# Patient Record
Sex: MALE | Race: BLACK OR AFRICAN AMERICAN | NOT HISPANIC OR LATINO | Employment: OTHER | URBAN - METROPOLITAN AREA
[De-identification: names, ages, dates, MRNs, and addresses within clinical notes are randomized per-mention and may not be internally consistent; named-entity substitution may affect disease eponyms.]

---

## 2023-03-07 LAB
ANION GAP IN SER/PLAS: 10 MMOL/L (ref 10–20)
CALCIUM (MG/DL) IN SER/PLAS: 9.1 MG/DL (ref 8.6–10.3)
CARBON DIOXIDE, TOTAL (MMOL/L) IN SER/PLAS: 30 MMOL/L (ref 21–32)
CHLORIDE (MMOL/L) IN SER/PLAS: 105 MMOL/L (ref 98–107)
CREATININE (MG/DL) IN SER/PLAS: 0.9 MG/DL (ref 0.5–1.3)
ESTIMATED AVERAGE GLUCOSE FOR HBA1C: 148 MG/DL
GFR MALE: 90 ML/MIN/1.73M2
GLUCOSE (MG/DL) IN SER/PLAS: 124 MG/DL (ref 74–99)
HEMOGLOBIN A1C/HEMOGLOBIN TOTAL IN BLOOD: 6.8 %
POTASSIUM (MMOL/L) IN SER/PLAS: 3.7 MMOL/L (ref 3.5–5.3)
SODIUM (MMOL/L) IN SER/PLAS: 141 MMOL/L (ref 136–145)
UREA NITROGEN (MG/DL) IN SER/PLAS: 13 MG/DL (ref 6–23)

## 2023-04-03 PROBLEM — E55.9 HYPOVITAMINOSIS D: Status: ACTIVE | Noted: 2023-04-03

## 2023-04-03 PROBLEM — R53.83 FATIGUE: Status: ACTIVE | Noted: 2023-04-03

## 2023-04-03 PROBLEM — N52.9 MALE ERECTILE DISORDER: Status: ACTIVE | Noted: 2023-04-03

## 2023-04-03 PROBLEM — M46.1 SACROILIITIS (CMS-HCC): Status: ACTIVE | Noted: 2023-04-03

## 2023-04-03 PROBLEM — E11.9 DIABETES MELLITUS (MULTI): Status: ACTIVE | Noted: 2023-04-03

## 2023-04-03 PROBLEM — I10 BENIGN ESSENTIAL HYPERTENSION: Status: ACTIVE | Noted: 2023-04-03

## 2023-04-03 PROBLEM — M25.562 LEFT KNEE PAIN: Status: ACTIVE | Noted: 2023-04-03

## 2023-04-03 PROBLEM — M21.70 LEG LENGTH DISCREPANCY: Status: ACTIVE | Noted: 2023-04-03

## 2023-04-03 PROBLEM — H91.91 HEARING LOSS ON RIGHT: Status: ACTIVE | Noted: 2023-04-03

## 2023-04-03 PROBLEM — R79.89 LOW VITAMIN B12 LEVEL: Status: ACTIVE | Noted: 2023-04-03

## 2023-04-03 PROBLEM — H61.21 IMPACTED CERUMEN OF RIGHT EAR: Status: ACTIVE | Noted: 2023-04-03

## 2023-04-03 PROBLEM — M17.12 LEFT KNEE DJD: Status: ACTIVE | Noted: 2023-04-03

## 2023-04-03 PROBLEM — R61 NIGHT SWEATS: Status: ACTIVE | Noted: 2023-04-03

## 2023-04-03 PROBLEM — M25.662 STIFFNESS OF LEFT KNEE: Status: ACTIVE | Noted: 2023-04-03

## 2023-04-03 PROBLEM — K92.1 HEMATOCHEZIA: Status: ACTIVE | Noted: 2023-04-03

## 2023-04-03 PROBLEM — K21.9 GERD (GASTROESOPHAGEAL REFLUX DISEASE): Status: ACTIVE | Noted: 2023-04-03

## 2023-04-03 PROBLEM — R60.9 EDEMA: Status: ACTIVE | Noted: 2023-04-03

## 2023-04-03 PROBLEM — M25.569 JOINT PAIN, KNEE: Status: ACTIVE | Noted: 2023-04-03

## 2023-04-03 PROBLEM — M54.30 SCIATICA: Status: ACTIVE | Noted: 2023-04-03

## 2023-04-03 PROBLEM — E78.5 HYPERLIPEMIA: Status: ACTIVE | Noted: 2023-04-03

## 2023-04-03 PROBLEM — E87.6 HYPOKALEMIA: Status: ACTIVE | Noted: 2023-04-03

## 2023-04-03 PROBLEM — M17.9 ARTHRITIS OF KNEE, DEGENERATIVE: Status: ACTIVE | Noted: 2023-04-03

## 2023-04-03 PROBLEM — M54.2 CERVICALGIA: Status: ACTIVE | Noted: 2023-04-03

## 2023-04-03 PROBLEM — M54.42 LOW BACK PAIN WITH LEFT-SIDED SCIATICA: Status: ACTIVE | Noted: 2023-04-03

## 2023-04-03 RX ORDER — SILDENAFIL 100 MG/1
TABLET, FILM COATED ORAL
COMMUNITY
Start: 2019-11-25

## 2023-04-03 RX ORDER — MELOXICAM 15 MG/1
1 TABLET ORAL DAILY
COMMUNITY
Start: 2022-08-10 | End: 2023-05-19

## 2023-04-03 RX ORDER — BLOOD SUGAR DIAGNOSTIC
STRIP MISCELLANEOUS 2 TIMES DAILY
COMMUNITY
Start: 2018-12-19

## 2023-04-03 RX ORDER — METFORMIN HYDROCHLORIDE 500 MG/1
2 TABLET, EXTENDED RELEASE ORAL 2 TIMES DAILY
COMMUNITY
Start: 2019-08-07 | End: 2023-05-16

## 2023-04-03 RX ORDER — ROSUVASTATIN CALCIUM 40 MG/1
1 TABLET, COATED ORAL DAILY
COMMUNITY
Start: 2015-04-28 | End: 2023-05-22

## 2023-04-03 RX ORDER — LISINOPRIL 40 MG/1
1 TABLET ORAL DAILY
COMMUNITY
Start: 2014-05-16 | End: 2023-06-26

## 2023-04-03 RX ORDER — CHLORTHALIDONE 25 MG/1
1 TABLET ORAL DAILY
COMMUNITY
Start: 2019-10-24 | End: 2023-04-20

## 2023-04-03 RX ORDER — OMEPRAZOLE 20 MG/1
1 CAPSULE, DELAYED RELEASE ORAL DAILY
COMMUNITY
Start: 2015-04-28 | End: 2023-06-26

## 2023-04-03 RX ORDER — LANOLIN ALCOHOL/MO/W.PET/CERES
1 CREAM (GRAM) TOPICAL DAILY
COMMUNITY
Start: 2018-12-14 | End: 2023-10-12

## 2023-04-03 RX ORDER — LANCETS
EACH MISCELLANEOUS 2 TIMES DAILY
COMMUNITY

## 2023-04-04 ENCOUNTER — APPOINTMENT (OUTPATIENT)
Dept: PRIMARY CARE | Facility: CLINIC | Age: 73
End: 2023-04-04
Payer: MEDICARE

## 2023-04-04 DIAGNOSIS — M54.2 CERVICALGIA: Primary | ICD-10-CM

## 2023-04-20 DIAGNOSIS — I10 ESSENTIAL (PRIMARY) HYPERTENSION: ICD-10-CM

## 2023-04-20 RX ORDER — CHLORTHALIDONE 25 MG/1
25 TABLET ORAL DAILY
Qty: 90 TABLET | Refills: 1 | Status: SHIPPED | OUTPATIENT
Start: 2023-04-20 | End: 2023-10-24

## 2023-05-15 DIAGNOSIS — E11.9 TYPE 2 DIABETES MELLITUS WITHOUT COMPLICATIONS (MULTI): ICD-10-CM

## 2023-05-16 RX ORDER — METFORMIN HYDROCHLORIDE 500 MG/1
TABLET, EXTENDED RELEASE ORAL
Qty: 360 TABLET | Refills: 3 | Status: SHIPPED | OUTPATIENT
Start: 2023-05-16 | End: 2024-04-01 | Stop reason: SDUPTHER

## 2023-05-19 DIAGNOSIS — M54.2 CERVICALGIA: ICD-10-CM

## 2023-05-19 RX ORDER — MELOXICAM 15 MG/1
TABLET ORAL
Qty: 90 TABLET | Refills: 0 | Status: SHIPPED | OUTPATIENT
Start: 2023-05-19 | End: 2023-08-15

## 2023-05-22 DIAGNOSIS — E78.5 HYPERLIPIDEMIA, UNSPECIFIED: ICD-10-CM

## 2023-05-22 RX ORDER — ROSUVASTATIN CALCIUM 40 MG/1
TABLET, COATED ORAL
Qty: 90 TABLET | Refills: 3 | Status: SHIPPED | OUTPATIENT
Start: 2023-05-22

## 2023-06-24 DIAGNOSIS — I10 ESSENTIAL (PRIMARY) HYPERTENSION: ICD-10-CM

## 2023-06-24 DIAGNOSIS — K21.9 GASTRO-ESOPHAGEAL REFLUX DISEASE WITHOUT ESOPHAGITIS: ICD-10-CM

## 2023-06-26 RX ORDER — OMEPRAZOLE 20 MG/1
CAPSULE, DELAYED RELEASE ORAL
Qty: 90 CAPSULE | Refills: 3 | Status: SHIPPED | OUTPATIENT
Start: 2023-06-26

## 2023-06-26 RX ORDER — LISINOPRIL 40 MG/1
TABLET ORAL
Qty: 90 TABLET | Refills: 3 | Status: SHIPPED | OUTPATIENT
Start: 2023-06-26

## 2023-08-14 DIAGNOSIS — M54.2 CERVICALGIA: ICD-10-CM

## 2023-08-15 RX ORDER — MELOXICAM 15 MG/1
TABLET ORAL
Qty: 90 TABLET | Refills: 0 | Status: SHIPPED | OUTPATIENT
Start: 2023-08-15 | End: 2023-11-02

## 2023-08-23 ENCOUNTER — OFFICE VISIT (OUTPATIENT)
Dept: PRIMARY CARE | Facility: CLINIC | Age: 73
End: 2023-08-23
Payer: MEDICARE

## 2023-08-23 VITALS
WEIGHT: 209 LBS | BODY MASS INDEX: 27.7 KG/M2 | RESPIRATION RATE: 16 BRPM | SYSTOLIC BLOOD PRESSURE: 118 MMHG | HEIGHT: 73 IN | OXYGEN SATURATION: 97 % | DIASTOLIC BLOOD PRESSURE: 78 MMHG | HEART RATE: 73 BPM

## 2023-08-23 DIAGNOSIS — E55.9 HYPOVITAMINOSIS D: ICD-10-CM

## 2023-08-23 DIAGNOSIS — Z00.00 MEDICARE ANNUAL WELLNESS VISIT, SUBSEQUENT: ICD-10-CM

## 2023-08-23 DIAGNOSIS — R79.89 LOW VITAMIN B12 LEVEL: ICD-10-CM

## 2023-08-23 DIAGNOSIS — I10 BENIGN ESSENTIAL HYPERTENSION: Primary | ICD-10-CM

## 2023-08-23 DIAGNOSIS — S46.911A STRAIN OF RIGHT SHOULDER, INITIAL ENCOUNTER: ICD-10-CM

## 2023-08-23 DIAGNOSIS — Z12.5 SCREENING FOR PROSTATE CANCER: ICD-10-CM

## 2023-08-23 DIAGNOSIS — E11.65 TYPE 2 DIABETES MELLITUS WITH HYPERGLYCEMIA, WITHOUT LONG-TERM CURRENT USE OF INSULIN (MULTI): ICD-10-CM

## 2023-08-23 DIAGNOSIS — E78.5 HYPERLIPIDEMIA, UNSPECIFIED HYPERLIPIDEMIA TYPE: ICD-10-CM

## 2023-08-23 PROCEDURE — 1036F TOBACCO NON-USER: CPT | Performed by: NURSE PRACTITIONER

## 2023-08-23 PROCEDURE — 4010F ACE/ARB THERAPY RXD/TAKEN: CPT | Performed by: NURSE PRACTITIONER

## 2023-08-23 PROCEDURE — 3078F DIAST BP <80 MM HG: CPT | Performed by: NURSE PRACTITIONER

## 2023-08-23 PROCEDURE — 1159F MED LIST DOCD IN RCRD: CPT | Performed by: NURSE PRACTITIONER

## 2023-08-23 PROCEDURE — 99214 OFFICE O/P EST MOD 30 MIN: CPT | Performed by: NURSE PRACTITIONER

## 2023-08-23 PROCEDURE — 1160F RVW MEDS BY RX/DR IN RCRD: CPT | Performed by: NURSE PRACTITIONER

## 2023-08-23 PROCEDURE — 3074F SYST BP LT 130 MM HG: CPT | Performed by: NURSE PRACTITIONER

## 2023-08-23 PROCEDURE — 3044F HG A1C LEVEL LT 7.0%: CPT | Performed by: NURSE PRACTITIONER

## 2023-08-23 RX ORDER — DICLOFENAC SODIUM 10 MG/G
4 GEL TOPICAL 4 TIMES DAILY
Qty: 100 G | Refills: 1 | Status: SHIPPED | OUTPATIENT
Start: 2023-08-23 | End: 2024-02-28 | Stop reason: ALTCHOICE

## 2023-08-23 ASSESSMENT — ANXIETY QUESTIONNAIRES
7. FEELING AFRAID AS IF SOMETHING AWFUL MIGHT HAPPEN: NOT AT ALL
4. TROUBLE RELAXING: NOT AT ALL
IF YOU CHECKED OFF ANY PROBLEMS ON THIS QUESTIONNAIRE, HOW DIFFICULT HAVE THESE PROBLEMS MADE IT FOR YOU TO DO YOUR WORK, TAKE CARE OF THINGS AT HOME, OR GET ALONG WITH OTHER PEOPLE: NOT DIFFICULT AT ALL
3. WORRYING TOO MUCH ABOUT DIFFERENT THINGS: NOT AT ALL
2. NOT BEING ABLE TO STOP OR CONTROL WORRYING: NOT AT ALL
6. BECOMING EASILY ANNOYED OR IRRITABLE: NOT AT ALL
1. FEELING NERVOUS, ANXIOUS, OR ON EDGE: NOT AT ALL
GAD7 TOTAL SCORE: 0
5. BEING SO RESTLESS THAT IT IS HARD TO SIT STILL: NOT AT ALL

## 2023-08-23 ASSESSMENT — PATIENT HEALTH QUESTIONNAIRE - PHQ9
SUM OF ALL RESPONSES TO PHQ9 QUESTIONS 1 AND 2: 0
1. LITTLE INTEREST OR PLEASURE IN DOING THINGS: NOT AT ALL
2. FEELING DOWN, DEPRESSED OR HOPELESS: NOT AT ALL

## 2023-08-23 ASSESSMENT — ENCOUNTER SYMPTOMS
DEPRESSION: 0
LOSS OF SENSATION IN FEET: 0
OCCASIONAL FEELINGS OF UNSTEADINESS: 0

## 2023-08-23 NOTE — PROGRESS NOTES
"Subjective   Patient ID: Jayant Holland is a 73 y.o. male who presents for Follow-up.    Patient is following up for management of multiple chronic disease.  He is a diabetic and complaining of lateral right distal shoulder discomfort with lifting of his arm above his head.  Reports that it feels like a strained muscle.  He denies preceding fall, strain or trauma.  He denies any other associated symptoms.  His most recent hemoglobin A1c was very good at 6.8%.         Review of Systems   All other systems reviewed and are negative.      Objective   /78   Pulse 73   Resp 16   Ht 1.854 m (6' 1\")   Wt 94.8 kg (209 lb)   SpO2 97%   BMI 27.57 kg/m²     Physical Exam  Constitutional:       Appearance: Normal appearance.   HENT:      Head: Normocephalic and atraumatic.      Right Ear: External ear normal.      Left Ear: External ear normal.      Nose: Nose normal.      Mouth/Throat:      Mouth: Mucous membranes are moist.   Cardiovascular:      Rate and Rhythm: Normal rate and regular rhythm.      Pulses: Normal pulses.      Heart sounds: Normal heart sounds.   Pulmonary:      Effort: Pulmonary effort is normal.      Breath sounds: Normal breath sounds.   Abdominal:      General: Abdomen is flat. Bowel sounds are normal.      Palpations: Abdomen is soft.   Musculoskeletal:         General: Normal range of motion.      Cervical back: Normal range of motion and neck supple.   Skin:     General: Skin is warm and dry.      Capillary Refill: Capillary refill takes less than 2 seconds.   Neurological:      General: No focal deficit present.      Mental Status: He is alert and oriented to person, place, and time.   Psychiatric:         Mood and Affect: Mood normal.         Behavior: Behavior normal.         Thought Content: Thought content normal.         Judgment: Judgment normal.         Assessment/Plan   Problem List Items Addressed This Visit    None         "

## 2023-09-14 ENCOUNTER — TELEPHONE (OUTPATIENT)
Dept: PRIMARY CARE | Facility: CLINIC | Age: 73
End: 2023-09-14
Payer: MEDICARE

## 2023-09-14 DIAGNOSIS — G89.29 CHRONIC BILATERAL LOW BACK PAIN WITH LEFT-SIDED SCIATICA: Primary | ICD-10-CM

## 2023-09-14 DIAGNOSIS — M54.42 CHRONIC BILATERAL LOW BACK PAIN WITH LEFT-SIDED SCIATICA: Primary | ICD-10-CM

## 2023-09-14 NOTE — TELEPHONE ENCOUNTER
He called to see if you would write a script for him to get a handicap placker. If so Please call him at [phone]

## 2023-09-22 DIAGNOSIS — R26.2 DIFFICULTY WALKING: Primary | ICD-10-CM

## 2023-10-12 DIAGNOSIS — E53.8 DEFICIENCY OF OTHER SPECIFIED B GROUP VITAMINS: ICD-10-CM

## 2023-10-12 RX ORDER — LANOLIN ALCOHOL/MO/W.PET/CERES
1000 CREAM (GRAM) TOPICAL DAILY
Qty: 90 TABLET | Refills: 3 | Status: SHIPPED | OUTPATIENT
Start: 2023-10-12

## 2023-10-21 DIAGNOSIS — I10 ESSENTIAL (PRIMARY) HYPERTENSION: ICD-10-CM

## 2023-10-24 RX ORDER — CHLORTHALIDONE 25 MG/1
25 TABLET ORAL DAILY
Qty: 90 TABLET | Refills: 1 | Status: SHIPPED | OUTPATIENT
Start: 2023-10-24 | End: 2024-02-28

## 2023-11-01 DIAGNOSIS — M54.2 CERVICALGIA: ICD-10-CM

## 2023-11-02 RX ORDER — MELOXICAM 15 MG/1
TABLET ORAL
Qty: 90 TABLET | Refills: 1 | Status: SHIPPED | OUTPATIENT
Start: 2023-11-02 | End: 2024-02-28

## 2023-11-22 DIAGNOSIS — M25.562 LEFT KNEE PAIN, UNSPECIFIED CHRONICITY: ICD-10-CM

## 2023-11-24 ENCOUNTER — ANCILLARY PROCEDURE (OUTPATIENT)
Dept: RADIOLOGY | Facility: CLINIC | Age: 73
End: 2023-11-24
Payer: MEDICARE

## 2023-11-24 DIAGNOSIS — M25.562 LEFT KNEE PAIN, UNSPECIFIED CHRONICITY: ICD-10-CM

## 2023-11-24 PROCEDURE — 73564 X-RAY EXAM KNEE 4 OR MORE: CPT | Mod: LT

## 2023-11-24 PROCEDURE — 73564 X-RAY EXAM KNEE 4 OR MORE: CPT | Mod: LEFT SIDE | Performed by: RADIOLOGY

## 2023-11-29 ENCOUNTER — OFFICE VISIT (OUTPATIENT)
Dept: ORTHOPEDIC SURGERY | Facility: CLINIC | Age: 73
End: 2023-11-29
Payer: MEDICARE

## 2023-11-29 VITALS — WEIGHT: 209 LBS | BODY MASS INDEX: 27.7 KG/M2 | HEIGHT: 73 IN

## 2023-11-29 DIAGNOSIS — M17.12 PRIMARY OSTEOARTHRITIS OF LEFT KNEE: Primary | ICD-10-CM

## 2023-11-29 PROCEDURE — 20610 DRAIN/INJ JOINT/BURSA W/O US: CPT | Performed by: STUDENT IN AN ORGANIZED HEALTH CARE EDUCATION/TRAINING PROGRAM

## 2023-11-29 PROCEDURE — 1160F RVW MEDS BY RX/DR IN RCRD: CPT | Performed by: STUDENT IN AN ORGANIZED HEALTH CARE EDUCATION/TRAINING PROGRAM

## 2023-11-29 PROCEDURE — 3044F HG A1C LEVEL LT 7.0%: CPT | Performed by: STUDENT IN AN ORGANIZED HEALTH CARE EDUCATION/TRAINING PROGRAM

## 2023-11-29 PROCEDURE — 4010F ACE/ARB THERAPY RXD/TAKEN: CPT | Performed by: STUDENT IN AN ORGANIZED HEALTH CARE EDUCATION/TRAINING PROGRAM

## 2023-11-29 PROCEDURE — 1125F AMNT PAIN NOTED PAIN PRSNT: CPT | Performed by: STUDENT IN AN ORGANIZED HEALTH CARE EDUCATION/TRAINING PROGRAM

## 2023-11-29 PROCEDURE — 99213 OFFICE O/P EST LOW 20 MIN: CPT | Performed by: STUDENT IN AN ORGANIZED HEALTH CARE EDUCATION/TRAINING PROGRAM

## 2023-11-29 PROCEDURE — 1036F TOBACCO NON-USER: CPT | Performed by: STUDENT IN AN ORGANIZED HEALTH CARE EDUCATION/TRAINING PROGRAM

## 2023-11-29 PROCEDURE — 1159F MED LIST DOCD IN RCRD: CPT | Performed by: STUDENT IN AN ORGANIZED HEALTH CARE EDUCATION/TRAINING PROGRAM

## 2023-11-29 RX ORDER — LIDOCAINE HYDROCHLORIDE 10 MG/ML
2 INJECTION INFILTRATION; PERINEURAL
Status: COMPLETED | OUTPATIENT
Start: 2023-11-29 | End: 2023-11-29

## 2023-11-29 RX ORDER — TRIAMCINOLONE ACETONIDE 40 MG/ML
40 INJECTION, SUSPENSION INTRA-ARTICULAR; INTRAMUSCULAR
Status: COMPLETED | OUTPATIENT
Start: 2023-11-29 | End: 2023-11-29

## 2023-11-29 RX ADMIN — LIDOCAINE HYDROCHLORIDE 2 ML: 10 INJECTION INFILTRATION; PERINEURAL at 17:11

## 2023-11-29 RX ADMIN — TRIAMCINOLONE ACETONIDE 40 MG: 40 INJECTION, SUSPENSION INTRA-ARTICULAR; INTRAMUSCULAR at 17:11

## 2023-11-29 ASSESSMENT — PAIN SCALES - GENERAL: PAINLEVEL_OUTOF10: 5 - MODERATE PAIN

## 2023-11-29 ASSESSMENT — PAIN - FUNCTIONAL ASSESSMENT: PAIN_FUNCTIONAL_ASSESSMENT: 0-10

## 2023-11-29 NOTE — PROGRESS NOTES
PRIMARY CARE PHYSICIAN: Jorge Luis Oconnor, APRN-CNP  ORTHOPAEDIC FOLLOW-UP: Knee Evaluation    ASSESSMENT & PLAN    Impression: 73 y.o. male with left knee osteoarthritis.    Plan:   I reviewed with the patient the nature of their diagnosis.  I reviewed their imaging studies with them.    Based on the history, physical exam and imaging studies above, the patient's presentation is consistent with consistent with the above diagnosis.  I had a long discussion with the patient regarding their presentation and the treatment options.  We discussed initial nonoperative versus operative management options as well as potential further diagnostic imaging.  We discussed continued nonoperative management.  He had good effect from the prior corticosteroid injection.  He requested a repeat injection today which he received as described below.  He tolerated this well.  He will continue to practice good weight management and focus on low impact activities.  He will work on maintaining his quadricep strength and hamstring flexibility.    Follow-Up: Patient will follow-up as needed    At the end of the visit, all questions were answered in full. The patient is in agreement with the plan and recommendations. They will call the office with any questions/concerns.    Note dictated with CloudTran software. Completed without full typed error editing and sent to avoid delay.     SUBJECTIVE  CHIEF COMPLAINT: Left knee osteoarthritis.    HPI: Jayant Holland is a 73 y.o. patient. Jayant Holland complains of Left knee pain and dysfunction related to his osteoarthritis. He would like an injection; last injection 8/10/22.  He denies any new traumatic injury.  He notes that the injection lasted for quite a while and his pain has recently returned.  He is hoping for a repeat corticosteroid injection today.    REVIEW OF SYSTEMS  Constitutional: See HPI for pain assessment, No significant weight loss, recent trauma  Cardiovascular: No  chest pain, shortness of breath  Respiratory: No difficulty breathing, cough  Gastrointestinal: No nausea, vomiting, diarrhea, constipation  Musculoskeletal: Noted in HPI, positive for pain, restricted motion, stiffness  Integumentary: No rashes, easy bruising, redness   Neurological: no numbness or tingling in extremities, no gait disturbances   Psychiatric: No mood changes, memory changes, social issues  Heme/Lymph: no excessive swelling, easy bruising, excessive bleeding  ENT: No hearing changes  Eyes: No vision changes    No past medical history on file.     No Known Allergies     Past Surgical History:   Procedure Laterality Date    COLONOSCOPY  04/28/2015    Complete Colonoscopy    KNEE SURGERY  04/28/2015    Knee Surgery        Family History   Problem Relation Name Age of Onset    Leukemia Mother      Memory loss Mother      Heart attack Father          Social History     Socioeconomic History    Marital status:      Spouse name: Not on file    Number of children: Not on file    Years of education: Not on file    Highest education level: Not on file   Occupational History    Not on file   Tobacco Use    Smoking status: Never    Smokeless tobacco: Never   Substance and Sexual Activity    Alcohol use: Never    Drug use: Never    Sexual activity: Not on file   Other Topics Concern    Not on file   Social History Narrative    Not on file     Social Determinants of Health     Financial Resource Strain: Not on file   Food Insecurity: Not on file   Transportation Needs: Not on file   Physical Activity: Not on file   Stress: Not on file   Social Connections: Not on file   Intimate Partner Violence: Not on file   Housing Stability: Not on file        CURRENT MEDICATIONS:   Current Outpatient Medications   Medication Sig Dispense Refill    blood sugar diagnostic (Accu-Chek Jessica Plus test strp) strip 2 times a day.      chlorthalidone (Hygroton) 25 mg tablet TAKE 1 TABLET BY MOUTH EVERY DAY 90 tablet 1     "cyanocobalamin (Vitamin B-12) 1,000 mcg tablet TAKE 1 TABLET BY MOUTH EVERY DAY AS DIRECTED 90 tablet 3    diclofenac sodium (Voltaren) 1 % gel gel Apply 1 Application topically 4 times a day. 100 g 1    lancets (Accu-Chek Softclix Lancets) misc 2 times a day.      lisinopril 40 mg tablet TAKE 1 TABLET BY MOUTH EVERY DAY 90 tablet 3    meloxicam (Mobic) 15 mg tablet TAKE 1 TABLET BY MOUTH EVERY DAY 90 tablet 1    metFORMIN XR (Glucophage-XR) 500 mg 24 hr tablet TAKE 2 TABLETS BY MOUTH TWICE A  tablet 3    omeprazole (PriLOSEC) 20 mg DR capsule TAKE 1 CAPSULE BY MOUTH EVERY DAY 90 capsule 3    rosuvastatin (Crestor) 40 mg tablet TAKE 1 TABLET BY MOUTH EVERY DAY 90 tablet 3    sildenafil (Viagra) 100 mg tablet Take by mouth. TAKE 1 TABLET AS NEEDED APPROXIMATELY 1 HOUR BEFORE SEXUAL ACTIVITY       No current facility-administered medications for this visit.        OBJECTIVE    PHYSICAL EXAM      4/15/2022     7:15 AM 8/4/2022    11:39 AM 8/10/2022     8:43 AM 12/9/2022    12:17 PM 2/21/2023     4:23 PM 3/14/2023     9:23 AM 8/23/2023     1:32 PM   Vitals   Systolic 120 120  120 118  118   Diastolic 80 74  72 76  78   Heart Rate 73 75  84 74  73   Resp 13 13  14   16   Height (in) 1.854 m (6' 1\") 1.854 m (6' 1\") 1.854 m (6' 1\") 1.854 m (6' 1\") 1.854 m (6' 1\") 1.852 m (6' 0.91\") 1.854 m (6' 1\")   Weight (lb) 210 200.05 200 208.4 196 198.41 209   BMI 27.71 kg/m2 26.39 kg/m2 26.39 kg/m2 27.5 kg/m2 25.86 kg/m2 26.24 kg/m2 27.57 kg/m2   BSA (m2) 2.22 m2 2.16 m2 2.16 m2 2.21 m2 2.14 m2 2.15 m2 2.21 m2   Visit Report       Report      There is no height or weight on file to calculate BMI.    GENERAL: A/Ox3, NAD. Appears healthy, well nourished  PSYCHIATRIC: Mood stable, appropriate memory recall  EYES: EOM intact, no scleral icterus  CARDIOVASCULAR: Palpable peripheral pulses  LUNGS: Breathing non-labored on room air  SKIN: no erythema, rashes, or ecchymoses     MUSCULOSKELETAL:  Laterality: left Knee Exam  - Skin " intact  - No erythema or warmth  - No ecchymosis or soft tissue swelling  - Alignment: varus  - Palpation: Positive tenderness medial and lateral joint lines, positive tenderness medial and lateral patellar facets  - ROM: 0 - 0 - 120  - Effusion: Trace  - Strength: knee extension and flexion 5/5, EHL/PF/DF motor intact  - Stability:        Anterior drawer stable       Posterior drawer stable       Varus/valgus stable       negative Lachman  -Equivocal Elma's  - Gait: Antalgic favoring left  - Hip Exam: flexion to 100+ degrees, full extension, internal/external rotation adequate, and no pain with log roll    NEUROVASCULAR:  - Neurovascular Status: sensation intact to light touch distally, lower extremity motor intact  - Capillary refill brisk at extremities, Bilateral dorsalis pedis pulse 2+    Imaging: No new imaging at this visit    L Inj/Asp: L knee on 11/29/2023 5:11 PM  Indications: pain  Details: 25 G needle, anterolateral approach  Medications: 40 mg triamcinolone acetonide 40 mg/mL; 2 mL lidocaine 10 mg/mL (1 %)  Outcome: tolerated well, no immediate complications  Procedure, treatment alternatives, risks and benefits explained, specific risks discussed. Consent was given by the patient. Immediately prior to procedure a time out was called to verify the correct patient, procedure, equipment, support staff and site/side marked as required. Patient was prepped and draped in the usual sterile fashion.               Art Mortensen MD  Attending Surgeon    Sports Medicine Orthopaedic Surgery  [facility] [employer]

## 2024-02-21 ENCOUNTER — LAB (OUTPATIENT)
Dept: LAB | Facility: LAB | Age: 74
End: 2024-02-21
Payer: MEDICARE

## 2024-02-21 DIAGNOSIS — I10 BENIGN ESSENTIAL HYPERTENSION: ICD-10-CM

## 2024-02-21 DIAGNOSIS — E78.5 HYPERLIPIDEMIA, UNSPECIFIED HYPERLIPIDEMIA TYPE: ICD-10-CM

## 2024-02-21 DIAGNOSIS — E55.9 HYPOVITAMINOSIS D: ICD-10-CM

## 2024-02-21 DIAGNOSIS — R79.89 LOW VITAMIN B12 LEVEL: ICD-10-CM

## 2024-02-21 DIAGNOSIS — E11.65 TYPE 2 DIABETES MELLITUS WITH HYPERGLYCEMIA, WITHOUT LONG-TERM CURRENT USE OF INSULIN (MULTI): ICD-10-CM

## 2024-02-21 DIAGNOSIS — Z12.5 SCREENING FOR PROSTATE CANCER: ICD-10-CM

## 2024-02-21 LAB
25(OH)D3 SERPL-MCNC: 16 NG/ML (ref 30–100)
ALBUMIN SERPL BCP-MCNC: 4.2 G/DL (ref 3.4–5)
ALP SERPL-CCNC: 66 U/L (ref 33–136)
ALT SERPL W P-5'-P-CCNC: 21 U/L (ref 10–52)
AMORPH CRY #/AREA UR COMP ASSIST: ABNORMAL /HPF
ANION GAP SERPL CALC-SCNC: 9 MMOL/L (ref 10–20)
APPEARANCE UR: ABNORMAL
AST SERPL W P-5'-P-CCNC: 18 U/L (ref 9–39)
BILIRUB SERPL-MCNC: 0.7 MG/DL (ref 0–1.2)
BILIRUB UR STRIP.AUTO-MCNC: NEGATIVE MG/DL
BUN SERPL-MCNC: 12 MG/DL (ref 6–23)
CALCIUM SERPL-MCNC: 9.6 MG/DL (ref 8.6–10.3)
CHLORIDE SERPL-SCNC: 105 MMOL/L (ref 98–107)
CHOLEST SERPL-MCNC: 183 MG/DL (ref 0–199)
CHOLESTEROL/HDL RATIO: 3
CO2 SERPL-SCNC: 32 MMOL/L (ref 21–32)
COLOR UR: YELLOW
CREAT SERPL-MCNC: 0.87 MG/DL (ref 0.5–1.3)
CREAT UR-MCNC: 393.9 MG/DL (ref 20–370)
EGFRCR SERPLBLD CKD-EPI 2021: >90 ML/MIN/1.73M*2
ERYTHROCYTE [DISTWIDTH] IN BLOOD BY AUTOMATED COUNT: 14.4 % (ref 11.5–14.5)
EST. AVERAGE GLUCOSE BLD GHB EST-MCNC: 154 MG/DL
GLUCOSE SERPL-MCNC: 147 MG/DL (ref 74–99)
GLUCOSE UR STRIP.AUTO-MCNC: NEGATIVE MG/DL
HBA1C MFR BLD: 7 %
HCT VFR BLD AUTO: 41.3 % (ref 41–52)
HDLC SERPL-MCNC: 61.2 MG/DL
HGB BLD-MCNC: 13.1 G/DL (ref 13.5–17.5)
KETONES UR STRIP.AUTO-MCNC: NEGATIVE MG/DL
LDLC SERPL CALC-MCNC: 103 MG/DL
LEUKOCYTE ESTERASE UR QL STRIP.AUTO: NEGATIVE
MCH RBC QN AUTO: 28.9 PG (ref 26–34)
MCHC RBC AUTO-ENTMCNC: 31.7 G/DL (ref 32–36)
MCV RBC AUTO: 91 FL (ref 80–100)
MICROALBUMIN UR-MCNC: 99.1 MG/L
MICROALBUMIN/CREAT UR: 25.2 UG/MG CREAT
MUCOUS THREADS #/AREA URNS AUTO: ABNORMAL /LPF
NITRITE UR QL STRIP.AUTO: NEGATIVE
NON HDL CHOLESTEROL: 122 MG/DL (ref 0–149)
NRBC BLD-RTO: 0 /100 WBCS (ref 0–0)
PH UR STRIP.AUTO: 5 [PH]
PLATELET # BLD AUTO: 165 X10*3/UL (ref 150–450)
POTASSIUM SERPL-SCNC: 3.9 MMOL/L (ref 3.5–5.3)
PROT SERPL-MCNC: 6.6 G/DL (ref 6.4–8.2)
PROT UR STRIP.AUTO-MCNC: ABNORMAL MG/DL
PSA SERPL-MCNC: 3.32 NG/ML
RBC # BLD AUTO: 4.53 X10*6/UL (ref 4.5–5.9)
RBC # UR STRIP.AUTO: NEGATIVE /UL
RBC #/AREA URNS AUTO: ABNORMAL /HPF
SODIUM SERPL-SCNC: 142 MMOL/L (ref 136–145)
SP GR UR STRIP.AUTO: 1.02
TRIGL SERPL-MCNC: 93 MG/DL (ref 0–149)
UROBILINOGEN UR STRIP.AUTO-MCNC: 2 MG/DL
VIT B12 SERPL-MCNC: 543 PG/ML (ref 211–911)
VLDL: 19 MG/DL (ref 0–40)
WBC # BLD AUTO: 5.7 X10*3/UL (ref 4.4–11.3)
WBC #/AREA URNS AUTO: ABNORMAL /HPF

## 2024-02-21 PROCEDURE — 80061 LIPID PANEL: CPT

## 2024-02-21 PROCEDURE — 36415 COLL VENOUS BLD VENIPUNCTURE: CPT

## 2024-02-21 PROCEDURE — G0103 PSA SCREENING: HCPCS

## 2024-02-21 PROCEDURE — 80053 COMPREHEN METABOLIC PANEL: CPT

## 2024-02-21 PROCEDURE — 81001 URINALYSIS AUTO W/SCOPE: CPT

## 2024-02-21 PROCEDURE — 82570 ASSAY OF URINE CREATININE: CPT

## 2024-02-21 PROCEDURE — 82306 VITAMIN D 25 HYDROXY: CPT

## 2024-02-21 PROCEDURE — 85027 COMPLETE CBC AUTOMATED: CPT

## 2024-02-21 PROCEDURE — 82607 VITAMIN B-12: CPT

## 2024-02-21 PROCEDURE — 83036 HEMOGLOBIN GLYCOSYLATED A1C: CPT

## 2024-02-21 PROCEDURE — 82043 UR ALBUMIN QUANTITATIVE: CPT

## 2024-02-27 ENCOUNTER — TELEPHONE (OUTPATIENT)
Dept: PRIMARY CARE | Facility: CLINIC | Age: 74
End: 2024-02-27
Payer: MEDICARE

## 2024-02-27 DIAGNOSIS — M54.2 CERVICALGIA: ICD-10-CM

## 2024-02-27 DIAGNOSIS — E11.65 TYPE 2 DIABETES MELLITUS WITH HYPERGLYCEMIA, WITHOUT LONG-TERM CURRENT USE OF INSULIN (MULTI): Primary | ICD-10-CM

## 2024-02-27 DIAGNOSIS — I10 ESSENTIAL (PRIMARY) HYPERTENSION: ICD-10-CM

## 2024-02-28 ENCOUNTER — OFFICE VISIT (OUTPATIENT)
Dept: PRIMARY CARE | Facility: CLINIC | Age: 74
End: 2024-02-28
Payer: MEDICARE

## 2024-02-28 VITALS
HEART RATE: 69 BPM | SYSTOLIC BLOOD PRESSURE: 118 MMHG | WEIGHT: 209 LBS | BODY MASS INDEX: 27.7 KG/M2 | OXYGEN SATURATION: 97 % | DIASTOLIC BLOOD PRESSURE: 78 MMHG | HEIGHT: 73 IN | RESPIRATION RATE: 16 BRPM

## 2024-02-28 DIAGNOSIS — K21.9 GASTROESOPHAGEAL REFLUX DISEASE WITHOUT ESOPHAGITIS: ICD-10-CM

## 2024-02-28 DIAGNOSIS — E11.42 TYPE 2 DIABETES MELLITUS WITH DIABETIC POLYNEUROPATHY, WITHOUT LONG-TERM CURRENT USE OF INSULIN (MULTI): ICD-10-CM

## 2024-02-28 DIAGNOSIS — R79.89 LOW VITAMIN B12 LEVEL: ICD-10-CM

## 2024-02-28 DIAGNOSIS — E55.9 VITAMIN D DEFICIENCY: ICD-10-CM

## 2024-02-28 DIAGNOSIS — R06.09 EXERTIONAL DYSPNEA: ICD-10-CM

## 2024-02-28 DIAGNOSIS — M46.1 INFLAMMATION OF SACROILIAC JOINT (CMS-HCC): ICD-10-CM

## 2024-02-28 DIAGNOSIS — E78.5 HYPERLIPIDEMIA, UNSPECIFIED HYPERLIPIDEMIA TYPE: ICD-10-CM

## 2024-02-28 DIAGNOSIS — I10 BENIGN ESSENTIAL HYPERTENSION: ICD-10-CM

## 2024-02-28 DIAGNOSIS — Z00.00 MEDICARE ANNUAL WELLNESS VISIT, SUBSEQUENT: Primary | ICD-10-CM

## 2024-02-28 DIAGNOSIS — N52.9 ERECTILE DYSFUNCTION, UNSPECIFIED ERECTILE DYSFUNCTION TYPE: ICD-10-CM

## 2024-02-28 PROBLEM — K52.9 ACUTE GASTROENTERITIS: Status: RESOLVED | Noted: 2023-02-09 | Resolved: 2024-02-28

## 2024-02-28 PROBLEM — Z20.822 CONTACT WITH AND (SUSPECTED) EXPOSURE TO COVID-19: Status: ACTIVE | Noted: 2023-02-09

## 2024-02-28 PROBLEM — Z77.098 EXPOSURE TO AGENT ORANGE: Status: ACTIVE | Noted: 2024-02-28

## 2024-02-28 PROBLEM — M25.669 KNEE STIFFNESS: Status: ACTIVE | Noted: 2024-02-28

## 2024-02-28 PROBLEM — R73.01 IMPAIRED FASTING GLUCOSE: Status: RESOLVED | Noted: 2024-02-28 | Resolved: 2024-02-28

## 2024-02-28 PROBLEM — M21.70 INEQUALITY OF LENGTH OF LOWER EXTREMITY: Status: ACTIVE | Noted: 2023-04-03

## 2024-02-28 PROBLEM — R73.01 IMPAIRED FASTING GLUCOSE: Status: ACTIVE | Noted: 2024-02-28

## 2024-02-28 PROBLEM — S46.919A STRAIN OF SHOULDER: Status: ACTIVE | Noted: 2023-08-23

## 2024-02-28 PROBLEM — Z77.29 CONTACT WITH AND (SUSPECTED) EXPOSURE TO OTHER HAZARDOUS SUBSTANCES: Status: ACTIVE | Noted: 2024-02-28

## 2024-02-28 PROBLEM — M17.9 OSTEOARTHRITIS OF KNEE: Status: ACTIVE | Noted: 2023-04-03

## 2024-02-28 PROBLEM — M17.12 OSTEOARTHRITIS OF LEFT KNEE: Status: ACTIVE | Noted: 2024-02-28

## 2024-02-28 PROBLEM — H91.91 HEARING LOSS OF RIGHT EAR: Status: ACTIVE | Noted: 2023-04-03

## 2024-02-28 PROBLEM — H61.20 IMPACTED CERUMEN: Status: ACTIVE | Noted: 2024-02-28

## 2024-02-28 PROBLEM — K52.9 ACUTE GASTROENTERITIS: Status: ACTIVE | Noted: 2023-02-09

## 2024-02-28 PROBLEM — R07.89 OTHER CHEST PAIN: Status: ACTIVE | Noted: 2024-02-28

## 2024-02-28 PROBLEM — M25.569 ARTHRALGIA OF KNEE: Status: ACTIVE | Noted: 2023-04-03

## 2024-02-28 PROCEDURE — 1170F FXNL STATUS ASSESSED: CPT | Performed by: NURSE PRACTITIONER

## 2024-02-28 PROCEDURE — 99214 OFFICE O/P EST MOD 30 MIN: CPT | Performed by: NURSE PRACTITIONER

## 2024-02-28 PROCEDURE — 1157F ADVNC CARE PLAN IN RCRD: CPT | Performed by: NURSE PRACTITIONER

## 2024-02-28 PROCEDURE — 3078F DIAST BP <80 MM HG: CPT | Performed by: NURSE PRACTITIONER

## 2024-02-28 PROCEDURE — 1159F MED LIST DOCD IN RCRD: CPT | Performed by: NURSE PRACTITIONER

## 2024-02-28 PROCEDURE — 3051F HG A1C>EQUAL 7.0%<8.0%: CPT | Performed by: NURSE PRACTITIONER

## 2024-02-28 PROCEDURE — 1160F RVW MEDS BY RX/DR IN RCRD: CPT | Performed by: NURSE PRACTITIONER

## 2024-02-28 PROCEDURE — G0444 DEPRESSION SCREEN ANNUAL: HCPCS | Performed by: NURSE PRACTITIONER

## 2024-02-28 PROCEDURE — G0439 PPPS, SUBSEQ VISIT: HCPCS | Performed by: NURSE PRACTITIONER

## 2024-02-28 PROCEDURE — 3049F LDL-C 100-129 MG/DL: CPT | Performed by: NURSE PRACTITIONER

## 2024-02-28 PROCEDURE — 3074F SYST BP LT 130 MM HG: CPT | Performed by: NURSE PRACTITIONER

## 2024-02-28 PROCEDURE — 1125F AMNT PAIN NOTED PAIN PRSNT: CPT | Performed by: NURSE PRACTITIONER

## 2024-02-28 PROCEDURE — 4010F ACE/ARB THERAPY RXD/TAKEN: CPT | Performed by: NURSE PRACTITIONER

## 2024-02-28 PROCEDURE — 1036F TOBACCO NON-USER: CPT | Performed by: NURSE PRACTITIONER

## 2024-02-28 PROCEDURE — 3060F POS MICROALBUMINURIA REV: CPT | Performed by: NURSE PRACTITIONER

## 2024-02-28 RX ORDER — LANCETS
1 EACH MISCELLANEOUS DAILY
Qty: 100 EACH | Refills: 3 | Status: SHIPPED | OUTPATIENT
Start: 2024-02-28

## 2024-02-28 RX ORDER — DEXTROSE 4 G
1 TABLET,CHEWABLE ORAL DAILY
Qty: 1 EACH | Refills: 0 | Status: SHIPPED | OUTPATIENT
Start: 2024-02-28 | End: 2024-03-06 | Stop reason: SDUPTHER

## 2024-02-28 RX ORDER — BLOOD-GLUCOSE METER
EACH MISCELLANEOUS
COMMUNITY
End: 2024-02-28 | Stop reason: SDUPTHER

## 2024-02-28 RX ORDER — DEXTROSE 4 G
TABLET,CHEWABLE ORAL
COMMUNITY
End: 2024-02-28 | Stop reason: SDUPTHER

## 2024-02-28 RX ORDER — IBUPROFEN 200 MG
1 CAPSULE ORAL DAILY
Qty: 100 STRIP | Refills: 3 | Status: SHIPPED | OUTPATIENT
Start: 2024-02-28 | End: 2024-03-11 | Stop reason: SDUPTHER

## 2024-02-28 RX ORDER — ALBUTEROL SULFATE 90 UG/1
2 AEROSOL, METERED RESPIRATORY (INHALATION) EVERY 4 HOURS PRN
Qty: 8 G | Refills: 0 | Status: SHIPPED | OUTPATIENT
Start: 2024-02-28 | End: 2025-02-27

## 2024-02-28 RX ORDER — ERGOCALCIFEROL 1.25 MG/1
50000 CAPSULE ORAL
Qty: 12 CAPSULE | Refills: 2 | Status: SHIPPED | OUTPATIENT
Start: 2024-02-28 | End: 2024-11-24

## 2024-02-28 RX ORDER — MELOXICAM 15 MG/1
15 TABLET ORAL DAILY
Qty: 90 TABLET | Refills: 3 | Status: SHIPPED | OUTPATIENT
Start: 2024-02-28

## 2024-02-28 RX ORDER — CHLORTHALIDONE 25 MG/1
25 TABLET ORAL DAILY
Qty: 90 TABLET | Refills: 3 | Status: SHIPPED | OUTPATIENT
Start: 2024-02-28 | End: 2024-04-01 | Stop reason: SDUPTHER

## 2024-02-28 RX ORDER — BLOOD-GLUCOSE METER
1 EACH MISCELLANEOUS DAILY
Qty: 100 STRIP | Refills: 3 | Status: SHIPPED | OUTPATIENT
Start: 2024-02-28

## 2024-02-28 ASSESSMENT — ANXIETY QUESTIONNAIRES
5. BEING SO RESTLESS THAT IT IS HARD TO SIT STILL: NOT AT ALL
7. FEELING AFRAID AS IF SOMETHING AWFUL MIGHT HAPPEN: NOT AT ALL
IF YOU CHECKED OFF ANY PROBLEMS ON THIS QUESTIONNAIRE, HOW DIFFICULT HAVE THESE PROBLEMS MADE IT FOR YOU TO DO YOUR WORK, TAKE CARE OF THINGS AT HOME, OR GET ALONG WITH OTHER PEOPLE: NOT DIFFICULT AT ALL
1. FEELING NERVOUS, ANXIOUS, OR ON EDGE: NOT AT ALL
GAD7 TOTAL SCORE: 0
6. BECOMING EASILY ANNOYED OR IRRITABLE: NOT AT ALL
3. WORRYING TOO MUCH ABOUT DIFFERENT THINGS: NOT AT ALL
4. TROUBLE RELAXING: NOT AT ALL
2. NOT BEING ABLE TO STOP OR CONTROL WORRYING: NOT AT ALL

## 2024-02-28 ASSESSMENT — ACTIVITIES OF DAILY LIVING (ADL)
BATHING: INDEPENDENT
GROCERY_SHOPPING: INDEPENDENT
TAKING_MEDICATION: INDEPENDENT
DOING_HOUSEWORK: INDEPENDENT
MANAGING_FINANCES: INDEPENDENT
DRESSING: INDEPENDENT

## 2024-02-28 ASSESSMENT — ENCOUNTER SYMPTOMS
OCCASIONAL FEELINGS OF UNSTEADINESS: 0
DEPRESSION: 0
LOSS OF SENSATION IN FEET: 0

## 2024-02-28 NOTE — PROGRESS NOTES
"Subjective   Reason for Visit: Jayant Holland is an 73 y.o. male here for a Medicare Wellness visit.     Past Medical, Surgical, and Family History reviewed and updated in chart.    Reviewed all medications by prescribing practitioner or clinical pharmacist (such as prescriptions, OTCs, herbal therapies and supplements) and documented in the medical record.    Patient is also following up for lab results review and management of multiple chronic diseases.  His lab results unremarkable, besides low vitamin D level at 16.  His hemoglobin A1c equals 7% which indicates controlled diabetes.  He is requesting for albuterol inhaler as needed for longstanding exertional shortness of breath.  He completed a screening colonoscopy on 3/14/2023 with recommendation to repeat for surveillance in 5 years.  [facility]. advises he is currently being worked up for [facility] by the [facility]. He denies acute medical complaint.        Patient Care Team:  JENNIFER Garland as PCP - General (Family Medicine)  JENNIFER Garland as PCP - [employer] PCP     Review of Systems   All other systems reviewed and are negative.      Objective   Vitals:  /78   Pulse 69   Resp 16   Ht 1.854 m (6' 1\")   Wt 94.8 kg (209 lb)   SpO2 97%   BMI 27.57 kg/m²       Physical Exam  Vitals reviewed.   Constitutional:       Appearance: Normal appearance.   HENT:      Head: Normocephalic and atraumatic.      Right Ear: Tympanic membrane, ear canal and external ear normal.      Left Ear: Tympanic membrane, ear canal and external ear normal.      Nose: Nose normal.      Mouth/Throat:      Mouth: Mucous membranes are moist.   Eyes:      Extraocular Movements: Extraocular movements intact.      Conjunctiva/sclera: Conjunctivae normal.      Pupils: Pupils are equal, round, and reactive to light.   Cardiovascular:      Rate and Rhythm: Normal rate and regular rhythm.      " Pulses: Normal pulses.      Heart sounds: Normal heart sounds.   Pulmonary:      Effort: Pulmonary effort is normal.      Breath sounds: Normal breath sounds.   Abdominal:      General: Abdomen is flat. Bowel sounds are normal.      Palpations: Abdomen is soft.   Musculoskeletal:      Cervical back: Neck supple.   Skin:     General: Skin is warm and dry.   Neurological:      General: No focal deficit present.      Mental Status: He is alert and oriented to person, place, and time.   Psychiatric:         Mood and Affect: Mood normal.         Behavior: Behavior normal.         Thought Content: Thought content normal.         Judgment: Judgment normal.         Assessment/Plan   Problem List Items Addressed This Visit       Medicare annual wellness visit, subsequent     Other Visit Diagnoses       Routine general medical examination at health care facility    -  Primary             Patient ID: Jayant Holland is a 73 y.o. male.    Procedures

## 2024-02-28 NOTE — PATIENT INSTRUCTIONS
Continue taking all current medications as prescribed, complete labs a week prior to follow up in 6 months or as needed.

## 2024-03-06 ENCOUNTER — TELEPHONE (OUTPATIENT)
Dept: PRIMARY CARE | Facility: CLINIC | Age: 74
End: 2024-03-06
Payer: COMMERCIAL

## 2024-03-06 DIAGNOSIS — E11.65 TYPE 2 DIABETES MELLITUS WITH HYPERGLYCEMIA, WITHOUT LONG-TERM CURRENT USE OF INSULIN (MULTI): ICD-10-CM

## 2024-03-06 RX ORDER — INSULIN PUMP SYRINGE, 3 ML
1 EACH MISCELLANEOUS AS NEEDED
COMMUNITY

## 2024-03-06 RX ORDER — DEXTROSE 4 G
1 TABLET,CHEWABLE ORAL DAILY
Qty: 1 EACH | Refills: 0 | Status: SHIPPED | OUTPATIENT
Start: 2024-03-06

## 2024-03-07 ENCOUNTER — TELEPHONE (OUTPATIENT)
Dept: PRIMARY CARE | Facility: CLINIC | Age: 74
End: 2024-03-07
Payer: COMMERCIAL

## 2024-03-07 DIAGNOSIS — E11.65 TYPE 2 DIABETES MELLITUS WITH HYPERGLYCEMIA, WITHOUT LONG-TERM CURRENT USE OF INSULIN (MULTI): Primary | ICD-10-CM

## 2024-03-07 RX ORDER — BLOOD-GLUCOSE CONTROL, NORMAL
EACH MISCELLANEOUS
COMMUNITY
End: 2024-03-07 | Stop reason: SDUPTHER

## 2024-03-07 NOTE — TELEPHONE ENCOUNTER
Refill on One touch delica plus lancets    To [facility] [place]    (Other lancets sent in, she cannot use in his machine)

## 2024-03-08 RX ORDER — BLOOD-GLUCOSE CONTROL, NORMAL
1 EACH MISCELLANEOUS 3 TIMES DAILY
Qty: 200 EACH | Refills: 3 | Status: SHIPPED | OUTPATIENT
Start: 2024-03-08

## 2024-03-11 ENCOUNTER — TELEPHONE (OUTPATIENT)
Dept: PRIMARY CARE | Facility: CLINIC | Age: 74
End: 2024-03-11
Payer: COMMERCIAL

## 2024-03-11 DIAGNOSIS — E11.42 TYPE 2 DIABETES MELLITUS WITH DIABETIC POLYNEUROPATHY, WITHOUT LONG-TERM CURRENT USE OF INSULIN (MULTI): ICD-10-CM

## 2024-03-11 RX ORDER — IBUPROFEN 200 MG
1 CAPSULE ORAL DAILY
Qty: 100 STRIP | Refills: 3 | Status: SHIPPED | OUTPATIENT
Start: 2024-03-11

## 2024-03-11 NOTE — TELEPHONE ENCOUNTER
Med Refill  blood sugar diagnostic (Blood Glucose Test) strip [[id number]]   TOUCH VERIO REFLECT - compatible     [id number] [place] [address]  [address] [address]  Phone: [phone]  Fax: [phone]

## 2024-04-01 DIAGNOSIS — I10 ESSENTIAL (PRIMARY) HYPERTENSION: ICD-10-CM

## 2024-04-01 DIAGNOSIS — E11.9 TYPE 2 DIABETES MELLITUS WITHOUT COMPLICATIONS (MULTI): ICD-10-CM

## 2024-04-01 RX ORDER — METFORMIN HYDROCHLORIDE 500 MG/1
1000 TABLET, EXTENDED RELEASE ORAL 2 TIMES DAILY
Qty: 360 TABLET | Refills: 3 | Status: SHIPPED | OUTPATIENT
Start: 2024-04-01

## 2024-04-01 RX ORDER — CHLORTHALIDONE 25 MG/1
25 TABLET ORAL DAILY
Qty: 90 TABLET | Refills: 3 | Status: SHIPPED | OUTPATIENT
Start: 2024-04-01

## 2024-06-22 DIAGNOSIS — K21.9 GASTRO-ESOPHAGEAL REFLUX DISEASE WITHOUT ESOPHAGITIS: ICD-10-CM

## 2024-06-22 DIAGNOSIS — E78.5 HYPERLIPIDEMIA, UNSPECIFIED: ICD-10-CM

## 2024-06-22 DIAGNOSIS — I10 ESSENTIAL (PRIMARY) HYPERTENSION: ICD-10-CM

## 2024-06-23 DIAGNOSIS — R06.09 EXERTIONAL DYSPNEA: ICD-10-CM

## 2024-06-24 RX ORDER — LISINOPRIL 40 MG/1
TABLET ORAL
Qty: 90 TABLET | Refills: 3 | Status: SHIPPED | OUTPATIENT
Start: 2024-06-24

## 2024-06-24 RX ORDER — ALBUTEROL SULFATE 90 UG/1
2 AEROSOL, METERED RESPIRATORY (INHALATION) EVERY 4 HOURS PRN
Qty: 18 G | Refills: 3 | Status: SHIPPED | OUTPATIENT
Start: 2024-06-24 | End: 2025-06-24

## 2024-06-24 RX ORDER — OMEPRAZOLE 20 MG/1
CAPSULE, DELAYED RELEASE ORAL
Qty: 90 CAPSULE | Refills: 3 | Status: SHIPPED | OUTPATIENT
Start: 2024-06-24

## 2024-06-24 RX ORDER — ROSUVASTATIN CALCIUM 40 MG/1
TABLET, COATED ORAL
Qty: 90 TABLET | Refills: 3 | Status: SHIPPED | OUTPATIENT
Start: 2024-06-24

## 2024-06-25 DIAGNOSIS — M54.2 CERVICALGIA: ICD-10-CM

## 2024-06-25 RX ORDER — MELOXICAM 15 MG/1
15 TABLET ORAL DAILY
Qty: 90 TABLET | Refills: 3 | Status: SHIPPED | OUTPATIENT
Start: 2024-06-25

## 2024-06-25 NOTE — TELEPHONE ENCOUNTER
----- Message from Jayant Holland sent at 6/25/2024  6:50 AM EDT -----  Regarding: Medication Refill  Contact: [phone]  I need a refill for my meloxicam 15 mg tablet  Commonly known as: Mobic sent to [facility].  Thank you

## 2024-08-26 ENCOUNTER — OFFICE VISIT (OUTPATIENT)
Dept: ORTHOPEDIC SURGERY | Facility: CLINIC | Age: 74
End: 2024-08-26
Payer: COMMERCIAL

## 2024-08-26 ENCOUNTER — HOSPITAL ENCOUNTER (OUTPATIENT)
Dept: RADIOLOGY | Facility: CLINIC | Age: 74
Discharge: HOME | End: 2024-08-26
Payer: COMMERCIAL

## 2024-08-26 VITALS — HEIGHT: 73 IN | WEIGHT: 209 LBS | BODY MASS INDEX: 27.7 KG/M2

## 2024-08-26 DIAGNOSIS — M17.11 OSTEOARTHRITIS OF RIGHT KNEE, UNSPECIFIED OSTEOARTHRITIS TYPE: Primary | ICD-10-CM

## 2024-08-26 DIAGNOSIS — M25.561 RIGHT KNEE PAIN, UNSPECIFIED CHRONICITY: ICD-10-CM

## 2024-08-26 PROCEDURE — 73564 X-RAY EXAM KNEE 4 OR MORE: CPT | Mod: RT

## 2024-08-26 PROCEDURE — 20610 DRAIN/INJ JOINT/BURSA W/O US: CPT

## 2024-08-26 PROCEDURE — 3060F POS MICROALBUMINURIA REV: CPT

## 2024-08-26 PROCEDURE — 3049F LDL-C 100-129 MG/DL: CPT

## 2024-08-26 PROCEDURE — 1157F ADVNC CARE PLAN IN RCRD: CPT

## 2024-08-26 PROCEDURE — 1159F MED LIST DOCD IN RCRD: CPT

## 2024-08-26 PROCEDURE — 3008F BODY MASS INDEX DOCD: CPT

## 2024-08-26 PROCEDURE — 1036F TOBACCO NON-USER: CPT

## 2024-08-26 PROCEDURE — 99214 OFFICE O/P EST MOD 30 MIN: CPT

## 2024-08-26 PROCEDURE — 1160F RVW MEDS BY RX/DR IN RCRD: CPT

## 2024-08-26 PROCEDURE — 4010F ACE/ARB THERAPY RXD/TAKEN: CPT

## 2024-08-26 PROCEDURE — 3051F HG A1C>EQUAL 7.0%<8.0%: CPT

## 2024-08-26 ASSESSMENT — PAIN SCALES - GENERAL: PAINLEVEL_OUTOF10: 5 - MODERATE PAIN

## 2024-08-26 ASSESSMENT — PAIN - FUNCTIONAL ASSESSMENT: PAIN_FUNCTIONAL_ASSESSMENT: 0-10

## 2024-08-26 NOTE — PROGRESS NOTES
PRIMARY CARE PHYSICIAN: Jorge Luis Oconnor, APRN-CNP  ORTHOPAEDIC FOLLOW-UP: Knee Evaluation    ASSESSMENT & PLAN    Impression: 74 y.o. male with right knee osteoarthritis.    Plan:   I reviewed with the patient the nature of their diagnosis.  I reviewed their imaging studies with them.    Based on the history, physical exam and imaging studies above, the patient's presentation is consistent with consistent with the above diagnosis.  I had a long discussion with the patient regarding their presentation and the treatment options.  We discussed initial nonoperative versus operative management options as well as potential further diagnostic imaging.  Patient would like to proceed with continued non operative management for both knees. He had good effect from prior corticosteroid injections into the left knee. As his right knee is currently the issue, he was provided with a corticosteroid injection into the right knee as described below and tolerated well. Patient will continue to practice good weight management and focus on low impact activities. He will work on maintaining his quadricep strength and hamstring flexibility and work on his home exercises. He will ice, rest and elevate the knee as he needs.    Follow-Up: Patient will follow-up as needed    At the end of the visit, all questions were answered in full. The patient is in agreement with the plan and recommendations. They will call the office with any questions/concerns.    Note dictated with Footnote software. Completed without full typed error editing and sent to avoid delay.     SUBJECTIVE  CHIEF COMPLAINT: Right knee osteoarthritis.    HPI: Jayant Holland is a 74 y.o. patient. Jayant Holland complains of Right knee pain and dysfunction related to his osteoarthritis. He would like an injection. Jayant is a known patient to Dr. Mortensen and has had multiple injections in his left knee in the past that have helped. Last injection into the left knee  done 11/29/2023. No prior injections into the right knee. He would now like to try an injection in his right knee today.  He denies any new traumatic injury.  He has had on and off right knee pain for a while now but states his right knee has progressively worsened for about a month and last Friday he was not able to put weight on it all without severe pain.  Pain is localized diffusely to the knee. He his taking oral anti-inflammatories and resting as he needs.    REVIEW OF SYSTEMS  Constitutional: See HPI for pain assessment, No significant weight loss, recent trauma  Cardiovascular: No chest pain, shortness of breath  Respiratory: No difficulty breathing, cough  Gastrointestinal: No nausea, vomiting, diarrhea, constipation  Musculoskeletal: Noted in HPI, positive for pain, restricted motion, stiffness  Integumentary: No rashes, easy bruising, redness   Neurological: no numbness or tingling in extremities, no gait disturbances   Psychiatric: No mood changes, memory changes, social issues  Heme/Lymph: no excessive swelling, easy bruising, excessive bleeding  ENT: No hearing changes  Eyes: No vision changes    No past medical history on file.     No Known Allergies     Past Surgical History:   Procedure Laterality Date    COLONOSCOPY  04/28/2015    Complete Colonoscopy    KNEE SURGERY  04/28/2015    Knee Surgery        Family History   Problem Relation Name Age of Onset    Leukemia Mother      Memory loss Mother      Heart attack Father          Social History     Socioeconomic History    Marital status:      Spouse name: Not on file    Number of children: Not on file    Years of education: Not on file    Highest education level: Not on file   Occupational History    Not on file   Tobacco Use    Smoking status: Never    Smokeless tobacco: Never   Substance and Sexual Activity    Alcohol use: Never    Drug use: Never    Sexual activity: Not on file   Other Topics Concern    Not on file   Social History  Narrative    Not on file     Social Determinants of Health     Financial Resource Strain: Not on file   Food Insecurity: Not on file   Transportation Needs: Not on file   Physical Activity: Not on file   Stress: Not on file   Social Connections: Not on file   Intimate Partner Violence: Not on file   Housing Stability: Not on file        CURRENT MEDICATIONS:   Current Outpatient Medications   Medication Sig Dispense Refill    albuterol 90 mcg/actuation inhaler INHALE 2 PUFFS EVERY 4 HOURS IF NEEDED FOR WHEEZING OR SHORTNESS OF BREATH. 18 g 3    Blood glucose monitoring meter kit kit 1 each if needed.      blood sugar diagnostic (Accu-Chek Jessica Plus test strp) strip 2 times a day.      blood sugar diagnostic (Blood Glucose Test) strip 1 strip once daily. 100 strip 3    blood sugar diagnostic (OneTouch Verio test strips) strip 1 strip once daily. 100 strip 3    blood-glucose meter misc 1 Device once daily. 1 each 0    chlorthalidone (Hygroton) 25 mg tablet Take 1 tablet (25 mg) by mouth once daily. 90 tablet 3    cyanocobalamin (Vitamin B-12) 1,000 mcg tablet TAKE 1 TABLET BY MOUTH EVERY DAY AS DIRECTED 90 tablet 3    ergocalciferol (Vitamin D-2) 1.25 MG (75410 UT) capsule Take 1 capsule (50,000 Units) by mouth 1 (one) time per week. 12 capsule 2    lancets (Accu-Chek Softclix Lancets) misc 2 times a day.      lancets (OneTouch UltraSoft Lancets) misc 1 Device once daily. 100 each 3    lancets 30 gauge misc 1 Device 3 times a day. 200 each 3    lancets misc Inject 1 Device under the skin once daily. 100 each 3    lisinopril 40 mg tablet TAKE 1 TABLET BY MOUTH EVERY DAY 90 tablet 3    meloxicam (Mobic) 15 mg tablet Take 1 tablet (15 mg) by mouth once daily. 90 tablet 3    metFORMIN  mg 24 hr tablet Take 2 tablets (1,000 mg) by mouth 2 times a day. Do not crush, chew, or split. 360 tablet 3    omeprazole (PriLOSEC) 20 mg DR capsule TAKE 1 CAPSULE BY MOUTH EVERY DAY 90 capsule 3    rosuvastatin (Crestor) 40 mg  "tablet TAKE 1 TABLET BY MOUTH EVERY DAY 90 tablet 3    sildenafil (Viagra) 100 mg tablet Take by mouth. TAKE 1 TABLET AS NEEDED APPROXIMATELY 1 HOUR BEFORE SEXUAL ACTIVITY       No current facility-administered medications for this visit.        OBJECTIVE    PHYSICAL EXAM      8/10/2022     8:43 AM 12/9/2022    12:17 PM 2/21/2023     4:23 PM 3/14/2023     9:23 AM 8/23/2023     1:32 PM 11/29/2023    11:58 AM 2/28/2024     7:54 AM   Vitals   Systolic  120 118  118  118   Diastolic  72 76  78  78   Heart Rate  84 74  73  69   Resp  14   16  16   Height (in) 1.854 m (6' 1\") 1.854 m (6' 1\") 1.854 m (6' 1\") 1.852 m (6' 0.91\") 1.854 m (6' 1\") 1.854 m (6' 1\") 1.854 m (6' 1\")   Weight (lb) 200 208.4 196 198.41 209 209 209   BMI 26.39 kg/m2 27.5 kg/m2 25.86 kg/m2 26.24 kg/m2 27.57 kg/m2 27.57 kg/m2 27.57 kg/m2   BSA (m2) 2.16 m2 2.21 m2 2.14 m2 2.15 m2 2.21 m2 2.21 m2 2.21 m2   Visit Report     Report Report Report      There is no height or weight on file to calculate BMI.    GENERAL: A/Ox3, NAD. Appears healthy, well nourished  PSYCHIATRIC: Mood stable, appropriate memory recall  EYES: EOM intact, no scleral icterus  CARDIOVASCULAR: Palpable peripheral pulses  LUNGS: Breathing non-labored on room air  SKIN: no erythema, rashes, or ecchymoses     MUSCULOSKELETAL:  Laterality: Right Knee Exam  - Skin intact  - No erythema or warmth  - No ecchymosis or soft tissue swelling  - Alignment: varus  - Palpation: Positive tenderness medial and lateral joint lines, positive tenderness medial and lateral patellar facets  - ROM: 0 - 0 - 120  - Effusion: Trace  - Strength: knee extension and flexion 5/5, EHL/PF/DF motor intact  - Stability:        Anterior drawer stable       Posterior drawer stable       Varus/valgus stable       negative Lachman  -Equivocal Elma's  - Gait: Antalgic favoring left  - Hip Exam: flexion to 100+ degrees, full extension, internal/external rotation adequate, and no pain with log roll    NEUROVASCULAR:  - " Neurovascular Status: sensation intact to light touch distally, lower extremity motor intact  - Capillary refill brisk at extremities, Bilateral dorsalis pedis pulse 2+    Imaging: Multiple views of the affected right knee(s) demonstrate: no acute osseous abnormality, no fracture, moderate tri-compartmental degenerative changes most prominent at medial joint line.   X-rays were personally reviewed and interpreted by me.  Radiology reports were reviewed by me as well, if readily available at the time.    L Inj/Asp: R knee on 8/26/2024 10:51 AM  Indications: pain  Details: 25 G needle, anterolateral approach  Medications: 40 mg triamcinolone acetonide 40 mg/mL; 2 mL lidocaine 10 mg/mL (1 %)  Outcome: tolerated well, no immediate complications  Procedure, treatment alternatives, risks and benefits explained, specific risks discussed. Consent was given by the patient. Immediately prior to procedure a time out was called to verify the correct patient, procedure, equipment, support staff and site/side marked as required. Patient was prepped and draped in the usual sterile fashion.

## 2024-08-26 NOTE — LETTER
August 26, 2024     Patient: Jayant Holland   YOB: 1950   Date of Visit: 8/26/2024       To Whom it May Concern:    Jayant Holland was seen in my clinic on 8/26/2024. Please excuse him from work on   8/23/2024 and today. Any questions or concerns please call us at [phone]        Sincerely,          Gabrielle LoPresti, PA-C        CC: No Recipients

## 2024-08-28 ENCOUNTER — APPOINTMENT (OUTPATIENT)
Dept: PRIMARY CARE | Facility: CLINIC | Age: 74
End: 2024-08-28
Payer: COMMERCIAL

## 2024-08-28 RX ORDER — TRIAMCINOLONE ACETONIDE 40 MG/ML
40 INJECTION, SUSPENSION INTRA-ARTICULAR; INTRAMUSCULAR
Status: COMPLETED | OUTPATIENT
Start: 2024-08-26 | End: 2024-08-26

## 2024-08-28 RX ORDER — LIDOCAINE HYDROCHLORIDE 10 MG/ML
2 INJECTION INFILTRATION; PERINEURAL
Status: COMPLETED | OUTPATIENT
Start: 2024-08-26 | End: 2024-08-26

## 2024-09-05 ENCOUNTER — APPOINTMENT (OUTPATIENT)
Dept: PRIMARY CARE | Facility: CLINIC | Age: 74
End: 2024-09-05
Payer: COMMERCIAL

## 2024-09-17 ENCOUNTER — APPOINTMENT (OUTPATIENT)
Dept: PRIMARY CARE | Facility: CLINIC | Age: 74
End: 2024-09-17
Payer: COMMERCIAL

## 2024-10-02 ENCOUNTER — LAB (OUTPATIENT)
Dept: LAB | Facility: LAB | Age: 74
End: 2024-10-02
Payer: COMMERCIAL

## 2024-10-02 DIAGNOSIS — E55.9 VITAMIN D DEFICIENCY: ICD-10-CM

## 2024-10-02 DIAGNOSIS — E11.42 TYPE 2 DIABETES MELLITUS WITH DIABETIC POLYNEUROPATHY, WITHOUT LONG-TERM CURRENT USE OF INSULIN: ICD-10-CM

## 2024-10-02 LAB
25(OH)D3 SERPL-MCNC: 31 NG/ML (ref 30–100)
ANION GAP SERPL CALC-SCNC: 12 MMOL/L (ref 10–20)
BUN SERPL-MCNC: 12 MG/DL (ref 6–23)
CALCIUM SERPL-MCNC: 9.2 MG/DL (ref 8.6–10.3)
CHLORIDE SERPL-SCNC: 105 MMOL/L (ref 98–107)
CO2 SERPL-SCNC: 29 MMOL/L (ref 21–32)
CREAT SERPL-MCNC: 0.91 MG/DL (ref 0.5–1.3)
EGFRCR SERPLBLD CKD-EPI 2021: 88 ML/MIN/1.73M*2
EST. AVERAGE GLUCOSE BLD GHB EST-MCNC: 137 MG/DL
GLUCOSE SERPL-MCNC: 130 MG/DL (ref 74–99)
HBA1C MFR BLD: 6.4 %
POTASSIUM SERPL-SCNC: 3.9 MMOL/L (ref 3.5–5.3)
SODIUM SERPL-SCNC: 142 MMOL/L (ref 136–145)

## 2024-10-02 PROCEDURE — 80048 BASIC METABOLIC PNL TOTAL CA: CPT

## 2024-10-02 PROCEDURE — 36415 COLL VENOUS BLD VENIPUNCTURE: CPT

## 2024-10-02 PROCEDURE — 83036 HEMOGLOBIN GLYCOSYLATED A1C: CPT

## 2024-10-02 PROCEDURE — 82306 VITAMIN D 25 HYDROXY: CPT

## 2024-10-08 ENCOUNTER — APPOINTMENT (OUTPATIENT)
Dept: PRIMARY CARE | Facility: CLINIC | Age: 74
End: 2024-10-08
Payer: COMMERCIAL

## 2024-10-08 VITALS
DIASTOLIC BLOOD PRESSURE: 80 MMHG | WEIGHT: 208 LBS | HEIGHT: 73 IN | BODY MASS INDEX: 27.57 KG/M2 | SYSTOLIC BLOOD PRESSURE: 126 MMHG | HEART RATE: 77 BPM | OXYGEN SATURATION: 97 %

## 2024-10-08 DIAGNOSIS — E11.42 TYPE 2 DIABETES MELLITUS WITH DIABETIC POLYNEUROPATHY, WITHOUT LONG-TERM CURRENT USE OF INSULIN: Primary | ICD-10-CM

## 2024-10-08 DIAGNOSIS — K21.9 GASTROESOPHAGEAL REFLUX DISEASE WITHOUT ESOPHAGITIS: ICD-10-CM

## 2024-10-08 DIAGNOSIS — Z00.00 MEDICARE ANNUAL WELLNESS VISIT, SUBSEQUENT: ICD-10-CM

## 2024-10-08 DIAGNOSIS — E78.5 HYPERLIPIDEMIA, UNSPECIFIED HYPERLIPIDEMIA TYPE: ICD-10-CM

## 2024-10-08 DIAGNOSIS — E55.9 VITAMIN D DEFICIENCY: ICD-10-CM

## 2024-10-08 DIAGNOSIS — R79.89 LOW VITAMIN B12 LEVEL: ICD-10-CM

## 2024-10-08 DIAGNOSIS — I10 BENIGN ESSENTIAL HYPERTENSION: ICD-10-CM

## 2024-10-08 PROCEDURE — 3074F SYST BP LT 130 MM HG: CPT | Performed by: NURSE PRACTITIONER

## 2024-10-08 PROCEDURE — 4010F ACE/ARB THERAPY RXD/TAKEN: CPT | Performed by: NURSE PRACTITIONER

## 2024-10-08 PROCEDURE — 3049F LDL-C 100-129 MG/DL: CPT | Performed by: NURSE PRACTITIONER

## 2024-10-08 PROCEDURE — 3079F DIAST BP 80-89 MM HG: CPT | Performed by: NURSE PRACTITIONER

## 2024-10-08 PROCEDURE — 1160F RVW MEDS BY RX/DR IN RCRD: CPT | Performed by: NURSE PRACTITIONER

## 2024-10-08 PROCEDURE — 3044F HG A1C LEVEL LT 7.0%: CPT | Performed by: NURSE PRACTITIONER

## 2024-10-08 PROCEDURE — 1036F TOBACCO NON-USER: CPT | Performed by: NURSE PRACTITIONER

## 2024-10-08 PROCEDURE — 3060F POS MICROALBUMINURIA REV: CPT | Performed by: NURSE PRACTITIONER

## 2024-10-08 PROCEDURE — 1157F ADVNC CARE PLAN IN RCRD: CPT | Performed by: NURSE PRACTITIONER

## 2024-10-08 PROCEDURE — 99214 OFFICE O/P EST MOD 30 MIN: CPT | Performed by: NURSE PRACTITIONER

## 2024-10-08 PROCEDURE — 1159F MED LIST DOCD IN RCRD: CPT | Performed by: NURSE PRACTITIONER

## 2024-10-08 PROCEDURE — 3008F BODY MASS INDEX DOCD: CPT | Performed by: NURSE PRACTITIONER

## 2024-10-08 ASSESSMENT — ENCOUNTER SYMPTOMS
OCCASIONAL FEELINGS OF UNSTEADINESS: 0
SORE THROAT: 1
LOSS OF SENSATION IN FEET: 0
DEPRESSION: 0

## 2024-10-08 ASSESSMENT — PATIENT HEALTH QUESTIONNAIRE - PHQ9
2. FEELING DOWN, DEPRESSED OR HOPELESS: NOT AT ALL
1. LITTLE INTEREST OR PLEASURE IN DOING THINGS: NOT AT ALL
SUM OF ALL RESPONSES TO PHQ9 QUESTIONS 1 AND 2: 0

## 2024-10-08 ASSESSMENT — COLUMBIA-SUICIDE SEVERITY RATING SCALE - C-SSRS
6. HAVE YOU EVER DONE ANYTHING, STARTED TO DO ANYTHING, OR PREPARED TO DO ANYTHING TO END YOUR LIFE?: NO
1. IN THE PAST MONTH, HAVE YOU WISHED YOU WERE DEAD OR WISHED YOU COULD GO TO SLEEP AND NOT WAKE UP?: NO
2. HAVE YOU ACTUALLY HAD ANY THOUGHTS OF KILLING YOURSELF?: NO

## 2024-10-08 NOTE — PROGRESS NOTES
"Subjective   Patient ID: Jayant Holland is a 74 y.o. male who presents for Follow-up.    Patient is following up for lab results review and management of multiple chronic diseases.  His lab results shows elevated hemoglobin A1c of 6.4%.  Advised he is compliant with his medication as prescribed with no side effect noted.  Reports that he just buried his mother last week, she was 93 years old with dementia.  Advises he developed a sore throat last weekend with no associated symptom. He started taking Mucinex this morning.  I recommend that he gets tested for COVID.  He denies any other acute medical complaint.         Review of Systems   HENT:  Positive for sore throat.    All other systems reviewed and are negative.      Objective   /80   Pulse 77   Ht 1.854 m (6' 1\")   Wt 94.3 kg (208 lb)   SpO2 97%   BMI 27.44 kg/m²     Physical Exam  Vitals reviewed.   Constitutional:       Appearance: Normal appearance.   HENT:      Head: Normocephalic and atraumatic.      Right Ear: External ear normal.      Left Ear: External ear normal.      Nose: Nose normal.      Mouth/Throat:      Mouth: Mucous membranes are dry.   Cardiovascular:      Rate and Rhythm: Normal rate and regular rhythm.      Pulses: Normal pulses.      Heart sounds: Normal heart sounds.   Pulmonary:      Effort: Pulmonary effort is normal.      Breath sounds: Normal breath sounds.   Abdominal:      General: Abdomen is flat. Bowel sounds are normal.      Palpations: Abdomen is soft.   Musculoskeletal:      Cervical back: Neck supple.   Skin:     General: Skin is warm and dry.      Capillary Refill: Capillary refill takes more than 3 seconds.   Neurological:      General: No focal deficit present.      Mental Status: He is alert and oriented to person, place, and time.   Psychiatric:         Mood and Affect: Mood normal.         Behavior: Behavior normal.         Thought Content: Thought content normal.         Judgment: Judgment normal. "         Assessment/Plan   Problem List Items Addressed This Visit       Diabetes mellitus (Multi)

## 2024-10-08 NOTE — PATIENT INSTRUCTIONS
Your hemoglobin A1c is very good at 6.4%.  Continue taking all current medications as prescribed and complete labs a few days prior to 5-month follow-up for annual Medicare wellness exam.

## 2024-10-21 ENCOUNTER — APPOINTMENT (OUTPATIENT)
Dept: ORTHOPEDIC SURGERY | Facility: CLINIC | Age: 74
End: 2024-10-21
Payer: COMMERCIAL

## 2024-10-21 VITALS — WEIGHT: 208 LBS | HEIGHT: 73 IN | BODY MASS INDEX: 27.57 KG/M2

## 2024-10-21 DIAGNOSIS — G89.29 CHRONIC PAIN OF LEFT KNEE: ICD-10-CM

## 2024-10-21 DIAGNOSIS — M25.562 CHRONIC PAIN OF LEFT KNEE: ICD-10-CM

## 2024-10-21 DIAGNOSIS — M17.12 PRIMARY OSTEOARTHRITIS OF LEFT KNEE: Primary | ICD-10-CM

## 2024-10-21 PROCEDURE — 3008F BODY MASS INDEX DOCD: CPT

## 2024-10-21 PROCEDURE — 1036F TOBACCO NON-USER: CPT

## 2024-10-21 PROCEDURE — 1157F ADVNC CARE PLAN IN RCRD: CPT

## 2024-10-21 PROCEDURE — 4010F ACE/ARB THERAPY RXD/TAKEN: CPT

## 2024-10-21 PROCEDURE — 3049F LDL-C 100-129 MG/DL: CPT

## 2024-10-21 PROCEDURE — 1159F MED LIST DOCD IN RCRD: CPT

## 2024-10-21 PROCEDURE — 1160F RVW MEDS BY RX/DR IN RCRD: CPT

## 2024-10-21 PROCEDURE — 3060F POS MICROALBUMINURIA REV: CPT

## 2024-10-21 PROCEDURE — 20610 DRAIN/INJ JOINT/BURSA W/O US: CPT

## 2024-10-21 PROCEDURE — 3044F HG A1C LEVEL LT 7.0%: CPT

## 2024-10-21 PROCEDURE — 99213 OFFICE O/P EST LOW 20 MIN: CPT

## 2024-10-21 RX ORDER — LIDOCAINE HYDROCHLORIDE 10 MG/ML
2 INJECTION, SOLUTION INFILTRATION; PERINEURAL
Status: COMPLETED | OUTPATIENT
Start: 2024-10-21 | End: 2024-10-21

## 2024-10-21 RX ORDER — TRIAMCINOLONE ACETONIDE 40 MG/ML
40 INJECTION, SUSPENSION INTRA-ARTICULAR; INTRAMUSCULAR
Status: COMPLETED | OUTPATIENT
Start: 2024-10-21 | End: 2024-10-21

## 2024-10-21 NOTE — PROGRESS NOTES
PRIMARY CARE PHYSICIAN: Jorge Luis Oconnor, APRN-CNP  ORTHOPAEDIC FOLLOW-UP: Knee Evaluation    ASSESSMENT & PLAN    Impression: 74 y.o. male with left knee osteoarthritis.    Plan:   I reviewed with the patient the nature of their diagnosis.  I reviewed their imaging studies with them.    Based on the history, physical exam and imaging studies above, the patient's presentation is consistent with consistent with the above diagnosis.  I had a long discussion with the patient regarding their presentation and the treatment options.  We discussed initial nonoperative versus operative management options as well as potential further diagnostic imaging.  Patient would like to continue to proceed with non operative management. He had good effect from his last corticosteroid injection done on 11/29/2023.  He requested a repeat injection today which he received as described below and tolerated well. He will continue to practice good weight management and focus on low impact activities. He will utilize a knee compression sleeve. He will work on maintaining his quadricep strength and hamstring flexibility and follow up with us as needed for persistent pain.    Follow-Up: Patient will follow-up as needed    At the end of the visit, all questions were answered in full. The patient is in agreement with the plan and recommendations. They will call the office with any questions/concerns.    Note dictated with [employer] software. Completed without full typed error editing and sent to avoid delay.     SUBJECTIVE  CHIEF COMPLAINT: Left knee osteoarthritis.    HPI: Jayant Holland is a 74 y.o. patient. Jayant Holland complains of chronic Left knee pain and dysfunction related to his osteoarthritis. He would like an injection; last injection into the Left knee was done 11/29/23. He denies any new traumatic injury.  He notes that the injection lasted for quite a while and his pain has recently returned.  He is hoping for a repeat  corticosteroid injection today.    REVIEW OF SYSTEMS  Constitutional: See HPI for pain assessment, No significant weight loss, recent trauma  Cardiovascular: No chest pain, shortness of breath  Respiratory: No difficulty breathing, cough  Gastrointestinal: No nausea, vomiting, diarrhea, constipation  Musculoskeletal: Noted in HPI, positive for pain, restricted motion, stiffness  Integumentary: No rashes, easy bruising, redness   Neurological: no numbness or tingling in extremities, no gait disturbances   Psychiatric: No mood changes, memory changes, social issues  Heme/Lymph: no excessive swelling, easy bruising, excessive bleeding  ENT: No hearing changes  Eyes: No vision changes    No past medical history on file.     No Known Allergies     Past Surgical History:   Procedure Laterality Date    COLONOSCOPY  04/28/2015    Complete Colonoscopy    KNEE SURGERY  04/28/2015    Knee Surgery        Family History   Problem Relation Name Age of Onset    Leukemia Mother      Memory loss Mother      Heart attack Father          Social History     Socioeconomic History    Marital status:      Spouse name: Not on file    Number of children: Not on file    Years of education: Not on file    Highest education level: Not on file   Occupational History    Not on file   Tobacco Use    Smoking status: Never    Smokeless tobacco: Never   Substance and Sexual Activity    Alcohol use: Never    Drug use: Never    Sexual activity: Not on file   Other Topics Concern    Not on file   Social History Narrative    Not on file     Social Drivers of Health     Financial Resource Strain: Not on file   Food Insecurity: Not on file   Transportation Needs: Not on file   Physical Activity: Not on file   Stress: Not on file   Social Connections: Not on file   Intimate Partner Violence: Not on file   Housing Stability: Not on file        CURRENT MEDICATIONS:   Current Outpatient Medications   Medication Sig Dispense Refill    albuterol 90  mcg/actuation inhaler INHALE 2 PUFFS EVERY 4 HOURS IF NEEDED FOR WHEEZING OR SHORTNESS OF BREATH. 18 g 3    Blood glucose monitoring meter kit kit 1 each if needed.      blood sugar diagnostic (Accu-Chek Jessica Plus test strp) strip 2 times a day.      blood sugar diagnostic (Blood Glucose Test) strip 1 strip once daily. 100 strip 3    blood sugar diagnostic (OneTouch Verio test strips) strip 1 strip once daily. 100 strip 3    blood-glucose meter misc 1 Device once daily. 1 each 0    chlorthalidone (Hygroton) 25 mg tablet Take 1 tablet (25 mg) by mouth once daily. 90 tablet 3    cyanocobalamin (Vitamin B-12) 1,000 mcg tablet TAKE 1 TABLET BY MOUTH EVERY DAY AS DIRECTED 90 tablet 3    ergocalciferol (Vitamin D-2) 1.25 MG (97362 UT) capsule Take 1 capsule (50,000 Units) by mouth 1 (one) time per week. 12 capsule 2    lancets (Accu-Chek Softclix Lancets) misc 2 times a day.      lancets (OneTouch UltraSoft Lancets) misc 1 Device once daily. 100 each 3    lancets 30 gauge misc 1 Device 3 times a day. 200 each 3    lancets misc Inject 1 Device under the skin once daily. 100 each 3    lisinopril 40 mg tablet TAKE 1 TABLET BY MOUTH EVERY DAY 90 tablet 3    meloxicam (Mobic) 15 mg tablet Take 1 tablet (15 mg) by mouth once daily. 90 tablet 3    metFORMIN  mg 24 hr tablet Take 2 tablets (1,000 mg) by mouth 2 times a day. Do not crush, chew, or split. 360 tablet 3    omeprazole (PriLOSEC) 20 mg DR capsule TAKE 1 CAPSULE BY MOUTH EVERY DAY 90 capsule 3    rosuvastatin (Crestor) 40 mg tablet TAKE 1 TABLET BY MOUTH EVERY DAY 90 tablet 3    sildenafil (Viagra) 100 mg tablet Take by mouth. TAKE 1 TABLET AS NEEDED APPROXIMATELY 1 HOUR BEFORE SEXUAL ACTIVITY       No current facility-administered medications for this visit.        OBJECTIVE    PHYSICAL EXAM      2/21/2023     4:23 PM 3/14/2023     9:23 AM 8/23/2023     1:32 PM 11/29/2023    11:58 AM 2/28/2024     7:54 AM 8/26/2024    10:41 AM 10/8/2024     2:13 PM   Vitals  "  Systolic 118  118  118  126   Diastolic 76  78  78  80   Heart Rate 74  73  69  77   Resp   16  16     Height (in) 1.854 m (6' 1\") 1.852 m (6' 0.91\") 1.854 m (6' 1\") 1.854 m (6' 1\") 1.854 m (6' 1\") 1.854 m (6' 1\") 1.854 m (6' 1\")   Weight (lb) 196 198.41 209 209 209 209 208   BMI 25.86 kg/m2 26.24 kg/m2 27.57 kg/m2 27.57 kg/m2 27.57 kg/m2 27.57 kg/m2 27.44 kg/m2   BSA (m2) 2.14 m2 2.15 m2 2.21 m2 2.21 m2 2.21 m2 2.21 m2 2.2 m2   Visit Report   Report Report Report Report Report      There is no height or weight on file to calculate BMI.    GENERAL: A/Ox3, NAD. Appears healthy, well nourished  PSYCHIATRIC: Mood stable, appropriate memory recall  EYES: EOM intact, no scleral icterus  CARDIOVASCULAR: Palpable peripheral pulses  LUNGS: Breathing non-labored on room air  SKIN: no erythema, rashes, or ecchymoses     MUSCULOSKELETAL:  Laterality: left Knee Exam  - Skin intact  - No erythema or warmth  - No ecchymosis or soft tissue swelling  - Alignment: varus  - Palpation: Positive tenderness medial and lateral joint lines, positive tenderness medial and lateral patellar facets  - ROM: 0 - 0 - 120  - Effusion: Trace  - Strength: knee extension and flexion 5/5, EHL/PF/DF motor intact  - Stability:        Anterior drawer stable       Posterior drawer stable       Varus/valgus stable       negative Lachman  -Equivocal Elma's  - Gait: Antalgic favoring left  - Hip Exam: flexion to 100+ degrees, full extension, internal/external rotation adequate, and no pain with log roll    NEUROVASCULAR:  - Neurovascular Status: sensation intact to light touch distally, lower extremity motor intact  - Capillary refill brisk at extremities, Bilateral dorsalis pedis pulse 2+    Imaging: No new imaging at this visit    L Inj/Asp: L knee on 10/21/2024 1:41 PM  Indications: pain  Details: 25 G needle, anterolateral approach  Medications: 40 mg triamcinolone acetonide 40 mg/mL; 2 mL lidocaine 10 mg/mL (1 %)  Outcome: tolerated well, no " immediate complications  Procedure, treatment alternatives, risks and benefits explained, specific risks discussed. Consent was given by the patient. Immediately prior to procedure a time out was called to verify the correct patient, procedure, equipment, support staff and site/side marked as required. Patient was prepped and draped in the usual sterile fashion.

## 2024-10-23 ENCOUNTER — APPOINTMENT (OUTPATIENT)
Dept: RADIOLOGY | Facility: HOSPITAL | Age: 74
End: 2024-10-23
Payer: COMMERCIAL

## 2024-10-23 ENCOUNTER — APPOINTMENT (OUTPATIENT)
Dept: CARDIOLOGY | Facility: HOSPITAL | Age: 74
End: 2024-10-23
Payer: COMMERCIAL

## 2024-10-23 ENCOUNTER — HOSPITAL ENCOUNTER (EMERGENCY)
Facility: HOSPITAL | Age: 74
Discharge: HOME | End: 2024-10-23
Payer: COMMERCIAL

## 2024-10-23 VITALS
HEART RATE: 66 BPM | BODY MASS INDEX: 26.85 KG/M2 | RESPIRATION RATE: 23 BRPM | WEIGHT: 202.6 LBS | HEIGHT: 73 IN | SYSTOLIC BLOOD PRESSURE: 131 MMHG | TEMPERATURE: 96.6 F | DIASTOLIC BLOOD PRESSURE: 84 MMHG | OXYGEN SATURATION: 96 %

## 2024-10-23 DIAGNOSIS — J18.9 PNEUMONIA OF RIGHT LOWER LOBE DUE TO INFECTIOUS ORGANISM: ICD-10-CM
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
## 2024-10-23 LAB
ALBUMIN SERPL BCP-MCNC: 4.4 G/DL (ref 3.4–5)
ALP SERPL-CCNC: 68 U/L (ref 33–136)
ALT SERPL W P-5'-P-CCNC: 21 U/L (ref 10–52)
ANION GAP SERPL CALC-SCNC: 14 MMOL/L (ref 10–20)
AST SERPL W P-5'-P-CCNC: 25 U/L (ref 9–39)
BASOPHILS # BLD AUTO: 0.02 X10*3/UL (ref 0–0.1)
BASOPHILS NFR BLD AUTO: 0.1 %
BILIRUB SERPL-MCNC: 0.3 MG/DL (ref 0–1.2)
BUN SERPL-MCNC: 26 MG/DL (ref 6–23)
CALCIUM SERPL-MCNC: 9.1 MG/DL (ref 8.6–10.3)
CARDIAC TROPONIN I PNL SERPL HS: 6 NG/L (ref 0–20)
CARDIAC TROPONIN I PNL SERPL HS: 7 NG/L (ref 0–20)
CHLORIDE SERPL-SCNC: 101 MMOL/L (ref 98–107)
CO2 SERPL-SCNC: 25 MMOL/L (ref 21–32)
CREAT SERPL-MCNC: 1.02 MG/DL (ref 0.5–1.3)
EGFRCR SERPLBLD CKD-EPI 2021: 77 ML/MIN/1.73M*2
EOSINOPHIL # BLD AUTO: 0.01 X10*3/UL (ref 0–0.4)
EOSINOPHIL NFR BLD AUTO: 0.1 %
ERYTHROCYTE [DISTWIDTH] IN BLOOD BY AUTOMATED COUNT: 13.3 % (ref 11.5–14.5)
ETHANOL SERPL-MCNC: 264 MG/DL
GLUCOSE SERPL-MCNC: 181 MG/DL (ref 74–99)
HCT VFR BLD AUTO: 39.3 % (ref 41–52)
HGB BLD-MCNC: 12.6 G/DL (ref 13.5–17.5)
IMM GRANULOCYTES # BLD AUTO: 0.1 X10*3/UL (ref 0–0.5)
IMM GRANULOCYTES NFR BLD AUTO: 0.6 % (ref 0–0.9)
LYMPHOCYTES # BLD AUTO: 1.57 X10*3/UL (ref 0.8–3)
LYMPHOCYTES NFR BLD AUTO: 8.6 %
MCH RBC QN AUTO: 28.8 PG (ref 26–34)
MCHC RBC AUTO-ENTMCNC: 32.1 G/DL (ref 32–36)
MCV RBC AUTO: 90 FL (ref 80–100)
MONOCYTES # BLD AUTO: 0.95 X10*3/UL (ref 0.05–0.8)
MONOCYTES NFR BLD AUTO: 5.2 %
NEUTROPHILS # BLD AUTO: 15.53 X10*3/UL (ref 1.6–5.5)
NEUTROPHILS NFR BLD AUTO: 85.4 %
NRBC BLD-RTO: 0 /100 WBCS (ref 0–0)
PLATELET # BLD AUTO: 231 X10*3/UL (ref 150–450)
POTASSIUM SERPL-SCNC: 3.2 MMOL/L (ref 3.5–5.3)
PROT SERPL-MCNC: 7.7 G/DL (ref 6.4–8.2)
RBC # BLD AUTO: 4.38 X10*6/UL (ref 4.5–5.9)
SODIUM SERPL-SCNC: 137 MMOL/L (ref 136–145)
WBC # BLD AUTO: 18.2 X10*3/UL (ref 4.4–11.3)

## 2024-10-23 PROCEDURE — 2500000002 HC RX 250 W HCPCS SELF ADMINISTERED DRUGS (ALT 637 FOR MEDICARE OP, ALT 636 FOR OP/ED)

## 2024-10-23 PROCEDURE — 70450 CT HEAD/BRAIN W/O DYE: CPT

## 2024-10-23 PROCEDURE — 93005 ELECTROCARDIOGRAM TRACING: CPT

## 2024-10-23 PROCEDURE — 12011 RPR F/E/E/N/L/M 2.5 CM/<: CPT

## 2024-10-23 PROCEDURE — 2500000004 HC RX 250 GENERAL PHARMACY W/ HCPCS (ALT 636 FOR OP/ED)

## 2024-10-23 PROCEDURE — 99285 EMERGENCY DEPT VISIT HI MDM: CPT | Mod: 25

## 2024-10-23 PROCEDURE — 84484 ASSAY OF TROPONIN QUANT: CPT

## 2024-10-23 PROCEDURE — 82077 ASSAY SPEC XCP UR&BREATH IA: CPT

## 2024-10-23 PROCEDURE — 71046 X-RAY EXAM CHEST 2 VIEWS: CPT

## 2024-10-23 PROCEDURE — 36415 COLL VENOUS BLD VENIPUNCTURE: CPT

## 2024-10-23 PROCEDURE — 71046 X-RAY EXAM CHEST 2 VIEWS: CPT | Performed by: RADIOLOGY

## 2024-10-23 PROCEDURE — 72125 CT NECK SPINE W/O DYE: CPT

## 2024-10-23 PROCEDURE — 2500000001 HC RX 250 WO HCPCS SELF ADMINISTERED DRUGS (ALT 637 FOR MEDICARE OP)

## 2024-10-23 PROCEDURE — 85025 COMPLETE CBC W/AUTO DIFF WBC: CPT

## 2024-10-23 PROCEDURE — 80053 COMPREHEN METABOLIC PANEL: CPT

## 2024-10-23 RX ORDER — AMOXICILLIN AND CLAVULANATE POTASSIUM 875; 125 MG/1; MG/1
1 TABLET, FILM COATED ORAL EVERY 12 HOURS
Qty: 10 TABLET | Refills: 0 | Status: SHIPPED | OUTPATIENT
Start: 2024-10-23 | End: 2024-10-28

## 2024-10-23 RX ORDER — AMOXICILLIN AND CLAVULANATE POTASSIUM 875; 125 MG/1; MG/1
1 TABLET, FILM COATED ORAL ONCE
Status: COMPLETED | OUTPATIENT
Start: 2024-10-23 | End: 2024-10-23

## 2024-10-23 RX ORDER — AZITHROMYCIN 250 MG/1
250 TABLET, FILM COATED ORAL DAILY
Qty: 5 TABLET | Refills: 0 | Status: SHIPPED | OUTPATIENT
Start: 2024-10-23 | End: 2024-10-28

## 2024-10-23 RX ORDER — AZITHROMYCIN 250 MG/1
500 TABLET, FILM COATED ORAL ONCE
Status: COMPLETED | OUTPATIENT
Start: 2024-10-23 | End: 2024-10-23

## 2024-10-23 RX ORDER — BACITRACIN ZINC 500 UNIT/G
OINTMENT IN PACKET (EA) TOPICAL 3 TIMES DAILY
Status: DISCONTINUED | OUTPATIENT
Start: 2024-10-23 | End: 2024-10-24 | Stop reason: HOSPADM

## 2024-10-23 RX ORDER — LIDOCAINE HYDROCHLORIDE AND EPINEPHRINE 10; 10 MG/ML; UG/ML
20 INJECTION, SOLUTION INFILTRATION; PERINEURAL ONCE
Status: COMPLETED | OUTPATIENT
Start: 2024-10-23 | End: 2024-10-23

## 2024-10-23 ASSESSMENT — LIFESTYLE VARIABLES
TOTAL SCORE: 0
EVER HAD A DRINK FIRST THING IN THE MORNING TO STEADY YOUR NERVES TO GET RID OF A HANGOVER: NO
HAVE PEOPLE ANNOYED YOU BY CRITICIZING YOUR DRINKING: NO
HAVE YOU EVER FELT YOU SHOULD CUT DOWN ON YOUR DRINKING: NO
EVER FELT BAD OR GUILTY ABOUT YOUR DRINKING: NO

## 2024-10-23 ASSESSMENT — PAIN - FUNCTIONAL ASSESSMENT: PAIN_FUNCTIONAL_ASSESSMENT: 0-10

## 2024-10-23 ASSESSMENT — PAIN SCALES - GENERAL
PAINLEVEL_OUTOF10: 0 - NO PAIN

## 2024-10-23 NOTE — ED PROVIDER NOTES
"HPI   Chief Complaint   Patient presents with    Fall    Head Laceration       HPI  Patient is a 74-year-old male who presents to emergency department for fall.  Patient states that he was walking in his driveway the next he knows he found was up on the ground.  Thinks he may have \"blacked out\".  He did hit his head on the driveway.  He does not take any blood thinners.  He admits to drinking 3 shots of absolute today.  Denies fevers, chills, nausea, vomiting, chest pain, shortness of breath, headache.      Patient History   History reviewed. No pertinent past medical history.  Past Surgical History:   Procedure Laterality Date    COLONOSCOPY  04/28/2015    Complete Colonoscopy    KNEE SURGERY  04/28/2015    Knee Surgery     Family History   Problem Relation Name Age of Onset    Leukemia Mother      Memory loss Mother      Heart attack Father       Social History     Tobacco Use    Smoking status: Never    Smokeless tobacco: Never   Substance Use Topics    Alcohol use: Yes     Alcohol/week: 3.0 standard drinks of alcohol     Types: 3 Standard drinks or equivalent per week    Drug use: Never       Physical Exam   ED Triage Vitals [10/23/24 1844]   Temp Heart Rate Respirations BP   -- 68 (!) 21 144/88      Pulse Ox Temp src Heart Rate Source Patient Position   97 % -- Monitor Lying      BP Location FiO2 (%)     Left arm --       Physical Exam  General: Resting comfortably.   Head: Normal cephalic.  2 cm gaping laceration left eyebrow.  Eyes: PERRL. Pupils 3 mm bilaterally.  ENT: No blood or trauma in the oropharynx. No nasal septal hematoma. Bilateral external ears atraumatic.   Neck: No midline cervical spine tenderness.  Heart: Regular rate and rhythm.  Lungs: Clear to auscultation bilaterally.    Abdomen: Soft, non-tender, non-distended, no guarding or peritoneal signs. Bilateral flanks atraumatic.  Musculoskeletal: No chest wall tenderness. Pelvis stable to AP and lateral compression. No midline TLS spinal " tenderness.  Neurologic: GCS 15      ED Course & MDM   ED Course as of 10/25/24 1303   Wed Oct 23, 2024   1928 WBC(!): 18.2 [WANDY]   1928 POTASSIUM(!): 3.2 [WANDY]   1928 Blood Urea Nitrogen(!): 26 [WANDY]   1928 Alcohol(!): 264 [WANDY]   1932 ECG 12 lead  EKG as interpreted by me, normal axis with PAC but no ST elevations or abnormal T wave inversions [WANDY]   1957 XR chest 2 views  IMPRESSION:  1. Patchy right lower lung airspace opacities. Consider pneumonia  and/or aspiration.   [WANDY]   1957 CT head wo IV contrast  IMPRESSION:  No evidence of acute cortical infarct or intracranial hemorrhage.   [WANDY]   1958 CT cervical spine wo IV contrast  IMPRESSION:  1. No evidence for an acute fracture or subluxation of the cervical  spine.   [WANDY]   2002 Patient states that he has been coughing for the past 2 weeks.  Last week did have fevers and chills.  States he was concerned that he may have pneumonia.  Will treat findings on x-ray with Augmentin and azithromycin. [WANDY]      ED Course User Index  [WANDY] Donna Rivas MD         Diagnoses as of 10/25/24 1303   Fall, initial encounter   Pneumonia of right lower lobe due to infectious organism                 No data recorded     Giltner Coma Scale Score: 15 (10/23/24 1849 : Flaquita Brown, RN)       NIH Stroke Scale: 0 (10/23/24 1849 : Flaquita Brown, RN)                   Medical Decision Making  Patient is a 74 y.o. male who presents to the emergency department via EMS with chief complaint of fall. History of present illness as above. Chronic conditions impacting care include hypertension, diabetes, hyperlipidemia. Patient remained afebrile and hemodynamically stable while in the emergency department. They saturated well on room air. The differential diagnosis associated with this patient's presentation includes intracranial injury, syncope, cervical spine injury, electrolyte abnormalities, alcohol intoxication, arrhythmia, traumatic chest injuries. Our workup consisted of  ordering/reviewing CBC, CMP, ethanol level, troponin, EKG, chest x-ray, CT head, CT C-spine.     Patient states it has been many years since he has had a tetanus booster, however he declines update at this time.    External records reviewed:  Care everywhere reviewed for current medications and medical history.     Diagnostics interpreted by me include:  EKG as noted below      ED Course as of 10/25/24 1303   Wed Oct 23, 2024   1928 WBC(!): 18.2 [WANDY]   1928 POTASSIUM(!): 3.2 [WANDY]   1928 Blood Urea Nitrogen(!): 26 [WANDY]   1928 Alcohol(!): 264 [WANDY]   1932 ECG 12 lead  EKG as interpreted by me, normal axis with PAC but no ST elevations or abnormal T wave inversions [WANDY]   1957 XR chest 2 views  IMPRESSION:  1. Patchy right lower lung airspace opacities. Consider pneumonia  and/or aspiration.   [WANDY]   1957 CT head wo IV contrast  IMPRESSION:  No evidence of acute cortical infarct or intracranial hemorrhage.   [WANDY]   1958 CT cervical spine wo IV contrast  IMPRESSION:  1. No evidence for an acute fracture or subluxation of the cervical  spine.   [WANDY]   2002 Patient states that he has been coughing for the past 2 weeks.  Last week did have fevers and chills.  States he was concerned that he may have pneumonia.  Will treat findings on x-ray with Augmentin and azithromycin. [WANDY]      ED Course User Index  [WANDY] Donna Rivas MD         Diagnoses as of 10/25/24 1303   Fall, initial encounter   Pneumonia of right lower lobe due to infectious organism     Patient's fall likely contributed by his alcohol intoxication and pneumonia.  Patient's wound was cleaned and sutured without any difficulty.  Patient ambulates without any difficulty.  He is otherwise well-appearing and appropriate discharge home with antibiotics for his pneumonia. Discussed results of labs and imaging with the patient (and/or their surrogate) who is agreeable with discharge home. Patient will follow up with their primary care provider regarding their  signs and symptoms. Appropriate return precautions discussed. Patient discharged in stable condition.      Social determinants of health affecting care:  None    ED Medications managed:  Medications   bacitracin ointment (has no administration in time range)   lidocaine-epinephrine (Xylocaine W/EPI) 1 %-1:100,000 injection 20 mL (20 mL infiltration Given 10/23/24 1918)         Procedure  Laceration Repair    Performed by: Donna Rivas MD  Authorized by: Donna Rivas MD    Consent:     Consent obtained:  Verbal    Consent given by:  Patient    Risks, benefits, and alternatives were discussed: yes      Risks discussed:  Poor wound healing, poor cosmetic result and pain  Universal protocol:     Procedure explained and questions answered to patient or proxy's satisfaction: yes      Imaging studies available: yes      Site/side marked: yes      Immediately prior to procedure, a time out was called: yes      Patient identity confirmed:  Verbally with patient  Anesthesia:     Anesthesia method:  Local infiltration    Local anesthetic:  Lidocaine 1% WITH epi  Laceration details:     Location:  Face    Face location:  L eyebrow    Length (cm):  2    Depth (mm):  3  Pre-procedure details:     Preparation:  Imaging obtained to evaluate for foreign bodies  Exploration:     Limited defect created (wound extended): no      Hemostasis achieved with:  Epinephrine    Imaging obtained comment:  CT    Imaging outcome: foreign body not noted      Wound extent: no fascia violation noted, no foreign bodies/material noted, no muscle damage noted and no underlying fracture noted      Contaminated: no    Treatment:     Area cleansed with:  Saline    Amount of cleaning:  Standard    Irrigation solution:  Sterile saline    Irrigation volume:  300    Visualized foreign bodies/material removed: no    Skin repair:     Repair method:  Sutures    Suture size:  5-0    Suture material:  Fast-absorbing gut    Suture technique:  Simple  interrupted    Number of sutures:  4  Approximation:     Approximation:  Close  Repair type:     Repair type:  Simple  Post-procedure details:     Dressing:  Antibiotic ointment    Procedure completion:  Tolerated well, no immediate complications       Donna Rivas MD  10/25/24 7980

## 2024-10-23 NOTE — ED TRIAGE NOTES
Pt arrived to ED by squad after falling in driveway and hitting the left side of his head. Pt states that he had 3 shots of absolute today. Pt states that he does not remember exactly what happened, just that he fell. Does not know exactly if he lost consciousness. Denies any pain.  
adult consistency food

## 2024-10-24 LAB
ATRIAL RATE: 63 BPM
P AXIS: 42 DEGREES
PR INTERVAL: 198 MS
Q ONSET: 249 MS
QRS COUNT: 10 BEATS
QRS DURATION: 101 MS
QT INTERVAL: 446 MS
QTC CALCULATION(BAZETT): 457 MS
QTC FREDERICIA: 453 MS
R AXIS: -14 DEGREES
T AXIS: 50 DEGREES
T OFFSET: 472 MS
VENTRICULAR RATE: 63 BPM

## 2024-11-06 ENCOUNTER — HOSPITAL ENCOUNTER (OUTPATIENT)
Dept: RADIOLOGY | Facility: HOSPITAL | Age: 74
Discharge: HOME | End: 2024-11-06
Payer: COMMERCIAL

## 2024-11-06 ENCOUNTER — APPOINTMENT (OUTPATIENT)
Dept: PRIMARY CARE | Facility: CLINIC | Age: 74
End: 2024-11-06
Payer: COMMERCIAL

## 2024-11-06 ENCOUNTER — TELEPHONE (OUTPATIENT)
Dept: PRIMARY CARE | Facility: CLINIC | Age: 74
End: 2024-11-06

## 2024-11-06 VITALS
DIASTOLIC BLOOD PRESSURE: 70 MMHG | OXYGEN SATURATION: 96 % | WEIGHT: 200.8 LBS | BODY MASS INDEX: 26.61 KG/M2 | HEIGHT: 73 IN | HEART RATE: 85 BPM | SYSTOLIC BLOOD PRESSURE: 140 MMHG

## 2024-11-06 DIAGNOSIS — M19.012 ARTHRITIS OF BOTH SHOULDER REGIONS: Primary | ICD-10-CM

## 2024-11-06 DIAGNOSIS — M25.512 BILATERAL SHOULDER PAIN, UNSPECIFIED CHRONICITY: ICD-10-CM

## 2024-11-06 DIAGNOSIS — M25.511 BILATERAL SHOULDER PAIN, UNSPECIFIED CHRONICITY: ICD-10-CM

## 2024-11-06 DIAGNOSIS — M25.819 IMPINGEMENT OF SHOULDER: ICD-10-CM

## 2024-11-06 DIAGNOSIS — M19.011 ARTHRITIS OF BOTH SHOULDER REGIONS: Primary | ICD-10-CM

## 2024-11-06 DIAGNOSIS — Z00.00 MEDICARE ANNUAL WELLNESS VISIT, SUBSEQUENT: ICD-10-CM

## 2024-11-06 DIAGNOSIS — M54.2 NECK PAIN, BILATERAL: Primary | ICD-10-CM

## 2024-11-06 DIAGNOSIS — M54.2 NECK PAIN, BILATERAL: ICD-10-CM

## 2024-11-06 PROBLEM — M48.02 NEURAL FORAMINAL STENOSIS OF CERVICAL SPINE: Status: ACTIVE | Noted: 2024-11-06

## 2024-11-06 PROCEDURE — 1160F RVW MEDS BY RX/DR IN RCRD: CPT | Performed by: NURSE PRACTITIONER

## 2024-11-06 PROCEDURE — 3044F HG A1C LEVEL LT 7.0%: CPT | Performed by: NURSE PRACTITIONER

## 2024-11-06 PROCEDURE — 3078F DIAST BP <80 MM HG: CPT | Performed by: NURSE PRACTITIONER

## 2024-11-06 PROCEDURE — 1157F ADVNC CARE PLAN IN RCRD: CPT | Performed by: NURSE PRACTITIONER

## 2024-11-06 PROCEDURE — 4010F ACE/ARB THERAPY RXD/TAKEN: CPT | Performed by: NURSE PRACTITIONER

## 2024-11-06 PROCEDURE — 72050 X-RAY EXAM NECK SPINE 4/5VWS: CPT

## 2024-11-06 PROCEDURE — 73030 X-RAY EXAM OF SHOULDER: CPT | Mod: LEFT SIDE | Performed by: RADIOLOGY

## 2024-11-06 PROCEDURE — 1036F TOBACCO NON-USER: CPT | Performed by: NURSE PRACTITIONER

## 2024-11-06 PROCEDURE — 3077F SYST BP >= 140 MM HG: CPT | Performed by: NURSE PRACTITIONER

## 2024-11-06 PROCEDURE — 73030 X-RAY EXAM OF SHOULDER: CPT | Mod: RT

## 2024-11-06 PROCEDURE — 72050 X-RAY EXAM NECK SPINE 4/5VWS: CPT | Performed by: RADIOLOGY

## 2024-11-06 PROCEDURE — 3008F BODY MASS INDEX DOCD: CPT | Performed by: NURSE PRACTITIONER

## 2024-11-06 PROCEDURE — 99213 OFFICE O/P EST LOW 20 MIN: CPT | Performed by: NURSE PRACTITIONER

## 2024-11-06 PROCEDURE — 3049F LDL-C 100-129 MG/DL: CPT | Performed by: NURSE PRACTITIONER

## 2024-11-06 PROCEDURE — 73030 X-RAY EXAM OF SHOULDER: CPT | Mod: LT

## 2024-11-06 PROCEDURE — 3060F POS MICROALBUMINURIA REV: CPT | Performed by: NURSE PRACTITIONER

## 2024-11-06 PROCEDURE — 1159F MED LIST DOCD IN RCRD: CPT | Performed by: NURSE PRACTITIONER

## 2024-11-06 ASSESSMENT — ANXIETY QUESTIONNAIRES
1. FEELING NERVOUS, ANXIOUS, OR ON EDGE: NOT AT ALL
GAD7 TOTAL SCORE: 0
3. WORRYING TOO MUCH ABOUT DIFFERENT THINGS: NOT AT ALL
2. NOT BEING ABLE TO STOP OR CONTROL WORRYING: NOT AT ALL
7. FEELING AFRAID AS IF SOMETHING AWFUL MIGHT HAPPEN: NOT AT ALL
5. BEING SO RESTLESS THAT IT IS HARD TO SIT STILL: NOT AT ALL
4. TROUBLE RELAXING: NOT AT ALL
6. BECOMING EASILY ANNOYED OR IRRITABLE: NOT AT ALL

## 2024-11-06 ASSESSMENT — ENCOUNTER SYMPTOMS
OCCASIONAL FEELINGS OF UNSTEADINESS: 0
DEPRESSION: 0
LOSS OF SENSATION IN FEET: 0
NECK PAIN: 1
ARTHRALGIAS: 1

## 2024-11-06 ASSESSMENT — PATIENT HEALTH QUESTIONNAIRE - PHQ9
SUM OF ALL RESPONSES TO PHQ9 QUESTIONS 1 AND 2: 0
2. FEELING DOWN, DEPRESSED OR HOPELESS: NOT AT ALL
1. LITTLE INTEREST OR PLEASURE IN DOING THINGS: NOT AT ALL
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
## 2024-11-06 NOTE — TELEPHONE ENCOUNTER
----- Message from Jorge Luis Oconnor sent at 11/6/2024  2:09 PM EST -----  Please tell patient that his neck x-ray shows arthritis and narrowing of the spine.  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  I have referred him to consult with orthopedic shoulder specialist.

## 2024-12-05 ENCOUNTER — HOSPITAL ENCOUNTER (EMERGENCY)
Facility: HOSPITAL | Age: 74
Discharge: HOME | End: 2024-12-05
Payer: COMMERCIAL

## 2024-12-05 ENCOUNTER — TELEPHONE (OUTPATIENT)
Dept: PRIMARY CARE | Facility: CLINIC | Age: 74
End: 2024-12-05
Payer: COMMERCIAL

## 2024-12-05 ENCOUNTER — APPOINTMENT (OUTPATIENT)
Dept: CARDIOLOGY | Facility: HOSPITAL | Age: 74
End: 2024-12-05
Payer: COMMERCIAL

## 2024-12-05 ENCOUNTER — HOSPITAL ENCOUNTER (OUTPATIENT)
Dept: RADIOLOGY | Facility: HOSPITAL | Age: 74
Discharge: HOME | End: 2024-12-05
Payer: COMMERCIAL

## 2024-12-05 ENCOUNTER — APPOINTMENT (OUTPATIENT)
Dept: RADIOLOGY | Facility: HOSPITAL | Age: 74
End: 2024-12-05
Payer: COMMERCIAL

## 2024-12-05 VITALS
HEART RATE: 85 BPM | BODY MASS INDEX: 26.51 KG/M2 | DIASTOLIC BLOOD PRESSURE: 90 MMHG | RESPIRATION RATE: 18 BRPM | WEIGHT: 200 LBS | OXYGEN SATURATION: 98 % | SYSTOLIC BLOOD PRESSURE: 144 MMHG | TEMPERATURE: 98.1 F | HEIGHT: 73 IN

## 2024-12-05 DIAGNOSIS — R06.02 SHORTNESS OF BREATH: Primary | ICD-10-CM

## 2024-12-05 DIAGNOSIS — R91.8 LUNG MASS: ICD-10-CM

## 2024-12-05 DIAGNOSIS — R05.2 SUBACUTE COUGH: Primary | ICD-10-CM

## 2024-12-05 DIAGNOSIS — R05.2 SUBACUTE COUGH: ICD-10-CM

## 2024-12-05 DIAGNOSIS — J18.9 PNEUMONIA OF RIGHT LOWER LOBE DUE TO INFECTIOUS ORGANISM: Primary | ICD-10-CM

## 2024-12-05 LAB
ALBUMIN SERPL BCP-MCNC: 4.3 G/DL (ref 3.4–5)
ALP SERPL-CCNC: 76 U/L (ref 33–136)
ALT SERPL W P-5'-P-CCNC: 20 U/L (ref 10–52)
ANION GAP SERPL CALC-SCNC: 9 MMOL/L (ref 10–20)
AST SERPL W P-5'-P-CCNC: 19 U/L (ref 9–39)
BASOPHILS # BLD AUTO: 0.05 X10*3/UL (ref 0–0.1)
BASOPHILS NFR BLD AUTO: 0.7 %
BILIRUB SERPL-MCNC: 0.6 MG/DL (ref 0–1.2)
BNP SERPL-MCNC: 16 PG/ML (ref 0–99)
BUN SERPL-MCNC: 14 MG/DL (ref 6–23)
CALCIUM SERPL-MCNC: 9.5 MG/DL (ref 8.6–10.3)
CARDIAC TROPONIN I PNL SERPL HS: 7 NG/L (ref 0–20)
CHLORIDE SERPL-SCNC: 105 MMOL/L (ref 98–107)
CO2 SERPL-SCNC: 29 MMOL/L (ref 21–32)
CREAT SERPL-MCNC: 0.91 MG/DL (ref 0.5–1.3)
EGFRCR SERPLBLD CKD-EPI 2021: 88 ML/MIN/1.73M*2
EOSINOPHIL # BLD AUTO: 0.39 X10*3/UL (ref 0–0.4)
EOSINOPHIL NFR BLD AUTO: 5.7 %
ERYTHROCYTE [DISTWIDTH] IN BLOOD BY AUTOMATED COUNT: 13.7 % (ref 11.5–14.5)
GLUCOSE SERPL-MCNC: 112 MG/DL (ref 74–99)
HCT VFR BLD AUTO: 42.5 % (ref 41–52)
HGB BLD-MCNC: 13.3 G/DL (ref 13.5–17.5)
IMM GRANULOCYTES # BLD AUTO: 0.01 X10*3/UL (ref 0–0.5)
IMM GRANULOCYTES NFR BLD AUTO: 0.1 % (ref 0–0.9)
LACTATE SERPL-SCNC: 1.6 MMOL/L (ref 0.4–2)
LYMPHOCYTES # BLD AUTO: 1.44 X10*3/UL (ref 0.8–3)
LYMPHOCYTES NFR BLD AUTO: 21.1 %
MCH RBC QN AUTO: 28.1 PG (ref 26–34)
MCHC RBC AUTO-ENTMCNC: 31.3 G/DL (ref 32–36)
MCV RBC AUTO: 90 FL (ref 80–100)
MONOCYTES # BLD AUTO: 0.54 X10*3/UL (ref 0.05–0.8)
MONOCYTES NFR BLD AUTO: 7.9 %
NEUTROPHILS # BLD AUTO: 4.39 X10*3/UL (ref 1.6–5.5)
NEUTROPHILS NFR BLD AUTO: 64.5 %
NRBC BLD-RTO: 0 /100 WBCS (ref 0–0)
PLATELET # BLD AUTO: 230 X10*3/UL (ref 150–450)
POTASSIUM SERPL-SCNC: 3.4 MMOL/L (ref 3.5–5.3)
PROT SERPL-MCNC: 7.4 G/DL (ref 6.4–8.2)
RBC # BLD AUTO: 4.74 X10*6/UL (ref 4.5–5.9)
SODIUM SERPL-SCNC: 140 MMOL/L (ref 136–145)
WBC # BLD AUTO: 6.8 X10*3/UL (ref 4.4–11.3)

## 2024-12-05 PROCEDURE — 71046 X-RAY EXAM CHEST 2 VIEWS: CPT

## 2024-12-05 PROCEDURE — 36415 COLL VENOUS BLD VENIPUNCTURE: CPT | Performed by: PHYSICIAN ASSISTANT

## 2024-12-05 PROCEDURE — 71260 CT THORAX DX C+: CPT

## 2024-12-05 PROCEDURE — 83880 ASSAY OF NATRIURETIC PEPTIDE: CPT | Performed by: PHYSICIAN ASSISTANT

## 2024-12-05 PROCEDURE — 85025 COMPLETE CBC W/AUTO DIFF WBC: CPT | Performed by: PHYSICIAN ASSISTANT

## 2024-12-05 PROCEDURE — 71260 CT THORAX DX C+: CPT | Mod: FOREIGN READ | Performed by: RADIOLOGY

## 2024-12-05 PROCEDURE — 84484 ASSAY OF TROPONIN QUANT: CPT | Performed by: PHYSICIAN ASSISTANT

## 2024-12-05 PROCEDURE — 87040 BLOOD CULTURE FOR BACTERIA: CPT | Mod: PORLAB | Performed by: PHYSICIAN ASSISTANT

## 2024-12-05 PROCEDURE — 99285 EMERGENCY DEPT VISIT HI MDM: CPT | Mod: 25

## 2024-12-05 PROCEDURE — 93005 ELECTROCARDIOGRAM TRACING: CPT

## 2024-12-05 PROCEDURE — 2550000001 HC RX 255 CONTRASTS: Performed by: PHYSICIAN ASSISTANT

## 2024-12-05 PROCEDURE — 80053 COMPREHEN METABOLIC PANEL: CPT | Performed by: PHYSICIAN ASSISTANT

## 2024-12-05 PROCEDURE — 83605 ASSAY OF LACTIC ACID: CPT | Performed by: PHYSICIAN ASSISTANT

## 2024-12-05 PROCEDURE — 99284 EMERGENCY DEPT VISIT MOD MDM: CPT | Mod: 25

## 2024-12-05 RX ORDER — AZITHROMYCIN 250 MG/1
TABLET, FILM COATED ORAL
Qty: 6 TABLET | Refills: 0 | Status: SHIPPED | OUTPATIENT
Start: 2024-12-05 | End: 2024-12-10

## 2024-12-05 RX ORDER — DOXYCYCLINE 100 MG/1
100 CAPSULE ORAL 2 TIMES DAILY
Qty: 20 CAPSULE | Refills: 0 | Status: SHIPPED | OUTPATIENT
Start: 2024-12-05 | End: 2024-12-15

## 2024-12-05 RX ADMIN — IOHEXOL 50 ML: 350 INJECTION, SOLUTION INTRAVENOUS at 18:24

## 2024-12-05 ASSESSMENT — PAIN SCALES - GENERAL
PAINLEVEL_OUTOF10: 0 - NO PAIN
PAINLEVEL_OUTOF10: 0 - NO PAIN

## 2024-12-05 ASSESSMENT — COLUMBIA-SUICIDE SEVERITY RATING SCALE - C-SSRS
2. HAVE YOU ACTUALLY HAD ANY THOUGHTS OF KILLING YOURSELF?: NO
1. IN THE PAST MONTH, HAVE YOU WISHED YOU WERE DEAD OR WISHED YOU COULD GO TO SLEEP AND NOT WAKE UP?: NO
6. HAVE YOU EVER DONE ANYTHING, STARTED TO DO ANYTHING, OR PREPARED TO DO ANYTHING TO END YOUR LIFE?: NO

## 2024-12-05 ASSESSMENT — PAIN - FUNCTIONAL ASSESSMENT: PAIN_FUNCTIONAL_ASSESSMENT: 0-10

## 2024-12-05 NOTE — TELEPHONE ENCOUNTER
He called and is coming on 12/10 but he wants to come in early for Persistant Coughing, since having pnuemonia.  Which this is why he is coming on 12/10

## 2024-12-05 NOTE — ED PROVIDER NOTES
"EMERGENCY MEDICINE EVALUATION NOTE    History of Present Illness     Chief Complaint:   Chief Complaint   Patient presents with    Shortness of Breath     Pt sent by dr jerome for fluid on lungs.        HPI: Jayant Holland is a 74 y.o. male presents with a chief complaint of shortness of breath.  Patient was sent by Dr. Jerome due to abnormal chest x-ray.  Patient had right-sided pneumonia about a month ago but never had resolution of his symptoms.  He states that he had continued shortness of breath and occasional pain along the right rib cage.  Patient reports that he had an x-ray performed as an outpatient today and they sent him here because he had \"water\" on his lungs.  Patient denies any fevers or chills but states a persistent cough that is nonproductive.  Patient reports that he has a history of diabetes as well as hypertension.    Previous History   No past medical history on file.  Past Surgical History:   Procedure Laterality Date    COLONOSCOPY  04/28/2015    Complete Colonoscopy    KNEE SURGERY  04/28/2015    Knee Surgery     Social History     Tobacco Use    Smoking status: Never    Smokeless tobacco: Never   Substance Use Topics    Alcohol use: Yes     Alcohol/week: 3.0 standard drinks of alcohol     Types: 3 Standard drinks or equivalent per week    Drug use: Never     Family History   Problem Relation Name Age of Onset    Leukemia Mother      Memory loss Mother      Heart attack Father       No Known Allergies  Current Outpatient Medications   Medication Instructions    albuterol 90 mcg/actuation inhaler 2 puffs, inhalation, Every 4 hours PRN    azithromycin (Zithromax) 250 mg tablet Take 2 tablets (500 mg) by mouth once daily for 1 day, THEN 1 tablet (250 mg) once daily for 4 days. Take 2 tabs (500 mg) by mouth today, than 1 daily for 4 days..    Blood glucose monitoring meter kit kit 1 each, As needed    blood sugar diagnostic (Accu-Chek Jessica Plus test strp) strip 2 times daily    blood sugar diagnostic " (Blood Glucose Test) strip 1 strip, miscellaneous, Daily    blood sugar diagnostic (OneTouch Verio test strips) strip 1 strip, miscellaneous, Daily    blood-glucose meter misc 1 Device, miscellaneous, Daily    chlorthalidone (HYGROTON) 25 mg, oral, Daily    cyanocobalamin (VITAMIN B-12) 1,000 mcg, oral, Daily, as directed    doxycycline (VIBRAMYCIN) 100 mg, oral, 2 times daily, Take with at least 8 ounces (large glass) of water, do not lie down for 30 minutes after    lancets 30 gauge misc 1 Device, miscellaneous, 3 times daily    lisinopril 40 mg tablet TAKE 1 TABLET BY MOUTH EVERY DAY    meloxicam (MOBIC) 15 mg, oral, Daily    metFORMIN XR (GLUCOPHAGE-XR) 1,000 mg, oral, 2 times daily, Do not crush, chew, or split.    omeprazole (PriLOSEC) 20 mg DR capsule TAKE 1 CAPSULE BY MOUTH EVERY DAY    rosuvastatin (Crestor) 40 mg tablet TAKE 1 TABLET BY MOUTH EVERY DAY    sildenafil (Viagra) 100 mg tablet Take by mouth. TAKE 1 TABLET AS NEEDED APPROXIMATELY 1 HOUR BEFORE SEXUAL ACTIVITY       Physical Exam     Appearance: Alert, oriented , cooperative,  in no acute distress.      Skin: Intact,  dry skin, no lesions, rash, petechiae or purpura.      Eyes: PERRLA, EOMs intact,  Conjunctiva pink      ENT: Hearing grossly intact. Pharynx clear     Neck: Supple. Trachea at midline.      Pulmonary: Creased throughout right lung field.  No rales, rhonchi or wheezing. No accessory muscle use or stridor.     Cardiac: Normal rate and rhythm without murmur     Abdomen: Soft, nontender, active bowel sounds.     Musculoskeletal: Full range of motion.      Neurological:Cranial nerves II through XII are grossly intact, normal sensation, no weakness, no focal findings identified.     Results     Labs Reviewed   CBC WITH AUTO DIFFERENTIAL - Abnormal       Result Value    WBC 6.8      nRBC 0.0      RBC 4.74      Hemoglobin 13.3 (*)     Hematocrit 42.5      MCV 90      MCH 28.1      MCHC 31.3 (*)     RDW 13.7      Platelets 230       Neutrophils % 64.5      Immature Granulocytes %, Automated 0.1      Lymphocytes % 21.1      Monocytes % 7.9      Eosinophils % 5.7      Basophils % 0.7      Neutrophils Absolute 4.39      Immature Granulocytes Absolute, Automated 0.01      Lymphocytes Absolute 1.44      Monocytes Absolute 0.54      Eosinophils Absolute 0.39      Basophils Absolute 0.05     COMPREHENSIVE METABOLIC PANEL - Abnormal    Glucose 112 (*)     Sodium 140      Potassium 3.4 (*)     Chloride 105      Bicarbonate 29      Anion Gap 9 (*)     Urea Nitrogen 14      Creatinine 0.91      eGFR 88      Calcium 9.5      Albumin 4.3      Alkaline Phosphatase 76      Total Protein 7.4      AST 19      Bilirubin, Total 0.6      ALT 20     LACTATE - Normal    Lactate 1.6      Narrative:     Venipuncture immediately after or during the administration of Metamizole may lead to falsely low results. Testing should be performed immediately prior to Metamizole dosing.   TROPONIN I, HIGH SENSITIVITY - Normal    Troponin I, High Sensitivity 7      Narrative:     Less than 99th percentile of normal range cutoff-  Female and children under 18 years old <14 ng/L; Male <21 ng/L: Negative  Repeat testing should be performed if clinically indicated.     Female and children under 18 years old 14-50 ng/L; Male 21-50 ng/L:  Consistent with possible cardiac damage and possible increased clinical   risk. Serial measurements may help to assess extent of myocardial damage.     >50 ng/L: Consistent with cardiac damage, increased clinical risk and  myocardial infarction. Serial measurements may help assess extent of   myocardial damage.      NOTE: Children less than 1 year old may have higher baseline troponin   levels and results should be interpreted in conjunction with the overall   clinical context.     NOTE: Troponin I testing is performed using a different   testing methodology at [facility] than at other   [facility]. Direct result comparisons should  "only   be made within the same method.   B-TYPE NATRIURETIC PEPTIDE - Normal    BNP 16      Narrative:        <100 pg/mL - Heart failure unlikely  100-299 pg/mL - Intermediate probability of acute heart                  failure exacerbation. Correlate with clinical                  context and patient history.    >=300 pg/mL - Heart Failure likely. Correlate with clinical                  context and patient history.    BNP testing is performed using different testing methodology at [facility] than at other [facility] hospitals. Direct result comparisons should only be made within the same method.      BLOOD CULTURE   BLOOD CULTURE     CT chest w IV contrast   Final Result   1. There is a large right pleural effusion with associated   atelectasis.   2. There is a spiculated mass in the right upper lobe which is   suspicious for malignancy.   Signed by Aung Almeida MD            ED Course & Medical Decision Making     Medications   iohexol (OMNIPaque) 350 mg iodine/mL solution 50 mL (50 mL intravenous Given 12/5/24 1824)     Heart Rate:  [87-92]   Temperature:  [36.7 °C (98.1 °F)]   Respirations:  [16-18]   BP: (145-153)/(87-88)   Height:  [185.4 cm (6' 1\")]   Weight:  [90.7 kg (200 lb)]   Pulse Ox:  [95 %-97 %]    ED Course as of 12/05/24 2107   Thu Dec 05, 2024   2105 Patient was seen and evaluated by attending physician.  Patient was updated on results by attending physician.  He was informed that there was a mass present with pleural effusion.  Patient will be discharged home at this time to follow-up with Veronica as an outpatient.  Patient and family agree with the plan of care.  Should be noted patient ambulatory in the emergency department and maintaining appropriate saturation.  Patient was encouraged to return to the emergency department immediately with any worsening symptoms. [CJ]      ED Course User Index  [CJ] Elbert Nieves PA-C         Diagnoses as of 12/05/24 2107   Shortness of breath "   Lung mass       Procedures   ECG 12 lead    Performed by: Elbert Nieves PA-C  Authorized by: Elbert Nieves PA-C    ECG interpreted by ED Physician in the absence of a cardiologist: yes    Rate:     ECG rate:  94    ECG rate assessment: normal    Rhythm:     Rhythm: sinus rhythm    ST segments:     ST segments:  Normal  T waves:     T waves: normal    Comments:      No STEMI      Diagnosis     1. Shortness of breath    2. Lung mass        Disposition   Discharge    ED Prescriptions    None         Disclaimer: This note was dictated by speech recognition. Minor errors in transcription may be present. Please call if questions.       Elbert Nieves PA-C  12/05/24 8178

## 2024-12-06 ENCOUNTER — TELEPHONE (OUTPATIENT)
Dept: HEMATOLOGY/ONCOLOGY | Facility: HOSPITAL | Age: 74
End: 2024-12-06
Payer: COMMERCIAL

## 2024-12-06 DIAGNOSIS — R91.8 MASS OF UPPER LOBE OF RIGHT LUNG: Primary | ICD-10-CM

## 2024-12-06 PROBLEM — E11.9 TYPE 2 DIABETES MELLITUS WITHOUT COMPLICATIONS (MULTI): Status: ACTIVE | Noted: 2024-12-06

## 2024-12-06 PROBLEM — F43.9 REACTION TO SEVERE STRESS, UNSPECIFIED: Status: ACTIVE | Noted: 2024-12-06

## 2024-12-06 PROBLEM — H90.3 SENSORINEURAL HEARING LOSS, BILATERAL: Status: ACTIVE | Noted: 2024-12-06

## 2024-12-06 PROBLEM — F43.12 CHRONIC POST-TRAUMATIC STRESS DISORDER: Status: ACTIVE | Noted: 2024-12-06

## 2024-12-06 PROBLEM — I20.0 UNSTABLE ANGINA (MULTI): Status: ACTIVE | Noted: 2024-12-06

## 2024-12-06 PROBLEM — R20.2 PARESTHESIA OF SKIN: Status: ACTIVE | Noted: 2024-12-06

## 2024-12-06 PROBLEM — Z77.29 EXPOSURE TO POTENTIALLY HAZARDOUS SUBSTANCE: Status: RESOLVED | Noted: 2024-12-06 | Resolved: 2024-12-06

## 2024-12-06 LAB
ATRIAL RATE: 87 BPM
P AXIS: 25 DEGREES
PR INTERVAL: 190 MS
Q ONSET: 253 MS
QRS COUNT: 15 BEATS
QRS DURATION: 87 MS
QT INTERVAL: 386 MS
QTC CALCULATION(BAZETT): 483 MS
QTC FREDERICIA: 448 MS
R AXIS: 235 DEGREES
T AXIS: 21 DEGREES
T OFFSET: 446 MS
VENTRICULAR RATE: 94 BPM

## 2024-12-06 RX ORDER — PRAZOSIN HYDROCHLORIDE 1 MG/1
1 CAPSULE ORAL NIGHTLY
COMMUNITY
Start: 2024-06-06

## 2024-12-06 RX ORDER — MIRTAZAPINE 30 MG/1
1 TABLET, FILM COATED ORAL NIGHTLY
COMMUNITY
Start: 2024-06-06

## 2024-12-06 NOTE — TELEPHONE ENCOUNTER
Referral placed to [facility] by KIKI Horton, [facility] ED, for RUL spiculated mass concerning for malignancy, noted on CT chest imaging yesterday, 12/5/24. I called the patient to discuss his imaging results and the referral, as well as to offer virtual visit to initiate work-up. No answer, I left  requesting return call to office. Diagnostic Clinic contact information provided.   FORTINO Robles.MARY  [facility]

## 2024-12-06 NOTE — DISCHARGE INSTRUCTIONS
Please follow-up with your primary care provider 1 to 2 days, or return to the emergency department immediately with any worsening symptoms.    If any medications were prescribed please take them as instructed.    Please follow-up with [facility] clinic with the referral provided.

## 2024-12-08 ENCOUNTER — TELEPHONE (OUTPATIENT)
Dept: HEMATOLOGY/ONCOLOGY | Facility: HOSPITAL | Age: 74
End: 2024-12-08
Payer: COMMERCIAL

## 2024-12-08 LAB
BACTERIA BLD CULT: NORMAL
BACTERIA BLD CULT: NORMAL

## 2024-12-08 ASSESSMENT — ENCOUNTER SYMPTOMS
SORE THROAT: 0
RHINORRHEA: 0
CLAUDICATION: 0
VOMITING: 0
HEADACHES: 0
SYNCOPE: 0
SWOLLEN GLANDS: 0
LEG PAIN: 0
ORTHOPNEA: 1
NECK PAIN: 0
FEVER: 0
ABDOMINAL PAIN: 0
HEMOPTYSIS: 0
PND: 1
WHEEZING: 1
SHORTNESS OF BREATH: 1
SPUTUM PRODUCTION: 1

## 2024-12-08 NOTE — TELEPHONE ENCOUNTER
12/8/2024 @ 1405:  Called and spoke with patient, per Kirstin Arteaga CNP's request, re: referral placed to our [facility], by Elbert Nieves PA-C, for further evaluation of the spiculated RUL lung mass and right pleural effusion noted on CT Chest 12/5/2024.  Patient states awareness of this referral.  Introduced myself, explained our [facility], our team and roles.       Patient reports continued cough and WILSON, which is significantly increased with stair climbing.  States dyspnea is the same or slightly increased since ED visit.  Denies fever, chills, sweats, chest pain, hemoptysis, N/V/C/D.  Reports decreased appetite, but still eating in smaller amounts.  States he has been eating cereal or eggs for breakfast and ate a hamburger 1 hour ago.  Reports approximate oral fluid intake of approximately 30-40 oz/day.  Advised patient of importance of increasing oral fluids; recommended sipping throughout the day, in addition to his current fluid intake.  Patient states he is able to remain on one level of his home, for most of the day, to minimize use of stairs.  Reviewed symptoms warranting prompt ED evaluation and/or call to EMS and risks of delayed evaluation.     Reviewed appointment options for a virtual visit appointment with Kirstin Arteaga CNP tomorrow; patient stated preference for 11 AM.  Virtual visit process reviewed with patient; he states previous experience with virtual visits via  [employer] and his smartphone.       Patient asked he could receive oncology care at [facility], if needed, due to proximity to his home and that [facility] would be the next easiest location for him.  He also stated preference for [facility] or [facility], for diagnostic tests, when possible and our  locations in [place], if needed.      Patient voiced understanding of all aforementioned information and instruction and denied further questions at this time.  He voiced understanding to contact us with  further questions, concerns or if he has difficulty connecting to his virtual visit tomorrow; our contact information was provided.    Update sent to Kirstin Arteaga CNP.    Rona Gonzalez RN  [facility]

## 2024-12-09 ENCOUNTER — APPOINTMENT (OUTPATIENT)
Dept: ORTHOPEDIC SURGERY | Facility: CLINIC | Age: 74
End: 2024-12-09
Payer: COMMERCIAL

## 2024-12-09 ENCOUNTER — TELEMEDICINE (OUTPATIENT)
Dept: HEMATOLOGY/ONCOLOGY | Facility: HOSPITAL | Age: 74
End: 2024-12-09
Payer: COMMERCIAL

## 2024-12-09 VITALS — HEIGHT: 73 IN | WEIGHT: 200 LBS | BODY MASS INDEX: 26.51 KG/M2

## 2024-12-09 DIAGNOSIS — M25.819 IMPINGEMENT OF SHOULDER: ICD-10-CM

## 2024-12-09 DIAGNOSIS — R91.8 MASS OF UPPER LOBE OF RIGHT LUNG: Primary | ICD-10-CM

## 2024-12-09 DIAGNOSIS — M19.012 ARTHRITIS OF BOTH SHOULDER REGIONS: ICD-10-CM

## 2024-12-09 DIAGNOSIS — M19.011 ARTHRITIS OF BOTH SHOULDER REGIONS: ICD-10-CM

## 2024-12-09 DIAGNOSIS — M47.812 CERVICAL ARTHRITIS: Primary | ICD-10-CM

## 2024-12-09 DIAGNOSIS — R50.9 FEVER, UNSPECIFIED: ICD-10-CM

## 2024-12-09 DIAGNOSIS — J90 PLEURAL EFFUSION, RIGHT: ICD-10-CM

## 2024-12-09 DIAGNOSIS — J90 PLEURAL EFFUSION ON RIGHT: Primary | ICD-10-CM

## 2024-12-09 PROCEDURE — 99214 OFFICE O/P EST MOD 30 MIN: CPT | Performed by: STUDENT IN AN ORGANIZED HEALTH CARE EDUCATION/TRAINING PROGRAM

## 2024-12-09 PROCEDURE — 3060F POS MICROALBUMINURIA REV: CPT | Performed by: NURSE PRACTITIONER

## 2024-12-09 PROCEDURE — 1157F ADVNC CARE PLAN IN RCRD: CPT | Performed by: STUDENT IN AN ORGANIZED HEALTH CARE EDUCATION/TRAINING PROGRAM

## 2024-12-09 PROCEDURE — 1157F ADVNC CARE PLAN IN RCRD: CPT | Performed by: NURSE PRACTITIONER

## 2024-12-09 PROCEDURE — 1036F TOBACCO NON-USER: CPT | Performed by: STUDENT IN AN ORGANIZED HEALTH CARE EDUCATION/TRAINING PROGRAM

## 2024-12-09 PROCEDURE — 3049F LDL-C 100-129 MG/DL: CPT | Performed by: STUDENT IN AN ORGANIZED HEALTH CARE EDUCATION/TRAINING PROGRAM

## 2024-12-09 PROCEDURE — 3008F BODY MASS INDEX DOCD: CPT | Performed by: STUDENT IN AN ORGANIZED HEALTH CARE EDUCATION/TRAINING PROGRAM

## 2024-12-09 PROCEDURE — 1160F RVW MEDS BY RX/DR IN RCRD: CPT | Performed by: STUDENT IN AN ORGANIZED HEALTH CARE EDUCATION/TRAINING PROGRAM

## 2024-12-09 PROCEDURE — 99205 OFFICE O/P NEW HI 60 MIN: CPT | Performed by: NURSE PRACTITIONER

## 2024-12-09 PROCEDURE — 1159F MED LIST DOCD IN RCRD: CPT | Performed by: NURSE PRACTITIONER

## 2024-12-09 PROCEDURE — 4010F ACE/ARB THERAPY RXD/TAKEN: CPT | Performed by: STUDENT IN AN ORGANIZED HEALTH CARE EDUCATION/TRAINING PROGRAM

## 2024-12-09 PROCEDURE — 1159F MED LIST DOCD IN RCRD: CPT | Performed by: STUDENT IN AN ORGANIZED HEALTH CARE EDUCATION/TRAINING PROGRAM

## 2024-12-09 PROCEDURE — 3060F POS MICROALBUMINURIA REV: CPT | Performed by: STUDENT IN AN ORGANIZED HEALTH CARE EDUCATION/TRAINING PROGRAM

## 2024-12-09 PROCEDURE — 3044F HG A1C LEVEL LT 7.0%: CPT | Performed by: NURSE PRACTITIONER

## 2024-12-09 PROCEDURE — 3044F HG A1C LEVEL LT 7.0%: CPT | Performed by: STUDENT IN AN ORGANIZED HEALTH CARE EDUCATION/TRAINING PROGRAM

## 2024-12-09 PROCEDURE — 1036F TOBACCO NON-USER: CPT | Performed by: NURSE PRACTITIONER

## 2024-12-09 PROCEDURE — 3049F LDL-C 100-129 MG/DL: CPT | Performed by: NURSE PRACTITIONER

## 2024-12-09 PROCEDURE — 4010F ACE/ARB THERAPY RXD/TAKEN: CPT | Performed by: NURSE PRACTITIONER

## 2024-12-09 PROCEDURE — 1160F RVW MEDS BY RX/DR IN RCRD: CPT | Performed by: NURSE PRACTITIONER

## 2024-12-09 ASSESSMENT — PAIN - FUNCTIONAL ASSESSMENT: PAIN_FUNCTIONAL_ASSESSMENT: 0-10

## 2024-12-09 ASSESSMENT — ENCOUNTER SYMPTOMS
VOMITING: 0
SHORTNESS OF BREATH: 1
EXTREMITY WEAKNESS: 0
COUGH: 1
UNEXPECTED WEIGHT CHANGE: 1
SPEECH DIFFICULTY: 0
FATIGUE: 1
NAUSEA: 0
CHILLS: 0
APPETITE CHANGE: 1
TROUBLE SWALLOWING: 0
HEMOPTYSIS: 0
WHEEZING: 1

## 2024-12-09 ASSESSMENT — PAIN SCALES - GENERAL: PAINLEVEL_OUTOF10: 4

## 2024-12-09 NOTE — PROGRESS NOTES
[facility]  Diagnostic Clinic  New Patient Virtual Visit    Date of Service: 24      Patient Name: Jayant Holland   : 1950  MRN: [id number]   PCP: FORTINO Garland-CNP   Referred by: KIKI Horton    Problem: RUL lung mass    HPI: Jayant Holland is a 74 y.o. year old male w/ PMH HTN, DM2, asthma, OA, GERD, and DDD who presents to [facility] with RUL lung mass concerning for malignancy, referral placed by KIKI Horton, [facility] ED.     Mr. Holland initially presented to [facility] ED on 10/23/24 after a fall on his driveway, resulting in a head laceration. CXR showed patchy RLL airspace opacities, treated w/ Zpak for possible PNA. He called his PCP on 24 to report persistent cough since treatment, repeat CXR was ordered - which showed interval development of a moderate right pleural effusion. He was referred to the ED, where he presented on . CT chest w IV contrast showed 2.2 x 2.1 cm spiculated mass in the RUL and large right pleural effusion w/ associated atelectasis. He was discharged home to follow-up in the outpatient setting w/ the diagnostic clinic.    He presents virtually via video to clinic today, w/ his wife. He reports persistent cough for at least the past 6 weeks, productive w/ phlegm, denies blood in sputum. He describes constant SOB, at rest and worse with any exertion, such as climbing a flight of stairs or carrying laundry. He also notes wheezing and is using his albuterol inh with mild relief. He has a history of asthma. He notes ~30 lb weight loss over the past yr, unintentional, and occasional episodes of night sweats. He has never smoked, vaped or used tobacco products.    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     History of tobacco use:   Never.  Denies ETOH or illicit drug use.    Personal history of malignancy:   None.    Family history of  malignancy:   Mother - breast cancer, leukemia.    PATHOLOGY:  N/A     IMAGING:    === 12/05/24 ===    CT CHEST W IV CONTRAST    - Impression -  1. There is a large right pleural effusion with associated  atelectasis.  2. There is a spiculated mass in the right upper lobe which is  suspicious for malignancy.  Signed by Anug Almeida MD    === 12/05/24 ===    XR CHEST 2 VIEWS    - Impression -  The right chest has developed a new moderate-sized pleural effusion  with worsening atelectasis versus pneumonia in the right lung base.      MACRO:  Critical Finding:  See findings. Notification was initiated on  12/5/2024 at 2:04 pm by  Placido Helm.  (**-OCF-**)    Signed by: Placido Helm 12/5/2024 2:04 PM  Dictation workstation:   [id number]    LABS:  12/5/24 Lab work reviewed.    PAST MEDICAL HISTORY:  Past Medical History:   Diagnosis Date    Arthritis 20+ years    Asthma 20+ years    Cervical disc disorder     Diabetes mellitus (Multi) 20+ years    Exposure to potentially hazardous substance 12/06/2024 Apr 24, 2024 Entered By: LALA RIBERA Comment: Entered automatically through [facility] Problem List documentation program      GERD (gastroesophageal reflux disease) 20+ years    Hypertension 20+ years       PAST SURGICAL HISTORY:  Past Surgical History:   Procedure Laterality Date    COLONOSCOPY  04/28/2015    Complete Colonoscopy    KNEE ARTHROPLASTY  20 + years    KNEE SURGERY  04/28/2015    Knee Surgery       SOCIAL HISTORY:  Social Connections: Not on file       FAMILY HISTORY:  Family History   Problem Relation Name Age of Onset    Leukemia Mother Annette Holland     Memory loss Mother Annette Holland     Cancer Mother Annette Holland     Heart attack Father         MEDICATIONS:  Current Outpatient Medications on File Prior to Visit   Medication Sig Dispense Refill    albuterol 90 mcg/actuation inhaler INHALE 2 PUFFS EVERY 4 HOURS IF NEEDED FOR WHEEZING OR SHORTNESS OF BREATH. 18 g 3    azithromycin (Zithromax) 250 mg  tablet Take 2 tablets (500 mg) by mouth once daily for 1 day, THEN 1 tablet (250 mg) once daily for 4 days. Take 2 tabs (500 mg) by mouth today, than 1 daily for 4 days.. 6 tablet 0    Blood glucose monitoring meter kit kit 1 each if needed.      blood sugar diagnostic (Accu-Chek Jessica Plus test strp) strip 2 times a day.      blood sugar diagnostic (Blood Glucose Test) strip 1 strip once daily. 100 strip 3    blood sugar diagnostic (OneTouch Verio test strips) strip 1 strip once daily. 100 strip 3    blood-glucose meter misc 1 Device once daily. 1 each 0    chlorthalidone (Hygroton) 25 mg tablet Take 1 tablet (25 mg) by mouth once daily. 90 tablet 3    cyanocobalamin (Vitamin B-12) 1,000 mcg tablet TAKE 1 TABLET BY MOUTH EVERY DAY AS DIRECTED 90 tablet 3    doxycycline (Vibramycin) 100 mg capsule Take 1 capsule (100 mg) by mouth 2 times a day for 10 days. Take with at least 8 ounces (large glass) of water, do not lie down for 30 minutes after 20 capsule 0    lancets 30 gauge misc 1 Device 3 times a day. 200 each 3    lisinopril 40 mg tablet TAKE 1 TABLET BY MOUTH EVERY DAY 90 tablet 3    meloxicam (Mobic) 15 mg tablet Take 1 tablet (15 mg) by mouth once daily. 90 tablet 3    metFORMIN  mg 24 hr tablet Take 2 tablets (1,000 mg) by mouth 2 times a day. Do not crush, chew, or split. 360 tablet 3    omeprazole (PriLOSEC) 20 mg DR capsule TAKE 1 CAPSULE BY MOUTH EVERY DAY 90 capsule 3    rosuvastatin (Crestor) 40 mg tablet TAKE 1 TABLET BY MOUTH EVERY DAY 90 tablet 3    sildenafil (Viagra) 100 mg tablet Take by mouth. TAKE 1 TABLET AS NEEDED APPROXIMATELY 1 HOUR BEFORE SEXUAL ACTIVITY       No current facility-administered medications on file prior to visit.         REVIEW OF SYSTEMS:  Review of Systems   Constitutional:  Positive for appetite change, fatigue and unexpected weight change. Negative for chills.        See HPI for more details, +30 lb unintentional wt loss over past yr, mild appetite decrease,  perhaps recent fever   HENT:   Negative for trouble swallowing.    Respiratory:  Positive for cough, shortness of breath and wheezing. Negative for hemoptysis.         +productive cough w/ phlegm, at least 6 wks, SOB at rest and worse w/ exertion, +wheezing Hx asthma, using inhaler, no hemoptysis   Cardiovascular:  Negative for chest pain.   Gastrointestinal:  Negative for nausea and vomiting.   Endocrine:        +occasional night sweats    Musculoskeletal:         +chronic neck pain   Neurological:  Negative for extremity weakness and speech difficulty.       OBJECTIVE:  There were no vitals taken for this visit.  General: Alert, in no acute distress, pleasant, conversant.  Pulm: +coughing, respirations appear unlabored, no conversational dyspnea.  Remainder of exam deferred due to virtual video visit.     ASSESSMENT:  Jayant Hollnad is a 74 y.o. year old male w/ PMH HTN, DM2, asthma, OA, GERD, and DDD who presents to [facility] with RUL lung mass, c/f malignancy, and large right pleural effusion, with SOB.    PLAN:  1. Mass of upper lobe of right lung (Primary)  2. Pleural effusion, right  Concern for possible malignancy, +symptomatic effusion.  -- Discussed w/ IP team - they will order urgent thora & reach out to patient to arrange, w/ cytology.  -- PET/CT ordered, to evaluate RUL mass & right lung (post-thora).  -- Will determine subsequent steps based on pleural cytology results and PET.  -- All patient and family questions answered. I provided the patient w/ the contact information for the diagnostic clinic; advised him to call in the interim with any questions/issues.     FORTINO Robles.CNP  [facility]         Virtual or Telephone Consent    An interactive audio and video telecommunication system which permits real time communications between the patient (at the originating site) and provider (at the distant site) was utilized to provide this telehealth service.   Verbal  consent was requested and obtained from Jayant Holland on this date, 12/09/24 for a telehealth visit.
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
## 2024-12-09 NOTE — PROGRESS NOTES
Pleural Procedure Scheduling Request    Pre-procedure visit: New patient visit with pleural procedure group provider  ASAP     Location: Either location  Performing physician: General pulmonologist  Referring physician:  Kirstin Arteaga CNP , JENNIFER Garland  Indication: Large Right Pleural Effusion  Sedation / Anesthesia: None  Procedure: Thoracentesis  Time: Tier 1  Cytology on-site: No  Imaging needed:  US machine  Labs:  None- had recent ok CBC and BMP  Meds:  None  Special Considerations:  Also has a RUL nodule, will need re-evaluation after Thoracentesis and results to see if needs bronch.  DEIDRA Arteaga ordered a  PET/CT.    Reviewed by:  Patti Brooks MD

## 2024-12-10 ENCOUNTER — APPOINTMENT (OUTPATIENT)
Dept: PRIMARY CARE | Facility: CLINIC | Age: 74
End: 2024-12-10
Payer: COMMERCIAL

## 2024-12-10 ENCOUNTER — HOSPITAL ENCOUNTER (OUTPATIENT)
Dept: GASTROENTEROLOGY | Facility: HOSPITAL | Age: 74
Discharge: HOME | End: 2024-12-10
Payer: COMMERCIAL

## 2024-12-10 ENCOUNTER — HOSPITAL ENCOUNTER (OUTPATIENT)
Dept: RADIOLOGY | Facility: HOSPITAL | Age: 74
Discharge: HOME | End: 2024-12-10
Payer: COMMERCIAL

## 2024-12-10 ENCOUNTER — TELEMEDICINE (OUTPATIENT)
Dept: PULMONOLOGY | Facility: CLINIC | Age: 74
End: 2024-12-10
Payer: COMMERCIAL

## 2024-12-10 VITALS
OXYGEN SATURATION: 95 % | HEART RATE: 90 BPM | BODY MASS INDEX: 26.51 KG/M2 | SYSTOLIC BLOOD PRESSURE: 148 MMHG | WEIGHT: 200 LBS | HEIGHT: 73 IN | TEMPERATURE: 98.4 F | DIASTOLIC BLOOD PRESSURE: 91 MMHG | RESPIRATION RATE: 16 BRPM

## 2024-12-10 VITALS — HEIGHT: 73 IN | BODY MASS INDEX: 26.51 KG/M2 | WEIGHT: 200 LBS

## 2024-12-10 DIAGNOSIS — Z01.811 PREOP PULMONARY/RESPIRATORY EXAM: Primary | ICD-10-CM

## 2024-12-10 DIAGNOSIS — J90 PLEURAL EFFUSION ON RIGHT: ICD-10-CM

## 2024-12-10 LAB
BACTERIA BLD CULT: NORMAL
BACTERIA BLD CULT: NORMAL
BASOPHILS NFR FLD MANUAL: 0 %
BLASTS NFR FLD MANUAL: 0 %
CHOLEST FLD-MCNC: 84 MG/DL
CLARITY FLD: ABNORMAL
COLOR FLD: ABNORMAL
EOSINOPHIL NFR FLD MANUAL: 3 %
GLUCOSE BLD MANUAL STRIP-MCNC: 145 MG/DL (ref 74–99)
GLUCOSE FLD-MCNC: 128 MG/DL
IMMATURE GRANULOCYTES IN FLUID: 0 %
LDH FLD L TO P-CCNC: 232 U/L
LYMPHOCYTES NFR FLD MANUAL: 75 %
MONOS+MACROS NFR FLD MANUAL: 15 %
NEUTROPHILS NFR FLD MANUAL: 3 %
OTHER CELLS NFR FLD MANUAL: 4 %
PLASMA CELLS NFR FLD MANUAL: 0 %
PROT FLD-MCNC: 5.2 G/DL
RBC # FLD MANUAL: ABNORMAL /UL
TOTAL CELLS COUNTED FLD: 100
WBC # FLD MANUAL: 2702 /UL

## 2024-12-10 PROCEDURE — 4010F ACE/ARB THERAPY RXD/TAKEN: CPT | Performed by: INTERNAL MEDICINE

## 2024-12-10 PROCEDURE — 87102 FUNGUS ISOLATION CULTURE: CPT | Performed by: INTERNAL MEDICINE

## 2024-12-10 PROCEDURE — 2720000007 HC OR 272 NO HCPCS

## 2024-12-10 PROCEDURE — 1159F MED LIST DOCD IN RCRD: CPT | Performed by: INTERNAL MEDICINE

## 2024-12-10 PROCEDURE — 71045 X-RAY EXAM CHEST 1 VIEW: CPT

## 2024-12-10 PROCEDURE — 88104 CYTOPATH FL NONGYN SMEARS: CPT | Performed by: INTERNAL MEDICINE

## 2024-12-10 PROCEDURE — 3008F BODY MASS INDEX DOCD: CPT | Performed by: INTERNAL MEDICINE

## 2024-12-10 PROCEDURE — 3060F POS MICROALBUMINURIA REV: CPT | Performed by: INTERNAL MEDICINE

## 2024-12-10 PROCEDURE — 71045 X-RAY EXAM CHEST 1 VIEW: CPT | Performed by: RADIOLOGY

## 2024-12-10 PROCEDURE — 82947 ASSAY GLUCOSE BLOOD QUANT: CPT

## 2024-12-10 PROCEDURE — 7100000010 HC PHASE TWO TIME - EACH INCREMENTAL 1 MINUTE

## 2024-12-10 PROCEDURE — 7100000009 HC PHASE TWO TIME - INITIAL BASE CHARGE

## 2024-12-10 PROCEDURE — 1036F TOBACCO NON-USER: CPT | Performed by: INTERNAL MEDICINE

## 2024-12-10 PROCEDURE — 83615 LACTATE (LD) (LDH) ENZYME: CPT | Performed by: INTERNAL MEDICINE

## 2024-12-10 PROCEDURE — 32555 ASPIRATE PLEURA W/ IMAGING: CPT | Performed by: INTERNAL MEDICINE

## 2024-12-10 PROCEDURE — 83986 ASSAY PH BODY FLUID NOS: CPT | Performed by: INTERNAL MEDICINE

## 2024-12-10 PROCEDURE — C1729 CATH, DRAINAGE: HCPCS

## 2024-12-10 PROCEDURE — 87075 CULTR BACTERIA EXCEPT BLOOD: CPT | Performed by: INTERNAL MEDICINE

## 2024-12-10 PROCEDURE — 82945 GLUCOSE OTHER FLUID: CPT | Performed by: INTERNAL MEDICINE

## 2024-12-10 PROCEDURE — 3044F HG A1C LEVEL LT 7.0%: CPT | Performed by: INTERNAL MEDICINE

## 2024-12-10 PROCEDURE — 87116 MYCOBACTERIA CULTURE: CPT | Performed by: INTERNAL MEDICINE

## 2024-12-10 PROCEDURE — 82465 ASSAY BLD/SERUM CHOLESTEROL: CPT | Performed by: INTERNAL MEDICINE

## 2024-12-10 PROCEDURE — 3049F LDL-C 100-129 MG/DL: CPT | Performed by: INTERNAL MEDICINE

## 2024-12-10 PROCEDURE — 89051 BODY FLUID CELL COUNT: CPT | Performed by: INTERNAL MEDICINE

## 2024-12-10 PROCEDURE — 1157F ADVNC CARE PLAN IN RCRD: CPT | Performed by: INTERNAL MEDICINE

## 2024-12-10 PROCEDURE — 99213 OFFICE O/P EST LOW 20 MIN: CPT | Performed by: INTERNAL MEDICINE

## 2024-12-10 PROCEDURE — 84157 ASSAY OF PROTEIN OTHER: CPT | Performed by: INTERNAL MEDICINE

## 2024-12-10 ASSESSMENT — ENCOUNTER SYMPTOMS
VOMITING: 0
RHINORRHEA: 0
LEG PAIN: 0
WHEEZING: 1
SPUTUM PRODUCTION: 1
HEMOPTYSIS: 0
SWOLLEN GLANDS: 0
NECK PAIN: 0
ORTHOPNEA: 1
FEVER: 0
CLAUDICATION: 0
SORE THROAT: 0
SYNCOPE: 0
PND: 1
SHORTNESS OF BREATH: 1
HEADACHES: 0
ABDOMINAL PAIN: 0

## 2024-12-10 ASSESSMENT — PAIN - FUNCTIONAL ASSESSMENT
PAIN_FUNCTIONAL_ASSESSMENT: 0-10

## 2024-12-10 ASSESSMENT — PAIN SCALES - GENERAL
PAINLEVEL_OUTOF10: 3
PAINLEVEL_OUTOF10: 3
PAINLEVEL_OUTOF10: 0 - NO PAIN
PAINLEVEL_OUTOF10: 0 - NO PAIN
PAINLEVEL_OUTOF10: 3
PAINLEVEL_OUTOF10: 0 - NO PAIN
PAINLEVEL_OUTOF10: 3

## 2024-12-10 NOTE — H&P (VIEW-ONLY)
Subjective   Patient ID: Jayant Holland is a 74 y.o. male who presents for Pre Thorocentesis.  HPI  Patient was contacted by his home telephone this morning.  Patient is scheduled for a thoracentesis on the right side at 2:00 this afternoon at the [facility].  Patient denies having any fever but he has had shortness of breath for the last 6 weeks.  He denies any chest pain.  He denies any prior history of pneumonia.  Patient does have a history of asthma and periodically uses an inhaler for his breathing.  He is currently taking doxycycline as treatment for the right sided pleural effusion.  Review of Systems  No change  Objective   Physical Exam  Not done today  Assessment/Plan        1.  Preop pulmonary/respiratory exam.  2.  Right sided pleural effusion.        This note was transcribed using the Dragon Dictation system.  There may be grammatical, punctuation, or verbiage errors that occur with voice recognition programs.  Viral He, DO 12/10/24 9:40 AM

## 2024-12-10 NOTE — DISCHARGE INSTRUCTIONS
Following your procedure, some people experience soreness at the insertion site.    Tylenol 650 mg (two regular strength tablets) may be taken every 4-6 hours for the soreness should it occur.     PLEASE CAREFULLY FOLLOW THE INSTRUCTIONS BELOW:  Remove the bandage tomorrow, and replace it with a band-aide or leave the site open to air if the wound is dry.  Notify your doctor:  · If there is continuous bleeding at the site  · If the insertion site becomes puffy or red  · If cloudy drainage is noted from the wound  · If you have a fever occur within 1-2 days following the procedure  · If you develop unusual chest pain, shortness of breath, or cough up bloody, frothy sputum    If you experience any problems or have any questions following discharge, please call:    Before 5 pm [phone]   After 5pm and on weekends call [phone] / [phone] and ask for the   Pulmonary Fellow on-call (Pager Number: [phone]).

## 2024-12-10 NOTE — PROGRESS NOTES
Subjective   Patient ID: Jayant Holland is a 74 y.o. male who presents for Pre Thorocentesis.  HPI  Patient was contacted by his home telephone this morning.  Patient is scheduled for a thoracentesis on the right side at 2:00 this afternoon at the [facility].  Patient denies having any fever but he has had shortness of breath for the last 6 weeks.  He denies any chest pain.  He denies any prior history of pneumonia.  Patient does have a history of asthma and periodically uses an inhaler for his breathing.  He is currently taking doxycycline as treatment for the right sided pleural effusion.  Review of Systems  No change  Objective   Physical Exam  Not done today  Assessment/Plan        1.  Preop pulmonary/respiratory exam.  2.  Right sided pleural effusion.        This note was transcribed using the Dragon Dictation system.  There may be grammatical, punctuation, or verbiage errors that occur with voice recognition programs.  Viral He, DO 12/10/24 9:40 AM

## 2024-12-10 NOTE — LETTER
DearJayant      12/9/2024                                              INFORMATION FOR YOUR PROCEDURE     INSTRUCTIONS MUST BE FOLLOWED OR YOU RUN THE RISK OF YOUR CASE BEING CANCELED     Information is attached to this e-mail for your upcoming procedure. (Look for the paperclip in your email to access this information)  Lab requisitions are also included as well as procedural instructions.    You are scheduled for your Thoracentesis on ,  12/10/24,   with Dr. Patti Brooks    Arrival is at  2:30 PM,  for Check In prior to your actual procedure time. This allows us to prepare you for your procedure.  Location:   [place] [address]   Bronchoscopy / Endoscopy Suite   [facility] Room 1300.     Located on the 1st Floor close to the Main entrance of the hospital.  Enter through the front doors, turn left and we are the 1st hallway on the left hand side.(Room 1300 [facility]).  If you park in the visitor's garage you will come in on the second floor and will need to make your way down to the 1st level.     Patient information is right there if assistance is needed.  You may have a light lunch prior    You may drive yourself if you feel comfortable    Automated calls about your upcoming scheduled appointments can be quite confusing for patients.    The times may vary depending on what you have scheduled for procedure day. Follow the time/s I have given you  so there is no confusion.    Blood work will need to be done prior to your procedure, preferably at a  Facility.  There are no restrictions for testing. Your blood work is current.  No need to repeat.      Dr. Viral He , will call you on  12/10/24, @ 9:00 AM    This is a phone visit to go over your medical history prior to your procedure, and is a necessary part of your workup.  The patient must be present at this phone visit.    Please reach out to me with any  questions you may have Alia @ [phone] or   Panfilo @ [phone]    If you have an emergency (weather or other) on the day of your scheduled procedure and need to cancel for any reason, Please call the Endoscopy Suite @ [phone],  Otherwise, call Alia @ [phone]    Have a nice day!    Alia Brambila    Bronchoscopy   Interventional Pulmonology    MD Hailey Trivedi MD Sameer Avasarala, MD Catalina Teba, MD Andrew Dunatchik, MD Sruti Brahmandam, MD        [facility]  Pulmonary, Critical Care and Sleep Medicine  [address] [place] [address]  P -[phone]   [phone]  [email or web address]

## 2024-12-10 NOTE — INTERVAL H&P NOTE
H&P reviewed. The patient was examined and there are no changes to the H&P.  No change from H and P prior, no complaints, lungs with diminished breath sounds on right, RRR no m/r/g, reviewed risks, benefits, alternatives- questions answered agrees to proceed and consent obtained.  Has PET/CT planned for Thursday- will evaluate thora results and PET/CT to determine next steps.

## 2024-12-10 NOTE — H&P (VIEW-ONLY)
Subjective   Patient ID: Jayant Holland is a 74 y.o. male who presents for Pre Thorocentesis.  HPI  Patient was contacted by his home telephone this morning.  Patient is scheduled for a thoracentesis on the right side at 2:00 this afternoon at the [facility].  Patient denies having any fever but he has had shortness of breath for the last 6 weeks.  He denies any chest pain.  He denies any prior history of pneumonia.  Patient does have a history of asthma and periodically uses an inhaler for his breathing.  He is currently taking doxycycline as treatment for the right sided pleural effusion.  Review of Systems  No change  Objective   Physical Exam  Not done today  Assessment/Plan        1.  Preop pulmonary/respiratory exam.  2.  Right sided pleural effusion.        This note was transcribed using the Dragon Dictation system.  There may be grammatical, punctuation, or verbiage errors that occur with voice recognition programs.  Viral He, DO 12/10/24 9:40 AM

## 2024-12-11 LAB
PATH REVIEW-CELL CT,FLUID: NORMAL
PH FLD: 8.46 [PH]

## 2024-12-11 PROCEDURE — 87116 MYCOBACTERIA CULTURE: CPT | Performed by: INTERNAL MEDICINE

## 2024-12-11 ASSESSMENT — PAIN SCALES - GENERAL: PAINLEVEL_OUTOF10: 0 - NO PAIN

## 2024-12-12 ENCOUNTER — APPOINTMENT (OUTPATIENT)
Dept: PRIMARY CARE | Facility: CLINIC | Age: 74
End: 2024-12-12
Payer: COMMERCIAL

## 2024-12-12 ENCOUNTER — APPOINTMENT (OUTPATIENT)
Dept: RADIOLOGY | Facility: HOSPITAL | Age: 74
End: 2024-12-12
Payer: COMMERCIAL

## 2024-12-12 ENCOUNTER — TELEPHONE (OUTPATIENT)
Dept: HEMATOLOGY/ONCOLOGY | Facility: HOSPITAL | Age: 74
End: 2024-12-12

## 2024-12-12 ASSESSMENT — ENCOUNTER SYMPTOMS
HEADACHES: 0
SPUTUM PRODUCTION: 1
WHEEZING: 1
SHORTNESS OF BREATH: 1
SORE THROAT: 0
RHINORRHEA: 0
VOMITING: 0
ORTHOPNEA: 1
NECK PAIN: 0
SWOLLEN GLANDS: 0
FEVER: 0
SYNCOPE: 0
PND: 1
HEMOPTYSIS: 0
ABDOMINAL PAIN: 0
LEG PAIN: 0
CLAUDICATION: 0

## 2024-12-12 NOTE — TELEPHONE ENCOUNTER
12/12/2024 @ 1258:  Patient called, stating that he is returning a call/VM message from Kirstin Arteaga CNP.  He states he'll be available all day today; requests call back to [phone].  Denies any needs or questions at this time.  Advised patient that I will ask Kirstin to return his call.    Update sent to Kirstin Arteaga CNP.    Rona Gonzalez RN  [facility]

## 2024-12-12 NOTE — TELEPHONE ENCOUNTER
12/12/2024 @ 1332:  Called patient; advised that Kirstin Arteaga CNP stated that she did not call the patient earlier today.  Also advised patient that I reviewed his EMR, but there were no notes stating that someone tried to reach him today.      Patient stated that his phone has been acting strangely, lately, so he  wondered if the VM message he received from Kirstin, today, was from a previous message.  Advised patient that Kirstin's last VM message to him was on 12/6/2024.    Inquired if he had spoken with someone, earlier, regarding his PET/CT appointment change; patient stated that he did speak with them this morning and that they were having issues with the PET scanner, that was going to require  assistance, so they rescheduled his appointment to Friday, 12/20/2024.  Asked patient if he would like me to check for sooner appointment availability at other  locations; patient stated the radiology staff offered to do the same, but since the earliest appointment was next week, also, patient preferred the 12/20/2024 appointment, since it is closest to his home.    Patient reports improvement in his breathing and discomfort following his thoracentesis on 12/10/2024.  He denies fever, chills, hemoptysis, increased discomfort at the thoracentesis site, bleeding/drainage at thoracentesis site, N/V/C/D, chest pain, abdominal pain, extremity pain or swelling.  Reports using Tylenol for discomfort and that he has increased his oral fluid intake while using Tylenol.      Advised patient that we will contact him to review his PET/CT results and to discuss next steps once his PET/CT report is available.  Patient stated awareness to contact us with questions, concerns or changes and awareness of our contact numbers. Update sent to JENNIFER Robles.    Rona Gonzalez RN  [facility] - RN Care Coordinator  Office:  [phone]

## 2024-12-12 NOTE — ADDENDUM NOTE
Encounter addended by: Daniel Moralez MT on: 12/11/2024 11:30 PM   Actions taken: Order list changed, Test resulted

## 2024-12-13 ENCOUNTER — TELEPHONE (OUTPATIENT)
Dept: HEMATOLOGY/ONCOLOGY | Facility: HOSPITAL | Age: 74
End: 2024-12-13

## 2024-12-13 ENCOUNTER — PATIENT OUTREACH (OUTPATIENT)
Dept: HEMATOLOGY/ONCOLOGY | Facility: HOSPITAL | Age: 74
End: 2024-12-13
Payer: COMMERCIAL

## 2024-12-13 DIAGNOSIS — C80.1 ADENOCARCINOMA (MULTI): Primary | ICD-10-CM

## 2024-12-13 DIAGNOSIS — J91.0 MALIGNANT PLEURAL EFFUSION (CMS-HCC): Primary | ICD-10-CM

## 2024-12-13 LAB
BACTERIA FLD CULT: NORMAL
FUNGUS SPEC CULT: NORMAL
FUNGUS SPEC FUNGUS STN: NORMAL
GRAM STN SPEC: NORMAL
GRAM STN SPEC: NORMAL
LAB AP ASR DISCLAIMER: NORMAL
LABORATORY COMMENT REPORT: NORMAL
LABORATORY COMMENT REPORT: NORMAL
PATH REPORT.COMMENTS IMP SPEC: NORMAL
PATH REPORT.FINAL DX SPEC: NORMAL
PATH REPORT.GROSS SPEC: NORMAL
PATH REPORT.TOTAL CANCER: NORMAL

## 2024-12-13 NOTE — RESULT ENCOUNTER NOTE
Called patient to discuss Thoracentesis results- no answer so left VM for him to call back and with call back numbers.  Given the positive cytology results and the fact that he had cough ending thoracentesis and still fluid residual, it is possible that there is lung entrapment and will recommend PleurX placement, and discuss other options for recurrent pleural effusion management.   If patient agrees to PleurX will schedule with my Pulmonary team.  Kirstin Arteaga NP with Heme/Onc will schedule him for Heme/Onc visit and follow up to discuss next steps.  His PET/CT is scheduled for 12/20 now, so pleurX placement prior may assist in getting a clearer view of the lungs.

## 2024-12-13 NOTE — PROGRESS NOTES
Pleural Procedure Scheduling Request    Pre-procedure visit: Not needed with PleurX physician    Location: Either location  Performing physician: PleurX physician  Referring physician: Kirstin Arteaga CNP, Patti Brooks MD, FORTINO Garland-CNP  Indication: Right Malignant Effusion  Sedation / Anesthesia: None  Procedure: PleurX placement - Right  Time: Tier 1  Cytology on-site: No  Imaging needed:  US machine for placement  Labs:  None  Meds:  None  Special Considerations:  Patient has PET/CT 12/20/24, his wife is having surgery 12/17/24- would be nice to have PleruX prior to PET/CT.  Still had residual effusion after thoracentesis 12/10, possible lung entrapment  Reviewed by:  Patti Brooks MD

## 2024-12-13 NOTE — TELEPHONE ENCOUNTER
Mr. Holland's pleural fluid cytology resulted this afternoon consistent with NSCLC - adenocarcinoma. He was notified of the results by Dr. Brooks & IP team is arranging PleurX catheter placement w/ concern for possible fluid entrapment. I discussed referral to thoracic medical oncology - he is agreeable to appt w/ Dr. Solitario on Tuesday, 12/17 at [facility]. He is scheduled for PET/CT on 12/20. Order placed for MRI brain to complete staging, will assist w/ appt. All questions answered & Mr. Holland is agreeable w/ the plan. Appt details reviewed.  Kirstin Arteaga, FORTINO.CNP  [facility]       Final Cytological Interpretation      A. PLEURAL FLUID RIGHT SIDE- RIGHT RLEURAL EFFUSION , CYTOLOGY AND CELL BLOCK:                                                Malignant cells present consistent with adenocarcinoma.  See comment.                      Electronically signed by Shari Carbone MD on 12/13/2024 at 1418     Comment    Immunohistochemical stains for TTF-1, Claudin 4 and MOC-31 (weak) are positive in the tumor cells and calretinin and D2-40 are negative consistent with adenocarcinoma, favor lung origin.

## 2024-12-13 NOTE — PROGRESS NOTES
RN called and spoke to Jayant in regards to his upcoming appointment on 12/17 at 11:40am with Dr. Solitario. Jayant said that he knew where to go for this appointment and knows to check in at desk A. He has no further questions or concerns at this time.

## 2024-12-16 ENCOUNTER — TELEPHONE (OUTPATIENT)
Dept: HEMATOLOGY/ONCOLOGY | Facility: HOSPITAL | Age: 74
End: 2024-12-16
Payer: COMMERCIAL

## 2024-12-16 DIAGNOSIS — C80.1 ADENOCARCINOMA (MULTI): ICD-10-CM

## 2024-12-16 LAB
FUNGUS SPEC CULT: NORMAL
FUNGUS SPEC FUNGUS STN: NORMAL
TEST COMMENT - SURGICAL SENDOUT REQUEST: NORMAL
TEST COMMENT - SURGICAL SENDOUT REQUEST: NORMAL

## 2024-12-16 ASSESSMENT — ENCOUNTER SYMPTOMS
SORE THROAT: 0
HEADACHES: 0
ABDOMINAL PAIN: 0
SWOLLEN GLANDS: 0
RHINORRHEA: 0
CLAUDICATION: 0
SPUTUM PRODUCTION: 1
LEG PAIN: 0
FEVER: 0
VOMITING: 0
ORTHOPNEA: 1
PND: 1
SYNCOPE: 0
WHEEZING: 1
HEMOPTYSIS: 0
NECK PAIN: 0
SHORTNESS OF BREATH: 1

## 2024-12-16 NOTE — TELEPHONE ENCOUNTER
"12/16/2024 @ 1218:  Spoke with patient regarding his MRI Brain appointment scheduling, per Kirstin Arteaga, CNP.  Patient audibly slightly dyspneic; he states he is currently \"doing ok.\"  Denies new or worsening symptoms over the weekend, including dyspnea, cough, hemoptysis, chest pain.  Reports some WILSON with stair climbing (15 steps to 2nd level) or when hurrying to the phone, which resolves in \"a few minutes.\"       Advised patient that per our Radiology Scheduling Team ([place]), patient's insurance plan ([employer] [facility] [employer]) requires radiology appointments to be scheduled 14 business days out, to allow time for precertification.  Patient's current appointment on 1/3/2025 ([facility]) is the soonest possible appointment that can be scheduled, at this time.  Advised patient that we will monitor for insurance authorization; once it has been authorized, we will call him with an update and we can try to move his appointment to an earlier date.    Improvement in conversational dyspnea noted after a few minutes.    Patient states preferred locations for MRI Brain include [facility] (1st choice) and [facility], [facility] or [facility] (2nd choice).       Patient states that his insurance plan will change to a [employer] plan as of Jan. 1, 2025 and that he uploaded his insurance information into [employer] over the weekend; advised patient that I can see his updated insurance card information (under the  tab).  Advised patient to bring his current and new insurance cards with him to his appointment tomorrow.    Reviewed patient's upcoming appointments with Dr. Solitario (12/17/2024) and for his PET/CT (12/20/2024).  Patient denies questions regarding driving directions.  Reviewed PET/CT prep information; patient voiced understanding.      Patient reports that his wife will undergo a colectomy tomorrow at [facility]; her surgery time is still pending.  Their son will stay with his wife, " but the patient would like his sister to be on speakerphone during his appointment with Dr. Solitario tomorrow.  Advised patient to let the nurses and Dr. Solitario know about this, at the beginning of his appointment and they will assist him, if needed.    Patient denied further questions at this time; he stated understanding of all aforementioned information, symptoms warranting ED evaluation and/or call to EMS, risks of delayed evaluation and awareness of our contact information for additional questions or concerns.  Continued improvement in patient's conversational dyspnea noted throughout conversation.    Update sent to FORTINO Robles-CNP.    Rona Gonzalez RN  [facility] - RN Care Coordinator  Office:  [phone]

## 2024-12-17 ENCOUNTER — HOSPITAL ENCOUNTER (OUTPATIENT)
Dept: GASTROENTEROLOGY | Facility: HOSPITAL | Age: 74
Discharge: HOME | End: 2024-12-17
Payer: COMMERCIAL

## 2024-12-17 ENCOUNTER — OFFICE VISIT (OUTPATIENT)
Dept: HEMATOLOGY/ONCOLOGY | Facility: HOSPITAL | Age: 74
End: 2024-12-17
Payer: COMMERCIAL

## 2024-12-17 ENCOUNTER — LAB (OUTPATIENT)
Dept: LAB | Facility: HOSPITAL | Age: 74
End: 2024-12-17
Payer: COMMERCIAL

## 2024-12-17 VITALS
RESPIRATION RATE: 16 BRPM | OXYGEN SATURATION: 97 % | SYSTOLIC BLOOD PRESSURE: 147 MMHG | BODY MASS INDEX: 27.29 KG/M2 | DIASTOLIC BLOOD PRESSURE: 80 MMHG | HEIGHT: 71 IN | WEIGHT: 194.9 LBS | HEART RATE: 102 BPM | TEMPERATURE: 97.5 F

## 2024-12-17 VITALS
TEMPERATURE: 96.8 F | SYSTOLIC BLOOD PRESSURE: 129 MMHG | WEIGHT: 194 LBS | DIASTOLIC BLOOD PRESSURE: 94 MMHG | HEART RATE: 93 BPM | HEIGHT: 73 IN | OXYGEN SATURATION: 97 % | BODY MASS INDEX: 25.71 KG/M2 | RESPIRATION RATE: 27 BRPM

## 2024-12-17 DIAGNOSIS — C80.1 ADENOCARCINOMA (MULTI): ICD-10-CM

## 2024-12-17 DIAGNOSIS — J90 PLEURAL EFFUSION ON RIGHT: ICD-10-CM

## 2024-12-17 LAB
ALBUMIN SERPL BCP-MCNC: 4.4 G/DL (ref 3.4–5)
ALP SERPL-CCNC: 70 U/L (ref 33–136)
ALT SERPL W P-5'-P-CCNC: 16 U/L (ref 10–52)
ANION GAP SERPL CALC-SCNC: 13 MMOL/L (ref 10–20)
AST SERPL W P-5'-P-CCNC: 19 U/L (ref 9–39)
BASOPHILS # BLD AUTO: 0.04 X10*3/UL (ref 0–0.1)
BASOPHILS NFR BLD AUTO: 0.6 %
BILIRUB SERPL-MCNC: 0.7 MG/DL (ref 0–1.2)
BUN SERPL-MCNC: 15 MG/DL (ref 6–23)
CALCIUM SERPL-MCNC: 9.9 MG/DL (ref 8.6–10.3)
CHLORIDE SERPL-SCNC: 103 MMOL/L (ref 98–107)
CO2 SERPL-SCNC: 30 MMOL/L (ref 21–32)
CREAT SERPL-MCNC: 0.85 MG/DL (ref 0.5–1.3)
EGFRCR SERPLBLD CKD-EPI 2021: >90 ML/MIN/1.73M*2
EOSINOPHIL # BLD AUTO: 0.29 X10*3/UL (ref 0–0.4)
EOSINOPHIL NFR BLD AUTO: 4.2 %
ERYTHROCYTE [DISTWIDTH] IN BLOOD BY AUTOMATED COUNT: 13.4 % (ref 11.5–14.5)
GLUCOSE BLD MANUAL STRIP-MCNC: 134 MG/DL (ref 74–99)
GLUCOSE SERPL-MCNC: 125 MG/DL (ref 74–99)
HCT VFR BLD AUTO: 41.3 % (ref 41–52)
HGB BLD-MCNC: 13.2 G/DL (ref 13.5–17.5)
IMM GRANULOCYTES # BLD AUTO: 0.02 X10*3/UL (ref 0–0.5)
IMM GRANULOCYTES NFR BLD AUTO: 0.3 % (ref 0–0.9)
LYMPHOCYTES # BLD AUTO: 1.68 X10*3/UL (ref 0.8–3)
LYMPHOCYTES NFR BLD AUTO: 24.5 %
MCH RBC QN AUTO: 28.3 PG (ref 26–34)
MCHC RBC AUTO-ENTMCNC: 32 G/DL (ref 32–36)
MCV RBC AUTO: 89 FL (ref 80–100)
MONOCYTES # BLD AUTO: 0.56 X10*3/UL (ref 0.05–0.8)
MONOCYTES NFR BLD AUTO: 8.2 %
NEUTROPHILS # BLD AUTO: 4.28 X10*3/UL (ref 1.6–5.5)
NEUTROPHILS NFR BLD AUTO: 62.2 %
NRBC BLD-RTO: 0 /100 WBCS (ref 0–0)
PLATELET # BLD AUTO: 247 X10*3/UL (ref 150–450)
POTASSIUM SERPL-SCNC: 3.5 MMOL/L (ref 3.5–5.3)
PROT SERPL-MCNC: 7.6 G/DL (ref 6.4–8.2)
RBC # BLD AUTO: 4.66 X10*6/UL (ref 4.5–5.9)
SODIUM SERPL-SCNC: 142 MMOL/L (ref 136–145)
WBC # BLD AUTO: 6.9 X10*3/UL (ref 4.4–11.3)

## 2024-12-17 PROCEDURE — G2211 COMPLEX E/M VISIT ADD ON: HCPCS | Performed by: STUDENT IN AN ORGANIZED HEALTH CARE EDUCATION/TRAINING PROGRAM

## 2024-12-17 PROCEDURE — 2720000007 HC OR 272 NO HCPCS

## 2024-12-17 PROCEDURE — 99215 OFFICE O/P EST HI 40 MIN: CPT | Mod: 25 | Performed by: STUDENT IN AN ORGANIZED HEALTH CARE EDUCATION/TRAINING PROGRAM

## 2024-12-17 PROCEDURE — 3049F LDL-C 100-129 MG/DL: CPT | Performed by: STUDENT IN AN ORGANIZED HEALTH CARE EDUCATION/TRAINING PROGRAM

## 2024-12-17 PROCEDURE — 7100000010 HC PHASE TWO TIME - EACH INCREMENTAL 1 MINUTE

## 2024-12-17 PROCEDURE — 32555 ASPIRATE PLEURA W/ IMAGING: CPT | Performed by: STUDENT IN AN ORGANIZED HEALTH CARE EDUCATION/TRAINING PROGRAM

## 2024-12-17 PROCEDURE — 7100000009 HC PHASE TWO TIME - INITIAL BASE CHARGE

## 2024-12-17 PROCEDURE — 3008F BODY MASS INDEX DOCD: CPT | Performed by: STUDENT IN AN ORGANIZED HEALTH CARE EDUCATION/TRAINING PROGRAM

## 2024-12-17 PROCEDURE — 1159F MED LIST DOCD IN RCRD: CPT | Performed by: STUDENT IN AN ORGANIZED HEALTH CARE EDUCATION/TRAINING PROGRAM

## 2024-12-17 PROCEDURE — 99215 OFFICE O/P EST HI 40 MIN: CPT | Performed by: STUDENT IN AN ORGANIZED HEALTH CARE EDUCATION/TRAINING PROGRAM

## 2024-12-17 PROCEDURE — 80053 COMPREHEN METABOLIC PANEL: CPT

## 2024-12-17 PROCEDURE — 36415 COLL VENOUS BLD VENIPUNCTURE: CPT

## 2024-12-17 PROCEDURE — 3079F DIAST BP 80-89 MM HG: CPT | Performed by: STUDENT IN AN ORGANIZED HEALTH CARE EDUCATION/TRAINING PROGRAM

## 2024-12-17 PROCEDURE — 3044F HG A1C LEVEL LT 7.0%: CPT | Performed by: STUDENT IN AN ORGANIZED HEALTH CARE EDUCATION/TRAINING PROGRAM

## 2024-12-17 PROCEDURE — 1157F ADVNC CARE PLAN IN RCRD: CPT | Performed by: STUDENT IN AN ORGANIZED HEALTH CARE EDUCATION/TRAINING PROGRAM

## 2024-12-17 PROCEDURE — 85025 COMPLETE CBC W/AUTO DIFF WBC: CPT

## 2024-12-17 PROCEDURE — 1125F AMNT PAIN NOTED PAIN PRSNT: CPT | Performed by: STUDENT IN AN ORGANIZED HEALTH CARE EDUCATION/TRAINING PROGRAM

## 2024-12-17 PROCEDURE — 3077F SYST BP >= 140 MM HG: CPT | Performed by: STUDENT IN AN ORGANIZED HEALTH CARE EDUCATION/TRAINING PROGRAM

## 2024-12-17 PROCEDURE — 4010F ACE/ARB THERAPY RXD/TAKEN: CPT | Performed by: STUDENT IN AN ORGANIZED HEALTH CARE EDUCATION/TRAINING PROGRAM

## 2024-12-17 PROCEDURE — 3060F POS MICROALBUMINURIA REV: CPT | Performed by: STUDENT IN AN ORGANIZED HEALTH CARE EDUCATION/TRAINING PROGRAM

## 2024-12-17 PROCEDURE — C1729 CATH, DRAINAGE: HCPCS

## 2024-12-17 PROCEDURE — 82947 ASSAY GLUCOSE BLOOD QUANT: CPT

## 2024-12-17 ASSESSMENT — PAIN - FUNCTIONAL ASSESSMENT
PAIN_FUNCTIONAL_ASSESSMENT: 0-10

## 2024-12-17 ASSESSMENT — COLUMBIA-SUICIDE SEVERITY RATING SCALE - C-SSRS
6. HAVE YOU EVER DONE ANYTHING, STARTED TO DO ANYTHING, OR PREPARED TO DO ANYTHING TO END YOUR LIFE?: NO
2. HAVE YOU ACTUALLY HAD ANY THOUGHTS OF KILLING YOURSELF?: NO
1. IN THE PAST MONTH, HAVE YOU WISHED YOU WERE DEAD OR WISHED YOU COULD GO TO SLEEP AND NOT WAKE UP?: NO

## 2024-12-17 ASSESSMENT — PAIN SCALES - GENERAL
PAINLEVEL_OUTOF10: 0 - NO PAIN
PAINLEVEL_OUTOF10: 3
PAINLEVEL_OUTOF10: 0 - NO PAIN

## 2024-12-17 NOTE — INTERVAL H&P NOTE
H&P reviewed. The patient was examined and there are no changes to the H&P.    Plan to proceed as discussed. Labs reviewed. No AC or DAPT.     Discussed with Dr. Clayton.     Poornima Rodriguez  Fellow  Pulmonary and Critical Care

## 2024-12-17 NOTE — SIGNIFICANT EVENT
Patient reporting chest pressure. Dr. Clayton said that is to be expected with the amount of pleural fluid drained Should resolve in 15 mins. Will continue to monitor.

## 2024-12-17 NOTE — PROGRESS NOTES
[facility] - Medical Oncology New Patient Visit    Patient ID: Jayant Holland is a 74 y.o. male.  Referring Physician: Kirstin Arteaga, APRN-CNP  [address]  [place] [id number]  Primary Care Provider: Jorge Luis Oconnor, FORTINO-CNP       DIAGNOSIS  NSCLC adenocarcinoma     STAGING  [id number]     CURRENT SITES OF DISEASE        MOLECULAR GENOMICS        SERUM TUMOR MARKER        PRIOR THERAPY        CURRENT THERAPY           CURRENT ONCOLOGICAL PROBLEMS           HISTORY OF PRESENT ILLNESS  Mr. Holland is a 75 yo with PMH significant for DMII, HTN, and HLD who had a CXR done on 12/5/24 for persistent coughing after pneumonia about a month prior, which was concerning for moderate pleural effusion of the R lung. He went to the ER, where a CT chest w/contrast was done with spiculated RUL mass measuring 2.2 x 2.1 cm and large R pleural effusion. He was referred to diagnostic clinic and seen on 12/9/24 by Kirstin where he noted persistent cough for 6 weeks, constant dyspnea, wheezing, and unintentional 30 lb weight loss over the last year. He was referred to pulmonology and had thoracentesis on 12/10/24 with 980 ml removed, cytology with adenocarcinoma, PD-L1 and NGS pending. He has been referred for pleur-x, PET/CT is scheduled for 12/20/24, and brain MRI is scheduled for 1/3/25.     He is here alone, his wife is having surgery right now. Sister on speakerphone. He is still short of breath, a little better after thora but still present. Worse with moving around, but present all the time. Still has cough, wheezing settled down. Non productive cough. Still losing weight. No appetite - this has been since he had the pneumonia. His energy levels have been low, taking naps, daily, couple hours. The arthritis in his neck can wake him up from pain. Every once in awhile he gets a little pain on his lower R chest.         PAST MEDICAL HISTORY  DMII   HTN  HLD   Osteoarthritis (neck)  asthma     SOCIAL  HISTORY  Lives at home with his wife. Retired from [employer], worked as a technician for 48 years, notes hazardous chemical exposures.  Tob: never  EtOH: occasionally  Illicits: none     FAMILY HISTORY  Mother - breast cancer      Meds (Current):    Current Outpatient Medications:     albuterol 90 mcg/actuation inhaler, INHALE 2 PUFFS EVERY 4 HOURS IF NEEDED FOR WHEEZING OR SHORTNESS OF BREATH., Disp: 18 g, Rfl: 3    Blood glucose monitoring meter kit kit, 1 each if needed., Disp: , Rfl:     blood sugar diagnostic (Accu-Chek Jessica Plus test strp) strip, 2 times a day., Disp: , Rfl:     blood sugar diagnostic (Blood Glucose Test) strip, 1 strip once daily., Disp: 100 strip, Rfl: 3    blood sugar diagnostic (OneTouch Verio test strips) strip, 1 strip once daily., Disp: 100 strip, Rfl: 3    blood-glucose meter misc, 1 Device once daily., Disp: 1 each, Rfl: 0    chlorthalidone (Hygroton) 25 mg tablet, Take 1 tablet (25 mg) by mouth once daily., Disp: 90 tablet, Rfl: 3    cyanocobalamin (Vitamin B-12) 1,000 mcg tablet, TAKE 1 TABLET BY MOUTH EVERY DAY AS DIRECTED, Disp: 90 tablet, Rfl: 3    lancets 30 gauge misc, 1 Device 3 times a day., Disp: 200 each, Rfl: 3    lisinopril 40 mg tablet, TAKE 1 TABLET BY MOUTH EVERY DAY, Disp: 90 tablet, Rfl: 3    meloxicam (Mobic) 15 mg tablet, Take 1 tablet (15 mg) by mouth once daily., Disp: 90 tablet, Rfl: 3    metFORMIN  mg 24 hr tablet, Take 2 tablets (1,000 mg) by mouth 2 times a day. Do not crush, chew, or split., Disp: 360 tablet, Rfl: 3    omeprazole (PriLOSEC) 20 mg DR capsule, TAKE 1 CAPSULE BY MOUTH EVERY DAY, Disp: 90 capsule, Rfl: 3    rosuvastatin (Crestor) 40 mg tablet, TAKE 1 TABLET BY MOUTH EVERY DAY, Disp: 90 tablet, Rfl: 3    sildenafil (Viagra) 100 mg tablet, Take by mouth. TAKE 1 TABLET AS NEEDED APPROXIMATELY 1 HOUR BEFORE SEXUAL ACTIVITY, Disp: , Rfl:     No Known Allergies    Review of Systems   All other systems reviewed and are negative.       Objective  "  BSA: 2.11 meters squared  Wt Readings from Last 5 Encounters:   12/18/24 87.1 kg (192 lb)   12/17/24 88 kg (194 lb)   12/17/24 88.4 kg (194 lb 14.4 oz)   12/10/24 90.7 kg (200 lb)   12/10/24 90.7 kg (200 lb)     /80 (BP Location: Left arm, Patient Position: Sitting)   Pulse 102   Temp 36.4 °C (97.5 °F) (Temporal)   Resp 16   Ht (S) 1.813 m (5' 11.38\")   Wt 88.4 kg (194 lb 14.4 oz)   SpO2 97%   BMI 26.90 kg/m²     ECOG Score: 1- Restricted in physically strenuous activity.  Carries out light duty.      Physical Exam  Vitals reviewed.   Constitutional:       General: He is not in acute distress.     Appearance: Normal appearance.   HENT:      Head: Normocephalic and atraumatic.      Mouth/Throat:      Mouth: Mucous membranes are moist.   Eyes:      Extraocular Movements: Extraocular movements intact.      Conjunctiva/sclera: Conjunctivae normal.      Pupils: Pupils are equal, round, and reactive to light.   Cardiovascular:      Rate and Rhythm: Regular rhythm.      Heart sounds: No murmur heard.  Pulmonary:      Effort: Pulmonary effort is normal. No respiratory distress.      Breath sounds: Normal breath sounds.   Abdominal:      General: Bowel sounds are normal. There is no distension.      Palpations: Abdomen is soft.   Skin:     General: Skin is warm and dry.   Neurological:      General: No focal deficit present.      Mental Status: He is alert and oriented to person, place, and time.   Psychiatric:         Mood and Affect: Mood normal.         Behavior: Behavior normal.         Thought Content: Thought content normal.          Results:  Labs:  Lab Results   Component Value Date    WBC 6.8 12/05/2024    HGB 13.3 (L) 12/05/2024    HCT 42.5 12/05/2024    MCV 90 12/05/2024     12/05/2024      Lab Results   Component Value Date    NEUTROABS 4.39 12/05/2024      Lab Results   Component Value Date    GLUCOSE 112 (H) 12/05/2024    CALCIUM 9.5 12/05/2024     12/05/2024    K 3.4 (L) 12/05/2024 "    CO2 29 12/05/2024     12/05/2024    BUN 14 12/05/2024    CREATININE 0.91 12/05/2024     Lab Results   Component Value Date    ALT 20 12/05/2024    AST 19 12/05/2024    ALKPHOS 76 12/05/2024    BILITOT 0.6 12/05/2024        Imaging:  I have personally reviewed the below imaging and concur with the reported findings unless otherwise stated:    === Results for orders placed during the hospital encounter of 12/05/24 ===    CT chest w IV contrast [[id number]] 12/05/2024    Status: Normal  1. There is a large right pleural effusion with associated  atelectasis.  2. There is a spiculated mass in the right upper lobe which is  suspicious for malignancy.  Signed by Aung Almeida MD      === Results for orders placed during the hospital encounter of 10/23/24 ===    CT cervical spine wo IV contrast [[id number]] 10/23/2024    Status: Normal  1. No evidence for an acute fracture or subluxation of the cervical  spine.    MACRO:  None    Signed by: Jose A Vyas 10/23/2024 7:56 PM  Dictation workstation:   [id number]    ___________________________________________________________________________    CT head wo IV contrast [[id number]] 10/23/2024    Status: Normal  No evidence of acute cortical infarct or intracranial hemorrhage.    MACRO:  None    Signed by: Jose A Vyas 10/23/2024 7:55 PM  Dictation workstation:   [id number]  No results found for this or any previous visit.  No results found for this or any previous visit.  No results found for this or any previous visit.  No results found for this or any previous visit.  === Results for orders placed in visit on 04/14/21 ===    XR KNEE COMPLETE 4 OR MORE VIEWS [[id number]] 04/14/2021    Status: Normal  Advanced osteoarthritis left knee similar to prior study.      === Results for orders placed in visit on 04/04/19 ===    XR KNEE COMPLETE 4 OR MORE VIEWS [[id number]] 04/04/2019    Status: Normal  Advanced osteoarthritis left knee worst medially, similar to prior.      === Results for  orders placed in visit on 11/28/16 ===    XR KNEE COMPLETE 4 OR MORE VIEWS [[id number]] 11/28/2016    Status: Normal  Tricompartmental degenerative change of the knee without osseous  injury evident.    Pathology:    Lab Results   Component Value Date    PATHREP  03/14/2023     Name SORAYA OROURKE                                                                                                   Accession #: [id number]            Pathologist:                   SHORTY HERNANDEZ MD  Date of Procedure:    3/14/2023  Date Received:          3/14/2023  Date Reported           3/20/2023  Submitting Physician:   ANDERSON TIPTON MD  Location:                    [facility]   Copy To/Referring/Attending:  LUPE RODRIGUEZ, CNP Other External #                                                                    FINAL DIAGNOSIS  A.  RECTAL POLYP:    -- COLONIC MUCOSA WITH SUPERFICIAL HYPERPLASTIC CHANGE.                                                                                                                                                                                                                                                                                                                                                                                                                                                                                         Electronically Signed Out By SHORTY HERNANDEZ MD/ATB  By the signature on this report, the individual or group listed as making the  Final Interpretation/Diagnosis certifies that they have reviewed this case.  Diagnostic interpretation performed at [facility] [address] [place] [address]           Clinical History:  Physician Contact Number: [phone]  Ischemic Time (A): 10:34  Fixative (A): Formalin  Fixative Time (A): 10:34  Clinical Diagnosis History; screening    Specimens Submitted As:  A: RECTAL POLYP     Gross Description:  Received in formalin,  "labeled with the patient's name and hospital number and  \"rectal polyp\", is one light tan polypoid, soft tissue fragment measuring 0.4 x  0.3 x 0.2 cm.  The specimen is submitted in toto in one cassette.  LMP    lmp/3/15/2023               [facility]  Department of Pathology   [address] [place] [id number]        FINALINTERP  12/10/2024       A. PLEURAL FLUID RIGHT SIDE- RIGHT RLEURAL EFFUSION , CYTOLOGY AND CELL BLOCK:         Malignant cells present consistent with adenocarcinoma.  See comment.          COMDX  12/10/2024     Immunohistochemical stains for TTF-1, Claudin 4 and MOC-31 (weak) are positive in the tumor cells and calretinin and D2-40 are negative consistent with adenocarcinoma, favor lung origin.         Assessment/Plan      Jayant Holland is a 74 y.o. male here for recommendations and to establish care for NSCLC adenocarcinoma.    # NSCLC adenocarcinoma  - [id number] (pleural effusion  - pending PET/CT and brain MRI  - pending PD-L1 and NGS  - with history of never smoking, discussed that we need both staging scans and NGS to determine best treatment plan moving forward  - discussed that if he becomes symptomatic, can consider chemotherapy alone until NGS returns  - sending liquid NGS ([facility])   - RTC after all results back    # Malignant pleural effusion  - s/p thoracentesism and contacted by IP while in visit for repeat thora.   - continue to monitor        Alisha Solitario MD                       "

## 2024-12-18 ENCOUNTER — APPOINTMENT (OUTPATIENT)
Dept: PRIMARY CARE | Facility: CLINIC | Age: 74
End: 2024-12-18
Payer: COMMERCIAL

## 2024-12-18 VITALS
BODY MASS INDEX: 25.45 KG/M2 | DIASTOLIC BLOOD PRESSURE: 66 MMHG | SYSTOLIC BLOOD PRESSURE: 106 MMHG | WEIGHT: 192 LBS | HEIGHT: 73 IN

## 2024-12-18 DIAGNOSIS — J91.0 MALIGNANT PLEURAL EFFUSION (CMS-HCC): Primary | ICD-10-CM

## 2024-12-18 DIAGNOSIS — Z00.00 MEDICARE ANNUAL WELLNESS VISIT, SUBSEQUENT: ICD-10-CM

## 2024-12-18 PROCEDURE — 3060F POS MICROALBUMINURIA REV: CPT | Performed by: NURSE PRACTITIONER

## 2024-12-18 PROCEDURE — 1036F TOBACCO NON-USER: CPT | Performed by: NURSE PRACTITIONER

## 2024-12-18 PROCEDURE — 1159F MED LIST DOCD IN RCRD: CPT | Performed by: NURSE PRACTITIONER

## 2024-12-18 PROCEDURE — 4010F ACE/ARB THERAPY RXD/TAKEN: CPT | Performed by: NURSE PRACTITIONER

## 2024-12-18 PROCEDURE — 3008F BODY MASS INDEX DOCD: CPT | Performed by: NURSE PRACTITIONER

## 2024-12-18 PROCEDURE — 3044F HG A1C LEVEL LT 7.0%: CPT | Performed by: NURSE PRACTITIONER

## 2024-12-18 PROCEDURE — 99213 OFFICE O/P EST LOW 20 MIN: CPT | Performed by: NURSE PRACTITIONER

## 2024-12-18 PROCEDURE — 1157F ADVNC CARE PLAN IN RCRD: CPT | Performed by: NURSE PRACTITIONER

## 2024-12-18 PROCEDURE — 3074F SYST BP LT 130 MM HG: CPT | Performed by: NURSE PRACTITIONER

## 2024-12-18 PROCEDURE — 3078F DIAST BP <80 MM HG: CPT | Performed by: NURSE PRACTITIONER

## 2024-12-18 PROCEDURE — 1160F RVW MEDS BY RX/DR IN RCRD: CPT | Performed by: NURSE PRACTITIONER

## 2024-12-18 PROCEDURE — 3049F LDL-C 100-129 MG/DL: CPT | Performed by: NURSE PRACTITIONER

## 2024-12-18 ASSESSMENT — ENCOUNTER SYMPTOMS
VOMITING: 0
PND: 1
SHORTNESS OF BREATH: 1
COUGH: 1
FEVER: 0
SYNCOPE: 0
ORTHOPNEA: 1
LEG PAIN: 0
RHINORRHEA: 0
SORE THROAT: 0
CLAUDICATION: 0
HEADACHES: 0
OCCASIONAL FEELINGS OF UNSTEADINESS: 0
LOSS OF SENSATION IN FEET: 0
ABDOMINAL PAIN: 0
SWOLLEN GLANDS: 0
DEPRESSION: 0
SPUTUM PRODUCTION: 1
HEMOPTYSIS: 0
NECK PAIN: 0

## 2024-12-18 ASSESSMENT — ANXIETY QUESTIONNAIRES
7. FEELING AFRAID AS IF SOMETHING AWFUL MIGHT HAPPEN: NOT AT ALL
2. NOT BEING ABLE TO STOP OR CONTROL WORRYING: NOT AT ALL
3. WORRYING TOO MUCH ABOUT DIFFERENT THINGS: NOT AT ALL
GAD7 TOTAL SCORE: 0
6. BECOMING EASILY ANNOYED OR IRRITABLE: NOT AT ALL
5. BEING SO RESTLESS THAT IT IS HARD TO SIT STILL: NOT AT ALL
4. TROUBLE RELAXING: NOT AT ALL
1. FEELING NERVOUS, ANXIOUS, OR ON EDGE: NOT AT ALL

## 2024-12-18 NOTE — PROGRESS NOTES
"Subjective   Patient ID: Jayant Holland is a 74 y.o. male who presents for Cough.    Patient is coming in for ED discharge follow-up exam.  On 12/5/2024, he was diagnosed with right malignant pleural effusion. He underwent thoracentesis on 12/10/25 and still had residual effusion, possible lung entrapment. He completed a right PleurX placement procedure yesterday at  main campus and reports feeling much better since after the procedure. He is scheduled for a PET/CT on 12/20/24. Reports his wife had surgery yesterday (Colectomy).  Patient is aware of her upcoming appointments after the PET/CT scan.  He denies acute medical complaint today.    Cough  Associated symptoms include shortness of breath. Pertinent negatives include no chest pain, ear pain, fever, headaches, hemoptysis, rash, rhinorrhea or sore throat.   Shortness of Breath  This is a new problem. The current episode started 1 to 4 weeks ago. The problem occurs daily. The problem has been waxing and waning. The average episode lasts 20 seconds. Associated symptoms include coryza, orthopnea, PND and sputum production. Pertinent negatives include no abdominal pain, chest pain, claudication, ear pain, fever, headaches, hemoptysis, leg pain, leg swelling, neck pain, rash, rhinorrhea, sore throat, swollen glands, syncope or vomiting. The symptoms are aggravated by any activity and lying flat.        Review of Systems   Constitutional:  Negative for fever.   HENT:  Negative for ear pain, rhinorrhea and sore throat.    Respiratory:  Positive for cough, sputum production and shortness of breath. Negative for hemoptysis.    Cardiovascular:  Positive for orthopnea and PND. Negative for chest pain, claudication, leg swelling and syncope.   Gastrointestinal:  Negative for abdominal pain and vomiting.   Musculoskeletal:  Negative for neck pain.   Skin:  Negative for rash.   Neurological:  Negative for headaches.       Objective   /66   Ht 1.854 m (6' 1\")   Wt 87.1 " kg (192 lb)   BMI 25.33 kg/m²     Physical Exam  Constitutional:       Appearance: Normal appearance.   HENT:      Head: Normocephalic and atraumatic.      Right Ear: External ear normal.      Left Ear: External ear normal.      Nose: Nose normal.      Mouth/Throat:      Mouth: Mucous membranes are moist.   Cardiovascular:      Rate and Rhythm: Normal rate and regular rhythm.      Pulses: Normal pulses.      Heart sounds: Normal heart sounds.   Pulmonary:      Effort: Pulmonary effort is normal.      Breath sounds: Normal breath sounds.   Abdominal:      General: Abdomen is flat. Bowel sounds are normal.      Palpations: Abdomen is soft.   Musculoskeletal:      Cervical back: Neck supple.   Skin:     General: Skin is warm and dry.   Neurological:      General: No focal deficit present.      Mental Status: He is alert and oriented to person, place, and time.   Psychiatric:         Mood and Affect: Mood normal.         Behavior: Behavior normal.         Thought Content: Thought content normal.         Judgment: Judgment normal.         Assessment/Plan   Problem List Items Addressed This Visit       Medicare annual wellness visit, subsequent - Primary    Relevant Orders    Follow Up In Advanced Primary Care - PCP - Medicare Annual

## 2024-12-18 NOTE — PATIENT INSTRUCTIONS
Continue taking all current medications as prescribed and keep all consult as advised.  Keep your predetermined office visit with me on 3/5/2025 for annual Medicare wellness exam.

## 2024-12-20 ENCOUNTER — HOSPITAL ENCOUNTER (OUTPATIENT)
Dept: RADIOLOGY | Facility: HOSPITAL | Age: 74
Discharge: HOME | End: 2024-12-20
Payer: COMMERCIAL

## 2024-12-20 DIAGNOSIS — R91.8 MASS OF UPPER LOBE OF RIGHT LUNG: ICD-10-CM

## 2024-12-20 DIAGNOSIS — J90 PLEURAL EFFUSION, RIGHT: ICD-10-CM

## 2024-12-20 DIAGNOSIS — R50.9 FEVER, UNSPECIFIED: ICD-10-CM

## 2024-12-20 PROCEDURE — A9552 F18 FDG: HCPCS | Performed by: NURSE PRACTITIONER

## 2024-12-20 PROCEDURE — 78815 PET IMAGE W/CT SKULL-THIGH: CPT | Mod: PI

## 2024-12-20 PROCEDURE — 3430000001 HC RX 343 DIAGNOSTIC RADIOPHARMACEUTICALS: Performed by: NURSE PRACTITIONER

## 2024-12-20 RX ORDER — FLUDEOXYGLUCOSE F 18 200 MCI/ML
14.1 INJECTION, SOLUTION INTRAVENOUS
Status: COMPLETED | OUTPATIENT
Start: 2024-12-20 | End: 2024-12-20

## 2024-12-23 LAB
FUNGUS SPEC CULT: NORMAL
FUNGUS SPEC FUNGUS STN: NORMAL

## 2024-12-24 LAB
LAB AP ASR DISCLAIMER: NORMAL
LABORATORY COMMENT REPORT: NORMAL
LABORATORY COMMENT REPORT: NORMAL
PATH REPORT.ADDENDUM SPEC: NORMAL
PATH REPORT.COMMENTS IMP SPEC: NORMAL
PATH REPORT.FINAL DX SPEC: NORMAL
PATH REPORT.GROSS SPEC: NORMAL
PATH REPORT.TOTAL CANCER: NORMAL

## 2024-12-27 LAB
DNA RANGE(S) EXAMINED NAR: NORMAL
ELECTRONICALLY SIGNED BY: NORMAL
FOCUSED SOLID TUMOR DNA/RNA RESULTS: NORMAL
GENE DIS ANL INTERP-IMP: POSITIVE
GENE DIS ASSESSED: NORMAL
REASON FOR STUDY: NORMAL
TEMPUS BLOOD TUMOR MUTATIONAL BURDEN: 5.5 M/MB
TEMPUS LCA: NORMAL
TEMPUS MSI NOTE: NORMAL
TEMPUS PORTAL: NORMAL
TEMPUS THERAPY1: NORMAL
TEMPUS THERAPYCOUNT: 1
TEMPUS TRIALCOUNT: 3
TEMPUS TRIALMATCHES1: NORMAL
TEMPUS TRIALMATCHES2: NORMAL
TEMPUS TRIALMATCHES3: NORMAL

## 2024-12-30 LAB
FUNGUS SPEC CULT: NORMAL
FUNGUS SPEC FUNGUS STN: NORMAL

## 2025-01-03 ENCOUNTER — APPOINTMENT (OUTPATIENT)
Dept: RADIOLOGY | Facility: HOSPITAL | Age: 75
End: 2025-01-03
Payer: MEDICARE

## 2025-01-06 ENCOUNTER — TUMOR BOARD CONFERENCE (OUTPATIENT)
Dept: HEMATOLOGY/ONCOLOGY | Facility: CLINIC | Age: 75
End: 2025-01-06
Payer: MEDICARE

## 2025-01-06 NOTE — TUMOR BOARD NOTE
Patient Name: SORAYA OROURKE  MRN: [id number]  Physician: Alisha Solitario  Date of Collection: 12-  Report Date: 12.13.2024  Primary Location of Tumor: Right Upper Lobe  Histology: Adenocarcinoma  Stage: IV  Location of Metastasis: Pleura  PDL 1: 5%  Actionable Alteration: EGFR p.[id number]  (exon 20)    Disease Relevant Alterations: EGFR p.[id number] ([id number])      Recommendations:  amivantamab    Clinical Trials (First line):   XL9415([id number]):Randomized Phase III Study of Combination PYL2690 (Osimertinib) and Bevacizumab Versus FLU0684 (Osimertinib) Alone as First-Line Treatment for Patients With Metastatic EGFR-Mutant Non-Small Cell Lung Cancer (NSCLC)    XMZ9624(Add Trial Number):A Phase 2, Open Label, Randomized Trial Evaluating the impact of enhanced versus standard dermatologic management on selected dermatologic adverse events among patients with locally advanced or metastatic EGFR-mutated NSCLC treated first-line with Amivantamab+ Lazertinib.

## 2025-01-07 ENCOUNTER — OFFICE VISIT (OUTPATIENT)
Dept: HEMATOLOGY/ONCOLOGY | Facility: HOSPITAL | Age: 75
End: 2025-01-07
Payer: MEDICARE

## 2025-01-07 VITALS
SYSTOLIC BLOOD PRESSURE: 118 MMHG | BODY MASS INDEX: 25.46 KG/M2 | TEMPERATURE: 97.5 F | HEART RATE: 101 BPM | DIASTOLIC BLOOD PRESSURE: 77 MMHG | OXYGEN SATURATION: 97 % | RESPIRATION RATE: 16 BRPM | WEIGHT: 193 LBS

## 2025-01-07 DIAGNOSIS — J90 PLEURAL EFFUSION: ICD-10-CM

## 2025-01-07 DIAGNOSIS — C80.1 ADENOCARCINOMA (MULTI): Primary | ICD-10-CM

## 2025-01-07 PROCEDURE — 4010F ACE/ARB THERAPY RXD/TAKEN: CPT | Performed by: STUDENT IN AN ORGANIZED HEALTH CARE EDUCATION/TRAINING PROGRAM

## 2025-01-07 PROCEDURE — 1125F AMNT PAIN NOTED PAIN PRSNT: CPT | Performed by: STUDENT IN AN ORGANIZED HEALTH CARE EDUCATION/TRAINING PROGRAM

## 2025-01-07 PROCEDURE — 3074F SYST BP LT 130 MM HG: CPT | Performed by: STUDENT IN AN ORGANIZED HEALTH CARE EDUCATION/TRAINING PROGRAM

## 2025-01-07 PROCEDURE — 1036F TOBACCO NON-USER: CPT | Performed by: STUDENT IN AN ORGANIZED HEALTH CARE EDUCATION/TRAINING PROGRAM

## 2025-01-07 PROCEDURE — 1157F ADVNC CARE PLAN IN RCRD: CPT | Performed by: STUDENT IN AN ORGANIZED HEALTH CARE EDUCATION/TRAINING PROGRAM

## 2025-01-07 PROCEDURE — 1159F MED LIST DOCD IN RCRD: CPT | Performed by: STUDENT IN AN ORGANIZED HEALTH CARE EDUCATION/TRAINING PROGRAM

## 2025-01-07 PROCEDURE — 99215 OFFICE O/P EST HI 40 MIN: CPT | Performed by: STUDENT IN AN ORGANIZED HEALTH CARE EDUCATION/TRAINING PROGRAM

## 2025-01-07 PROCEDURE — G2211 COMPLEX E/M VISIT ADD ON: HCPCS | Performed by: STUDENT IN AN ORGANIZED HEALTH CARE EDUCATION/TRAINING PROGRAM

## 2025-01-07 PROCEDURE — 3078F DIAST BP <80 MM HG: CPT | Performed by: STUDENT IN AN ORGANIZED HEALTH CARE EDUCATION/TRAINING PROGRAM

## 2025-01-07 RX ORDER — HEPARIN 100 UNIT/ML
500 SYRINGE INTRAVENOUS AS NEEDED
OUTPATIENT
Start: 2025-01-07

## 2025-01-07 RX ORDER — DEXAMETHASONE 4 MG/1
8 TABLET ORAL 2 TIMES DAILY
Qty: 10 TABLET | Refills: 0 | Status: SHIPPED | OUTPATIENT
Start: 2025-01-07 | End: 2025-01-10

## 2025-01-07 RX ORDER — DEXAMETHASONE 4 MG/1
TABLET ORAL
Qty: 4 TABLET | Refills: 11 | Status: SHIPPED | OUTPATIENT
Start: 2025-01-07

## 2025-01-07 RX ORDER — SENNOSIDES 8.6 MG/1
2 TABLET ORAL NIGHTLY PRN
Qty: 30 TABLET | Refills: 11 | Status: SHIPPED | OUTPATIENT
Start: 2025-01-07

## 2025-01-07 RX ORDER — DOXYCYCLINE 100 MG/1
100 CAPSULE ORAL 2 TIMES DAILY
Qty: 56 CAPSULE | Refills: 3 | Status: SHIPPED | OUTPATIENT
Start: 2025-01-07

## 2025-01-07 RX ORDER — FOLIC ACID 1 MG/1
1000 TABLET ORAL DAILY
Qty: 30 TABLET | Refills: 11 | Status: SHIPPED | OUTPATIENT
Start: 2025-01-07

## 2025-01-07 RX ORDER — HYDROCORTISONE 1 %
CREAM (GRAM) TOPICAL
Qty: 453.6 G | Refills: 3 | Status: SHIPPED | OUTPATIENT
Start: 2025-01-07

## 2025-01-07 RX ORDER — PROCHLORPERAZINE MALEATE 10 MG
10 TABLET ORAL EVERY 6 HOURS PRN
Qty: 30 TABLET | Refills: 5 | Status: SHIPPED | OUTPATIENT
Start: 2025-01-07

## 2025-01-07 RX ORDER — ONDANSETRON HYDROCHLORIDE 8 MG/1
8 TABLET, FILM COATED ORAL EVERY 8 HOURS PRN
Qty: 30 TABLET | Refills: 5 | Status: SHIPPED | OUTPATIENT
Start: 2025-01-07

## 2025-01-07 RX ORDER — CLINDAMYCIN PHOSPHATE 10 UG/ML
LOTION TOPICAL
Qty: 60 ML | Refills: 3 | Status: SHIPPED | OUTPATIENT
Start: 2025-01-07

## 2025-01-07 RX ORDER — OLANZAPINE 5 MG/1
5 TABLET ORAL NIGHTLY
Qty: 16 TABLET | Refills: 0 | Status: SHIPPED | OUTPATIENT
Start: 2025-01-07

## 2025-01-07 RX ORDER — HEPARIN SODIUM,PORCINE/PF 10 UNIT/ML
50 SYRINGE (ML) INTRAVENOUS AS NEEDED
OUTPATIENT
Start: 2025-01-07

## 2025-01-07 ASSESSMENT — PAIN SCALES - GENERAL: PAINLEVEL_OUTOF10: 2

## 2025-01-07 NOTE — PROGRESS NOTES
[facility] - Medical Oncology Follow-Up Visit    Patient ID: Jayant Holland is a 74 y.o. male with NSCLC adenocarcinoma     Current therapy: [No matching plan found]     Chief Concern: next steps    Oncologic History:     DIAGNOSIS  NSCLC adenocarcinoma     STAGING  [id number]     CURRENT SITES OF DISEASE  RUL, R pleural effusion/pleural carcinomatosis, R hilar, mediastinal, retrocaval nodes      MOLECULAR GENOMICS  PD-L1 5%  EGFR p.[id number] ([id number])      PRIOR THERAPY        CURRENT THERAPY           CURRENT ONCOLOGICAL PROBLEMS           HISTORY OF PRESENT ILLNESS  Mr. Holland is a 73 yo with PMH significant for DMII, HTN, and HLD who had a CXR done on 12/5/24 for persistent coughing after pneumonia about a month prior, which was concerning for moderate pleural effusion of the R lung. He went to the ER, where a CT chest w/contrast was done with spiculated RUL mass measuring 2.2 x 2.1 cm and large R pleural effusion. He was referred to diagnostic clinic and seen on 12/9/24 by Kirstin where he noted persistent cough for 6 weeks, constant dyspnea, wheezing, and unintentional 30 lb weight loss over the last year. He was referred to pulmonology and had thoracentesis on 12/10/24 with 980 ml removed, cytology with adenocarcinoma, PD-L1 5%, and NGS with EGFR exon 20 mutation. He had repeat thoracentesis on 12/17/24 with 2.4L removed. PET/CT 12/20/24 with FDV avidity of the RL nodule SUV max 9.2, multiple RLL avid nodules SUV max 4.1, FDG-avid pleural thickening on the right, multiple R hilar, mediastinal, subcarinal nodes, and moderate R pleural effusion. Mildly avid GGO within JANELLE SUV max 2.0. Brain MRI is rescheduled for 1/15/25. Consented for carboplatin/pemetrexed/amivantamab to start 1/22/25.      PAST MEDICAL HISTORY  DMII   HTN  HLD   Osteoarthritis (neck)  asthma     SOCIAL HISTORY  Lives at home with his wife. Retired from [employer], worked as a technician for  48 years, notes hazardous chemical exposures.  Tob: never  EtOH: occasionally  Illicits: none     FAMILY HISTORY  Mother - breast cancer    HPI   He is here today with his wife, and his son is on the phone. They got stuck in the elevator today. He continues to be tired, exhausted, a little worse than a couple weeks ago. By the time he got over here he was tired and short of breath. He had thoracentesis on 12/17/24 after visit, 2.4L off. He felt much better afterwards, started wheezing again though. He snowblowed his driveway yesterday - took him about half hour, about a year ago would have only taken 10-15 minutes. His weight loss has plateaued, nothing tastes right but still has an appetite. Still has a horibble taste in his mouth.     Meds (Current):    Current Outpatient Medications:     albuterol 90 mcg/actuation inhaler, INHALE 2 PUFFS EVERY 4 HOURS IF NEEDED FOR WHEEZING OR SHORTNESS OF BREATH., Disp: 18 g, Rfl: 3    Blood glucose monitoring meter kit kit, 1 each if needed., Disp: , Rfl:     blood sugar diagnostic (Accu-Chek Jessica Plus test strp) strip, 2 times a day., Disp: , Rfl:     blood sugar diagnostic (Blood Glucose Test) strip, 1 strip once daily., Disp: 100 strip, Rfl: 3    blood sugar diagnostic (OneTouch Verio test strips) strip, 1 strip once daily., Disp: 100 strip, Rfl: 3    blood-glucose meter misc, 1 Device once daily., Disp: 1 each, Rfl: 0    cyanocobalamin (Vitamin B-12) 1,000 mcg tablet, TAKE 1 TABLET BY MOUTH EVERY DAY AS DIRECTED, Disp: 90 tablet, Rfl: 3    lancets 30 gauge misc, 1 Device 3 times a day., Disp: 200 each, Rfl: 3    lisinopril 40 mg tablet, TAKE 1 TABLET BY MOUTH EVERY DAY, Disp: 90 tablet, Rfl: 3    metFORMIN  mg 24 hr tablet, Take 2 tablets (1,000 mg) by mouth 2 times a day. Do not crush, chew, or split., Disp: 360 tablet, Rfl: 3    omeprazole (PriLOSEC) 20 mg DR capsule, TAKE 1 CAPSULE BY MOUTH EVERY DAY, Disp: 90 capsule, Rfl: 3    rosuvastatin (Crestor) 40 mg  tablet, TAKE 1 TABLET BY MOUTH EVERY DAY, Disp: 90 tablet, Rfl: 3    sildenafil (Viagra) 100 mg tablet, Take by mouth. TAKE 1 TABLET AS NEEDED APPROXIMATELY 1 HOUR BEFORE SEXUAL ACTIVITY, Disp: , Rfl:     chlorthalidone (Hygroton) 25 mg tablet, Take 1 tablet (25 mg) by mouth once daily., Disp: 90 tablet, Rfl: 3    meloxicam (Mobic) 15 mg tablet, Take 1 tablet (15 mg) by mouth once daily., Disp: 90 tablet, Rfl: 3    Review of Systems   All other systems reviewed and are negative.       Objective   BSA: 2.12 meters squared  Wt Readings from Last 5 Encounters:   01/07/25 87.5 kg (193 lb)   12/18/24 87.1 kg (192 lb)   12/17/24 88 kg (194 lb)   12/17/24 88.4 kg (194 lb 14.4 oz)   12/10/24 90.7 kg (200 lb)     /77 (BP Location: Right arm, Patient Position: Sitting, BP Cuff Size: Adult)   Pulse 101   Temp 36.4 °C (97.5 °F) (Temporal)   Resp 16   Wt 87.5 kg (193 lb)   SpO2 97%   BMI 25.46 kg/m²     ECOG Score: 1- Restricted in physically strenuous activity.  Carries out light duty.      Physical Exam  Vitals reviewed.   Constitutional:       General: He is not in acute distress.  HENT:      Head: Normocephalic and atraumatic.      Mouth/Throat:      Mouth: Mucous membranes are moist.   Eyes:      Conjunctiva/sclera: Conjunctivae normal.      Pupils: Pupils are equal, round, and reactive to light.   Cardiovascular:      Rate and Rhythm: Normal rate and regular rhythm.      Heart sounds: No murmur heard.  Pulmonary:      Effort: No respiratory distress.      Comments: Reduced breath sounds throughout on R  Abdominal:      General: Bowel sounds are normal. There is no distension.      Palpations: Abdomen is soft.   Skin:     General: Skin is warm and dry.   Neurological:      General: No focal deficit present.      Mental Status: He is alert and oriented to person, place, and time. Mental status is at baseline.   Psychiatric:         Mood and Affect: Mood normal.         Behavior: Behavior normal.         Thought  Content: Thought content normal.          Results:  Labs:  Lab Results   Component Value Date    WBC 6.9 12/17/2024    HGB 13.2 (L) 12/17/2024    HCT 41.3 12/17/2024    MCV 89 12/17/2024     12/17/2024      Lab Results   Component Value Date    NEUTROABS 4.28 12/17/2024      Lab Results   Component Value Date    GLUCOSE 125 (H) 12/17/2024    CALCIUM 9.9 12/17/2024     12/17/2024    K 3.5 12/17/2024    CO2 30 12/17/2024     12/17/2024    BUN 15 12/17/2024    CREATININE 0.85 12/17/2024    MG 1.63 01/24/2023     Lab Results   Component Value Date    ALT 16 12/17/2024    AST 19 12/17/2024    ALKPHOS 70 12/17/2024    BILITOT 0.7 12/17/2024    BILIDIR 0.1 11/18/2019      Lab Results   Component Value Date    TSH 0.82 10/02/2021       Imaging:  I have personally reviewed the below imaging and concur with the reported findings unless otherwise stated:    NM PET CT bone skull base to mid thigh    Result Date: 12/21/2024  Impression: 1. Intense FDG avid spiculated pulmonary nodule in the right upper lobe, likely representing a primary lung cancer. Additional FDG avid pulmonary nodules within the right lower lobe concerning for local metastases. 2. FDG avid nodular pleural thickening in the right hemithorax as well as mildly FDG avid moderate-sized right pleural effusion, concerning for pleural carcinomatosis. 3. Multiple FDG avid right hilar, mediastinal and retrocaval nodes, compatible with zena metastases. 4. A mildly FDG avid ground-glass opacity within the apex of left lung, likely nonspecific, attention on follow-up study.   I personally reviewed the image(s) / study and agree with the findings and interpretation as stated. This study was interpreted at [facility].     Signed by: Harriet Stokes 12/21/2024 3:50 PM Dictation workstation:   [id number]      No results found for this or any previous visit.      Assessment/Plan      Jayant Holland is a 74 y.o. male here for  follow up of NSCLC adenocarcinoma    # NSCLC adenocarcinoma  - [id number] (pleural effusion)  - pending brain MRI  - PD-L1 5%, EGFR p.[id number] ([id number])   - discussed that given EGFR exon 20 mutation, recommend combination chemotherapy+amivantamab, consented  - planned to start 1/22/25  - plan for surveillance CT scans after C4     # Malignant pleural effusion  - s/p thoracentesis and contacted by IP while in visit for repeat thora.   - reached out to pulm for consideration of repeat thora  - continue to monitor    # Advanced care planning  - discussed incurable but treatable nature of metastatic disease, and goal of therapy is to prolong life while maintaining or improving quality of life.       Alisha Solitario MD  [facility]

## 2025-01-07 NOTE — PROGRESS NOTES
[place] - Medical Oncology New Patient Visit    Patient ID: Jayant Holland is a 74 y.o. male.  Referring Physician: Alisha Solitario MD  [address] [id number]  Primary Care Provider: Jorge Luis Oconnor, APRN-CNP       DIAGNOSIS  NSCLC adenocarcinoma     STAGING  [id number]     CURRENT SITES OF DISEASE  RUL, R pleura, hilar/mediastinal/retrocaval nodes     MOLECULAR GENOMICS  EGFR p.[id number] ([id number])      SERUM TUMOR MARKER  PD-L1 5%      PRIOR THERAPY  N/A      CURRENT THERAPY        CURRENT ONCOLOGICAL PROBLEMS        HISTORY OF PRESENT ILLNESS  Mr. Holland is a 75 yo with PMH significant for DMII, HTN, and HLD who had a CXR done on 12/5/24 for persistent coughing after pneumonia about a month prior, which was concerning for moderate pleural effusion of the R lung. He went to the ER, where a CT chest w/contrast was done with spiculated RUL mass measuring 2.2 x 2.1 cm and large R pleural effusion. He was referred to diagnostic clinic and seen on 12/9/24 by Kirstin where he noted persistent cough for 6 weeks, constant dyspnea, wheezing, and unintentional 30 lb weight loss over the last year. He was referred to pulmonology and had thoracentesis on 12/10/24 with 980 ml removed, cytology with adenocarcinoma, PD-L1 and NGS pending. S/p pleurx placement 12/17/24. 12/20/24  PET/CT with RUL avid nodule, R pleural mets, multiple FDG avid nodes (R hilar, mediastinal, retrocaval). Pending brain MRI. Discussed at 1/6 tumor board, recommending amivantamab.    PAST MEDICAL HISTORY  DMII   HTN  HLD   Osteoarthritis (neck)  asthma     SOCIAL HISTORY  Lives at home with his wife. Retired from [employer], worked as a technician for 48 years, notes hazardous chemical exposures.  Tob: never  EtOH: occasionally  Illicits: none     FAMILY HISTORY  Mother - breast cancer    Chief Concern: follow-up and readiness to treat     HPI   He is here today      He is here alone,  his wife is having surgery right now. Sister on speakerphone. He is still short of breath, a little better after thora but still present. Worse with moving around, but present all the time. Still has cough, wheezing settled down. Non productive cough. Still losing weight. No appetite - this has been since he had the pneumonia. His energy levels have been low, taking naps, daily, couple hours. The arthritis in his neck can wake him up from pain. Every once in awhile he gets a little pain on his lower R chest.     Meds (Current):    Current Outpatient Medications:     albuterol 90 mcg/actuation inhaler, INHALE 2 PUFFS EVERY 4 HOURS IF NEEDED FOR WHEEZING OR SHORTNESS OF BREATH., Disp: 18 g, Rfl: 3    Blood glucose monitoring meter kit kit, 1 each if needed., Disp: , Rfl:     blood sugar diagnostic (Accu-Chek Jessica Plus test strp) strip, 2 times a day., Disp: , Rfl:     blood sugar diagnostic (Blood Glucose Test) strip, 1 strip once daily., Disp: 100 strip, Rfl: 3    blood sugar diagnostic (OneTouch Verio test strips) strip, 1 strip once daily., Disp: 100 strip, Rfl: 3    blood-glucose meter misc, 1 Device once daily., Disp: 1 each, Rfl: 0    chlorthalidone (Hygroton) 25 mg tablet, Take 1 tablet (25 mg) by mouth once daily., Disp: 90 tablet, Rfl: 3    cyanocobalamin (Vitamin B-12) 1,000 mcg tablet, TAKE 1 TABLET BY MOUTH EVERY DAY AS DIRECTED, Disp: 90 tablet, Rfl: 3    lancets 30 gauge misc, 1 Device 3 times a day., Disp: 200 each, Rfl: 3    lisinopril 40 mg tablet, TAKE 1 TABLET BY MOUTH EVERY DAY, Disp: 90 tablet, Rfl: 3    meloxicam (Mobic) 15 mg tablet, Take 1 tablet (15 mg) by mouth once daily., Disp: 90 tablet, Rfl: 3    metFORMIN  mg 24 hr tablet, Take 2 tablets (1,000 mg) by mouth 2 times a day. Do not crush, chew, or split., Disp: 360 tablet, Rfl: 3    omeprazole (PriLOSEC) 20 mg DR capsule, TAKE 1 CAPSULE BY MOUTH EVERY DAY, Disp: 90 capsule, Rfl: 3    rosuvastatin (Crestor) 40 mg tablet, TAKE 1 TABLET  BY MOUTH EVERY DAY, Disp: 90 tablet, Rfl: 3    sildenafil (Viagra) 100 mg tablet, Take by mouth. TAKE 1 TABLET AS NEEDED APPROXIMATELY 1 HOUR BEFORE SEXUAL ACTIVITY, Disp: , Rfl:     No Known Allergies    Review of Systems   All other systems reviewed and are negative.       Objective   BSA: There is no height or weight on file to calculate BSA.  Wt Readings from Last 5 Encounters:   12/18/24 87.1 kg (192 lb)   12/17/24 88 kg (194 lb)   12/17/24 88.4 kg (194 lb 14.4 oz)   12/10/24 90.7 kg (200 lb)   12/10/24 90.7 kg (200 lb)     There were no vitals taken for this visit.    ECOG Score: 1- Restricted in physically strenuous activity.  Carries out light duty.      Physical Exam  Vitals reviewed.   Constitutional:       General: He is not in acute distress.     Appearance: Normal appearance.   HENT:      Head: Normocephalic and atraumatic.      Mouth/Throat:      Mouth: Mucous membranes are moist.   Eyes:      Extraocular Movements: Extraocular movements intact.      Conjunctiva/sclera: Conjunctivae normal.      Pupils: Pupils are equal, round, and reactive to light.   Cardiovascular:      Rate and Rhythm: Regular rhythm.      Heart sounds: No murmur heard.  Pulmonary:      Effort: Pulmonary effort is normal. No respiratory distress.      Breath sounds: Normal breath sounds.   Abdominal:      General: Bowel sounds are normal. There is no distension.      Palpations: Abdomen is soft.   Skin:     General: Skin is warm and dry.   Neurological:      General: No focal deficit present.      Mental Status: He is alert and oriented to person, place, and time.   Psychiatric:         Mood and Affect: Mood normal.         Behavior: Behavior normal.         Thought Content: Thought content normal.          Results:  Labs:  Lab Results   Component Value Date    WBC 6.9 12/17/2024    HGB 13.2 (L) 12/17/2024    HCT 41.3 12/17/2024    MCV 89 12/17/2024     12/17/2024      Lab Results   Component Value Date    NEUTROABS 4.28  12/17/2024      Lab Results   Component Value Date    GLUCOSE 125 (H) 12/17/2024    CALCIUM 9.9 12/17/2024     12/17/2024    K 3.5 12/17/2024    CO2 30 12/17/2024     12/17/2024    BUN 15 12/17/2024    CREATININE 0.85 12/17/2024     Lab Results   Component Value Date    ALT 16 12/17/2024    AST 19 12/17/2024    ALKPHOS 70 12/17/2024    BILITOT 0.7 12/17/2024        Imaging:  I have personally reviewed the below imaging and concur with the reported findings unless otherwise stated:    === Results for orders placed during the hospital encounter of 12/05/24 ===    CT chest w IV contrast [CJF411] 12/05/2024    Status: Normal  1. There is a large right pleural effusion with associated  atelectasis.  2. There is a spiculated mass in the right upper lobe which is  suspicious for malignancy.  Signed by Aung Almeida MD      === Results for orders placed during the hospital encounter of 10/23/24 ===    CT cervical spine wo IV contrast [[id number]] 10/23/2024    Status: Normal  1. No evidence for an acute fracture or subluxation of the cervical  spine.    MACRO:  None    Signed by: Jose A Vyas 10/23/2024 7:56 PM  Dictation workstation:   [id number]    ___________________________________________________________________________    CT head wo IV contrast [[id number]] 10/23/2024    Status: Normal  No evidence of acute cortical infarct or intracranial hemorrhage.    MACRO:  None    Signed by: Jose A Vyas 10/23/2024 7:55 PM  Dictation workstation:   [id number]  No results found for this or any previous visit.  No results found for this or any previous visit.  No results found for this or any previous visit.  === Results for orders placed during the hospital encounter of 12/20/24 ===    NM PET CT bone skull base to mid thigh [[id number]] 12/20/2024    Status: Normal  1. Intense FDG avid spiculated pulmonary nodule in the right upper  lobe, likely representing a primary lung cancer. Additional FDG avid  pulmonary nodules  within the right lower lobe concerning for local  metastases.  2. FDG avid nodular pleural thickening in the right hemithorax as  well as mildly FDG avid moderate-sized right pleural effusion,  concerning for pleural carcinomatosis.  3. Multiple FDG avid right hilar, mediastinal and retrocaval nodes,  compatible with zena metastases.  4. A mildly FDG avid ground-glass opacity within the apex of left  lung, likely nonspecific, attention on follow-up study.    I personally reviewed the image(s) / study and agree with the  findings and interpretation as stated. This study was interpreted at  [facility].      Signed by: Harriet Stokes 12/21/2024 3:50 PM  Dictation workstation:   [id number]  === Results for orders placed in visit on 04/14/21 ===    XR KNEE COMPLETE 4 OR MORE VIEWS [[id number]] 04/14/2021    Status: Normal  Advanced osteoarthritis left knee similar to prior study.      === Results for orders placed in visit on 04/04/19 ===    XR KNEE COMPLETE 4 OR MORE VIEWS [[id number]] 04/04/2019    Status: Normal  Advanced osteoarthritis left knee worst medially, similar to prior.      === Results for orders placed in visit on 11/28/16 ===    XR KNEE COMPLETE 4 OR MORE VIEWS [[id number]] 11/28/2016    Status: Normal  Tricompartmental degenerative change of the knee without osseous  injury evident.    Pathology:    Lab Results   Component Value Date    PATHREP  03/14/2023     Name SORAYA OROURKE                                                                                                   Accession #: [id number]            Pathologist:                   SHORTY HERNANDEZ MD  Date of Procedure:    3/14/2023  Date Received:          3/14/2023  Date Reported           3/20/2023  Submitting Physician:   ANDERSON TIPTON MD  Location:                    [facility]   Copy To/Referring/Attending:  LUPE RODRIGUEZ, CNP Other External #                                                                 "    FINAL DIAGNOSIS  A.  RECTAL POLYP:    -- COLONIC MUCOSA WITH SUPERFICIAL HYPERPLASTIC CHANGE.                                                                                                                                                                                                                                                                                                                                                                                                                                                                                         Electronically Signed Out By SHORTY HERNANDEZ MD/ATARIAN  By the signature on this report, the individual or group listed as making the  Final Interpretation/Diagnosis certifies that they have reviewed this case.  Diagnostic interpretation performed at [facility] [address] [facility] [place] [address]           Clinical History:  Physician Contact Number: [phone]  Ischemic Time (A): 10:34  Fixative (A): Formalin  Fixative Time (A): 10:34  Clinical Diagnosis History; screening    Specimens Submitted As:  A: RECTAL POLYP     Gross Description:  Received in formalin, labeled with the patient's name and hospital number and  \"rectal polyp\", is one light tan polypoid, soft tissue fragment measuring 0.4 x  0.3 x 0.2 cm.  The specimen is submitted in toto in one cassette.  LMP    lmp/3/15/2023               [facility]  Department of Pathology   [id number] [place] [address]        ADD1  12/10/2024     PD-L1 22C3  by Immunohistochemistry with Interpretation, pembrolizumab  (KEYTRUDA)    Block used:  A2    Interpretation: Low expression    Tumor Proportion Score (TPS): 5%      Intended use:  PD-L1 22C3 by IHC with Interpretation is a qualitative immunohistochemical assay using Monoclonal Mouse Anti-PD-L1, Clone 22C3 intended for use in the detection of PD-L1 protein in formalin-fixed, paraffin-embedded (FFPE) " non-small cell lung cancer (NSCLC) tissue using the Optiview detection on a Arley BenchMark Ultra. The specimen submitted for testing should contain at least 100 viable tumor cells to be considered adequate for evaluation. This assay is indicated as an aid in identifying NSCLC patients for treatment with pembrolizumab (KEYTRUDA).    Methodology:  PD-L1 protein expression is determined by using Tumor Proportion Score (TPS), which is the percentage of viable tumor cells showing partial or complete membrane staining at any intensity. In the clinical setting of first-line therapy (treatment-naïve patients), the specimen is considered PD-L1 positive if the TPS is equal to or greater than 50 percent. In the setting of second-line therapy, the specimen is considered positive if the TPS is equal to or greater than 1 percent.    Reference Range:  High Expression >=50% TPS  Low Expression 1-49% TPS  No Expression <1% TPS    This assay is validated and FDA-approved for lung cancer specimens only. For all other specimen types, results should be interpreted with caution and within the appropriate clinical context. The use of this assay on decalcified tissues has not been validated and is not recommended.   One or more of the reagents used to perform assays on this specimen MAY have contained components considered to be Laboratory Developed Tests (LDT).  LDT's have not been cleared or approved by the U.S. Food and Drug Administration.  These assays/tests were developed and their performance characteristics determined by the Department of Pathology Immunohistochemistry Lab at [facility]. The FDA does not require this test to go through premarket FDA review. This test is used for clinical purposes. It should not be regarded as investigational or for research. This laboratory is certified under the Clinical Laboratory Improvement Amendments (CLIA) as qualified to perform high complexity clinical  laboratory testing.  The assays/tests were performed with appropriate positive and negative controls which stained appropriately.       FINALINTERP  12/10/2024       A. PLEURAL FLUID RIGHT SIDE- RIGHT RLEURAL EFFUSION , CYTOLOGY AND CELL BLOCK:         Malignant cells present consistent with adenocarcinoma.  See comment.          COMDX  12/10/2024     Immunohistochemical stains for TTF-1, Claudin 4 and MOC-31 (weak) are positive in the tumor cells and calretinin and D2-40 are negative consistent with adenocarcinoma, favor lung origin.         Assessment/Plan      Jayant Holland is a 74 y.o. male here for recommendations and to establish care for NSCLC adenocarcinoma.    # NSCLC adenocarcinoma  - [id number] (pleural effusion s/p pleurx placement)  - EGFR mutant found on liquid NGS ([facility])  - 1/6/25 tumor board recommendations: amivantamab  - Discussed amivantamab today  - Still needs brain MRI    # Malignant pleural effusion  - thoracentesis with malignant cells  - s/p pleurx placement 12/17/24  - continue to monitor        Alisha Solitario MD

## 2025-01-08 ENCOUNTER — ONCOLOGY MEDICATION OUTREACH (OUTPATIENT)
Dept: HEMATOLOGY/ONCOLOGY | Facility: HOSPITAL | Age: 75
End: 2025-01-08
Payer: MEDICARE

## 2025-01-08 ENCOUNTER — APPOINTMENT (OUTPATIENT)
Dept: PAIN MEDICINE | Facility: HOSPITAL | Age: 75
End: 2025-01-08
Payer: MEDICARE

## 2025-01-08 LAB — SCAN RESULT: NORMAL

## 2025-01-08 NOTE — PROGRESS NOTES
ONCOLOGY CLINICAL PHARMACY NOTE     Subjective  Jayant Holland is a 74 y.o. male with NSCLC adenocarcinoma, contacted for education.        Treatment history  Treatment Details   Treatment goal [No plan goal]   Plan Name Amivantamab + PEMEtrexed / CARBOplatin, 21 Day Cycles   Status Active   Start Date 1/22/2025 (Planned)   End Date 1/13/2026 (Planned)   Provider Alisha Solitario MD   Chemotherapy cyanocobalamin (Vitamin B-12) injection 1,000 mcg, 1,000 mcg, intramuscular, Once, 0 of 6 cycles    fosaprepitant (Emend) 150 mg in sodium chloride 0.9% 150 mL IV, 150 mg, intravenous, Once, 0 of 4 cycles    CARBOplatin (Paraplatin) in sodium chloride 0.9% 100 mL IV, , intravenous, Once, 0 of 4 cycles    amivantamab-vmjw (Rybrevant) 350 mg in sodium chloride 0.9% 250 mL IV, 350 mg, intravenous, Once, 0 of 18 cycles    methylPREDNISolone sod succinate (SOLU-Medrol) 40 mg/mL injection 40 mg, 40 mg, intravenous, As needed, 0 of 18 cycles    palonosetron (Aloxi) injection 250 mcg, 250 mcg, intravenous, Once, 0 of 4 cycles    PEMEtrexed disodium (Alimta) 1,100 mg in sodium chloride 0.9% 144 mL IV, 500 mg/m2 = 1,100 mg, intravenous, Once, 0 of 18 cycles       Treatment Details   Treatment goal [No plan goal]   Plan Name Venous Access Orders   Status Active   Start Date 1/7/2025   End Date Until discontinued   Provider Alisha Solitario MD   Chemotherapy [No matching medication found in this treatment plan]        Objective  There were no vitals taken for this visit.  Lab Results   Component Value Date    WBC 6.9 12/17/2024    HGB 13.2 (L) 12/17/2024    HCT 41.3 12/17/2024    MCV 89 12/17/2024     12/17/2024      Lab Results   Component Value Date    GLUCOSE 125 (H) 12/17/2024    CALCIUM 9.9 12/17/2024     12/17/2024    K 3.5 12/17/2024    CO2 30 12/17/2024     12/17/2024    BUN 15 12/17/2024    CREATININE 0.85 12/17/2024     Lab Results   Component Value Date    ALT 16 12/17/2024    AST 19 12/17/2024    ALKPHOS 70  12/17/2024    BILITOT 0.7 12/17/2024       Allergies and Medications   No Known Allergies    Current Outpatient Medications:     albuterol 90 mcg/actuation inhaler, INHALE 2 PUFFS EVERY 4 HOURS IF NEEDED FOR WHEEZING OR SHORTNESS OF BREATH., Disp: 18 g, Rfl: 3    Blood glucose monitoring meter kit kit, 1 each if needed., Disp: , Rfl:     blood sugar diagnostic (Accu-Chek Jessica Plus test strp) strip, 2 times a day., Disp: , Rfl:     blood sugar diagnostic (Blood Glucose Test) strip, 1 strip once daily., Disp: 100 strip, Rfl: 3    blood sugar diagnostic (OneTouch Verio test strips) strip, 1 strip once daily., Disp: 100 strip, Rfl: 3    blood-glucose meter misc, 1 Device once daily., Disp: 1 each, Rfl: 0    chlorthalidone (Hygroton) 25 mg tablet, Take 1 tablet (25 mg) by mouth once daily., Disp: 90 tablet, Rfl: 3    clindamycin (Cleocin T) 1 % lotion, Apply topically to affected area twice daily., Disp: 60 mL, Rfl: 3    cyanocobalamin (Vitamin B-12) 1,000 mcg tablet, TAKE 1 TABLET BY MOUTH EVERY DAY AS DIRECTED, Disp: 90 tablet, Rfl: 3    dexAMETHasone (Decadron) 4 mg tablet, Take 2 tablets (8 mg) by mouth 2 times a day for 5 doses. Start 2 days prior to the first dose of Amivantamab on Cycle 1 only., Disp: 10 tablet, Rfl: 0    dexAMETHasone (Decadron) 4 mg tablet, Beginning with Cycle 2, take 1 tablet (4 mg) by mouth twice daily the day before PEMEtrexed treatment and twice daily the day after PEMEtrexed treatment., Disp: 4 tablet, Rfl: 11    doxycycline (Vibramycin) 100 mg capsule, Take 1 capsule (100 mg) by mouth 2 times a day. For rash prevention. Take with at least 8 ounces (large glass) of water, do not lie down for 30 minutes after, Disp: 56 capsule, Rfl: 3    folic acid (Folvite) 1 mg tablet, Take 1 tablet (1,000 mcg) by mouth once daily., Disp: 30 tablet, Rfl: 11    hydrocortisone 1 % cream, Apply topically to face, hands, feet, neck, back, and chest once daily at bedtime for rash prevention., Disp: 453.6 g,  Rfl: 3    lancets 30 gauge misc, 1 Device 3 times a day., Disp: 200 each, Rfl: 3    lisinopril 40 mg tablet, TAKE 1 TABLET BY MOUTH EVERY DAY, Disp: 90 tablet, Rfl: 3    meloxicam (Mobic) 15 mg tablet, Take 1 tablet (15 mg) by mouth once daily., Disp: 90 tablet, Rfl: 3    metFORMIN  mg 24 hr tablet, Take 2 tablets (1,000 mg) by mouth 2 times a day. Do not crush, chew, or split., Disp: 360 tablet, Rfl: 3    OLANZapine (ZyPREXA) 5 mg tablet, Take 1 tablet (5 mg) by mouth once daily at bedtime. For 4 days starting the evening of treatment, Disp: 16 tablet, Rfl: 0    omeprazole (PriLOSEC) 20 mg DR capsule, TAKE 1 CAPSULE BY MOUTH EVERY DAY, Disp: 90 capsule, Rfl: 3    ondansetron (Zofran) 8 mg tablet, Take 1 tablet (8 mg) by mouth every 8 hours if needed for nausea or vomiting., Disp: 30 tablet, Rfl: 5    prochlorperazine (Compazine) 10 mg tablet, Take 1 tablet (10 mg) by mouth every 6 hours if needed for nausea or vomiting., Disp: 30 tablet, Rfl: 5    rosuvastatin (Crestor) 40 mg tablet, TAKE 1 TABLET BY MOUTH EVERY DAY, Disp: 90 tablet, Rfl: 3    sennosides (Senokot) 8.6 mg tablet, Take 2 tablets (17.2 mg) by mouth as needed at bedtime for constipation., Disp: 30 tablet, Rfl: 11    sildenafil (Viagra) 100 mg tablet, Take by mouth. TAKE 1 TABLET AS NEEDED APPROXIMATELY 1 HOUR BEFORE SEXUAL ACTIVITY, Disp: , Rfl:     Assessment and Plan  Jayant Holland is a 74 y.o. 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  Clinical pharmacist was requested to contact patient via phone for education.     Chemotherapy  Chemotherapy Education: The chemotherapy regimen was discussed with the patient including treatment schedule, potential side effects, and management of side effects.      -   Reviewed the following treatment schedule  Cycle 1:  **Split dosing of amivantamab with first cycle**   Day 1: amivantamab + pemetrexed/carboplatin (480  min infusion appointment)  Day 2: amivantamab only (360 min infusion appointment)  Day 8: amivantamab only (360 min infusion appointment)  Day 15: amivantamab only (350 min infusion appointment)    Cycle 2 and beyond: amivantamab + pemetrexed/carboplatin  (240 min infusion appointment), once every 21 days.   Patient will receive premedications 30-60 minutes prior to infusions by RN (I.e. corticosteroid, benadryl, tylenol) as well as anti-nausea medications.     -    Potential side effects include but not limited to: myelosuppression (discussed fever >/= 100.4 F warrant call to office), changes in organ function and electrolyte abnormalities, nausea/vomiting, changes in bowel movements, fatigue, metallic/taste changes, rash/itchy skin (photosensitivity precautions discussed), neuropathy, edema, ocular/visual changes, cough/SOB, infusion reactions, bleeding/clotting risks.    -    Management techniques of these side effects were discussed.  Patient has been instructed on limiting sun exposure and the use of protective cloting and/or broad spectrum sunscreen.  Reviewed each of the 10 prescription medications sent to patients retail pharmacy including antinausea medications (ondansetron and prochlorperazine for as needed use; olanzapine for post-infusion prophylaxis), topical and oral antibiotics and topical steroids for prevention and treatment of rash related to EGFR inhibitor; stimulant laxative as needed for constipation; daily folic acid; and dexamethasone steroid with treatment cycles as indicated.  Advised patient he may initiate folic acid and doxycycline up to a week prior to cycle 1 start and continue as directed by provider throughout treatment.   -    Written materials were provided in-office including drug-specific side effect handouts.  Prescriptions for supportive care/prophylaxis (if applicable) medications were also discussed in terms of use and potential side effects.    -    Home Medications: Reviewed  patients documented home medications. Drug interactions identified between chemotherapy and patient's home medications: None.   -  Precautions regarding safe handling of bodily fluids addressed.     All questions were answered.  Patient verbalized understanding of the plan of care and management of side effects.  Patient confirms he has reviewed written material provided in office and is confident with plan of care. Spent approximately 30 minutes coordinating care and patient interaction.  Will follow-up as necessary.    Thank you for consulting [facility] Oncology Pharmacy Team.   Please don't hesitate to reach out for further questions regarding this patient.         Danika Almaraz, PharmD

## 2025-01-09 ENCOUNTER — APPOINTMENT (OUTPATIENT)
Dept: PHYSICAL THERAPY | Facility: CLINIC | Age: 75
End: 2025-01-09

## 2025-01-10 ENCOUNTER — HOSPITAL ENCOUNTER (OUTPATIENT)
Dept: GASTROENTEROLOGY | Facility: HOSPITAL | Age: 75
Discharge: HOME | End: 2025-01-10
Payer: MEDICARE

## 2025-01-10 ENCOUNTER — HOSPITAL ENCOUNTER (OUTPATIENT)
Dept: RADIOLOGY | Facility: HOSPITAL | Age: 75
Discharge: HOME | End: 2025-01-10
Payer: MEDICARE

## 2025-01-10 VITALS
OXYGEN SATURATION: 97 % | WEIGHT: 192 LBS | DIASTOLIC BLOOD PRESSURE: 78 MMHG | TEMPERATURE: 96.9 F | HEART RATE: 93 BPM | RESPIRATION RATE: 18 BRPM | SYSTOLIC BLOOD PRESSURE: 111 MMHG | HEIGHT: 73 IN | BODY MASS INDEX: 25.45 KG/M2

## 2025-01-10 DIAGNOSIS — J90 PLEURAL EFFUSION: ICD-10-CM

## 2025-01-10 LAB — GLUCOSE BLD MANUAL STRIP-MCNC: 117 MG/DL (ref 74–99)

## 2025-01-10 PROCEDURE — 32550 INSERT PLEURAL CATH: CPT | Performed by: STUDENT IN AN ORGANIZED HEALTH CARE EDUCATION/TRAINING PROGRAM

## 2025-01-10 PROCEDURE — C1729 CATH, DRAINAGE: HCPCS

## 2025-01-10 PROCEDURE — 82947 ASSAY GLUCOSE BLOOD QUANT: CPT

## 2025-01-10 PROCEDURE — 7100000009 HC PHASE TWO TIME - INITIAL BASE CHARGE

## 2025-01-10 PROCEDURE — 2720000007 HC OR 272 NO HCPCS

## 2025-01-10 PROCEDURE — 71045 X-RAY EXAM CHEST 1 VIEW: CPT

## 2025-01-10 PROCEDURE — 7100000010 HC PHASE TWO TIME - EACH INCREMENTAL 1 MINUTE

## 2025-01-10 ASSESSMENT — PAIN SCALES - GENERAL
PAINLEVEL_OUTOF10: 2
PAINLEVEL_OUTOF10: 0 - NO PAIN
PAINLEVEL_OUTOF10: 3
PAINLEVEL_OUTOF10: 0 - NO PAIN
PAINLEVEL_OUTOF10: 0 - NO PAIN
PAINLEVEL_OUTOF10: 2

## 2025-01-10 ASSESSMENT — PAIN - FUNCTIONAL ASSESSMENT
PAIN_FUNCTIONAL_ASSESSMENT: 0-10

## 2025-01-10 ASSESSMENT — PAIN DESCRIPTION - DESCRIPTORS: DESCRIPTORS: THROBBING

## 2025-01-10 NOTE — DISCHARGE INSTRUCTIONS
[facility] Interventional Pulmonology  PLEURX CATHETER CARE  Care After Indwelling Pleural Catheter Insertion     Refer to this sheet in the next few weeks. These instructions provide you with information on caring for yourself after your procedure. Your health care provider may also give you more specific instructions. Your treatment has been planned according to current medical practices, but problems sometimes occur. Call your health care provider if you have any problems or questions after your procedure.    WHAT TO EXPECT AFTER THE PROCEDURE:    After the procedure, you will have a thin tube that passes through the skin between your ribs and into your chest.  You may have pain or discomfort at the site of the chest tube insertion for 3-4 days post procedure.    You may experience side effects related to sedation, which include: Drowsiness, slurred speech, dizziness, blurred vision, poor balance, and feeling of a hangover for up to 24-48 hours after the procedure.    You may experience an increased cough for 24 hours after the procedure.    HOME CARE INSTRUCTIONS:  You will be going home with a catheter inserted into the chest so must follow these instructions:    Be careful to avoid any activity that may cause your catheter to become dislodged.    Keep the provided clamp nearby. If the tube gets damaged, clamp the tubing close to the site where it enters the body with the clamp. Call your health care provider for directions. You may need to go to the emergency department if the problem cannot be solved.      Take these steps if the catheter falls out:    Do not panic.    Open a package of gauze coated with petroleum jelly.    Open a package of dry, square gauze.    Cover the wound first with the petroleum jelly gauze and then cover that with the dry, square gauze.    Tape the dry, square gauze in place.    After you have covered the site, call your health care provider for instructions. Your health  care provider will decide if you need to go to the emergency department.      You may shower with the chest tube in place if your health care provider approves. Cover the area where the chest tube comes out with a small plastic bag or waterproof dressing and tape it well.      You may not submerge the insertion site - do NOT take a bath, swim, or use a Jacuzzi while the pleural tube is in place.      You may resume your previous diet and medications as instructed by your health care provider. Your family/caregiver has received instructions from the pulmonary staff on how to drain and care for the pleural tube at home or home health care is being arranged to assist you with this.  You have received a starter kit, which includes draining supplies, an instructional DVD, as well as other resources to help you are for your pleural catheter at home.    A draining schedule has been included.  Please follow the directions on the schedule, outlining how often to drain your catheter and when to contact your healthcare provider.  Perform gentle exercise such as walking as directed by your health care provider. Talk to your health care provider about any other activity or exercise you want to do.    Only take over-the-counter or prescription medicines as directed by your health care provider.    Follow up with your health care provider as directed.    For any NON-EMERGENT questions, please call Pulmonary at [phone] and ask to speak with a nurse.    SEEK MEDICAL CARE IF:    You have redness or swelling at the incision.      Your incision has opened (the edges are not staying together).    You have drainage from the incision area.        Your pain is not controlled with the medicines prescribed.      SEEK IMMEDIATE MEDICAL CARE IF:    You have an unexplained fever that lasts more than 24 hours.      You have any new trouble with breathing.      You develop any new chest pain.      You feel lightheaded, or you faint.    You  develop red streaking of the skin that extends above or below the incision.    There is pus coming from the site where the catheter is inserted.    Your chest tube falls out.      The drainage fluid appears significantly bloody or pus like or has foul odor.     DRAINING INSTRUCTIONS FOR PLEURAL CATHETER:    To begin, drain every other day    When, on two successive drainage attempts, you are draining:  Less than 300 mL = drain every 3rd day  Less than 200 mL = drain every 5th day  Less than 100 mL = drain once per week  Less than 50 mL for 2 weeks continuously - call Pulmonary at [phone] and ask to speak with a nurse.     This information is not intended to replace advice given to you by your health care provider. Make sure you discuss any questions you have with your health care provider.  The patient/parent/caregiver has received the instructions and indicates understanding.

## 2025-01-13 ENCOUNTER — HOME HEALTH ADMISSION (OUTPATIENT)
Dept: HOME HEALTH SERVICES | Facility: HOME HEALTH | Age: 75
End: 2025-01-13
Payer: MEDICARE

## 2025-01-13 ENCOUNTER — TELEPHONE (OUTPATIENT)
Dept: HOME HEALTH SERVICES | Facility: HOME HEALTH | Age: 75
End: 2025-01-13
Payer: MEDICARE

## 2025-01-13 NOTE — TELEPHONE ENCOUNTER
We can see this patient on Thursday 1/16/25. If home care is needed sooner than that, please refer to an alternate agency.     Thank you,   [facility] Intake

## 2025-01-13 NOTE — TELEPHONE ENCOUNTER
Good afternoon, thank you for the referral to  Home Care, skilled nursing available start of care is 1/16/2025.    Thank you, [facility] Intake

## 2025-01-14 ENCOUNTER — APPOINTMENT (OUTPATIENT)
Dept: GASTROENTEROLOGY | Facility: HOSPITAL | Age: 75
End: 2025-01-14
Payer: MEDICARE

## 2025-01-15 ENCOUNTER — TELEPHONE (OUTPATIENT)
Dept: HEMATOLOGY/ONCOLOGY | Facility: HOSPITAL | Age: 75
End: 2025-01-15
Payer: MEDICARE

## 2025-01-15 ENCOUNTER — HOSPITAL ENCOUNTER (OUTPATIENT)
Dept: RADIOLOGY | Facility: HOSPITAL | Age: 75
Discharge: HOME | End: 2025-01-15
Payer: MEDICARE

## 2025-01-15 DIAGNOSIS — C80.1 ADENOCARCINOMA (MULTI): ICD-10-CM

## 2025-01-15 PROCEDURE — 70553 MRI BRAIN STEM W/O & W/DYE: CPT | Performed by: RADIOLOGY

## 2025-01-15 PROCEDURE — 2550000001 HC RX 255 CONTRASTS: Performed by: NURSE PRACTITIONER

## 2025-01-15 PROCEDURE — 70553 MRI BRAIN STEM W/O & W/DYE: CPT

## 2025-01-15 PROCEDURE — A9575 INJ GADOTERATE MEGLUMI 0.1ML: HCPCS | Performed by: NURSE PRACTITIONER

## 2025-01-15 RX ORDER — GADOTERATE MEGLUMINE 376.9 MG/ML
0.2 INJECTION INTRAVENOUS
Status: COMPLETED | OUTPATIENT
Start: 2025-01-15 | End: 2025-01-15

## 2025-01-15 RX ADMIN — GADOTERATE MEGLUMINE 17 ML: 376.9 INJECTION INTRAVENOUS at 07:13

## 2025-01-15 NOTE — TELEPHONE ENCOUNTER
1/15/2025 @ 1608:  Received call from patient, requesting to speak with Kirstin Arteaga CNP, stating she was supposed to call him with updates regarding arranging home care to come out, tomorrow, to drain his PleurX catheter, but he has not heard back from her yet.  He requests call back to [phone].  Advised patient that I will talk with Kirstin and one of us will call him back before the end of the day.  Patient voiced appreciation and understanding.    Upon review of patient's EMR,  Home Care is scheduled to be initiated on Thursday, 1/16/2025 by Sushila Soliman RN.      1/15/2025 @ 1612:  Called  Home Care; spoke with Scott.  Advised that the patient called, stating that he's still waiting for a call back, from Sushila, regarding his  Home Care appt tomorrow.   Scott confirmed that Sushila Soliman RN is scheduled to see the patient at 9 AM tomorrow.  I explained that the patient has been working with Kirstin Arteaga CNP in our [facility], recently, so he must have thought that's who he had spoken with earlier today.  Scott voiced understanding and stated that he'll ask Sushila Soliman RN to contact the patient, this afternoon, to confirm his home care appt with her tomorrow.    1/15/2025 @ 1653:  Left VM message for patient, advising that Sushila Soliman RN, from  Home Care, is scheduled to see him in his home tomorrow at 9 AM, which will include assisting with his PleurX catheter drainage.  Advised that this will be a new nurse for him and it will not be Kirstin Arteaga CNP, whom he's been working with, recently, in our [facility].   Advised patient to call  Home Care at [phone] for questions or updates regarding his home care visits and advised that he may call us at [phone] for further clarification, if needed.    Update sent to JENNIFER Robles.    Rona Gonzalez RN  [facility]  [phone]

## 2025-01-16 ENCOUNTER — HOME CARE VISIT (OUTPATIENT)
Dept: HOME HEALTH SERVICES | Facility: HOME HEALTH | Age: 75
End: 2025-01-16
Payer: MEDICARE

## 2025-01-16 PROCEDURE — 169592 NO-PAY CLAIM PROCEDURE

## 2025-01-16 PROCEDURE — G0299 HHS/HOSPICE OF RN EA 15 MIN: HCPCS | Mod: HHH

## 2025-01-17 VITALS
BODY MASS INDEX: 25.45 KG/M2 | HEART RATE: 98 BPM | OXYGEN SATURATION: 95 % | RESPIRATION RATE: 14 BRPM | WEIGHT: 192 LBS | HEIGHT: 73 IN | DIASTOLIC BLOOD PRESSURE: 68 MMHG | SYSTOLIC BLOOD PRESSURE: 110 MMHG | TEMPERATURE: 96.8 F

## 2025-01-17 ASSESSMENT — ENCOUNTER SYMPTOMS
PAIN LOCATION - PAIN QUALITY: CRAMPING
APPETITE LEVEL: FAIR
FATIGUES EASILY: 1
PAIN LOCATION: CHEST
LOWEST PAIN SEVERITY IN PAST 24 HOURS: 1/10
PERSON REPORTING PAIN: PATIENT
HIGHEST PAIN SEVERITY IN PAST 24 HOURS: 3/10
PAIN LOCATION - PAIN FREQUENCY: INTERMITTENT
PAIN: 1
MUSCLE WEAKNESS: 1
SUBJECTIVE PAIN PROGRESSION: UNCHANGED
PAIN LOCATION - RELIEVING FACTORS: MEDICATION, REST
DYSPNEA ON EXERTION: 1
PAIN SEVERITY GOAL: 0/10
PAIN LOCATION - PAIN SEVERITY: 3/10
CHANGE IN APPETITE: UNCHANGED
SHORTNESS OF BREATH: 1
FATIGUE: 1
PAIN LOCATION - EXACERBATING FACTORS: LUNG CA
PAIN LOCATION - PAIN DURATION: VARIES
DYSPNEA ACTIVITY LEVEL: AFTER AMBULATING 10 - 20 FT

## 2025-01-17 ASSESSMENT — ACTIVITIES OF DAILY LIVING (ADL)
ENTERING_EXITING_HOME: MODERATE ASSIST
AMBULATION ASSISTANCE: STAND BY ASSIST
AMBULATION ASSISTANCE: 1
OASIS_M1830: 03

## 2025-01-18 ENCOUNTER — HOME CARE VISIT (OUTPATIENT)
Dept: HOME HEALTH SERVICES | Facility: HOME HEALTH | Age: 75
End: 2025-01-18
Payer: MEDICARE

## 2025-01-18 VITALS
OXYGEN SATURATION: 98 % | SYSTOLIC BLOOD PRESSURE: 110 MMHG | TEMPERATURE: 97.4 F | RESPIRATION RATE: 12 BRPM | DIASTOLIC BLOOD PRESSURE: 62 MMHG | HEART RATE: 95 BPM

## 2025-01-18 PROCEDURE — G0299 HHS/HOSPICE OF RN EA 15 MIN: HCPCS | Mod: HHH

## 2025-01-18 ASSESSMENT — ENCOUNTER SYMPTOMS
PAIN: 1
PAIN LOCATION - RELIEVING FACTORS: MEDICATION, REST
PAIN LOCATION - PAIN DURATION: VARIES
PERSON REPORTING PAIN: PATIENT
PAIN LOCATION: CHEST
PAIN LOCATION - PAIN FREQUENCY: INTERMITTENT
PAIN LOCATION - PAIN QUALITY: PRESSURE
SUBJECTIVE PAIN PROGRESSION: UNCHANGED
PAIN LOCATION - PAIN SEVERITY: 3/10
LOWEST PAIN SEVERITY IN PAST 24 HOURS: 1/10
HIGHEST PAIN SEVERITY IN PAST 24 HOURS: 4/10
PAIN LOCATION - EXACERBATING FACTORS: LUNG CA
PAIN SEVERITY GOAL: 0/10

## 2025-01-20 ENCOUNTER — HOME CARE VISIT (OUTPATIENT)
Dept: HOME HEALTH SERVICES | Facility: HOME HEALTH | Age: 75
End: 2025-01-20
Payer: MEDICARE

## 2025-01-20 ENCOUNTER — TELEPHONE (OUTPATIENT)
Dept: HEMATOLOGY/ONCOLOGY | Facility: HOSPITAL | Age: 75
End: 2025-01-20
Payer: MEDICARE

## 2025-01-20 VITALS
HEART RATE: 103 BPM | DIASTOLIC BLOOD PRESSURE: 62 MMHG | RESPIRATION RATE: 16 BRPM | OXYGEN SATURATION: 98 % | SYSTOLIC BLOOD PRESSURE: 110 MMHG | TEMPERATURE: 97.9 F

## 2025-01-20 PROCEDURE — G0300 HHS/HOSPICE OF LPN EA 15 MIN: HCPCS | Mod: HHH

## 2025-01-20 SDOH — ECONOMIC STABILITY: GENERAL

## 2025-01-20 ASSESSMENT — ENCOUNTER SYMPTOMS
FATIGUES EASILY: 1
CHANGE IN APPETITE: UNCHANGED
APPETITE LEVEL: FAIR
LAST BOWEL MOVEMENT: 67225
DENIES PAIN: 1
FATIGUE: 1
COUGH: 1
SHORTNESS OF BREATH: 1
SHORTNESS OF BREATH: 1
COUGH CHARACTERISTICS: NON-PRODUCTIVE
DIZZINESS: 1
APPETITE LEVEL: GOOD
DYSPNEA ACTIVITY LEVEL: AFTER AMBULATING 10 - 20 FT
MUSCLE WEAKNESS: 1
DYSPNEA ACTIVITY LEVEL: AFTER AMBULATING 10 - 20 FT
CHANGE IN APPETITE: UNCHANGED

## 2025-01-20 ASSESSMENT — ACTIVITIES OF DAILY LIVING (ADL)
AMBULATION ASSISTANCE: 1
AMBULATION ASSISTANCE: STAND BY ASSIST
MONEY MANAGEMENT (EXPENSES/BILLS): NEEDS ASSISTANCE

## 2025-01-20 NOTE — TELEPHONE ENCOUNTER
Patient scheduled for first infusion Wednesday am but also due for pleurx to be drained that date as well. Inquiring if pleurx can be drained in infusion or if still needs to have home care nurse come out in the afternoon after infusion.

## 2025-01-20 NOTE — TELEPHONE ENCOUNTER
Patient notified he will still need to have home care nurse come out to drain. Patient verbalized understanding and agreeable.

## 2025-01-21 ENCOUNTER — HOSPITAL ENCOUNTER (INPATIENT)
Facility: HOSPITAL | Age: 75
LOS: 2 days | Discharge: HOME HEALTH | DRG: 181 | End: 2025-01-23
Attending: STUDENT IN AN ORGANIZED HEALTH CARE EDUCATION/TRAINING PROGRAM | Admitting: STUDENT IN AN ORGANIZED HEALTH CARE EDUCATION/TRAINING PROGRAM
Payer: MEDICARE

## 2025-01-21 ENCOUNTER — APPOINTMENT (OUTPATIENT)
Dept: CARDIOLOGY | Facility: HOSPITAL | Age: 75
DRG: 181 | End: 2025-01-21
Payer: MEDICARE

## 2025-01-21 ENCOUNTER — APPOINTMENT (OUTPATIENT)
Dept: RADIOLOGY | Facility: HOSPITAL | Age: 75
DRG: 181 | End: 2025-01-21
Payer: MEDICARE

## 2025-01-21 DIAGNOSIS — J90 PLEURAL EFFUSION ON RIGHT: ICD-10-CM

## 2025-01-21 DIAGNOSIS — R07.9 CHEST PAIN, UNSPECIFIED TYPE: ICD-10-CM

## 2025-01-21 DIAGNOSIS — C80.1 ADENOCARCINOMA (MULTI): ICD-10-CM

## 2025-01-21 DIAGNOSIS — D72.829 LEUKOCYTOSIS, UNSPECIFIED TYPE: ICD-10-CM

## 2025-01-21 DIAGNOSIS — J15.3: ICD-10-CM

## 2025-01-21 DIAGNOSIS — J90 PLEURAL EFFUSION: Primary | ICD-10-CM

## 2025-01-21 LAB
ALBUMIN SERPL BCP-MCNC: 4 G/DL (ref 3.4–5)
ALP SERPL-CCNC: 65 U/L (ref 33–136)
ALT SERPL W P-5'-P-CCNC: 10 U/L (ref 10–52)
ANION GAP SERPL CALC-SCNC: 12 MMOL/L (ref 10–20)
APPEARANCE UR: CLEAR
APTT PPP: 34 SECONDS (ref 27–38)
AST SERPL W P-5'-P-CCNC: 13 U/L (ref 9–39)
BASOPHILS # BLD AUTO: 0.02 X10*3/UL (ref 0–0.1)
BASOPHILS NFR BLD AUTO: 0.1 %
BILIRUB SERPL-MCNC: 0.6 MG/DL (ref 0–1.2)
BILIRUB UR STRIP.AUTO-MCNC: NEGATIVE MG/DL
BNP SERPL-MCNC: 61 PG/ML (ref 0–99)
BUN SERPL-MCNC: 20 MG/DL (ref 6–23)
CALCIUM SERPL-MCNC: 9.4 MG/DL (ref 8.6–10.3)
CAOX CRY #/AREA UR COMP ASSIST: ABNORMAL /HPF
CARDIAC TROPONIN I PNL SERPL HS: 4 NG/L (ref 0–20)
CARDIAC TROPONIN I PNL SERPL HS: 5 NG/L (ref 0–20)
CHLORIDE SERPL-SCNC: 99 MMOL/L (ref 98–107)
CO2 SERPL-SCNC: 28 MMOL/L (ref 21–32)
COLOR UR: ABNORMAL
CREAT SERPL-MCNC: 0.95 MG/DL (ref 0.5–1.3)
EGFRCR SERPLBLD CKD-EPI 2021: 84 ML/MIN/1.73M*2
EOSINOPHIL # BLD AUTO: 0.01 X10*3/UL (ref 0–0.4)
EOSINOPHIL NFR BLD AUTO: 0.1 %
ERYTHROCYTE [DISTWIDTH] IN BLOOD BY AUTOMATED COUNT: 13.3 % (ref 11.5–14.5)
GLUCOSE BLD MANUAL STRIP-MCNC: 200 MG/DL (ref 74–99)
GLUCOSE SERPL-MCNC: 192 MG/DL (ref 74–99)
GLUCOSE UR STRIP.AUTO-MCNC: NORMAL MG/DL
HCT VFR BLD AUTO: 39 % (ref 41–52)
HGB BLD-MCNC: 12.5 G/DL (ref 13.5–17.5)
HYALINE CASTS #/AREA URNS AUTO: ABNORMAL /LPF
IMM GRANULOCYTES # BLD AUTO: 0.1 X10*3/UL (ref 0–0.5)
IMM GRANULOCYTES NFR BLD AUTO: 0.5 % (ref 0–0.9)
INR PPP: 1.1 (ref 0.9–1.1)
KETONES UR STRIP.AUTO-MCNC: NEGATIVE MG/DL
LACTATE SERPL-SCNC: 1.9 MMOL/L (ref 0.4–2)
LEUKOCYTE ESTERASE UR QL STRIP.AUTO: NEGATIVE
LYMPHOCYTES # BLD AUTO: 0.73 X10*3/UL (ref 0.8–3)
LYMPHOCYTES NFR BLD AUTO: 3.8 %
MCH RBC QN AUTO: 27.4 PG (ref 26–34)
MCHC RBC AUTO-ENTMCNC: 32.1 G/DL (ref 32–36)
MCV RBC AUTO: 86 FL (ref 80–100)
MONOCYTES # BLD AUTO: 0.34 X10*3/UL (ref 0.05–0.8)
MONOCYTES NFR BLD AUTO: 1.8 %
MUCOUS THREADS #/AREA URNS AUTO: ABNORMAL /LPF
NEUTROPHILS # BLD AUTO: 17.83 X10*3/UL (ref 1.6–5.5)
NEUTROPHILS NFR BLD AUTO: 93.7 %
NITRITE UR QL STRIP.AUTO: NEGATIVE
NRBC BLD-RTO: 0 /100 WBCS (ref 0–0)
PH UR STRIP.AUTO: 5.5 [PH]
PLATELET # BLD AUTO: 271 X10*3/UL (ref 150–450)
POTASSIUM SERPL-SCNC: 3.7 MMOL/L (ref 3.5–5.3)
PROT SERPL-MCNC: 7.2 G/DL (ref 6.4–8.2)
PROT UR STRIP.AUTO-MCNC: ABNORMAL MG/DL
PROTHROMBIN TIME: 12.3 SECONDS (ref 9.8–12.8)
RBC # BLD AUTO: 4.56 X10*6/UL (ref 4.5–5.9)
RBC # UR STRIP.AUTO: NEGATIVE /UL
RBC #/AREA URNS AUTO: ABNORMAL /HPF
SODIUM SERPL-SCNC: 135 MMOL/L (ref 136–145)
SP GR UR STRIP.AUTO: 1.05
UROBILINOGEN UR STRIP.AUTO-MCNC: NORMAL MG/DL
WBC # BLD AUTO: 19 X10*3/UL (ref 4.4–11.3)
WBC #/AREA URNS AUTO: ABNORMAL /HPF

## 2025-01-21 PROCEDURE — 71275 CT ANGIOGRAPHY CHEST: CPT

## 2025-01-21 PROCEDURE — 80053 COMPREHEN METABOLIC PANEL: CPT | Performed by: PHYSICIAN ASSISTANT

## 2025-01-21 PROCEDURE — 84484 ASSAY OF TROPONIN QUANT: CPT | Performed by: PHYSICIAN ASSISTANT

## 2025-01-21 PROCEDURE — 83880 ASSAY OF NATRIURETIC PEPTIDE: CPT | Performed by: STUDENT IN AN ORGANIZED HEALTH CARE EDUCATION/TRAINING PROGRAM

## 2025-01-21 PROCEDURE — 87040 BLOOD CULTURE FOR BACTERIA: CPT | Mod: PORLAB | Performed by: PHYSICIAN ASSISTANT

## 2025-01-21 PROCEDURE — 96365 THER/PROPH/DIAG IV INF INIT: CPT

## 2025-01-21 PROCEDURE — 83605 ASSAY OF LACTIC ACID: CPT | Performed by: PHYSICIAN ASSISTANT

## 2025-01-21 PROCEDURE — 85730 THROMBOPLASTIN TIME PARTIAL: CPT | Performed by: PHYSICIAN ASSISTANT

## 2025-01-21 PROCEDURE — 99223 1ST HOSP IP/OBS HIGH 75: CPT | Performed by: STUDENT IN AN ORGANIZED HEALTH CARE EDUCATION/TRAINING PROGRAM

## 2025-01-21 PROCEDURE — 36415 COLL VENOUS BLD VENIPUNCTURE: CPT | Performed by: PHYSICIAN ASSISTANT

## 2025-01-21 PROCEDURE — 99285 EMERGENCY DEPT VISIT HI MDM: CPT | Mod: 25 | Performed by: STUDENT IN AN ORGANIZED HEALTH CARE EDUCATION/TRAINING PROGRAM

## 2025-01-21 PROCEDURE — 96366 THER/PROPH/DIAG IV INF ADDON: CPT

## 2025-01-21 PROCEDURE — 2500000004 HC RX 250 GENERAL PHARMACY W/ HCPCS (ALT 636 FOR OP/ED): Performed by: PHYSICIAN ASSISTANT

## 2025-01-21 PROCEDURE — 93005 ELECTROCARDIOGRAM TRACING: CPT

## 2025-01-21 PROCEDURE — 85025 COMPLETE CBC W/AUTO DIFF WBC: CPT | Performed by: PHYSICIAN ASSISTANT

## 2025-01-21 PROCEDURE — 2500000004 HC RX 250 GENERAL PHARMACY W/ HCPCS (ALT 636 FOR OP/ED): Performed by: STUDENT IN AN ORGANIZED HEALTH CARE EDUCATION/TRAINING PROGRAM

## 2025-01-21 PROCEDURE — 85610 PROTHROMBIN TIME: CPT | Performed by: PHYSICIAN ASSISTANT

## 2025-01-21 PROCEDURE — 1210000001 HC SEMI-PRIVATE ROOM DAILY

## 2025-01-21 PROCEDURE — 82947 ASSAY GLUCOSE BLOOD QUANT: CPT

## 2025-01-21 PROCEDURE — 2500000005 HC RX 250 GENERAL PHARMACY W/O HCPCS: Performed by: PHYSICIAN ASSISTANT

## 2025-01-21 PROCEDURE — 2550000001 HC RX 255 CONTRASTS: Performed by: PHYSICIAN ASSISTANT

## 2025-01-21 PROCEDURE — 81001 URINALYSIS AUTO W/SCOPE: CPT | Performed by: PHYSICIAN ASSISTANT

## 2025-01-21 PROCEDURE — 71275 CT ANGIOGRAPHY CHEST: CPT | Mod: FOREIGN READ | Performed by: RADIOLOGY

## 2025-01-21 PROCEDURE — 96367 TX/PROPH/DG ADDL SEQ IV INF: CPT

## 2025-01-21 RX ORDER — POLYETHYLENE GLYCOL 3350 17 G/17G
17 POWDER, FOR SOLUTION ORAL DAILY
Status: DISCONTINUED | OUTPATIENT
Start: 2025-01-22 | End: 2025-01-23 | Stop reason: HOSPADM

## 2025-01-21 RX ORDER — DEXTROSE 50 % IN WATER (D50W) INTRAVENOUS SYRINGE
25
Status: DISCONTINUED | OUTPATIENT
Start: 2025-01-21 | End: 2025-01-23 | Stop reason: HOSPADM

## 2025-01-21 RX ORDER — BISACODYL 10 MG/1
10 SUPPOSITORY RECTAL DAILY PRN
Status: DISCONTINUED | OUTPATIENT
Start: 2025-01-21 | End: 2025-01-23 | Stop reason: HOSPADM

## 2025-01-21 RX ORDER — DEXTROSE 50 % IN WATER (D50W) INTRAVENOUS SYRINGE
12.5
Status: DISCONTINUED | OUTPATIENT
Start: 2025-01-21 | End: 2025-01-23 | Stop reason: HOSPADM

## 2025-01-21 RX ORDER — SODIUM CHLORIDE, SODIUM LACTATE, POTASSIUM CHLORIDE, CALCIUM CHLORIDE 600; 310; 30; 20 MG/100ML; MG/100ML; MG/100ML; MG/100ML
75 INJECTION, SOLUTION INTRAVENOUS CONTINUOUS
Status: ACTIVE | OUTPATIENT
Start: 2025-01-21 | End: 2025-01-22

## 2025-01-21 RX ORDER — PANTOPRAZOLE SODIUM 40 MG/1
40 TABLET, DELAYED RELEASE ORAL
Status: DISCONTINUED | OUTPATIENT
Start: 2025-01-22 | End: 2025-01-23 | Stop reason: HOSPADM

## 2025-01-21 RX ORDER — GUAIFENESIN 600 MG/1
600 TABLET, EXTENDED RELEASE ORAL EVERY 12 HOURS PRN
Status: DISCONTINUED | OUTPATIENT
Start: 2025-01-21 | End: 2025-01-23 | Stop reason: HOSPADM

## 2025-01-21 RX ORDER — PANTOPRAZOLE SODIUM 40 MG/10ML
40 INJECTION, POWDER, LYOPHILIZED, FOR SOLUTION INTRAVENOUS
Status: DISCONTINUED | OUTPATIENT
Start: 2025-01-22 | End: 2025-01-23 | Stop reason: HOSPADM

## 2025-01-21 RX ORDER — INSULIN LISPRO 100 [IU]/ML
0-10 INJECTION, SOLUTION INTRAVENOUS; SUBCUTANEOUS
Status: DISCONTINUED | OUTPATIENT
Start: 2025-01-21 | End: 2025-01-23 | Stop reason: HOSPADM

## 2025-01-21 RX ORDER — ONDANSETRON HYDROCHLORIDE 2 MG/ML
4 INJECTION, SOLUTION INTRAVENOUS EVERY 8 HOURS PRN
Status: DISCONTINUED | OUTPATIENT
Start: 2025-01-21 | End: 2025-01-23 | Stop reason: HOSPADM

## 2025-01-21 RX ORDER — BISACODYL 5 MG
10 TABLET, DELAYED RELEASE (ENTERIC COATED) ORAL DAILY PRN
Status: DISCONTINUED | OUTPATIENT
Start: 2025-01-21 | End: 2025-01-23 | Stop reason: HOSPADM

## 2025-01-21 RX ORDER — ROSUVASTATIN CALCIUM 20 MG/1
40 TABLET, COATED ORAL DAILY
Status: DISCONTINUED | OUTPATIENT
Start: 2025-01-22 | End: 2025-01-23 | Stop reason: HOSPADM

## 2025-01-21 RX ORDER — ONDANSETRON 4 MG/1
4 TABLET, FILM COATED ORAL EVERY 8 HOURS PRN
Status: DISCONTINUED | OUTPATIENT
Start: 2025-01-21 | End: 2025-01-23 | Stop reason: HOSPADM

## 2025-01-21 RX ORDER — CEFTRIAXONE 2 G/50ML
2 INJECTION, SOLUTION INTRAVENOUS EVERY 24 HOURS
Status: DISCONTINUED | OUTPATIENT
Start: 2025-01-22 | End: 2025-01-22

## 2025-01-21 RX ORDER — ACETAMINOPHEN 325 MG/1
650 TABLET ORAL EVERY 4 HOURS PRN
Status: DISCONTINUED | OUTPATIENT
Start: 2025-01-21 | End: 2025-01-23 | Stop reason: HOSPADM

## 2025-01-21 RX ORDER — HEPARIN SODIUM 5000 [USP'U]/ML
5000 INJECTION, SOLUTION INTRAVENOUS; SUBCUTANEOUS EVERY 8 HOURS
Status: DISCONTINUED | OUTPATIENT
Start: 2025-01-21 | End: 2025-01-23 | Stop reason: HOSPADM

## 2025-01-21 RX ORDER — TALC
3 POWDER (GRAM) TOPICAL NIGHTLY PRN
Status: DISCONTINUED | OUTPATIENT
Start: 2025-01-21 | End: 2025-01-23 | Stop reason: HOSPADM

## 2025-01-21 RX ORDER — ALBUTEROL SULFATE 90 UG/1
2 INHALANT RESPIRATORY (INHALATION) EVERY 4 HOURS PRN
Status: DISCONTINUED | OUTPATIENT
Start: 2025-01-21 | End: 2025-01-23 | Stop reason: HOSPADM

## 2025-01-21 RX ADMIN — IOHEXOL 75 ML: 350 INJECTION, SOLUTION INTRAVENOUS at 17:22

## 2025-01-21 RX ADMIN — PIPERACILLIN SODIUM AND TAZOBACTAM SODIUM 4.5 G: 4; .5 INJECTION, SOLUTION INTRAVENOUS at 16:52

## 2025-01-21 RX ADMIN — VANCOMYCIN HYDROCHLORIDE 2 G: 10 INJECTION, POWDER, LYOPHILIZED, FOR SOLUTION INTRAVENOUS at 17:48

## 2025-01-21 RX ADMIN — SODIUM CHLORIDE, POTASSIUM CHLORIDE, SODIUM LACTATE AND CALCIUM CHLORIDE 75 ML/HR: 600; 310; 30; 20 INJECTION, SOLUTION INTRAVENOUS at 23:04

## 2025-01-21 SDOH — ECONOMIC STABILITY: INCOME INSECURITY: IN THE PAST 12 MONTHS HAS THE ELECTRIC, GAS, OIL, OR WATER COMPANY THREATENED TO SHUT OFF SERVICES IN YOUR HOME?: NO

## 2025-01-21 SDOH — SOCIAL STABILITY: SOCIAL INSECURITY: WITHIN THE LAST YEAR, HAVE YOU BEEN HUMILIATED OR EMOTIONALLY ABUSED IN OTHER WAYS BY YOUR PARTNER OR EX-PARTNER?: NO

## 2025-01-21 SDOH — SOCIAL STABILITY: SOCIAL INSECURITY: WITHIN THE LAST YEAR, HAVE YOU BEEN AFRAID OF YOUR PARTNER OR EX-PARTNER?: NO

## 2025-01-21 SDOH — SOCIAL STABILITY: SOCIAL INSECURITY: ARE THERE ANY APPARENT SIGNS OF INJURIES/BEHAVIORS THAT COULD BE RELATED TO ABUSE/NEGLECT?: NO

## 2025-01-21 SDOH — SOCIAL STABILITY: SOCIAL INSECURITY: DO YOU FEEL ANYONE HAS EXPLOITED OR TAKEN ADVANTAGE OF YOU FINANCIALLY OR OF YOUR PERSONAL PROPERTY?: NO

## 2025-01-21 SDOH — ECONOMIC STABILITY: FOOD INSECURITY: WITHIN THE PAST 12 MONTHS, THE FOOD YOU BOUGHT JUST DIDN'T LAST AND YOU DIDN'T HAVE MONEY TO GET MORE.: NEVER TRUE

## 2025-01-21 SDOH — SOCIAL STABILITY: SOCIAL INSECURITY: HAVE YOU HAD ANY THOUGHTS OF HARMING ANYONE ELSE?: NO

## 2025-01-21 SDOH — ECONOMIC STABILITY: FOOD INSECURITY: WITHIN THE PAST 12 MONTHS, YOU WORRIED THAT YOUR FOOD WOULD RUN OUT BEFORE YOU GOT THE MONEY TO BUY MORE.: NEVER TRUE

## 2025-01-21 SDOH — SOCIAL STABILITY: SOCIAL INSECURITY
WITHIN THE LAST YEAR, HAVE YOU BEEN KICKED, HIT, SLAPPED, OR OTHERWISE PHYSICALLY HURT BY YOUR PARTNER OR EX-PARTNER?: NO

## 2025-01-21 SDOH — SOCIAL STABILITY: SOCIAL INSECURITY: WERE YOU ABLE TO COMPLETE ALL THE BEHAVIORAL HEALTH SCREENINGS?: YES

## 2025-01-21 SDOH — SOCIAL STABILITY: SOCIAL INSECURITY
WITHIN THE LAST YEAR, HAVE YOU BEEN RAPED OR FORCED TO HAVE ANY KIND OF SEXUAL ACTIVITY BY YOUR PARTNER OR EX-PARTNER?: NO

## 2025-01-21 SDOH — SOCIAL STABILITY: SOCIAL INSECURITY: HAVE YOU HAD THOUGHTS OF HARMING ANYONE ELSE?: NO

## 2025-01-21 SDOH — SOCIAL STABILITY: SOCIAL INSECURITY: HAS ANYONE EVER THREATENED TO HURT YOUR FAMILY OR YOUR PETS?: NO

## 2025-01-21 SDOH — SOCIAL STABILITY: SOCIAL INSECURITY: ARE YOU OR HAVE YOU BEEN THREATENED OR ABUSED PHYSICALLY, EMOTIONALLY, OR SEXUALLY BY ANYONE?: NO

## 2025-01-21 SDOH — SOCIAL STABILITY: SOCIAL INSECURITY: DOES ANYONE TRY TO KEEP YOU FROM HAVING/CONTACTING OTHER FRIENDS OR DOING THINGS OUTSIDE YOUR HOME?: NO

## 2025-01-21 SDOH — SOCIAL STABILITY: SOCIAL INSECURITY: ABUSE: ADULT

## 2025-01-21 SDOH — SOCIAL STABILITY: SOCIAL INSECURITY: DO YOU FEEL UNSAFE GOING BACK TO THE PLACE WHERE YOU ARE LIVING?: NO

## 2025-01-21 ASSESSMENT — LIFESTYLE VARIABLES
HAVE PEOPLE ANNOYED YOU BY CRITICIZING YOUR DRINKING: NO
HAVE YOU EVER FELT YOU SHOULD CUT DOWN ON YOUR DRINKING: NO
HOW MANY STANDARD DRINKS CONTAINING ALCOHOL DO YOU HAVE ON A TYPICAL DAY: PATIENT DOES NOT DRINK
HOW OFTEN DO YOU HAVE A DRINK CONTAINING ALCOHOL: NEVER
EVER FELT BAD OR GUILTY ABOUT YOUR DRINKING: NO
AUDIT-C TOTAL SCORE: 0
HOW OFTEN DO YOU HAVE 6 OR MORE DRINKS ON ONE OCCASION: NEVER
EVER HAD A DRINK FIRST THING IN THE MORNING TO STEADY YOUR NERVES TO GET RID OF A HANGOVER: NO
AUDIT-C TOTAL SCORE: 0
SKIP TO QUESTIONS 9-10: 1
TOTAL SCORE: 0

## 2025-01-21 ASSESSMENT — ACTIVITIES OF DAILY LIVING (ADL)
HEARING - LEFT EAR: FUNCTIONAL
LACK_OF_TRANSPORTATION: NO
HEARING - RIGHT EAR: FUNCTIONAL
TOILETING: INDEPENDENT
FEEDING YOURSELF: INDEPENDENT
GROOMING: INDEPENDENT
WALKS IN HOME: INDEPENDENT
LACK_OF_TRANSPORTATION: NO
DRESSING YOURSELF: INDEPENDENT
BATHING: INDEPENDENT
PATIENT'S MEMORY ADEQUATE TO SAFELY COMPLETE DAILY ACTIVITIES?: YES
ADEQUATE_TO_COMPLETE_ADL: YES
ASSISTIVE_DEVICE: EYEGLASSES
JUDGMENT_ADEQUATE_SAFELY_COMPLETE_DAILY_ACTIVITIES: YES

## 2025-01-21 ASSESSMENT — COGNITIVE AND FUNCTIONAL STATUS - GENERAL
PATIENT BASELINE BEDBOUND: NO
MOBILITY SCORE: 24
DAILY ACTIVITIY SCORE: 24

## 2025-01-21 ASSESSMENT — PATIENT HEALTH QUESTIONNAIRE - PHQ9
1. LITTLE INTEREST OR PLEASURE IN DOING THINGS: NOT AT ALL
2. FEELING DOWN, DEPRESSED OR HOPELESS: NOT AT ALL
SUM OF ALL RESPONSES TO PHQ9 QUESTIONS 1 & 2: 0

## 2025-01-21 ASSESSMENT — ENCOUNTER SYMPTOMS
DYSURIA: 0
FATIGUE: 1
CHILLS: 0
UNEXPECTED WEIGHT CHANGE: 1
WHEEZING: 1
VOMITING: 0
ABDOMINAL PAIN: 0
COUGH: 1
WEAKNESS: 1
SLEEP DISTURBANCE: 0
APPETITE CHANGE: 1
NAUSEA: 0
TREMORS: 0
PHOTOPHOBIA: 0
DIZZINESS: 0
FEVER: 0
SHORTNESS OF BREATH: 1
POLYDIPSIA: 0
COLOR CHANGE: 0
PALPITATIONS: 0
HEMATURIA: 0
BLOOD IN STOOL: 0
CONFUSION: 0
LIGHT-HEADEDNESS: 0

## 2025-01-21 ASSESSMENT — PAIN SCALES - GENERAL
PAINLEVEL_OUTOF10: 0 - NO PAIN
PAINLEVEL_OUTOF10: 0 - NO PAIN
PAINLEVEL_OUTOF10: 3
PAINLEVEL_OUTOF10: 0 - NO PAIN

## 2025-01-21 ASSESSMENT — PAIN - FUNCTIONAL ASSESSMENT
PAIN_FUNCTIONAL_ASSESSMENT: 0-10
PAIN_FUNCTIONAL_ASSESSMENT: 0-10

## 2025-01-21 NOTE — ED PROVIDER NOTES
EMERGENCY MEDICINE EVALUATION NOTE    History of Present Illness     Chief Complaint:   Chief Complaint   Patient presents with    Chest Pain     PT has a catheter in his right chest to drain fluid from the lung area. PT has stage 4 lung cancer which has also spread to the brain. PT starts treatment tomorrow morning.  PT was doubled over in pain when he called EMS for right sided chest pain.       HPI: Jayant Holland is a 74 y.o. male presents with a chief complaint of chest pain and some shortness of breath.  Patient has a catheter in the right chest for effusion drainage.  Patient reports that he gets about a liter taken off every other day.  He states that the last time fluid taken off was yesterday.  He reports that he does have stage IV lung cancer which is spread to his brain on last CT imaging.  Patient reports that he supposed to start his treatment for his cancer tomorrow.  Wife states that he was doubled over in pain earlier today so she called for EMS.  He states that he was having pain on that right side of his chest.  He states that he occasionally has intermittent pain near that catheter site.  He states he is feeling as where the pain was today but it kind of radiate towards the center of his chest.  Patient denies any cardiac history currently.    Previous History     Past Medical History:   Diagnosis Date    Arthritis 20+ years    Asthma 20+ years    Cervical disc disorder     Diabetes mellitus (Multi) 20+ years    Exposure to potentially hazardous substance 12/06/2024 Apr 24, 2024 Entered By: LALA RIBERA Comment: Entered automatically through [employer] Problem List documentation program      GERD (gastroesophageal reflux disease) 20+ years    Hypertension 20+ years     Past Surgical History:   Procedure Laterality Date    COLONOSCOPY  04/28/2015    Complete Colonoscopy    KNEE ARTHROPLASTY  20 + years    KNEE SURGERY  04/28/2015    Knee Surgery     Social History     Tobacco Use    Smoking  status: Never    Smokeless tobacco: Never   Substance Use Topics    Alcohol use: Yes     Alcohol/week: 5.0 standard drinks of alcohol     Types: 2 Glasses of wine, 3 Standard drinks or equivalent per week    Drug use: Never     Family History   Problem Relation Name Age of Onset    Leukemia Mother Annette Holland     Memory loss Mother Annette Holland     Cancer Mother Annette Holland     Heart attack Father       No Known Allergies  Current Outpatient Medications   Medication Instructions    albuterol 90 mcg/actuation inhaler 2 puffs, inhalation, Every 4 hours PRN    Blood glucose monitoring meter kit kit 1 each, As needed    blood sugar diagnostic (Accu-Chek Jessica Plus test strp) strip 2 times daily    blood sugar diagnostic (Blood Glucose Test) strip 1 strip, miscellaneous, Daily    blood sugar diagnostic (OneTouch Verio test strips) strip 1 strip, miscellaneous, Daily    blood-glucose meter misc 1 Device, miscellaneous, Daily    chlorthalidone (HYGROTON) 25 mg, oral, Daily    clindamycin (Cleocin T) 1 % lotion Apply topically to affected area twice daily.    cyanocobalamin (VITAMIN B-12) 1,000 mcg, oral, Daily, as directed    dexAMETHasone (Decadron) 4 mg tablet Beginning with Cycle 2, take 1 tablet (4 mg) by mouth twice daily the day before PEMEtrexed treatment and twice daily the day after PEMEtrexed treatment.    dexAMETHasone (DECADRON) 8 mg, oral, 2 times daily, Start 2 days prior to the first dose of Amivantamab on Cycle 1 only.    doxycycline (VIBRAMYCIN) 100 mg, oral, 2 times daily, For rash prevention. Take with at least 8 ounces (large glass) of water, do not lie down for 30 minutes after    folic acid (FOLVITE) 1,000 mcg, oral, Daily    hydrocortisone 1 % cream Apply topically to face, hands, feet, neck, back, and chest once daily at bedtime for rash prevention.    lancets 30 gauge misc 1 Device, miscellaneous, 3 times daily    lisinopril 40 mg tablet TAKE 1 TABLET BY MOUTH EVERY DAY    meloxicam (MOBIC) 15 mg,  oral, Daily    metFORMIN XR (GLUCOPHAGE-XR) 1,000 mg, oral, 2 times daily, Do not crush, chew, or split.    OLANZapine (ZYPREXA) 5 mg, oral, Nightly, For 4 days starting the evening of treatment    omeprazole (PriLOSEC) 20 mg DR capsule TAKE 1 CAPSULE BY MOUTH EVERY DAY    ondansetron (ZOFRAN) 8 mg, oral, Every 8 hours PRN    prochlorperazine (COMPAZINE) 10 mg, oral, Every 6 hours PRN    rosuvastatin (Crestor) 40 mg tablet TAKE 1 TABLET BY MOUTH EVERY DAY    sennosides (SENOKOT) 17.2 mg, oral, Nightly PRN    sildenafil (Viagra) 100 mg tablet Take by mouth. TAKE 1 TABLET AS NEEDED APPROXIMATELY 1 HOUR BEFORE SEXUAL ACTIVITY       Physical Exam     Appearance: Alert, oriented , cooperative     Skin: Intact,  dry skin, no lesions, rash, petechiae or purpura.      Eyes: PERRLA, EOMs intact,  Conjunctiva pink      ENT: Hearing grossly intact. Pharynx clear     Neck: Supple. Trachea at midline.      Pulmonary: Patient has clear lung sounds in the left lung field as well as the right upper lung fields.  Right lower lung field is decreased throughout.     Cardiac: Normal rate and rhythm without murmur.  Drainage catheter noted in right lower chest right upper abdomen area on right side.  Dressing intact.     Abdomen: Soft, nontender, active bowel sounds.     Musculoskeletal: Full range of motion.      Neurological:Cranial nerves II through XII are grossly intact, normal sensation, no weakness, no focal findings identified.     Results     Labs Reviewed   CBC WITH AUTO DIFFERENTIAL - Abnormal       Result Value    WBC 19.0 (*)     nRBC 0.0      RBC 4.56      Hemoglobin 12.5 (*)     Hematocrit 39.0 (*)     MCV 86      MCH 27.4      MCHC 32.1      RDW 13.3      Platelets 271      Neutrophils % 93.7      Immature Granulocytes %, Automated 0.5      Lymphocytes % 3.8      Monocytes % 1.8      Eosinophils % 0.1      Basophils % 0.1      Neutrophils Absolute 17.83 (*)     Immature Granulocytes Absolute, Automated 0.10       Lymphocytes Absolute 0.73 (*)     Monocytes Absolute 0.34      Eosinophils Absolute 0.01      Basophils Absolute 0.02     COMPREHENSIVE METABOLIC PANEL - Abnormal    Glucose 192 (*)     Sodium 135 (*)     Potassium 3.7      Chloride 99      Bicarbonate 28      Anion Gap 12      Urea Nitrogen 20      Creatinine 0.95      eGFR 84      Calcium 9.4      Albumin 4.0      Alkaline Phosphatase 65      Total Protein 7.2      AST 13      Bilirubin, Total 0.6      ALT 10     URINALYSIS WITH REFLEX CULTURE AND MICROSCOPIC - Abnormal    Color, Urine Light-Yellow      Appearance, Urine Clear      Specific Gravity, Urine 1.050 (*)     pH, Urine 5.5      Protein, Urine 20 (TRACE)      Glucose, Urine Normal      Blood, Urine NEGATIVE      Ketones, Urine NEGATIVE      Bilirubin, Urine NEGATIVE      Urobilinogen, Urine Normal      Nitrite, Urine NEGATIVE      Leukocyte Esterase, Urine NEGATIVE     URINALYSIS MICROSCOPIC WITH REFLEX CULTURE - Abnormal    WBC, Urine NONE      RBC, Urine NONE      Mucus, Urine 2+      Hyaline Casts, Urine 1+ (*)     Calcium Oxalate Crystals, Urine 1+     PROTIME-INR - Normal    Protime 12.3      INR 1.1     APTT - Normal    aPTT 34      Narrative:     The APTT is no longer used for monitoring Unfractionated Heparin Therapy. For monitoring Heparin Therapy, use the Heparin Assay.   SERIAL TROPONIN-INITIAL - Normal    Troponin I, High Sensitivity 4      Narrative:     Less than 99th percentile of normal range cutoff-  Female and children under 18 years old <14 ng/L; Male <21 ng/L: Negative  Repeat testing should be performed if clinically indicated.     Female and children under 18 years old 14-50 ng/L; Male 21-50 ng/L:  Consistent with possible cardiac damage and possible increased clinical   risk. Serial measurements may help to assess extent of myocardial damage.     >50 ng/L: Consistent with cardiac damage, increased clinical risk and  myocardial infarction. Serial measurements may help assess extent  of   myocardial damage.      NOTE: Children less than 1 year old may have higher baseline troponin   levels and results should be interpreted in conjunction with the overall   clinical context.     NOTE: Troponin I testing is performed using a different   testing methodology at [facility] than at other   [facility]. Direct result comparisons should only   be made within the same method.   SERIAL TROPONIN, 1 HOUR - Normal    Troponin I, High Sensitivity 5      Narrative:     Less than 99th percentile of normal range cutoff-  Female and children under 18 years old <14 ng/L; Male <21 ng/L: Negative  Repeat testing should be performed if clinically indicated.     Female and children under 18 years old 14-50 ng/L; Male 21-50 ng/L:  Consistent with possible cardiac damage and possible increased clinical   risk. Serial measurements may help to assess extent of myocardial damage.     >50 ng/L: Consistent with cardiac damage, increased clinical risk and  myocardial infarction. Serial measurements may help assess extent of   myocardial damage.      NOTE: Children less than 1 year old may have higher baseline troponin   levels and results should be interpreted in conjunction with the overall   clinical context.     NOTE: Troponin I testing is performed using a different   testing methodology at [facility] than at other   [facility]. Direct result comparisons should only   be made within the same method.   B-TYPE NATRIURETIC PEPTIDE - Normal    BNP 61      Narrative:        <100 pg/mL - Heart failure unlikely  100-299 pg/mL - Intermediate probability of acute heart                  failure exacerbation. Correlate with clinical                  context and patient history.    >=300 pg/mL - Heart Failure likely. Correlate with clinical                  context and patient history.    BNP testing is performed using different testing methodology at [facility] than at other  [facility]. Direct result comparisons should only be made within the same method.      LACTATE - Normal    Lactate 1.9      Narrative:     Venipuncture immediately after or during the administration of Metamizole may lead to falsely low results. Testing should be performed immediately prior to Metamizole dosing.   BLOOD CULTURE   BLOOD CULTURE   TROPONIN SERIES- (INITIAL, 1 HR)    Narrative:     The following orders were created for panel order Troponin I Series, High Sensitivity (0, 1 HR).  Procedure                               Abnormality         Status                     ---------                               -----------         ------                     Troponin I, High Sensiti...[[id number]]  Normal              Final result               Troponin, High Sensitivi...[[id number]]  Normal              Final result                 Please view results for these tests on the individual orders.   URINALYSIS WITH REFLEX CULTURE AND MICROSCOPIC    Narrative:     The following orders were created for panel order Urinalysis with Reflex Culture and Microscopic.  Procedure                               Abnormality         Status                     ---------                               -----------         ------                     Urinalysis with Reflex C...[[id number]]  Abnormal            Final result               Extra Urine Gray Tube[[id number]]                            In process                   Please view results for these tests on the individual orders.   EXTRA URINE GRAY TUBE     CT angio chest for pulmonary embolism   Final Result   1.  No evidence of a pulmonary embolus.   2.  Large right pleural effusion.  A tunneled pleural drainage   catheter is present.   3.  Incidental mass in the right upper lobe of the lung, indicative of   carcinoma.   4.  Multiple satellite nodules seen throughout the right lung,   indicative of metastatic disease.   5.  Right hilar and mediastinal adenopathy.   6.   "Coronary artery calcifications.   Signed by Bola Rodriguez MD            ED Course & Medical Decision Making     Medications   piperacillin-tazobactam (Zosyn) 4.5 g in dextrose (iso)  mL (0 g intravenous Stopped 1/21/25 1744)   vancomycin (Vancocin) 2 g in dextrose 5% 500 mL IV (2 g intravenous New Bag 1/21/25 1748)   iohexol (OMNIPaque) 350 mg iodine/mL solution 75 mL (75 mL intravenous Given 1/21/25 1722)     Heart Rate:  [75-82]   Temperature:  [36.4 °C (97.5 °F)]   Respirations:  [16-18]   BP: (103-106)/(66-93)   Height:  [185.4 cm (6' 1\")]   Weight:  [86.2 kg (190 lb)]   Pulse Ox:  [96 %-98 %]    ED Course as of 01/21/25 2046   Tue Jan 21, 2025   1602 WBC(!): 19.0  Patient will have sepsis orders placed at this time. [CJ]   1927 Spoke to the on-call oncologist Dr. Marcus.  We discussed the patient's plan of care.  She does request that we bring the patient in here for observation.  She states that she would like to have him observed to ensure that his blood cultures are negative and she recommends potentially culturing pleural fluid as well. [CJ]   1936 Updated patient on the plan of care.  He is in agreement with admission to the hospital for observation. [CJ]   2045 Patient signed out to attending at shift change pending discussion with hospitalist for admission. [CJ]      ED Course User Index  [CJ] Elbert Nieves PA-C         Diagnoses as of 01/21/25 2046   Pleural effusion   Leukocytosis, unspecified type       Procedures   Procedures    Diagnosis     1. Pleural effusion    2. Leukocytosis, unspecified type        Disposition   Pending discussed with hospitalist for admission.    ED Prescriptions    None         Disclaimer: This note was dictated by speech recognition. Minor errors in transcription may be present. Please call if questions.       Elbert Nieves PA-C  01/21/25 2046    "

## 2025-01-22 ENCOUNTER — APPOINTMENT (OUTPATIENT)
Dept: HOME HEALTH SERVICES | Facility: HOME HEALTH | Age: 75
End: 2025-01-22
Payer: MEDICARE

## 2025-01-22 ENCOUNTER — APPOINTMENT (OUTPATIENT)
Dept: CARDIOLOGY | Facility: HOSPITAL | Age: 75
DRG: 181 | End: 2025-01-22
Payer: MEDICARE

## 2025-01-22 ENCOUNTER — APPOINTMENT (OUTPATIENT)
Dept: RADIOLOGY | Facility: HOSPITAL | Age: 75
DRG: 181 | End: 2025-01-22
Payer: MEDICARE

## 2025-01-22 ENCOUNTER — HOME CARE VISIT (OUTPATIENT)
Dept: HOME HEALTH SERVICES | Facility: HOME HEALTH | Age: 75
End: 2025-01-22
Payer: MEDICARE

## 2025-01-22 ENCOUNTER — APPOINTMENT (OUTPATIENT)
Dept: HEMATOLOGY/ONCOLOGY | Facility: HOSPITAL | Age: 75
End: 2025-01-22
Payer: MEDICARE

## 2025-01-22 LAB
ALBUMIN SERPL BCP-MCNC: 3.3 G/DL (ref 3.4–5)
ALP SERPL-CCNC: 56 U/L (ref 33–136)
ALT SERPL W P-5'-P-CCNC: 12 U/L (ref 10–52)
ANION GAP SERPL CALC-SCNC: 11 MMOL/L (ref 10–20)
AST SERPL W P-5'-P-CCNC: 15 U/L (ref 9–39)
ATRIAL RATE: 96 BPM
BASOPHILS # BLD AUTO: 0.03 X10*3/UL (ref 0–0.1)
BASOPHILS NFR BLD AUTO: 0.2 %
BASOPHILS NFR FLD MANUAL: 1 %
BILIRUB SERPL-MCNC: 0.4 MG/DL (ref 0–1.2)
BLASTS NFR FLD MANUAL: 0 %
BUN SERPL-MCNC: 19 MG/DL (ref 6–23)
CALCIUM SERPL-MCNC: 8.7 MG/DL (ref 8.6–10.3)
CHLORIDE SERPL-SCNC: 101 MMOL/L (ref 98–107)
CLARITY FLD: ABNORMAL
CO2 SERPL-SCNC: 27 MMOL/L (ref 21–32)
COLOR FLD: ABNORMAL
CREAT SERPL-MCNC: 0.95 MG/DL (ref 0.5–1.3)
EGFRCR SERPLBLD CKD-EPI 2021: 84 ML/MIN/1.73M*2
EJECTION FRACTION APICAL 4 CHAMBER: 58.7
EJECTION FRACTION: 53 %
EOSINOPHIL # BLD AUTO: 0.45 X10*3/UL (ref 0–0.4)
EOSINOPHIL NFR BLD AUTO: 2.7 %
EOSINOPHIL NFR FLD MANUAL: 4 %
ERYTHROCYTE [DISTWIDTH] IN BLOOD BY AUTOMATED COUNT: 13.5 % (ref 11.5–14.5)
GLOBAL LONGITUDINAL STRAIN: 13 %
GLUCOSE BLD MANUAL STRIP-MCNC: 100 MG/DL (ref 74–99)
GLUCOSE BLD MANUAL STRIP-MCNC: 134 MG/DL (ref 74–99)
GLUCOSE BLD MANUAL STRIP-MCNC: 156 MG/DL (ref 74–99)
GLUCOSE BLD MANUAL STRIP-MCNC: 99 MG/DL (ref 74–99)
GLUCOSE FLD-MCNC: 106 MG/DL
GLUCOSE SERPL-MCNC: 158 MG/DL (ref 74–99)
HCT VFR BLD AUTO: 35.4 % (ref 41–52)
HGB BLD-MCNC: 11.1 G/DL (ref 13.5–17.5)
HOLD SPECIMEN: NORMAL
IMM GRANULOCYTES # BLD AUTO: 0.12 X10*3/UL (ref 0–0.5)
IMM GRANULOCYTES NFR BLD AUTO: 0.7 % (ref 0–0.9)
IMMATURE GRANULOCYTES IN FLUID: 0 %
LDH FLD L TO P-CCNC: 414 U/L
LDH SERPL L TO P-CCNC: 175 U/L (ref 84–246)
LEFT ATRIUM VOLUME AREA LENGTH INDEX BSA: 18.7 ML/M2
LEFT VENTRICLE INTERNAL DIMENSION DIASTOLE: 4.3 CM (ref 3.5–6)
LEFT VENTRICULAR OUTFLOW TRACT DIAMETER: 2.2 CM
LV EJECTION FRACTION BIPLANE: 53 %
LYMPHOCYTES # BLD AUTO: 1.03 X10*3/UL (ref 0.8–3)
LYMPHOCYTES NFR BLD AUTO: 6.2 %
LYMPHOCYTES NFR FLD MANUAL: 83 %
MAGNESIUM SERPL-MCNC: 1.37 MG/DL (ref 1.6–2.4)
MCH RBC QN AUTO: 26.7 PG (ref 26–34)
MCHC RBC AUTO-ENTMCNC: 31.4 G/DL (ref 32–36)
MCV RBC AUTO: 85 FL (ref 80–100)
MITRAL VALVE E/A RATIO: 1.07
MONOCYTES # BLD AUTO: 0.95 X10*3/UL (ref 0.05–0.8)
MONOCYTES NFR BLD AUTO: 5.7 %
MONOS+MACROS NFR FLD MANUAL: 8 %
NEUTROPHILS # BLD AUTO: 14.01 X10*3/UL (ref 1.6–5.5)
NEUTROPHILS NFR BLD AUTO: 84.5 %
NEUTROPHILS NFR FLD MANUAL: 4 %
NRBC BLD-RTO: 0 /100 WBCS (ref 0–0)
OTHER CELLS NFR FLD MANUAL: 0 %
P AXIS: 49 DEGREES
PH FLD: 7.7 [PH]
PLASMA CELLS NFR FLD MANUAL: 0 %
PLATELET # BLD AUTO: 235 X10*3/UL (ref 150–450)
POTASSIUM SERPL-SCNC: 3.7 MMOL/L (ref 3.5–5.3)
PR INTERVAL: 189 MS
PROT FLD-MCNC: 4.5 G/DL
PROT SERPL-MCNC: 5.7 G/DL (ref 6.4–8.2)
PROT SERPL-MCNC: 6 G/DL (ref 6.4–8.2)
Q ONSET: 249 MS
QRS COUNT: 15 BEATS
QRS DURATION: 93 MS
QT INTERVAL: 386 MS
QTC CALCULATION(BAZETT): 459 MS
QTC FREDERICIA: 433 MS
R AXIS: 171 DEGREES
RBC # BLD AUTO: 4.15 X10*6/UL (ref 4.5–5.9)
RBC # FLD AUTO: ABNORMAL /UL
RIGHT VENTRICLE FREE WALL PEAK S': 13.4 CM/S
RIGHT VENTRICLE PEAK SYSTOLIC PRESSURE: 27.4 MMHG
SODIUM SERPL-SCNC: 135 MMOL/L (ref 136–145)
T AXIS: -5 DEGREES
T OFFSET: 442 MS
TOTAL CELLS COUNTED FLD: 100
TRICUSPID ANNULAR PLANE SYSTOLIC EXCURSION: 1.9 CM
VENTRICULAR RATE: 85 BPM
WBC # BLD AUTO: 16.6 X10*3/UL (ref 4.4–11.3)
WBC # FLD AUTO: 3382 /UL

## 2025-01-22 PROCEDURE — 85025 COMPLETE CBC W/AUTO DIFF WBC: CPT | Performed by: STUDENT IN AN ORGANIZED HEALTH CARE EDUCATION/TRAINING PROGRAM

## 2025-01-22 PROCEDURE — 2500000002 HC RX 250 W HCPCS SELF ADMINISTERED DRUGS (ALT 637 FOR MEDICARE OP, ALT 636 FOR OP/ED): Performed by: STUDENT IN AN ORGANIZED HEALTH CARE EDUCATION/TRAINING PROGRAM

## 2025-01-22 PROCEDURE — 84157 ASSAY OF PROTEIN OTHER: CPT | Mod: PORLAB | Performed by: INTERNAL MEDICINE

## 2025-01-22 PROCEDURE — 36415 COLL VENOUS BLD VENIPUNCTURE: CPT | Performed by: STUDENT IN AN ORGANIZED HEALTH CARE EDUCATION/TRAINING PROGRAM

## 2025-01-22 PROCEDURE — 82947 ASSAY GLUCOSE BLOOD QUANT: CPT

## 2025-01-22 PROCEDURE — 93005 ELECTROCARDIOGRAM TRACING: CPT

## 2025-01-22 PROCEDURE — 82945 GLUCOSE OTHER FLUID: CPT | Mod: PORLAB | Performed by: INTERNAL MEDICINE

## 2025-01-22 PROCEDURE — 89051 BODY FLUID CELL COUNT: CPT | Performed by: INTERNAL MEDICINE

## 2025-01-22 PROCEDURE — 2500000001 HC RX 250 WO HCPCS SELF ADMINISTERED DRUGS (ALT 637 FOR MEDICARE OP): Performed by: STUDENT IN AN ORGANIZED HEALTH CARE EDUCATION/TRAINING PROGRAM

## 2025-01-22 PROCEDURE — 88112 CYTOPATH CELL ENHANCE TECH: CPT | Mod: TC | Performed by: INTERNAL MEDICINE

## 2025-01-22 PROCEDURE — 83615 LACTATE (LD) (LDH) ENZYME: CPT | Mod: PORLAB | Performed by: INTERNAL MEDICINE

## 2025-01-22 PROCEDURE — 1210000001 HC SEMI-PRIVATE ROOM DAILY

## 2025-01-22 PROCEDURE — 99233 SBSQ HOSP IP/OBS HIGH 50: CPT | Performed by: INTERNAL MEDICINE

## 2025-01-22 PROCEDURE — C8929 TTE W OR WO FOL WCON,DOPPLER: HCPCS

## 2025-01-22 PROCEDURE — 83615 LACTATE (LD) (LDH) ENZYME: CPT | Performed by: INTERNAL MEDICINE

## 2025-01-22 PROCEDURE — 83986 ASSAY PH BODY FLUID NOS: CPT | Performed by: STUDENT IN AN ORGANIZED HEALTH CARE EDUCATION/TRAINING PROGRAM

## 2025-01-22 PROCEDURE — 87070 CULTURE OTHR SPECIMN AEROBIC: CPT | Mod: PORLAB | Performed by: INTERNAL MEDICINE

## 2025-01-22 PROCEDURE — 84075 ASSAY ALKALINE PHOSPHATASE: CPT | Performed by: STUDENT IN AN ORGANIZED HEALTH CARE EDUCATION/TRAINING PROGRAM

## 2025-01-22 PROCEDURE — 93010 ELECTROCARDIOGRAM REPORT: CPT | Performed by: INTERNAL MEDICINE

## 2025-01-22 PROCEDURE — 83735 ASSAY OF MAGNESIUM: CPT | Performed by: STUDENT IN AN ORGANIZED HEALTH CARE EDUCATION/TRAINING PROGRAM

## 2025-01-22 PROCEDURE — 84155 ASSAY OF PROTEIN SERUM: CPT | Performed by: INTERNAL MEDICINE

## 2025-01-22 PROCEDURE — 99221 1ST HOSP IP/OBS SF/LOW 40: CPT | Performed by: INTERNAL MEDICINE

## 2025-01-22 PROCEDURE — 93306 TTE W/DOPPLER COMPLETE: CPT | Performed by: INTERNAL MEDICINE

## 2025-01-22 PROCEDURE — 2500000004 HC RX 250 GENERAL PHARMACY W/ HCPCS (ALT 636 FOR OP/ED): Performed by: STUDENT IN AN ORGANIZED HEALTH CARE EDUCATION/TRAINING PROGRAM

## 2025-01-22 RX ADMIN — HUMAN ALBUMIN MICROSPHERES AND PERFLUTREN 0.5 ML: 10; .22 INJECTION, SOLUTION INTRAVENOUS at 14:16

## 2025-01-22 RX ADMIN — DOXYCYCLINE 100 MG: 100 INJECTION, POWDER, LYOPHILIZED, FOR SOLUTION INTRAVENOUS at 09:10

## 2025-01-22 RX ADMIN — CEFTRIAXONE SODIUM 2 G: 2 INJECTION, SOLUTION INTRAVENOUS at 10:50

## 2025-01-22 RX ADMIN — PANTOPRAZOLE SODIUM 40 MG: 40 TABLET, DELAYED RELEASE ORAL at 05:23

## 2025-01-22 RX ADMIN — ROSUVASTATIN 40 MG: 20 TABLET, FILM COATED ORAL at 19:46

## 2025-01-22 ASSESSMENT — COGNITIVE AND FUNCTIONAL STATUS - GENERAL
CLIMB 3 TO 5 STEPS WITH RAILING: A LITTLE
DAILY ACTIVITIY SCORE: 24
MOBILITY SCORE: 22
WALKING IN HOSPITAL ROOM: A LITTLE

## 2025-01-22 ASSESSMENT — PAIN SCALES - GENERAL: PAINLEVEL_OUTOF10: 0 - NO PAIN

## 2025-01-22 ASSESSMENT — PAIN - FUNCTIONAL ASSESSMENT: PAIN_FUNCTIONAL_ASSESSMENT: 0-10

## 2025-01-22 NOTE — H&P
[facility] - GENERAL MEDICINE HISTORY AND PHYSICAL    History Obtained From (Primary Source): Patient  Collateral History (Secondary Sources): D/w spouse, ED    History Of Present Illness (HPI):  Jayant Holland is a 74 y.o. male with PMHx s/f HTN, DLD, T2DM, asthma, NSCLC - [id number] (current sites: RUL, R pleural effusion/pleural carcinomatosis, R hilar, mediastinal, retrocaval nodes, brain), recurrent malignant pleural effusion s/p PleurX (getting drained at home), presenting with right-sided chest pain and associated SOB. Pt reports that he currently has a PleurX in place for getting fluid drained at home 2/2 recurrent malignant effusion on the R; most recently had 1L fluid off day before admission (Monday, 01/20/25) at home through [facility]. He admits to occasionally getting pain at the catheter site since it was placed, but this morning (01/21/25), he had severe pain at the site that actually radiated toward the center of his chest. This was associated with increased SOB from his baseline. His wife called EMS 2/2 being hunched over in severe pain when this happened. He has since had improvement in these symptoms. He denies any known cardiac history. Denies palpitations, diaphoresis, dizziness / lightheadedness, syncope or near syncope, HA, vision changes, f/c/n/v/d/abd pain. He was scheduled to start his combination chemotherapy tomorrow (01/22/2025). Of note, oncology was contacted by the ED and they recommended admission locally, trending blood cultures, and consideration of repeat thora on the R and sending for cultures.     ED Course (Summary - please note all labs, imaging studies, and interventions noted below have been personally reviewed and/or interpreted on day of admission):   Vitals on presentation: T97.5, /69, HR 75, RR 18, SpO2 98% RA  Labs: CBC with WBC 19.0, Hgb 12.5, platelets 271.  CMP with glucose 192, sodium 135, potassium 3.7, BUN 20, serum creatinine 0.95.  Lactate 1.9.  BNP  61.  High-sensitivity troponin 4-5.  PT/INR: 12.3/1.1.  UA negative for infection.  Blood cultures x 2 collected, in process.  EKG: none completed in ED  Imaging: CTA PE protocol with no evidence for PE, large right pleural effusion; tunneled pleural drainage catheter present, right upper lobe mass with multiple satellite nodules seen throughout the lung, right hilar and mediastinal adenopathy, coronary artery calcifications  Interventions: Vanco/Zosyn, consultation with oncology, admitted to medicine    12-point ROS reviewed and found to be negative aside from aforementioned positives in HPI and/or noted in dedicated ROS section below.     ED Course (From ED Provider):  ED Course as of 01/21/25 2124 Tue Jan 21, 2025   1602 WBC(!): 19.0  Patient will have sepsis orders placed at this time. [CJ]   1927 Spoke to the on-call oncologist Dr. Marcus.  We discussed the patient's plan of care.  She does request that we bring the patient in here for observation.  She states that she would like to have him observed to ensure that his blood cultures are negative and she recommends potentially culturing pleural fluid as well. [CJ]   1936 Updated patient on the plan of care.  He is in agreement with admission to the hospital for observation. [CJ]   2045 Patient signed out to attending at shift change pending discussion with hospitalist for admission. [CJ]      ED Course User Index  [CJ] Elbert Nieves PA-C         Diagnoses as of 01/21/25 2124   Pleural effusion   Leukocytosis, unspecified type     Relevant Results  Results for orders placed or performed during the hospital encounter of 01/21/25 (from the past 24 hours)   CBC and Auto Differential   Result Value Ref Range    WBC 19.0 (H) 4.4 - 11.3 x10*3/uL    nRBC 0.0 0.0 - 0.0 /100 WBCs    RBC 4.56 4.50 - 5.90 x10*6/uL    Hemoglobin 12.5 (L) 13.5 - 17.5 g/dL    Hematocrit 39.0 (L) 41.0 - 52.0 %    MCV 86 80 - 100 fL    MCH 27.4 26.0 - 34.0 pg    MCHC 32.1 32.0 - 36.0 g/dL     RDW 13.3 11.5 - 14.5 %    Platelets 271 150 - 450 x10*3/uL    Neutrophils % 93.7 40.0 - 80.0 %    Immature Granulocytes %, Automated 0.5 0.0 - 0.9 %    Lymphocytes % 3.8 13.0 - 44.0 %    Monocytes % 1.8 2.0 - 10.0 %    Eosinophils % 0.1 0.0 - 6.0 %    Basophils % 0.1 0.0 - 2.0 %    Neutrophils Absolute 17.83 (H) 1.60 - 5.50 x10*3/uL    Immature Granulocytes Absolute, Automated 0.10 0.00 - 0.50 x10*3/uL    Lymphocytes Absolute 0.73 (L) 0.80 - 3.00 x10*3/uL    Monocytes Absolute 0.34 0.05 - 0.80 x10*3/uL    Eosinophils Absolute 0.01 0.00 - 0.40 x10*3/uL    Basophils Absolute 0.02 0.00 - 0.10 x10*3/uL   Comprehensive metabolic panel   Result Value Ref Range    Glucose 192 (H) 74 - 99 mg/dL    Sodium 135 (L) 136 - 145 mmol/L    Potassium 3.7 3.5 - 5.3 mmol/L    Chloride 99 98 - 107 mmol/L    Bicarbonate 28 21 - 32 mmol/L    Anion Gap 12 10 - 20 mmol/L    Urea Nitrogen 20 6 - 23 mg/dL    Creatinine 0.95 0.50 - 1.30 mg/dL    eGFR 84 >60 mL/min/1.73m*2    Calcium 9.4 8.6 - 10.3 mg/dL    Albumin 4.0 3.4 - 5.0 g/dL    Alkaline Phosphatase 65 33 - 136 U/L    Total Protein 7.2 6.4 - 8.2 g/dL    AST 13 9 - 39 U/L    Bilirubin, Total 0.6 0.0 - 1.2 mg/dL    ALT 10 10 - 52 U/L   Protime-INR   Result Value Ref Range    Protime 12.3 9.8 - 12.8 seconds    INR 1.1 0.9 - 1.1   aPTT   Result Value Ref Range    aPTT 34 27 - 38 seconds   Troponin I, High Sensitivity, Initial   Result Value Ref Range    Troponin I, High Sensitivity 4 0 - 20 ng/L   B-type natriuretic peptide   Result Value Ref Range    BNP 61 0 - 99 pg/mL   Troponin, High Sensitivity, 1 Hour   Result Value Ref Range    Troponin I, High Sensitivity 5 0 - 20 ng/L   Lactate   Result Value Ref Range    Lactate 1.9 0.4 - 2.0 mmol/L   Urinalysis with Reflex Culture and Microscopic   Result Value Ref Range    Color, Urine Light-Yellow Light-Yellow, Yellow, Dark-Yellow    Appearance, Urine Clear Clear    Specific Gravity, Urine 1.050 (N) 1.005 - 1.035    pH, Urine 5.5 5.0, 5.5,  6.0, 6.5, 7.0, 7.5, 8.0    Protein, Urine 20 (TRACE) NEGATIVE, 10 (TRACE), 20 (TRACE) mg/dL    Glucose, Urine Normal Normal mg/dL    Blood, Urine NEGATIVE NEGATIVE    Ketones, Urine NEGATIVE NEGATIVE mg/dL    Bilirubin, Urine NEGATIVE NEGATIVE    Urobilinogen, Urine Normal Normal mg/dL    Nitrite, Urine NEGATIVE NEGATIVE    Leukocyte Esterase, Urine NEGATIVE NEGATIVE   Urinalysis Microscopic   Result Value Ref Range    WBC, Urine NONE 1-5, NONE /HPF    RBC, Urine NONE NONE, 1-2, 3-5 /HPF    Mucus, Urine 2+ Reference range not established. /LPF    Hyaline Casts, Urine 1+ (A) NONE /LPF    Calcium Oxalate Crystals, Urine 1+ NONE, 1+ /HPF      CT angio chest for pulmonary embolism    Result Date: 1/21/2025  STUDY: CT Angiogram of the Chest; 1/21/2025 at 5:44 PM. INDICATION: History of right-sided lung cancer, shortness of breath. COMPARISON: XR chest 1/10/2025, 12/10/2024; PET/CT 12/20/2024; CT chest 12/5/2024. ACCESSION NUMBER(S): [id number] ORDERING CLINICIAN: SHARAN ESPINAL TECHNIQUE:  CTA of the chest was performed with intravenous contrast. Images are reviewed and processed at a workstation according to the CT angiogram protocol with 3-D and/or MIP post processing imaging generated.  Omnipaque 350 75 mL was administered intravenously. Automated mA/kV exposure control was utilized and patient examination was performed in strict accordance with principles of ALARA. FINDINGS: No filling defects are seen in the main pulmonary artery, left or right pulmonary arteries, or first order branches to indicate an embolus.  No early filling veins are noted to indicate an arteriovenous malformation.  There is a large right pleural effusion. A tunneled pleural catheter is present.  There continues to be a mass in the right upper lobe of the lung measured at 2.0 x 2.2 cm(Series 5, Image 74), indicative of carcinoma.  There are multiple nodules seen throughout the right upper lobe, right middle lobe, and right lower lobe,  suspicious for metastatic satellite nodules.  No pneumothorax is noted.  No filling defects are seen in the trachea or mainstem bronchi.  There is an enlarged right hilar lymph node measured at 2.3 x 1.7 cm.  There is a subcarinal lymph node estimated at 2.2 x 1.8 cm.  There is no pericardial effusion.  There is no evidence of aneurysmal dilatation of the ascending aorta, aortic arch, or descending thoracic aorta.  Coronary artery calcifications are present.  Degenerative changes are seen throughout the thoracic spine.    1.  No evidence of a pulmonary embolus. 2.  Large right pleural effusion.  A tunneled pleural drainage catheter is present. 3.  Incidental mass in the right upper lobe of the lung, indicative of carcinoma. 4.  Multiple satellite nodules seen throughout the right lung, indicative of metastatic disease. 5.  Right hilar and mediastinal adenopathy. 6.  Coronary artery calcifications. Signed by Bola Rodriguez MD    Scheduled medications:  [START ON 1/22/2025] cefTRIAXone, 2 g, intravenous, q24h  [START ON 1/22/2025] doxycycline, 100 mg, intravenous, q12h  heparin (porcine), 5,000 Units, subcutaneous, q8h  insulin lispro, 0-10 Units, subcutaneous, Before meals & nightly  [START ON 1/22/2025] pantoprazole, 40 mg, oral, Daily before breakfast   Or  [START ON 1/22/2025] pantoprazole, 40 mg, intravenous, Daily before breakfast  [START ON 1/22/2025] polyethylene glycol, 17 g, oral, Daily  [START ON 1/22/2025] rosuvastatin, 40 mg, oral, Daily      Continuous medications:     PRN medications:  PRN medications: acetaminophen, albuterol, bisacodyl, bisacodyl, dextrose, dextrose, glucagon, glucagon, guaiFENesin, melatonin, ondansetron **OR** ondansetron     Past Medical History  He has a past medical history of Arthritis (20+ years), Asthma (20+ years), Cervical disc disorder, Diabetes mellitus (Multi) (20+ years), Exposure to potentially hazardous substance (12/06/2024), GERD (gastroesophageal reflux disease) (20+  years), and Hypertension (20+ years).    Surgical History  He has a past surgical history that includes Knee surgery (04/28/2015); Colonoscopy (04/28/2015); and Knee Arthroplasty (20 + years).     Social History  He reports that he has never smoked. He has never used smokeless tobacco. He reports current alcohol use of about 5.0 standard drinks of alcohol per week. He reports that he does not use drugs.    Family History  Family History   Problem Relation Name Age of Onset    Leukemia Mother Annette Holland     Memory loss Mother Annette Holland     Cancer Mother Annette Holland     Heart attack Father       Allergies  Patient has no known allergies.    Code Status  Full Code     Review of Systems   Constitutional:  Positive for appetite change, fatigue and unexpected weight change (Weight loss has plateaued). Negative for chills and fever.   Eyes:  Negative for photophobia and visual disturbance.   Respiratory:  Positive for cough, shortness of breath and wheezing.    Cardiovascular:  Positive for chest pain. Negative for palpitations and leg swelling.   Gastrointestinal:  Negative for abdominal pain, blood in stool, nausea and vomiting.   Endocrine: Negative for polydipsia and polyuria.   Genitourinary:  Negative for decreased urine volume, dysuria, hematuria and urgency.   Skin:  Negative for color change and rash.   Allergic/Immunologic: Positive for immunocompromised state.   Neurological:  Positive for weakness. Negative for dizziness, tremors, syncope and light-headedness.   Psychiatric/Behavioral:  Negative for confusion and sleep disturbance.    All other systems reviewed and are negative.    Last Recorded Vitals  BP (!) 103/93   Pulse 81   Temp 36.4 °C (97.5 °F)   Resp 16   Wt 86.2 kg (190 lb)   SpO2 96%      Physical Exam:  Vital signs and nursing notes reviewed.   Constitutional: Pleasant and cooperative. Laying in bed in no acute distress. Conversant.   Skin: Warm and dry; no obvious lesions, rashes, pallor, or  jaundice.   Eyes: EOMI. Anicteric sclera.   ENT: Mucous membranes moist; no obvious injury or deformity appreciated.   Head and Neck: Normocephalic, atraumatic. ROM preserved. Trachea midline.   Respiratory: Nonlabored on RA. Diminished breath sounds throughout the R. Chest rise is equal. +PleurX right lower chest  Cardiovascular: RRR. No gross murmur, gallop, or rub. Extremities are warm and well-perfused with good capillary refill (< 3 seconds). No chest wall tenderness.   GI: Abdomen soft, nontender, nondistended. No obvious organomegaly appreciated.   : No CVA tenderness.   MSK: No gross abnormalities appreciated. No limitations to AROM/PROM appreciated.   Extremities: No cyanosis, edema, or clubbing evident. Neurovascularly intact.   Neuro: A&Ox3. CN 2-12 grossly intact. Able to respond to questions appropriately and clearly. No acute focal neurologic deficits appreciated.  Psych: Appropriate mood and behavior.    Assessment/Plan     74 y.o. male with PMHx s/f HTN, DLD, T2DM, asthma, NSCLC - [id number] (current sites: RUL, R pleural effusion/pleural carcinomatosis, R hilar, mediastinal, retrocaval nodes, brain), recurrent malignant pleural effusion s/p PleurX (getting drained at home), presenting with right-sided chest pain and associated SOB.     Recurrent R pleural effusion   Leukocytosis   -Patient does not technically meet sepsis criteria. He is immunocompromised and has elevated WBCs however   -Will follow blood cultures.   -IR consult appreciated for thora and send fluid for diagnostics   -Continue ceftriaxone/doxycycline for now   -ID, heme-onc consults appreciated     Chest pain  -Likely related to the above, however does have coronary artery calcifications on CT   -No evidence of PE on imaging   -HSTI 4-5 in the ED  -EKG ordered, pending   -Tele monitoring. Check echo out of abundance of caution    Metastatic NSCLC   -current sites: RUL, R pleural effusion/pleural carcinomatosis, R hilar, mediastinal,  retrocaval nodes, brain  -Continue current management as above   -Continue supportive care   -Pall care consult for symptom support appreciated     HTN, DLD   -Continue home therapies     DM-II   -Hold home oral meds for now   -Continue with SSI ACHS   -Accucheks, hypoglycemic protocol   -Monitor and adjust as needed      Asthma without acute exacerbation   -Continue home therapies     Diet: Carb controlled, NPO @ MN  DVT Prophylaxis: SCDs, SQH  Code Status: Full Code   Case Discussed With: ED provider  Additional Sources Reviewed: ED note day of admission; available outpatient heme-onc notes, available primary care notes     Anticipated Length of Stay (LOS): Patient will require 2+ midnight stay for management of the above     Rosie River PA-C    Dragon dictation software was used to dictate this note and thus there may be minor errors in translation/transcription including garbled speech or misspellings. Please contact for clarification if needed.

## 2025-01-22 NOTE — PROGRESS NOTES
Jayant Holland is a 74 y.o. male on day 1 of admission presenting with Pleural effusion.      Subjective   Jayant Holland is a 74 y.o. male with PMHx s/f HTN, DLD, T2DM, asthma, NSCLC - [id number] (current sites: RUL, R pleural effusion/pleural carcinomatosis, R hilar, mediastinal, retrocaval nodes, brain), recurrent malignant pleural effusion s/p PleurX (getting drained at home), presenting with right-sided chest pain and associated SOB. Pt reports that he currently has a PleurX in place for getting fluid drained at home 2/2 recurrent malignant effusion on the R; most recently had 1L fluid off day before admission (Monday, 01/20/25) at home through [facility]. He admits to occasionally getting pain at the catheter site since it was placed, but this morning (01/21/25), he had severe pain at the site that actually radiated toward the center of his chest. This was associated with increased SOB from his baseline. His wife called EMS 2/2 being hunched over in severe pain when this happened. He has since had improvement in these symptoms. He denies any known cardiac history. Denies palpitations, diaphoresis, dizziness / lightheadedness, syncope or near syncope, HA, vision changes, f/c/n/v/d/abd pain. He was scheduled to start his combination chemotherapy tomorrow (01/22/2025). Of note, oncology was contacted by the ED and they recommended admission locally, trending blood cultures, and consideration of repeat thora on the R and sending for cultures.      ED Course (Summary - please note all labs, imaging studies, and interventions noted below have been personally reviewed and/or interpreted on day of admission):   Vitals on presentation: T97.5, /69, HR 75, RR 18, SpO2 98% RA  Labs: CBC with WBC 19.0, Hgb 12.5, platelets 271.  CMP with glucose 192, sodium 135, potassium 3.7, BUN 20, serum creatinine 0.95.  Lactate 1.9.  BNP 61.  High-sensitivity troponin 4-5.  PT/INR: 12.3/1.1.  UA negative for infection.  Blood cultures x 2  collected, in process.  EKG: none completed in ED  Imaging: CTA PE protocol with no evidence for PE, large right pleural effusion; tunneled pleural drainage catheter present, right upper lobe mass with multiple satellite nodules seen throughout the lung, right hilar and mediastinal adenopathy, coronary artery calcifications  Interventions: Vanco/Zosyn, consultation with oncology, admitted to medicine    1/22: Right thoracentesis today. Pleural fluid studies ordered.     Objective     Last Recorded Vitals  /69 (BP Location: Right arm, Patient Position: Lying)   Pulse 67   Temp 36.2 °C (97.1 °F)   Resp 16   Wt 86.2 kg (190 lb)   SpO2 96%   Intake/Output last 3 Shifts:    Intake/Output Summary (Last 24 hours) at 1/22/2025 1318  Last data filed at 1/22/2025 0353  Gross per 24 hour   Intake 361.25 ml   Output --   Net 361.25 ml       Admission Weight  Weight: 86.2 kg (190 lb) (01/21/25 1323)    Daily Weight  01/21/25 : 86.2 kg (190 lb)      Physical Exam  Constitutional: Pleasant and cooperative. Laying in bed in no acute distress. Conversant.   Head and Neck: Normocephalic, atraumatic. ROM preserved. Trachea midline.   Respiratory: Nonlabored on RA. Diminished breath sounds throughout the R. Chest rise is equal. +PleurX right lower chest  Cardiovascular: RRR. No gross murmur, gallop, or rub. Extremities are warm and well-perfused with good capillary refill (< 3 seconds). No chest wall tenderness.   GI: Abdomen soft, nontender, nondistended. No obvious organomegaly appreciated.   : No CVA tenderness.   MSK: No gross abnormalities appreciated. No limitations to AROM/PROM appreciated.   Extremities: No cyanosis, edema, or clubbing evident. Neurovascularly intact.   Neuro: A&Ox3. CN 2-12 grossly intact. Able to respond to questions appropriately and clearly. No acute focal neurologic deficits appreciated.  Psych: Appropriate mood and behavior.  Relevant Results  Results for orders placed or performed during the  hospital encounter of 01/21/25 (from the past 24 hours)   Electrocardiogram, 12-lead PRN ACS symptoms   Result Value Ref Range    Ventricular Rate 85 BPM    Atrial Rate 96 BPM    LA Interval 189 ms    QRS Duration 93 ms    QT Interval 386 ms    QTC Calculation(Bazett) 459 ms    P Axis 49 degrees    R Axis 171 degrees    T Axis -5 degrees    QRS Count 15 beats    Q Onset 249 ms    T Offset 442 ms    QTC Fredericia 433 ms   CBC and Auto Differential   Result Value Ref Range    WBC 19.0 (H) 4.4 - 11.3 x10*3/uL    nRBC 0.0 0.0 - 0.0 /100 WBCs    RBC 4.56 4.50 - 5.90 x10*6/uL    Hemoglobin 12.5 (L) 13.5 - 17.5 g/dL    Hematocrit 39.0 (L) 41.0 - 52.0 %    MCV 86 80 - 100 fL    MCH 27.4 26.0 - 34.0 pg    MCHC 32.1 32.0 - 36.0 g/dL    RDW 13.3 11.5 - 14.5 %    Platelets 271 150 - 450 x10*3/uL    Neutrophils % 93.7 40.0 - 80.0 %    Immature Granulocytes %, Automated 0.5 0.0 - 0.9 %    Lymphocytes % 3.8 13.0 - 44.0 %    Monocytes % 1.8 2.0 - 10.0 %    Eosinophils % 0.1 0.0 - 6.0 %    Basophils % 0.1 0.0 - 2.0 %    Neutrophils Absolute 17.83 (H) 1.60 - 5.50 x10*3/uL    Immature Granulocytes Absolute, Automated 0.10 0.00 - 0.50 x10*3/uL    Lymphocytes Absolute 0.73 (L) 0.80 - 3.00 x10*3/uL    Monocytes Absolute 0.34 0.05 - 0.80 x10*3/uL    Eosinophils Absolute 0.01 0.00 - 0.40 x10*3/uL    Basophils Absolute 0.02 0.00 - 0.10 x10*3/uL   Comprehensive metabolic panel   Result Value Ref Range    Glucose 192 (H) 74 - 99 mg/dL    Sodium 135 (L) 136 - 145 mmol/L    Potassium 3.7 3.5 - 5.3 mmol/L    Chloride 99 98 - 107 mmol/L    Bicarbonate 28 21 - 32 mmol/L    Anion Gap 12 10 - 20 mmol/L    Urea Nitrogen 20 6 - 23 mg/dL    Creatinine 0.95 0.50 - 1.30 mg/dL    eGFR 84 >60 mL/min/1.73m*2    Calcium 9.4 8.6 - 10.3 mg/dL    Albumin 4.0 3.4 - 5.0 g/dL    Alkaline Phosphatase 65 33 - 136 U/L    Total Protein 7.2 6.4 - 8.2 g/dL    AST 13 9 - 39 U/L    Bilirubin, Total 0.6 0.0 - 1.2 mg/dL    ALT 10 10 - 52 U/L   Protime-INR   Result Value  Ref Range    Protime 12.3 9.8 - 12.8 seconds    INR 1.1 0.9 - 1.1   aPTT   Result Value Ref Range    aPTT 34 27 - 38 seconds   Troponin I, High Sensitivity, Initial   Result Value Ref Range    Troponin I, High Sensitivity 4 0 - 20 ng/L   B-type natriuretic peptide   Result Value Ref Range    BNP 61 0 - 99 pg/mL   Troponin, High Sensitivity, 1 Hour   Result Value Ref Range    Troponin I, High Sensitivity 5 0 - 20 ng/L   Lactate   Result Value Ref Range    Lactate 1.9 0.4 - 2.0 mmol/L   Blood Culture    Specimen: Peripheral Venipuncture; Blood culture   Result Value Ref Range    Blood Culture Loaded on Instrument - Culture in progress    Blood Culture    Specimen: Peripheral Venipuncture; Blood culture   Result Value Ref Range    Blood Culture Loaded on Instrument - Culture in progress    Urinalysis with Reflex Culture and Microscopic   Result Value Ref Range    Color, Urine Light-Yellow Light-Yellow, Yellow, Dark-Yellow    Appearance, Urine Clear Clear    Specific Gravity, Urine 1.050 (N) 1.005 - 1.035    pH, Urine 5.5 5.0, 5.5, 6.0, 6.5, 7.0, 7.5, 8.0    Protein, Urine 20 (TRACE) NEGATIVE, 10 (TRACE), 20 (TRACE) mg/dL    Glucose, Urine Normal Normal mg/dL    Blood, Urine NEGATIVE NEGATIVE    Ketones, Urine NEGATIVE NEGATIVE mg/dL    Bilirubin, Urine NEGATIVE NEGATIVE    Urobilinogen, Urine Normal Normal mg/dL    Nitrite, Urine NEGATIVE NEGATIVE    Leukocyte Esterase, Urine NEGATIVE NEGATIVE   Extra Urine Gray Tube   Result Value Ref Range    Extra Tube Hold for add-ons.    Urinalysis Microscopic   Result Value Ref Range    WBC, Urine NONE 1-5, NONE /HPF    RBC, Urine NONE NONE, 1-2, 3-5 /HPF    Mucus, Urine 2+ Reference range not established. /LPF    Hyaline Casts, Urine 1+ (A) NONE /LPF    Calcium Oxalate Crystals, Urine 1+ NONE, 1+ /HPF   POCT GLUCOSE   Result Value Ref Range    POCT Glucose 200 (H) 74 - 99 mg/dL   ECG 12 Lead   Result Value Ref Range    Ventricular Rate 86 BPM    Atrial Rate 39 BPM    PA Interval  187 ms    QRS Duration 94 ms    QT Interval 402 ms    QTC Calculation(Bazett) 481 ms    P Axis 29 degrees    R Axis 123 degrees    T Axis 20 degrees    QRS Count 12 beats    Q Onset 251 ms    T Offset 452 ms    QTC Fredericia 453 ms   CBC and Auto Differential   Result Value Ref Range    WBC 16.6 (H) 4.4 - 11.3 x10*3/uL    nRBC 0.0 0.0 - 0.0 /100 WBCs    RBC 4.15 (L) 4.50 - 5.90 x10*6/uL    Hemoglobin 11.1 (L) 13.5 - 17.5 g/dL    Hematocrit 35.4 (L) 41.0 - 52.0 %    MCV 85 80 - 100 fL    MCH 26.7 26.0 - 34.0 pg    MCHC 31.4 (L) 32.0 - 36.0 g/dL    RDW 13.5 11.5 - 14.5 %    Platelets 235 150 - 450 x10*3/uL    Neutrophils % 84.5 40.0 - 80.0 %    Immature Granulocytes %, Automated 0.7 0.0 - 0.9 %    Lymphocytes % 6.2 13.0 - 44.0 %    Monocytes % 5.7 2.0 - 10.0 %    Eosinophils % 2.7 0.0 - 6.0 %    Basophils % 0.2 0.0 - 2.0 %    Neutrophils Absolute 14.01 (H) 1.60 - 5.50 x10*3/uL    Immature Granulocytes Absolute, Automated 0.12 0.00 - 0.50 x10*3/uL    Lymphocytes Absolute 1.03 0.80 - 3.00 x10*3/uL    Monocytes Absolute 0.95 (H) 0.05 - 0.80 x10*3/uL    Eosinophils Absolute 0.45 (H) 0.00 - 0.40 x10*3/uL    Basophils Absolute 0.03 0.00 - 0.10 x10*3/uL   Comprehensive Metabolic Panel   Result Value Ref Range    Glucose 158 (H) 74 - 99 mg/dL    Sodium 135 (L) 136 - 145 mmol/L    Potassium 3.7 3.5 - 5.3 mmol/L    Chloride 101 98 - 107 mmol/L    Bicarbonate 27 21 - 32 mmol/L    Anion Gap 11 10 - 20 mmol/L    Urea Nitrogen 19 6 - 23 mg/dL    Creatinine 0.95 0.50 - 1.30 mg/dL    eGFR 84 >60 mL/min/1.73m*2    Calcium 8.7 8.6 - 10.3 mg/dL    Albumin 3.3 (L) 3.4 - 5.0 g/dL    Alkaline Phosphatase 56 33 - 136 U/L    Total Protein 5.7 (L) 6.4 - 8.2 g/dL    AST 15 9 - 39 U/L    Bilirubin, Total 0.4 0.0 - 1.2 mg/dL    ALT 12 10 - 52 U/L   Magnesium   Result Value Ref Range    Magnesium 1.37 (L) 1.60 - 2.40 mg/dL   Protein, Total   Result Value Ref Range    Total Protein 6.0 (L) 6.4 - 8.2 g/dL   Lactate Dehydrogenase   Result Value Ref  Range     84 - 246 U/L   POCT GLUCOSE   Result Value Ref Range    POCT Glucose 134 (H) 74 - 99 mg/dL   POCT GLUCOSE   Result Value Ref Range    POCT Glucose 99 74 - 99 mg/dL        CT angio chest for pulmonary embolism   Final Result   1.  No evidence of a pulmonary embolus.   2.  Large right pleural effusion.  A tunneled pleural drainage   catheter is present.   3.  Incidental mass in the right upper lobe of the lung, indicative of   carcinoma.   4.  Multiple satellite nodules seen throughout the right lung,   indicative of metastatic disease.   5.  Right hilar and mediastinal adenopathy.   6.  Coronary artery calcifications.   Signed by Bola Rodriguez MD      Transthoracic Echo (TTE) Complete    (Results Pending)   US thoracentesis    (Results Pending)       Scheduled medications  cefTRIAXone, 2 g, intravenous, q24h  heparin (porcine), 5,000 Units, subcutaneous, q8h  insulin lispro, 0-10 Units, subcutaneous, Before meals & nightly  pantoprazole, 40 mg, oral, Daily before breakfast   Or  pantoprazole, 40 mg, intravenous, Daily before breakfast  perflutren protein A microsphere, 0.5 mL, intravenous, Once in imaging  polyethylene glycol, 17 g, oral, Daily  rosuvastatin, 40 mg, oral, Daily      Continuous medications     PRN medications  PRN medications: acetaminophen, albuterol, bisacodyl, bisacodyl, dextrose, dextrose, glucagon, glucagon, guaiFENesin, melatonin, ondansetron **OR** ondansetron         Assessment/Plan                  Assessment & Plan  Pleural effusion    74 y.o. male with PMHx s/f HTN, DLD, T2DM, asthma, NSCLC - [id number] (current sites: RUL, R pleural effusion/pleural carcinomatosis, R hilar, mediastinal, retrocaval nodes, brain), recurrent malignant pleural effusion s/p PleurX (getting drained at home), presenting with right-sided chest pain and associated SOB.      Recurrent R pleural effusion   Leukocytosis   Stage IV non-small cell lung cancer   -Patient does not technically meet sepsis  criteria. He is immunocompromised and has elevated WBCs however   -Will follow blood cultures.   -IR consult appreciated for thora and send fluid for diagnostics   -Continue ceftriaxone/doxycycline for now   -ID, heme-onc consults appreciated   1/22: Right thoracentesis today. Pleural fluid studies ordered. Oncology and ID recs appreciated. Less likely infectious process per ID. DC doxycycline. Continue ceftriaxone     Chest pain  -Likely related to the above, however does have coronary artery calcifications on CT   -No evidence of PE on imaging   -HSTI 4-5 in the ED  -EKG ordered, pending   -Tele monitoring. Check echo out of abundance of caution     Metastatic NSCLC   -current sites: RUL, R pleural effusion/pleural carcinomatosis, R hilar, mediastinal, retrocaval nodes, brain  -Continue current management as above   -Continue supportive care   -Pall care consult for symptom support appreciated      HTN, DLD   -Continue home therapies      DM-II   -Hold home oral meds for now   -Continue with SSI ACHS   -Accucheks, hypoglycemic protocol   -Monitor and adjust as needed       Asthma without acute exacerbation   -Continue home therapies      Diet: Carb controlled, NPO @ MN  DVT Prophylaxis: SCDs, SQH  Code Status: Full Code               Raheem Olivier MD

## 2025-01-22 NOTE — PROGRESS NOTES
Physical Therapy SCREEN                 Therapy Communication Note    Patient Name: Jayant Holland  MRN: [id number]  Department: [facility] 2 E  Room: [address] [place], [place] [address]  Today's Date: 1/22/2025     Discipline: Physical Therapy SCREEN/DC      Comment: pt denies need for Skilled PT services, states he has no difficulty with mobility. PT discussed with Mobility Aide who will promote amb/mobility while pt admitted.

## 2025-01-22 NOTE — CONSULTS
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 male with PMHx s/f HTN, DLD, T2DM, asthma, NSCLC - [id number] (current sites: RUL, R pleural effusion/pleural carcinomatosis, R hilar, mediastinal, retrocaval nodes, brain), recurrent malignant pleural effusion s/p PleurX (getting drained at home), presenting with right-sided chest pain and associated SOB. Pt reports that he currently has a PleurX in place for getting fluid drained at home 2/2 recurrent malignant effusion on the R; most recently had 1L fluid off day before admission (Monday, 01/20/25) at home through [facility]. He admits to occasionally getting pain at the catheter site since it was placed, but this morning (01/21/25), he had severe pain at the site that actually radiated toward the center of his chest. This was associated with increased SOB from his baseline. His wife called EMS 2/2 being hunched over in severe pain when this happened. He has since had improvement in these symptoms. He denies any known cardiac history. Denies palpitations, diaphoresis, dizziness / lightheadedness, syncope or near syncope, HA, vision changes, f/c/n/v/d/abd pain. He was scheduled to start his combination chemotherapy tomorrow (01/22/2025). Of note, oncology was contacted by the ED and they recommended admission locally, trending blood cultures, and consideration of repeat thora on the R and sending for cultures.      ED Course (Summary - please note all labs, imaging studies, and interventions noted below have been personally reviewed and/or interpreted on day of admission):   Vitals on presentation: T97.5, /69, HR 75, RR 18, SpO2 98% RA  Labs: CBC with WBC 19.0, Hgb 12.5, platelets 271.  CMP with glucose 192, sodium 135, potassium 3.7, BUN 20, serum creatinine 0.95.  Lactate 1.9.  BNP 61.  High-sensitivity troponin 4-5.  PT/INR: 12.3/1.1.  UA negative for infection.  Blood  cultures x 2 collected, in process.  EKG: none completed in ED  Imaging: CTA PE protocol with no evidence for PE, large right pleural effusion; tunneled pleural drainage catheter present, right upper lobe mass with multiple satellite nodules seen throughout the lung, right hilar and mediastinal adenopathy, coronary artery calcifications  Interventions: Vanco/Zosyn, consultation with oncology, admitted to medicine  Patient admitted with recurrent right pleural effusion status Pleurx catheter placement.    Medical oncology consultation requested, medical record reviewed, patient was scheduled to receive chemotherapy including carboplatin pemetrexed and amivantamab.     Past Medical History  He has a past medical history of Arthritis (20+ years), Asthma (20+ years), Cervical disc disorder, Diabetes mellitus (Multi) (20+ years), Exposure to potentially hazardous substance (12/06/2024), GERD (gastroesophageal reflux disease) (20+ years), and Hypertension (20+ years).    Surgical History  He has a past surgical history that includes Knee surgery (04/28/2015); Colonoscopy (04/28/2015); and Knee Arthroplasty (20 + years).     Social History  He reports that he has never smoked. He has never used smokeless tobacco. He reports current alcohol use of about 5.0 standard drinks of alcohol per week. He reports that he does not use drugs.    Family History  Family History   Problem Relation Name Age of Onset    Leukemia Mother Annette Holland     Memory loss Mother Annette Holland     Cancer Mother Annette Holland     Heart attack Father          Allergies  Patient has no known allergies.    Review of Systems  Review of system positive for weakness, chronic shortness of breath sometimes substernal discomfort  ECOG 2  Physical Exam  Vitals are stable  Neck supple  Lungs examination did show decreased breath on the right side status post right Pleurx catheter placement    Heart with normal regular rhythm    Abdomen is soft, nontender no  "hepatosplenomegaly    Neuro examination nonfocal     Last Recorded Vitals  Blood pressure 109/69, pulse 67, temperature 36.2 °C (97.1 °F), resp. rate 16, height 1.854 m (6' 1\"), weight 86.2 kg (190 lb), SpO2 96%.    Relevant Results  CT chest angio,1.  No evidence of a pulmonary embolus.  2.  Large right pleural effusion.  A tunneled pleural drainage  catheter is present.  3.  Incidental mass in the right upper lobe of the lung, indicative of  carcinoma.  4.  Multiple satellite nodules seen throughout the right lung,  indicative of metastatic disease.  5.  Right hilar and mediastinal adenopathy.  6.  Coronary artery calcifications.     Assessment/Plan     1 . 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  Recurrent right pleural effusion    Right thoracentesis today pending patient is stable patient can be discharged we will start chemotherapy including carboplatin pemetrexed and amivantamab as outpatient.    Continue supportive care, discussed with patient    I spent 45 minutes in the professional and overall care of this patient.      Aaron Malloy MD    "

## 2025-01-22 NOTE — CARE PLAN
The patient's goals for the shift include  rest    The clinical goals for the shift include patient will be free of falls and injury this shift    Problem: Pain - Adult  Goal: Verbalizes/displays adequate comfort level or baseline comfort level  Outcome: Progressing     Problem: Safety - Adult  Goal: Free from fall injury  Outcome: Progressing     Problem: Discharge Planning  Goal: Discharge to home or other facility with appropriate resources  Outcome: Progressing     Problem: Chronic Conditions and Co-morbidities  Goal: Patient's chronic conditions and co-morbidity symptoms are monitored and maintained or improved  Outcome: Progressing     Problem: Diabetes  Goal: Achieve decreasing blood glucose levels by end of shift  Outcome: Progressing  Goal: Increase stability of blood glucose readings by end of shift  Outcome: Progressing  Goal: Decrease in ketones present in urine by end of shift  Outcome: Progressing  Goal: Maintain electrolyte levels within acceptable range throughout shift  Outcome: Progressing  Goal: Maintain glucose levels >70mg/dl to <250mg/dl throughout shift  Outcome: Progressing  Goal: No changes in neurological exam by end of shift  Outcome: Progressing  Goal: Learn about and adhere to nutrition recommendations by end of shift  Outcome: Progressing  Goal: Vital signs within normal range for age by end of shift  Outcome: Progressing  Goal: Increase self care and/or family involovement by end of shift  Outcome: Progressing  Goal: Receive DSME education by end of shift  Outcome: Progressing

## 2025-01-22 NOTE — PROGRESS NOTES
Social work consult placed for discharge planning. SW reviewed pt's chart and communicated with TCC. No SW needs foreseen at this time. SW signing off; available upon request.    Rogelio Gamino, MSW, LSW ([id number])   Care Transitions

## 2025-01-22 NOTE — CONSULTS
Infectious Disease Inpatient Consult    Inpatient consult to Infectious Diseases  Consult performed by: Kevin Fatima MD  Consult ordered by: Rosie River PA-C        Primary MD: FORTINO Garland-CNP    Reason For Consult  R pleural effusion      Assessment/Plan:    #Recurrent right pleural effusion, likely malignant  NSCLC.  Plan was made to start chemotherapy today but patient ended up in the hospital.  States since the catheter insertion, he has pain but yesterday the pain was severe.  No signs or symptoms of infection    #Leukocytosis, likely reactive  Less likely infectious process as patient is clinically stable.  Pending thoracentesis at this time.  Will DC doxycycline and continue ceftriaxone pending thoracentesis results    DM, HTN  GERD  NSCLC    Recommendations:    -Continue ceftriaxone 2 g every 24 hours  -DC doxycycline  -Monitor blood cultures  -Pending thoracentesis, body fluid cell count, culture, pH  -Thank you for consult.  Will continue to follow    Kevin Fatima MD  Date of service: 1/22/2025  Time of service: 8:47 AM      History Of Present Illness  Jayant Holland is a 74 y.o. male with PMHx of asthma, DM, GERD, HTN, NSCLC presented to the hospital after pain around the Pleurx catheter.  Patient states that he has been getting about 1 L of fluid removed every other day by home health.  Yesterday, he started having severe sharp pain 10/10 around the Pleurx catheter for which she came to the hospital for further evaluation.  Denies any cough, fever, chills.    On admission, vital stable.  Labs showed WBC count 19, Hb 12.5, potassium 3.7, creatinine 0.9, AST/ALT normal.  Blood cultures were drawn and patient started on ceftriaxone and doxycycline.  CT chest shows right pleural effusion, right upper lobe mass, multiple satellite nodules throughout the right lung indicated of metastatic disease.  ID consulted for further antibiotic recommendation     Past Medical History  He has a  past medical history of Arthritis (20+ years), Asthma (20+ years), Cervical disc disorder, Diabetes mellitus (Multi) (20+ years), Exposure to potentially hazardous substance (12/06/2024), GERD (gastroesophageal reflux disease) (20+ years), and Hypertension (20+ years).    Surgical History  He has a past surgical history that includes Knee surgery (04/28/2015); Colonoscopy (04/28/2015); and Knee Arthroplasty (20 + years).     Social History     Occupational History    Not on file   Tobacco Use    Smoking status: Never    Smokeless tobacco: Never   Substance and Sexual Activity    Alcohol use: Yes     Alcohol/week: 5.0 standard drinks of alcohol     Types: 2 Glasses of wine, 3 Standard drinks or equivalent per week    Drug use: Never    Sexual activity: Yes     Partners: Female     Travel History   Travel since 12/22/24    No documented travel since 12/22/24        Service:  [employer]eper Years Served Period   [employer]       Comments:        Family History  Family History   Problem Relation Name Age of Onset    Leukemia Mother Annette Holland     Memory loss Mother Annette Holland     Cancer Mother Annette Holland     Heart attack Father       Allergies  Patient has no known allergies.     Immunization History   Administered Date(s) Administered    COVID-19, mRNA, LNP-S, PF, 30 mcg/0.3 mL dose 07/30/2021, 08/20/2021    Influenza, Unspecified 11/13/2019    Influenza, seasonal, injectable 11/13/2019    Tdap vaccine, age 7 year and older (BOOSTRIX, ADACEL) 04/28/2010     Medications  Home medications:  Medications Prior to Admission   Medication Sig Dispense Refill Last Dose/Taking    dexAMETHasone (Decadron) 4 mg tablet Beginning with Cycle 2, take 1 tablet (4 mg) by mouth twice daily the day before PEMEtrexed treatment and twice daily the day after PEMEtrexed treatment. 4 tablet 11 1/21/2025    albuterol 90 mcg/actuation inhaler INHALE 2 PUFFS EVERY 4 HOURS IF NEEDED FOR WHEEZING OR SHORTNESS OF BREATH. 18 g 3     Blood glucose  monitoring meter kit kit 1 each if needed.       blood sugar diagnostic (Accu-Chek Jessica Plus test strp) strip 2 times a day.       blood sugar diagnostic (Blood Glucose Test) strip 1 strip once daily. 100 strip 3     blood sugar diagnostic (OneTouch Verio test strips) strip 1 strip once daily. 100 strip 3     blood-glucose meter misc 1 Device once daily. 1 each 0     chlorthalidone (Hygroton) 25 mg tablet Take 1 tablet (25 mg) by mouth once daily. (Patient not taking: Reported on 1/10/2025) 90 tablet 3     clindamycin (Cleocin T) 1 % lotion Apply topically to affected area twice daily. (Patient not taking: Reported on 1/10/2025) 60 mL 3     cyanocobalamin (Vitamin B-12) 1,000 mcg tablet TAKE 1 TABLET BY MOUTH EVERY DAY AS DIRECTED (Patient not taking: Reported on 1/10/2025) 90 tablet 3     dexAMETHasone (Decadron) 4 mg tablet Take 2 tablets (8 mg) by mouth 2 times a day for 5 doses. Start 2 days prior to the first dose of Amivantamab on Cycle 1 only. (Patient not taking: Reported on 1/10/2025) 10 tablet 0     doxycycline (Vibramycin) 100 mg capsule Take 1 capsule (100 mg) by mouth 2 times a day. For rash prevention. Take with at least 8 ounces (large glass) of water, do not lie down for 30 minutes after (Patient not taking: Reported on 1/10/2025) 56 capsule 3     folic acid (Folvite) 1 mg tablet Take 1 tablet (1,000 mcg) by mouth once daily. (Patient not taking: Reported on 1/10/2025) 30 tablet 11     hydrocortisone 1 % cream Apply topically to face, hands, feet, neck, back, and chest once daily at bedtime for rash prevention. (Patient not taking: Reported on 1/10/2025) 453.6 g 3     lancets 30 gauge misc 1 Device 3 times a day. 200 each 3     lisinopril 40 mg tablet TAKE 1 TABLET BY MOUTH EVERY DAY 90 tablet 3     meloxicam (Mobic) 15 mg tablet Take 1 tablet (15 mg) by mouth once daily. 90 tablet 3     metFORMIN  mg 24 hr tablet Take 2 tablets (1,000 mg) by mouth 2 times a day. Do not crush, chew, or split.  360 tablet 3     OLANZapine (ZyPREXA) 5 mg tablet Take 1 tablet (5 mg) by mouth once daily at bedtime. For 4 days starting the evening of treatment (Patient not taking: Reported on 1/10/2025) 16 tablet 0     omeprazole (PriLOSEC) 20 mg DR capsule TAKE 1 CAPSULE BY MOUTH EVERY DAY 90 capsule 3     ondansetron (Zofran) 8 mg tablet Take 1 tablet (8 mg) by mouth every 8 hours if needed for nausea or vomiting. (Patient not taking: Reported on 1/10/2025) 30 tablet 5     prochlorperazine (Compazine) 10 mg tablet Take 1 tablet (10 mg) by mouth every 6 hours if needed for nausea or vomiting. (Patient not taking: Reported on 1/10/2025) 30 tablet 5     rosuvastatin (Crestor) 40 mg tablet TAKE 1 TABLET BY MOUTH EVERY DAY 90 tablet 3     sennosides (Senokot) 8.6 mg tablet Take 2 tablets (17.2 mg) by mouth as needed at bedtime for constipation. (Patient not taking: Reported on 1/10/2025) 30 tablet 11     sildenafil (Viagra) 100 mg tablet Take by mouth. TAKE 1 TABLET AS NEEDED APPROXIMATELY 1 HOUR BEFORE SEXUAL ACTIVITY        Current medications:  Scheduled medications  cefTRIAXone, 2 g, intravenous, q24h  doxycycline, 100 mg, intravenous, q12h  heparin (porcine), 5,000 Units, subcutaneous, q8h  insulin lispro, 0-10 Units, subcutaneous, Before meals & nightly  pantoprazole, 40 mg, oral, Daily before breakfast   Or  pantoprazole, 40 mg, intravenous, Daily before breakfast  perflutren protein A microsphere, 0.5 mL, intravenous, Once in imaging  polyethylene glycol, 17 g, oral, Daily  rosuvastatin, 40 mg, oral, Daily      Continuous medications  lactated Ringer's, 75 mL/hr, Last Rate: 75 mL/hr (01/22/25 0353)      PRN medications  PRN medications: acetaminophen, albuterol, bisacodyl, bisacodyl, dextrose, dextrose, glucagon, glucagon, guaiFENesin, melatonin, ondansetron **OR** ondansetron    Review of Systems     Constitutional:  Denies appetite change, chills, fatigue, fever.  HENT:  Denies ear discharge, ear pain, sore throat   "  Eyes:  Denies photophobia, eye drainage  Respiratory: Reports improved intermittent cough  Cardiovascular:  Denies chest pain, palpitations  Gastrointestinal:  Denies abdominal pain, diarrhea, nausea and vomiting.   Genitourinary:  Denies dysuria, flank pain, frequency  Musculoskeletal:  Denies joint pain  Skin:  Denies ulcer and rash   Neurological:  Denies light-headedness, numbness and headaches.    Objective  Range of Vitals (last 24 hours)  Heart Rate:  [68-90]   Temp:  [36.3 °C (97.4 °F)-36.4 °C (97.6 °F)]   Resp:  [15-18]   BP: ()/(63-93)   Height:  [185.4 cm (6' 1\")]   Weight:  [86.2 kg (190 lb)]   SpO2:  [96 %-100 %]   Daily Weight  25 : 86.2 kg (190 lb)    Body mass index is 25.07 kg/m².     Physical Exam  /65 (BP Location: Right arm, Patient Position: Lying)   Pulse 68   Temp 36.4 °C (97.6 °F) (Temporal)   Resp 16   Ht 1.854 m (6' 1\")   Wt 86.2 kg (190 lb)   SpO2 97%   BMI 25.07 kg/m²   Temp (24hrs), Av.4 °C (97.5 °F), Min:36.3 °C (97.4 °F), Max:36.4 °C (97.6 °F)    General: alert, oriented, NAD  HEENT: No conjunctival pallor, no scleral icterus  Neck: No LAD, No JVD  Lungs: Absent breath sounds right lower side, no wheezing  Heart: regular rate and rhythm, no murmur  Abdomen: soft, non tender, non distended, BS+, right upper quadrant Pleurx catheter  Neuro: AAO x 3, PERRL, CN grossly intact  Extremities: no edema, no cyanosis  Skin: No rashes or ulcers  MSK: No joint inflammation     Relevant Results    Labs  Results from last 72 hours   Lab Units 25  0606 25  1515   WBC AUTO x10*3/uL 16.6* 19.0*   HEMOGLOBIN g/dL 11.1* 12.5*   HEMATOCRIT % 35.4* 39.0*   PLATELETS AUTO x10*3/uL 235 271   NEUTROS PCT AUTO % 84.5 93.7   LYMPHS PCT AUTO % 6.2 3.8   MONOS PCT AUTO % 5.7 1.8   EOS PCT AUTO % 2.7 0.1     Results from last 72 hours   Lab Units 25  0606 25  1515   SODIUM mmol/L 135* 135*   POTASSIUM mmol/L 3.7 3.7   CHLORIDE mmol/L 101 99   CO2 mmol/L 27 28 " "  BUN mg/dL 19 20   CREATININE mg/dL 0.95 0.95   GLUCOSE mg/dL 158* 192*   CALCIUM mg/dL 8.7 9.4   ANION GAP mmol/L 11 12   EGFR mL/min/1.73m*2 84 84     Results from last 72 hours   Lab Units 01/22/25  0606 01/21/25  1515   ALK PHOS U/L 56 65   BILIRUBIN TOTAL mg/dL 0.4 0.6   PROTEIN TOTAL g/dL 5.7* 7.2   ALT U/L 12 10   AST U/L 15 13   ALBUMIN g/dL 3.3* 4.0     Estimated Creatinine Clearance: 77.1 mL/min (by C-G formula based on SCr of 0.95 mg/dL).  No results found for: \"CRP\", \"SEDRATE\"  No results found for: \"HIV1X2\", \"HIVCONF\", \"AZGBBU3WI\"  Hepatitis C Ab   Date Value Ref Range Status   12/13/2018 NON-REACTIVE NONREACTIVE Final     Comment:      Patients receiving more than 5 mg/day of biotin may have interference   in test results.  A sample should be taken no sooner than eight hours   after  previous dose. Contact [phone] for additional information.          Microbiology  No results found for the last 14 days.      Imaging  CT angio chest for pulmonary embolism    Result Date: 1/21/2025  1.  No evidence of a pulmonary embolus. 2.  Large right pleural effusion.  A tunneled pleural drainage catheter is present. 3.  Incidental mass in the right upper lobe of the lung, indicative of carcinoma. 4.  Multiple satellite nodules seen throughout the right lung, indicative of metastatic disease. 5.  Right hilar and mediastinal adenopathy. 6.  Coronary artery calcifications. Signed by Bola Rodriguez MD    MR brain w and wo IV contrast    Result Date: 1/15/2025  8 x 5 mm metastatic lesion in the dorsal left superior frontal gyrus and 5 x 4 mm lesion in the left precuneus. No associated hemorrhage or mass effect.   No acute infarct. Moderate microangiopathic disease and mild diffuse parenchymal volume loss.   Signed by: Yulia Kolb 1/15/2025 12:51 PM Dictation workstation:   [id number]    XR chest 1 view    Result Date: 1/11/2025  1. Interval placement of right pleural catheter/chest tube. No pneumothorax. 2. " Similar large right pleural effusion with increased right basilar subsegmental atelectasis.   I personally reviewed the images/study and I agree with the findings as stated by Miryam Su MD (PGY-2). This study was interpreted at [facility], [place], [place].   MACRO: None   Signed by: Viral Salcido 1/11/2025 2:28 PM Dictation workstation:   [id number]

## 2025-01-22 NOTE — PROGRESS NOTES
Emergency Medicine Transition of Care Note.    I received Jayant Holland in signout from Dr. Pearce.  Please see the previous ED provider note for all HPI, PE and MDM up to the time of signout at 1700. This is in addition to the primary record.    In brief Jayant Holland is an 74 y.o. male presenting for   Chief Complaint   Patient presents with    Chest Pain     PT has a catheter in his right chest to drain fluid from the lung area. PT has stage 4 lung cancer which has also spread to the brain. PT starts treatment tomorrow morning.  PT was doubled over in pain when he called EMS for right sided chest pain.     At the time of signout we were awaiting: Labs, CT, disposition    ED Course as of 01/21/25 2119 Tue Jan 21, 2025   1602 WBC(!): 19.0  Patient will have sepsis orders placed at this time. [CJ]   1927 Spoke to the on-call oncologist Dr. Marcus.  We discussed the patient's plan of care.  She does request that we bring the patient in here for observation.  She states that she would like to have him observed to ensure that his blood cultures are negative and she recommends potentially culturing pleural fluid as well. [CJ]   1936 Updated patient on the plan of care.  He is in agreement with admission to the hospital for observation. [CJ]   2045 Patient signed out to attending at shift change pending discussion with hospitalist for admission. [CJ]      ED Course User Index  [CJ] Elbert Nieves PA-C         Diagnoses as of 01/21/25 2119   Pleural effusion   Leukocytosis, unspecified type       Medical Decision Making  Patient was signed out to me from the outgoing team pending labs, CAT scan, and disposition.  Labs demonstrate a leukocytosis and the patient was covered with antibiotics.  Does not meet sepsis criteria as the patient is afebrile, not tachycardic, not tachypneic, and the patient's lactate is within normal limits.  CT scan demonstrates a large right-sided pleural effusion with an incidental mass in the right  upper lobe of the lung indicative of carcinoma multiple satellite nodules noted.  Advanced practice provider spoke with oncology who are recommending bringing the patient in here at [facility] for observation, trending of blood cultures, and draining of the pleural fluid.  Patient was admitted to medicine here at [facility] for continued management and treatment.    Final diagnoses:   [J90] Pleural effusion   [D72.829] Leukocytosis, unspecified type           Procedure  Procedures    Hitesh Patiño MD

## 2025-01-22 NOTE — PROGRESS NOTES
01/22/25 1520   Discharge Planning   Living Arrangements Spouse/significant other   Support Systems Spouse/significant other   Type of Residence Private residence   Type of Home Care Services Home nursing visits   Expected Discharge Disposition Home Health   Patient Choice   Patient / Family choosing to utilize agency / facility established prior to hospitalization Yes   Stroke Family Assessment   Stroke Family Assessment Needed No   Intensity of Service   Intensity of Service 0-30 min     Pt lives at home with wife. Pt confirms PCP as Charbel Oconnor. Pt denies any ambulation assistive device usage at home. Pt confirms that he still drives himself to and from appointments. Pt states that he was utilizing [facility] prior to admission and would like to return to their services at discharge. PT/OT pending.  TCC following.

## 2025-01-23 ENCOUNTER — APPOINTMENT (OUTPATIENT)
Dept: HEMATOLOGY/ONCOLOGY | Facility: HOSPITAL | Age: 75
End: 2025-01-23
Payer: MEDICARE

## 2025-01-23 VITALS
TEMPERATURE: 97.5 F | HEIGHT: 73 IN | OXYGEN SATURATION: 94 % | DIASTOLIC BLOOD PRESSURE: 70 MMHG | WEIGHT: 190 LBS | HEART RATE: 75 BPM | SYSTOLIC BLOOD PRESSURE: 112 MMHG | RESPIRATION RATE: 16 BRPM | BODY MASS INDEX: 25.18 KG/M2

## 2025-01-23 LAB
ANION GAP SERPL CALC-SCNC: 8 MMOL/L (ref 10–20)
ATRIAL RATE: 39 BPM
BUN SERPL-MCNC: 20 MG/DL (ref 6–23)
CALCIUM SERPL-MCNC: 8.4 MG/DL (ref 8.6–10.3)
CHLORIDE SERPL-SCNC: 102 MMOL/L (ref 98–107)
CO2 SERPL-SCNC: 29 MMOL/L (ref 21–32)
CREAT SERPL-MCNC: 1.07 MG/DL (ref 0.5–1.3)
EGFRCR SERPLBLD CKD-EPI 2021: 73 ML/MIN/1.73M*2
ERYTHROCYTE [DISTWIDTH] IN BLOOD BY AUTOMATED COUNT: 13.6 % (ref 11.5–14.5)
GLUCOSE BLD MANUAL STRIP-MCNC: 119 MG/DL (ref 74–99)
GLUCOSE BLD MANUAL STRIP-MCNC: 169 MG/DL (ref 74–99)
GLUCOSE SERPL-MCNC: 126 MG/DL (ref 74–99)
HCT VFR BLD AUTO: 36.5 % (ref 41–52)
HGB BLD-MCNC: 11.3 G/DL (ref 13.5–17.5)
MCH RBC QN AUTO: 27 PG (ref 26–34)
MCHC RBC AUTO-ENTMCNC: 31 G/DL (ref 32–36)
MCV RBC AUTO: 87 FL (ref 80–100)
NRBC BLD-RTO: 0 /100 WBCS (ref 0–0)
P AXIS: 29 DEGREES
PATH REVIEW-CELL CT,FLUID: NORMAL
PLATELET # BLD AUTO: 229 X10*3/UL (ref 150–450)
POTASSIUM SERPL-SCNC: 3.8 MMOL/L (ref 3.5–5.3)
PR INTERVAL: 187 MS
Q ONSET: 251 MS
QRS COUNT: 12 BEATS
QRS DURATION: 94 MS
QT INTERVAL: 402 MS
QTC CALCULATION(BAZETT): 481 MS
QTC FREDERICIA: 453 MS
R AXIS: 123 DEGREES
RBC # BLD AUTO: 4.18 X10*6/UL (ref 4.5–5.9)
SODIUM SERPL-SCNC: 135 MMOL/L (ref 136–145)
T AXIS: 20 DEGREES
T OFFSET: 452 MS
VENTRICULAR RATE: 86 BPM
WBC # BLD AUTO: 8.5 X10*3/UL (ref 4.4–11.3)

## 2025-01-23 PROCEDURE — 99231 SBSQ HOSP IP/OBS SF/LOW 25: CPT | Performed by: INTERNAL MEDICINE

## 2025-01-23 PROCEDURE — 82947 ASSAY GLUCOSE BLOOD QUANT: CPT

## 2025-01-23 PROCEDURE — 2500000002 HC RX 250 W HCPCS SELF ADMINISTERED DRUGS (ALT 637 FOR MEDICARE OP, ALT 636 FOR OP/ED): Performed by: STUDENT IN AN ORGANIZED HEALTH CARE EDUCATION/TRAINING PROGRAM

## 2025-01-23 PROCEDURE — 85027 COMPLETE CBC AUTOMATED: CPT | Performed by: INTERNAL MEDICINE

## 2025-01-23 PROCEDURE — 36415 COLL VENOUS BLD VENIPUNCTURE: CPT | Performed by: INTERNAL MEDICINE

## 2025-01-23 PROCEDURE — 80048 BASIC METABOLIC PNL TOTAL CA: CPT | Performed by: INTERNAL MEDICINE

## 2025-01-23 PROCEDURE — 2500000001 HC RX 250 WO HCPCS SELF ADMINISTERED DRUGS (ALT 637 FOR MEDICARE OP): Performed by: STUDENT IN AN ORGANIZED HEALTH CARE EDUCATION/TRAINING PROGRAM

## 2025-01-23 PROCEDURE — 99239 HOSP IP/OBS DSCHRG MGMT >30: CPT | Performed by: INTERNAL MEDICINE

## 2025-01-23 PROCEDURE — 2500000004 HC RX 250 GENERAL PHARMACY W/ HCPCS (ALT 636 FOR OP/ED): Performed by: STUDENT IN AN ORGANIZED HEALTH CARE EDUCATION/TRAINING PROGRAM

## 2025-01-23 RX ORDER — DOXYCYCLINE 100 MG/1
100 CAPSULE ORAL 2 TIMES DAILY
Qty: 10 CAPSULE | Refills: 0 | Status: CANCELLED | OUTPATIENT
Start: 2025-01-23 | End: 2025-01-28

## 2025-01-23 RX ORDER — DOXYCYCLINE 100 MG/1
100 CAPSULE ORAL 2 TIMES DAILY
Qty: 10 CAPSULE | Refills: 0 | Status: SHIPPED | OUTPATIENT
Start: 2025-01-23 | End: 2025-01-28

## 2025-01-23 RX ADMIN — CEFTRIAXONE 2 G: 2 INJECTION, POWDER, FOR SOLUTION INTRAMUSCULAR; INTRAVENOUS at 09:46

## 2025-01-23 RX ADMIN — ROSUVASTATIN 40 MG: 20 TABLET, FILM COATED ORAL at 09:46

## 2025-01-23 RX ADMIN — PANTOPRAZOLE SODIUM 40 MG: 40 TABLET, DELAYED RELEASE ORAL at 05:07

## 2025-01-23 ASSESSMENT — COGNITIVE AND FUNCTIONAL STATUS - GENERAL
DAILY ACTIVITIY SCORE: 24
MOBILITY SCORE: 24

## 2025-01-23 ASSESSMENT — PAIN - FUNCTIONAL ASSESSMENT: PAIN_FUNCTIONAL_ASSESSMENT: 0-10

## 2025-01-23 ASSESSMENT — PAIN SCALES - GENERAL: PAINLEVEL_OUTOF10: 0 - NO PAIN

## 2025-01-23 NOTE — PROGRESS NOTES
Jayant Holland is a 74 y.o. male on day 2 of admission presenting with Pleural effusion.      Assessment/Plan:     #Recurrent right pleural effusion, likely malignant s/p thora  NSCLC.  Plan was made to start chemotherapy today but patient ended up in the hospital.  States since the catheter insertion, he has pain but yesterday the pain was severe.  No signs or symptoms of infection.  Thoracentesis shows pH 7.7, cloudy fluid with 3000 WBCs.  pH 7.7, pending glucose fluid analysis concerning for exudative effusion, likely due to malignancy.     #Leukocytosis, likely reactive  Less likely infectious process as patient is clinically stable.  Pending thoracentesis at this time.  Will DC doxycycline and continue ceftriaxone pending thoracentesis results.  WBC count back to normal     DM, HTN  GERD  NSCLC     Recommendations:     -DC ceftriaxone  -Monitor blood cultures  -Monitor off antibiotics  -Patient can be discharge from ID standpoint    Kevin Fatima MD  Date of service: 1/23/2025  Time of service: 1:47 PM      Subjective   Interval History: No acute events overnight.  Patient denies any new complaint.        Review of Systems  Denies: fever, chills, nausea, vomiting, diarrhea, dysuria    Objective   Range of Vitals (last 24 hours)  Heart Rate:  [66-91]   Temp:  [36.4 °C (97.5 °F)-37.4 °C (99.4 °F)]   Resp:  [16-17]   BP: (100-116)/(63-71)   SpO2:  [94 %-96 %]  Daily Weight  01/21/25 : 86.2 kg (190 lb)   Body mass index is 25.07 kg/m².      General: alert, oriented, NAD  Lungs: Decreased breath sounds right lower side, no wheezing  Heart: regular rate and rhythm, no murmur  Abdomen: soft, non tender, non distended, BS+, right upper quadrant Pleurx catheter  Neuro: AAO x 3, PERRL, CN grossly intact  Extremities: no edema, no cyanosis  Skin: No rashes or ulcers  MSK: No joint inflammation    Antibiotics  cefTRIAXone - 2 gram/50 mL  clindamycin - 1 %  doxycycline - 100 mg      Relevant Results  Labs  Results from last 72  "hours   Lab Units 01/23/25  0525 01/22/25  0606 01/21/25  1515   WBC AUTO x10*3/uL 8.5 16.6* 19.0*   HEMOGLOBIN g/dL 11.3* 11.1* 12.5*   HEMATOCRIT % 36.5* 35.4* 39.0*   PLATELETS AUTO x10*3/uL 229 235 271   NEUTROS PCT AUTO %  --  84.5 93.7   LYMPHS PCT AUTO %  --  6.2 3.8   MONOS PCT AUTO %  --  5.7 1.8   EOS PCT AUTO %  --  2.7 0.1     Results from last 72 hours   Lab Units 01/23/25  0525 01/22/25  0606 01/21/25  1515   SODIUM mmol/L 135* 135* 135*   POTASSIUM mmol/L 3.8 3.7 3.7   CHLORIDE mmol/L 102 101 99   CO2 mmol/L 29 27 28   BUN mg/dL 20 19 20   CREATININE mg/dL 1.07 0.95 0.95   GLUCOSE mg/dL 126* 158* 192*   CALCIUM mg/dL 8.4* 8.7 9.4   ANION GAP mmol/L 8* 11 12   EGFR mL/min/1.73m*2 73 84 84     Results from last 72 hours   Lab Units 01/22/25  0606 01/21/25  1515   ALK PHOS U/L 56 65   BILIRUBIN TOTAL mg/dL 0.4 0.6   PROTEIN TOTAL g/dL 6.0*  5.7* 7.2   ALT U/L 12 10   AST U/L 15 13   ALBUMIN g/dL 3.3* 4.0     Estimated Creatinine Clearance: 68.5 mL/min (by C-G formula based on SCr of 1.07 mg/dL).  No results found for: \"CRP\"    Microbiology  No results found for the last 14 days.      Imaging    CT angio chest for pulmonary embolism    Result Date: 1/21/2025  1.  No evidence of a pulmonary embolus. 2.  Large right pleural effusion.  A tunneled pleural drainage catheter is present. 3.  Incidental mass in the right upper lobe of the lung, indicative of carcinoma. 4.  Multiple satellite nodules seen throughout the right lung, indicative of metastatic disease. 5.  Right hilar and mediastinal adenopathy. 6.  Coronary artery calcifications. Signed by Bola Rodriguez MD    MR brain w and wo IV contrast    Result Date: 1/15/2025  8 x 5 mm metastatic lesion in the dorsal left superior frontal gyrus and 5 x 4 mm lesion in the left precuneus. No associated hemorrhage or mass effect.   No acute infarct. Moderate microangiopathic disease and mild diffuse parenchymal volume loss.   Signed by: Yulia Kolb 1/15/2025 " 12:51 PM Dictation workstation:   [id number]    XR chest 1 view    Result Date: 1/11/2025  1. Interval placement of right pleural catheter/chest tube. No pneumothorax. 2. Similar large right pleural effusion with increased right basilar subsegmental atelectasis.   I personally reviewed the images/study and I agree with the findings as stated by Miryam Su MD (PGY-2). This study was interpreted at [facility], [place], [place].   MACRO: None   Signed by: Viral Salcido 1/11/2025 2:28 PM Dictation workstation:   [id number]

## 2025-01-23 NOTE — DISCHARGE SUMMARY
Discharge Diagnosis  Recurrent pleural effusion  Stage IV non-small cell lung cancer  Leukocytosis  Chest pain  Hypertension  Hyperlipidemia  Diabetes type 2  Asthma without exacerbation      Issues Requiring Follow-Up      Discharge Meds     Medication List      CHANGE how you take these medications     dexAMETHasone 4 mg tablet; Commonly known as: Decadron; Beginning with   Cycle 2, take 1 tablet (4 mg) by mouth twice daily the day before   PEMEtrexed treatment and twice daily the day after PEMEtrexed treatment.;   What changed: Another medication with the same name was removed. Continue   taking this medication, and follow the directions you see here.   doxycycline 100 mg capsule; Commonly known as: Vibramycin; Take 1   capsule (100 mg) by mouth 2 times a day for 5 days. Take with at least 8   ounces (large glass) of water, do not lie down for 30 minutes after; What   changed: additional instructions     CONTINUE taking these medications     * Accu-Chek Jessica Plus test strp strip; Generic drug: blood sugar   diagnostic   * OneTouch Verio test strips strip; Generic drug: blood sugar   diagnostic; 1 strip once daily.   * Blood Glucose Test strip; Generic drug: blood sugar diagnostic; 1   strip once daily.   albuterol 90 mcg/actuation inhaler; INHALE 2 PUFFS EVERY 4 HOURS IF   NEEDED FOR WHEEZING OR SHORTNESS OF BREATH.   * Blood glucose monitoring meter   * blood-glucose meter misc; 1 Device once daily.   lancets 30 gauge misc; 1 Device 3 times a day.   lisinopril 40 mg tablet; TAKE 1 TABLET BY MOUTH EVERY DAY   meloxicam 15 mg tablet; Commonly known as: Mobic; Take 1 tablet (15 mg)   by mouth once daily.   metFORMIN  mg 24 hr tablet; Commonly known as: Glucophage-XR; Take   2 tablets (1,000 mg) by mouth 2 times a day. Do not crush, chew, or split.   omeprazole 20 mg DR capsule; Commonly known as: PriLOSEC; TAKE 1 CAPSULE   BY MOUTH EVERY DAY   rosuvastatin 40 mg tablet; Commonly known as: Crestor; TAKE 1  TABLET BY   MOUTH EVERY DAY   sildenafil 100 mg tablet; Commonly known as: Viagra  * This list has 5 medication(s) that are the same as other medications   prescribed for you. Read the directions carefully, and ask your doctor or   other care provider to review them with you.     STOP taking these medications     chlorthalidone 25 mg tablet; Commonly known as: Hygroton   clindamycin 1 % lotion; Commonly known as: Cleocin T   cyanocobalamin 1,000 mcg tablet; Commonly known as: Vitamin B-12   folic acid 1 mg tablet; Commonly known as: Folvite   hydrocortisone 1 % cream   OLANZapine 5 mg tablet; Commonly known as: ZyPREXA   ondansetron 8 mg tablet; Commonly known as: Zofran   prochlorperazine 10 mg tablet; Commonly known as: Compazine   sennosides 8.6 mg tablet; Commonly known as: Senokot       Test Results Pending At Discharge  Pending Labs       Order Current Status    Cytology (Non-Gynecologic) In process    Blood Culture Preliminary result    Blood Culture Preliminary result    Sterile Fluid Culture/Smear Preliminary result            Hospital Course  Jayant Holland is a 74 y.o. male with PMHx s/f HTN, DLD, T2DM, asthma, NSCLC - [id number] (current sites: RUL, R pleural effusion/pleural carcinomatosis, R hilar, mediastinal, retrocaval nodes, brain), recurrent malignant pleural effusion s/p PleurX (getting drained at home), presenting with right-sided chest pain and associated SOB. Pt reports that he currently has a PleurX in place for getting fluid drained at home 2/2 recurrent malignant effusion on the R; most recently had 1L fluid off day before admission (Monday, 01/20/25) at home through [facility]. He admits to occasionally getting pain at the catheter site since it was placed, but this morning (01/21/25), he had severe pain at the site that actually radiated toward the center of his chest. This was associated with increased SOB from his baseline. His wife called EMS 2/2 being hunched over in severe pain when this  happened. He has since had improvement in these symptoms. He denies any known cardiac history. Denies palpitations, diaphoresis, dizziness / lightheadedness, syncope or near syncope, HA, vision changes, f/c/n/v/d/abd pain. He was scheduled to start his combination chemotherapy tomorrow (01/22/2025). Of note, oncology was contacted by the ED and they recommended admission locally, trending blood cultures, and consideration of repeat thora on the R and sending for cultures.      ED Course (Summary - please note all labs, imaging studies, and interventions noted below have been personally reviewed and/or interpreted on day of admission):   Vitals on presentation: T97.5, /69, HR 75, RR 18, SpO2 98% RA  Labs: CBC with WBC 19.0, Hgb 12.5, platelets 271.  CMP with glucose 192, sodium 135, potassium 3.7, BUN 20, serum creatinine 0.95.  Lactate 1.9.  BNP 61.  High-sensitivity troponin 4-5.  PT/INR: 12.3/1.1.  UA negative for infection.  Blood cultures x 2 collected, in process.  EKG: none completed in ED  Imaging: CTA PE protocol with no evidence for PE, large right pleural effusion; tunneled pleural drainage catheter present, right upper lobe mass with multiple satellite nodules seen throughout the lung, right hilar and mediastinal adenopathy, coronary artery calcifications  Interventions: Vanco/Zosyn, consultation with oncology, admitted to medicine     1/22: Right thoracentesis today. Pleural fluid studies ordered.   1/23: Patient is status post Pleurx catheter drainage from yesterday of 1.2 L of fluid.  Blood cultures are negative x 1.  Fluid analysis cultures are also negative.  ID recommended p.o. doxycycline on discharge.  He was discharged in stable condition to follow-up as outpatient with primary care physician within 1 week of discharge and with oncologist as scheduled.  He was discharged home with home health care to continue every other day Pleurx catheter drainage.      The discharge process took about 35  minutes.    Pertinent Physical Exam At Time of Discharge  Physical Exam  Constitutional: Pleasant and cooperative. Laying in bed in no acute distress. Conversant.   Head and Neck: Normocephalic, atraumatic. ROM preserved. Trachea midline.   Respiratory: Nonlabored on RA. Diminished breath sounds throughout the R. Chest rise is equal. +PleurX right lower chest  Cardiovascular: RRR. No gross murmur, gallop, or rub. Extremities are warm and well-perfused with good capillary refill (< 3 seconds). No chest wall tenderness.   GI: Abdomen soft, nontender, nondistended. No obvious organomegaly appreciated.   : No CVA tenderness.   MSK: No gross abnormalities appreciated. No limitations to AROM/PROM appreciated.   Extremities: No cyanosis, edema, or clubbing evident. Neurovascularly intact.   Neuro: A&Ox3. CN 2-12 grossly intact. Able to respond to questions appropriately and clearly. No acute focal neurologic deficits appreciated.  Psych: Appropriate mood and behavior.  Outpatient Follow-Up  Future Appointments   Date Time Provider Department Center   1/28/2025  8:00 AM INF 14 [facility]   1/28/2025  8:00 AM FORTINO Kaur-CNP [id number] [facility]   2/4/2025  8:[facility]   2/4/2025  8:00 AM FORTINO Kaur-CNP [id number] [facility]   2/11/2025  9:30 AM FORTINO Kaur-CNP [id number] [facility]   2/11/2025 10:00 AM INF 10 [facility]   3/5/2025  4:00 PM FORTINO Garland-CNP [id number] [facility]   3/31/2025  9:40 AM FORTINO Garland-CNP [id number] [facility]         Lexie Zambrano MD

## 2025-01-23 NOTE — NURSING NOTE
Patient discharged to home. Discharge instructions were reviewed with the patient and his IV access was removed. Patient left with all of his belongings.

## 2025-01-23 NOTE — CARE PLAN
Problem: Pain - Adult  Goal: Verbalizes/displays adequate comfort level or baseline comfort level  Outcome: Progressing     Problem: Safety - Adult  Goal: Free from fall injury  Outcome: Progressing     Problem: Discharge Planning  Goal: Discharge to home or other facility with appropriate resources  Outcome: Progressing     Problem: Chronic Conditions and Co-morbidities  Goal: Patient's chronic conditions and co-morbidity symptoms are monitored and maintained or improved  Outcome: Progressing     Problem: Diabetes  Goal: Achieve decreasing blood glucose levels by end of shift  Outcome: Progressing  Goal: Increase stability of blood glucose readings by end of shift  Outcome: Progressing  Goal: Decrease in ketones present in urine by end of shift  Outcome: Progressing  Goal: Maintain electrolyte levels within acceptable range throughout shift  Outcome: Progressing  Goal: Maintain glucose levels >70mg/dl to <250mg/dl throughout shift  Outcome: Progressing  Goal: No changes in neurological exam by end of shift  Outcome: Progressing  Goal: Learn about and adhere to nutrition recommendations by end of shift  Outcome: Progressing  Goal: Vital signs within normal range for age by end of shift  Outcome: Progressing  Goal: Increase self care and/or family involovement by end of shift  Outcome: Progressing  Goal: Receive DSME education by end of shift  Outcome: Progressing       The patient's goals for the shift include      The clinical goals for the shift include patient will remain hemodynamiclly stable throughout the shift.

## 2025-01-23 NOTE — PROGRESS NOTES
"Jayant Holland is a 74 y.o. male on day 2 of admission presenting with Pleural effusion.  Metastatic non-small cell lung cancer  Subjective   Shortness of breath better after thoracentesis       Objective     Physical Exam  Vitals are stable  Neck supple  Lungs examination did show decreased breath on the right side status post right Pleurx catheter placement     Heart with normal regular rhythm     Abdomen is soft, nontender no hepatosplenomegaly     Neuro examination nonfocal  Last Recorded Vitals  Blood pressure 116/71, pulse 69, temperature 37.2 °C (98.9 °F), resp. rate 16, height 1.854 m (6' 1\"), weight 86.2 kg (190 lb), SpO2 95%.  Intake/Output last 3 Shifts:  I/O last 3 completed shifts:  In: 240 (2.8 mL/kg) [P.O.:240]  Out: 1200 (13.9 mL/kg) [Drains:1200]  Weight: 86.2 kg     Relevant Results                  Assessment/Plan     1 . 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  Recurrent right pleural effusion     Right thoracentesis today pending patient is stable patient can be discharged we will start chemotherapy including carboplatin pemetrexed and amivantamab as outpatient.     Continue supportive care, discussed with patient  I spent 20 minutes in the professional and overall care of this patient.      Aaron Malloy MD      "

## 2025-01-23 NOTE — PROGRESS NOTES
Occupational Therapy                 Therapy Communication Note    Patient Name: Jayant Holland  MRN: [id number]  Department: [facility] 2 E  Room: [address] [place] [address]  Today's Date: 1/23/2025     Discipline: Occupational Therapy    Patient was screened for need of OT services. Chart reviewed. Spoke with pt in his room and observed activity. Pt performed transfers independently. Pt performed functional mobility around room no LOB, distant supervision. Pt denies self care concern and has been SBA for ADLs. No skilled OT needs, pt appears close to baseline. DC

## 2025-01-24 ENCOUNTER — PATIENT OUTREACH (OUTPATIENT)
Dept: PRIMARY CARE | Facility: CLINIC | Age: 75
End: 2025-01-24
Payer: MEDICARE

## 2025-01-24 ENCOUNTER — DOCUMENTATION (OUTPATIENT)
Dept: HOME HEALTH SERVICES | Facility: HOME HEALTH | Age: 75
End: 2025-01-24
Payer: MEDICARE

## 2025-01-24 ENCOUNTER — TELEPHONE (OUTPATIENT)
Dept: HEMATOLOGY/ONCOLOGY | Facility: HOSPITAL | Age: 75
End: 2025-01-24
Payer: MEDICARE

## 2025-01-24 NOTE — PROGRESS NOTES
Discharge Facility: [place]  Discharge Diagnosis:   Recurrent pleural effusion  Stage IV non-small cell lung cancer  Leukocytosis  Chest pain  Hypertension  Hyperlipidemia  Diabetes type 2  Asthma without exacerbation  Admission Date: 21 Jan 25  Discharge Date: 23 Jan 25    PCP Appointment Date: Tasked to office  Specialist Appointment Date: 28 Jan 25 ([facility])  Hospital Encounter and Summary Linked: Yes    See discharge assessment below for further details     Engagement  Call Start Time: 1012 (Spoke with patient) (1/24/2025 10:22 AM)    Medications  Medications reviewed with patient/caregiver?: Yes (1/24/2025 10:22 AM)  Is the patient having any side effects they believe may be caused by any medication additions or changes?: No (1/24/2025 10:22 AM)  Does the patient have all medications ordered at discharge?: Yes (1/24/2025 10:22 AM)  Prescription Comments: pt able to obtain and afford all meds. (1/24/2025 10:22 AM)  Is the patient taking all medications as directed (includes completed medication regime)?: Yes (1/24/2025 10:22 AM)  Medication Comments: No questions on meds. (1/24/2025 10:22 AM)    Appointments  Does the patient have a primary care provider?: Yes (Tasked to office) (1/24/2025 10:22 AM)  Has the patient kept scheduled appointments due by today?: Yes (1/24/2025 10:22 AM)    Self Management  What is the home health agency?: [facility], set to see patient 25 Jan 25 (1/24/2025 10:22 AM)  Has home health visited the patient within 72 hours of discharge?: Call prior to 72 hours (1/24/2025 10:22 AM)  What Durable Medical Equipment (DME) was ordered?: None (1/24/2025 10:22 AM)    Patient Teaching  Does the patient have access to their discharge instructions?: Yes (1/24/2025 10:22 AM)  Care Management Interventions: Reviewed instructions with patient (1/24/2025 10:22 AM)  What is the patient's perception of their health status since discharge?: Improving (1/24/2025 10:22 AM)  Is the patient/caregiver  able to teach back the hierarchy of who to call/visit for symptoms/problems? PCP, Specialist, Home Health nurse, Urgent Care, ED, 911: Yes (1/24/2025 10:22 AM)  Patient/Caregiver Education Comments: None (1/24/2025 10:22 AM)    Wrap Up  Wrap Up Additional Comments: Pt stating he is doing well since his discharge. pt has no questions on medications or discharge instructions at this time. message sent to office for scheduling. pt ok with contact in 2 wks. Home health set to see patient tomorrow (25 Jan 25) (1/24/2025 10:22 AM)  Call End Time: 1022 (1/24/2025 10:22 AM)

## 2025-01-24 NOTE — HH CARE COORDINATION
Home Care received a Referral to Resume Care for Nursing. We have processed the referral for a Resumption of Care on 1/25.     If you have any questions or concerns, please feel free to contact us at [phone]. Follow the prompts, enter your five digit zip code, and you will be directed to your care team on [facility].

## 2025-01-24 NOTE — TELEPHONE ENCOUNTER
RN called Jayant in regards to his upcoming appointments. RN let him know that he will see JUNIOR Rehman NP on 1/28 at 8am and the following day 1/29 he will start treatment. RN informed Jayant that a schedule will be handed to him when he comes in for his clinic appointment. Jyaant was appreciative of the call and had no further questions at this time.

## 2025-01-25 ENCOUNTER — HOME CARE VISIT (OUTPATIENT)
Dept: HOME HEALTH SERVICES | Facility: HOME HEALTH | Age: 75
End: 2025-01-25
Payer: MEDICARE

## 2025-01-25 VITALS
OXYGEN SATURATION: 96 % | DIASTOLIC BLOOD PRESSURE: 58 MMHG | TEMPERATURE: 97.3 F | SYSTOLIC BLOOD PRESSURE: 114 MMHG | HEART RATE: 84 BPM

## 2025-01-25 LAB
BACTERIA FLD CULT: NORMAL
GRAM STN SPEC: NORMAL
GRAM STN SPEC: NORMAL

## 2025-01-25 PROCEDURE — G0299 HHS/HOSPICE OF RN EA 15 MIN: HCPCS | Mod: HHH

## 2025-01-25 ASSESSMENT — ENCOUNTER SYMPTOMS
MUSCLE WEAKNESS: 1
DYSPNEA ACTIVITY LEVEL: AFTER AMBULATING LESS THAN 10 FT
APPETITE LEVEL: POOR
SHORTNESS OF BREATH: 1
CHANGE IN APPETITE: UNCHANGED

## 2025-01-25 ASSESSMENT — ACTIVITIES OF DAILY LIVING (ADL)
ENTERING_EXITING_HOME: SUPERVISION
OASIS_M1830: 01

## 2025-01-26 LAB
BACTERIA BLD CULT: NORMAL
BACTERIA BLD CULT: NORMAL

## 2025-01-27 ENCOUNTER — APPOINTMENT (OUTPATIENT)
Dept: HOME HEALTH SERVICES | Facility: HOME HEALTH | Age: 75
End: 2025-01-27
Payer: MEDICARE

## 2025-01-27 VITALS
DIASTOLIC BLOOD PRESSURE: 62 MMHG | SYSTOLIC BLOOD PRESSURE: 122 MMHG | TEMPERATURE: 98.4 F | OXYGEN SATURATION: 96 % | HEART RATE: 86 BPM | RESPIRATION RATE: 16 BRPM

## 2025-01-27 PROCEDURE — G0300 HHS/HOSPICE OF LPN EA 15 MIN: HCPCS | Mod: HHH

## 2025-01-27 ASSESSMENT — ENCOUNTER SYMPTOMS
LAST BOWEL MOVEMENT: 67231
CHANGE IN APPETITE: UNCHANGED
DENIES PAIN: 1
COUGH: 1
SHORTNESS OF BREATH: 1
APPETITE LEVEL: GOOD

## 2025-01-28 ENCOUNTER — APPOINTMENT (OUTPATIENT)
Dept: HEMATOLOGY/ONCOLOGY | Facility: HOSPITAL | Age: 75
End: 2025-01-28
Payer: MEDICARE

## 2025-01-28 ENCOUNTER — LAB (OUTPATIENT)
Dept: LAB | Facility: HOSPITAL | Age: 75
End: 2025-01-28
Payer: MEDICARE

## 2025-01-28 ENCOUNTER — OFFICE VISIT (OUTPATIENT)
Dept: HEMATOLOGY/ONCOLOGY | Facility: HOSPITAL | Age: 75
End: 2025-01-28
Payer: MEDICARE

## 2025-01-28 VITALS
OXYGEN SATURATION: 97 % | TEMPERATURE: 98.1 F | WEIGHT: 185.3 LBS | HEART RATE: 85 BPM | BODY MASS INDEX: 24.45 KG/M2 | DIASTOLIC BLOOD PRESSURE: 75 MMHG | RESPIRATION RATE: 18 BRPM | SYSTOLIC BLOOD PRESSURE: 127 MMHG

## 2025-01-28 DIAGNOSIS — C80.1 ADENOCARCINOMA (MULTI): Primary | ICD-10-CM

## 2025-01-28 DIAGNOSIS — Z51.11 ENCOUNTER FOR ANTINEOPLASTIC CHEMOTHERAPY: ICD-10-CM

## 2025-01-28 DIAGNOSIS — R79.89 LOW VITAMIN B12 LEVEL: ICD-10-CM

## 2025-01-28 DIAGNOSIS — I10 BENIGN ESSENTIAL HYPERTENSION: ICD-10-CM

## 2025-01-28 DIAGNOSIS — C80.1 ADENOCARCINOMA (MULTI): ICD-10-CM

## 2025-01-28 DIAGNOSIS — E78.5 HYPERLIPIDEMIA, UNSPECIFIED HYPERLIPIDEMIA TYPE: ICD-10-CM

## 2025-01-28 DIAGNOSIS — E11.42 TYPE 2 DIABETES MELLITUS WITH DIABETIC POLYNEUROPATHY, WITHOUT LONG-TERM CURRENT USE OF INSULIN: ICD-10-CM

## 2025-01-28 DIAGNOSIS — E55.9 VITAMIN D DEFICIENCY: ICD-10-CM

## 2025-01-28 LAB
25(OH)D3 SERPL-MCNC: 32 NG/ML (ref 30–100)
ALBUMIN SERPL BCP-MCNC: 3.9 G/DL (ref 3.4–5)
ALP SERPL-CCNC: 68 U/L (ref 33–136)
ALT SERPL W P-5'-P-CCNC: 26 U/L (ref 10–52)
ANION GAP SERPL CALC-SCNC: 11 MMOL/L (ref 10–20)
APPEARANCE UR: CLEAR
AST SERPL W P-5'-P-CCNC: 19 U/L (ref 9–39)
BASOPHILS # BLD AUTO: 0.05 X10*3/UL (ref 0–0.1)
BASOPHILS NFR BLD AUTO: 0.7 %
BILIRUB SERPL-MCNC: 0.6 MG/DL (ref 0–1.2)
BILIRUB UR STRIP.AUTO-MCNC: NEGATIVE MG/DL
BUN SERPL-MCNC: 14 MG/DL (ref 6–23)
CALCIUM SERPL-MCNC: 9.7 MG/DL (ref 8.6–10.6)
CHLORIDE SERPL-SCNC: 100 MMOL/L (ref 98–107)
CHOLEST SERPL-MCNC: 171 MG/DL (ref 0–199)
CHOLESTEROL/HDL RATIO: 3
CO2 SERPL-SCNC: 32 MMOL/L (ref 21–32)
COLOR UR: NORMAL
CREAT SERPL-MCNC: 0.8 MG/DL (ref 0.5–1.3)
CREAT UR-MCNC: 162 MG/DL (ref 20–370)
EGFRCR SERPLBLD CKD-EPI 2021: >90 ML/MIN/1.73M*2
EOSINOPHIL # BLD AUTO: 0.61 X10*3/UL (ref 0–0.4)
EOSINOPHIL NFR BLD AUTO: 8 %
ERYTHROCYTE [DISTWIDTH] IN BLOOD BY AUTOMATED COUNT: 13.7 % (ref 11.5–14.5)
EST. AVERAGE GLUCOSE BLD GHB EST-MCNC: 157 MG/DL
GLUCOSE SERPL-MCNC: 135 MG/DL (ref 74–99)
GLUCOSE UR STRIP.AUTO-MCNC: NORMAL MG/DL
HBA1C MFR BLD: 7.1 %
HBV CORE AB SER QL: NONREACTIVE
HBV SURFACE AB SER-ACNC: <3.1 MIU/ML
HBV SURFACE AG SERPL QL IA: NONREACTIVE
HCT VFR BLD AUTO: 40.3 % (ref 41–52)
HDLC SERPL-MCNC: 56.4 MG/DL
HGB BLD-MCNC: 12.9 G/DL (ref 13.5–17.5)
IMM GRANULOCYTES # BLD AUTO: 0.09 X10*3/UL (ref 0–0.5)
IMM GRANULOCYTES NFR BLD AUTO: 1.2 % (ref 0–0.9)
KETONES UR STRIP.AUTO-MCNC: NEGATIVE MG/DL
LDLC SERPL CALC-MCNC: 83 MG/DL
LEUKOCYTE ESTERASE UR QL STRIP.AUTO: NEGATIVE
LYMPHOCYTES # BLD AUTO: 1.61 X10*3/UL (ref 0.8–3)
LYMPHOCYTES NFR BLD AUTO: 21 %
MCH RBC QN AUTO: 27.5 PG (ref 26–34)
MCHC RBC AUTO-ENTMCNC: 32 G/DL (ref 32–36)
MCV RBC AUTO: 86 FL (ref 80–100)
MICROALBUMIN UR-MCNC: 13.8 MG/L
MICROALBUMIN/CREAT UR: 8.5 UG/MG CREAT
MONOCYTES # BLD AUTO: 0.85 X10*3/UL (ref 0.05–0.8)
MONOCYTES NFR BLD AUTO: 11.1 %
MUCOUS THREADS #/AREA URNS AUTO: NORMAL /LPF
NEUTROPHILS # BLD AUTO: 4.46 X10*3/UL (ref 1.6–5.5)
NEUTROPHILS NFR BLD AUTO: 58 %
NITRITE UR QL STRIP.AUTO: NEGATIVE
NON HDL CHOLESTEROL: 115 MG/DL (ref 0–149)
NRBC BLD-RTO: 0 /100 WBCS (ref 0–0)
PH UR STRIP.AUTO: 6.5 [PH]
PLATELET # BLD AUTO: 245 X10*3/UL (ref 150–450)
POTASSIUM SERPL-SCNC: 4.4 MMOL/L (ref 3.5–5.3)
PROT SERPL-MCNC: 6.8 G/DL (ref 6.4–8.2)
PROT UR STRIP.AUTO-MCNC: NORMAL MG/DL
RBC # BLD AUTO: 4.69 X10*6/UL (ref 4.5–5.9)
RBC # UR STRIP.AUTO: NEGATIVE /UL
RBC #/AREA URNS AUTO: NORMAL /HPF
SODIUM SERPL-SCNC: 139 MMOL/L (ref 136–145)
SP GR UR STRIP.AUTO: 1.02
TRIGL SERPL-MCNC: 156 MG/DL (ref 0–149)
TSH SERPL-ACNC: 2.12 MIU/L (ref 0.44–3.98)
UROBILINOGEN UR STRIP.AUTO-MCNC: NORMAL MG/DL
VIT B12 SERPL-MCNC: 372 PG/ML (ref 211–911)
VLDL: 31 MG/DL (ref 0–40)
WBC # BLD AUTO: 7.7 X10*3/UL (ref 4.4–11.3)
WBC #/AREA URNS AUTO: NORMAL /HPF

## 2025-01-28 PROCEDURE — 3061F NEG MICROALBUMINURIA REV: CPT | Performed by: NURSE PRACTITIONER

## 2025-01-28 PROCEDURE — 1126F AMNT PAIN NOTED NONE PRSNT: CPT | Performed by: NURSE PRACTITIONER

## 2025-01-28 PROCEDURE — 86704 HEP B CORE ANTIBODY TOTAL: CPT

## 2025-01-28 PROCEDURE — 82306 VITAMIN D 25 HYDROXY: CPT

## 2025-01-28 PROCEDURE — 99215 OFFICE O/P EST HI 40 MIN: CPT | Performed by: NURSE PRACTITIONER

## 2025-01-28 PROCEDURE — 82607 VITAMIN B-12: CPT

## 2025-01-28 PROCEDURE — 80061 LIPID PANEL: CPT

## 2025-01-28 PROCEDURE — 4010F ACE/ARB THERAPY RXD/TAKEN: CPT | Performed by: NURSE PRACTITIONER

## 2025-01-28 PROCEDURE — 83036 HEMOGLOBIN GLYCOSYLATED A1C: CPT

## 2025-01-28 PROCEDURE — 1160F RVW MEDS BY RX/DR IN RCRD: CPT | Performed by: NURSE PRACTITIONER

## 2025-01-28 PROCEDURE — 85025 COMPLETE CBC W/AUTO DIFF WBC: CPT

## 2025-01-28 PROCEDURE — 3074F SYST BP LT 130 MM HG: CPT | Performed by: NURSE PRACTITIONER

## 2025-01-28 PROCEDURE — 3051F HG A1C>EQUAL 7.0%<8.0%: CPT | Performed by: NURSE PRACTITIONER

## 2025-01-28 PROCEDURE — 84443 ASSAY THYROID STIM HORMONE: CPT

## 2025-01-28 PROCEDURE — 3078F DIAST BP <80 MM HG: CPT | Performed by: NURSE PRACTITIONER

## 2025-01-28 PROCEDURE — 1111F DSCHRG MED/CURRENT MED MERGE: CPT | Performed by: NURSE PRACTITIONER

## 2025-01-28 PROCEDURE — 1159F MED LIST DOCD IN RCRD: CPT | Performed by: NURSE PRACTITIONER

## 2025-01-28 PROCEDURE — 81001 URINALYSIS AUTO W/SCOPE: CPT

## 2025-01-28 PROCEDURE — 3048F LDL-C <100 MG/DL: CPT | Performed by: NURSE PRACTITIONER

## 2025-01-28 PROCEDURE — 36415 COLL VENOUS BLD VENIPUNCTURE: CPT

## 2025-01-28 PROCEDURE — 86706 HEP B SURFACE ANTIBODY: CPT

## 2025-01-28 PROCEDURE — 87340 HEPATITIS B SURFACE AG IA: CPT

## 2025-01-28 PROCEDURE — 82043 UR ALBUMIN QUANTITATIVE: CPT

## 2025-01-28 PROCEDURE — 1157F ADVNC CARE PLAN IN RCRD: CPT | Performed by: NURSE PRACTITIONER

## 2025-01-28 PROCEDURE — 80053 COMPREHEN METABOLIC PANEL: CPT

## 2025-01-28 PROCEDURE — G2211 COMPLEX E/M VISIT ADD ON: HCPCS | Performed by: NURSE PRACTITIONER

## 2025-01-28 RX ORDER — DEXAMETHASONE 4 MG/1
8 TABLET ORAL 2 TIMES DAILY
Qty: 10 TABLET | Refills: 0 | Status: SHIPPED | OUTPATIENT
Start: 2025-01-28 | End: 2025-01-31

## 2025-01-28 RX ORDER — PROCHLORPERAZINE EDISYLATE 5 MG/ML
10 INJECTION INTRAMUSCULAR; INTRAVENOUS EVERY 6 HOURS PRN
Status: CANCELLED | OUTPATIENT
Start: 2025-01-29

## 2025-01-28 RX ORDER — ACETAMINOPHEN 325 MG/1
650 TABLET ORAL ONCE
Status: CANCELLED | OUTPATIENT
Start: 2025-01-29

## 2025-01-28 RX ORDER — DEXAMETHASONE 4 MG/1
TABLET ORAL
Qty: 4 TABLET | Refills: 11 | Status: SHIPPED | OUTPATIENT
Start: 2025-01-29

## 2025-01-28 RX ORDER — DOXYCYCLINE 100 MG/1
100 CAPSULE ORAL 2 TIMES DAILY
Qty: 56 CAPSULE | Refills: 3 | Status: SHIPPED | OUTPATIENT
Start: 2025-01-29

## 2025-01-28 RX ORDER — DIPHENHYDRAMINE HYDROCHLORIDE 50 MG/ML
50 INJECTION INTRAMUSCULAR; INTRAVENOUS AS NEEDED
Status: CANCELLED | OUTPATIENT
Start: 2025-01-29

## 2025-01-28 RX ORDER — PALONOSETRON 0.05 MG/ML
0.25 INJECTION, SOLUTION INTRAVENOUS ONCE
Status: CANCELLED | OUTPATIENT
Start: 2025-01-29

## 2025-01-28 RX ORDER — FOLIC ACID 1 MG/1
1000 TABLET ORAL DAILY
Qty: 30 TABLET | Refills: 11 | Status: SHIPPED | OUTPATIENT
Start: 2025-01-29

## 2025-01-28 RX ORDER — DIPHENHYDRAMINE HCL 50 MG
50 CAPSULE ORAL ONCE
Status: CANCELLED | OUTPATIENT
Start: 2025-01-29

## 2025-01-28 RX ORDER — PROCHLORPERAZINE MALEATE 10 MG
10 TABLET ORAL EVERY 6 HOURS PRN
Status: CANCELLED | OUTPATIENT
Start: 2025-01-29

## 2025-01-28 RX ORDER — ALBUTEROL SULFATE 0.83 MG/ML
3 SOLUTION RESPIRATORY (INHALATION) AS NEEDED
Status: CANCELLED | OUTPATIENT
Start: 2025-01-29

## 2025-01-28 RX ORDER — CYANOCOBALAMIN 1000 UG/ML
1000 INJECTION, SOLUTION INTRAMUSCULAR; SUBCUTANEOUS ONCE
Status: CANCELLED | OUTPATIENT
Start: 2025-01-29

## 2025-01-28 RX ORDER — FAMOTIDINE 10 MG/ML
20 INJECTION INTRAVENOUS ONCE AS NEEDED
Status: CANCELLED | OUTPATIENT
Start: 2025-01-29

## 2025-01-28 RX ORDER — ONDANSETRON HYDROCHLORIDE 8 MG/1
8 TABLET, FILM COATED ORAL EVERY 8 HOURS PRN
Qty: 30 TABLET | Refills: 5 | Status: SHIPPED | OUTPATIENT
Start: 2025-01-29

## 2025-01-28 RX ORDER — EPINEPHRINE 0.3 MG/.3ML
0.3 INJECTION SUBCUTANEOUS EVERY 5 MIN PRN
Status: CANCELLED | OUTPATIENT
Start: 2025-01-29

## 2025-01-28 RX ORDER — SENNOSIDES 8.6 MG/1
2 TABLET ORAL NIGHTLY PRN
Qty: 30 TABLET | Refills: 11 | Status: SHIPPED | OUTPATIENT
Start: 2025-01-29

## 2025-01-28 RX ORDER — OLANZAPINE 5 MG/1
5 TABLET ORAL NIGHTLY
Qty: 16 TABLET | Refills: 0 | Status: SHIPPED | OUTPATIENT
Start: 2025-01-29

## 2025-01-28 RX ORDER — HYDROCORTISONE 1 %
CREAM (GRAM) TOPICAL
Qty: 453.6 G | Refills: 3 | Status: SHIPPED | OUTPATIENT
Start: 2025-01-29

## 2025-01-28 RX ORDER — CLINDAMYCIN PHOSPHATE 10 UG/ML
LOTION TOPICAL
Qty: 60 ML | Refills: 3 | Status: SHIPPED | OUTPATIENT
Start: 2025-01-29

## 2025-01-28 RX ORDER — PROCHLORPERAZINE MALEATE 10 MG
10 TABLET ORAL EVERY 6 HOURS PRN
Qty: 30 TABLET | Refills: 5 | Status: SHIPPED | OUTPATIENT
Start: 2025-01-29

## 2025-01-28 RX ORDER — DEXAMETHASONE 4 MG/1
8 TABLET ORAL 2 TIMES DAILY
Qty: 10 TABLET | Refills: 0 | Status: SHIPPED | OUTPATIENT
Start: 2025-01-29 | End: 2025-02-01

## 2025-01-28 ASSESSMENT — ENCOUNTER SYMPTOMS
CONSTITUTIONAL NEGATIVE: 1
GASTROINTESTINAL NEGATIVE: 1
CARDIOVASCULAR NEGATIVE: 1
RESPIRATORY NEGATIVE: 1

## 2025-01-28 ASSESSMENT — PAIN SCALES - GENERAL: PAINLEVEL_OUTOF10: 0-NO PAIN

## 2025-01-28 NOTE — PROGRESS NOTES
[facility] - Medical Oncology Follow-Up Visit    Patient ID: Jayant Holland is a 74 y.o. male with NSCLC adenocarcinoma     Current therapy: cyanocobalamin (Vitamin B-12) injection 1,000 mcg, 1,000 mcg, intramuscular, Once, 0 of 6 cycles        fosaprepitant (Emend) 150 mg in sodium chloride 0.9% 150 mL IV, 150 mg, intravenous, Once, 0 of 4 cycles        CARBOplatin (Paraplatin) 600 mg in sodium chloride 0.9% 160 mL IV, 600 mg, intravenous, Once, 0 of 4 cycles        amivantamab-vmjw (Rybrevant) 350 mg in sodium chloride 0.9% 250 mL IV, 350 mg, intravenous, Once, 0 of 18 cycles        methylPREDNISolone sod succinate (SOLU-Medrol) 40 mg/mL injection 40 mg, 40 mg, intravenous, As needed, 0 of 18 cycles        palonosetron (Aloxi) injection 250 mcg, 250 mcg, intravenous, Once, 0 of 4 cycles        PEMEtrexed disodium (Alimta) 1,100 mg in sodium chloride 0.9% 144 mL IV, 500 mg/m2 = 1,100 mg, intravenous, Once, 0 of 18 cycles       Chief Concern: readiness to treat     Oncologic History:     DIAGNOSIS  NSCLC adenocarcinoma     STAGING  [id number]     CURRENT SITES OF DISEASE  RUL, R pleural effusion/pleural carcinomatosis, R hilar, mediastinal, retrocaval nodes      MOLECULAR GENOMICS  PD-L1 5%  EGFR [id number] ([id number])      PRIOR THERAPY        CURRENT THERAPY  Carboplatin/Pemetrexed/Amivantamab C1D1 1/29/25 -         CURRENT ONCOLOGICAL PROBLEMS           HISTORY OF PRESENT ILLNESS  Mr. Holland is a 73 yo with PMH significant for DMII, HTN, and HLD who had a CXR done on 12/5/24 for persistent coughing after pneumonia about a month prior, which was concerning for moderate pleural effusion of the R lung. He went to the ER, where a CT chest w/contrast was done with spiculated RUL mass measuring 2.2 x 2.1 cm and large R pleural effusion. He was referred to diagnostic clinic and seen on 12/9/24 by Kirstin where he noted persistent cough for 6 weeks, constant  dyspnea, wheezing, and unintentional 30 lb weight loss over the last year. He was referred to pulmonology and had thoracentesis on 12/10/24 with 980 ml removed, cytology with adenocarcinoma, PD-L1 5%, and NGS with EGFR exon 20 mutation. He had repeat thoracentesis on 12/17/24 with 2.4L removed. PET/CT 12/20/24 with FDV avidity of the RL nodule SUV max 9.2, multiple RLL avid nodules SUV max 4.1, FDG-avid pleural thickening on the right, multiple R hilar, mediastinal, subcarinal nodes, and moderate R pleural effusion. Mildly avid GGO within JANELLE SUV max 2.0. Brain MRI is rescheduled for 1/15/25.   Consented for carboplatin/pemetrexed/amivantamab to start 1/29/25.  Treatment delayed d/t hospitalization.      1/21/25 - 1/23/25 - hospitalization 2/2 recurrent pleural effusion.        PAST MEDICAL HISTORY  DMII   HTN  HLD   Osteoarthritis (neck)  asthma     SOCIAL HISTORY  Lives at home with his wife. Retired from [employer], worked as a technician for 48 years, notes hazardous chemical exposures.  Tob: never  EtOH: occasionally  Illicits: none     FAMILY HISTORY  Mother - breast cancer    HPI   He is here today with his wife.  Feels well today.  Breathing is stable.  Reports pleurx in place with HH referral.  Pleurx drained yesterday - output 50ml.  Reports output cranberry in color.  Plan is drainage every other day.  Doxy Rx provided upon hospital discharge not started.  Pharmacy did not fill Rx d/t tx Rx.  Was told it was too early to fill Rx.  C1D1 dexamethasone Rx completed last week - with original start date.  Will send new C1D1 Rx.  Pt to take two doses today and 3rd dose prior to treatment.  Discussed potential initial treatment infusion reaction.  Denies n/v/c/d.  No fevers, chills, or CP.  Treatment medication discussed. Labs WNL.  Ready for treatment tomorrow.        Meds (Current):    Current Outpatient Medications:     albuterol 90 mcg/actuation inhaler, INHALE 2 PUFFS EVERY 4 HOURS IF NEEDED FOR WHEEZING OR  SHORTNESS OF BREATH., Disp: 18 g, Rfl: 3    Blood glucose monitoring meter kit kit, 1 each if needed., Disp: , Rfl:     blood sugar diagnostic (Accu-Chek Jessica Plus test strp) strip, 2 times a day., Disp: , Rfl:     blood sugar diagnostic (Blood Glucose Test) strip, 1 strip once daily., Disp: 100 strip, Rfl: 3    blood sugar diagnostic (OneTouch Verio test strips) strip, 1 strip once daily., Disp: 100 strip, Rfl: 3    blood-glucose meter misc, 1 Device once daily., Disp: 1 each, Rfl: 0    dexAMETHasone (Decadron) 4 mg tablet, Beginning with Cycle 2, take 1 tablet (4 mg) by mouth twice daily the day before PEMEtrexed treatment and twice daily the day after PEMEtrexed treatment., Disp: 4 tablet, Rfl: 11    lancets 30 gauge misc, 1 Device 3 times a day., Disp: 200 each, Rfl: 3    lisinopril 40 mg tablet, TAKE 1 TABLET BY MOUTH EVERY DAY, Disp: 90 tablet, Rfl: 3    meloxicam (Mobic) 15 mg tablet, Take 1 tablet (15 mg) by mouth once daily., Disp: 90 tablet, Rfl: 3    metFORMIN  mg 24 hr tablet, Take 2 tablets (1,000 mg) by mouth 2 times a day. Do not crush, chew, or split., Disp: 360 tablet, Rfl: 3    omeprazole (PriLOSEC) 20 mg DR capsule, TAKE 1 CAPSULE BY MOUTH EVERY DAY, Disp: 90 capsule, Rfl: 3    rosuvastatin (Crestor) 40 mg tablet, TAKE 1 TABLET BY MOUTH EVERY DAY, Disp: 90 tablet, Rfl: 3    sildenafil (Viagra) 100 mg tablet, Take by mouth. TAKE 1 TABLET AS NEEDED APPROXIMATELY 1 HOUR BEFORE SEXUAL ACTIVITY, Disp: , Rfl:     [START ON 1/29/2025] clindamycin (Cleocin T) 1 % lotion, Apply topically to affected area twice daily. Do not fill before January 29, 2025., Disp: 60 mL, Rfl: 3    dexAMETHasone (Decadron) 4 mg tablet, Take 2 tablets (8 mg) by mouth 2 times a day for 5 doses. Start 2 days prior to the first dose of Amivantamab on Cycle 1 only., Disp: 10 tablet, Rfl: 0    [START ON 1/29/2025] dexAMETHasone (Decadron) 4 mg tablet, Take 2 tablets (8 mg) by mouth 2 times a day for 5 doses. Start 2 days prior  to the first dose of Amivantamab on Cycle 1 only. Do not fill before January 29, 2025., Disp: 10 tablet, Rfl: 0    [START ON 1/29/2025] dexAMETHasone (Decadron) 4 mg tablet, Beginning with Cycle 2, take 1 tablet (4 mg) by mouth twice daily the day before PEMEtrexed treatment and twice daily the day after PEMEtrexed treatment. Do not fill before January 29, 2025., Disp: 4 tablet, Rfl: 11    doxycycline (Vibramycin) 100 mg capsule, Take 1 capsule (100 mg) by mouth 2 times a day for 5 days. Take with at least 8 ounces (large glass) of water, do not lie down for 30 minutes after (Patient not taking: Reported on 1/28/2025), Disp: 10 capsule, Rfl: 0    [START ON 1/29/2025] doxycycline (Vibramycin) 100 mg capsule, Take 1 capsule (100 mg) by mouth 2 times a day. For rash prevention. Take with at least 8 ounces (large glass) of water, do not lie down for 30 minutes after Do not fill before January 29, 2025., Disp: 56 capsule, Rfl: 3    [START ON 1/29/2025] folic acid (Folvite) 1 mg tablet, Take 1 tablet (1,000 mcg) by mouth once daily. Do not fill before January 29, 2025., Disp: 30 tablet, Rfl: 11    [START ON 1/29/2025] hydrocortisone 1 % cream, Apply topically to face, hands, feet, neck, back, and chest once daily at bedtime for rash prevention. Do not fill before January 29, 2025., Disp: 453.6 g, Rfl: 3    [START ON 1/29/2025] OLANZapine (ZyPREXA) 5 mg tablet, Take 1 tablet (5 mg) by mouth once daily at bedtime. For 4 days starting the evening of treatment Do not fill before January 29, 2025., Disp: 16 tablet, Rfl: 0    [START ON 1/29/2025] ondansetron (Zofran) 8 mg tablet, Take 1 tablet (8 mg) by mouth every 8 hours if needed for nausea or vomiting. Do not fill before January 29, 2025., Disp: 30 tablet, Rfl: 5    [START ON 1/29/2025] prochlorperazine (Compazine) 10 mg tablet, Take 1 tablet (10 mg) by mouth every 6 hours if needed for nausea or vomiting. Do not fill before January 29, 2025., Disp: 30 tablet, Rfl: 5     [START ON 1/29/2025] sennosides (Senokot) 8.6 mg tablet, Take 2 tablets (17.2 mg) by mouth as needed at bedtime for constipation. Do not fill before January 29, 2025., Disp: 30 tablet, Rfl: 11    Review of Systems   Constitutional: Negative.    HENT:  Negative.     Respiratory: Negative.     Cardiovascular: Negative.    Gastrointestinal: Negative.    All other systems reviewed and are negative.       Objective   BSA: 2.08 meters squared  Wt Readings from Last 5 Encounters:   01/28/25 84.1 kg (185 lb 4.8 oz)   01/21/25 86.2 kg (190 lb)   01/16/25 87.1 kg (192 lb)   01/10/25 87.1 kg (192 lb)   01/07/25 87.5 kg (193 lb)     /75 (BP Location: Left arm, Patient Position: Sitting, BP Cuff Size: Adult)   Pulse 85   Temp 36.7 °C (98.1 °F) (Skin)   Resp 18   Wt 84.1 kg (185 lb 4.8 oz)   SpO2 97%   BMI 24.45 kg/m²     ECOG Score: 1- Restricted in physically strenuous activity.  Carries out light duty.      Physical Exam  Vitals reviewed.   Constitutional:       General: He is not in acute distress.     Appearance: Normal appearance.   HENT:      Head: Normocephalic and atraumatic.      Mouth/Throat:      Mouth: Mucous membranes are moist.   Eyes:      Conjunctiva/sclera: Conjunctivae normal.      Pupils: Pupils are equal, round, and reactive to light.   Cardiovascular:      Rate and Rhythm: Normal rate and regular rhythm.      Heart sounds: Normal heart sounds. No murmur heard.  Pulmonary:      Effort: Pulmonary effort is normal. No respiratory distress.      Comments: Reduced breath sounds throughout on R  Abdominal:      General: Bowel sounds are normal. There is no distension.      Palpations: Abdomen is soft.   Musculoskeletal:         General: Normal range of motion.      Cervical back: Normal range of motion.   Skin:     General: Skin is warm and dry.   Neurological:      General: No focal deficit present.      Mental Status: He is alert and oriented to person, place, and time. Mental status is at baseline.    Psychiatric:         Mood and Affect: Mood normal.         Behavior: Behavior normal.         Thought Content: Thought content normal.          Results:  Labs:  Lab Results   Component Value Date    WBC 7.7 01/28/2025    HGB 12.9 (L) 01/28/2025    HCT 40.3 (L) 01/28/2025    MCV 86 01/28/2025     01/28/2025      Lab Results   Component Value Date    NEUTROABS 4.46 01/28/2025      Lab Results   Component Value Date    GLUCOSE 135 (H) 01/28/2025    CALCIUM 9.7 01/28/2025     01/28/2025    K 4.4 01/28/2025    CO2 32 01/28/2025     01/28/2025    BUN 14 01/28/2025    CREATININE 0.80 01/28/2025    MG 1.37 (L) 01/22/2025     Lab Results   Component Value Date    ALT 26 01/28/2025    AST 19 01/28/2025    ALKPHOS 68 01/28/2025    BILITOT 0.6 01/28/2025    BILIDIR 0.1 11/18/2019      Lab Results   Component Value Date    TSH 2.12 01/28/2025       Imaging:  No new imaging.       Assessment/Plan      Jayant Holland is a 74 y.o. male here for follow up of NSCLC adenocarcinoma    # NSCLC adenocarcinoma  - [id number] (pleural effusion)  - pending brain MRI  - PD-L1 5%, EGFR [id number] ([id number])   - discussed that given EGFR exon 20 mutation, recommend combination chemotherapy+amivantamab, consented  - planned to start C1D1 1/28/25  - plan for surveillance CT scans after C4  - C1D1 dexamethasone Rx started today - for 3 doses.  Discussed potential infusion reaction.  Pt verbalized understanding.       # Malignant pleural effusion  - s/p thoracentesis and contacted by IP while in visit for repeat thora.   - Pleurx catheter placed 12/17/24 with HH referral in place.  Plan is drainage every other day.    - continue to monitor    # Advanced care planning  - discussed incurable but treatable nature of metastatic disease, and goal of therapy is to prolong life while maintaining or improving quality of life.

## 2025-01-28 NOTE — PATIENT INSTRUCTIONS
Just for C1D1 - today start Dexamethasone (Decadron) 8mg (2 tabs) two doses today.  Tomorrow - one hour prior to treatment, take 8mg (2 tabs) one hour prior to tx.

## 2025-01-29 ENCOUNTER — INFUSION (OUTPATIENT)
Dept: HEMATOLOGY/ONCOLOGY | Facility: HOSPITAL | Age: 75
End: 2025-01-29
Payer: MEDICARE

## 2025-01-29 VITALS
WEIGHT: 187.17 LBS | SYSTOLIC BLOOD PRESSURE: 120 MMHG | HEIGHT: 71 IN | HEART RATE: 85 BPM | DIASTOLIC BLOOD PRESSURE: 55 MMHG | OXYGEN SATURATION: 98 % | TEMPERATURE: 97.7 F | RESPIRATION RATE: 18 BRPM | BODY MASS INDEX: 26.2 KG/M2

## 2025-01-29 DIAGNOSIS — C80.1 ADENOCARCINOMA (MULTI): ICD-10-CM

## 2025-01-29 LAB
LAB AP ASR DISCLAIMER: NORMAL
LABORATORY COMMENT REPORT: NORMAL
LABORATORY COMMENT REPORT: NORMAL
PATH REPORT.COMMENTS IMP SPEC: NORMAL
PATH REPORT.FINAL DX SPEC: NORMAL
PATH REPORT.GROSS SPEC: NORMAL
PATH REPORT.RELEVANT HX SPEC: NORMAL
PATH REPORT.TOTAL CANCER: NORMAL

## 2025-01-29 PROCEDURE — 2500000004 HC RX 250 GENERAL PHARMACY W/ HCPCS (ALT 636 FOR OP/ED): Performed by: STUDENT IN AN ORGANIZED HEALTH CARE EDUCATION/TRAINING PROGRAM

## 2025-01-29 PROCEDURE — 96375 TX/PRO/DX INJ NEW DRUG ADDON: CPT | Mod: INF

## 2025-01-29 PROCEDURE — 96411 CHEMO IV PUSH ADDL DRUG: CPT

## 2025-01-29 PROCEDURE — 96413 CHEMO IV INFUSION 1 HR: CPT

## 2025-01-29 PROCEDURE — 96366 THER/PROPH/DIAG IV INF ADDON: CPT | Mod: INF

## 2025-01-29 PROCEDURE — 96367 TX/PROPH/DG ADDL SEQ IV INF: CPT

## 2025-01-29 PROCEDURE — 2500000001 HC RX 250 WO HCPCS SELF ADMINISTERED DRUGS (ALT 637 FOR MEDICARE OP): Performed by: STUDENT IN AN ORGANIZED HEALTH CARE EDUCATION/TRAINING PROGRAM

## 2025-01-29 PROCEDURE — 96372 THER/PROPH/DIAG INJ SC/IM: CPT

## 2025-01-29 RX ORDER — FAMOTIDINE 10 MG/ML
20 INJECTION INTRAVENOUS ONCE AS NEEDED
Status: DISCONTINUED | OUTPATIENT
Start: 2025-01-29 | End: 2025-01-29 | Stop reason: HOSPADM

## 2025-01-29 RX ORDER — HEPARIN SODIUM,PORCINE/PF 10 UNIT/ML
50 SYRINGE (ML) INTRAVENOUS AS NEEDED
OUTPATIENT
Start: 2025-01-29

## 2025-01-29 RX ORDER — EPINEPHRINE 0.3 MG/.3ML
0.3 INJECTION SUBCUTANEOUS EVERY 5 MIN PRN
Status: CANCELLED | OUTPATIENT
Start: 2025-01-30

## 2025-01-29 RX ORDER — HEPARIN 100 UNIT/ML
500 SYRINGE INTRAVENOUS AS NEEDED
OUTPATIENT
Start: 2025-01-29

## 2025-01-29 RX ORDER — DIPHENHYDRAMINE HCL 50 MG
50 CAPSULE ORAL ONCE
Status: COMPLETED | OUTPATIENT
Start: 2025-01-29 | End: 2025-01-29

## 2025-01-29 RX ORDER — PROCHLORPERAZINE MALEATE 10 MG
10 TABLET ORAL EVERY 6 HOURS PRN
Status: CANCELLED | OUTPATIENT
Start: 2025-01-30

## 2025-01-29 RX ORDER — ALBUTEROL SULFATE 0.83 MG/ML
3 SOLUTION RESPIRATORY (INHALATION) AS NEEDED
Status: DISCONTINUED | OUTPATIENT
Start: 2025-01-29 | End: 2025-01-29 | Stop reason: HOSPADM

## 2025-01-29 RX ORDER — PROCHLORPERAZINE EDISYLATE 5 MG/ML
10 INJECTION INTRAMUSCULAR; INTRAVENOUS EVERY 6 HOURS PRN
Status: DISCONTINUED | OUTPATIENT
Start: 2025-01-29 | End: 2025-01-29 | Stop reason: HOSPADM

## 2025-01-29 RX ORDER — DIPHENHYDRAMINE HCL 50 MG
50 CAPSULE ORAL ONCE
Status: CANCELLED | OUTPATIENT
Start: 2025-01-30

## 2025-01-29 RX ORDER — DIPHENHYDRAMINE HYDROCHLORIDE 50 MG/ML
50 INJECTION INTRAMUSCULAR; INTRAVENOUS AS NEEDED
Status: CANCELLED | OUTPATIENT
Start: 2025-01-30

## 2025-01-29 RX ORDER — CYANOCOBALAMIN 1000 UG/ML
1000 INJECTION, SOLUTION INTRAMUSCULAR; SUBCUTANEOUS ONCE
Status: COMPLETED | OUTPATIENT
Start: 2025-01-29 | End: 2025-01-29

## 2025-01-29 RX ORDER — PALONOSETRON 0.05 MG/ML
0.25 INJECTION, SOLUTION INTRAVENOUS ONCE
Status: COMPLETED | OUTPATIENT
Start: 2025-01-29 | End: 2025-01-29

## 2025-01-29 RX ORDER — ACETAMINOPHEN 325 MG/1
650 TABLET ORAL ONCE
Status: CANCELLED | OUTPATIENT
Start: 2025-01-30

## 2025-01-29 RX ORDER — DIPHENHYDRAMINE HYDROCHLORIDE 50 MG/ML
50 INJECTION INTRAMUSCULAR; INTRAVENOUS AS NEEDED
Status: DISCONTINUED | OUTPATIENT
Start: 2025-01-29 | End: 2025-01-29 | Stop reason: HOSPADM

## 2025-01-29 RX ORDER — PROCHLORPERAZINE MALEATE 10 MG
10 TABLET ORAL EVERY 6 HOURS PRN
Status: DISCONTINUED | OUTPATIENT
Start: 2025-01-29 | End: 2025-01-29 | Stop reason: HOSPADM

## 2025-01-29 RX ORDER — PROCHLORPERAZINE EDISYLATE 5 MG/ML
10 INJECTION INTRAMUSCULAR; INTRAVENOUS EVERY 6 HOURS PRN
Status: CANCELLED | OUTPATIENT
Start: 2025-01-30

## 2025-01-29 RX ORDER — EPINEPHRINE 0.3 MG/.3ML
0.3 INJECTION SUBCUTANEOUS EVERY 5 MIN PRN
Status: DISCONTINUED | OUTPATIENT
Start: 2025-01-29 | End: 2025-01-29 | Stop reason: HOSPADM

## 2025-01-29 RX ORDER — FAMOTIDINE 10 MG/ML
20 INJECTION INTRAVENOUS ONCE AS NEEDED
Status: CANCELLED | OUTPATIENT
Start: 2025-01-30

## 2025-01-29 RX ORDER — ACETAMINOPHEN 325 MG/1
650 TABLET ORAL ONCE
Status: COMPLETED | OUTPATIENT
Start: 2025-01-29 | End: 2025-01-29

## 2025-01-29 RX ORDER — ALBUTEROL SULFATE 0.83 MG/ML
3 SOLUTION RESPIRATORY (INHALATION) AS NEEDED
Status: CANCELLED | OUTPATIENT
Start: 2025-01-30

## 2025-01-29 RX ADMIN — FOSAPREPITANT 150 MG: 150 INJECTION, POWDER, LYOPHILIZED, FOR SOLUTION INTRAVENOUS at 09:32

## 2025-01-29 RX ADMIN — ACETAMINOPHEN 650 MG: 325 TABLET ORAL at 10:23

## 2025-01-29 RX ADMIN — DEXAMETHASONE SODIUM PHOSPHATE 12 MG: 10 INJECTION, SOLUTION INTRAMUSCULAR; INTRAVENOUS at 09:08

## 2025-01-29 RX ADMIN — CYANOCOBALAMIN 1000 MCG: 1000 INJECTION, SOLUTION INTRAMUSCULAR at 09:09

## 2025-01-29 RX ADMIN — CARBOPLATIN 600 MG: 600 INJECTION, SOLUTION INTRAVENOUS at 10:23

## 2025-01-29 RX ADMIN — DIPHENHYDRAMINE HYDROCHLORIDE 50 MG: 50 CAPSULE ORAL at 10:23

## 2025-01-29 RX ADMIN — AMIVANTAMAB 350 MG: 350 INJECTION INTRAVENOUS at 11:05

## 2025-01-29 RX ADMIN — PEMETREXED DISODIUM 1100 MG: 500 INJECTION, POWDER, LYOPHILIZED, FOR SOLUTION INTRAVENOUS at 10:06

## 2025-01-29 RX ADMIN — PALONOSETRON HYDROCHLORIDE 250 MCG: 0.25 INJECTION INTRAVENOUS at 09:09

## 2025-01-29 NOTE — PROGRESS NOTES
Pt arrived ambulatory to infusion for treatment of C1D1 .  Denies any new or worsening symptoms. Tolerated infusions without issue. Discharged in stable condition.

## 2025-01-29 NOTE — PATIENT INSTRUCTIONS
You received aloxi as a premedication to your chemotherapy today. Do not take zofran for 3 days following treatment. You may take compazine if you are nauseous during this time.

## 2025-01-30 ENCOUNTER — SOCIAL WORK (OUTPATIENT)
Dept: CASE MANAGEMENT | Facility: HOSPITAL | Age: 75
End: 2025-01-30
Payer: MEDICARE

## 2025-01-30 ENCOUNTER — TELEPHONE (OUTPATIENT)
Dept: PALLIATIVE MEDICINE | Facility: HOSPITAL | Age: 75
End: 2025-01-30
Payer: MEDICARE

## 2025-01-30 ENCOUNTER — INFUSION (OUTPATIENT)
Dept: HEMATOLOGY/ONCOLOGY | Facility: HOSPITAL | Age: 75
End: 2025-01-30
Payer: MEDICARE

## 2025-01-30 VITALS
HEART RATE: 87 BPM | HEIGHT: 71 IN | DIASTOLIC BLOOD PRESSURE: 50 MMHG | SYSTOLIC BLOOD PRESSURE: 94 MMHG | BODY MASS INDEX: 26.6 KG/M2 | OXYGEN SATURATION: 98 % | RESPIRATION RATE: 22 BRPM | TEMPERATURE: 97.7 F | WEIGHT: 190.04 LBS

## 2025-01-30 DIAGNOSIS — C80.1 ADENOCARCINOMA (MULTI): ICD-10-CM

## 2025-01-30 DIAGNOSIS — C80.1 ADENOCARCINOMA (MULTI): Primary | ICD-10-CM

## 2025-01-30 PROCEDURE — 96375 TX/PRO/DX INJ NEW DRUG ADDON: CPT | Mod: INF

## 2025-01-30 PROCEDURE — 2500000004 HC RX 250 GENERAL PHARMACY W/ HCPCS (ALT 636 FOR OP/ED): Performed by: NURSE PRACTITIONER

## 2025-01-30 PROCEDURE — 96365 THER/PROPH/DIAG IV INF INIT: CPT | Mod: INF

## 2025-01-30 PROCEDURE — 2500000004 HC RX 250 GENERAL PHARMACY W/ HCPCS (ALT 636 FOR OP/ED): Mod: JZ,TB | Performed by: NURSE PRACTITIONER

## 2025-01-30 PROCEDURE — 2500000001 HC RX 250 WO HCPCS SELF ADMINISTERED DRUGS (ALT 637 FOR MEDICARE OP): Performed by: NURSE PRACTITIONER

## 2025-01-30 PROCEDURE — 96413 CHEMO IV INFUSION 1 HR: CPT

## 2025-01-30 PROCEDURE — 96366 THER/PROPH/DIAG IV INF ADDON: CPT | Mod: INF

## 2025-01-30 PROCEDURE — 96415 CHEMO IV INFUSION ADDL HR: CPT

## 2025-01-30 RX ORDER — DIPHENHYDRAMINE HCL 50 MG
50 CAPSULE ORAL ONCE
Status: COMPLETED | OUTPATIENT
Start: 2025-01-30 | End: 2025-01-30

## 2025-01-30 RX ORDER — ACETAMINOPHEN 325 MG/1
650 TABLET ORAL ONCE
Status: COMPLETED | OUTPATIENT
Start: 2025-01-30 | End: 2025-01-30

## 2025-01-30 RX ORDER — PROCHLORPERAZINE EDISYLATE 5 MG/ML
10 INJECTION INTRAMUSCULAR; INTRAVENOUS EVERY 6 HOURS PRN
Status: DISCONTINUED | OUTPATIENT
Start: 2025-01-30 | End: 2025-01-30 | Stop reason: HOSPADM

## 2025-01-30 RX ORDER — FAMOTIDINE 10 MG/ML
20 INJECTION INTRAVENOUS ONCE AS NEEDED
Status: DISCONTINUED | OUTPATIENT
Start: 2025-01-30 | End: 2025-01-30 | Stop reason: HOSPADM

## 2025-01-30 RX ORDER — DIPHENHYDRAMINE HYDROCHLORIDE 50 MG/ML
50 INJECTION INTRAMUSCULAR; INTRAVENOUS AS NEEDED
Status: DISCONTINUED | OUTPATIENT
Start: 2025-01-30 | End: 2025-01-30 | Stop reason: HOSPADM

## 2025-01-30 RX ORDER — ALBUTEROL SULFATE 0.83 MG/ML
3 SOLUTION RESPIRATORY (INHALATION) AS NEEDED
Status: DISCONTINUED | OUTPATIENT
Start: 2025-01-30 | End: 2025-01-30 | Stop reason: HOSPADM

## 2025-01-30 RX ORDER — PROCHLORPERAZINE MALEATE 10 MG
10 TABLET ORAL EVERY 6 HOURS PRN
Status: DISCONTINUED | OUTPATIENT
Start: 2025-01-30 | End: 2025-01-30 | Stop reason: HOSPADM

## 2025-01-30 RX ORDER — EPINEPHRINE 0.3 MG/.3ML
0.3 INJECTION SUBCUTANEOUS EVERY 5 MIN PRN
Status: DISCONTINUED | OUTPATIENT
Start: 2025-01-30 | End: 2025-01-30 | Stop reason: HOSPADM

## 2025-01-30 RX ADMIN — AMIVANTAMAB 1400 MG: 350 INJECTION INTRAVENOUS at 08:56

## 2025-01-30 RX ADMIN — DIPHENHYDRAMINE HYDROCHLORIDE 50 MG: 50 CAPSULE ORAL at 08:09

## 2025-01-30 RX ADMIN — ACETAMINOPHEN 650 MG: 325 TABLET ORAL at 08:09

## 2025-01-30 RX ADMIN — DEXAMETHASONE SODIUM PHOSPHATE 10 MG: 4 INJECTION, SOLUTION INTRA-ARTICULAR; INTRALESIONAL; INTRAMUSCULAR; INTRAVENOUS; SOFT TISSUE at 08:09

## 2025-01-30 NOTE — PROGRESS NOTES
This SW was asked to call pt who had inquired about [facility]. SW called pt and [facility] is [facility] with [facility]. I suggested he contact social work at the site where he sees provider(s). He acknowledged. No further needs identified.  SW available as needed.

## 2025-01-30 NOTE — TELEPHONE ENCOUNTER
Supportive Oncology referral received  from Jamaica Rehman NP. Mr. Holland has metastatic lung cancer and is currently receiving  chemotherapy.  He is referred to Supportive Oncology for  constipation, fatigue and Goals of Care.  Spoke with Mr. Holland, reviewed the referral and focus of our team. He states that he feels that this symptoms are managed at this time and therefore declined to schedule a this time. He will reach out to his provider if needs arise.

## 2025-01-31 ENCOUNTER — HOME CARE VISIT (OUTPATIENT)
Dept: HOME HEALTH SERVICES | Facility: HOME HEALTH | Age: 75
End: 2025-01-31
Payer: MEDICARE

## 2025-01-31 DIAGNOSIS — E11.9 TYPE 2 DIABETES MELLITUS WITHOUT COMPLICATION, UNSPECIFIED WHETHER LONG TERM INSULIN USE (MULTI): Primary | ICD-10-CM

## 2025-01-31 PROCEDURE — G0299 HHS/HOSPICE OF RN EA 15 MIN: HCPCS | Mod: HHH

## 2025-02-01 VITALS
HEART RATE: 92 BPM | DIASTOLIC BLOOD PRESSURE: 50 MMHG | RESPIRATION RATE: 18 BRPM | TEMPERATURE: 97.5 F | SYSTOLIC BLOOD PRESSURE: 112 MMHG | OXYGEN SATURATION: 96 %

## 2025-02-01 ASSESSMENT — ENCOUNTER SYMPTOMS
CHANGE IN APPETITE: UNCHANGED
DENIES PAIN: 1
PERSON REPORTING PAIN: PATIENT
LAST BOWEL MOVEMENT: 67236
LIMITED RANGE OF MOTION: 1
MUSCLE WEAKNESS: 1
APPETITE LEVEL: GOOD

## 2025-02-03 ENCOUNTER — HOME CARE VISIT (OUTPATIENT)
Dept: HOME HEALTH SERVICES | Facility: HOME HEALTH | Age: 75
End: 2025-02-03
Payer: MEDICARE

## 2025-02-03 VITALS
TEMPERATURE: 97.1 F | DIASTOLIC BLOOD PRESSURE: 62 MMHG | OXYGEN SATURATION: 96 % | SYSTOLIC BLOOD PRESSURE: 105 MMHG | RESPIRATION RATE: 26 BRPM | HEART RATE: 113 BPM

## 2025-02-03 PROCEDURE — G0299 HHS/HOSPICE OF RN EA 15 MIN: HCPCS | Mod: HHH

## 2025-02-03 ASSESSMENT — ENCOUNTER SYMPTOMS
DENIES PAIN: 1
LAST BOWEL MOVEMENT: 67239
PERSON REPORTING PAIN: PATIENT
DYSPNEA ON EXERTION: 1
MUSCLE WEAKNESS: 1
APPETITE LEVEL: GOOD
CHANGE IN APPETITE: UNCHANGED
DYSPNEA ACTIVITY LEVEL: AFTER AMBULATING LESS THAN 10 FT
SHORTNESS OF BREATH: 1

## 2025-02-03 NOTE — CASE COMMUNICATION
650ml red fluid drained via plurex today. Patient's respirations before draining were 26 and , but irregular. Patient says he typically feels like it's easier to take full breaths after fluid removal. No distress noted. Patient advised to call 911 if SOB, increased anxiety, can't catch his breath.   Yolanda Cummings RN

## 2025-02-04 ENCOUNTER — APPOINTMENT (OUTPATIENT)
Dept: PRIMARY CARE | Facility: CLINIC | Age: 75
End: 2025-02-04
Payer: MEDICARE

## 2025-02-04 ENCOUNTER — INFUSION (OUTPATIENT)
Dept: HEMATOLOGY/ONCOLOGY | Facility: HOSPITAL | Age: 75
End: 2025-02-04
Payer: MEDICARE

## 2025-02-04 ENCOUNTER — OFFICE VISIT (OUTPATIENT)
Dept: HEMATOLOGY/ONCOLOGY | Facility: HOSPITAL | Age: 75
End: 2025-02-04
Payer: MEDICARE

## 2025-02-04 ENCOUNTER — LAB (OUTPATIENT)
Dept: LAB | Facility: HOSPITAL | Age: 75
End: 2025-02-04
Payer: MEDICARE

## 2025-02-04 VITALS
OXYGEN SATURATION: 98 % | BODY MASS INDEX: 25.04 KG/M2 | RESPIRATION RATE: 18 BRPM | SYSTOLIC BLOOD PRESSURE: 110 MMHG | HEART RATE: 107 BPM | WEIGHT: 181.44 LBS | DIASTOLIC BLOOD PRESSURE: 73 MMHG | TEMPERATURE: 97.5 F

## 2025-02-04 DIAGNOSIS — C80.1 ADENOCARCINOMA (MULTI): ICD-10-CM

## 2025-02-04 DIAGNOSIS — E83.42 HYPOMAGNESEMIA: ICD-10-CM

## 2025-02-04 DIAGNOSIS — R63.4 WEIGHT LOSS, ABNORMAL: Primary | ICD-10-CM

## 2025-02-04 LAB
ALBUMIN SERPL BCP-MCNC: 3.4 G/DL (ref 3.4–5)
ALP SERPL-CCNC: 89 U/L (ref 33–136)
ALT SERPL W P-5'-P-CCNC: 27 U/L (ref 10–52)
ANION GAP SERPL CALC-SCNC: 17 MMOL/L (ref 10–20)
AST SERPL W P-5'-P-CCNC: 16 U/L (ref 9–39)
BASOPHILS # BLD AUTO: 0 X10*3/UL (ref 0–0.1)
BASOPHILS NFR BLD AUTO: 0 %
BILIRUB SERPL-MCNC: 1.2 MG/DL (ref 0–1.2)
BUN SERPL-MCNC: 23 MG/DL (ref 6–23)
CALCIUM SERPL-MCNC: 9 MG/DL (ref 8.6–10.3)
CHLORIDE SERPL-SCNC: 94 MMOL/L (ref 98–107)
CO2 SERPL-SCNC: 27 MMOL/L (ref 21–32)
CREAT SERPL-MCNC: 0.78 MG/DL (ref 0.5–1.3)
EGFRCR SERPLBLD CKD-EPI 2021: >90 ML/MIN/1.73M*2
EOSINOPHIL # BLD AUTO: 0.02 X10*3/UL (ref 0–0.4)
EOSINOPHIL NFR BLD AUTO: 0.5 %
ERYTHROCYTE [DISTWIDTH] IN BLOOD BY AUTOMATED COUNT: 13.3 % (ref 11.5–14.5)
GLUCOSE SERPL-MCNC: 365 MG/DL (ref 74–99)
HCT VFR BLD AUTO: 40.4 % (ref 41–52)
HGB BLD-MCNC: 13.1 G/DL (ref 13.5–17.5)
IMM GRANULOCYTES # BLD AUTO: 0.02 X10*3/UL (ref 0–0.5)
IMM GRANULOCYTES NFR BLD AUTO: 0.5 % (ref 0–0.9)
LYMPHOCYTES # BLD AUTO: 0.5 X10*3/UL (ref 0.8–3)
LYMPHOCYTES NFR BLD AUTO: 11.4 %
MCH RBC QN AUTO: 27.8 PG (ref 26–34)
MCHC RBC AUTO-ENTMCNC: 32.4 G/DL (ref 32–36)
MCV RBC AUTO: 86 FL (ref 80–100)
MONOCYTES # BLD AUTO: 0.03 X10*3/UL (ref 0.05–0.8)
MONOCYTES NFR BLD AUTO: 0.7 %
NEUTROPHILS # BLD AUTO: 3.83 X10*3/UL (ref 1.6–5.5)
NEUTROPHILS NFR BLD AUTO: 86.9 %
NRBC BLD-RTO: 0 /100 WBCS (ref 0–0)
PLATELET # BLD AUTO: 116 X10*3/UL (ref 150–450)
POTASSIUM SERPL-SCNC: 4 MMOL/L (ref 3.5–5.3)
PROT SERPL-MCNC: 6.3 G/DL (ref 6.4–8.2)
RBC # BLD AUTO: 4.72 X10*6/UL (ref 4.5–5.9)
SODIUM SERPL-SCNC: 134 MMOL/L (ref 136–145)
WBC # BLD AUTO: 4.4 X10*3/UL (ref 4.4–11.3)

## 2025-02-04 PROCEDURE — 99215 OFFICE O/P EST HI 40 MIN: CPT | Performed by: NURSE PRACTITIONER

## 2025-02-04 PROCEDURE — 1111F DSCHRG MED/CURRENT MED MERGE: CPT | Performed by: NURSE PRACTITIONER

## 2025-02-04 PROCEDURE — 36415 COLL VENOUS BLD VENIPUNCTURE: CPT

## 2025-02-04 PROCEDURE — G2211 COMPLEX E/M VISIT ADD ON: HCPCS | Performed by: NURSE PRACTITIONER

## 2025-02-04 PROCEDURE — 2500000004 HC RX 250 GENERAL PHARMACY W/ HCPCS (ALT 636 FOR OP/ED): Mod: JZ,TB | Performed by: NURSE PRACTITIONER

## 2025-02-04 PROCEDURE — 3074F SYST BP LT 130 MM HG: CPT | Performed by: NURSE PRACTITIONER

## 2025-02-04 PROCEDURE — 96413 CHEMO IV INFUSION 1 HR: CPT

## 2025-02-04 PROCEDURE — 1159F MED LIST DOCD IN RCRD: CPT | Performed by: NURSE PRACTITIONER

## 2025-02-04 PROCEDURE — 96375 TX/PRO/DX INJ NEW DRUG ADDON: CPT | Mod: INF

## 2025-02-04 PROCEDURE — 3078F DIAST BP <80 MM HG: CPT | Performed by: NURSE PRACTITIONER

## 2025-02-04 PROCEDURE — 1126F AMNT PAIN NOTED NONE PRSNT: CPT | Performed by: NURSE PRACTITIONER

## 2025-02-04 PROCEDURE — 80053 COMPREHEN METABOLIC PANEL: CPT

## 2025-02-04 PROCEDURE — 1157F ADVNC CARE PLAN IN RCRD: CPT | Performed by: NURSE PRACTITIONER

## 2025-02-04 PROCEDURE — 96415 CHEMO IV INFUSION ADDL HR: CPT

## 2025-02-04 PROCEDURE — 2500000004 HC RX 250 GENERAL PHARMACY W/ HCPCS (ALT 636 FOR OP/ED): Performed by: NURSE PRACTITIONER

## 2025-02-04 PROCEDURE — 3048F LDL-C <100 MG/DL: CPT | Performed by: NURSE PRACTITIONER

## 2025-02-04 PROCEDURE — 1036F TOBACCO NON-USER: CPT | Performed by: NURSE PRACTITIONER

## 2025-02-04 PROCEDURE — 3061F NEG MICROALBUMINURIA REV: CPT | Performed by: NURSE PRACTITIONER

## 2025-02-04 PROCEDURE — 2500000001 HC RX 250 WO HCPCS SELF ADMINISTERED DRUGS (ALT 637 FOR MEDICARE OP): Performed by: NURSE PRACTITIONER

## 2025-02-04 PROCEDURE — 3051F HG A1C>EQUAL 7.0%<8.0%: CPT | Performed by: NURSE PRACTITIONER

## 2025-02-04 PROCEDURE — 4010F ACE/ARB THERAPY RXD/TAKEN: CPT | Performed by: NURSE PRACTITIONER

## 2025-02-04 PROCEDURE — 85025 COMPLETE CBC W/AUTO DIFF WBC: CPT

## 2025-02-04 RX ORDER — ONDANSETRON HYDROCHLORIDE 2 MG/ML
8 INJECTION, SOLUTION INTRAVENOUS ONCE
Status: CANCELLED | OUTPATIENT
Start: 2025-02-04

## 2025-02-04 RX ORDER — PROCHLORPERAZINE EDISYLATE 5 MG/ML
10 INJECTION INTRAMUSCULAR; INTRAVENOUS EVERY 6 HOURS PRN
Status: CANCELLED | OUTPATIENT
Start: 2025-02-04

## 2025-02-04 RX ORDER — ACETAMINOPHEN 325 MG/1
650 TABLET ORAL ONCE
Status: CANCELLED | OUTPATIENT
Start: 2025-02-04

## 2025-02-04 RX ORDER — DIPHENHYDRAMINE HYDROCHLORIDE 50 MG/ML
50 INJECTION INTRAMUSCULAR; INTRAVENOUS AS NEEDED
Status: DISCONTINUED | OUTPATIENT
Start: 2025-02-04 | End: 2025-02-04 | Stop reason: HOSPADM

## 2025-02-04 RX ORDER — FAMOTIDINE 10 MG/ML
20 INJECTION INTRAVENOUS ONCE AS NEEDED
Status: DISCONTINUED | OUTPATIENT
Start: 2025-02-04 | End: 2025-02-04 | Stop reason: HOSPADM

## 2025-02-04 RX ORDER — HEPARIN SODIUM,PORCINE/PF 10 UNIT/ML
50 SYRINGE (ML) INTRAVENOUS AS NEEDED
OUTPATIENT
Start: 2025-02-04

## 2025-02-04 RX ORDER — PROCHLORPERAZINE MALEATE 10 MG
10 TABLET ORAL EVERY 6 HOURS PRN
Status: CANCELLED | OUTPATIENT
Start: 2025-02-04

## 2025-02-04 RX ORDER — DIPHENHYDRAMINE HCL 50 MG
50 CAPSULE ORAL ONCE
Status: CANCELLED | OUTPATIENT
Start: 2025-02-04

## 2025-02-04 RX ORDER — ALBUTEROL SULFATE 0.83 MG/ML
3 SOLUTION RESPIRATORY (INHALATION) AS NEEDED
Status: DISCONTINUED | OUTPATIENT
Start: 2025-02-04 | End: 2025-02-04 | Stop reason: HOSPADM

## 2025-02-04 RX ORDER — ONDANSETRON HYDROCHLORIDE 2 MG/ML
8 INJECTION, SOLUTION INTRAVENOUS ONCE
Status: COMPLETED | OUTPATIENT
Start: 2025-02-04 | End: 2025-02-04

## 2025-02-04 RX ORDER — FAMOTIDINE 10 MG/ML
20 INJECTION INTRAVENOUS ONCE AS NEEDED
Status: CANCELLED | OUTPATIENT
Start: 2025-02-04

## 2025-02-04 RX ORDER — DIPHENHYDRAMINE HYDROCHLORIDE 50 MG/ML
50 INJECTION INTRAMUSCULAR; INTRAVENOUS AS NEEDED
Status: CANCELLED | OUTPATIENT
Start: 2025-02-04

## 2025-02-04 RX ORDER — HEPARIN 100 UNIT/ML
500 SYRINGE INTRAVENOUS AS NEEDED
OUTPATIENT
Start: 2025-02-04

## 2025-02-04 RX ORDER — ALBUTEROL SULFATE 0.83 MG/ML
3 SOLUTION RESPIRATORY (INHALATION) AS NEEDED
Status: CANCELLED | OUTPATIENT
Start: 2025-02-04

## 2025-02-04 RX ORDER — PROCHLORPERAZINE MALEATE 10 MG
10 TABLET ORAL EVERY 6 HOURS PRN
Status: DISCONTINUED | OUTPATIENT
Start: 2025-02-04 | End: 2025-02-04 | Stop reason: HOSPADM

## 2025-02-04 RX ORDER — EPINEPHRINE 0.3 MG/.3ML
0.3 INJECTION SUBCUTANEOUS EVERY 5 MIN PRN
Status: DISCONTINUED | OUTPATIENT
Start: 2025-02-04 | End: 2025-02-04 | Stop reason: HOSPADM

## 2025-02-04 RX ORDER — ACETAMINOPHEN 325 MG/1
650 TABLET ORAL ONCE
Status: COMPLETED | OUTPATIENT
Start: 2025-02-04 | End: 2025-02-04

## 2025-02-04 RX ORDER — PROCHLORPERAZINE EDISYLATE 5 MG/ML
10 INJECTION INTRAMUSCULAR; INTRAVENOUS EVERY 6 HOURS PRN
Status: DISCONTINUED | OUTPATIENT
Start: 2025-02-04 | End: 2025-02-04 | Stop reason: HOSPADM

## 2025-02-04 RX ORDER — EPINEPHRINE 0.3 MG/.3ML
0.3 INJECTION SUBCUTANEOUS EVERY 5 MIN PRN
Status: CANCELLED | OUTPATIENT
Start: 2025-02-04

## 2025-02-04 RX ORDER — DIPHENHYDRAMINE HCL 50 MG
50 CAPSULE ORAL ONCE
Status: COMPLETED | OUTPATIENT
Start: 2025-02-04 | End: 2025-02-04

## 2025-02-04 RX ADMIN — ACETAMINOPHEN 650 MG: 325 TABLET ORAL at 09:20

## 2025-02-04 RX ADMIN — DIPHENHYDRAMINE HYDROCHLORIDE 50 MG: 50 CAPSULE ORAL at 09:20

## 2025-02-04 RX ADMIN — ONDANSETRON 8 MG: 2 INJECTION INTRAMUSCULAR; INTRAVENOUS at 09:26

## 2025-02-04 RX ADMIN — AMIVANTAMAB 1750 MG: 350 INJECTION INTRAVENOUS at 10:08

## 2025-02-04 ASSESSMENT — ENCOUNTER SYMPTOMS
APPETITE CHANGE: 1
RESPIRATORY NEGATIVE: 1
UNEXPECTED WEIGHT CHANGE: 1
FATIGUE: 1
GASTROINTESTINAL NEGATIVE: 1
NUMBNESS: 1
CARDIOVASCULAR NEGATIVE: 1
MUSCULOSKELETAL NEGATIVE: 1

## 2025-02-04 ASSESSMENT — PAIN SCALES - GENERAL: PAINLEVEL_OUTOF10: 0-NO PAIN

## 2025-02-04 NOTE — PROGRESS NOTES
Patient arrives for scheduled Amivantamab.  Seen by Jamaica Rehman CNP prior to infusion appointment, see note for further assessment questions.   Tolerated infusion well and discharged in stable condition.

## 2025-02-04 NOTE — PROGRESS NOTES
[facility] - Medical Oncology Follow-Up Visit    Patient ID: Jayant Holland is a 74 y.o. male with NSCLC adenocarcinoma     Current therapy: cyanocobalamin (Vitamin B-12) injection 1,000 mcg, 1,000 mcg, intramuscular, Once, 1 of 6 cycles    Administration: 1,000 mcg (1/29/2025)        fosaprepitant (Emend) 150 mg in sodium chloride 0.9% 250 mL IV, 150 mg, intravenous, Once, 1 of 4 cycles    Administration: 150 mg (1/29/2025)        CARBOplatin (Paraplatin) 600 mg in sodium chloride 0.9% 170 mL IV, 600 mg, intravenous, Once, 1 of 4 cycles    Administration: 600 mg (1/29/2025)        amivantamab-vmjw (Rybrevant) 350 mg in sodium chloride 0.9% 250 mL IV, 350 mg, intravenous, Once, 1 of 18 cycles    Administration: 350 mg (1/29/2025), 1,400 mg (1/30/2025), 1,750 mg (2/4/2025)        methylPREDNISolone sod succinate (SOLU-Medrol) 40 mg/mL injection 40 mg, 40 mg, intravenous, As needed, 1 of 18 cycles        palonosetron (Aloxi) injection 250 mcg, 250 mcg, intravenous, Once, 1 of 4 cycles    Administration: 250 mcg (1/29/2025)        PEMEtrexed disodium (Alimta) 1,100 mg in sodium chloride 0.9% 154 mL IV, 500 mg/m2 = 1,100 mg, intravenous, Once, 1 of 18 cycles    Administration: 1,100 mg (1/29/2025)       Chief Concern: follow-up and readiness to treat     Oncologic History:     DIAGNOSIS  NSCLC adenocarcinoma     STAGING  [id number]     CURRENT SITES OF DISEASE  RUL, R pleural effusion/pleural carcinomatosis, R hilar, mediastinal, retrocaval nodes      MOLECULAR GENOMICS  PD-L1 5%  EGFR [id number] ([id number])      PRIOR THERAPY        CURRENT THERAPY  Carboplatin/Pemetrexed/Amivantamab C1D1 1/29/25 -         CURRENT ONCOLOGICAL PROBLEMS  Unintentional wt loss         HISTORY OF PRESENT ILLNESS  Mr. Holland is a 75 yo with PMH significant for DMII, HTN, and HLD who had a CXR done on 12/5/24 for persistent coughing after pneumonia about a month prior, which was  concerning for moderate pleural effusion of the R lung. He went to the ER, where a CT chest w/contrast was done with spiculated RUL mass measuring 2.2 x 2.1 cm and large R pleural effusion. He was referred to diagnostic clinic and seen on 12/9/24 by Kirstin where he noted persistent cough for 6 weeks, constant dyspnea, wheezing, and unintentional 30 lb weight loss over the last year. He was referred to pulmonology and had thoracentesis on 12/10/24 with 980 ml removed, cytology with adenocarcinoma, PD-L1 5%, and NGS with EGFR exon 20 mutation. He had repeat thoracentesis on 12/17/24 with 2.4L removed. PET/CT 12/20/24 with FDV avidity of the RL nodule SUV max 9.2, multiple RLL avid nodules SUV max 4.1, FDG-avid pleural thickening on the right, multiple R hilar, mediastinal, subcarinal nodes, and moderate R pleural effusion. Mildly avid GGO within JANELLE SUV max 2.0. Brain MRI is rescheduled for 1/15/25.   Consented for carboplatin/pemetrexed/amivantamab to start 1/29/25.  Treatment delayed d/t hospitalization.      1/21/25 - 1/23/25 - hospitalization 2/2 recurrent pleural effusion.        PAST MEDICAL HISTORY  DMII   HTN  HLD   Osteoarthritis (neck)  asthma     SOCIAL HISTORY  Lives at home with his wife. Retired from [employer], worked as a technician for 48 years, notes hazardous chemical exposures.  Tob: never  EtOH: occasionally  Illicits: none     FAMILY HISTORY  Mother - breast cancer    HPI   He is here today with his wife.  Feeling sluggish today.  Breathing stable.  Denies SOB.  #9 Weight loss notes since 1/30/25.  States he has an appetite.  Eating but not as much as he was prior to tx.   Tastes - can see a difference.  Not totally off.   Denies n/v/c/d.  Staying hydrated.  Voiding fine.  No fevers, chills, or CP.  Numbness in his right arm, to pinky finger.  Not new.  Occurs every now and then.  Lasts approx 30 seconds.  Occurring 1-2x/wk.  Comes out of the blue.  Does not keep him up.  No worse.  Recreased PLT  count.  Denies s/sx bleeding.   No other concerns today.  Labs WNL.  Ready for treatment today.        Meds (Current):    Current Outpatient Medications:     albuterol 90 mcg/actuation inhaler, INHALE 2 PUFFS EVERY 4 HOURS IF NEEDED FOR WHEEZING OR SHORTNESS OF BREATH., Disp: 18 g, Rfl: 3    Blood glucose monitoring meter kit kit, 1 each if needed., Disp: , Rfl:     blood sugar diagnostic (Accu-Chek Jessica Plus test strp) strip, 2 times a day., Disp: , Rfl:     blood sugar diagnostic (Blood Glucose Test) strip, 1 strip once daily., Disp: 100 strip, Rfl: 3    blood sugar diagnostic (OneTouch Verio test strips) strip, 1 strip once daily., Disp: 100 strip, Rfl: 3    blood-glucose meter misc, 1 Device once daily., Disp: 1 each, Rfl: 0    clindamycin (Cleocin T) 1 % lotion, Apply topically to affected area twice daily. Do not fill before January 29, 2025., Disp: 60 mL, Rfl: 3    dexAMETHasone (Decadron) 4 mg tablet, Beginning with Cycle 2, take 1 tablet (4 mg) by mouth twice daily the day before PEMEtrexed treatment and twice daily the day after PEMEtrexed treatment., Disp: 4 tablet, Rfl: 11    dexAMETHasone (Decadron) 4 mg tablet, Beginning with Cycle 2, take 1 tablet (4 mg) by mouth twice daily the day before PEMEtrexed treatment and twice daily the day after PEMEtrexed treatment. Do not fill before January 29, 2025., Disp: 4 tablet, Rfl: 11    doxycycline (Vibramycin) 100 mg capsule, Take 1 capsule (100 mg) by mouth 2 times a day. For rash prevention. Take with at least 8 ounces (large glass) of water, do not lie down for 30 minutes after Do not fill before January 29, 2025., Disp: 56 capsule, Rfl: 3    folic acid (Folvite) 1 mg tablet, Take 1 tablet (1,000 mcg) by mouth once daily. Do not fill before January 29, 2025., Disp: 30 tablet, Rfl: 11    hydrocortisone 1 % cream, Apply topically to face, hands, feet, neck, back, and chest once daily at bedtime for rash prevention. Do not fill before January 29, 2025., Disp:  453.6 g, Rfl: 3    lancets 30 gauge misc, 1 Device 3 times a day., Disp: 200 each, Rfl: 3    lisinopril 40 mg tablet, TAKE 1 TABLET BY MOUTH EVERY DAY, Disp: 90 tablet, Rfl: 3    meloxicam (Mobic) 15 mg tablet, Take 1 tablet (15 mg) by mouth once daily., Disp: 90 tablet, Rfl: 3    metFORMIN  mg 24 hr tablet, Take 2 tablets (1,000 mg) by mouth 2 times a day. Do not crush, chew, or split., Disp: 360 tablet, Rfl: 3    OLANZapine (ZyPREXA) 5 mg tablet, Take 1 tablet (5 mg) by mouth once daily at bedtime. For 4 days starting the evening of treatment Do not fill before January 29, 2025., Disp: 16 tablet, Rfl: 0    omeprazole (PriLOSEC) 20 mg DR capsule, TAKE 1 CAPSULE BY MOUTH EVERY DAY, Disp: 90 capsule, Rfl: 3    ondansetron (Zofran) 8 mg tablet, Take 1 tablet (8 mg) by mouth every 8 hours if needed for nausea or vomiting. Do not fill before January 29, 2025., Disp: 30 tablet, Rfl: 5    prochlorperazine (Compazine) 10 mg tablet, Take 1 tablet (10 mg) by mouth every 6 hours if needed for nausea or vomiting. Do not fill before January 29, 2025., Disp: 30 tablet, Rfl: 5    rosuvastatin (Crestor) 40 mg tablet, TAKE 1 TABLET BY MOUTH EVERY DAY, Disp: 90 tablet, Rfl: 3    sennosides (Senokot) 8.6 mg tablet, Take 2 tablets (17.2 mg) by mouth as needed at bedtime for constipation. Do not fill before January 29, 2025., Disp: 30 tablet, Rfl: 11    sildenafil (Viagra) 100 mg tablet, Take by mouth. TAKE 1 TABLET AS NEEDED APPROXIMATELY 1 HOUR BEFORE SEXUAL ACTIVITY, Disp: , Rfl:     dexAMETHasone (Decadron) 4 mg tablet, Take 2 tablets (8 mg) by mouth 2 times a day for 5 doses. Start 2 days prior to the first dose of Amivantamab on Cycle 1 only., Disp: 10 tablet, Rfl: 0    dexAMETHasone (Decadron) 4 mg tablet, Take 2 tablets (8 mg) by mouth 2 times a day for 5 doses. Start 2 days prior to the first dose of Amivantamab on Cycle 1 only. Do not fill before January 29, 2025., Disp: 10 tablet, Rfl: 0  No current  facility-administered medications for this visit.    Facility-Administered Medications Ordered in Other Visits:     albuterol 2.5 mg /3 mL (0.083 %) nebulizer solution 3 mL, 3 mL, nebulization, PRN, [place] Fergusons, APRN-CNP    dextrose 5 % in water (D5W) bolus 500 mL, 500 mL, intravenous, PRN, Jamaica Mckinneygins, APRN-CNP    diphenhydrAMINE (BENADryl) injection 50 mg, 50 mg, intravenous, PRN, [place] Mckinneygins, APRN-CNP    EPINEPHrine (Epipen) injection syringe 0.3 mg, 0.3 mg, intramuscular, q5 min PRN, [place] Fergusons, APRN-CNP    famotidine PF (Pepcid) injection 20 mg, 20 mg, intravenous, Once PRN, [place] Mckinneygins, APRN-CNP    methylPREDNISolone sod succinate (SOLU-Medrol) 40 mg/mL injection 40 mg, 40 mg, intravenous, PRN, [place] Fergusons, APRN-CNP    prochlorperazine (Compazine) injection 10 mg, 10 mg, intravenous, q6h PRN, [place] Mckinneygins, APRN-CNP    prochlorperazine (Compazine) tablet 10 mg, 10 mg, oral, q6h PRN, [place] Fergusons, APRN-CNP    sodium chloride 0.9 % bolus 500 mL, 500 mL, intravenous, PRN, [place] Mckinneygins, APRN-CNP    Review of Systems   Constitutional:  Positive for appetite change, fatigue and unexpected weight change.   HENT:  Negative.     Respiratory: Negative.     Cardiovascular: Negative.    Gastrointestinal: Negative.    Musculoskeletal: Negative.    Skin: Negative.    Neurological:  Positive for numbness.   All other systems reviewed and are negative.       Objective   BSA: 2.04 meters squared  Wt Readings from Last 5 Encounters:   02/04/25 82.3 kg (181 lb 7 oz)   01/30/25 86.2 kg (190 lb 0.6 oz)   01/29/25 84.9 kg (187 lb 2.7 oz)   01/28/25 84.1 kg (185 lb 4.8 oz)   01/21/25 86.2 kg (190 lb)     /73 (BP Location: Left arm, Patient Position: Sitting, BP Cuff Size: Adult)   Pulse 107   Temp 36.4 °C (97.5 °F) (Temporal)   Resp 18   Wt 82.3 kg (181 lb 7 oz)   SpO2 98%   BMI 25.04 kg/m²     ECOG Score: 1- Restricted in physically strenuous activity.  Carries out  light duty.      Physical Exam  Vitals reviewed.   Constitutional:       General: He is not in acute distress.     Appearance: Normal appearance.   HENT:      Head: Normocephalic and atraumatic.      Mouth/Throat:      Mouth: Mucous membranes are moist.   Eyes:      Conjunctiva/sclera: Conjunctivae normal.      Pupils: Pupils are equal, round, and reactive to light.   Cardiovascular:      Rate and Rhythm: Normal rate and regular rhythm.      Heart sounds: Normal heart sounds. No murmur heard.  Pulmonary:      Effort: Pulmonary effort is normal. No respiratory distress.   Abdominal:      General: Bowel sounds are normal. There is no distension.      Palpations: Abdomen is soft.   Musculoskeletal:         General: Normal range of motion.      Cervical back: Normal range of motion.   Skin:     General: Skin is warm and dry.   Neurological:      General: No focal deficit present.      Mental Status: He is alert and oriented to person, place, and time. Mental status is at baseline.   Psychiatric:         Mood and Affect: Mood normal.         Behavior: Behavior normal.         Thought Content: Thought content normal.          Results:  Labs:  Lab Results   Component Value Date    WBC 4.4 02/04/2025    HGB 13.1 (L) 02/04/2025    HCT 40.4 (L) 02/04/2025    MCV 86 02/04/2025     (L) 02/04/2025      Lab Results   Component Value Date    NEUTROABS 3.83 02/04/2025      Lab Results   Component Value Date    GLUCOSE 365 (H) 02/04/2025    CALCIUM 9.0 02/04/2025     (L) 02/04/2025    K 4.0 02/04/2025    CO2 27 02/04/2025    CL 94 (L) 02/04/2025    BUN 23 02/04/2025    CREATININE 0.78 02/04/2025    MG 1.37 (L) 01/22/2025     Lab Results   Component Value Date    ALT 27 02/04/2025    AST 16 02/04/2025    ALKPHOS 89 02/04/2025    BILITOT 1.2 02/04/2025    BILIDIR 0.1 11/18/2019      Lab Results   Component Value Date    TSH 2.12 01/28/2025       Imaging:  No new imaging.       Assessment/Plan      Jayant Holland is a 74 y.o.  male here for follow up of NSCLC adenocarcinoma    # NSCLC adenocarcinoma  - [id number] (pleural effusion)  - pending brain MRI  - PD-L1 5%, EGFR [id number] ([id number])   - discussed that given EGFR exon 20 mutation, recommend combination chemotherapy+amivantamab, consented  - planned to start C1D1 1/28/25  - plan for surveillance CT scans after C4  - C1D1 dexamethasone Rx started today - for 3 doses.  Discussed potential infusion reaction.  Pt verbalized understanding.    - RTC 2/11/25 for C1D15.   - Per pt request, 2/19/25 C2 infusion and visit transferred to [facility].  Visit with FORTINO Grace.  Scan review visits with Dr. Solitario at [facility].       # Malignant pleural effusion  - s/p thoracentesis and contacted by IP while in visit for repeat thora.   - Right pleurx catheter placed 12/17/24 with HH referral in place.  Plan is drainage every other day.    - continue to monitor    # Hypomagnesia 1/37 1/22/25  - Repeat lab 2/11/25    # Unintentional wt loss  - Recommended pt add Ensure/Boost supplements and snacks throughout the day. Will try increased nutritional intake vs pharm intervention for now.    - Dietician referral placed.     # Advanced care planning  - discussed incurable but treatable nature of metastatic disease, and goal of therapy is to prolong life while maintaining or improving quality of life.  - 1/30/25 - Supportive onc referral placed.  Pt declined need for visit at this time.  Supportive onc will follow-up in 2-3 weeks.

## 2025-02-05 ENCOUNTER — HOME CARE VISIT (OUTPATIENT)
Dept: HOME HEALTH SERVICES | Facility: HOME HEALTH | Age: 75
End: 2025-02-05
Payer: MEDICARE

## 2025-02-05 VITALS
TEMPERATURE: 97.1 F | DIASTOLIC BLOOD PRESSURE: 62 MMHG | RESPIRATION RATE: 22 BRPM | SYSTOLIC BLOOD PRESSURE: 110 MMHG | OXYGEN SATURATION: 98 % | HEART RATE: 92 BPM

## 2025-02-05 PROCEDURE — G0299 HHS/HOSPICE OF RN EA 15 MIN: HCPCS | Mod: HHH

## 2025-02-05 ASSESSMENT — ENCOUNTER SYMPTOMS
BOWEL PATTERN NORMAL: 1
DENIES PAIN: 1
RECTAL BLEEDING: 1
MUSCLE WEAKNESS: 1
SHORTNESS OF BREATH: 1
DYSPNEA ACTIVITY LEVEL: AFTER AMBULATING LESS THAN 10 FT
PERSON REPORTING PAIN: PATIENT
STOOL FREQUENCY: DAILY
CHANGE IN APPETITE: UNCHANGED
APPETITE LEVEL: FAIR

## 2025-02-07 ENCOUNTER — APPOINTMENT (OUTPATIENT)
Dept: HOME HEALTH SERVICES | Facility: HOME HEALTH | Age: 75
End: 2025-02-07
Payer: MEDICARE

## 2025-02-07 VITALS
SYSTOLIC BLOOD PRESSURE: 96 MMHG | OXYGEN SATURATION: 95 % | HEART RATE: 94 BPM | RESPIRATION RATE: 18 BRPM | DIASTOLIC BLOOD PRESSURE: 68 MMHG | TEMPERATURE: 98.2 F

## 2025-02-07 LAB — SCAN RESULT: NORMAL

## 2025-02-07 PROCEDURE — G0299 HHS/HOSPICE OF RN EA 15 MIN: HCPCS | Mod: HHH

## 2025-02-07 ASSESSMENT — ENCOUNTER SYMPTOMS
APPETITE LEVEL: GOOD
DENIES PAIN: 1
LAST BOWEL MOVEMENT: 67242
FLUID RETENTION: 1
PERSON REPORTING PAIN: PATIENT
FATIGUES EASILY: 1
FATIGUE: 1
DRY SKIN: 1

## 2025-02-10 ENCOUNTER — PATIENT OUTREACH (OUTPATIENT)
Dept: PRIMARY CARE | Facility: CLINIC | Age: 75
End: 2025-02-10

## 2025-02-10 ENCOUNTER — APPOINTMENT (OUTPATIENT)
Dept: HOME HEALTH SERVICES | Facility: HOME HEALTH | Age: 75
End: 2025-02-10
Payer: MEDICARE

## 2025-02-10 VITALS
RESPIRATION RATE: 16 BRPM | OXYGEN SATURATION: 98 % | TEMPERATURE: 98 F | HEART RATE: 95 BPM | SYSTOLIC BLOOD PRESSURE: 100 MMHG | DIASTOLIC BLOOD PRESSURE: 62 MMHG

## 2025-02-10 PROCEDURE — G0300 HHS/HOSPICE OF LPN EA 15 MIN: HCPCS | Mod: HHH

## 2025-02-10 RX ORDER — PROCHLORPERAZINE EDISYLATE 5 MG/ML
10 INJECTION INTRAMUSCULAR; INTRAVENOUS EVERY 6 HOURS PRN
Status: CANCELLED | OUTPATIENT
Start: 2025-02-11

## 2025-02-10 RX ORDER — ONDANSETRON HYDROCHLORIDE 2 MG/ML
8 INJECTION, SOLUTION INTRAVENOUS ONCE
Status: CANCELLED | OUTPATIENT
Start: 2025-02-11

## 2025-02-10 RX ORDER — ALBUTEROL SULFATE 0.83 MG/ML
3 SOLUTION RESPIRATORY (INHALATION) AS NEEDED
Status: CANCELLED | OUTPATIENT
Start: 2025-02-11

## 2025-02-10 RX ORDER — EPINEPHRINE 0.3 MG/.3ML
0.3 INJECTION SUBCUTANEOUS EVERY 5 MIN PRN
Status: CANCELLED | OUTPATIENT
Start: 2025-02-11

## 2025-02-10 RX ORDER — PROCHLORPERAZINE MALEATE 10 MG
10 TABLET ORAL EVERY 6 HOURS PRN
Status: CANCELLED | OUTPATIENT
Start: 2025-02-11

## 2025-02-10 RX ORDER — DIPHENHYDRAMINE HCL 50 MG
50 CAPSULE ORAL ONCE
Status: CANCELLED | OUTPATIENT
Start: 2025-02-11

## 2025-02-10 RX ORDER — ACETAMINOPHEN 325 MG/1
650 TABLET ORAL ONCE
Status: CANCELLED | OUTPATIENT
Start: 2025-02-11

## 2025-02-10 RX ORDER — DIPHENHYDRAMINE HYDROCHLORIDE 50 MG/ML
50 INJECTION INTRAMUSCULAR; INTRAVENOUS AS NEEDED
Status: CANCELLED | OUTPATIENT
Start: 2025-02-11

## 2025-02-10 RX ORDER — FAMOTIDINE 10 MG/ML
20 INJECTION INTRAVENOUS ONCE AS NEEDED
Status: CANCELLED | OUTPATIENT
Start: 2025-02-11

## 2025-02-10 ASSESSMENT — ENCOUNTER SYMPTOMS
APPETITE LEVEL: GOOD
DENIES PAIN: 1
LAST BOWEL MOVEMENT: 67246
CHANGE IN APPETITE: UNCHANGED

## 2025-02-10 NOTE — PROGRESS NOTES
Pt was a no show to PCP follow up appt. Callback attempt successful . Pt states he is doing well and has no questions or concerns at this time

## 2025-02-11 ENCOUNTER — LAB (OUTPATIENT)
Dept: LAB | Facility: HOSPITAL | Age: 75
End: 2025-02-11
Payer: MEDICARE

## 2025-02-11 ENCOUNTER — INFUSION (OUTPATIENT)
Dept: HEMATOLOGY/ONCOLOGY | Facility: HOSPITAL | Age: 75
End: 2025-02-11
Payer: MEDICARE

## 2025-02-11 ENCOUNTER — OFFICE VISIT (OUTPATIENT)
Dept: HEMATOLOGY/ONCOLOGY | Facility: HOSPITAL | Age: 75
End: 2025-02-11
Payer: MEDICARE

## 2025-02-11 VITALS
RESPIRATION RATE: 18 BRPM | TEMPERATURE: 98.2 F | HEART RATE: 108 BPM | BODY MASS INDEX: 24.98 KG/M2 | WEIGHT: 181 LBS | OXYGEN SATURATION: 100 %

## 2025-02-11 DIAGNOSIS — C80.1 ADENOCARCINOMA (MULTI): Primary | ICD-10-CM

## 2025-02-11 DIAGNOSIS — C80.1 ADENOCARCINOMA (MULTI): ICD-10-CM

## 2025-02-11 DIAGNOSIS — Z51.12 ENCOUNTER FOR MONOCLONAL ANTIBODY TREATMENT FOR MALIGNANCY: ICD-10-CM

## 2025-02-11 DIAGNOSIS — E83.42 HYPOMAGNESEMIA: ICD-10-CM

## 2025-02-11 LAB
ALBUMIN SERPL BCP-MCNC: 3.4 G/DL (ref 3.4–5)
ALP SERPL-CCNC: 100 U/L (ref 33–136)
ALT SERPL W P-5'-P-CCNC: 33 U/L (ref 10–52)
ANION GAP SERPL CALC-SCNC: 16 MMOL/L (ref 10–20)
AST SERPL W P-5'-P-CCNC: 18 U/L (ref 9–39)
BASOPHILS # BLD AUTO: 0 X10*3/UL (ref 0–0.1)
BASOPHILS NFR BLD AUTO: 0 %
BILIRUB SERPL-MCNC: 0.4 MG/DL (ref 0–1.2)
BUN SERPL-MCNC: 22 MG/DL (ref 6–23)
BURR CELLS BLD QL SMEAR: NORMAL
CALCIUM SERPL-MCNC: 8 MG/DL (ref 8.6–10.3)
CHLORIDE SERPL-SCNC: 100 MMOL/L (ref 98–107)
CO2 SERPL-SCNC: 24 MMOL/L (ref 21–32)
CREAT SERPL-MCNC: 0.83 MG/DL (ref 0.5–1.3)
EGFRCR SERPLBLD CKD-EPI 2021: >90 ML/MIN/1.73M*2
EOSINOPHIL # BLD AUTO: 0.01 X10*3/UL (ref 0–0.4)
EOSINOPHIL NFR BLD AUTO: 0.6 %
ERYTHROCYTE [DISTWIDTH] IN BLOOD BY AUTOMATED COUNT: 13.2 % (ref 11.5–14.5)
GIANT PLATELETS BLD QL SMEAR: NORMAL
GLUCOSE SERPL-MCNC: 291 MG/DL (ref 74–99)
HCT VFR BLD AUTO: 35.3 % (ref 41–52)
HGB BLD-MCNC: 11.5 G/DL (ref 13.5–17.5)
IMM GRANULOCYTES # BLD AUTO: 0.01 X10*3/UL (ref 0–0.5)
IMM GRANULOCYTES NFR BLD AUTO: 0.6 % (ref 0–0.9)
LYMPHOCYTES # BLD AUTO: 0.47 X10*3/UL (ref 0.8–3)
LYMPHOCYTES NFR BLD AUTO: 29.4 %
MAGNESIUM SERPL-MCNC: 0.7 MG/DL (ref 1.6–2.4)
MCH RBC QN AUTO: 28 PG (ref 26–34)
MCHC RBC AUTO-ENTMCNC: 32.6 G/DL (ref 32–36)
MCV RBC AUTO: 86 FL (ref 80–100)
MONOCYTES # BLD AUTO: 0.1 X10*3/UL (ref 0.05–0.8)
MONOCYTES NFR BLD AUTO: 6.3 %
NEUTROPHILS # BLD AUTO: 1.01 X10*3/UL (ref 1.6–5.5)
NEUTROPHILS NFR BLD AUTO: 63.1 %
NRBC BLD-RTO: 0 /100 WBCS (ref 0–0)
OVALOCYTES BLD QL SMEAR: NORMAL
PLATELET # BLD AUTO: 71 X10*3/UL (ref 150–450)
PLATELET CLUMP BLD QL SMEAR: PRESENT
POLYCHROMASIA BLD QL SMEAR: NORMAL
POTASSIUM SERPL-SCNC: 3.7 MMOL/L (ref 3.5–5.3)
PROT SERPL-MCNC: 5.9 G/DL (ref 6.4–8.2)
RBC # BLD AUTO: 4.11 X10*6/UL (ref 4.5–5.9)
RBC MORPH BLD: NORMAL
SODIUM SERPL-SCNC: 136 MMOL/L (ref 136–145)
WBC # BLD AUTO: 1.6 X10*3/UL (ref 4.4–11.3)

## 2025-02-11 PROCEDURE — 96365 THER/PROPH/DIAG IV INF INIT: CPT | Mod: INF

## 2025-02-11 PROCEDURE — 96415 CHEMO IV INFUSION ADDL HR: CPT

## 2025-02-11 PROCEDURE — 99215 OFFICE O/P EST HI 40 MIN: CPT | Performed by: NURSE PRACTITIONER

## 2025-02-11 PROCEDURE — 3051F HG A1C>EQUAL 7.0%<8.0%: CPT | Performed by: NURSE PRACTITIONER

## 2025-02-11 PROCEDURE — 2500000004 HC RX 250 GENERAL PHARMACY W/ HCPCS (ALT 636 FOR OP/ED): Performed by: STUDENT IN AN ORGANIZED HEALTH CARE EDUCATION/TRAINING PROGRAM

## 2025-02-11 PROCEDURE — 3061F NEG MICROALBUMINURIA REV: CPT | Performed by: NURSE PRACTITIONER

## 2025-02-11 PROCEDURE — 4010F ACE/ARB THERAPY RXD/TAKEN: CPT | Performed by: NURSE PRACTITIONER

## 2025-02-11 PROCEDURE — 96417 CHEMO IV INFUS EACH ADDL SEQ: CPT

## 2025-02-11 PROCEDURE — 2500000004 HC RX 250 GENERAL PHARMACY W/ HCPCS (ALT 636 FOR OP/ED): Performed by: NURSE PRACTITIONER

## 2025-02-11 PROCEDURE — 96375 TX/PRO/DX INJ NEW DRUG ADDON: CPT | Mod: INF

## 2025-02-11 PROCEDURE — 85025 COMPLETE CBC W/AUTO DIFF WBC: CPT

## 2025-02-11 PROCEDURE — 1126F AMNT PAIN NOTED NONE PRSNT: CPT | Performed by: NURSE PRACTITIONER

## 2025-02-11 PROCEDURE — 1157F ADVNC CARE PLAN IN RCRD: CPT | Performed by: NURSE PRACTITIONER

## 2025-02-11 PROCEDURE — 3048F LDL-C <100 MG/DL: CPT | Performed by: NURSE PRACTITIONER

## 2025-02-11 PROCEDURE — 80053 COMPREHEN METABOLIC PANEL: CPT

## 2025-02-11 PROCEDURE — 36415 COLL VENOUS BLD VENIPUNCTURE: CPT

## 2025-02-11 PROCEDURE — 96413 CHEMO IV INFUSION 1 HR: CPT

## 2025-02-11 PROCEDURE — 1111F DSCHRG MED/CURRENT MED MERGE: CPT | Performed by: NURSE PRACTITIONER

## 2025-02-11 PROCEDURE — 96366 THER/PROPH/DIAG IV INF ADDON: CPT | Mod: INF

## 2025-02-11 PROCEDURE — 99215 OFFICE O/P EST HI 40 MIN: CPT | Mod: 25 | Performed by: NURSE PRACTITIONER

## 2025-02-11 PROCEDURE — 96367 TX/PROPH/DG ADDL SEQ IV INF: CPT

## 2025-02-11 PROCEDURE — 2500000001 HC RX 250 WO HCPCS SELF ADMINISTERED DRUGS (ALT 637 FOR MEDICARE OP): Performed by: STUDENT IN AN ORGANIZED HEALTH CARE EDUCATION/TRAINING PROGRAM

## 2025-02-11 PROCEDURE — 83735 ASSAY OF MAGNESIUM: CPT

## 2025-02-11 PROCEDURE — 1159F MED LIST DOCD IN RCRD: CPT | Performed by: NURSE PRACTITIONER

## 2025-02-11 RX ORDER — FAMOTIDINE 10 MG/ML
20 INJECTION INTRAVENOUS ONCE AS NEEDED
Status: DISCONTINUED | OUTPATIENT
Start: 2025-02-11 | End: 2025-02-11 | Stop reason: HOSPADM

## 2025-02-11 RX ORDER — MAGNESIUM SULFATE HEPTAHYDRATE 40 MG/ML
4 INJECTION, SOLUTION INTRAVENOUS ONCE
Status: COMPLETED | OUTPATIENT
Start: 2025-02-11 | End: 2025-02-11

## 2025-02-11 RX ORDER — LANOLIN ALCOHOL/MO/W.PET/CERES
400 CREAM (GRAM) TOPICAL 2 TIMES DAILY
Qty: 60 TABLET | Refills: 11 | Status: SHIPPED | OUTPATIENT
Start: 2025-02-11 | End: 2026-02-11

## 2025-02-11 RX ORDER — ALBUTEROL SULFATE 0.83 MG/ML
3 SOLUTION RESPIRATORY (INHALATION) AS NEEDED
Status: DISCONTINUED | OUTPATIENT
Start: 2025-02-11 | End: 2025-02-11 | Stop reason: HOSPADM

## 2025-02-11 RX ORDER — MAGNESIUM SULFATE HEPTAHYDRATE 40 MG/ML
4 INJECTION, SOLUTION INTRAVENOUS ONCE
Status: CANCELLED | OUTPATIENT
Start: 2025-02-11 | End: 2025-02-11

## 2025-02-11 RX ORDER — EPINEPHRINE 0.3 MG/.3ML
0.3 INJECTION SUBCUTANEOUS EVERY 5 MIN PRN
Status: DISCONTINUED | OUTPATIENT
Start: 2025-02-11 | End: 2025-02-11 | Stop reason: HOSPADM

## 2025-02-11 RX ORDER — DIPHENHYDRAMINE HCL 50 MG
50 CAPSULE ORAL ONCE
Status: COMPLETED | OUTPATIENT
Start: 2025-02-11 | End: 2025-02-11

## 2025-02-11 RX ORDER — DIPHENHYDRAMINE HYDROCHLORIDE 50 MG/ML
50 INJECTION INTRAMUSCULAR; INTRAVENOUS AS NEEDED
Status: DISCONTINUED | OUTPATIENT
Start: 2025-02-11 | End: 2025-02-11 | Stop reason: HOSPADM

## 2025-02-11 RX ORDER — ACETAMINOPHEN 325 MG/1
650 TABLET ORAL ONCE
Status: COMPLETED | OUTPATIENT
Start: 2025-02-11 | End: 2025-02-11

## 2025-02-11 RX ORDER — PROCHLORPERAZINE MALEATE 10 MG
10 TABLET ORAL EVERY 6 HOURS PRN
Status: DISCONTINUED | OUTPATIENT
Start: 2025-02-11 | End: 2025-02-11 | Stop reason: HOSPADM

## 2025-02-11 RX ORDER — PROCHLORPERAZINE EDISYLATE 5 MG/ML
10 INJECTION INTRAMUSCULAR; INTRAVENOUS EVERY 6 HOURS PRN
Status: DISCONTINUED | OUTPATIENT
Start: 2025-02-11 | End: 2025-02-11 | Stop reason: HOSPADM

## 2025-02-11 RX ORDER — ONDANSETRON HYDROCHLORIDE 2 MG/ML
8 INJECTION, SOLUTION INTRAVENOUS ONCE
Status: COMPLETED | OUTPATIENT
Start: 2025-02-11 | End: 2025-02-11

## 2025-02-11 RX ADMIN — AMIVANTAMAB 1750 MG: 350 INJECTION INTRAVENOUS at 12:47

## 2025-02-11 RX ADMIN — MAGNESIUM SULFATE IN WATER 4 G: 4 INJECTION, SOLUTION INTRAVENOUS at 10:41

## 2025-02-11 RX ADMIN — ONDANSETRON 8 MG: 2 INJECTION INTRAMUSCULAR; INTRAVENOUS at 12:38

## 2025-02-11 RX ADMIN — ACETAMINOPHEN 650 MG: 325 TABLET ORAL at 12:19

## 2025-02-11 RX ADMIN — DIPHENHYDRAMINE HYDROCHLORIDE 50 MG: 50 CAPSULE ORAL at 12:19

## 2025-02-11 ASSESSMENT — ENCOUNTER SYMPTOMS
RESPIRATORY NEGATIVE: 1
CARDIOVASCULAR NEGATIVE: 1
MUSCULOSKELETAL NEGATIVE: 1
FATIGUE: 1
GASTROINTESTINAL NEGATIVE: 1

## 2025-02-11 ASSESSMENT — PAIN SCALES - GENERAL: PAINLEVEL_OUTOF10: 0-NO PAIN

## 2025-02-11 NOTE — PROGRESS NOTES
[facility] - Medical Oncology Follow-Up Visit    Patient ID: Jayant Holland is a 74 y.o. male with NSCLC adenocarcinoma     Current therapy: cyanocobalamin (Vitamin B-12) injection 1,000 mcg, 1,000 mcg, intramuscular, Once, 1 of 6 cycles    Administration: 1,000 mcg (1/29/2025)        fosaprepitant (Emend) 150 mg in sodium chloride 0.9% 250 mL IV, 150 mg, intravenous, Once, 1 of 4 cycles    Administration: 150 mg (1/29/2025)        CARBOplatin (Paraplatin) 600 mg in sodium chloride 0.9% 170 mL IV, 600 mg, intravenous, Once, 1 of 4 cycles    Administration: 600 mg (1/29/2025)        amivantamab-vmjw (Rybrevant) 350 mg in sodium chloride 0.9% 250 mL IV, 350 mg, intravenous, Once, 1 of 18 cycles    Administration: 350 mg (1/29/2025), 1,400 mg (1/30/2025), 1,750 mg (2/4/2025), 1,750 mg (2/11/2025)        methylPREDNISolone sod succinate (SOLU-Medrol) 40 mg/mL injection 40 mg, 40 mg, intravenous, As needed, 1 of 18 cycles        palonosetron (Aloxi) injection 250 mcg, 250 mcg, intravenous, Once, 1 of 4 cycles    Administration: 250 mcg (1/29/2025)        PEMEtrexed disodium (Alimta) 1,100 mg in sodium chloride 0.9% 154 mL IV, 500 mg/m2 = 1,100 mg, intravenous, Once, 1 of 18 cycles    Dose modification: 400 mg/m2 (original dose 500 mg/m2, Cycle 2)    Administration: 1,100 mg (1/29/2025)       Chief Concern: follow-up and readiness to treat     Oncologic History:     DIAGNOSIS  NSCLC adenocarcinoma     STAGING  [id number]     CURRENT SITES OF DISEASE  RUL, R pleural effusion/pleural carcinomatosis, R hilar, mediastinal, retrocaval nodes      MOLECULAR GENOMICS  PD-L1 5%  EGFR [id number] ([id number])      PRIOR THERAPY        CURRENT THERAPY  Carboplatin/Pemetrexed/Amivantamab C1D1 1/29/25 -         CURRENT ONCOLOGICAL PROBLEMS  Unintentional wt loss - stabilized        HISTORY OF PRESENT ILLNESS  Mr. Holland is a 75 yo with PMH significant for DMII, HTN, and HLD who  had a CXR done on 12/5/24 for persistent coughing after pneumonia about a month prior, which was concerning for moderate pleural effusion of the R lung. He went to the ER, where a CT chest w/contrast was done with spiculated RUL mass measuring 2.2 x 2.1 cm and large R pleural effusion. He was referred to diagnostic clinic and seen on 12/9/24 by Kirstin where he noted persistent cough for 6 weeks, constant dyspnea, wheezing, and unintentional 30 lb weight loss over the last year. He was referred to pulmonology and had thoracentesis on 12/10/24 with 980 ml removed, cytology with adenocarcinoma, PD-L1 5%, and NGS with EGFR exon 20 mutation. He had repeat thoracentesis on 12/17/24 with 2.4L removed. PET/CT 12/20/24 with FDV avidity of the RL nodule SUV max 9.2, multiple RLL avid nodules SUV max 4.1, FDG-avid pleural thickening on the right, multiple R hilar, mediastinal, subcarinal nodes, and moderate R pleural effusion. Mildly avid GGO within JANELLE SUV max 2.0. Brain MRI is rescheduled for 1/15/25.   Consented for carboplatin/pemetrexed/amivantamab to start 1/29/25.  Treatment delayed d/t hospitalization.      1/21/25 - 1/23/25 - hospitalization 2/2 recurrent pleural effusion.        PAST MEDICAL HISTORY  DMII   HTN  HLD   Osteoarthritis (neck)  asthma     SOCIAL HISTORY  Lives at home with his wife. Retired from [employer], worked as a technician for 48 years, notes hazardous chemical exposures.  Tob: never  EtOH: occasionally  Illicits: none     FAMILY HISTORY  Mother - breast cancer    HPI   He is here today with his wife.  Breathing feeling better.  Denies cough or SOB.  Pleurx drained yesterday - 400cc drained off.  Output cranberry in color.  Visits changed to 2x/wk.  Appetite - good.  Wt loss stabilized.   Drinking 2 Boost/day.   Denies n/v/c/d.  Voiding fine.   Lightheadedness/dizziness if he gets up quickly.  Sore/scratchy throat - started two weeks.  No intervention taken.  Not that bothersome.  No fevers, chills, or  CP.  Ready for treatment.        Meds (Current):    Current Outpatient Medications:     albuterol 90 mcg/actuation inhaler, INHALE 2 PUFFS EVERY 4 HOURS IF NEEDED FOR WHEEZING OR SHORTNESS OF BREATH., Disp: 18 g, Rfl: 3    Blood glucose monitoring meter kit kit, 1 each if needed., Disp: , Rfl:     blood sugar diagnostic (Accu-Chek Jessica Plus test strp) strip, 2 times a day., Disp: , Rfl:     blood sugar diagnostic (Blood Glucose Test) strip, 1 strip once daily., Disp: 100 strip, Rfl: 3    blood sugar diagnostic (OneTouch Verio test strips) strip, 1 strip once daily., Disp: 100 strip, Rfl: 3    blood-glucose meter misc, 1 Device once daily., Disp: 1 each, Rfl: 0    clindamycin (Cleocin T) 1 % lotion, Apply topically to affected area twice daily. Do not fill before January 29, 2025., Disp: 60 mL, Rfl: 3    dexAMETHasone (Decadron) 4 mg tablet, Beginning with Cycle 2, take 1 tablet (4 mg) by mouth twice daily the day before PEMEtrexed treatment and twice daily the day after PEMEtrexed treatment., Disp: 4 tablet, Rfl: 11    dexAMETHasone (Decadron) 4 mg tablet, Beginning with Cycle 2, take 1 tablet (4 mg) by mouth twice daily the day before PEMEtrexed treatment and twice daily the day after PEMEtrexed treatment. Do not fill before January 29, 2025., Disp: 4 tablet, Rfl: 11    doxycycline (Vibramycin) 100 mg capsule, Take 1 capsule (100 mg) by mouth 2 times a day. For rash prevention. Take with at least 8 ounces (large glass) of water, do not lie down for 30 minutes after Do not fill before January 29, 2025., Disp: 56 capsule, Rfl: 3    folic acid (Folvite) 1 mg tablet, Take 1 tablet (1,000 mcg) by mouth once daily. Do not fill before January 29, 2025., Disp: 30 tablet, Rfl: 11    hydrocortisone 1 % cream, Apply topically to face, hands, feet, neck, back, and chest once daily at bedtime for rash prevention. Do not fill before January 29, 2025., Disp: 453.6 g, Rfl: 3    lancets 30 gauge misc, 1 Device 3 times a day.,  Disp: 200 each, Rfl: 3    lisinopril 40 mg tablet, TAKE 1 TABLET BY MOUTH EVERY DAY, Disp: 90 tablet, Rfl: 3    meloxicam (Mobic) 15 mg tablet, Take 1 tablet (15 mg) by mouth once daily., Disp: 90 tablet, Rfl: 3    metFORMIN  mg 24 hr tablet, Take 2 tablets (1,000 mg) by mouth 2 times a day. Do not crush, chew, or split., Disp: 360 tablet, Rfl: 3    OLANZapine (ZyPREXA) 5 mg tablet, Take 1 tablet (5 mg) by mouth once daily at bedtime. For 4 days starting the evening of treatment Do not fill before January 29, 2025., Disp: 16 tablet, Rfl: 0    omeprazole (PriLOSEC) 20 mg DR capsule, TAKE 1 CAPSULE BY MOUTH EVERY DAY, Disp: 90 capsule, Rfl: 3    ondansetron (Zofran) 8 mg tablet, Take 1 tablet (8 mg) by mouth every 8 hours if needed for nausea or vomiting. Do not fill before January 29, 2025., Disp: 30 tablet, Rfl: 5    prochlorperazine (Compazine) 10 mg tablet, Take 1 tablet (10 mg) by mouth every 6 hours if needed for nausea or vomiting. Do not fill before January 29, 2025., Disp: 30 tablet, Rfl: 5    rosuvastatin (Crestor) 40 mg tablet, TAKE 1 TABLET BY MOUTH EVERY DAY, Disp: 90 tablet, Rfl: 3    sennosides (Senokot) 8.6 mg tablet, Take 2 tablets (17.2 mg) by mouth as needed at bedtime for constipation. Do not fill before January 29, 2025., Disp: 30 tablet, Rfl: 11    sildenafil (Viagra) 100 mg tablet, Take by mouth. TAKE 1 TABLET AS NEEDED APPROXIMATELY 1 HOUR BEFORE SEXUAL ACTIVITY, Disp: , Rfl:     dexAMETHasone (Decadron) 4 mg tablet, Take 2 tablets (8 mg) by mouth 2 times a day for 5 doses. Start 2 days prior to the first dose of Amivantamab on Cycle 1 only., Disp: 10 tablet, Rfl: 0    dexAMETHasone (Decadron) 4 mg tablet, Take 2 tablets (8 mg) by mouth 2 times a day for 5 doses. Start 2 days prior to the first dose of Amivantamab on Cycle 1 only. Do not fill before January 29, 2025., Disp: 10 tablet, Rfl: 0    magnesium oxide (Mag-Ox) 400 mg (241.3 mg magnesium) tablet, Take 1 tablet (400 mg) by mouth 2  times a day., Disp: 60 tablet, Rfl: 11    Review of Systems   Constitutional:  Positive for fatigue. Negative for appetite change and unexpected weight change.   HENT:   Positive for sore throat.    Respiratory: Negative.     Cardiovascular: Negative.    Gastrointestinal: Negative.    Musculoskeletal: Negative.    Skin: Negative.    Neurological:  Negative for numbness.   All other systems reviewed and are negative.       Objective   BSA: 2.03 meters squared  Wt Readings from Last 5 Encounters:   02/11/25 82.1 kg (181 lb)   02/04/25 82.3 kg (181 lb 7 oz)   01/30/25 86.2 kg (190 lb 0.6 oz)   01/29/25 84.9 kg (187 lb 2.7 oz)   01/28/25 84.1 kg (185 lb 4.8 oz)     Pulse 108   Temp 36.8 °C (98.2 °F) (Skin)   Resp 18   Wt 82.1 kg (181 lb)   SpO2 100%   BMI 24.98 kg/m²     ECOG Score: 1- Restricted in physically strenuous activity.  Carries out light duty.      Physical Exam  Vitals reviewed.   Constitutional:       General: He is not in acute distress.     Appearance: Normal appearance.   HENT:      Head: Normocephalic and atraumatic.      Mouth/Throat:      Mouth: Mucous membranes are moist.   Eyes:      Conjunctiva/sclera: Conjunctivae normal.      Pupils: Pupils are equal, round, and reactive to light.   Cardiovascular:      Rate and Rhythm: Normal rate and regular rhythm.      Heart sounds: Normal heart sounds. No murmur heard.  Pulmonary:      Effort: Pulmonary effort is normal. No respiratory distress.   Abdominal:      General: Bowel sounds are normal. There is no distension.      Palpations: Abdomen is soft.   Musculoskeletal:         General: Normal range of motion.      Cervical back: Normal range of motion.   Skin:     General: Skin is warm and dry.      Comments: Pleurx dressing - D&I to RUQ   Neurological:      General: No focal deficit present.      Mental Status: He is alert and oriented to person, place, and time. Mental status is at baseline.   Psychiatric:         Mood and Affect: Mood normal.          Behavior: Behavior normal.         Thought Content: Thought content normal.          Results:  Labs:  Lab Results   Component Value Date    WBC 1.6 (L) 02/11/2025    HGB 11.5 (L) 02/11/2025    HCT 35.3 (L) 02/11/2025    MCV 86 02/11/2025    PLT 71 (L) 02/11/2025      Lab Results   Component Value Date    NEUTROABS 1.01 (L) 02/11/2025      Lab Results   Component Value Date    GLUCOSE 291 (H) 02/11/2025    CALCIUM 8.0 (L) 02/11/2025     02/11/2025    K 3.7 02/11/2025    CO2 24 02/11/2025     02/11/2025    BUN 22 02/11/2025    CREATININE 0.83 02/11/2025    MG 0.70 (LL) 02/11/2025     Lab Results   Component Value Date    ALT 33 02/11/2025    AST 18 02/11/2025    ALKPHOS 100 02/11/2025    BILITOT 0.4 02/11/2025    BILIDIR 0.1 11/18/2019      Lab Results   Component Value Date    TSH 2.12 01/28/2025       Imaging:  No new imaging.       Assessment/Plan      Jayant Holland is a 74 y.o. male here for follow up of NSCLC adenocarcinoma    # NSCLC adenocarcinoma  - [id number] (pleural effusion)  - pending brain MRI  - PD-L1 5%, EGFR [id number] ([id number])   - discussed that given EGFR exon 20 mutation, recommend combination chemotherapy+amivantamab, consented  - planned to start C1D1 1/28/25  - plan for surveillance CT scans after C4  - C1D1 dexamethasone Rx started today - for 3 doses.  Discussed potential infusion reaction.  Pt verbalized understanding.    - RTC 2/11/25 for C1D15.   - Per pt request, 2/19/25 C2 infusion and visit transferred to [facility].  Visit with FORTINO Grace.  Scan review visits with Dr. Solitario at [facility].    - C2D1 2/19/25 at [facility], labs 2/17/25 prior to visit.      # Malignant pleural effusion  - s/p thoracentesis and contacted by IP while in visit for repeat thora.   - Right pleurx catheter placed 12/17/24 with HH referral in place.  Drained 2x/wk.  - continue to monitor    # Hypomagnesia 0.70 2/11/25  - 4g IV Magnesium during 2/11/25 infusion visit  - Rx  sent for Mag Oxide, 1 tab BID, repeat lab 2/14/25  - Routine magnesium lab added to tx plan, weekly checks    # Unintentional wt loss  - Recommended pt add Ensure/Boost supplements and snacks throughout the day. Will try increased nutritional intake vs pharm intervention for now.    - Dietician referral placed.   - 2/11:  wt loss stabilized, pt started daily Boost supplements    # Advanced care planning  - discussed incurable but treatable nature of metastatic disease, and goal of therapy is to prolong life while maintaining or improving quality of life.  - 1/30/25 - Supportive onc referral placed.  Pt declined need for visit at this time.  Supportive onc will follow-up in 2-3 weeks.

## 2025-02-11 NOTE — PROGRESS NOTES
Patient arrived ambulatory to infusion for scheduled tx of amivantamab. Saw [place] RODRIGUEZ  in clinic prior. Denies any new or worsening sx. Patient received 4g of Mg for a level of 0.70 per [place] Rehmna request.  Tolerated infusion without issue. Patient made aware of upcoming appointment.Discharged in stable condition.

## 2025-02-13 PROBLEM — Z51.12 ENCOUNTER FOR MONOCLONAL ANTIBODY TREATMENT FOR MALIGNANCY: Status: ACTIVE | Noted: 2025-02-13

## 2025-02-13 ASSESSMENT — ENCOUNTER SYMPTOMS
NUMBNESS: 0
UNEXPECTED WEIGHT CHANGE: 0
APPETITE CHANGE: 0
SORE THROAT: 1

## 2025-02-14 ENCOUNTER — HOME CARE VISIT (OUTPATIENT)
Dept: HOME HEALTH SERVICES | Facility: HOME HEALTH | Age: 75
End: 2025-02-14
Payer: MEDICARE

## 2025-02-14 ENCOUNTER — LAB (OUTPATIENT)
Dept: LAB | Facility: HOSPITAL | Age: 75
End: 2025-02-14
Payer: MEDICARE

## 2025-02-14 DIAGNOSIS — C80.1 MALIGNANT (PRIMARY) NEOPLASM, UNSPECIFIED (MULTI): Primary | ICD-10-CM

## 2025-02-14 DIAGNOSIS — C80.1 ADENOCARCINOMA (MULTI): ICD-10-CM

## 2025-02-14 DIAGNOSIS — E83.42 HYPOMAGNESEMIA: ICD-10-CM

## 2025-02-14 LAB
ALBUMIN SERPL BCP-MCNC: 3.3 G/DL (ref 3.4–5)
ALP SERPL-CCNC: 83 U/L (ref 33–136)
ALT SERPL W P-5'-P-CCNC: 36 U/L (ref 10–52)
ANION GAP SERPL CALC-SCNC: 10 MMOL/L (ref 10–20)
AST SERPL W P-5'-P-CCNC: 23 U/L (ref 9–39)
ATRIAL RATE: 96 BPM
BASOPHILS # BLD MANUAL: 0 X10*3/UL (ref 0–0.1)
BASOPHILS NFR BLD MANUAL: 0 %
BILIRUB SERPL-MCNC: 0.3 MG/DL (ref 0–1.2)
BUN SERPL-MCNC: 15 MG/DL (ref 6–23)
BURR CELLS BLD QL SMEAR: ABNORMAL
CALCIUM SERPL-MCNC: 8 MG/DL (ref 8.6–10.3)
CHLORIDE SERPL-SCNC: 101 MMOL/L (ref 98–107)
CO2 SERPL-SCNC: 28 MMOL/L (ref 21–32)
CREAT SERPL-MCNC: 0.89 MG/DL (ref 0.5–1.3)
DACRYOCYTES BLD QL SMEAR: ABNORMAL
EGFRCR SERPLBLD CKD-EPI 2021: 90 ML/MIN/1.73M*2
EOSINOPHIL # BLD MANUAL: 0.05 X10*3/UL (ref 0–0.4)
EOSINOPHIL NFR BLD MANUAL: 2 %
ERYTHROCYTE [DISTWIDTH] IN BLOOD BY AUTOMATED COUNT: 14.2 % (ref 11.5–14.5)
GIANT PLATELETS BLD QL SMEAR: ABNORMAL
GLUCOSE SERPL-MCNC: 145 MG/DL (ref 74–99)
HCT VFR BLD AUTO: 37.5 % (ref 41–52)
HGB BLD-MCNC: 11.7 G/DL (ref 13.5–17.5)
HYPOCHROMIA BLD QL SMEAR: ABNORMAL
IMM GRANULOCYTES # BLD AUTO: 0.16 X10*3/UL (ref 0–0.5)
IMM GRANULOCYTES NFR BLD AUTO: 5.9 % (ref 0–0.9)
LYMPHOCYTES # BLD MANUAL: 1.38 X10*3/UL (ref 0.8–3)
LYMPHOCYTES NFR BLD MANUAL: 51 %
MCH RBC QN AUTO: 27.2 PG (ref 26–34)
MCHC RBC AUTO-ENTMCNC: 31.2 G/DL (ref 32–36)
MCV RBC AUTO: 87 FL (ref 80–100)
METAMYELOCYTES # BLD MANUAL: 0.03 X10*3/UL
METAMYELOCYTES NFR BLD MANUAL: 1 %
MONOCYTES # BLD MANUAL: 0.27 X10*3/UL (ref 0.05–0.8)
MONOCYTES NFR BLD MANUAL: 10 %
NEUTROPHILS # BLD MANUAL: 0.84 X10*3/UL (ref 1.6–5.5)
NEUTS BAND # BLD MANUAL: 0.08 X10*3/UL (ref 0–0.5)
NEUTS BAND NFR BLD MANUAL: 3 %
NEUTS SEG # BLD MANUAL: 0.76 X10*3/UL (ref 1.6–5)
NEUTS SEG NFR BLD MANUAL: 28 %
NRBC BLD-RTO: 0 /100 WBCS (ref 0–0)
OVALOCYTES BLD QL SMEAR: ABNORMAL
P AXIS: 49 DEGREES
PLATELET # BLD AUTO: 191 X10*3/UL (ref 150–450)
POLYCHROMASIA BLD QL SMEAR: ABNORMAL
POTASSIUM SERPL-SCNC: 4.3 MMOL/L (ref 3.5–5.3)
PR INTERVAL: 189 MS
PROT SERPL-MCNC: 5.5 G/DL (ref 6.4–8.2)
Q ONSET: 249 MS
QRS COUNT: 15 BEATS
QRS DURATION: 93 MS
QT INTERVAL: 386 MS
QTC CALCULATION(BAZETT): 459 MS
QTC FREDERICIA: 433 MS
R AXIS: 171 DEGREES
RBC # BLD AUTO: 4.3 X10*6/UL (ref 4.5–5.9)
RBC MORPH BLD: ABNORMAL
SODIUM SERPL-SCNC: 135 MMOL/L (ref 136–145)
T AXIS: -5 DEGREES
T OFFSET: 442 MS
TARGETS BLD QL SMEAR: ABNORMAL
TOTAL CELLS COUNTED BLD: 100
VARIANT LYMPHS # BLD MANUAL: 0.14 X10*3/UL (ref 0–0.3)
VARIANT LYMPHS NFR BLD: 5 %
VENTRICULAR RATE: 85 BPM
WBC # BLD AUTO: 2.7 X10*3/UL (ref 4.4–11.3)

## 2025-02-14 PROCEDURE — 83735 ASSAY OF MAGNESIUM: CPT

## 2025-02-14 PROCEDURE — 80053 COMPREHEN METABOLIC PANEL: CPT

## 2025-02-14 PROCEDURE — 36415 COLL VENOUS BLD VENIPUNCTURE: CPT

## 2025-02-14 PROCEDURE — 85027 COMPLETE CBC AUTOMATED: CPT

## 2025-02-14 PROCEDURE — 85007 BL SMEAR W/DIFF WBC COUNT: CPT

## 2025-02-14 PROCEDURE — G0299 HHS/HOSPICE OF RN EA 15 MIN: HCPCS | Mod: HHH

## 2025-02-15 ENCOUNTER — TELEPHONE (OUTPATIENT)
Dept: ADMISSION | Facility: HOSPITAL | Age: 75
End: 2025-02-15
Payer: MEDICARE

## 2025-02-15 DIAGNOSIS — E83.42 HYPOMAGNESEMIA: Primary | ICD-10-CM

## 2025-02-15 LAB — MAGNESIUM SERPL-MCNC: 0.86 MG/DL (ref 1.6–2.4)

## 2025-02-15 RX ORDER — MAGNESIUM SULFATE HEPTAHYDRATE 40 MG/ML
4 INJECTION, SOLUTION INTRAVENOUS ONCE
Status: CANCELLED | OUTPATIENT
Start: 2025-02-15 | End: 2025-02-15

## 2025-02-15 NOTE — TELEPHONE ENCOUNTER
Rebekah from  Outpatient lab called the office with a Critical Magnesium 0.86 at 11:55am 2/15. Message sent to fellow on call.

## 2025-02-15 NOTE — TELEPHONE ENCOUNTER
Patient set up with infusion for tomorrow at 9am main campus. Contacted patient currently displaying no symptoms of low magnesium - Dr. Solitario aware and will complete orders.

## 2025-02-16 ENCOUNTER — INFUSION (OUTPATIENT)
Dept: HEMATOLOGY/ONCOLOGY | Facility: HOSPITAL | Age: 75
End: 2025-02-16
Payer: MEDICARE

## 2025-02-16 VITALS
OXYGEN SATURATION: 99 % | SYSTOLIC BLOOD PRESSURE: 110 MMHG | RESPIRATION RATE: 12 BRPM | TEMPERATURE: 97.2 F | HEART RATE: 91 BPM | DIASTOLIC BLOOD PRESSURE: 50 MMHG

## 2025-02-16 VITALS
DIASTOLIC BLOOD PRESSURE: 58 MMHG | RESPIRATION RATE: 18 BRPM | TEMPERATURE: 97.5 F | SYSTOLIC BLOOD PRESSURE: 110 MMHG | HEART RATE: 102 BPM

## 2025-02-16 DIAGNOSIS — E83.42 HYPOMAGNESEMIA: ICD-10-CM

## 2025-02-16 PROCEDURE — 96365 THER/PROPH/DIAG IV INF INIT: CPT | Mod: INF

## 2025-02-16 PROCEDURE — 96366 THER/PROPH/DIAG IV INF ADDON: CPT | Mod: INF

## 2025-02-16 PROCEDURE — 2500000004 HC RX 250 GENERAL PHARMACY W/ HCPCS (ALT 636 FOR OP/ED): Performed by: STUDENT IN AN ORGANIZED HEALTH CARE EDUCATION/TRAINING PROGRAM

## 2025-02-16 RX ORDER — MAGNESIUM SULFATE HEPTAHYDRATE 40 MG/ML
4 INJECTION, SOLUTION INTRAVENOUS ONCE
Status: COMPLETED | OUTPATIENT
Start: 2025-02-16 | End: 2025-02-16

## 2025-02-16 RX ADMIN — MAGNESIUM SULFATE IN WATER 4 G: 4 INJECTION, SOLUTION INTRAVENOUS at 09:11

## 2025-02-16 SDOH — ECONOMIC STABILITY: GENERAL

## 2025-02-16 ASSESSMENT — ENCOUNTER SYMPTOMS
LOWEST PAIN SEVERITY IN PAST 24 HOURS: 0/10
DENIES PAIN: 1
FATIGUE: 1
HIGHEST PAIN SEVERITY IN PAST 24 HOURS: 0/10
CHANGE IN APPETITE: UNCHANGED
PERSON REPORTING PAIN: PATIENT
PAIN SEVERITY GOAL: 0/10
SUBJECTIVE PAIN PROGRESSION: UNCHANGED
DYSPNEA ACTIVITY LEVEL: AFTER AMBULATING MORE THAN 20 FT
MUSCLE WEAKNESS: 1
SHORTNESS OF BREATH: 1
FATIGUES EASILY: 1
APPETITE LEVEL: GOOD

## 2025-02-16 ASSESSMENT — ACTIVITIES OF DAILY LIVING (ADL)
MONEY MANAGEMENT (EXPENSES/BILLS): NEEDS ASSISTANCE
AMBULATION ASSISTANCE: 1
AMBULATION ASSISTANCE: STAND BY ASSIST

## 2025-02-17 ENCOUNTER — LAB (OUTPATIENT)
Dept: LAB | Facility: HOSPITAL | Age: 75
End: 2025-02-17
Payer: MEDICARE

## 2025-02-17 ENCOUNTER — HOME CARE VISIT (OUTPATIENT)
Dept: HOME HEALTH SERVICES | Facility: HOME HEALTH | Age: 75
End: 2025-02-17
Payer: MEDICARE

## 2025-02-17 VITALS
HEART RATE: 110 BPM | RESPIRATION RATE: 20 BRPM | DIASTOLIC BLOOD PRESSURE: 62 MMHG | OXYGEN SATURATION: 99 % | SYSTOLIC BLOOD PRESSURE: 100 MMHG | TEMPERATURE: 97.9 F

## 2025-02-17 DIAGNOSIS — E83.42 HYPOMAGNESEMIA: Primary | ICD-10-CM

## 2025-02-17 LAB — MAGNESIUM SERPL-MCNC: 1.34 MG/DL (ref 1.6–2.4)

## 2025-02-17 PROCEDURE — 83735 ASSAY OF MAGNESIUM: CPT

## 2025-02-17 PROCEDURE — G0300 HHS/HOSPICE OF LPN EA 15 MIN: HCPCS | Mod: HHH

## 2025-02-17 RX ORDER — MAGNESIUM SULFATE HEPTAHYDRATE 40 MG/ML
2 INJECTION, SOLUTION INTRAVENOUS ONCE
Status: CANCELLED | OUTPATIENT
Start: 2025-02-19 | End: 2025-02-19

## 2025-02-17 RX ORDER — MAGNESIUM SULFATE HEPTAHYDRATE 40 MG/ML
2 INJECTION, SOLUTION INTRAVENOUS ONCE
Status: CANCELLED | OUTPATIENT
Start: 2025-02-18

## 2025-02-17 ASSESSMENT — ENCOUNTER SYMPTOMS
APPETITE LEVEL: GOOD
LAST BOWEL MOVEMENT: 67252
DENIES PAIN: 1

## 2025-02-18 ENCOUNTER — INFUSION (OUTPATIENT)
Dept: HEMATOLOGY/ONCOLOGY | Facility: CLINIC | Age: 75
End: 2025-02-18
Payer: MEDICARE

## 2025-02-18 VITALS
OXYGEN SATURATION: 99 % | HEART RATE: 88 BPM | SYSTOLIC BLOOD PRESSURE: 113 MMHG | BODY MASS INDEX: 26.11 KG/M2 | HEIGHT: 71 IN | DIASTOLIC BLOOD PRESSURE: 65 MMHG | RESPIRATION RATE: 16 BRPM | WEIGHT: 186.5 LBS | TEMPERATURE: 97.7 F

## 2025-02-18 DIAGNOSIS — J91.0 MALIGNANT PLEURAL EFFUSION (CMS-HCC): ICD-10-CM

## 2025-02-18 DIAGNOSIS — J91.0 MALIGNANT PLEURAL EFFUSION (CMS-HCC): Primary | ICD-10-CM

## 2025-02-18 DIAGNOSIS — C80.1 ADENOCARCINOMA (MULTI): ICD-10-CM

## 2025-02-18 DIAGNOSIS — E83.42 HYPOMAGNESEMIA: ICD-10-CM

## 2025-02-18 DIAGNOSIS — E83.42 HYPOMAGNESEMIA: Primary | ICD-10-CM

## 2025-02-18 LAB
BASOPHILS # BLD MANUAL: 0 X10*3/UL (ref 0–0.1)
BASOPHILS NFR BLD MANUAL: 0 %
EOSINOPHIL # BLD MANUAL: 0.18 X10*3/UL (ref 0–0.4)
EOSINOPHIL NFR BLD MANUAL: 3 %
ERYTHROCYTE [DISTWIDTH] IN BLOOD BY AUTOMATED COUNT: 14.6 % (ref 11.5–14.5)
HCT VFR BLD AUTO: 35.1 % (ref 41–52)
HGB BLD-MCNC: 10.9 G/DL (ref 13.5–17.5)
HYPOCHROMIA BLD QL SMEAR: ABNORMAL
IMM GRANULOCYTES # BLD AUTO: 0.41 X10*3/UL (ref 0–0.5)
IMM GRANULOCYTES NFR BLD AUTO: 6.8 % (ref 0–0.9)
LYMPHOCYTES # BLD MANUAL: 0.6 X10*3/UL (ref 0.8–3)
LYMPHOCYTES NFR BLD MANUAL: 10 %
MAGNESIUM SERPL-MCNC: 1.07 MG/DL (ref 1.6–2.4)
MCH RBC QN AUTO: 27.3 PG (ref 26–34)
MCHC RBC AUTO-ENTMCNC: 31.1 G/DL (ref 32–36)
MCV RBC AUTO: 88 FL (ref 80–100)
METAMYELOCYTES # BLD MANUAL: 0.12 X10*3/UL
METAMYELOCYTES NFR BLD MANUAL: 2 %
MONOCYTES # BLD MANUAL: 0.48 X10*3/UL (ref 0.05–0.8)
MONOCYTES NFR BLD MANUAL: 8 %
MYELOCYTES # BLD MANUAL: 0.06 X10*3/UL
MYELOCYTES NFR BLD MANUAL: 1 %
NEUTROPHILS # BLD MANUAL: 4.38 X10*3/UL (ref 1.6–5.5)
NEUTS BAND # BLD MANUAL: 0.12 X10*3/UL (ref 0–0.5)
NEUTS BAND NFR BLD MANUAL: 2 %
NEUTS SEG # BLD MANUAL: 4.26 X10*3/UL (ref 1.6–5)
NEUTS SEG NFR BLD MANUAL: 71 %
NRBC BLD-RTO: ABNORMAL /100{WBCS}
OVALOCYTES BLD QL SMEAR: ABNORMAL
PLATELET # BLD AUTO: 239 X10*3/UL (ref 150–450)
POLYCHROMASIA BLD QL SMEAR: ABNORMAL
RBC # BLD AUTO: 3.99 X10*6/UL (ref 4.5–5.9)
RBC MORPH BLD: ABNORMAL
TOTAL CELLS COUNTED BLD: 100
VARIANT LYMPHS # BLD MANUAL: 0.18 X10*3/UL (ref 0–0.3)
VARIANT LYMPHS NFR BLD: 3 %
WBC # BLD AUTO: 6 X10*3/UL (ref 4.4–11.3)

## 2025-02-18 PROCEDURE — 85007 BL SMEAR W/DIFF WBC COUNT: CPT

## 2025-02-18 PROCEDURE — 83735 ASSAY OF MAGNESIUM: CPT

## 2025-02-18 PROCEDURE — 96365 THER/PROPH/DIAG IV INF INIT: CPT | Mod: INF

## 2025-02-18 PROCEDURE — 2500000004 HC RX 250 GENERAL PHARMACY W/ HCPCS (ALT 636 FOR OP/ED): Performed by: NURSE PRACTITIONER

## 2025-02-18 PROCEDURE — 85027 COMPLETE CBC AUTOMATED: CPT

## 2025-02-18 RX ORDER — MAGNESIUM SULFATE HEPTAHYDRATE 40 MG/ML
4 INJECTION, SOLUTION INTRAVENOUS ONCE
OUTPATIENT
Start: 2025-02-27

## 2025-02-18 RX ORDER — FAMOTIDINE 10 MG/ML
20 INJECTION, SOLUTION INTRAVENOUS ONCE AS NEEDED
Status: CANCELLED | OUTPATIENT
Start: 2025-02-19

## 2025-02-18 RX ORDER — MAGNESIUM SULFATE HEPTAHYDRATE 40 MG/ML
4 INJECTION, SOLUTION INTRAVENOUS ONCE
OUTPATIENT
Start: 2025-02-24

## 2025-02-18 RX ORDER — MAGNESIUM SULFATE HEPTAHYDRATE 40 MG/ML
4 INJECTION, SOLUTION INTRAVENOUS ONCE
Status: CANCELLED | OUTPATIENT
Start: 2025-02-21

## 2025-02-18 RX ORDER — MAGNESIUM SULFATE HEPTAHYDRATE 40 MG/ML
4 INJECTION, SOLUTION INTRAVENOUS ONCE
Status: CANCELLED | OUTPATIENT
Start: 2025-02-19

## 2025-02-18 RX ORDER — ALBUTEROL SULFATE 0.83 MG/ML
3 SOLUTION RESPIRATORY (INHALATION) AS NEEDED
Status: CANCELLED | OUTPATIENT
Start: 2025-02-19

## 2025-02-18 RX ORDER — MAGNESIUM SULFATE HEPTAHYDRATE 40 MG/ML
2 INJECTION, SOLUTION INTRAVENOUS ONCE
Status: COMPLETED | OUTPATIENT
Start: 2025-02-18 | End: 2025-02-18

## 2025-02-18 RX ORDER — EPINEPHRINE 0.3 MG/.3ML
0.3 INJECTION SUBCUTANEOUS EVERY 5 MIN PRN
Status: CANCELLED | OUTPATIENT
Start: 2025-02-19

## 2025-02-18 RX ORDER — PROCHLORPERAZINE EDISYLATE 5 MG/ML
10 INJECTION INTRAMUSCULAR; INTRAVENOUS EVERY 6 HOURS PRN
Status: CANCELLED | OUTPATIENT
Start: 2025-02-19

## 2025-02-18 RX ORDER — ACETAMINOPHEN 325 MG/1
650 TABLET ORAL ONCE
Status: CANCELLED | OUTPATIENT
Start: 2025-02-19

## 2025-02-18 RX ORDER — DIPHENHYDRAMINE HYDROCHLORIDE 50 MG/ML
50 INJECTION INTRAMUSCULAR; INTRAVENOUS AS NEEDED
Status: CANCELLED | OUTPATIENT
Start: 2025-02-19

## 2025-02-18 RX ORDER — PROCHLORPERAZINE MALEATE 10 MG
10 TABLET ORAL EVERY 6 HOURS PRN
Status: CANCELLED | OUTPATIENT
Start: 2025-02-19

## 2025-02-18 RX ORDER — MAGNESIUM SULFATE HEPTAHYDRATE 40 MG/ML
4 INJECTION, SOLUTION INTRAVENOUS ONCE
OUTPATIENT
Start: 2025-03-03

## 2025-02-18 RX ORDER — MAGNESIUM SULFATE HEPTAHYDRATE 40 MG/ML
4 INJECTION, SOLUTION INTRAVENOUS ONCE
OUTPATIENT
Start: 2025-03-06

## 2025-02-18 RX ORDER — DIPHENHYDRAMINE HCL 50 MG
50 CAPSULE ORAL ONCE
Status: CANCELLED | OUTPATIENT
Start: 2025-02-19

## 2025-02-18 RX ORDER — PALONOSETRON 0.05 MG/ML
0.25 INJECTION, SOLUTION INTRAVENOUS ONCE
Status: CANCELLED | OUTPATIENT
Start: 2025-02-19

## 2025-02-18 RX ADMIN — MAGNESIUM SULFATE IN WATER 2 G: 40 INJECTION, SOLUTION INTRAVENOUS at 13:09

## 2025-02-18 ASSESSMENT — PAIN SCALES - GENERAL: PAINLEVEL_OUTOF10: 0-NO PAIN

## 2025-02-18 NOTE — PROGRESS NOTES
Patient here for 2MG Magnesium only, tolerated well. Patient to [facility] on 02/18 for treatment. Patient denies any questions at this time. Discharged in stable condition.

## 2025-02-19 ENCOUNTER — INFUSION (OUTPATIENT)
Dept: HEMATOLOGY/ONCOLOGY | Facility: CLINIC | Age: 75
End: 2025-02-19
Payer: MEDICARE

## 2025-02-19 ENCOUNTER — OFFICE VISIT (OUTPATIENT)
Dept: HEMATOLOGY/ONCOLOGY | Facility: CLINIC | Age: 75
End: 2025-02-19
Payer: MEDICARE

## 2025-02-19 VITALS
WEIGHT: 185.85 LBS | DIASTOLIC BLOOD PRESSURE: 75 MMHG | SYSTOLIC BLOOD PRESSURE: 121 MMHG | OXYGEN SATURATION: 96 % | TEMPERATURE: 97.3 F | HEART RATE: 93 BPM | RESPIRATION RATE: 18 BRPM | BODY MASS INDEX: 25.65 KG/M2

## 2025-02-19 DIAGNOSIS — C80.1 ADENOCARCINOMA (MULTI): ICD-10-CM

## 2025-02-19 DIAGNOSIS — E83.42 HYPOMAGNESEMIA: Primary | ICD-10-CM

## 2025-02-19 PROCEDURE — 96367 TX/PROPH/DG ADDL SEQ IV INF: CPT

## 2025-02-19 PROCEDURE — 96366 THER/PROPH/DIAG IV INF ADDON: CPT | Mod: INF

## 2025-02-19 PROCEDURE — 96417 CHEMO IV INFUS EACH ADDL SEQ: CPT

## 2025-02-19 PROCEDURE — 3048F LDL-C <100 MG/DL: CPT | Performed by: NURSE PRACTITIONER

## 2025-02-19 PROCEDURE — 4010F ACE/ARB THERAPY RXD/TAKEN: CPT | Performed by: NURSE PRACTITIONER

## 2025-02-19 PROCEDURE — 96415 CHEMO IV INFUSION ADDL HR: CPT

## 2025-02-19 PROCEDURE — 1157F ADVNC CARE PLAN IN RCRD: CPT | Performed by: NURSE PRACTITIONER

## 2025-02-19 PROCEDURE — 1036F TOBACCO NON-USER: CPT | Performed by: NURSE PRACTITIONER

## 2025-02-19 PROCEDURE — 1111F DSCHRG MED/CURRENT MED MERGE: CPT | Performed by: NURSE PRACTITIONER

## 2025-02-19 PROCEDURE — 2500000004 HC RX 250 GENERAL PHARMACY W/ HCPCS (ALT 636 FOR OP/ED): Performed by: STUDENT IN AN ORGANIZED HEALTH CARE EDUCATION/TRAINING PROGRAM

## 2025-02-19 PROCEDURE — 2500000004 HC RX 250 GENERAL PHARMACY W/ HCPCS (ALT 636 FOR OP/ED): Performed by: NURSE PRACTITIONER

## 2025-02-19 PROCEDURE — 96375 TX/PRO/DX INJ NEW DRUG ADDON: CPT | Mod: INF

## 2025-02-19 PROCEDURE — 96411 CHEMO IV PUSH ADDL DRUG: CPT

## 2025-02-19 PROCEDURE — 3061F NEG MICROALBUMINURIA REV: CPT | Performed by: NURSE PRACTITIONER

## 2025-02-19 PROCEDURE — 96413 CHEMO IV INFUSION 1 HR: CPT

## 2025-02-19 PROCEDURE — 3051F HG A1C>EQUAL 7.0%<8.0%: CPT | Performed by: NURSE PRACTITIONER

## 2025-02-19 PROCEDURE — 2500000001 HC RX 250 WO HCPCS SELF ADMINISTERED DRUGS (ALT 637 FOR MEDICARE OP): Performed by: STUDENT IN AN ORGANIZED HEALTH CARE EDUCATION/TRAINING PROGRAM

## 2025-02-19 PROCEDURE — 99213 OFFICE O/P EST LOW 20 MIN: CPT | Performed by: NURSE PRACTITIONER

## 2025-02-19 RX ORDER — PALONOSETRON 0.05 MG/ML
0.25 INJECTION, SOLUTION INTRAVENOUS ONCE
Status: COMPLETED | OUTPATIENT
Start: 2025-02-19 | End: 2025-02-19

## 2025-02-19 RX ORDER — EPINEPHRINE 0.3 MG/.3ML
0.3 INJECTION SUBCUTANEOUS EVERY 5 MIN PRN
Status: DISCONTINUED | OUTPATIENT
Start: 2025-02-19 | End: 2025-02-19 | Stop reason: HOSPADM

## 2025-02-19 RX ORDER — PROCHLORPERAZINE MALEATE 10 MG
10 TABLET ORAL EVERY 6 HOURS PRN
Status: DISCONTINUED | OUTPATIENT
Start: 2025-02-19 | End: 2025-02-19 | Stop reason: HOSPADM

## 2025-02-19 RX ORDER — HEPARIN SODIUM,PORCINE/PF 10 UNIT/ML
50 SYRINGE (ML) INTRAVENOUS AS NEEDED
Status: CANCELLED | OUTPATIENT
Start: 2025-02-19

## 2025-02-19 RX ORDER — ALBUTEROL SULFATE 0.83 MG/ML
3 SOLUTION RESPIRATORY (INHALATION) AS NEEDED
Status: DISCONTINUED | OUTPATIENT
Start: 2025-02-19 | End: 2025-02-19 | Stop reason: HOSPADM

## 2025-02-19 RX ORDER — ACETAMINOPHEN 325 MG/1
650 TABLET ORAL ONCE
Status: COMPLETED | OUTPATIENT
Start: 2025-02-19 | End: 2025-02-19

## 2025-02-19 RX ORDER — FAMOTIDINE 10 MG/ML
20 INJECTION, SOLUTION INTRAVENOUS ONCE AS NEEDED
Status: DISCONTINUED | OUTPATIENT
Start: 2025-02-19 | End: 2025-02-19 | Stop reason: HOSPADM

## 2025-02-19 RX ORDER — HEPARIN 100 UNIT/ML
500 SYRINGE INTRAVENOUS AS NEEDED
Status: DISCONTINUED | OUTPATIENT
Start: 2025-02-19 | End: 2025-02-19 | Stop reason: HOSPADM

## 2025-02-19 RX ORDER — MAGNESIUM SULFATE HEPTAHYDRATE 40 MG/ML
4 INJECTION, SOLUTION INTRAVENOUS ONCE
Status: COMPLETED | OUTPATIENT
Start: 2025-02-19 | End: 2025-02-19

## 2025-02-19 RX ORDER — DIPHENHYDRAMINE HCL 50 MG
50 CAPSULE ORAL ONCE
Status: COMPLETED | OUTPATIENT
Start: 2025-02-19 | End: 2025-02-19

## 2025-02-19 RX ORDER — HEPARIN 100 UNIT/ML
500 SYRINGE INTRAVENOUS AS NEEDED
Status: CANCELLED | OUTPATIENT
Start: 2025-02-19

## 2025-02-19 RX ORDER — DIPHENHYDRAMINE HYDROCHLORIDE 50 MG/ML
50 INJECTION INTRAMUSCULAR; INTRAVENOUS AS NEEDED
Status: DISCONTINUED | OUTPATIENT
Start: 2025-02-19 | End: 2025-02-19 | Stop reason: HOSPADM

## 2025-02-19 RX ORDER — PROCHLORPERAZINE EDISYLATE 5 MG/ML
10 INJECTION INTRAMUSCULAR; INTRAVENOUS EVERY 6 HOURS PRN
Status: DISCONTINUED | OUTPATIENT
Start: 2025-02-19 | End: 2025-02-19 | Stop reason: HOSPADM

## 2025-02-19 RX ORDER — HEPARIN SODIUM,PORCINE/PF 10 UNIT/ML
50 SYRINGE (ML) INTRAVENOUS AS NEEDED
Status: DISCONTINUED | OUTPATIENT
Start: 2025-02-19 | End: 2025-02-19 | Stop reason: HOSPADM

## 2025-02-19 RX ADMIN — AMIVANTAMAB 1750 MG: 350 INJECTION INTRAVENOUS at 12:50

## 2025-02-19 RX ADMIN — DIPHENHYDRAMINE HYDROCHLORIDE 50 MG: 50 CAPSULE ORAL at 10:49

## 2025-02-19 RX ADMIN — PALONOSETRON HYDROCHLORIDE 250 MCG: 0.25 INJECTION INTRAVENOUS at 10:49

## 2025-02-19 RX ADMIN — DEXAMETHASONE SODIUM PHOSPHATE 12 MG: 10 INJECTION, SOLUTION INTRAMUSCULAR; INTRAVENOUS at 10:49

## 2025-02-19 RX ADMIN — MAGNESIUM SULFATE HEPTAHYDRATE 4 G: 4 INJECTION, SOLUTION INTRAVENOUS at 08:42

## 2025-02-19 RX ADMIN — FOSAPREPITANT 150 MG: 150 INJECTION, POWDER, LYOPHILIZED, FOR SOLUTION INTRAVENOUS at 11:14

## 2025-02-19 RX ADMIN — CARBOPLATIN 550 MG: 10 INJECTION, SOLUTION INTRAVENOUS at 12:09

## 2025-02-19 RX ADMIN — PEMETREXED DISODIUM 850 MG: 500 INJECTION, POWDER, LYOPHILIZED, FOR SOLUTION INTRAVENOUS at 11:53

## 2025-02-19 RX ADMIN — ACETAMINOPHEN 650 MG: 325 TABLET ORAL at 10:49

## 2025-02-19 ASSESSMENT — PAIN SCALES - GENERAL: PAINLEVEL_OUTOF10: 0-NO PAIN

## 2025-02-19 NOTE — PROGRESS NOTES
[facility] - Medical Oncology Follow-Up Visit  Patient ID: Jayant Holland is a 74 y.o. male with NSCLC adenocarcinoma     Current therapy: cyanocobalamin (Vitamin B-12) injection 1,000 mcg, 1,000 mcg, intramuscular, Once, 1 of 6 cycles  Administration: 1,000 mcg (1/29/2025)    fosaprepitant (Emend) 150 mg in sodium chloride 0.9% 250 mL IV, 150 mg, intravenous, Once, 1 of 4 cycles  Administration: 150 mg (1/29/2025)    CARBOplatin (Paraplatin) 600 mg in sodium chloride 0.9% 170 mL IV, 600 mg, intravenous, Once, 1 of 4 cycles  Administration: 600 mg (1/29/2025)    amivantamab-vmjw (Rybrevant) 350 mg in sodium chloride 0.9% 250 mL IV, 350 mg, intravenous, Once, 1 of 18 cycles  Administration: 350 mg (1/29/2025), 1,400 mg (1/30/2025), 1,750 mg (2/4/2025), 1,750 mg (2/11/2025)  methylPREDNISolone sod succinate (SOLU-Medrol) 40 mg/mL injection 40 mg, 40 mg, intravenous, As needed, 1 of 18 cycles    palonosetron (Aloxi) injection 250 mcg, 250 mcg, intravenous, Once, 1 of 4 cycles  Administration: 250 mcg (1/29/2025)    PEMEtrexed disodium (Alimta) 1,100 mg in sodium chloride 0.9% 154 mL IV, 500 mg/m2 = 1,100 mg, intravenous, Once, 1 of 18 cycles  Dose modification: 400 mg/m2 (original dose 500 mg/m2, Cycle 2)  Administration: 1,100 mg (1/29/2025)       Chief Concern: follow-up and readiness to treat     Oncologic History:     DIAGNOSIS  NSCLC adenocarcinoma     STAGING  [id number]     CURRENT SITES OF DISEASE  RUL, R pleural effusion/pleural carcinomatosis, R hilar, mediastinal, retrocaval nodes      MOLECULAR GENOMICS  PD-L1 5%  EGFR [id number] ([id number])      PRIOR THERAPY      CURRENT THERAPY  Carboplatin/Pemetrexed/Amivantamab C1D1 1/29/25 -   Pemetrexed dose reduced 400mg/m2 C2 -      CURRENT ONCOLOGICAL PROBLEMS  Unintentional wt loss - stabilized        HISTORY OF PRESENT ILLNESS  Mr. Holland is a 73 yo with PMH significant for DMII, HTN, and HLD who had a CXR  done on 12/5/24 for persistent coughing after pneumonia about a month prior, which was concerning for moderate pleural effusion of the R lung. He went to the ER, where a CT chest w/contrast was done with spiculated RUL mass measuring 2.2 x 2.1 cm and large R pleural effusion. He was referred to diagnostic clinic and seen on 12/9/24 by Kirstin where he noted persistent cough for 6 weeks, constant dyspnea, wheezing, and unintentional 30 lb weight loss over the last year. He was referred to pulmonology and had thoracentesis on 12/10/24 with 980 ml removed, cytology with adenocarcinoma, PD-L1 5%, and NGS with EGFR exon 20 mutation. He had repeat thoracentesis on 12/17/24 with 2.4L removed. PET/CT 12/20/24 with FDV avidity of the RL nodule SUV max 9.2, multiple RLL avid nodules SUV max 4.1, FDG-avid pleural thickening on the right, multiple R hilar, mediastinal, subcarinal nodes, and moderate R pleural effusion. Mildly avid GGO within JANELLE SUV max 2.0. Brain MRI is rescheduled for 1/15/25.   Consented for carboplatin/pemetrexed/amivantamab to start 1/29/25.  Treatment delayed d/t hospitalization.      1/21/25 - 1/23/25 - hospitalization 2/2 recurrent pleural effusion.        PAST MEDICAL HISTORY  DMII   HTN  HLD   Osteoarthritis (neck)  asthma     SOCIAL HISTORY  Lives at home with his wife. Retired from [employer], worked as a technician for 48 years, notes hazardous chemical exposures.  Tob: never  EtOH: occasionally  Illicits: none     FAMILY HISTORY  Mother - breast cancer    HPI   2/19/2025  Patient presents today for treatment readiness visit. He is doing well and has no complaints. He denies any fevers, chills or night sweats. No cough, chest pain or shortness of breath. No nausea or vomiting. No constipation or diarrhea. No urinary symptoms. Dry skin bridge of nose. No neuropathy.     Review of Systems:  A review of systems has been completed and are negative for complaints except what is stated in the HPI and/or past  medical history     Meds (Current):    Current Outpatient Medications:     albuterol 90 mcg/actuation inhaler, INHALE 2 PUFFS EVERY 4 HOURS IF NEEDED FOR WHEEZING OR SHORTNESS OF BREATH., Disp: 18 g, Rfl: 3    Blood glucose monitoring meter kit kit, 1 each if needed., Disp: , Rfl:     blood sugar diagnostic (Accu-Chek Jessica Plus test strp) strip, 2 times a day., Disp: , Rfl:     blood sugar diagnostic (Blood Glucose Test) strip, 1 strip once daily., Disp: 100 strip, Rfl: 3    blood sugar diagnostic (OneTouch Verio test strips) strip, 1 strip once daily., Disp: 100 strip, Rfl: 3    blood-glucose meter misc, 1 Device once daily., Disp: 1 each, Rfl: 0    clindamycin (Cleocin T) 1 % lotion, Apply topically to affected area twice daily. Do not fill before January 29, 2025., Disp: 60 mL, Rfl: 3    dexAMETHasone (Decadron) 4 mg tablet, Beginning with Cycle 2, take 1 tablet (4 mg) by mouth twice daily the day before PEMEtrexed treatment and twice daily the day after PEMEtrexed treatment., Disp: 4 tablet, Rfl: 11    dexAMETHasone (Decadron) 4 mg tablet, Take 2 tablets (8 mg) by mouth 2 times a day for 5 doses. Start 2 days prior to the first dose of Amivantamab on Cycle 1 only., Disp: 10 tablet, Rfl: 0    dexAMETHasone (Decadron) 4 mg tablet, Take 2 tablets (8 mg) by mouth 2 times a day for 5 doses. Start 2 days prior to the first dose of Amivantamab on Cycle 1 only. Do not fill before January 29, 2025., Disp: 10 tablet, Rfl: 0    dexAMETHasone (Decadron) 4 mg tablet, Beginning with Cycle 2, take 1 tablet (4 mg) by mouth twice daily the day before PEMEtrexed treatment and twice daily the day after PEMEtrexed treatment. Do not fill before January 29, 2025., Disp: 4 tablet, Rfl: 11    doxycycline (Vibramycin) 100 mg capsule, Take 1 capsule (100 mg) by mouth 2 times a day. For rash prevention. Take with at least 8 ounces (large glass) of water, do not lie down for 30 minutes after Do not fill before January 29, 2025., Disp: 56  capsule, Rfl: 3    folic acid (Folvite) 1 mg tablet, Take 1 tablet (1,000 mcg) by mouth once daily. Do not fill before January 29, 2025., Disp: 30 tablet, Rfl: 11    hydrocortisone 1 % cream, Apply topically to face, hands, feet, neck, back, and chest once daily at bedtime for rash prevention. Do not fill before January 29, 2025., Disp: 453.6 g, Rfl: 3    lancets 30 gauge misc, 1 Device 3 times a day., Disp: 200 each, Rfl: 3    lisinopril 40 mg tablet, TAKE 1 TABLET BY MOUTH EVERY DAY, Disp: 90 tablet, Rfl: 3    magnesium oxide (Mag-Ox) 400 mg (241.3 mg magnesium) tablet, Take 1 tablet (400 mg) by mouth 2 times a day., Disp: 60 tablet, Rfl: 11    meloxicam (Mobic) 15 mg tablet, Take 1 tablet (15 mg) by mouth once daily., Disp: 90 tablet, Rfl: 3    metFORMIN  mg 24 hr tablet, Take 2 tablets (1,000 mg) by mouth 2 times a day. Do not crush, chew, or split., Disp: 360 tablet, Rfl: 3    OLANZapine (ZyPREXA) 5 mg tablet, Take 1 tablet (5 mg) by mouth once daily at bedtime. For 4 days starting the evening of treatment Do not fill before January 29, 2025., Disp: 16 tablet, Rfl: 0    omeprazole (PriLOSEC) 20 mg DR capsule, TAKE 1 CAPSULE BY MOUTH EVERY DAY, Disp: 90 capsule, Rfl: 3    ondansetron (Zofran) 8 mg tablet, Take 1 tablet (8 mg) by mouth every 8 hours if needed for nausea or vomiting. Do not fill before January 29, 2025., Disp: 30 tablet, Rfl: 5    prochlorperazine (Compazine) 10 mg tablet, Take 1 tablet (10 mg) by mouth every 6 hours if needed for nausea or vomiting. Do not fill before January 29, 2025., Disp: 30 tablet, Rfl: 5    rosuvastatin (Crestor) 40 mg tablet, TAKE 1 TABLET BY MOUTH EVERY DAY, Disp: 90 tablet, Rfl: 3    sennosides (Senokot) 8.6 mg tablet, Take 2 tablets (17.2 mg) by mouth as needed at bedtime for constipation. Do not fill before January 29, 2025., Disp: 30 tablet, Rfl: 11    sildenafil (Viagra) 100 mg tablet, Take by mouth. TAKE 1 TABLET AS NEEDED APPROXIMATELY 1 HOUR BEFORE SEXUAL  ACTIVITY, Disp: , Rfl:   No current facility-administered medications for this visit.    Facility-Administered Medications Ordered in Other Visits:     alteplase (Cathflo Activase) injection 2 mg, 2 mg, intra-catheter, PRN, Alisha Solitario MD    heparin flush 10 unit/mL syringe 50 Units, 50 Units, intra-catheter, PATRICK, Alisha Solitario MD    heparin flush 100 unit/mL syringe 500 Units, 500 Units, intra-catheter, Alisha NGUYEN MD    magnesium sulfate 4 g in sterile water for injection 100 mL, 4 g, intravenous, Once, FORTINO Kaur-CNP, Last Rate: 50 mL/hr at 25 0842, 4 g at 25 0842      Objective   Vital Signs  /75, HR 93, Temp 36.3. Pox 96%    Wt Readings from Last 5 Encounters:   25 84.3 kg (185 lb 13.6 oz)   25 84.6 kg (186 lb 8 oz)   25 82.1 kg (181 lb)   25 82.3 kg (181 lb 7 oz)   25 86.2 kg (190 lb 0.6 oz)        Physical Exam:  ECO  Pain: 0  Constitutional: Well developed, awake/alert/oriented x3, no distress, alert and cooperative  Eyes: PER. sclera anicteric  ENMT: Oral mucosa moist, no lesions or thrush identified  Respiratory/Thorax: Breathing is non-labored. Lungs are clear to auscultation bilaterally. No adventitious breath sounds  Cardiovascular: S1-S2. Regular rate and rhythm. No murmurs, rubs, or gallops appreciated  Gastrointestinal: Abdomen soft nontender, nondistended, normal active bowel sounds.  Musculoskeletal: ROM intact, no joint swelling, normal strength  Extremities: normal extremities, no cyanosis, no edema, no clubbing  Lymphatics: no palpable lymphadenopathy   Skin: no rash  Neurologic: alert and oriented x3. Nonfocal exam. No myoclonus  Psychological: Pleasant, appropriate and easily engaged        Results:    Lab Results   Component Value Date    WBC 6.0 2025    HGB 10.9 (L) 2025    HCT 35.1 (L) 2025    MCV 88 2025     2025      Lab Results   Component Value Date    NEUTROABS 1.01 (L)  02/11/2025      Lab Results   Component Value Date    GLUCOSE 145 (H) 02/14/2025    CALCIUM 8.0 (L) 02/14/2025     (L) 02/14/2025    K 4.3 02/14/2025    CO2 28 02/14/2025     02/14/2025    BUN 15 02/14/2025    CREATININE 0.89 02/14/2025    MG 1.07 (L) 02/18/2025     Lab Results   Component Value Date    ALT 36 02/14/2025    AST 23 02/14/2025    ALKPHOS 83 02/14/2025    BILITOT 0.3 02/14/2025    BILIDIR 0.1 11/18/2019      Lab Results   Component Value Date    TSH 2.12 01/28/2025           Imaging:  No new imaging.       Assessment/Plan      Jayant Holland is a 74 y.o. male here for follow up of NSCLC adenocarcinoma    # NSCLC adenocarcinoma  - [id number] (pleural effusion)  - pending brain MRI  - PD-L1 5%, EGFR [id number] ([id number])   - discussed that given EGFR exon 20 mutation, recommend combination chemotherapy+amivantamab, consented  - planned to start C1D1 1/28/25  - plan for surveillance CT scans after C4  - C1D1 dexamethasone Rx started today - for 3 doses.  Discussed potential infusion reaction.  Pt verbalized understanding.    - RTC 2/11/25 for C1D15.   - Per pt request, 2/19/25 C2 infusion and visit transferred to [facility].  Visit with FORTINO Grace.  [place] [facility]    - C2D1 2/19/25 at [facility] (today), labs 2/17/25 prior to visit.      # Malignant pleural effusion  - s/p thoracentesis and contacted by IP while in visit for repeat thora.   - Right pleurx catheter placed 12/17/24 with HH referral in place.  Drained 2x/wk.  - continue to monitor    # Hypomagnesia 0.70 2/11/25  - 4g IV Magnesium during 2/19/25 infusion visit  - Rx sent for Mag Oxide, 1 tab BID, repeat lab 2/14/25  - Routine magnesium lab added to tx plan, weekly checks    # Unintentional wt loss  - Recommended pt add Ensure/Boost supplements and snacks throughout the day. Will try increased nutritional intake vs pharm intervention for now.    - Dietician referral placed.   -  2/11:  wt loss stabilized, pt started daily Boost supplements    # Advanced care planning  - discussed incurable but treatable nature of metastatic disease, and goal of therapy is to prolong life while maintaining or improving quality of life.  - 1/30/25 - Supportive onc referral placed.  Pt declined need for visit at this time.  Supportive onc will follow-up in 2-3 weeks.        Tito Matos, APRN-CNP  [facility]  Thoracic + H&N Medical Oncology     I spent a total of 30+ minutes on the date of the service which included preparing to see the patient, face-to-face patient care, completing clinical documentation, obtaining and / or reviewing separately obtained history, counseling and educating the patient/family/caregiver, ordering medications, tests, or procedures, communicating with other healthcare providers (not separately reported), independently interpreting results, not separately reported, and communicating results to the patient/family/caregiver, Name and date of birth verified.

## 2025-02-19 NOTE — SIGNIFICANT EVENT

## 2025-02-19 NOTE — PATIENT INSTRUCTIONS
Common side effects of amivantamab (Rybrevent) is infusion reactions, shortness of breath, cough, fever, rash, nail changes, eye problems such as dryness, redness, itching, blurred vision, call doctor with any of these side effects at [phone], press 2 then 5 to leave message with nurse.    To help prevent skin rashes, use hydrocortisone cream with alcohol free lotion at bedtime and avoid laying down for 30 minutes. Limit sun exposure (even in the winter) during treatment and for up to 2 months after, use sunscreen and protective clothing to avoid sun. Take doxycycline (antibiotic) 100 mg twice a day to help prevent skin rashes. If rash occurs, use clindamycin cream on any rashes that appear twice a day.    To help prevent nail changes or inflammation of finger nails and toe nails, do antimicrobial soaks twice a day, use vinegar, baking soda, and warm water to soaks finger nails and toenails for 15 mins.     For cycle 2 and future cycles, take dexamethasone 4 mg twice a day, one day before chemo and for one day after chemo.    Today you received a medication called aloxi. This is a long acting anti-nausea medication that will help prevent nausea for 3 days. If you have nausea within this 3 day time period, it is recommended that you utilize compazine (prochlorperazine) as directed by your doctor for nausea relief. After day 3, you can start to also utilize zofran (ondansetron) for additional nausea relief.  If you do not get relief from nausea, please reach out to your physician's office for additional support. You have been instructed by your oncology team to take zyprexa 5 mg at bedtime to help with your nausea.

## 2025-02-20 ENCOUNTER — PATIENT OUTREACH (OUTPATIENT)
Dept: PRIMARY CARE | Facility: CLINIC | Age: 75
End: 2025-02-20
Payer: MEDICARE

## 2025-02-20 ENCOUNTER — HOME CARE VISIT (OUTPATIENT)
Dept: HOME HEALTH SERVICES | Facility: HOME HEALTH | Age: 75
End: 2025-02-20
Payer: MEDICARE

## 2025-02-20 ENCOUNTER — LAB (OUTPATIENT)
Dept: LAB | Facility: HOSPITAL | Age: 75
End: 2025-02-20
Payer: MEDICARE

## 2025-02-20 VITALS
DIASTOLIC BLOOD PRESSURE: 62 MMHG | OXYGEN SATURATION: 98 % | SYSTOLIC BLOOD PRESSURE: 112 MMHG | TEMPERATURE: 96.8 F | RESPIRATION RATE: 12 BRPM | HEART RATE: 92 BPM

## 2025-02-20 DIAGNOSIS — E83.42 HYPOMAGNESEMIA: ICD-10-CM

## 2025-02-20 DIAGNOSIS — E83.42 HYPOMAGNESEMIA: Primary | ICD-10-CM

## 2025-02-20 LAB — MAGNESIUM SERPL-MCNC: 1.64 MG/DL (ref 1.6–2.4)

## 2025-02-20 PROCEDURE — 83735 ASSAY OF MAGNESIUM: CPT

## 2025-02-20 PROCEDURE — G0299 HHS/HOSPICE OF RN EA 15 MIN: HCPCS | Mod: HHH

## 2025-02-20 SDOH — ECONOMIC STABILITY: GENERAL

## 2025-02-20 ASSESSMENT — ENCOUNTER SYMPTOMS
DENIES PAIN: 1
LOWEST PAIN SEVERITY IN PAST 24 HOURS: 0/10
MUSCLE WEAKNESS: 1
PAIN SEVERITY GOAL: 0/10
SUBJECTIVE PAIN PROGRESSION: UNCHANGED
SHORTNESS OF BREATH: 1
APPETITE LEVEL: GOOD
FATIGUES EASILY: 1
FATIGUE: 1
DYSPNEA ACTIVITY LEVEL: AFTER AMBULATING MORE THAN 20 FT
HIGHEST PAIN SEVERITY IN PAST 24 HOURS: 0/10
PERSON REPORTING PAIN: PATIENT
PAIN: PATIENT DENIES PAIN

## 2025-02-21 ENCOUNTER — INFUSION (OUTPATIENT)
Dept: HEMATOLOGY/ONCOLOGY | Facility: CLINIC | Age: 75
End: 2025-02-21
Payer: MEDICARE

## 2025-02-21 VITALS
TEMPERATURE: 96.8 F | HEIGHT: 71 IN | DIASTOLIC BLOOD PRESSURE: 68 MMHG | HEART RATE: 82 BPM | RESPIRATION RATE: 16 BRPM | OXYGEN SATURATION: 97 % | WEIGHT: 188.3 LBS | BODY MASS INDEX: 26.36 KG/M2 | SYSTOLIC BLOOD PRESSURE: 113 MMHG

## 2025-02-21 DIAGNOSIS — E83.42 HYPOMAGNESEMIA: ICD-10-CM

## 2025-02-21 DIAGNOSIS — C80.1 ADENOCARCINOMA (MULTI): ICD-10-CM

## 2025-02-21 PROCEDURE — 96365 THER/PROPH/DIAG IV INF INIT: CPT | Mod: INF

## 2025-02-21 PROCEDURE — 96366 THER/PROPH/DIAG IV INF ADDON: CPT | Mod: INF

## 2025-02-21 PROCEDURE — 2500000004 HC RX 250 GENERAL PHARMACY W/ HCPCS (ALT 636 FOR OP/ED): Performed by: STUDENT IN AN ORGANIZED HEALTH CARE EDUCATION/TRAINING PROGRAM

## 2025-02-21 RX ORDER — MAGNESIUM SULFATE HEPTAHYDRATE 40 MG/ML
4 INJECTION, SOLUTION INTRAVENOUS ONCE
Status: COMPLETED | OUTPATIENT
Start: 2025-02-21 | End: 2025-02-21

## 2025-02-21 RX ORDER — HEPARIN SODIUM,PORCINE/PF 10 UNIT/ML
50 SYRINGE (ML) INTRAVENOUS AS NEEDED
OUTPATIENT
Start: 2025-02-21

## 2025-02-21 RX ORDER — HEPARIN 100 UNIT/ML
500 SYRINGE INTRAVENOUS AS NEEDED
OUTPATIENT
Start: 2025-02-21

## 2025-02-21 RX ADMIN — MAGNESIUM SULFATE 4 G: 4 INJECTION INTRAVENOUS at 10:43

## 2025-02-21 ASSESSMENT — PAIN SCALES - GENERAL: PAINLEVEL_OUTOF10: 0-NO PAIN

## 2025-02-21 NOTE — PROGRESS NOTES
Pt here for magnesium infusion. Clarified with team to make sure pt is to still receive mag infusion with mag level of 1.64 which is WNL. Will still proceed with mag today. Pt tolerated infusion well. He is aware of plan of care, will call with further questions or concerns. Will return Monday for next infusion

## 2025-02-22 DIAGNOSIS — E83.42 HYPOMAGNESEMIA: ICD-10-CM

## 2025-02-24 ENCOUNTER — LAB (OUTPATIENT)
Dept: LAB | Facility: HOSPITAL | Age: 75
End: 2025-02-24
Payer: MEDICARE

## 2025-02-24 ENCOUNTER — INFUSION (OUTPATIENT)
Dept: HEMATOLOGY/ONCOLOGY | Facility: CLINIC | Age: 75
End: 2025-02-24
Payer: MEDICARE

## 2025-02-24 VITALS
RESPIRATION RATE: 16 BRPM | HEIGHT: 71 IN | DIASTOLIC BLOOD PRESSURE: 75 MMHG | BODY MASS INDEX: 26.32 KG/M2 | HEART RATE: 97 BPM | SYSTOLIC BLOOD PRESSURE: 124 MMHG | OXYGEN SATURATION: 97 % | TEMPERATURE: 96.8 F | WEIGHT: 188 LBS

## 2025-02-24 DIAGNOSIS — E83.42 HYPOMAGNESEMIA: Primary | ICD-10-CM

## 2025-02-24 DIAGNOSIS — E83.42 HYPOMAGNESEMIA: ICD-10-CM

## 2025-02-24 LAB — MAGNESIUM SERPL-MCNC: 1.01 MG/DL (ref 1.6–2.4)

## 2025-02-24 PROCEDURE — 96365 THER/PROPH/DIAG IV INF INIT: CPT | Mod: INF

## 2025-02-24 PROCEDURE — 2500000004 HC RX 250 GENERAL PHARMACY W/ HCPCS (ALT 636 FOR OP/ED): Performed by: STUDENT IN AN ORGANIZED HEALTH CARE EDUCATION/TRAINING PROGRAM

## 2025-02-24 PROCEDURE — 83735 ASSAY OF MAGNESIUM: CPT

## 2025-02-24 RX ORDER — MAGNESIUM SULFATE HEPTAHYDRATE 40 MG/ML
2 INJECTION, SOLUTION INTRAVENOUS ONCE
Status: COMPLETED | OUTPATIENT
Start: 2025-02-24 | End: 2025-02-24

## 2025-02-24 RX ORDER — MAGNESIUM SULFATE HEPTAHYDRATE 40 MG/ML
4 INJECTION, SOLUTION INTRAVENOUS ONCE
Status: DISCONTINUED | OUTPATIENT
Start: 2025-03-06 | End: 2025-02-24 | Stop reason: HOSPADM

## 2025-02-24 RX ORDER — HEPARIN SODIUM,PORCINE/PF 10 UNIT/ML
50 SYRINGE (ML) INTRAVENOUS AS NEEDED
OUTPATIENT
Start: 2025-02-24

## 2025-02-24 RX ORDER — HEPARIN 100 UNIT/ML
500 SYRINGE INTRAVENOUS AS NEEDED
OUTPATIENT
Start: 2025-02-24

## 2025-02-24 RX ADMIN — MAGNESIUM SULFATE IN WATER 2 G: 40 INJECTION, SOLUTION INTRAVENOUS at 13:37

## 2025-02-24 ASSESSMENT — PAIN SCALES - GENERAL: PAINLEVEL_OUTOF10: 0-NO PAIN

## 2025-02-24 NOTE — PROGRESS NOTES
Patient presents for treatment, has no complaints alert and oriented x 4. PIV placed per protocol. Patient meets parameters for treatment. Patient had labs drawn at  facility earlier in the day. Per provider team, patient to get 2 grams of magnesium today, OK to treat with lab pending.  Patient tolerated treatment well, no reaction noted. After treatment, PIV flushed and removed per practice policy and procedure, site care given with gauze, tape and coban. Pt tolerated procedure well. Patient feels well and has no complaints, vital signs stable. Patient discharged in stable condition with no further needs.

## 2025-02-25 ENCOUNTER — HOME CARE VISIT (OUTPATIENT)
Dept: HOME HEALTH SERVICES | Facility: HOME HEALTH | Age: 75
End: 2025-02-25
Payer: MEDICARE

## 2025-02-25 VITALS
OXYGEN SATURATION: 96 % | SYSTOLIC BLOOD PRESSURE: 100 MMHG | RESPIRATION RATE: 16 BRPM | DIASTOLIC BLOOD PRESSURE: 70 MMHG | TEMPERATURE: 97.5 F | HEART RATE: 76 BPM

## 2025-02-25 PROCEDURE — G0300 HHS/HOSPICE OF LPN EA 15 MIN: HCPCS | Mod: HHH

## 2025-02-25 ASSESSMENT — ENCOUNTER SYMPTOMS
SHORTNESS OF BREATH: 1
DYSPNEA ON EXERTION: 1
APPETITE LEVEL: GOOD
MUSCLE WEAKNESS: 1
CHANGE IN APPETITE: UNCHANGED
FATIGUES EASILY: 1
DENIES PAIN: 1
DYSPNEA ACTIVITY LEVEL: AFTER AMBULATING MORE THAN 20 FT

## 2025-02-26 DIAGNOSIS — E83.42 HYPOMAGNESEMIA: ICD-10-CM

## 2025-02-27 ENCOUNTER — INFUSION (OUTPATIENT)
Dept: HEMATOLOGY/ONCOLOGY | Facility: CLINIC | Age: 75
End: 2025-02-27
Payer: MEDICARE

## 2025-02-27 VITALS
OXYGEN SATURATION: 98 % | SYSTOLIC BLOOD PRESSURE: 105 MMHG | BODY MASS INDEX: 25.54 KG/M2 | TEMPERATURE: 97.5 F | HEIGHT: 71 IN | WEIGHT: 182.4 LBS | RESPIRATION RATE: 16 BRPM | DIASTOLIC BLOOD PRESSURE: 65 MMHG | HEART RATE: 107 BPM

## 2025-02-27 DIAGNOSIS — E83.42 HYPOMAGNESEMIA: Primary | ICD-10-CM

## 2025-02-27 DIAGNOSIS — C80.1 ADENOCARCINOMA (MULTI): ICD-10-CM

## 2025-02-27 DIAGNOSIS — E83.42 HYPOMAGNESEMIA: ICD-10-CM

## 2025-02-27 LAB — MAGNESIUM SERPL-MCNC: 0.97 MG/DL (ref 1.6–2.4)

## 2025-02-27 PROCEDURE — 83735 ASSAY OF MAGNESIUM: CPT

## 2025-02-27 PROCEDURE — 96365 THER/PROPH/DIAG IV INF INIT: CPT | Mod: INF

## 2025-02-27 PROCEDURE — 2500000004 HC RX 250 GENERAL PHARMACY W/ HCPCS (ALT 636 FOR OP/ED): Performed by: STUDENT IN AN ORGANIZED HEALTH CARE EDUCATION/TRAINING PROGRAM

## 2025-02-27 PROCEDURE — 96366 THER/PROPH/DIAG IV INF ADDON: CPT | Mod: INF

## 2025-02-27 RX ORDER — MAGNESIUM SULFATE HEPTAHYDRATE 40 MG/ML
4 INJECTION, SOLUTION INTRAVENOUS ONCE
Status: COMPLETED | OUTPATIENT
Start: 2025-02-27 | End: 2025-02-27

## 2025-02-27 RX ORDER — MAGNESIUM SULFATE HEPTAHYDRATE 40 MG/ML
4 INJECTION, SOLUTION INTRAVENOUS ONCE
OUTPATIENT
Start: 2025-03-10

## 2025-02-27 RX ADMIN — MAGNESIUM SULFATE 4 G: 4 INJECTION INTRAVENOUS at 10:46

## 2025-02-27 ASSESSMENT — PAIN SCALES - GENERAL: PAINLEVEL_OUTOF10: 0-NO PAIN

## 2025-02-27 NOTE — PROGRESS NOTES
Pt here for magnesium. Team notified of critical mag of 0.97. 4g of mag given as ordered. Tolerated well. He is aware of plan of care, will call with further questions or concerns. Will return Monday for labs and magnesium infusion

## 2025-02-28 ENCOUNTER — HOME CARE VISIT (OUTPATIENT)
Dept: HOME HEALTH SERVICES | Facility: HOME HEALTH | Age: 75
End: 2025-02-28
Payer: MEDICARE

## 2025-02-28 VITALS
OXYGEN SATURATION: 97 % | RESPIRATION RATE: 16 BRPM | SYSTOLIC BLOOD PRESSURE: 88 MMHG | TEMPERATURE: 97.1 F | DIASTOLIC BLOOD PRESSURE: 58 MMHG | HEART RATE: 92 BPM

## 2025-02-28 DIAGNOSIS — I10 BENIGN ESSENTIAL HYPERTENSION: Primary | ICD-10-CM

## 2025-02-28 PROCEDURE — G0299 HHS/HOSPICE OF RN EA 15 MIN: HCPCS | Mod: HHH

## 2025-02-28 RX ORDER — LISINOPRIL 20 MG/1
20 TABLET ORAL DAILY
Qty: 90 TABLET | Refills: 0 | Status: SHIPPED | OUTPATIENT
Start: 2025-02-28 | End: 2025-05-29

## 2025-02-28 ASSESSMENT — ENCOUNTER SYMPTOMS
PERSON REPORTING PAIN: PATIENT
MUSCLE WEAKNESS: 1
CHANGE IN APPETITE: UNCHANGED
APPETITE LEVEL: FAIR
DENIES PAIN: 1
DIZZINESS: 1

## 2025-03-01 DIAGNOSIS — E83.42 HYPOMAGNESEMIA: ICD-10-CM

## 2025-03-03 ENCOUNTER — HOME CARE VISIT (OUTPATIENT)
Dept: HOME HEALTH SERVICES | Facility: HOME HEALTH | Age: 75
End: 2025-03-03
Payer: MEDICARE

## 2025-03-03 ENCOUNTER — INFUSION (OUTPATIENT)
Dept: HEMATOLOGY/ONCOLOGY | Facility: CLINIC | Age: 75
End: 2025-03-03
Payer: MEDICARE

## 2025-03-03 ENCOUNTER — APPOINTMENT (OUTPATIENT)
Dept: HOME HEALTH SERVICES | Facility: HOME HEALTH | Age: 75
End: 2025-03-03
Payer: MEDICARE

## 2025-03-03 VITALS
HEART RATE: 103 BPM | HEIGHT: 71 IN | TEMPERATURE: 97.5 F | BODY MASS INDEX: 25.81 KG/M2 | RESPIRATION RATE: 16 BRPM | DIASTOLIC BLOOD PRESSURE: 66 MMHG | SYSTOLIC BLOOD PRESSURE: 100 MMHG | WEIGHT: 184.4 LBS | OXYGEN SATURATION: 99 %

## 2025-03-03 DIAGNOSIS — E83.42 HYPOMAGNESEMIA: ICD-10-CM

## 2025-03-03 DIAGNOSIS — C80.1 ADENOCARCINOMA (MULTI): ICD-10-CM

## 2025-03-03 LAB
ALBUMIN SERPL BCP-MCNC: 3 G/DL (ref 3.4–5)
ALP SERPL-CCNC: 96 U/L (ref 33–136)
ALT SERPL W P-5'-P-CCNC: 48 U/L (ref 10–52)
ANION GAP SERPL CALC-SCNC: <7 MMOL/L (ref 10–20)
AST SERPL W P-5'-P-CCNC: 32 U/L (ref 9–39)
BASOPHILS # BLD AUTO: 0.01 X10*3/UL (ref 0–0.1)
BASOPHILS NFR BLD AUTO: 0.2 %
BILIRUB SERPL-MCNC: 0.4 MG/DL (ref 0–1.2)
BUN SERPL-MCNC: 18 MG/DL (ref 6–23)
CALCIUM SERPL-MCNC: 8.2 MG/DL (ref 8.6–10.3)
CHLORIDE SERPL-SCNC: 106 MMOL/L (ref 98–107)
CO2 SERPL-SCNC: 28 MMOL/L (ref 21–32)
CREAT SERPL-MCNC: 0.88 MG/DL (ref 0.5–1.3)
EGFRCR SERPLBLD CKD-EPI 2021: 90 ML/MIN/1.73M*2
EOSINOPHIL # BLD AUTO: 0.28 X10*3/UL (ref 0–0.4)
EOSINOPHIL NFR BLD AUTO: 5.5 %
ERYTHROCYTE [DISTWIDTH] IN BLOOD BY AUTOMATED COUNT: 14.8 % (ref 11.5–14.5)
GLUCOSE SERPL-MCNC: 161 MG/DL (ref 74–99)
HCT VFR BLD AUTO: 35.4 % (ref 41–52)
HGB BLD-MCNC: 11 G/DL (ref 13.5–17.5)
IMM GRANULOCYTES # BLD AUTO: 0.02 X10*3/UL (ref 0–0.5)
IMM GRANULOCYTES NFR BLD AUTO: 0.4 % (ref 0–0.9)
LYMPHOCYTES # BLD AUTO: 1.93 X10*3/UL (ref 0.8–3)
LYMPHOCYTES NFR BLD AUTO: 38.1 %
MAGNESIUM SERPL-MCNC: 1.04 MG/DL (ref 1.6–2.4)
MCH RBC QN AUTO: 27.2 PG (ref 26–34)
MCHC RBC AUTO-ENTMCNC: 31.1 G/DL (ref 32–36)
MCV RBC AUTO: 87 FL (ref 80–100)
MONOCYTES # BLD AUTO: 0.41 X10*3/UL (ref 0.05–0.8)
MONOCYTES NFR BLD AUTO: 8.1 %
NEUTROPHILS # BLD AUTO: 2.41 X10*3/UL (ref 1.6–5.5)
NEUTROPHILS NFR BLD AUTO: 47.7 %
NRBC BLD-RTO: ABNORMAL /100{WBCS}
PLATELET # BLD AUTO: 98 X10*3/UL (ref 150–450)
POTASSIUM SERPL-SCNC: 4.3 MMOL/L (ref 3.5–5.3)
PROT SERPL-MCNC: 5.4 G/DL (ref 6.4–8.2)
RBC # BLD AUTO: 4.05 X10*6/UL (ref 4.5–5.9)
SODIUM SERPL-SCNC: 136 MMOL/L (ref 136–145)
WBC # BLD AUTO: 5.1 X10*3/UL (ref 4.4–11.3)

## 2025-03-03 PROCEDURE — 83735 ASSAY OF MAGNESIUM: CPT | Performed by: NURSE PRACTITIONER

## 2025-03-03 PROCEDURE — 80053 COMPREHEN METABOLIC PANEL: CPT

## 2025-03-03 PROCEDURE — 2500000004 HC RX 250 GENERAL PHARMACY W/ HCPCS (ALT 636 FOR OP/ED): Performed by: STUDENT IN AN ORGANIZED HEALTH CARE EDUCATION/TRAINING PROGRAM

## 2025-03-03 PROCEDURE — 96365 THER/PROPH/DIAG IV INF INIT: CPT | Mod: INF

## 2025-03-03 PROCEDURE — 36415 COLL VENOUS BLD VENIPUNCTURE: CPT

## 2025-03-03 PROCEDURE — 96366 THER/PROPH/DIAG IV INF ADDON: CPT | Mod: INF

## 2025-03-03 PROCEDURE — 85025 COMPLETE CBC W/AUTO DIFF WBC: CPT

## 2025-03-03 RX ORDER — HEPARIN 100 UNIT/ML
500 SYRINGE INTRAVENOUS AS NEEDED
Status: CANCELLED | OUTPATIENT
Start: 2025-03-03

## 2025-03-03 RX ORDER — MAGNESIUM SULFATE HEPTAHYDRATE 40 MG/ML
4 INJECTION, SOLUTION INTRAVENOUS ONCE
Status: COMPLETED | OUTPATIENT
Start: 2025-03-03 | End: 2025-03-03

## 2025-03-03 RX ORDER — HEPARIN SODIUM,PORCINE/PF 10 UNIT/ML
50 SYRINGE (ML) INTRAVENOUS AS NEEDED
Status: CANCELLED | OUTPATIENT
Start: 2025-03-03

## 2025-03-03 RX ADMIN — MAGNESIUM SULFATE 4 G: 4 INJECTION INTRAVENOUS at 10:36

## 2025-03-03 ASSESSMENT — PAIN SCALES - GENERAL: PAINLEVEL_OUTOF10: 0-NO PAIN

## 2025-03-03 NOTE — PROGRESS NOTES
Patient presents for Magnesium treatment,  has no complaints alert and oriented x 4. PIV placed per protocol. Patient meets parameters for treatment based off of lab results from 2/27/25. Labs also drawn today prior to treatment. Patient tolerated treatment well, no reaction noted. After treatment, PIV flushed and removed per practice policy and procedure, site care given with gauze, tape and coban. Pt tolerated procedure well. Patient feels well and has no complaints, vital signs stable. Patient discharged in stable condition with no further needs.

## 2025-03-03 NOTE — HOME HEALTH
Last Friday this patient's BP was 88/58 taken manually. He reports being dizzy with ambulation. Primary care provider JENNIFER Garland sent in basket response to this nurse to advise the patient to reduce lisinopril down to 20mg daily. Patient called to relay message. Spouse took message and verbalized understanding of instructions.

## 2025-03-04 ENCOUNTER — HOME CARE VISIT (OUTPATIENT)
Dept: HOME HEALTH SERVICES | Facility: HOME HEALTH | Age: 75
End: 2025-03-04
Payer: MEDICARE

## 2025-03-04 ASSESSMENT — ACTIVITIES OF DAILY LIVING (ADL)
OASIS_M1830: 00
HOME_HEALTH_OASIS: 00

## 2025-03-05 ENCOUNTER — APPOINTMENT (OUTPATIENT)
Dept: PRIMARY CARE | Facility: CLINIC | Age: 75
End: 2025-03-05
Payer: COMMERCIAL

## 2025-03-05 VITALS
HEART RATE: 95 BPM | DIASTOLIC BLOOD PRESSURE: 67 MMHG | OXYGEN SATURATION: 96 % | WEIGHT: 187 LBS | BODY MASS INDEX: 25.33 KG/M2 | SYSTOLIC BLOOD PRESSURE: 110 MMHG | HEIGHT: 72 IN

## 2025-03-05 DIAGNOSIS — N52.9 ERECTILE DYSFUNCTION, UNSPECIFIED ERECTILE DYSFUNCTION TYPE: ICD-10-CM

## 2025-03-05 DIAGNOSIS — Z78.9 ADVANCE DIRECTIVE IN CHART: ICD-10-CM

## 2025-03-05 DIAGNOSIS — E11.69 TYPE 2 DIABETES MELLITUS WITH OTHER SPECIFIED COMPLICATION, WITHOUT LONG-TERM CURRENT USE OF INSULIN: ICD-10-CM

## 2025-03-05 DIAGNOSIS — E78.5 HYPERLIPIDEMIA, UNSPECIFIED HYPERLIPIDEMIA TYPE: ICD-10-CM

## 2025-03-05 DIAGNOSIS — K21.9 GASTROESOPHAGEAL REFLUX DISEASE WITHOUT ESOPHAGITIS: ICD-10-CM

## 2025-03-05 DIAGNOSIS — E83.42 HYPOMAGNESEMIA: ICD-10-CM

## 2025-03-05 DIAGNOSIS — M54.2 CERVICALGIA: ICD-10-CM

## 2025-03-05 DIAGNOSIS — Z00.00 MEDICARE ANNUAL WELLNESS VISIT, SUBSEQUENT: Primary | ICD-10-CM

## 2025-03-05 DIAGNOSIS — I10 BENIGN ESSENTIAL HYPERTENSION: ICD-10-CM

## 2025-03-05 DIAGNOSIS — C80.1 ADENOCARCINOMA (MULTI): ICD-10-CM

## 2025-03-05 PROBLEM — R63.4 WEIGHT LOSS, ABNORMAL: Status: RESOLVED | Noted: 2025-02-04 | Resolved: 2025-03-05

## 2025-03-05 PROBLEM — H91.91 HEARING LOSS ON RIGHT: Status: RESOLVED | Noted: 2023-04-03 | Resolved: 2025-03-05

## 2025-03-05 PROBLEM — H61.21 IMPACTED CERUMEN OF RIGHT EAR: Status: RESOLVED | Noted: 2023-04-03 | Resolved: 2025-03-05

## 2025-03-05 PROBLEM — H61.20 IMPACTED CERUMEN: Status: RESOLVED | Noted: 2024-02-28 | Resolved: 2025-03-05

## 2025-03-05 PROBLEM — E11.9 TYPE 2 DIABETES MELLITUS WITHOUT COMPLICATIONS (MULTI): Status: RESOLVED | Noted: 2024-12-06 | Resolved: 2025-03-05

## 2025-03-05 PROCEDURE — G0439 PPPS, SUBSEQ VISIT: HCPCS | Performed by: NURSE PRACTITIONER

## 2025-03-05 PROCEDURE — 1159F MED LIST DOCD IN RCRD: CPT | Performed by: NURSE PRACTITIONER

## 2025-03-05 PROCEDURE — 3078F DIAST BP <80 MM HG: CPT | Performed by: NURSE PRACTITIONER

## 2025-03-05 PROCEDURE — 1160F RVW MEDS BY RX/DR IN RCRD: CPT | Performed by: NURSE PRACTITIONER

## 2025-03-05 PROCEDURE — 4010F ACE/ARB THERAPY RXD/TAKEN: CPT | Performed by: NURSE PRACTITIONER

## 2025-03-05 PROCEDURE — 1170F FXNL STATUS ASSESSED: CPT | Performed by: NURSE PRACTITIONER

## 2025-03-05 PROCEDURE — 3061F NEG MICROALBUMINURIA REV: CPT | Performed by: NURSE PRACTITIONER

## 2025-03-05 PROCEDURE — 3074F SYST BP LT 130 MM HG: CPT | Performed by: NURSE PRACTITIONER

## 2025-03-05 PROCEDURE — 3048F LDL-C <100 MG/DL: CPT | Performed by: NURSE PRACTITIONER

## 2025-03-05 PROCEDURE — 1157F ADVNC CARE PLAN IN RCRD: CPT | Performed by: NURSE PRACTITIONER

## 2025-03-05 PROCEDURE — 3051F HG A1C>EQUAL 7.0%<8.0%: CPT | Performed by: NURSE PRACTITIONER

## 2025-03-05 PROCEDURE — 99214 OFFICE O/P EST MOD 30 MIN: CPT | Performed by: NURSE PRACTITIONER

## 2025-03-05 PROCEDURE — 3008F BODY MASS INDEX DOCD: CPT | Performed by: NURSE PRACTITIONER

## 2025-03-05 PROCEDURE — 1036F TOBACCO NON-USER: CPT | Performed by: NURSE PRACTITIONER

## 2025-03-05 RX ORDER — LISINOPRIL 20 MG/1
20 TABLET ORAL DAILY
Qty: 100 TABLET | Refills: 3 | Status: SHIPPED | OUTPATIENT
Start: 2025-03-05 | End: 2026-04-09

## 2025-03-05 ASSESSMENT — ACTIVITIES OF DAILY LIVING (ADL)
MANAGING_FINANCES: INDEPENDENT
DOING_HOUSEWORK: INDEPENDENT
FEEDING: INDEPENDENT
BATHING: INDEPENDENT
DRESSING: INDEPENDENT
PREPARING MEALS: INDEPENDENT
DRESSING: INDEPENDENT
TAKING_MEDICATION: INDEPENDENT
ADEQUATE_TO_COMPLETE_ADL: YES
GROCERY_SHOPPING: INDEPENDENT
JUDGMENT_ADEQUATE_SAFELY_COMPLETE_DAILY_ACTIVITIES: YES
DOING HOUSEWORK: INDEPENDENT
USING TRANSPORTATION: INDEPENDENT
PILL BOX USED: YES
MANAGING FINANCES: INDEPENDENT
NEEDS ASSISTANCE WITH FOOD: INDEPENDENT
STIL DRIVING: YES
GROCERY SHOPPING: INDEPENDENT
USING TELEPHONE: INDEPENDENT
BATHING: INDEPENDENT
EATING: INDEPENDENT
TAKING MEDICATION: INDEPENDENT
TOILETING: INDEPENDENT

## 2025-03-05 ASSESSMENT — PATIENT HEALTH QUESTIONNAIRE - PHQ9
1. LITTLE INTEREST OR PLEASURE IN DOING THINGS: NOT AT ALL
2. FEELING DOWN, DEPRESSED OR HOPELESS: NOT AT ALL
SUM OF ALL RESPONSES TO PHQ9 QUESTIONS 1 AND 2: 0

## 2025-03-05 ASSESSMENT — ENCOUNTER SYMPTOMS
OCCASIONAL FEELINGS OF UNSTEADINESS: 0
DEPRESSION: 0
LOSS OF SENSATION IN FEET: 0

## 2025-03-05 ASSESSMENT — ANXIETY QUESTIONNAIRES
2. NOT BEING ABLE TO STOP OR CONTROL WORRYING: NOT AT ALL
6. BECOMING EASILY ANNOYED OR IRRITABLE: NOT AT ALL
3. WORRYING TOO MUCH ABOUT DIFFERENT THINGS: NOT AT ALL
GAD7 TOTAL SCORE: 0
5. BEING SO RESTLESS THAT IT IS HARD TO SIT STILL: NOT AT ALL
1. FEELING NERVOUS, ANXIOUS, OR ON EDGE: NOT AT ALL
4. TROUBLE RELAXING: NOT AT ALL
7. FEELING AFRAID AS IF SOMETHING AWFUL MIGHT HAPPEN: NOT AT ALL

## 2025-03-05 NOTE — ASSESSMENT & PLAN NOTE
Orders:    lisinopril 20 mg tablet; Take 1 tablet (20 mg) by mouth once daily.    Comprehensive Metabolic Panel; Future    CBC; Future    Follow Up In [facility] - PCP - Established; Future

## 2025-03-05 NOTE — ASSESSMENT & PLAN NOTE
Orders:    lisinopril 20 mg tablet; Take 1 tablet (20 mg) by mouth once daily.    Hemoglobin A1C; Future    Comprehensive Metabolic Panel; Future    Follow Up In [facility] - PCP - Established; Future    Albumin-Creatinine Ratio, Urine Random; Future

## 2025-03-05 NOTE — PROGRESS NOTES
Subjective   Reason for Visit: Jayant Holland is an 74 y.o. male here for a Medicare Wellness visit.     Past Medical, Surgical, and Family History reviewed and updated in chart.    Reviewed all medications by prescribing practitioner or clinical pharmacist (such as prescriptions, OTCs, herbal therapies and supplements) and documented in the medical record.    Patient is following for annual Medicare wellness exam and management of hypertension, type 2 diabetes mellitus, GERD, hyperlipidemia, cephalgia and vitamin D deficiency.  He is compliant with his prescribed medications.  He completed a screening colonoscopy on 4/13/2023 and does not remember when to repeat for surveillance.  He will contact the office of the provider who  completed the colonoscopy for the date to repeat for surveillance.  He is under the care of oncology for management of adenocarcinoma and hypomagnesemia.  Reports low blood pressure associated with dizziness.        Patient Care Team:  FORTINO Garland-CNP as PCP - General (Family Medicine)  Alisha Solitario MD as On Call Attending Physician (Hematology and Oncology)  Birgit Estrada RN as Nurse Navigator (Hematology and Oncology)  Papito Weinberg RN as Care Manager (Case Management)     Review of Systems    Objective   Vitals:  Ht 1.829 m (6')   Wt 84.8 kg (187 lb)   BMI 25.36 kg/m²       Physical Exam    Assessment & Plan  Medicare annual wellness visit, subsequent    Orders:    Follow Up In [facility] - PCP - Medicare Annual    Advance directive in chart    Orders:    1 Year Follow Up In [facility] - PCP - Wellness Exam; Future    Benign essential hypertension    Orders:    lisinopril 20 mg tablet; Take 1 tablet (20 mg) by mouth once daily.    Comprehensive Metabolic Panel; Future    CBC; Future    Follow Up In [facility] - PCP - Established; Future    Hyperlipidemia, unspecified hyperlipidemia type    Orders:    Lipid panel; Future    Type 2 diabetes  mellitus with other specified complication, without long-term current use of insulin    Orders:    lisinopril 20 mg tablet; Take 1 tablet (20 mg) by mouth once daily.    Hemoglobin A1C; Future    Comprehensive Metabolic Panel; Future    Follow Up In [facility] - PCP - Established; Future    Albumin-Creatinine Ratio, Urine Random; Future    Gastroesophageal reflux disease without esophagitis         Erectile dysfunction, unspecified erectile dysfunction type         Hypomagnesemia         Adenocarcinoma (Multi)         Cervicalgia

## 2025-03-05 NOTE — PATIENT INSTRUCTIONS
I have reduced her lisinopril from 40 mg daily to 20 mg daily.  Continue taking all other medications as prescribed and complete labs a few days prior to 6-month follow-up.

## 2025-03-06 ENCOUNTER — INFUSION (OUTPATIENT)
Dept: HEMATOLOGY/ONCOLOGY | Facility: CLINIC | Age: 75
End: 2025-03-06
Payer: MEDICARE

## 2025-03-06 VITALS
HEART RATE: 99 BPM | HEIGHT: 72 IN | DIASTOLIC BLOOD PRESSURE: 73 MMHG | SYSTOLIC BLOOD PRESSURE: 105 MMHG | TEMPERATURE: 96.8 F | WEIGHT: 187 LBS | RESPIRATION RATE: 16 BRPM | BODY MASS INDEX: 25.33 KG/M2 | OXYGEN SATURATION: 99 %

## 2025-03-06 DIAGNOSIS — E83.42 HYPOMAGNESEMIA: ICD-10-CM

## 2025-03-06 DIAGNOSIS — E83.42 HYPOMAGNESEMIA: Primary | ICD-10-CM

## 2025-03-06 DIAGNOSIS — C80.1 ADENOCARCINOMA (MULTI): ICD-10-CM

## 2025-03-06 LAB — MAGNESIUM SERPL-MCNC: 1.03 MG/DL (ref 1.6–2.4)

## 2025-03-06 PROCEDURE — 96366 THER/PROPH/DIAG IV INF ADDON: CPT | Mod: INF

## 2025-03-06 PROCEDURE — 96365 THER/PROPH/DIAG IV INF INIT: CPT | Mod: INF

## 2025-03-06 PROCEDURE — 2500000004 HC RX 250 GENERAL PHARMACY W/ HCPCS (ALT 636 FOR OP/ED): Performed by: STUDENT IN AN ORGANIZED HEALTH CARE EDUCATION/TRAINING PROGRAM

## 2025-03-06 PROCEDURE — 83735 ASSAY OF MAGNESIUM: CPT

## 2025-03-06 RX ORDER — MAGNESIUM SULFATE HEPTAHYDRATE 40 MG/ML
4 INJECTION, SOLUTION INTRAVENOUS ONCE
Status: COMPLETED | OUTPATIENT
Start: 2025-03-06 | End: 2025-03-06

## 2025-03-06 RX ORDER — DIPHENHYDRAMINE HYDROCHLORIDE 50 MG/ML
50 INJECTION INTRAMUSCULAR; INTRAVENOUS AS NEEDED
OUTPATIENT
Start: 2025-03-14

## 2025-03-06 RX ORDER — MAGNESIUM SULFATE HEPTAHYDRATE 40 MG/ML
4 INJECTION, SOLUTION INTRAVENOUS ONCE
OUTPATIENT
Start: 2025-03-20

## 2025-03-06 RX ORDER — FAMOTIDINE 10 MG/ML
20 INJECTION, SOLUTION INTRAVENOUS ONCE AS NEEDED
OUTPATIENT
Start: 2025-03-14

## 2025-03-06 RX ORDER — MAGNESIUM SULFATE HEPTAHYDRATE 40 MG/ML
4 INJECTION, SOLUTION INTRAVENOUS ONCE
OUTPATIENT
Start: 2025-03-14

## 2025-03-06 RX ORDER — MAGNESIUM SULFATE HEPTAHYDRATE 40 MG/ML
4 INJECTION, SOLUTION INTRAVENOUS ONCE
OUTPATIENT
Start: 2025-03-17

## 2025-03-06 RX ORDER — HEPARIN 100 UNIT/ML
500 SYRINGE INTRAVENOUS AS NEEDED
OUTPATIENT
Start: 2025-03-06

## 2025-03-06 RX ORDER — ALBUTEROL SULFATE 0.83 MG/ML
3 SOLUTION RESPIRATORY (INHALATION) AS NEEDED
OUTPATIENT
Start: 2025-03-14

## 2025-03-06 RX ORDER — HEPARIN SODIUM,PORCINE/PF 10 UNIT/ML
50 SYRINGE (ML) INTRAVENOUS AS NEEDED
OUTPATIENT
Start: 2025-03-06

## 2025-03-06 RX ORDER — MAGNESIUM SULFATE HEPTAHYDRATE 40 MG/ML
4 INJECTION, SOLUTION INTRAVENOUS ONCE
Status: CANCELLED | OUTPATIENT
Start: 2025-03-09

## 2025-03-06 RX ORDER — MAGNESIUM SULFATE HEPTAHYDRATE 40 MG/ML
4 INJECTION, SOLUTION INTRAVENOUS ONCE
OUTPATIENT
Start: 2025-03-06 | End: 2025-03-06

## 2025-03-06 RX ORDER — EPINEPHRINE 0.3 MG/.3ML
0.3 INJECTION SUBCUTANEOUS EVERY 5 MIN PRN
OUTPATIENT
Start: 2025-03-14

## 2025-03-06 RX ADMIN — MAGNESIUM SULFATE 4 G: 4 INJECTION INTRAVENOUS at 11:06

## 2025-03-06 ASSESSMENT — PAIN SCALES - GENERAL: PAINLEVEL_OUTOF10: 0-NO PAIN

## 2025-03-06 NOTE — PROGRESS NOTES
Pt arrived awake, alert, oriented, no apparent distress with unlabored breaths. Pt reports feeling well today without s/sx of illness. Pt here for infusion of magnesium. Pt received 4 gram magnesium, as ordered, without event. Pt to be set up with an additional magnesium infusion appointment on 3/10/25 by his provider. Pt with FUV and infusion appointment set for 3/12/25 at [facility] [place]. Pt verbalized understanding that he is to call his provider office in regards to setting up his next mag infusion for 3/10/25. Pt without further questions or concerns, discharged in stable condition.

## 2025-03-09 ENCOUNTER — INFUSION (OUTPATIENT)
Dept: HEMATOLOGY/ONCOLOGY | Facility: HOSPITAL | Age: 75
End: 2025-03-09
Payer: MEDICARE

## 2025-03-09 VITALS
HEART RATE: 103 BPM | DIASTOLIC BLOOD PRESSURE: 63 MMHG | OXYGEN SATURATION: 97 % | TEMPERATURE: 97.9 F | BODY MASS INDEX: 25.56 KG/M2 | RESPIRATION RATE: 16 BRPM | SYSTOLIC BLOOD PRESSURE: 111 MMHG | WEIGHT: 188.49 LBS

## 2025-03-09 DIAGNOSIS — E83.42 HYPOMAGNESEMIA: ICD-10-CM

## 2025-03-09 LAB — MAGNESIUM SERPL-MCNC: 1.25 MG/DL (ref 1.6–2.4)

## 2025-03-09 PROCEDURE — 2500000004 HC RX 250 GENERAL PHARMACY W/ HCPCS (ALT 636 FOR OP/ED): Performed by: NURSE PRACTITIONER

## 2025-03-09 PROCEDURE — 83735 ASSAY OF MAGNESIUM: CPT

## 2025-03-09 PROCEDURE — 96366 THER/PROPH/DIAG IV INF ADDON: CPT | Mod: INF

## 2025-03-09 PROCEDURE — 96365 THER/PROPH/DIAG IV INF INIT: CPT | Mod: INF

## 2025-03-09 RX ORDER — MAGNESIUM SULFATE HEPTAHYDRATE 40 MG/ML
4 INJECTION, SOLUTION INTRAVENOUS ONCE
Status: COMPLETED | OUTPATIENT
Start: 2025-03-09 | End: 2025-03-09

## 2025-03-09 RX ADMIN — MAGNESIUM SULFATE IN WATER 4 G: 4 INJECTION, SOLUTION INTRAVENOUS at 09:10

## 2025-03-09 ASSESSMENT — PAIN SCALES - GENERAL: PAINLEVEL_OUTOF10: 0-NO PAIN

## 2025-03-09 NOTE — PROGRESS NOTES
Patient seen in infusion for IV Mag replacement, tolerated without complications. Mag level 1.25 today. Labs and schedule reviewed with patient. IV removed. Discharged in stable condition.

## 2025-03-10 ENCOUNTER — APPOINTMENT (OUTPATIENT)
Dept: HEMATOLOGY/ONCOLOGY | Facility: CLINIC | Age: 75
End: 2025-03-10
Payer: MEDICARE

## 2025-03-12 ENCOUNTER — NUTRITION (OUTPATIENT)
Dept: HEMATOLOGY/ONCOLOGY | Facility: HOSPITAL | Age: 75
End: 2025-03-12

## 2025-03-12 ENCOUNTER — LAB (OUTPATIENT)
Dept: LAB | Facility: HOSPITAL | Age: 75
End: 2025-03-12
Payer: MEDICARE

## 2025-03-12 ENCOUNTER — INFUSION (OUTPATIENT)
Dept: HEMATOLOGY/ONCOLOGY | Facility: HOSPITAL | Age: 75
End: 2025-03-12
Payer: MEDICARE

## 2025-03-12 ENCOUNTER — OFFICE VISIT (OUTPATIENT)
Dept: HEMATOLOGY/ONCOLOGY | Facility: HOSPITAL | Age: 75
End: 2025-03-12
Payer: MEDICARE

## 2025-03-12 VITALS
OXYGEN SATURATION: 97 % | TEMPERATURE: 97.2 F | DIASTOLIC BLOOD PRESSURE: 67 MMHG | WEIGHT: 187.83 LBS | SYSTOLIC BLOOD PRESSURE: 110 MMHG | RESPIRATION RATE: 20 BRPM | HEART RATE: 98 BPM | BODY MASS INDEX: 25.47 KG/M2

## 2025-03-12 VITALS — BODY MASS INDEX: 25.55 KG/M2 | HEIGHT: 72 IN

## 2025-03-12 DIAGNOSIS — E83.42 HYPOMAGNESEMIA: ICD-10-CM

## 2025-03-12 DIAGNOSIS — C80.1 ADENOCARCINOMA (MULTI): ICD-10-CM

## 2025-03-12 DIAGNOSIS — Z51.12 ENCOUNTER FOR MONOCLONAL ANTIBODY TREATMENT FOR MALIGNANCY: ICD-10-CM

## 2025-03-12 DIAGNOSIS — J91.0 MALIGNANT PLEURAL EFFUSION (CMS-HCC): ICD-10-CM

## 2025-03-12 DIAGNOSIS — J91.0 MALIGNANT PLEURAL EFFUSION (CMS-HCC): Primary | ICD-10-CM

## 2025-03-12 LAB
ALBUMIN SERPL BCP-MCNC: 3.3 G/DL (ref 3.4–5)
ALP SERPL-CCNC: 130 U/L (ref 33–136)
ALT SERPL W P-5'-P-CCNC: 30 U/L (ref 10–52)
ANION GAP SERPL CALC-SCNC: 13 MMOL/L (ref 10–20)
AST SERPL W P-5'-P-CCNC: 21 U/L (ref 9–39)
BASOPHILS # BLD AUTO: 0.01 X10*3/UL (ref 0–0.1)
BASOPHILS NFR BLD AUTO: 0.1 %
BILIRUB SERPL-MCNC: 0.3 MG/DL (ref 0–1.2)
BUN SERPL-MCNC: 20 MG/DL (ref 6–23)
CALCIUM SERPL-MCNC: 8.7 MG/DL (ref 8.6–10.3)
CHLORIDE SERPL-SCNC: 102 MMOL/L (ref 98–107)
CO2 SERPL-SCNC: 25 MMOL/L (ref 21–32)
CREAT SERPL-MCNC: 0.81 MG/DL (ref 0.5–1.3)
EGFRCR SERPLBLD CKD-EPI 2021: >90 ML/MIN/1.73M*2
EOSINOPHIL # BLD AUTO: 0.01 X10*3/UL (ref 0–0.4)
EOSINOPHIL NFR BLD AUTO: 0.1 %
ERYTHROCYTE [DISTWIDTH] IN BLOOD BY AUTOMATED COUNT: 16.8 % (ref 11.5–14.5)
EST. AVERAGE GLUCOSE BLD GHB EST-MCNC: 189 MG/DL
GLUCOSE SERPL-MCNC: 297 MG/DL (ref 74–99)
HBA1C MFR BLD: 8.2 %
HCT VFR BLD AUTO: 37 % (ref 41–52)
HGB BLD-MCNC: 11.8 G/DL (ref 13.5–17.5)
IMM GRANULOCYTES # BLD AUTO: 0.24 X10*3/UL (ref 0–0.5)
IMM GRANULOCYTES NFR BLD AUTO: 1.9 % (ref 0–0.9)
LYMPHOCYTES # BLD AUTO: 2.08 X10*3/UL (ref 0.8–3)
LYMPHOCYTES NFR BLD AUTO: 16.5 %
MAGNESIUM SERPL-MCNC: 1.32 MG/DL (ref 1.6–2.4)
MCH RBC QN AUTO: 28 PG (ref 26–34)
MCHC RBC AUTO-ENTMCNC: 31.9 G/DL (ref 32–36)
MCV RBC AUTO: 88 FL (ref 80–100)
MONOCYTES # BLD AUTO: 0.78 X10*3/UL (ref 0.05–0.8)
MONOCYTES NFR BLD AUTO: 6.2 %
NEUTROPHILS # BLD AUTO: 9.45 X10*3/UL (ref 1.6–5.5)
NEUTROPHILS NFR BLD AUTO: 75.2 %
NRBC BLD-RTO: 0 /100 WBCS (ref 0–0)
PLATELET # BLD AUTO: 266 X10*3/UL (ref 150–450)
POTASSIUM SERPL-SCNC: 4.6 MMOL/L (ref 3.5–5.3)
PROT SERPL-MCNC: 6.1 G/DL (ref 6.4–8.2)
RBC # BLD AUTO: 4.21 X10*6/UL (ref 4.5–5.9)
SODIUM SERPL-SCNC: 135 MMOL/L (ref 136–145)
WBC # BLD AUTO: 12.6 X10*3/UL (ref 4.4–11.3)

## 2025-03-12 PROCEDURE — 83036 HEMOGLOBIN GLYCOSYLATED A1C: CPT | Performed by: NURSE PRACTITIONER

## 2025-03-12 PROCEDURE — 1157F ADVNC CARE PLAN IN RCRD: CPT | Performed by: NURSE PRACTITIONER

## 2025-03-12 PROCEDURE — 3074F SYST BP LT 130 MM HG: CPT | Performed by: NURSE PRACTITIONER

## 2025-03-12 PROCEDURE — 96368 THER/DIAG CONCURRENT INF: CPT

## 2025-03-12 PROCEDURE — 2500000004 HC RX 250 GENERAL PHARMACY W/ HCPCS (ALT 636 FOR OP/ED): Performed by: NURSE PRACTITIONER

## 2025-03-12 PROCEDURE — 4010F ACE/ARB THERAPY RXD/TAKEN: CPT | Performed by: NURSE PRACTITIONER

## 2025-03-12 PROCEDURE — 96411 CHEMO IV PUSH ADDL DRUG: CPT

## 2025-03-12 PROCEDURE — 99215 OFFICE O/P EST HI 40 MIN: CPT | Performed by: NURSE PRACTITIONER

## 2025-03-12 PROCEDURE — 96415 CHEMO IV INFUSION ADDL HR: CPT

## 2025-03-12 PROCEDURE — 96375 TX/PRO/DX INJ NEW DRUG ADDON: CPT | Mod: INF

## 2025-03-12 PROCEDURE — 1159F MED LIST DOCD IN RCRD: CPT | Performed by: NURSE PRACTITIONER

## 2025-03-12 PROCEDURE — 80053 COMPREHEN METABOLIC PANEL: CPT

## 2025-03-12 PROCEDURE — 96417 CHEMO IV INFUS EACH ADDL SEQ: CPT

## 2025-03-12 PROCEDURE — 2500000001 HC RX 250 WO HCPCS SELF ADMINISTERED DRUGS (ALT 637 FOR MEDICARE OP): Performed by: NURSE PRACTITIONER

## 2025-03-12 PROCEDURE — 1160F RVW MEDS BY RX/DR IN RCRD: CPT | Performed by: NURSE PRACTITIONER

## 2025-03-12 PROCEDURE — 3048F LDL-C <100 MG/DL: CPT | Performed by: NURSE PRACTITIONER

## 2025-03-12 PROCEDURE — 85025 COMPLETE CBC W/AUTO DIFF WBC: CPT

## 2025-03-12 PROCEDURE — 96367 TX/PROPH/DG ADDL SEQ IV INF: CPT

## 2025-03-12 PROCEDURE — 3078F DIAST BP <80 MM HG: CPT | Performed by: NURSE PRACTITIONER

## 2025-03-12 PROCEDURE — 3061F NEG MICROALBUMINURIA REV: CPT | Performed by: NURSE PRACTITIONER

## 2025-03-12 PROCEDURE — G2211 COMPLEX E/M VISIT ADD ON: HCPCS | Performed by: NURSE PRACTITIONER

## 2025-03-12 PROCEDURE — 2500000004 HC RX 250 GENERAL PHARMACY W/ HCPCS (ALT 636 FOR OP/ED): Mod: JZ,TB | Performed by: NURSE PRACTITIONER

## 2025-03-12 PROCEDURE — 96413 CHEMO IV INFUSION 1 HR: CPT

## 2025-03-12 PROCEDURE — 36415 COLL VENOUS BLD VENIPUNCTURE: CPT

## 2025-03-12 PROCEDURE — 1126F AMNT PAIN NOTED NONE PRSNT: CPT | Performed by: NURSE PRACTITIONER

## 2025-03-12 PROCEDURE — 3052F HG A1C>EQUAL 8.0%<EQUAL 9.0%: CPT | Performed by: NURSE PRACTITIONER

## 2025-03-12 PROCEDURE — 83735 ASSAY OF MAGNESIUM: CPT

## 2025-03-12 RX ORDER — EPINEPHRINE 0.3 MG/.3ML
0.3 INJECTION SUBCUTANEOUS EVERY 5 MIN PRN
Status: CANCELLED | OUTPATIENT
Start: 2025-03-14

## 2025-03-12 RX ORDER — ALBUTEROL SULFATE 0.83 MG/ML
3 SOLUTION RESPIRATORY (INHALATION) AS NEEDED
Status: DISCONTINUED | OUTPATIENT
Start: 2025-03-12 | End: 2025-03-12 | Stop reason: HOSPADM

## 2025-03-12 RX ORDER — DIPHENHYDRAMINE HYDROCHLORIDE 50 MG/ML
50 INJECTION INTRAMUSCULAR; INTRAVENOUS AS NEEDED
Status: DISCONTINUED | OUTPATIENT
Start: 2025-03-12 | End: 2025-03-12 | Stop reason: HOSPADM

## 2025-03-12 RX ORDER — EPINEPHRINE 0.3 MG/.3ML
0.3 INJECTION SUBCUTANEOUS EVERY 5 MIN PRN
Status: DISCONTINUED | OUTPATIENT
Start: 2025-03-12 | End: 2025-03-12 | Stop reason: HOSPADM

## 2025-03-12 RX ORDER — PROCHLORPERAZINE EDISYLATE 5 MG/ML
10 INJECTION INTRAMUSCULAR; INTRAVENOUS EVERY 6 HOURS PRN
Status: DISCONTINUED | OUTPATIENT
Start: 2025-03-12 | End: 2025-03-12 | Stop reason: HOSPADM

## 2025-03-12 RX ORDER — FAMOTIDINE 10 MG/ML
20 INJECTION, SOLUTION INTRAVENOUS ONCE AS NEEDED
Status: CANCELLED | OUTPATIENT
Start: 2025-03-12

## 2025-03-12 RX ORDER — PALONOSETRON 0.05 MG/ML
0.25 INJECTION, SOLUTION INTRAVENOUS ONCE
Status: CANCELLED | OUTPATIENT
Start: 2025-03-12

## 2025-03-12 RX ORDER — ALBUTEROL SULFATE 0.83 MG/ML
3 SOLUTION RESPIRATORY (INHALATION) AS NEEDED
Status: CANCELLED | OUTPATIENT
Start: 2025-03-12

## 2025-03-12 RX ORDER — DIPHENHYDRAMINE HYDROCHLORIDE 50 MG/ML
50 INJECTION INTRAMUSCULAR; INTRAVENOUS AS NEEDED
Status: CANCELLED | OUTPATIENT
Start: 2025-03-12

## 2025-03-12 RX ORDER — ALBUTEROL SULFATE 0.83 MG/ML
3 SOLUTION RESPIRATORY (INHALATION) AS NEEDED
Status: CANCELLED | OUTPATIENT
Start: 2025-03-14

## 2025-03-12 RX ORDER — ACETAMINOPHEN 325 MG/1
650 TABLET ORAL ONCE
Status: CANCELLED | OUTPATIENT
Start: 2025-03-12

## 2025-03-12 RX ORDER — DIPHENHYDRAMINE HYDROCHLORIDE 50 MG/ML
50 INJECTION INTRAMUSCULAR; INTRAVENOUS AS NEEDED
Status: CANCELLED | OUTPATIENT
Start: 2025-03-14

## 2025-03-12 RX ORDER — ACETAMINOPHEN 325 MG/1
650 TABLET ORAL ONCE
Status: COMPLETED | OUTPATIENT
Start: 2025-03-12 | End: 2025-03-12

## 2025-03-12 RX ORDER — FAMOTIDINE 10 MG/ML
20 INJECTION, SOLUTION INTRAVENOUS ONCE AS NEEDED
Status: CANCELLED | OUTPATIENT
Start: 2025-03-14

## 2025-03-12 RX ORDER — PROCHLORPERAZINE MALEATE 10 MG
10 TABLET ORAL EVERY 6 HOURS PRN
Status: DISCONTINUED | OUTPATIENT
Start: 2025-03-12 | End: 2025-03-12 | Stop reason: HOSPADM

## 2025-03-12 RX ORDER — EPINEPHRINE 0.3 MG/.3ML
0.3 INJECTION SUBCUTANEOUS EVERY 5 MIN PRN
Status: CANCELLED | OUTPATIENT
Start: 2025-03-12

## 2025-03-12 RX ORDER — DIPHENHYDRAMINE HCL 50 MG
50 CAPSULE ORAL ONCE
Status: COMPLETED | OUTPATIENT
Start: 2025-03-12 | End: 2025-03-12

## 2025-03-12 RX ORDER — MAGNESIUM SULFATE HEPTAHYDRATE 40 MG/ML
2 INJECTION, SOLUTION INTRAVENOUS ONCE
Status: COMPLETED | OUTPATIENT
Start: 2025-03-12 | End: 2025-03-12

## 2025-03-12 RX ORDER — DIPHENHYDRAMINE HCL 50 MG
50 CAPSULE ORAL ONCE
Status: CANCELLED | OUTPATIENT
Start: 2025-03-12

## 2025-03-12 RX ORDER — PROCHLORPERAZINE EDISYLATE 5 MG/ML
10 INJECTION INTRAMUSCULAR; INTRAVENOUS EVERY 6 HOURS PRN
Status: CANCELLED | OUTPATIENT
Start: 2025-03-12

## 2025-03-12 RX ORDER — FAMOTIDINE 10 MG/ML
20 INJECTION, SOLUTION INTRAVENOUS ONCE AS NEEDED
Status: DISCONTINUED | OUTPATIENT
Start: 2025-03-12 | End: 2025-03-12 | Stop reason: HOSPADM

## 2025-03-12 RX ORDER — MAGNESIUM SULFATE HEPTAHYDRATE 40 MG/ML
2 INJECTION, SOLUTION INTRAVENOUS ONCE
Status: CANCELLED | OUTPATIENT
Start: 2025-03-12 | End: 2025-03-12

## 2025-03-12 RX ORDER — PROCHLORPERAZINE MALEATE 10 MG
10 TABLET ORAL EVERY 6 HOURS PRN
Status: CANCELLED | OUTPATIENT
Start: 2025-03-12

## 2025-03-12 RX ORDER — PALONOSETRON 0.05 MG/ML
0.25 INJECTION, SOLUTION INTRAVENOUS ONCE
Status: COMPLETED | OUTPATIENT
Start: 2025-03-12 | End: 2025-03-12

## 2025-03-12 RX ORDER — DEXAMETHASONE 6 MG/1
12 TABLET ORAL ONCE
Status: COMPLETED | OUTPATIENT
Start: 2025-03-12 | End: 2025-03-12

## 2025-03-12 RX ADMIN — PALONOSETRON HYDROCHLORIDE 0.25 MG: 0.25 INJECTION INTRAVENOUS at 10:46

## 2025-03-12 RX ADMIN — ACETAMINOPHEN 650 MG: 325 TABLET ORAL at 12:04

## 2025-03-12 RX ADMIN — MAGNESIUM SULFATE IN WATER 2 G: 40 INJECTION, SOLUTION INTRAVENOUS at 11:22

## 2025-03-12 RX ADMIN — PEMETREXED DISODIUM 850 MG: 500 INJECTION, POWDER, LYOPHILIZED, FOR SOLUTION INTRAVENOUS at 11:22

## 2025-03-12 RX ADMIN — DEXAMETHASONE 12 MG: 6 TABLET ORAL at 10:46

## 2025-03-12 RX ADMIN — CARBOPLATIN 590 MG: 10 INJECTION INTRAVENOUS at 11:38

## 2025-03-12 RX ADMIN — DIPHENHYDRAMINE HYDROCHLORIDE 50 MG: 50 CAPSULE ORAL at 12:04

## 2025-03-12 RX ADMIN — AMIVANTAMAB 2100 MG: 350 INJECTION INTRAVENOUS at 12:42

## 2025-03-12 RX ADMIN — FOSAPREPITANT 150 MG: 150 INJECTION, POWDER, LYOPHILIZED, FOR SOLUTION INTRAVENOUS at 10:48

## 2025-03-12 ASSESSMENT — PAIN SCALES - GENERAL: PAINLEVEL_OUTOF10: 0-NO PAIN

## 2025-03-12 ASSESSMENT — ENCOUNTER SYMPTOMS
RESPIRATORY NEGATIVE: 1
GASTROINTESTINAL NEGATIVE: 1
CARDIOVASCULAR NEGATIVE: 1
MUSCULOSKELETAL NEGATIVE: 1
FATIGUE: 1

## 2025-03-12 NOTE — PROGRESS NOTES
NUTRITION Assessment NOTE    Nutrition Assessment     Reason for Visit:  Jayant Holland is a 74 y.o. male who presents for NSCLC Adenocarcinoma, Staging [id number], RUL, R pleaural effusion/;leural carcinomatosis, R hilar, mediastinal retrocaval nodes.  PMH: T2DM, HTN, HLD    ACTIVE TREATMENT: Amivantamab + Pemetrexed/Carboplatin, 21 Day Cycles  Today's treatment: Day 1, Cycle 3    Met with patient during infusion due to hx of weight loss and varied intake.    Patient Active Problem List   Diagnosis    Arthritis of knee, degenerative    Benign essential hypertension    Diabetes mellitus (Multi)    Edema    Fatigue    GERD (gastroesophageal reflux disease)    Hematochezia    Hyperlipemia    Hypokalemia    Joint pain, knee    Left knee DJD    Left knee pain    Leg length discrepancy    Cervicalgia    Low back pain with left-sided sciatica    Low vitamin B12 level    Stiffness of left knee    Sciatica    Night sweats    Strain of right shoulder    Medicare annual wellness visit, subsequent    Contact with and (suspected) exposure to covid-19    Contact with and (suspected) exposure to other hazardous substances    Exposure to Agent Orange    Other chest pain    Hearing loss of right ear    Vitamin D deficiency    Knee stiffness    Arthralgia of knee    Osteoarthritis of knee    Osteoarthritis of left knee    Inequality of length of lower extremity    Erectile dysfunction    Inflammation of sacroiliac joint (CMS-HCC)    Strain of shoulder    Exertional dyspnea    Neck pain, bilateral    Bilateral shoulder pain    Neural foraminal stenosis of cervical spine    Arthritis of both shoulder regions    Impingement of shoulder    Chronic post-traumatic stress disorder    Paresthesia of skin    Reaction to severe stress, unspecified    Sensorineural hearing loss, bilateral    Unstable angina (Multi)    Pleural effusion on right    Malignant pleural effusion (CMS-HCC)    Adenocarcinoma (Multi)    Pleural effusion    Encounter for  "antineoplastic chemotherapy    Hypomagnesemia    Encounter for monoclonal antibody treatment for malignancy    Advance directive in chart       Nutrition Significant Labs:  Lab Results   Component Value Date/Time    GLUCOSE 297 (H) 03/12/2025 0901     (L) 03/12/2025 0901    K 4.6 03/12/2025 0901     03/12/2025 0901    CO2 25 03/12/2025 0901    ANIONGAP 13 03/12/2025 0901    BUN 20 03/12/2025 0901    CREATININE 0.81 03/12/2025 0901    EGFR >90 03/12/2025 0901    CALCIUM 8.7 03/12/2025 0901    ALBUMIN 3.3 (L) 03/12/2025 0901    ALKPHOS 130 03/12/2025 0901    PROT 6.1 (L) 03/12/2025 0901    AST 21 03/12/2025 0901    BILITOT 0.3 03/12/2025 0901    ALT 30 03/12/2025 0901    MG 1.32 (L) 03/12/2025 0901     Lab Results   Component Value Date/Time    VITD25 32 01/28/2025 0833         Anthropometrics:  Height: 182.6 cm (5' 11.89\")            IBW/kg (Dietitian Calculated): 81 kg Percent of IBW: 105 %          Weight History:   Daily Weight  03/12/25 : 85.2 kg (187 lb 13.3 oz)  03/09/25 : 85.5 kg (188 lb 7.9 oz)  03/06/25 : 84.8 kg (187 lb)  03/05/25 : 84.8 kg (187 lb)  03/03/25 : 83.6 kg (184 lb 6.4 oz)  02/27/25 : 82.7 kg (182 lb 6.4 oz)  02/24/25 : 85.3 kg (188 lb)  02/21/25 : 85.4 kg (188 lb 4.8 oz)  02/19/25 : 84.3 kg (185 lb 13.6 oz)  02/18/25 : 84.6 kg (186 lb 8 oz)  8/26/24: 94.8 kg    Weight Change %:  Weight History / % Weight Change: 9.7% loss in 6 months  Significant Weight Loss: No (Insidious weight loss.)    Nutrition History:  Food & Nutrition History: Appetite is good  Food Allergies:    Food Intolerances:    Vitamin/mineral intake:       Herbal supplements:    Medication and Complementary/Alternative Medicine Use:    Dentition:    Sleep Habits:      Diet Recall:  Meal 1: Boost regular,  or quevedo and scramb eggs sometime toast/apple juice  Snack 1:    Meal 2:    Snack 2:    Meal 3: fish, filet, green beans  sweet tea, spinach  Snack 3: Boost  reg  Food Variety:    Oral Nutrition Supplement Use: Oral " Nutrition Supplements: Boost Frequency: once/day Calories: 240 Protein: 10  Fluid Intake: 48 ounces water/16 oz juice  Energy Intake:  ~ 1000 kcal  Protein intake: 60 gm/day  Food Preparation:  Cooking: Patient  Grocery Shopping: Patient  Dining Out:      Medications:  Current Outpatient Medications   Medication Instructions    albuterol 90 mcg/actuation inhaler 2 puffs, inhalation, Every 4 hours PRN    Blood glucose monitoring meter kit kit 1 each, As needed    blood sugar diagnostic (Accu-Chek Jessica Plus test strp) strip 2 times daily    blood sugar diagnostic (Blood Glucose Test) strip 1 strip, miscellaneous, Daily    blood sugar diagnostic (OneTouch Verio test strips) strip 1 strip, miscellaneous, Daily    blood-glucose meter misc 1 Device, miscellaneous, Daily    clindamycin (Cleocin T) 1 % lotion Apply topically to affected area twice daily.    dexAMETHasone (Decadron) 4 mg tablet Beginning with Cycle 2, take 1 tablet (4 mg) by mouth twice daily the day before PEMEtrexed treatment and twice daily the day after PEMEtrexed treatment.    dexAMETHasone (Decadron) 4 mg tablet Beginning with Cycle 2, take 1 tablet (4 mg) by mouth twice daily the day before PEMEtrexed treatment and twice daily the day after PEMEtrexed treatment.    dexAMETHasone (DECADRON) 8 mg, oral, 2 times daily, Start 2 days prior to the first dose of Amivantamab on Cycle 1 only.    dexAMETHasone (DECADRON) 8 mg, oral, 2 times daily, Start 2 days prior to the first dose of Amivantamab on Cycle 1 only.    folic acid (FOLVITE) 1,000 mcg, oral, Daily    hydrocortisone 1 % cream Apply topically to face, hands, feet, neck, back, and chest once daily at bedtime for rash prevention.    lancets 30 gauge misc 1 Device, miscellaneous, 3 times daily    lisinopril 20 mg, oral, Daily    magnesium oxide (MAG-OX) 400 mg, oral, 2 times daily    meloxicam (MOBIC) 15 mg, oral, Daily    metFORMIN XR (GLUCOPHAGE-XR) 1,000 mg, oral, 2 times daily, Do not crush, chew,  or split.    OLANZapine (ZYPREXA) 5 mg, oral, Nightly, For 4 days starting the evening of treatment    omeprazole (PriLOSEC) 20 mg DR capsule TAKE 1 CAPSULE BY MOUTH EVERY DAY    ondansetron (ZOFRAN) 8 mg, oral, Every 8 hours PRN    prochlorperazine (COMPAZINE) 10 mg, oral, Every 6 hours PRN    rosuvastatin (Crestor) 40 mg tablet TAKE 1 TABLET BY MOUTH EVERY DAY    sennosides (SENOKOT) 17.2 mg, oral, Nightly PRN    sildenafil (Viagra) 100 mg tablet Take by mouth. TAKE 1 TABLET AS NEEDED APPROXIMATELY 1 HOUR BEFORE SEXUAL ACTIVITY       Nutrition Focused Physical Exam Findings:    Subcutaneous Fat Loss:   Orbital Fat Pads: Mild-Moderate (slight dark circles and slight hollowing)  Buccal Fat Pads: Mild-Moderate (flat cheeks, minimal bounce)  Triceps: Mild-Moderate (less than ample fat tissue)  Ribs: Well nourished (chest is full, ribs do not show, slight to no protrusion of the iliac crest)    Muscle Wasting:  Temporalis: Well nourished (well-defined muscle)  Pectoralis (Clavicular Region): Well nourished (clavicle not visible)  Deltoid/Trapezius: Mild-Moderate (slight protrusion of acromion process)  Interosseous: Mild-Moderate (slightly depressed area between thumb and forefinger)  Trapezius/Infraspinatus/Supraspinatus (Scapular Region): Mild-Moderate (slight protrusion of scapula)  Quadriceps: Well nourished (well developed, well rounded)  Gastrocnemius: Well nourished (well developed bulbous muscle)    Physical Findings:     Edema: +2 mild    Estimated Needs:       Total Energy Estimated Needs in 24 hours (kCal):  (4496-9407)  Energy Estimated Needs per kg Body Weight in 24 hours (kCal/kg):  (25-30)     Total Fluid Estimated Needs in 24 Hours (mL):  (0176-7500)         Nutrition Diagnosis   Malnutrition Diagnosis  Patient has Malnutrition Diagnosis: Yes  Diagnosis Status: New  Malnutrition Diagnosis: Moderate malnutrition related to chronic disease or condition  Related to: increased nutrient demands of cancer and  inadequate intake  As Evidenced by: intake <50% of nutrition needs, insidious weight loss of 9.7% in 6 months, and physical findings per NFPE.        Nutrition Interventions/Recommendations   Nutrition Prescription: Oral nutrition High Calorie, High protein diet, ~ 3 meals and 2 snacks or supplements/day    Nutrition Interventions:   Food and Nutrient Delivery: Meals & Snacks: Modification of schedule of oral intake, Protein-modified diet, Energy-modified diet  Goals: Pt will increase intake to >/= 75% of nutrition/hydration needs AEB diet recall.  Medical Food Supplement: Commercial beverage medical food supplement therapy  Goals: Pt will consume 1-2 Boost per day.  Other:: Fluids  Goals: Pt will consume >/= 75% of fluid needs /day.     Coordination of Care: Collaboration and referral of nutrition care: Collaboration and Referral of Nutrition Care  Goals: RD will continue to work with Oncology team to ensure best possible outcomes for patient.     Nutrition Education:   Nutrition Education Content: Content related nutrition education  Pt and family will have good understanding of diet concepts AEB diet recall.  Provided verbal and written instruction on High-Calorie, High Protein diet with suggestions for snack ideas.  Provided recommendation to increase  or chose nutrient density of foods, and/or increase Boost to 1 carton BID or consider Boost VHC at least 1 time day.  Provided dietitian contact information.    Readiness to Change : Good  Level of Understanding : Good  Anticipated Compliant : Good         Nutrition Monitoring and Evaluation   Food and Nutrient Intake  Monitoring and Evaluation Plan: Energy intake, Protein intake, Meal/snack pattern  Energy Intake: Estimated energy intake  Criteria: Diet recall, measured weight  Meal/Snack Pattern: Estimated meal and snack pattern  Criteria: Diet recall  Estimated protein intake: Estimated protein intake  Criteria: Diet recall    Anthropometric  measurements  Monitoring and Evaluation Plan: Weight, Weight change  Body Weight: Measured body weight  Body Weight Change: Body weight change percentage       Follow Up:     Time Spent  Prep time on day of patient encounter: 5 minutes  Time spent directly with patient, family or caregiver: 30 minutes  Additional Time Spent on Patient Care Activities: 0 minutes  Documentation Time: 20 minutes  Other Time Spent: 0 minutes  Total: 55 minutes

## 2025-03-12 NOTE — H&P (VIEW-ONLY)
[facility] - Medical Oncology Follow-Up Visit  Patient ID: Jayant Holland is a 74 y.o. male with NSCLC adenocarcinoma     Current therapy: cyanocobalamin (Vitamin B-12) injection 1,000 mcg, 1,000 mcg, intramuscular, Once, 1 of 6 cycles  Administration: 1,000 mcg (1/29/2025)    fosaprepitant (Emend) 150 mg in sodium chloride 0.9% 250 mL IV, 150 mg, intravenous, Once, 1 of 4 cycles  Administration: 150 mg (1/29/2025)    CARBOplatin (Paraplatin) 600 mg in sodium chloride 0.9% 170 mL IV, 600 mg, intravenous, Once, 1 of 4 cycles  Administration: 600 mg (1/29/2025)    amivantamab-vmjw (Rybrevant) 350 mg in sodium chloride 0.9% 250 mL IV, 350 mg, intravenous, Once, 1 of 18 cycles  Administration: 350 mg (1/29/2025), 1,400 mg (1/30/2025), 1,750 mg (2/4/2025), 1,750 mg (2/11/2025)  methylPREDNISolone sod succinate (SOLU-Medrol) 40 mg/mL injection 40 mg, 40 mg, intravenous, As needed, 1 of 18 cycles    palonosetron (Aloxi) injection 250 mcg, 250 mcg, intravenous, Once, 1 of 4 cycles  Administration: 250 mcg (1/29/2025)    PEMEtrexed disodium (Alimta) 1,100 mg in sodium chloride 0.9% 154 mL IV, 500 mg/m2 = 1,100 mg, intravenous, Once, 1 of 18 cycles  Dose modification: 400 mg/m2 (original dose 500 mg/m2, Cycle 2)  Administration: 1,100 mg (1/29/2025)       Chief Concern: follow-up and readiness to treat     Oncologic History:     DIAGNOSIS  NSCLC adenocarcinoma     STAGING  [id number]     CURRENT SITES OF DISEASE  RUL, R pleural effusion/pleural carcinomatosis, R hilar, mediastinal, retrocaval nodes      MOLECULAR GENOMICS  PD-L1 5%  EGFR [id number] ([id number])      PRIOR THERAPY      CURRENT THERAPY  Carboplatin/Pemetrexed/Amivantamab C1D1 1/29/25 -   Pemetrexed dose reduced 400mg/m2 C2 -      CURRENT ONCOLOGICAL PROBLEMS  Unintentional wt loss - stabilized  Hypomagnesia         HISTORY OF PRESENT ILLNESS  Mr. Holland is a 75 yo with PMH significant for DMII, HTN, and  EMANUEL who had a CXR done on 12/5/24 for persistent coughing after pneumonia about a month prior, which was concerning for moderate pleural effusion of the R lung. He went to the ER, where a CT chest w/contrast was done with spiculated RUL mass measuring 2.2 x 2.1 cm and large R pleural effusion. He was referred to diagnostic clinic and seen on 12/9/24 by Kirstin where he noted persistent cough for 6 weeks, constant dyspnea, wheezing, and unintentional 30 lb weight loss over the last year. He was referred to pulmonology and had thoracentesis on 12/10/24 with 980 ml removed, cytology with adenocarcinoma, PD-L1 5%, and NGS with EGFR exon 20 mutation. He had repeat thoracentesis on 12/17/24 with 2.4L removed. PET/CT 12/20/24 with FDV avidity of the RL nodule SUV max 9.2, multiple RLL avid nodules SUV max 4.1, FDG-avid pleural thickening on the right, multiple R hilar, mediastinal, subcarinal nodes, and moderate R pleural effusion. Mildly avid GGO within JANELLE SUV max 2.0. Brain MRI is rescheduled for 1/15/25.   Consented for carboplatin/pemetrexed/amivantamab to start 1/29/25.  Treatment delayed d/t hospitalization.      1/21/25 - 1/23/25 - hospitalization 2/2 recurrent pleural effusion.        PAST MEDICAL HISTORY  DMII   HTN  HLD   Osteoarthritis (neck)  asthma     SOCIAL HISTORY  Lives at home with his wife. Retired from [employer], worked as a technician for 48 years, notes hazardous chemical exposures.  Tob: never  EtOH: occasionally  Illicits: none     FAMILY HISTORY  Mother - breast cancer    HPI   Present for readiness to treat - C3.  Reports feeling fatigued for the last few days.  Low energy.   Breathing feels good.  Pleurx drained 2x/wk.  Output cranberry in color.  Averaging approx 400cc.  Appetite is good with stable wt.  Denies n/v/c/d.  Staying hydrated.  Voiding fine.  No pain.   Rash to face, improving with cream use.  No other skin concerns.  No fevers, chills, or CP.  Slight swelling to ankles, left slighty  worse then right (Hx of ankle injury).  No other concerns.  Labs WNL.  Ready for treatment.        Review of Systems   Constitutional:  Positive for fatigue.   HENT:  Negative.     Respiratory: Negative.     Cardiovascular: Negative.    Gastrointestinal: Negative.    Musculoskeletal: Negative.    Skin:  Positive for rash.         Meds (Current):    Current Outpatient Medications:     albuterol 90 mcg/actuation inhaler, INHALE 2 PUFFS EVERY 4 HOURS IF NEEDED FOR WHEEZING OR SHORTNESS OF BREATH., Disp: 18 g, Rfl: 3    Blood glucose monitoring meter kit kit, 1 each if needed., Disp: , Rfl:     blood sugar diagnostic (Accu-Chek Jessica Plus test strp) strip, 2 times a day., Disp: , Rfl:     blood sugar diagnostic (Blood Glucose Test) strip, 1 strip once daily., Disp: 100 strip, Rfl: 3    blood sugar diagnostic (OneTouch Verio test strips) strip, 1 strip once daily., Disp: 100 strip, Rfl: 3    blood-glucose meter misc, 1 Device once daily., Disp: 1 each, Rfl: 0    clindamycin (Cleocin T) 1 % lotion, Apply topically to affected area twice daily. Do not fill before January 29, 2025., Disp: 60 mL, Rfl: 3    dexAMETHasone (Decadron) 4 mg tablet, Beginning with Cycle 2, take 1 tablet (4 mg) by mouth twice daily the day before PEMEtrexed treatment and twice daily the day after PEMEtrexed treatment., Disp: 4 tablet, Rfl: 11    dexAMETHasone (Decadron) 4 mg tablet, Beginning with Cycle 2, take 1 tablet (4 mg) by mouth twice daily the day before PEMEtrexed treatment and twice daily the day after PEMEtrexed treatment. Do not fill before January 29, 2025., Disp: 4 tablet, Rfl: 11    folic acid (Folvite) 1 mg tablet, Take 1 tablet (1,000 mcg) by mouth once daily. Do not fill before January 29, 2025., Disp: 30 tablet, Rfl: 11    hydrocortisone 1 % cream, Apply topically to face, hands, feet, neck, back, and chest once daily at bedtime for rash prevention. Do not fill before January 29, 2025., Disp: 453.6 g, Rfl: 3    lancets 30 gauge  misc, 1 Device 3 times a day., Disp: 200 each, Rfl: 3    lisinopril 20 mg tablet, Take 1 tablet (20 mg) by mouth once daily., Disp: 100 tablet, Rfl: 3    magnesium oxide (Mag-Ox) 400 mg (241.3 mg magnesium) tablet, Take 1 tablet (400 mg) by mouth 2 times a day., Disp: 60 tablet, Rfl: 11    meloxicam (Mobic) 15 mg tablet, Take 1 tablet (15 mg) by mouth once daily., Disp: 90 tablet, Rfl: 3    metFORMIN  mg 24 hr tablet, Take 2 tablets (1,000 mg) by mouth 2 times a day. Do not crush, chew, or split., Disp: 360 tablet, Rfl: 3    OLANZapine (ZyPREXA) 5 mg tablet, Take 1 tablet (5 mg) by mouth once daily at bedtime. For 4 days starting the evening of treatment Do not fill before January 29, 2025., Disp: 16 tablet, Rfl: 0    omeprazole (PriLOSEC) 20 mg DR capsule, TAKE 1 CAPSULE BY MOUTH EVERY DAY, Disp: 90 capsule, Rfl: 3    ondansetron (Zofran) 8 mg tablet, Take 1 tablet (8 mg) by mouth every 8 hours if needed for nausea or vomiting. Do not fill before January 29, 2025., Disp: 30 tablet, Rfl: 5    prochlorperazine (Compazine) 10 mg tablet, Take 1 tablet (10 mg) by mouth every 6 hours if needed for nausea or vomiting. Do not fill before January 29, 2025., Disp: 30 tablet, Rfl: 5    rosuvastatin (Crestor) 40 mg tablet, TAKE 1 TABLET BY MOUTH EVERY DAY, Disp: 90 tablet, Rfl: 3    sennosides (Senokot) 8.6 mg tablet, Take 2 tablets (17.2 mg) by mouth as needed at bedtime for constipation. Do not fill before January 29, 2025., Disp: 30 tablet, Rfl: 11    sildenafil (Viagra) 100 mg tablet, Take by mouth. TAKE 1 TABLET AS NEEDED APPROXIMATELY 1 HOUR BEFORE SEXUAL ACTIVITY, Disp: , Rfl:     dexAMETHasone (Decadron) 4 mg tablet, Take 2 tablets (8 mg) by mouth 2 times a day for 5 doses. Start 2 days prior to the first dose of Amivantamab on Cycle 1 only., Disp: 10 tablet, Rfl: 0    dexAMETHasone (Decadron) 4 mg tablet, Take 2 tablets (8 mg) by mouth 2 times a day for 5 doses. Start 2 days prior to the first dose of  Amivantamab on Cycle 1 only. Do not fill before January 29, 2025., Disp: 10 tablet, Rfl: 0  No current facility-administered medications for this visit.      Objective     /67   Pulse 98   Temp 36.2 °C (97.2 °F) (Core)   Resp 20   Wt 85.2 kg (187 lb 13.3 oz)   SpO2 97%   BMI 25.47 kg/m²      Wt Readings from Last 5 Encounters:   03/12/25 85.2 kg (187 lb 13.3 oz)   03/09/25 85.5 kg (188 lb 7.9 oz)   03/06/25 84.8 kg (187 lb)   03/05/25 84.8 kg (187 lb)   03/03/25 83.6 kg (184 lb 6.4 oz)       Physical Exam  Vitals reviewed.   Constitutional:       Appearance: Normal appearance.   HENT:      Head: Normocephalic.      Nose: Nose normal.      Mouth/Throat:      Mouth: Mucous membranes are moist.      Pharynx: Oropharynx is clear.   Eyes:      Extraocular Movements: Extraocular movements intact.      Conjunctiva/sclera: Conjunctivae normal.      Pupils: Pupils are equal, round, and reactive to light.   Cardiovascular:      Rate and Rhythm: Normal rate and regular rhythm.      Pulses: Normal pulses.      Heart sounds: Normal heart sounds.   Pulmonary:      Breath sounds: Normal breath sounds.      Comments: +Pleurx drain  Abdominal:      General: Bowel sounds are normal.      Palpations: Abdomen is soft.   Musculoskeletal:         General: Normal range of motion.      Cervical back: Normal range of motion and neck supple.      Comments: Slight swelling to ankles.     Skin:     General: Skin is warm and dry.      Findings: Rash present.      Comments: Faint macular rash to bridge of nose and cheeks.     Neurological:      General: No focal deficit present.      Mental Status: He is alert and oriented to person, place, and time.   Psychiatric:         Mood and Affect: Mood normal.         Behavior: Behavior normal.         Thought Content: Thought content normal.         Judgment: Judgment normal.          Results:    Lab Results   Component Value Date    WBC 12.6 (H) 03/12/2025    HGB 11.8 (L) 03/12/2025    HCT  37.0 (L) 03/12/2025    MCV 88 03/12/2025     03/12/2025      Lab Results   Component Value Date    NEUTROABS 9.45 (H) 03/12/2025      Lab Results   Component Value Date    GLUCOSE 297 (H) 03/12/2025    CALCIUM 8.7 03/12/2025     (L) 03/12/2025    K 4.6 03/12/2025    CO2 25 03/12/2025     03/12/2025    BUN 20 03/12/2025    CREATININE 0.81 03/12/2025    MG 1.32 (L) 03/12/2025     Lab Results   Component Value Date    ALT 30 03/12/2025    AST 21 03/12/2025    ALKPHOS 130 03/12/2025    BILITOT 0.3 03/12/2025    BILIDIR 0.1 11/18/2019      Lab Results   Component Value Date    TSH 2.12 01/28/2025       Imaging:  No new imaging.       Assessment/Plan      Jayant Holland is a 74 y.o. male here for follow up of NSCLC adenocarcinoma    # NSCLC adenocarcinoma  - [id number] (pleural effusion)  - pending brain MRI  - PD-L1 5%, EGFR [id number] ([id number])   - discussed that given EGFR exon 20 mutation, recommend combination chemotherapy+amivantamab, consented  - planned to start C1D1 1/28/25  - plan for surveillance CT scans after C4 - CT scheduled 4/18/25.   - C1D1 dexamethasone Rx started today - for 3 doses.  Discussed potential infusion reaction.  Pt verbalized understanding.    - Per pt request, 2/19/25 C2 infusion and visit transferred to [facility].  Visit with FORTINO Grace.  [place] [facility] [place]    - C4D1 4/2/25 at [facility], labs prior to visit.   - C5 - visit changed to Dr. Solitario for scan review.    - Per pt request, order placed for mediport placement.      # Malignant pleural effusion  - s/p thoracentesis and contacted by IP while in visit for repeat thora.   - Right pleurx catheter placed 12/17/24 with HH referral in place.  Drained 2x/wk.  - continue to monitor    # Hypomagnesia, ongoing with current Amivantamab treatment  - Mg replacement plan created, next IV mag replacement dates include: 3/14, 3/17, 3/20, 3/24, 3/27, 3/31, 4/4, 4/7, 4/9, 4/11, 4/14  at [place].  Replacement dates ongoing. Treatment parameters:    Serum magnesium 1 - 1.6 mg/dL - administer 2 grams over 1 hour  Serum magnesium less than 1 mg/dL - administer 4 grams over 2 hours   - 3/12:  Mg 1.32 - 2g IV Mg with today's treatment.      # Macular rash to face, bridge of nose and checks  - Continue routine Doxycycline and treatment rash protocols.      # Unintentional wt loss  - Recommended pt add Ensure/Boost supplements and snacks throughout the day. Will try increased nutritional intake vs pharm intervention for now.    - Dietician referral placed.   - 2/11:  wt loss stabilized, pt started daily Boost supplements    # Leukocytosis 12.6 3/12/25  - no clinical s/sx's infection  - Continue to monitor     # Advanced care planning  - discussed incurable but treatable nature of metastatic disease, and goal of therapy is to prolong life while maintaining or improving quality of life.  - 1/30/25 - Supportive onc referral placed.  Pt declined need for visit at this time.  Supportive onc will follow-up in 2-3 weeks.

## 2025-03-12 NOTE — PROGRESS NOTES
[facility] - Medical Oncology Follow-Up Visit  Patient ID: Jayant Holland is a 74 y.o. male with NSCLC adenocarcinoma     Current therapy: cyanocobalamin (Vitamin B-12) injection 1,000 mcg, 1,000 mcg, intramuscular, Once, 1 of 6 cycles  Administration: 1,000 mcg (1/29/2025)    fosaprepitant (Emend) 150 mg in sodium chloride 0.9% 250 mL IV, 150 mg, intravenous, Once, 1 of 4 cycles  Administration: 150 mg (1/29/2025)    CARBOplatin (Paraplatin) 600 mg in sodium chloride 0.9% 170 mL IV, 600 mg, intravenous, Once, 1 of 4 cycles  Administration: 600 mg (1/29/2025)    amivantamab-vmjw (Rybrevant) 350 mg in sodium chloride 0.9% 250 mL IV, 350 mg, intravenous, Once, 1 of 18 cycles  Administration: 350 mg (1/29/2025), 1,400 mg (1/30/2025), 1,750 mg (2/4/2025), 1,750 mg (2/11/2025)  methylPREDNISolone sod succinate (SOLU-Medrol) 40 mg/mL injection 40 mg, 40 mg, intravenous, As needed, 1 of 18 cycles    palonosetron (Aloxi) injection 250 mcg, 250 mcg, intravenous, Once, 1 of 4 cycles  Administration: 250 mcg (1/29/2025)    PEMEtrexed disodium (Alimta) 1,100 mg in sodium chloride 0.9% 154 mL IV, 500 mg/m2 = 1,100 mg, intravenous, Once, 1 of 18 cycles  Dose modification: 400 mg/m2 (original dose 500 mg/m2, Cycle 2)  Administration: 1,100 mg (1/29/2025)       Chief Concern: follow-up and readiness to treat     Oncologic History:     DIAGNOSIS  NSCLC adenocarcinoma     STAGING  [id number]     CURRENT SITES OF DISEASE  RUL, R pleural effusion/pleural carcinomatosis, R hilar, mediastinal, retrocaval nodes      MOLECULAR GENOMICS  PD-L1 5%  EGFR [id number] ([id number])      PRIOR THERAPY      CURRENT THERAPY  Carboplatin/Pemetrexed/Amivantamab C1D1 1/29/25 -   Pemetrexed dose reduced 400mg/m2 C2 -      CURRENT ONCOLOGICAL PROBLEMS  Unintentional wt loss - stabilized  Hypomagnesia         HISTORY OF PRESENT ILLNESS  Mr. Holland is a 75 yo with PMH significant for DMII, HTN, and  EMANUEL who had a CXR done on 12/5/24 for persistent coughing after pneumonia about a month prior, which was concerning for moderate pleural effusion of the R lung. He went to the ER, where a CT chest w/contrast was done with spiculated RUL mass measuring 2.2 x 2.1 cm and large R pleural effusion. He was referred to diagnostic clinic and seen on 12/9/24 by Kirstin where he noted persistent cough for 6 weeks, constant dyspnea, wheezing, and unintentional 30 lb weight loss over the last year. He was referred to pulmonology and had thoracentesis on 12/10/24 with 980 ml removed, cytology with adenocarcinoma, PD-L1 5%, and NGS with EGFR exon 20 mutation. He had repeat thoracentesis on 12/17/24 with 2.4L removed. PET/CT 12/20/24 with FDV avidity of the RL nodule SUV max 9.2, multiple RLL avid nodules SUV max 4.1, FDG-avid pleural thickening on the right, multiple R hilar, mediastinal, subcarinal nodes, and moderate R pleural effusion. Mildly avid GGO within JANELLE SUV max 2.0. Brain MRI is rescheduled for 1/15/25.   Consented for carboplatin/pemetrexed/amivantamab to start 1/29/25.  Treatment delayed d/t hospitalization.      1/21/25 - 1/23/25 - hospitalization 2/2 recurrent pleural effusion.        PAST MEDICAL HISTORY  DMII   HTN  HLD   Osteoarthritis (neck)  asthma     SOCIAL HISTORY  Lives at home with his wife. Retired from [employer], worked as a technician for 48 years, notes hazardous chemical exposures.  Tob: never  EtOH: occasionally  Illicits: none     FAMILY HISTORY  Mother - breast cancer    HPI   Present for readiness to treat - C3.  Reports feeling fatigued for the last few days.  Low energy.   Breathing feels good.  Pleurx drained 2x/wk.  Output cranberry in color.  Averaging approx 400cc.  Appetite is good with stable wt.  Denies n/v/c/d.  Staying hydrated.  Voiding fine.  No pain.   Rash to face, improving with cream use.  No other skin concerns.  No fevers, chills, or CP.  Slight swelling to ankles, left slighty  worse then right (Hx of ankle injury).  No other concerns.  Labs WNL.  Ready for treatment.        Review of Systems   Constitutional:  Positive for fatigue.   HENT:  Negative.     Respiratory: Negative.     Cardiovascular: Negative.    Gastrointestinal: Negative.    Musculoskeletal: Negative.    Skin:  Positive for rash.         Meds (Current):    Current Outpatient Medications:     albuterol 90 mcg/actuation inhaler, INHALE 2 PUFFS EVERY 4 HOURS IF NEEDED FOR WHEEZING OR SHORTNESS OF BREATH., Disp: 18 g, Rfl: 3    Blood glucose monitoring meter kit kit, 1 each if needed., Disp: , Rfl:     blood sugar diagnostic (Accu-Chek Jessica Plus test strp) strip, 2 times a day., Disp: , Rfl:     blood sugar diagnostic (Blood Glucose Test) strip, 1 strip once daily., Disp: 100 strip, Rfl: 3    blood sugar diagnostic (OneTouch Verio test strips) strip, 1 strip once daily., Disp: 100 strip, Rfl: 3    blood-glucose meter misc, 1 Device once daily., Disp: 1 each, Rfl: 0    clindamycin (Cleocin T) 1 % lotion, Apply topically to affected area twice daily. Do not fill before January 29, 2025., Disp: 60 mL, Rfl: 3    dexAMETHasone (Decadron) 4 mg tablet, Beginning with Cycle 2, take 1 tablet (4 mg) by mouth twice daily the day before PEMEtrexed treatment and twice daily the day after PEMEtrexed treatment., Disp: 4 tablet, Rfl: 11    dexAMETHasone (Decadron) 4 mg tablet, Beginning with Cycle 2, take 1 tablet (4 mg) by mouth twice daily the day before PEMEtrexed treatment and twice daily the day after PEMEtrexed treatment. Do not fill before January 29, 2025., Disp: 4 tablet, Rfl: 11    folic acid (Folvite) 1 mg tablet, Take 1 tablet (1,000 mcg) by mouth once daily. Do not fill before January 29, 2025., Disp: 30 tablet, Rfl: 11    hydrocortisone 1 % cream, Apply topically to face, hands, feet, neck, back, and chest once daily at bedtime for rash prevention. Do not fill before January 29, 2025., Disp: 453.6 g, Rfl: 3    lancets 30 gauge  misc, 1 Device 3 times a day., Disp: 200 each, Rfl: 3    lisinopril 20 mg tablet, Take 1 tablet (20 mg) by mouth once daily., Disp: 100 tablet, Rfl: 3    magnesium oxide (Mag-Ox) 400 mg (241.3 mg magnesium) tablet, Take 1 tablet (400 mg) by mouth 2 times a day., Disp: 60 tablet, Rfl: 11    meloxicam (Mobic) 15 mg tablet, Take 1 tablet (15 mg) by mouth once daily., Disp: 90 tablet, Rfl: 3    metFORMIN  mg 24 hr tablet, Take 2 tablets (1,000 mg) by mouth 2 times a day. Do not crush, chew, or split., Disp: 360 tablet, Rfl: 3    OLANZapine (ZyPREXA) 5 mg tablet, Take 1 tablet (5 mg) by mouth once daily at bedtime. For 4 days starting the evening of treatment Do not fill before January 29, 2025., Disp: 16 tablet, Rfl: 0    omeprazole (PriLOSEC) 20 mg DR capsule, TAKE 1 CAPSULE BY MOUTH EVERY DAY, Disp: 90 capsule, Rfl: 3    ondansetron (Zofran) 8 mg tablet, Take 1 tablet (8 mg) by mouth every 8 hours if needed for nausea or vomiting. Do not fill before January 29, 2025., Disp: 30 tablet, Rfl: 5    prochlorperazine (Compazine) 10 mg tablet, Take 1 tablet (10 mg) by mouth every 6 hours if needed for nausea or vomiting. Do not fill before January 29, 2025., Disp: 30 tablet, Rfl: 5    rosuvastatin (Crestor) 40 mg tablet, TAKE 1 TABLET BY MOUTH EVERY DAY, Disp: 90 tablet, Rfl: 3    sennosides (Senokot) 8.6 mg tablet, Take 2 tablets (17.2 mg) by mouth as needed at bedtime for constipation. Do not fill before January 29, 2025., Disp: 30 tablet, Rfl: 11    sildenafil (Viagra) 100 mg tablet, Take by mouth. TAKE 1 TABLET AS NEEDED APPROXIMATELY 1 HOUR BEFORE SEXUAL ACTIVITY, Disp: , Rfl:     dexAMETHasone (Decadron) 4 mg tablet, Take 2 tablets (8 mg) by mouth 2 times a day for 5 doses. Start 2 days prior to the first dose of Amivantamab on Cycle 1 only., Disp: 10 tablet, Rfl: 0    dexAMETHasone (Decadron) 4 mg tablet, Take 2 tablets (8 mg) by mouth 2 times a day for 5 doses. Start 2 days prior to the first dose of  Amivantamab on Cycle 1 only. Do not fill before January 29, 2025., Disp: 10 tablet, Rfl: 0  No current facility-administered medications for this visit.      Objective     /67   Pulse 98   Temp 36.2 °C (97.2 °F) (Core)   Resp 20   Wt 85.2 kg (187 lb 13.3 oz)   SpO2 97%   BMI 25.47 kg/m²      Wt Readings from Last 5 Encounters:   03/12/25 85.2 kg (187 lb 13.3 oz)   03/09/25 85.5 kg (188 lb 7.9 oz)   03/06/25 84.8 kg (187 lb)   03/05/25 84.8 kg (187 lb)   03/03/25 83.6 kg (184 lb 6.4 oz)       Physical Exam  Vitals reviewed.   Constitutional:       Appearance: Normal appearance.   HENT:      Head: Normocephalic.      Nose: Nose normal.      Mouth/Throat:      Mouth: Mucous membranes are moist.      Pharynx: Oropharynx is clear.   Eyes:      Extraocular Movements: Extraocular movements intact.      Conjunctiva/sclera: Conjunctivae normal.      Pupils: Pupils are equal, round, and reactive to light.   Cardiovascular:      Rate and Rhythm: Normal rate and regular rhythm.      Pulses: Normal pulses.      Heart sounds: Normal heart sounds.   Pulmonary:      Breath sounds: Normal breath sounds.      Comments: +Pleurx drain  Abdominal:      General: Bowel sounds are normal.      Palpations: Abdomen is soft.   Musculoskeletal:         General: Normal range of motion.      Cervical back: Normal range of motion and neck supple.      Comments: Slight swelling to ankles.     Skin:     General: Skin is warm and dry.      Findings: Rash present.      Comments: Faint macular rash to bridge of nose and cheeks.     Neurological:      General: No focal deficit present.      Mental Status: He is alert and oriented to person, place, and time.   Psychiatric:         Mood and Affect: Mood normal.         Behavior: Behavior normal.         Thought Content: Thought content normal.         Judgment: Judgment normal.          Results:    Lab Results   Component Value Date    WBC 12.6 (H) 03/12/2025    HGB 11.8 (L) 03/12/2025    HCT  37.0 (L) 03/12/2025    MCV 88 03/12/2025     03/12/2025      Lab Results   Component Value Date    NEUTROABS 9.45 (H) 03/12/2025      Lab Results   Component Value Date    GLUCOSE 297 (H) 03/12/2025    CALCIUM 8.7 03/12/2025     (L) 03/12/2025    K 4.6 03/12/2025    CO2 25 03/12/2025     03/12/2025    BUN 20 03/12/2025    CREATININE 0.81 03/12/2025    MG 1.32 (L) 03/12/2025     Lab Results   Component Value Date    ALT 30 03/12/2025    AST 21 03/12/2025    ALKPHOS 130 03/12/2025    BILITOT 0.3 03/12/2025    BILIDIR 0.1 11/18/2019      Lab Results   Component Value Date    TSH 2.12 01/28/2025       Imaging:  No new imaging.       Assessment/Plan      Jayant Holland is a 74 y.o. male here for follow up of NSCLC adenocarcinoma    # NSCLC adenocarcinoma  - [id number] (pleural effusion)  - pending brain MRI  - PD-L1 5%, EGFR [id number] ([id number])   - discussed that given EGFR exon 20 mutation, recommend combination chemotherapy+amivantamab, consented  - planned to start C1D1 1/28/25  - plan for surveillance CT scans after C4 - CT scheduled 4/18/25.   - C1D1 dexamethasone Rx started today - for 3 doses.  Discussed potential infusion reaction.  Pt verbalized understanding.    - Per pt request, 2/19/25 C2 infusion and visit transferred to [facility].  Visit with FORTINO Grace.  Scan review visits with Dr. Solitario at [facility].    - C4D1 4/2/25 at [facility], labs prior to visit.   - C5 - visit changed to Dr. Solitario for scan review.    - Per pt request, order placed for mediport placement.      # Malignant pleural effusion  - s/p thoracentesis and contacted by IP while in visit for repeat thora.   - Right pleurx catheter placed 12/17/24 with HH referral in place.  Drained 2x/wk.  - continue to monitor    # Hypomagnesia, ongoing with current Amivantamab treatment  - Mg replacement plan created, next IV mag replacement dates include: 3/14, 3/17, 3/20, 3/24, 3/27, 3/31, 4/4, 4/7, 4/9, 4/11, 4/14  at [facility].  Replacement dates ongoing. Treatment parameters:    Serum magnesium 1 - 1.6 mg/dL - administer 2 grams over 1 hour  Serum magnesium less than 1 mg/dL - administer 4 grams over 2 hours   - 3/12:  Mg 1.32 - 2g IV Mg with today's treatment.      # Macular rash to face, bridge of nose and checks  - Continue routine Doxycycline and treatment rash protocols.      # Unintentional wt loss  - Recommended pt add Ensure/Boost supplements and snacks throughout the day. Will try increased nutritional intake vs pharm intervention for now.    - Dietician referral placed.   - 2/11:  wt loss stabilized, pt started daily Boost supplements    # Leukocytosis 12.6 3/12/25  - no clinical s/sx's infection  - Continue to monitor     # Advanced care planning  - discussed incurable but treatable nature of metastatic disease, and goal of therapy is to prolong life while maintaining or improving quality of life.  - 1/30/25 - Supportive onc referral placed.  Pt declined need for visit at this time.  Supportive onc will follow-up in 2-3 weeks.

## 2025-03-12 NOTE — PROGRESS NOTES
Assessment unchanged from prior office visit. Pt tolerated chemo infusions without incident.  Per patient request education provided re Mediport placement.  PI sheet given and explained.  Verbalized understanding. Discharged home in stable condition

## 2025-03-13 ENCOUNTER — APPOINTMENT (OUTPATIENT)
Dept: HEMATOLOGY/ONCOLOGY | Facility: CLINIC | Age: 75
End: 2025-03-13
Payer: MEDICARE

## 2025-03-13 DIAGNOSIS — C80.1 ADENOCARCINOMA (MULTI): ICD-10-CM

## 2025-03-13 RX ORDER — CHLORTHALIDONE 25 MG/1
25 TABLET ORAL DAILY
COMMUNITY

## 2025-03-14 ENCOUNTER — DOCUMENTATION (OUTPATIENT)
Dept: HEMATOLOGY/ONCOLOGY | Facility: HOSPITAL | Age: 75
End: 2025-03-14
Payer: MEDICARE

## 2025-03-14 ENCOUNTER — INFUSION (OUTPATIENT)
Dept: HEMATOLOGY/ONCOLOGY | Facility: CLINIC | Age: 75
End: 2025-03-14
Payer: MEDICARE

## 2025-03-14 VITALS
TEMPERATURE: 97.2 F | HEIGHT: 72 IN | WEIGHT: 193.4 LBS | BODY MASS INDEX: 26.19 KG/M2 | RESPIRATION RATE: 16 BRPM | OXYGEN SATURATION: 99 % | SYSTOLIC BLOOD PRESSURE: 104 MMHG | HEART RATE: 78 BPM | DIASTOLIC BLOOD PRESSURE: 65 MMHG

## 2025-03-14 DIAGNOSIS — J91.0 MALIGNANT PLEURAL EFFUSION (CMS-HCC): ICD-10-CM

## 2025-03-14 DIAGNOSIS — E83.42 HYPOMAGNESEMIA: ICD-10-CM

## 2025-03-14 DIAGNOSIS — J90 PLEURAL EFFUSION, NOT ELSEWHERE CLASSIFIED: ICD-10-CM

## 2025-03-14 DIAGNOSIS — C80.1 ADENOCARCINOMA (MULTI): ICD-10-CM

## 2025-03-14 DIAGNOSIS — C80.1 ADENOCARCINOMA (MULTI): Primary | ICD-10-CM

## 2025-03-14 LAB
INR PPP: 1.2 (ref 0.9–1.1)
MAGNESIUM SERPL-MCNC: 1.51 MG/DL (ref 1.6–2.4)
PROTHROMBIN TIME: 13 SECONDS (ref 9.8–12.4)

## 2025-03-14 PROCEDURE — 96365 THER/PROPH/DIAG IV INF INIT: CPT | Mod: INF

## 2025-03-14 PROCEDURE — 85610 PROTHROMBIN TIME: CPT

## 2025-03-14 PROCEDURE — 83735 ASSAY OF MAGNESIUM: CPT

## 2025-03-14 PROCEDURE — 2500000004 HC RX 250 GENERAL PHARMACY W/ HCPCS (ALT 636 FOR OP/ED): Performed by: STUDENT IN AN ORGANIZED HEALTH CARE EDUCATION/TRAINING PROGRAM

## 2025-03-14 RX ORDER — DIPHENHYDRAMINE HYDROCHLORIDE 50 MG/ML
50 INJECTION INTRAMUSCULAR; INTRAVENOUS AS NEEDED
Status: DISCONTINUED | OUTPATIENT
Start: 2025-03-14 | End: 2025-03-14 | Stop reason: HOSPADM

## 2025-03-14 RX ORDER — ALBUTEROL SULFATE 0.83 MG/ML
3 SOLUTION RESPIRATORY (INHALATION) AS NEEDED
Status: DISCONTINUED | OUTPATIENT
Start: 2025-03-14 | End: 2025-03-14 | Stop reason: HOSPADM

## 2025-03-14 RX ORDER — DIPHENHYDRAMINE HYDROCHLORIDE 50 MG/ML
50 INJECTION INTRAMUSCULAR; INTRAVENOUS AS NEEDED
OUTPATIENT
Start: 2025-03-17

## 2025-03-14 RX ORDER — EPINEPHRINE 0.3 MG/.3ML
0.3 INJECTION SUBCUTANEOUS EVERY 5 MIN PRN
Status: DISCONTINUED | OUTPATIENT
Start: 2025-03-14 | End: 2025-03-14 | Stop reason: HOSPADM

## 2025-03-14 RX ORDER — ALBUTEROL SULFATE 0.83 MG/ML
3 SOLUTION RESPIRATORY (INHALATION) AS NEEDED
OUTPATIENT
Start: 2025-03-17

## 2025-03-14 RX ORDER — FAMOTIDINE 10 MG/ML
20 INJECTION, SOLUTION INTRAVENOUS ONCE AS NEEDED
OUTPATIENT
Start: 2025-03-17

## 2025-03-14 RX ORDER — EPINEPHRINE 0.3 MG/.3ML
0.3 INJECTION SUBCUTANEOUS EVERY 5 MIN PRN
OUTPATIENT
Start: 2025-03-17

## 2025-03-14 RX ORDER — MAGNESIUM SULFATE HEPTAHYDRATE 40 MG/ML
2 INJECTION, SOLUTION INTRAVENOUS ONCE
Status: CANCELLED | OUTPATIENT
Start: 2025-03-17 | End: 2025-03-17

## 2025-03-14 RX ORDER — FAMOTIDINE 10 MG/ML
20 INJECTION, SOLUTION INTRAVENOUS ONCE AS NEEDED
Status: DISCONTINUED | OUTPATIENT
Start: 2025-03-14 | End: 2025-03-14 | Stop reason: HOSPADM

## 2025-03-14 RX ORDER — MAGNESIUM SULFATE HEPTAHYDRATE 40 MG/ML
2 INJECTION, SOLUTION INTRAVENOUS ONCE
Status: COMPLETED | OUTPATIENT
Start: 2025-03-14 | End: 2025-03-14

## 2025-03-14 RX ADMIN — MAGNESIUM SULFATE IN WATER 2 G: 40 INJECTION, SOLUTION INTRAVENOUS at 10:28

## 2025-03-14 ASSESSMENT — PAIN SCALES - GENERAL: PAINLEVEL_OUTOF10: 0-NO PAIN

## 2025-03-14 NOTE — PROGRESS NOTES
Pt here for mag infusion. Tolerated well. He is aware of plan of care, will call with further questions or concerns. Will return Monday for next infusion. Pt has appt for port placement next week, IV was placed via US today

## 2025-03-17 ENCOUNTER — INFUSION (OUTPATIENT)
Dept: HEMATOLOGY/ONCOLOGY | Facility: CLINIC | Age: 75
End: 2025-03-17
Payer: MEDICARE

## 2025-03-17 ENCOUNTER — LAB (OUTPATIENT)
Dept: LAB | Facility: HOSPITAL | Age: 75
End: 2025-03-17
Payer: MEDICARE

## 2025-03-17 ENCOUNTER — HOSPITAL ENCOUNTER (OUTPATIENT)
Dept: CARDIOLOGY | Facility: HOSPITAL | Age: 75
Discharge: HOME | End: 2025-03-17
Payer: MEDICARE

## 2025-03-17 VITALS
SYSTOLIC BLOOD PRESSURE: 133 MMHG | HEART RATE: 97 BPM | TEMPERATURE: 97.9 F | BODY MASS INDEX: 25.27 KG/M2 | RESPIRATION RATE: 16 BRPM | DIASTOLIC BLOOD PRESSURE: 75 MMHG | OXYGEN SATURATION: 97 % | WEIGHT: 186.6 LBS | HEIGHT: 72 IN

## 2025-03-17 VITALS
TEMPERATURE: 97 F | DIASTOLIC BLOOD PRESSURE: 64 MMHG | OXYGEN SATURATION: 97 % | HEIGHT: 73 IN | BODY MASS INDEX: 25.58 KG/M2 | SYSTOLIC BLOOD PRESSURE: 99 MMHG | RESPIRATION RATE: 16 BRPM | WEIGHT: 193 LBS | HEART RATE: 79 BPM

## 2025-03-17 DIAGNOSIS — E83.42 HYPOMAGNESEMIA: ICD-10-CM

## 2025-03-17 DIAGNOSIS — C80.1 ADENOCARCINOMA (MULTI): ICD-10-CM

## 2025-03-17 DIAGNOSIS — C80.1 ADENOCARCINOMA (MULTI): Primary | ICD-10-CM

## 2025-03-17 LAB — MAGNESIUM SERPL-MCNC: 1.12 MG/DL (ref 1.6–2.4)

## 2025-03-17 PROCEDURE — C1788 PORT, INDWELLING, IMP: HCPCS

## 2025-03-17 PROCEDURE — 2500000004 HC RX 250 GENERAL PHARMACY W/ HCPCS (ALT 636 FOR OP/ED): Performed by: NURSE PRACTITIONER

## 2025-03-17 PROCEDURE — 7100000009 HC PHASE TWO TIME - INITIAL BASE CHARGE

## 2025-03-17 PROCEDURE — 2720000007 HC OR 272 NO HCPCS

## 2025-03-17 PROCEDURE — 83735 ASSAY OF MAGNESIUM: CPT

## 2025-03-17 PROCEDURE — 36561 INSERT TUNNELED CV CATH: CPT | Mod: RT | Performed by: RADIOLOGY

## 2025-03-17 PROCEDURE — 2500000004 HC RX 250 GENERAL PHARMACY W/ HCPCS (ALT 636 FOR OP/ED): Performed by: STUDENT IN AN ORGANIZED HEALTH CARE EDUCATION/TRAINING PROGRAM

## 2025-03-17 PROCEDURE — 99152 MOD SED SAME PHYS/QHP 5/>YRS: CPT

## 2025-03-17 PROCEDURE — 99152 MOD SED SAME PHYS/QHP 5/>YRS: CPT | Performed by: RADIOLOGY

## 2025-03-17 PROCEDURE — 96365 THER/PROPH/DIAG IV INF INIT: CPT | Mod: INF

## 2025-03-17 PROCEDURE — C1894 INTRO/SHEATH, NON-LASER: HCPCS

## 2025-03-17 PROCEDURE — 7100000010 HC PHASE TWO TIME - EACH INCREMENTAL 1 MINUTE

## 2025-03-17 PROCEDURE — 2500000004 HC RX 250 GENERAL PHARMACY W/ HCPCS (ALT 636 FOR OP/ED): Performed by: RADIOLOGY

## 2025-03-17 PROCEDURE — 77001 FLUOROGUIDE FOR VEIN DEVICE: CPT | Performed by: RADIOLOGY

## 2025-03-17 PROCEDURE — 76937 US GUIDE VASCULAR ACCESS: CPT | Performed by: RADIOLOGY

## 2025-03-17 PROCEDURE — 2780000003 HC OR 278 NO HCPCS

## 2025-03-17 RX ORDER — EPINEPHRINE 0.3 MG/.3ML
0.3 INJECTION SUBCUTANEOUS EVERY 5 MIN PRN
Status: CANCELLED | OUTPATIENT
Start: 2025-03-20

## 2025-03-17 RX ORDER — LIDOCAINE HYDROCHLORIDE AND EPINEPHRINE 10; 10 UG/ML; MG/ML
INJECTION, SOLUTION INFILTRATION; PERINEURAL
Status: COMPLETED | OUTPATIENT
Start: 2025-03-17 | End: 2025-03-17

## 2025-03-17 RX ORDER — ALBUTEROL SULFATE 0.83 MG/ML
3 SOLUTION RESPIRATORY (INHALATION) AS NEEDED
Status: CANCELLED | OUTPATIENT
Start: 2025-03-20

## 2025-03-17 RX ORDER — MAGNESIUM SULFATE HEPTAHYDRATE 40 MG/ML
2 INJECTION, SOLUTION INTRAVENOUS ONCE
Status: CANCELLED | OUTPATIENT
Start: 2025-03-20

## 2025-03-17 RX ORDER — ALBUTEROL SULFATE 0.83 MG/ML
3 SOLUTION RESPIRATORY (INHALATION) AS NEEDED
Status: DISCONTINUED | OUTPATIENT
Start: 2025-03-17 | End: 2025-03-17 | Stop reason: HOSPADM

## 2025-03-17 RX ORDER — HEPARIN 100 UNIT/ML
500 SYRINGE INTRAVENOUS AS NEEDED
OUTPATIENT
Start: 2025-03-17

## 2025-03-17 RX ORDER — DIPHENHYDRAMINE HYDROCHLORIDE 50 MG/ML
50 INJECTION, SOLUTION INTRAMUSCULAR; INTRAVENOUS AS NEEDED
Status: CANCELLED | OUTPATIENT
Start: 2025-03-20

## 2025-03-17 RX ORDER — DIPHENHYDRAMINE HYDROCHLORIDE 50 MG/ML
50 INJECTION, SOLUTION INTRAMUSCULAR; INTRAVENOUS AS NEEDED
Status: DISCONTINUED | OUTPATIENT
Start: 2025-03-17 | End: 2025-03-17 | Stop reason: HOSPADM

## 2025-03-17 RX ORDER — HEPARIN SODIUM,PORCINE/PF 10 UNIT/ML
50 SYRINGE (ML) INTRAVENOUS AS NEEDED
OUTPATIENT
Start: 2025-03-17

## 2025-03-17 RX ORDER — MAGNESIUM SULFATE HEPTAHYDRATE 40 MG/ML
4 INJECTION, SOLUTION INTRAVENOUS ONCE
Status: DISCONTINUED | OUTPATIENT
Start: 2025-03-17 | End: 2025-03-17

## 2025-03-17 RX ORDER — EPINEPHRINE 0.3 MG/.3ML
0.3 INJECTION SUBCUTANEOUS EVERY 5 MIN PRN
Status: DISCONTINUED | OUTPATIENT
Start: 2025-03-17 | End: 2025-03-17 | Stop reason: HOSPADM

## 2025-03-17 RX ORDER — HEPARIN 100 UNIT/ML
500 SYRINGE INTRAVENOUS AS NEEDED
Status: DISCONTINUED | OUTPATIENT
Start: 2025-03-17 | End: 2025-03-17 | Stop reason: HOSPADM

## 2025-03-17 RX ORDER — FAMOTIDINE 10 MG/ML
20 INJECTION, SOLUTION INTRAVENOUS ONCE AS NEEDED
Status: CANCELLED | OUTPATIENT
Start: 2025-03-20

## 2025-03-17 RX ORDER — FENTANYL CITRATE 50 UG/ML
INJECTION, SOLUTION INTRAMUSCULAR; INTRAVENOUS
Status: COMPLETED | OUTPATIENT
Start: 2025-03-17 | End: 2025-03-17

## 2025-03-17 RX ORDER — MAGNESIUM SULFATE HEPTAHYDRATE 40 MG/ML
2 INJECTION, SOLUTION INTRAVENOUS ONCE
Status: COMPLETED | OUTPATIENT
Start: 2025-03-17 | End: 2025-03-17

## 2025-03-17 RX ORDER — FAMOTIDINE 10 MG/ML
20 INJECTION, SOLUTION INTRAVENOUS ONCE AS NEEDED
Status: DISCONTINUED | OUTPATIENT
Start: 2025-03-17 | End: 2025-03-17 | Stop reason: HOSPADM

## 2025-03-17 RX ORDER — MIDAZOLAM HYDROCHLORIDE 1 MG/ML
INJECTION, SOLUTION INTRAMUSCULAR; INTRAVENOUS
Status: COMPLETED | OUTPATIENT
Start: 2025-03-17 | End: 2025-03-17

## 2025-03-17 RX ADMIN — MAGNESIUM SULFATE IN WATER 2 G: 40 INJECTION, SOLUTION INTRAVENOUS at 15:09

## 2025-03-17 RX ADMIN — FENTANYL CITRATE 50 MCG: 50 INJECTION INTRAMUSCULAR; INTRAVENOUS at 13:27

## 2025-03-17 RX ADMIN — Medication 500 UNITS: at 16:09

## 2025-03-17 RX ADMIN — LIDOCAINE HYDROCHLORIDE,EPINEPHRINE BITARTRATE 5 ML: 10; .01 INJECTION, SOLUTION INFILTRATION; PERINEURAL at 13:28

## 2025-03-17 RX ADMIN — MIDAZOLAM 1 MG: 1 INJECTION INTRAMUSCULAR; INTRAVENOUS at 13:27

## 2025-03-17 ASSESSMENT — PAIN SCALES - GENERAL
PAINLEVEL_OUTOF10: 0-NO PAIN
PAINLEVEL_OUTOF10: 0 - NO PAIN

## 2025-03-17 ASSESSMENT — PAIN - FUNCTIONAL ASSESSMENT
PAIN_FUNCTIONAL_ASSESSMENT: 0-10

## 2025-03-17 NOTE — PROGRESS NOTES
Pt here magnesium infusion after port placement. Tolerated well. He is aware of plan of care, will call with further questions or concerns. Will return this week for mag infusion and labs

## 2025-03-17 NOTE — DISCHARGE INSTRUCTIONS
Instructions after Your Port Placement   Today, you had your port placed in Interventional Radiology/Specials department. Your port will be used for blood draws, imaging studies like CT-scans or MRIs and used for infusions and/ or chemotherapy etc.     Activity  For the first day, rest with light walking as tolerated. You might feel more tired than usual.   Do not lift anything heavier than 10 lbs. (around the weight of 1 gallon milk jug) for 24 hours. You can lift weights heavier than 10 lbs. on _3/18/2025______      .  Do not drive for the first 24 hours. You can drive on __3/18/2025______________.   The following day after the procedure, you can get back to your normal activities.    Port incision and neck area care   Keep your dressing over your chest area clean, dry and in place for 5-7 days. If you have a scheduled infusion before your dressing removal date, it is okay to let the staff remove your dressing and use your port.   You may remove chest dressing on __3/21/2025___________ and leave the area uncovered.   You may remove your clear neck dressing 2 days after your procedure.   You may remove neck dressing on __3/19/2025__________ and leave the area uncovered.   Do not remove the strips of tape on your chest or neck area. Let theses fall off on their own. Do not peel off any glue that might be over your incision.  The stitches used to close the skin around the port will dissolve on their own.   You may shower 3 days after your procedure. Continue to protect the dressing from direct water.   Do not submerge your port area in bath tub, swimming pool or hot tub until it is fully healed.   Once you remove a dressing, gently clean the area with soap and water and pat it dry with clean towel.   Check your port area every day for any signs of infection   Redness   Drainage  Increased warmth around the port site   Pus or foul odor   Fever or chills or increased pain at the port site  If you have any of  these signs or symptoms of infection: Please call and leave a message for our IR/Specials Nurse Practitioner Emily Minh at [phone]. She will return your call the same business day. If unable to reach Emily, please call [phone] and ask to speak with a specials nurse. We are here M-F 7-4 PM.    Pain  After your procedure, when the numbing medicine wears off you might experience mild to moderate pain in the neck and chest area.   To help with pain you may apply ice pack 3 to 4 times a day for 20 minutes at a time to the chest and neck area. Be careful to not get the dressing area wet.      Medications for pain  Please refer to the medications on your discharge paperwork   If you are having pain, please take what you normally would to help relieve your pain.   If you have a prescription medication for chronic pain, continue taking your current regiment and that will help reduce or alleviate pain at the site of the incision as well.   Pain at the incision typically lasts 3-7 days and continues to lessen each day      Bleeding  If you have oozing from the port site that extends to the edges of the dressing. Hold pressure over the neck and chest area. If oozing does not stop in 5 minutes, call 911 or come straight to the emergency room to be evaluated.

## 2025-03-17 NOTE — POST-PROCEDURE NOTE
Interventional Radiology Brief Postprocedure Note    Attending: Cristopher Vargas MD      Assistant: none    Diagnosis: lung ca    Description of procedure: port placement     Anesthesia:  Local, mod sed    Complications: None    Estimated Blood Loss: minimal    No specimens collected      See detailed result report with images in PACS.    The patient tolerated the procedure well without incident or complication.

## 2025-03-17 NOTE — PRE-SEDATION DOCUMENTATION
"Interventional Radiology Preprocedure Note    Jayant Holland   Indication for procedure: The encounter diagnosis was Adenocarcinoma (Multi). Patient here for Mediport placement. Patient has had 4 cycles of chemotherapy. He is scheduled to have another cycle today.     Relevant review of systems: NA      /81   Pulse 85   Temp 36.1 °C (97 °F) (Tympanic)   Resp 14   Ht 1.854 m (6' 1\")   Wt 87.5 kg (193 lb)   SpO2 99%   BMI 25.46 kg/m²    Relevant Labs:   Lab Results   Component Value Date    CREATININE 0.81 03/12/2025    EGFR >90 03/12/2025    INR 1.2 (H) 03/14/2025    PROTIME 13.0 (H) 03/14/2025       Planned Sedation/Anesthesia: Moderate    Airway assessment: normal    Directed physical examination:    Alert and oriented X 3, lungs CTAB, heart sounds normal S1 and S2    Mallampati: II (hard and soft palate, upper portion of tonsils and uvula visible)    ASA Score: ASA 3 - Patient with moderate systemic disease with functional limitations    Benefits, risks and alternatives of procedure and planned sedation have been discussed with the patient and/or their representative. All questions answered and they agree to proceed.     Leigh Wilkinson, APRN-CNP   "

## 2025-03-18 ENCOUNTER — TELEPHONE (OUTPATIENT)
Dept: PULMONOLOGY | Facility: HOSPITAL | Age: 75
End: 2025-03-18
Payer: MEDICARE

## 2025-03-18 NOTE — TELEPHONE ENCOUNTER
I called [employer] on behalf of the patient to order more PleurX draining bottles. They are shipping out in 2 days.

## 2025-03-19 ENCOUNTER — APPOINTMENT (OUTPATIENT)
Dept: CARDIOLOGY | Facility: HOSPITAL | Age: 75
End: 2025-03-19
Payer: MEDICARE

## 2025-03-20 ENCOUNTER — HOSPITAL ENCOUNTER (INPATIENT)
Facility: HOSPITAL | Age: 75
LOS: 2 days | Discharge: HOME | DRG: 175 | End: 2025-03-23
Attending: EMERGENCY MEDICINE | Admitting: INTERNAL MEDICINE
Payer: MEDICARE

## 2025-03-20 ENCOUNTER — APPOINTMENT (OUTPATIENT)
Dept: CARDIOLOGY | Facility: HOSPITAL | Age: 75
DRG: 175 | End: 2025-03-20
Payer: MEDICARE

## 2025-03-20 ENCOUNTER — INFUSION (OUTPATIENT)
Dept: HEMATOLOGY/ONCOLOGY | Facility: CLINIC | Age: 75
End: 2025-03-20
Payer: MEDICARE

## 2025-03-20 ENCOUNTER — APPOINTMENT (OUTPATIENT)
Dept: RADIOLOGY | Facility: HOSPITAL | Age: 75
DRG: 175 | End: 2025-03-20
Payer: MEDICARE

## 2025-03-20 VITALS
DIASTOLIC BLOOD PRESSURE: 77 MMHG | TEMPERATURE: 97.2 F | RESPIRATION RATE: 16 BRPM | BODY MASS INDEX: 24.77 KG/M2 | SYSTOLIC BLOOD PRESSURE: 117 MMHG | HEART RATE: 104 BPM | OXYGEN SATURATION: 99 % | HEIGHT: 72 IN | WEIGHT: 182.9 LBS

## 2025-03-20 DIAGNOSIS — R55 SYNCOPE, UNSPECIFIED SYNCOPE TYPE: ICD-10-CM

## 2025-03-20 DIAGNOSIS — C80.1 ADENOCARCINOMA (MULTI): ICD-10-CM

## 2025-03-20 DIAGNOSIS — I26.93 SINGLE SUBSEGMENTAL PULMONARY EMBOLISM WITHOUT ACUTE COR PULMONALE: Primary | ICD-10-CM

## 2025-03-20 DIAGNOSIS — I26.99 ACUTE PULMONARY EMBOLISM WITHOUT ACUTE COR PULMONALE, UNSPECIFIED PULMONARY EMBOLISM TYPE (MULTI): ICD-10-CM

## 2025-03-20 DIAGNOSIS — R60.1 GENERALIZED EDEMA: ICD-10-CM

## 2025-03-20 DIAGNOSIS — E83.42 HYPOMAGNESEMIA: ICD-10-CM

## 2025-03-20 DIAGNOSIS — I26.99 OTHER PULMONARY EMBOLISM WITHOUT ACUTE COR PULMONALE: ICD-10-CM

## 2025-03-20 LAB
ALBUMIN SERPL BCP-MCNC: 3.1 G/DL (ref 3.4–5)
ALP SERPL-CCNC: 84 U/L (ref 33–136)
ALT SERPL W P-5'-P-CCNC: 24 U/L (ref 10–52)
ANION GAP SERPL CALC-SCNC: 12 MMOL/L (ref 10–20)
AST SERPL W P-5'-P-CCNC: 22 U/L (ref 9–39)
BASOPHILS # BLD AUTO: 0.02 X10*3/UL (ref 0–0.1)
BASOPHILS NFR BLD AUTO: 0.4 %
BILIRUB SERPL-MCNC: 0.6 MG/DL (ref 0–1.2)
BNP SERPL-MCNC: 9 PG/ML (ref 0–99)
BUN SERPL-MCNC: 22 MG/DL (ref 6–23)
BURR CELLS BLD QL SMEAR: NORMAL
CALCIUM SERPL-MCNC: 7.9 MG/DL (ref 8.6–10.3)
CARDIAC TROPONIN I PNL SERPL HS: 10 NG/L (ref 0–20)
CARDIAC TROPONIN I PNL SERPL HS: 11 NG/L (ref 0–20)
CHLORIDE SERPL-SCNC: 98 MMOL/L (ref 98–107)
CO2 SERPL-SCNC: 26 MMOL/L (ref 21–32)
CREAT SERPL-MCNC: 0.83 MG/DL (ref 0.5–1.3)
EGFRCR SERPLBLD CKD-EPI 2021: >90 ML/MIN/1.73M*2
EOSINOPHIL # BLD AUTO: 0.04 X10*3/UL (ref 0–0.4)
EOSINOPHIL NFR BLD AUTO: 0.8 %
ERYTHROCYTE [DISTWIDTH] IN BLOOD BY AUTOMATED COUNT: 16.1 % (ref 11.5–14.5)
GLUCOSE SERPL-MCNC: 205 MG/DL (ref 74–99)
HCT VFR BLD AUTO: 31.3 % (ref 41–52)
HGB BLD-MCNC: 10.1 G/DL (ref 13.5–17.5)
IMM GRANULOCYTES # BLD AUTO: 0.04 X10*3/UL (ref 0–0.5)
IMM GRANULOCYTES NFR BLD AUTO: 0.8 % (ref 0–0.9)
INR PPP: 1.2 (ref 0.9–1.1)
LACTATE SERPL-SCNC: 2.2 MMOL/L (ref 0.4–2)
LYMPHOCYTES # BLD AUTO: 1.41 X10*3/UL (ref 0.8–3)
LYMPHOCYTES NFR BLD AUTO: 29.6 %
MAGNESIUM SERPL-MCNC: 0.96 MG/DL (ref 1.6–2.4)
MAGNESIUM SERPL-MCNC: 1.63 MG/DL (ref 1.6–2.4)
MCH RBC QN AUTO: 27.4 PG (ref 26–34)
MCHC RBC AUTO-ENTMCNC: 32.3 G/DL (ref 32–36)
MCV RBC AUTO: 85 FL (ref 80–100)
MONOCYTES # BLD AUTO: 0.37 X10*3/UL (ref 0.05–0.8)
MONOCYTES NFR BLD AUTO: 7.8 %
NEUTROPHILS # BLD AUTO: 2.89 X10*3/UL (ref 1.6–5.5)
NEUTROPHILS NFR BLD AUTO: 60.6 %
NRBC BLD-RTO: 0 /100 WBCS (ref 0–0)
OVALOCYTES BLD QL SMEAR: NORMAL
PLATELET # BLD AUTO: 66 X10*3/UL (ref 150–450)
POLYCHROMASIA BLD QL SMEAR: NORMAL
POTASSIUM SERPL-SCNC: 3.4 MMOL/L (ref 3.5–5.3)
PROT SERPL-MCNC: 5.7 G/DL (ref 6.4–8.2)
PROTHROMBIN TIME: 13.3 SECONDS (ref 9.8–12.4)
RBC # BLD AUTO: 3.69 X10*6/UL (ref 4.5–5.9)
RBC MORPH BLD: NORMAL
SODIUM SERPL-SCNC: 133 MMOL/L (ref 136–145)
WBC # BLD AUTO: 4.8 X10*3/UL (ref 4.4–11.3)

## 2025-03-20 PROCEDURE — 99285 EMERGENCY DEPT VISIT HI MDM: CPT | Mod: 25 | Performed by: EMERGENCY MEDICINE

## 2025-03-20 PROCEDURE — 85610 PROTHROMBIN TIME: CPT | Performed by: EMERGENCY MEDICINE

## 2025-03-20 PROCEDURE — 80053 COMPREHEN METABOLIC PANEL: CPT | Performed by: EMERGENCY MEDICINE

## 2025-03-20 PROCEDURE — 96365 THER/PROPH/DIAG IV INF INIT: CPT

## 2025-03-20 PROCEDURE — 96366 THER/PROPH/DIAG IV INF ADDON: CPT

## 2025-03-20 PROCEDURE — 83735 ASSAY OF MAGNESIUM: CPT

## 2025-03-20 PROCEDURE — 85730 THROMBOPLASTIN TIME PARTIAL: CPT | Performed by: STUDENT IN AN ORGANIZED HEALTH CARE EDUCATION/TRAINING PROGRAM

## 2025-03-20 PROCEDURE — 2550000001 HC RX 255 CONTRASTS: Performed by: EMERGENCY MEDICINE

## 2025-03-20 PROCEDURE — 84484 ASSAY OF TROPONIN QUANT: CPT | Performed by: EMERGENCY MEDICINE

## 2025-03-20 PROCEDURE — 83880 ASSAY OF NATRIURETIC PEPTIDE: CPT | Performed by: EMERGENCY MEDICINE

## 2025-03-20 PROCEDURE — 70450 CT HEAD/BRAIN W/O DYE: CPT | Performed by: RADIOLOGY

## 2025-03-20 PROCEDURE — 96366 THER/PROPH/DIAG IV INF ADDON: CPT | Mod: INF

## 2025-03-20 PROCEDURE — 71275 CT ANGIOGRAPHY CHEST: CPT

## 2025-03-20 PROCEDURE — 93005 ELECTROCARDIOGRAM TRACING: CPT

## 2025-03-20 PROCEDURE — 83605 ASSAY OF LACTIC ACID: CPT | Performed by: EMERGENCY MEDICINE

## 2025-03-20 PROCEDURE — 2500000004 HC RX 250 GENERAL PHARMACY W/ HCPCS (ALT 636 FOR OP/ED): Performed by: NURSE PRACTITIONER

## 2025-03-20 PROCEDURE — 83735 ASSAY OF MAGNESIUM: CPT | Performed by: EMERGENCY MEDICINE

## 2025-03-20 PROCEDURE — 71275 CT ANGIOGRAPHY CHEST: CPT | Performed by: RADIOLOGY

## 2025-03-20 PROCEDURE — 2500000004 HC RX 250 GENERAL PHARMACY W/ HCPCS (ALT 636 FOR OP/ED): Performed by: EMERGENCY MEDICINE

## 2025-03-20 PROCEDURE — 96367 TX/PROPH/DG ADDL SEQ IV INF: CPT

## 2025-03-20 PROCEDURE — 85025 COMPLETE CBC W/AUTO DIFF WBC: CPT | Performed by: EMERGENCY MEDICINE

## 2025-03-20 PROCEDURE — 96361 HYDRATE IV INFUSION ADD-ON: CPT

## 2025-03-20 PROCEDURE — 70450 CT HEAD/BRAIN W/O DYE: CPT

## 2025-03-20 RX ORDER — EPINEPHRINE 0.3 MG/.3ML
0.3 INJECTION SUBCUTANEOUS EVERY 5 MIN PRN
OUTPATIENT
Start: 2025-03-24

## 2025-03-20 RX ORDER — MAGNESIUM SULFATE HEPTAHYDRATE 40 MG/ML
2 INJECTION, SOLUTION INTRAVENOUS ONCE
Status: COMPLETED | OUTPATIENT
Start: 2025-03-20 | End: 2025-03-20

## 2025-03-20 RX ORDER — FAMOTIDINE 10 MG/ML
20 INJECTION, SOLUTION INTRAVENOUS ONCE AS NEEDED
OUTPATIENT
Start: 2025-03-24

## 2025-03-20 RX ORDER — ALBUTEROL SULFATE 0.83 MG/ML
3 SOLUTION RESPIRATORY (INHALATION) AS NEEDED
Status: DISCONTINUED | OUTPATIENT
Start: 2025-03-20 | End: 2025-03-20 | Stop reason: HOSPADM

## 2025-03-20 RX ORDER — DIPHENHYDRAMINE HYDROCHLORIDE 50 MG/ML
50 INJECTION, SOLUTION INTRAMUSCULAR; INTRAVENOUS AS NEEDED
OUTPATIENT
Start: 2025-03-24

## 2025-03-20 RX ORDER — MAGNESIUM SULFATE HEPTAHYDRATE 40 MG/ML
4 INJECTION, SOLUTION INTRAVENOUS ONCE
Status: COMPLETED | OUTPATIENT
Start: 2025-03-20 | End: 2025-03-20

## 2025-03-20 RX ORDER — EPINEPHRINE 0.3 MG/.3ML
0.3 INJECTION SUBCUTANEOUS EVERY 5 MIN PRN
Status: DISCONTINUED | OUTPATIENT
Start: 2025-03-20 | End: 2025-03-20 | Stop reason: HOSPADM

## 2025-03-20 RX ORDER — ALBUTEROL SULFATE 0.83 MG/ML
3 SOLUTION RESPIRATORY (INHALATION) AS NEEDED
OUTPATIENT
Start: 2025-03-24

## 2025-03-20 RX ORDER — DIPHENHYDRAMINE HYDROCHLORIDE 50 MG/ML
50 INJECTION, SOLUTION INTRAMUSCULAR; INTRAVENOUS AS NEEDED
Status: DISCONTINUED | OUTPATIENT
Start: 2025-03-20 | End: 2025-03-20 | Stop reason: HOSPADM

## 2025-03-20 RX ORDER — FAMOTIDINE 10 MG/ML
20 INJECTION, SOLUTION INTRAVENOUS ONCE AS NEEDED
Status: DISCONTINUED | OUTPATIENT
Start: 2025-03-20 | End: 2025-03-20 | Stop reason: HOSPADM

## 2025-03-20 RX ORDER — HEPARIN SODIUM 10000 [USP'U]/100ML
0-4500 INJECTION, SOLUTION INTRAVENOUS CONTINUOUS
Status: DISCONTINUED | OUTPATIENT
Start: 2025-03-20 | End: 2025-03-21

## 2025-03-20 RX ORDER — SODIUM CHLORIDE 9 MG/ML
125 INJECTION, SOLUTION INTRAVENOUS CONTINUOUS
Status: ACTIVE | OUTPATIENT
Start: 2025-03-20 | End: 2025-03-21

## 2025-03-20 RX ADMIN — MAGNESIUM SULFATE HEPTAHYDRATE 2 G: 40 INJECTION, SOLUTION INTRAVENOUS at 20:12

## 2025-03-20 RX ADMIN — IOHEXOL 75 ML: 350 INJECTION, SOLUTION INTRAVENOUS at 21:43

## 2025-03-20 RX ADMIN — MAGNESIUM SULFATE 4 G: 4 INJECTION INTRAVENOUS at 12:54

## 2025-03-20 RX ADMIN — SODIUM CHLORIDE 125 ML/HR: 0.9 INJECTION, SOLUTION INTRAVENOUS at 20:13

## 2025-03-20 RX ADMIN — SODIUM CHLORIDE 500 ML: 9 INJECTION, SOLUTION INTRAVENOUS at 20:53

## 2025-03-20 ASSESSMENT — PAIN - FUNCTIONAL ASSESSMENT: PAIN_FUNCTIONAL_ASSESSMENT: 0-10

## 2025-03-20 ASSESSMENT — PAIN SCALES - GENERAL
PAINLEVEL_OUTOF10: 0 - NO PAIN
PAINLEVEL_OUTOF10: 0-NO PAIN

## 2025-03-20 NOTE — PROGRESS NOTES
Patient here for port labs and Magnesium Replacement, tolerated well. Patient to return 03/24 for labs and IV Mag replacement if needed. Patient denies any questions at this time. Discharged in stable condition.

## 2025-03-21 ENCOUNTER — APPOINTMENT (OUTPATIENT)
Dept: RADIOLOGY | Facility: HOSPITAL | Age: 75
DRG: 175 | End: 2025-03-21
Payer: MEDICARE

## 2025-03-21 ENCOUNTER — APPOINTMENT (OUTPATIENT)
Dept: VASCULAR MEDICINE | Facility: HOSPITAL | Age: 75
DRG: 175 | End: 2025-03-21
Payer: MEDICARE

## 2025-03-21 PROBLEM — I26.99 PULMONARY EMBOLI: Status: ACTIVE | Noted: 2025-03-21

## 2025-03-21 LAB
ANION GAP SERPL CALC-SCNC: 9 MMOL/L (ref 10–20)
APTT PPP: 29 SECONDS (ref 26–36)
ATRIAL RATE: 91 BPM
BUN SERPL-MCNC: 17 MG/DL (ref 6–23)
CALCIUM SERPL-MCNC: 7.5 MG/DL (ref 8.6–10.3)
CHLORIDE SERPL-SCNC: 104 MMOL/L (ref 98–107)
CO2 SERPL-SCNC: 25 MMOL/L (ref 21–32)
CREAT SERPL-MCNC: 0.64 MG/DL (ref 0.5–1.3)
EGFRCR SERPLBLD CKD-EPI 2021: >90 ML/MIN/1.73M*2
ERYTHROCYTE [DISTWIDTH] IN BLOOD BY AUTOMATED COUNT: 16.2 % (ref 11.5–14.5)
GLUCOSE SERPL-MCNC: 114 MG/DL (ref 74–99)
HCT VFR BLD AUTO: 27.4 % (ref 41–52)
HGB BLD-MCNC: 8.9 G/DL (ref 13.5–17.5)
LACTATE SERPL-SCNC: 1.3 MMOL/L (ref 0.4–2)
MCH RBC QN AUTO: 27.8 PG (ref 26–34)
MCHC RBC AUTO-ENTMCNC: 32.5 G/DL (ref 32–36)
MCV RBC AUTO: 86 FL (ref 80–100)
NRBC BLD-RTO: 0 /100 WBCS (ref 0–0)
P AXIS: 28 DEGREES
PLATELET # BLD AUTO: 60 X10*3/UL (ref 150–450)
POTASSIUM SERPL-SCNC: 3.6 MMOL/L (ref 3.5–5.3)
PR INTERVAL: 193 MS
Q ONSET: 251 MS
QRS COUNT: 15 BEATS
QRS DURATION: 91 MS
QT INTERVAL: 371 MS
QTC CALCULATION(BAZETT): 449 MS
QTC FREDERICIA: 421 MS
R AXIS: 126 DEGREES
RBC # BLD AUTO: 3.2 X10*6/UL (ref 4.5–5.9)
SODIUM SERPL-SCNC: 134 MMOL/L (ref 136–145)
T AXIS: 43 DEGREES
T OFFSET: 436 MS
VENTRICULAR RATE: 88 BPM
WBC # BLD AUTO: 3.3 X10*3/UL (ref 4.4–11.3)

## 2025-03-21 PROCEDURE — 83605 ASSAY OF LACTIC ACID: CPT | Performed by: EMERGENCY MEDICINE

## 2025-03-21 PROCEDURE — 93970 EXTREMITY STUDY: CPT | Performed by: INTERNAL MEDICINE

## 2025-03-21 PROCEDURE — 2500000002 HC RX 250 W HCPCS SELF ADMINISTERED DRUGS (ALT 637 FOR MEDICARE OP, ALT 636 FOR OP/ED): Performed by: INTERNAL MEDICINE

## 2025-03-21 PROCEDURE — 96365 THER/PROPH/DIAG IV INF INIT: CPT

## 2025-03-21 PROCEDURE — 85027 COMPLETE CBC AUTOMATED: CPT | Performed by: INTERNAL MEDICINE

## 2025-03-21 PROCEDURE — 80048 BASIC METABOLIC PNL TOTAL CA: CPT | Performed by: INTERNAL MEDICINE

## 2025-03-21 PROCEDURE — 2500000001 HC RX 250 WO HCPCS SELF ADMINISTERED DRUGS (ALT 637 FOR MEDICARE OP): Performed by: INTERNAL MEDICINE

## 2025-03-21 PROCEDURE — 2500000004 HC RX 250 GENERAL PHARMACY W/ HCPCS (ALT 636 FOR OP/ED): Performed by: INTERNAL MEDICINE

## 2025-03-21 PROCEDURE — 96361 HYDRATE IV INFUSION ADD-ON: CPT

## 2025-03-21 PROCEDURE — 2060000001 HC INTERMEDIATE ICU ROOM DAILY

## 2025-03-21 PROCEDURE — 99223 1ST HOSP IP/OBS HIGH 75: CPT | Performed by: INTERNAL MEDICINE

## 2025-03-21 PROCEDURE — 93970 EXTREMITY STUDY: CPT

## 2025-03-21 PROCEDURE — 2500000004 HC RX 250 GENERAL PHARMACY W/ HCPCS (ALT 636 FOR OP/ED): Performed by: STUDENT IN AN ORGANIZED HEALTH CARE EDUCATION/TRAINING PROGRAM

## 2025-03-21 RX ORDER — ACETAMINOPHEN 650 MG/1
650 SUPPOSITORY RECTAL EVERY 4 HOURS PRN
Status: DISCONTINUED | OUTPATIENT
Start: 2025-03-21 | End: 2025-03-23 | Stop reason: HOSPADM

## 2025-03-21 RX ORDER — PANTOPRAZOLE SODIUM 40 MG/1
40 TABLET, DELAYED RELEASE ORAL DAILY
Status: DISCONTINUED | OUTPATIENT
Start: 2025-03-21 | End: 2025-03-23 | Stop reason: HOSPADM

## 2025-03-21 RX ORDER — ACETAMINOPHEN 160 MG/5ML
650 SOLUTION ORAL EVERY 4 HOURS PRN
Status: DISCONTINUED | OUTPATIENT
Start: 2025-03-21 | End: 2025-03-23 | Stop reason: HOSPADM

## 2025-03-21 RX ORDER — ACETAMINOPHEN 325 MG/1
650 TABLET ORAL EVERY 4 HOURS PRN
Status: DISCONTINUED | OUTPATIENT
Start: 2025-03-21 | End: 2025-03-23 | Stop reason: HOSPADM

## 2025-03-21 RX ORDER — SODIUM CHLORIDE 9 MG/ML
75 INJECTION, SOLUTION INTRAVENOUS CONTINUOUS
Status: ACTIVE | OUTPATIENT
Start: 2025-03-21 | End: 2025-03-22

## 2025-03-21 RX ORDER — ONDANSETRON 4 MG/1
8 TABLET, FILM COATED ORAL EVERY 8 HOURS PRN
Status: DISCONTINUED | OUTPATIENT
Start: 2025-03-21 | End: 2025-03-23 | Stop reason: HOSPADM

## 2025-03-21 RX ORDER — LANOLIN ALCOHOL/MO/W.PET/CERES
400 CREAM (GRAM) TOPICAL 2 TIMES DAILY
Status: DISCONTINUED | OUTPATIENT
Start: 2025-03-21 | End: 2025-03-23 | Stop reason: HOSPADM

## 2025-03-21 RX ORDER — METFORMIN HYDROCHLORIDE 500 MG/1
1000 TABLET, EXTENDED RELEASE ORAL 2 TIMES DAILY
Status: DISCONTINUED | OUTPATIENT
Start: 2025-03-21 | End: 2025-03-23 | Stop reason: HOSPADM

## 2025-03-21 RX ORDER — DEXTROSE 50 % IN WATER (D50W) INTRAVENOUS SYRINGE
25
Status: DISCONTINUED | OUTPATIENT
Start: 2025-03-21 | End: 2025-03-23 | Stop reason: HOSPADM

## 2025-03-21 RX ORDER — INSULIN LISPRO 100 [IU]/ML
0-5 INJECTION, SOLUTION INTRAVENOUS; SUBCUTANEOUS
Status: DISCONTINUED | OUTPATIENT
Start: 2025-03-22 | End: 2025-03-23 | Stop reason: HOSPADM

## 2025-03-21 RX ORDER — OLANZAPINE 5 MG/1
5 TABLET ORAL NIGHTLY
Status: DISCONTINUED | OUTPATIENT
Start: 2025-03-21 | End: 2025-03-23 | Stop reason: HOSPADM

## 2025-03-21 RX ORDER — SENNOSIDES 8.6 MG/1
2 TABLET ORAL NIGHTLY PRN
Status: DISCONTINUED | OUTPATIENT
Start: 2025-03-21 | End: 2025-03-23 | Stop reason: HOSPADM

## 2025-03-21 RX ORDER — ROSUVASTATIN CALCIUM 20 MG/1
40 TABLET, COATED ORAL NIGHTLY
Status: DISCONTINUED | OUTPATIENT
Start: 2025-03-21 | End: 2025-03-23 | Stop reason: HOSPADM

## 2025-03-21 RX ORDER — DEXTROSE 50 % IN WATER (D50W) INTRAVENOUS SYRINGE
12.5
Status: DISCONTINUED | OUTPATIENT
Start: 2025-03-21 | End: 2025-03-23 | Stop reason: HOSPADM

## 2025-03-21 RX ADMIN — METFORMIN HYDROCHLORIDE 1000 MG: 500 TABLET, EXTENDED RELEASE ORAL at 09:36

## 2025-03-21 RX ADMIN — OLANZAPINE 5 MG: 5 TABLET, FILM COATED ORAL at 20:00

## 2025-03-21 RX ADMIN — APIXABAN 10 MG: 5 TABLET, FILM COATED ORAL at 19:57

## 2025-03-21 RX ADMIN — Medication 400 MG: at 20:16

## 2025-03-21 RX ADMIN — HEPARIN SODIUM 1500 UNITS/HR: 10000 INJECTION, SOLUTION INTRAVENOUS at 00:13

## 2025-03-21 RX ADMIN — SODIUM CHLORIDE 75 ML/HR: 9 INJECTION, SOLUTION INTRAVENOUS at 04:27

## 2025-03-21 RX ADMIN — ROSUVASTATIN 40 MG: 20 TABLET, FILM COATED ORAL at 20:00

## 2025-03-21 RX ADMIN — Medication 400 MG: at 09:36

## 2025-03-21 RX ADMIN — APIXABAN 10 MG: 5 TABLET, FILM COATED ORAL at 06:09

## 2025-03-21 RX ADMIN — PANTOPRAZOLE SODIUM 40 MG: 40 TABLET, DELAYED RELEASE ORAL at 09:36

## 2025-03-21 SDOH — SOCIAL STABILITY: SOCIAL INSECURITY: DOES ANYONE TRY TO KEEP YOU FROM HAVING/CONTACTING OTHER FRIENDS OR DOING THINGS OUTSIDE YOUR HOME?: NO

## 2025-03-21 SDOH — ECONOMIC STABILITY: FOOD INSECURITY: HOW HARD IS IT FOR YOU TO PAY FOR THE VERY BASICS LIKE FOOD, HOUSING, MEDICAL CARE, AND HEATING?: NOT HARD AT ALL

## 2025-03-21 SDOH — SOCIAL STABILITY: SOCIAL INSECURITY: ARE THERE ANY APPARENT SIGNS OF INJURIES/BEHAVIORS THAT COULD BE RELATED TO ABUSE/NEGLECT?: NO

## 2025-03-21 SDOH — ECONOMIC STABILITY: HOUSING INSECURITY: IN THE PAST 12 MONTHS, HOW MANY TIMES HAVE YOU MOVED WHERE YOU WERE LIVING?: 0

## 2025-03-21 SDOH — SOCIAL STABILITY: SOCIAL INSECURITY: WITHIN THE LAST YEAR, HAVE YOU BEEN AFRAID OF YOUR PARTNER OR EX-PARTNER?: NO

## 2025-03-21 SDOH — SOCIAL STABILITY: SOCIAL INSECURITY: DO YOU FEEL UNSAFE GOING BACK TO THE PLACE WHERE YOU ARE LIVING?: NO

## 2025-03-21 SDOH — HEALTH STABILITY: PHYSICAL HEALTH
HOW OFTEN DO YOU NEED TO HAVE SOMEONE HELP YOU WHEN YOU READ INSTRUCTIONS, PAMPHLETS, OR OTHER WRITTEN MATERIAL FROM YOUR DOCTOR OR PHARMACY?: NEVER

## 2025-03-21 SDOH — SOCIAL STABILITY: SOCIAL INSECURITY: HAVE YOU HAD ANY THOUGHTS OF HARMING ANYONE ELSE?: NO

## 2025-03-21 SDOH — ECONOMIC STABILITY: FOOD INSECURITY: WITHIN THE PAST 12 MONTHS, THE FOOD YOU BOUGHT JUST DIDN'T LAST AND YOU DIDN'T HAVE MONEY TO GET MORE.: NEVER TRUE

## 2025-03-21 SDOH — SOCIAL STABILITY: SOCIAL INSECURITY: WITHIN THE LAST YEAR, HAVE YOU BEEN HUMILIATED OR EMOTIONALLY ABUSED IN OTHER WAYS BY YOUR PARTNER OR EX-PARTNER?: NO

## 2025-03-21 SDOH — SOCIAL STABILITY: SOCIAL INSECURITY: ABUSE: ADULT

## 2025-03-21 SDOH — ECONOMIC STABILITY: HOUSING INSECURITY: IN THE LAST 12 MONTHS, WAS THERE A TIME WHEN YOU WERE NOT ABLE TO PAY THE MORTGAGE OR RENT ON TIME?: NO

## 2025-03-21 SDOH — HEALTH STABILITY: MENTAL HEALTH
DO YOU FEEL STRESS - TENSE, RESTLESS, NERVOUS, OR ANXIOUS, OR UNABLE TO SLEEP AT NIGHT BECAUSE YOUR MIND IS TROUBLED ALL THE TIME - THESE DAYS?: NOT AT ALL

## 2025-03-21 SDOH — HEALTH STABILITY: PHYSICAL HEALTH: ON AVERAGE, HOW MANY MINUTES DO YOU ENGAGE IN EXERCISE AT THIS LEVEL?: 10 MIN

## 2025-03-21 SDOH — SOCIAL STABILITY: SOCIAL NETWORK: HOW OFTEN DO YOU ATTEND MEETINGS OF THE CLUBS OR ORGANIZATIONS YOU BELONG TO?: 1 TO 4 TIMES PER YEAR

## 2025-03-21 SDOH — ECONOMIC STABILITY: FOOD INSECURITY: WITHIN THE PAST 12 MONTHS, YOU WORRIED THAT YOUR FOOD WOULD RUN OUT BEFORE YOU GOT THE MONEY TO BUY MORE.: NEVER TRUE

## 2025-03-21 SDOH — SOCIAL STABILITY: SOCIAL INSECURITY: DO YOU FEEL ANYONE HAS EXPLOITED OR TAKEN ADVANTAGE OF YOU FINANCIALLY OR OF YOUR PERSONAL PROPERTY?: NO

## 2025-03-21 SDOH — SOCIAL STABILITY: SOCIAL NETWORK
DO YOU BELONG TO ANY CLUBS OR ORGANIZATIONS SUCH AS CHURCH GROUPS, UNIONS, FRATERNAL OR ATHLETIC GROUPS, OR SCHOOL GROUPS?: YES

## 2025-03-21 SDOH — SOCIAL STABILITY: SOCIAL INSECURITY: ARE YOU MARRIED, WIDOWED, DIVORCED, SEPARATED, NEVER MARRIED, OR LIVING WITH A PARTNER?: MARRIED

## 2025-03-21 SDOH — SOCIAL STABILITY: SOCIAL INSECURITY: ARE YOU OR HAVE YOU BEEN THREATENED OR ABUSED PHYSICALLY, EMOTIONALLY, OR SEXUALLY BY ANYONE?: NO

## 2025-03-21 SDOH — ECONOMIC STABILITY: TRANSPORTATION INSECURITY: IN THE PAST 12 MONTHS, HAS LACK OF TRANSPORTATION KEPT YOU FROM MEDICAL APPOINTMENTS OR FROM GETTING MEDICATIONS?: NO

## 2025-03-21 SDOH — ECONOMIC STABILITY: INCOME INSECURITY: IN THE PAST 12 MONTHS HAS THE ELECTRIC, GAS, OIL, OR WATER COMPANY THREATENED TO SHUT OFF SERVICES IN YOUR HOME?: NO

## 2025-03-21 SDOH — ECONOMIC STABILITY: HOUSING INSECURITY: AT ANY TIME IN THE PAST 12 MONTHS, WERE YOU HOMELESS OR LIVING IN A SHELTER (INCLUDING NOW)?: NO

## 2025-03-21 SDOH — SOCIAL STABILITY: SOCIAL NETWORK: HOW OFTEN DO YOU ATTEND CHURCH OR RELIGIOUS SERVICES?: NEVER

## 2025-03-21 SDOH — SOCIAL STABILITY: SOCIAL INSECURITY: HAS ANYONE EVER THREATENED TO HURT YOUR FAMILY OR YOUR PETS?: NO

## 2025-03-21 SDOH — SOCIAL STABILITY: SOCIAL INSECURITY: HAVE YOU HAD THOUGHTS OF HARMING ANYONE ELSE?: NO

## 2025-03-21 SDOH — HEALTH STABILITY: PHYSICAL HEALTH: ON AVERAGE, HOW MANY DAYS PER WEEK DO YOU ENGAGE IN MODERATE TO STRENUOUS EXERCISE (LIKE A BRISK WALK)?: 3 DAYS

## 2025-03-21 SDOH — SOCIAL STABILITY: SOCIAL NETWORK: HOW OFTEN DO YOU GET TOGETHER WITH FRIENDS OR RELATIVES?: TWICE A WEEK

## 2025-03-21 SDOH — SOCIAL STABILITY: SOCIAL NETWORK
IN A TYPICAL WEEK, HOW MANY TIMES DO YOU TALK ON THE PHONE WITH FAMILY, FRIENDS, OR NEIGHBORS?: MORE THAN THREE TIMES A WEEK

## 2025-03-21 ASSESSMENT — PAIN SCALES - GENERAL
PAINLEVEL_OUTOF10: 0 - NO PAIN
PAINLEVEL_OUTOF10: 0 - NO PAIN

## 2025-03-21 ASSESSMENT — COGNITIVE AND FUNCTIONAL STATUS - GENERAL
DAILY ACTIVITIY SCORE: 24
MOBILITY SCORE: 24
PATIENT BASELINE BEDBOUND: NO
MOBILITY SCORE: 24
DAILY ACTIVITIY SCORE: 24

## 2025-03-21 ASSESSMENT — ACTIVITIES OF DAILY LIVING (ADL)
GROOMING: INDEPENDENT
WALKS IN HOME: INDEPENDENT
DRESSING YOURSELF: INDEPENDENT
PATIENT'S MEMORY ADEQUATE TO SAFELY COMPLETE DAILY ACTIVITIES?: YES
HEARING - RIGHT EAR: FUNCTIONAL
TOILETING: INDEPENDENT
BATHING: INDEPENDENT
FEEDING YOURSELF: INDEPENDENT
LACK_OF_TRANSPORTATION: NO
JUDGMENT_ADEQUATE_SAFELY_COMPLETE_DAILY_ACTIVITIES: YES
ADEQUATE_TO_COMPLETE_ADL: YES
HEARING - LEFT EAR: FUNCTIONAL
LACK_OF_TRANSPORTATION: NO

## 2025-03-21 ASSESSMENT — PATIENT HEALTH QUESTIONNAIRE - PHQ9
2. FEELING DOWN, DEPRESSED OR HOPELESS: NOT AT ALL
SUM OF ALL RESPONSES TO PHQ9 QUESTIONS 1 & 2: 0
1. LITTLE INTEREST OR PLEASURE IN DOING THINGS: NOT AT ALL

## 2025-03-21 ASSESSMENT — ENCOUNTER SYMPTOMS
DIZZINESS: 0
COUGH: 0
CHEST TIGHTNESS: 0
BLOOD IN STOOL: 0
FATIGUE: 0
NECK PAIN: 0
SPEECH DIFFICULTY: 0
WHEEZING: 0
FEVER: 0
CONFUSION: 0
WEAKNESS: 0
POLYDIPSIA: 0
VOMITING: 0
NUMBNESS: 0
SEIZURES: 0
ABDOMINAL PAIN: 0
SORE THROAT: 0
NAUSEA: 0
EYES NEGATIVE: 1
SHORTNESS OF BREATH: 1
FLANK PAIN: 0
PALPITATIONS: 0
BACK PAIN: 0
CHILLS: 0
DYSURIA: 0

## 2025-03-21 ASSESSMENT — LIFESTYLE VARIABLES
AUDIT-C TOTAL SCORE: 1
SUBSTANCE_ABUSE_PAST_12_MONTHS: NO
HOW OFTEN DO YOU HAVE 6 OR MORE DRINKS ON ONE OCCASION: NEVER
AUDIT-C TOTAL SCORE: 1
HOW MANY STANDARD DRINKS CONTAINING ALCOHOL DO YOU HAVE ON A TYPICAL DAY: 1 OR 2
PRESCIPTION_ABUSE_PAST_12_MONTHS: NO
SKIP TO QUESTIONS 9-10: 1
HOW OFTEN DO YOU HAVE A DRINK CONTAINING ALCOHOL: MONTHLY OR LESS

## 2025-03-21 ASSESSMENT — PAIN - FUNCTIONAL ASSESSMENT: PAIN_FUNCTIONAL_ASSESSMENT: 0-10

## 2025-03-21 NOTE — PROGRESS NOTES
Occupational Therapy                 Therapy Communication Note    Patient Name: Jayant Holland  MRN: [id number]  Department: P[facility] ICU  Room: 16/16-A  Today's Date: 3/21/2025     Discipline: Occupational Therapy    The patient was screened for need of OT services. Chart reviewed and observed pt activity. Pt ambulating self out of bathroom when therapy arrived. Observed pt perform transfers and functional mobility independently. Pt denies self care concerns. No dizziness, no LOB. Appears near baseline with no skilled needs. DC

## 2025-03-21 NOTE — PROGRESS NOTES
Emergency Medicine Transition of Care Note.    I received Jayant Holland in signout from Dr. Sanchez.  Please see the previous ED provider note for all HPI, PE and MDM up to the time of signout. This is in addition to the primary record.    In brief Jayant Holland is an 74 y.o. male presenting for   Chief Complaint   Patient presents with    Syncope     Passed out at dinner table. Unwitnessed. Slumped over in chair. Regained consciousness and couldn't recall what had happened. No complaints at this time. Lung CA patient        ED Course as of 03/20/25 2335   Thu Mar 20, 2025   2021 EKG at 752 interpreted me at 756.  Normal sinus rhythm 88 bpm.  Normal axis.  AL, QRS, QT intervals are normal.  No acute ST-T change.  No STEMI. [EC]   2300 CT head showing mild inflammatory changes but no acute hemorrhage or infarct noted. [JG]   2305 CT PE showing small pulmonary embolism in RML. Patient SpO2 100% on RA. No evidence of right heart strain. PESI score 80, consistent with intermediate risk class 3. Per our protocol, patient can be admitted at this local facility.  Started on heparin gtt.  Patient admitted to [facility] for PE in the setting of known lung cancer with Pleur-evac.   [JG]      ED Course User Index  [EC] Rick Sanchez DO  [JG] Yolanda Pearce MD         Diagnoses as of 03/20/25 2335   Syncope, unspecified syncope type       Medical Decision Making  I received this patient in signout at this time. Patient presented with syncopal episode with history of lung cancer. Had labs outpatient earlier today showing hypomagnesemia. Pleurivac present to left chest. Suspicion for cardiac origin of syncope. Signed out to me pending CT head and chest imaging and dispo.  Please refer to ED course above for further MDM and dispo.      Final diagnoses:   [R55] Syncope, unspecified syncope type           Procedure  Procedures    Yolanda Pearce MD

## 2025-03-21 NOTE — CARE PLAN
The patient's goals for the shift include settle in, get all tests    The clinical goals for the shift include settle to ICU      Problem: Diabetes  Goal: Achieve decreasing blood glucose levels by end of shift  Reactivated  Goal: Increase stability of blood glucose readings by end of shift  Reactivated  Goal: Decrease in ketones present in urine by end of shift  Reactivated  Goal: Maintain electrolyte levels within acceptable range throughout shift  Reactivated  Goal: Maintain glucose levels >70mg/dl to <250mg/dl throughout shift  Reactivated  Goal: No changes in neurological exam by end of shift  Reactivated  Goal: Learn about and adhere to nutrition recommendations by end of shift  Reactivated  Goal: Vital signs within normal range for age by end of shift  Reactivated  Goal: Increase self care and/or family involovement by end of shift  Reactivated  Goal: Receive DSME education by end of shift  Reactivated

## 2025-03-21 NOTE — H&P
History Of Present Illness  Jayant Holland is a 74 y.o. male with past medical history of non-small cell lung CA with mets last chemo approximately 10 days ago, Pleurx catheter drainage of recurrent malignant pleural effusion, hypertension, diabetes mellitus type 2 presenting with syncopal event.  Patient states he was sitting at the dinner table became lightheaded and passed out.  He states that he feels much better at present time denies having any chest pain shortness of breath cough sputum.  Upon arrival emergency room blood pressure was 98/69 with a pulse of 96 respirations 16 sat of 100% on room air patient is afebrile.  Patient did drop his blood pressure down to 85/54 during his ER visit.  I do not see orthostatic vitals obtained anywhere.  Blood work shows BMP significant for sodium 133 potassium of 3.4 patient's creatinine 0.83.  Glucose was 205.  CBC is unremarkable except for chronic hemoglobin 10.1 and platelets of only 66.  CT of the head shows mild inflammatory changes of the paranasal sinuses but no evidence of any acute cortical infarct or intracranial hemorrhage.  CT of the chest shows small pulmonary emboli corresponding to the right middle lobe pulmonary artery branches findings may be acute or chronic in nature.  Interval decrease in size of loculated right pleural effusions when compared to previous study.  Patient with right-sided pleural catheter in place.  Associated moderate atelectasis or infiltrate persist.  Interval improvement in right upper lobe nodule/malignancy when compared to previous studies  Patient is being admitted to the hospital for new pulmonary emboli and syncope.  Patient will be started on apixaban.  Patient only has 66,000 platelets so will observe closely for any bleeding.  Pulmonary consultation to be obtained for any further recommendations.  Assuming that this is a PE as a result of increased clotting risk from malignancy.     Past Medical History  He has a past medical  history of Arthritis (20+ years), Asthma (20+ years), Cervical disc disorder, Diabetes mellitus (Multi) (20+ years), Exposure to potentially hazardous substance (12/06/2024), GERD (gastroesophageal reflux disease) (20+ years), and Hypertension (20+ years).    Surgical History  He has a past surgical history that includes Knee surgery (04/28/2015); Colonoscopy (04/28/2015); and Knee Arthroplasty (20 + years).     Social History  He reports that he has never smoked. He has never used smokeless tobacco. He reports current alcohol use of about 5.0 standard drinks of alcohol per week. He reports that he does not use drugs.    Family History  Family History   Problem Relation Name Age of Onset    Leukemia Mother Annette Holland     Memory loss Mother Annette Holland     Cancer Mother Annette Holland     Heart attack Father          Allergies  Patient has no known allergies.    Meds    Current Facility-Administered Medications:     sodium chloride 0.9% infusion, 125 mL/hr, intravenous, Continuous, Rick Sanchez DO, Last Rate: 125 mL/hr at 03/20/25 2331, 125 mL/hr at 03/20/25 2331    Current Outpatient Medications:     albuterol 90 mcg/actuation inhaler, INHALE 2 PUFFS EVERY 4 HOURS IF NEEDED FOR WHEEZING OR SHORTNESS OF BREATH., Disp: 18 g, Rfl: 3    Blood glucose monitoring meter kit kit, 1 each if needed., Disp: , Rfl:     blood sugar diagnostic (Accu-Chek Jessica Plus test strp) strip, 2 times a day., Disp: , Rfl:     blood sugar diagnostic (Blood Glucose Test) strip, 1 strip once daily., Disp: 100 strip, Rfl: 3    blood sugar diagnostic (OneTouch Verio test strips) strip, 1 strip once daily., Disp: 100 strip, Rfl: 3    blood-glucose meter misc, 1 Device once daily., Disp: 1 each, Rfl: 0    chlorthalidone (Hygroton) 25 mg tablet, Take 1 tablet (25 mg) by mouth once daily., Disp: , Rfl:     clindamycin (Cleocin T) 1 % lotion, Apply topically to affected area twice daily. Do not fill before January 29, 2025., Disp: 60 mL, Rfl: 3     dexAMETHasone (Decadron) 4 mg tablet, Beginning with Cycle 2, take 1 tablet (4 mg) by mouth twice daily the day before PEMEtrexed treatment and twice daily the day after PEMEtrexed treatment., Disp: 4 tablet, Rfl: 11    dexAMETHasone (Decadron) 4 mg tablet, Take 2 tablets (8 mg) by mouth 2 times a day for 5 doses. Start 2 days prior to the first dose of Amivantamab on Cycle 1 only., Disp: 10 tablet, Rfl: 0    dexAMETHasone (Decadron) 4 mg tablet, Take 2 tablets (8 mg) by mouth 2 times a day for 5 doses. Start 2 days prior to the first dose of Amivantamab on Cycle 1 only. Do not fill before January 29, 2025., Disp: 10 tablet, Rfl: 0    dexAMETHasone (Decadron) 4 mg tablet, Beginning with Cycle 2, take 1 tablet (4 mg) by mouth twice daily the day before PEMEtrexed treatment and twice daily the day after PEMEtrexed treatment. Do not fill before January 29, 2025., Disp: 4 tablet, Rfl: 11    folic acid (Folvite) 1 mg tablet, Take 1 tablet (1,000 mcg) by mouth once daily. Do not fill before January 29, 2025., Disp: 30 tablet, Rfl: 11    hydrocortisone 1 % cream, Apply topically to face, hands, feet, neck, back, and chest once daily at bedtime for rash prevention. Do not fill before January 29, 2025., Disp: 453.6 g, Rfl: 3    lancets 30 gauge misc, 1 Device 3 times a day., Disp: 200 each, Rfl: 3    lisinopril 20 mg tablet, Take 1 tablet (20 mg) by mouth once daily., Disp: 100 tablet, Rfl: 3    magnesium oxide (Mag-Ox) 400 mg (241.3 mg magnesium) tablet, Take 1 tablet (400 mg) by mouth 2 times a day., Disp: 60 tablet, Rfl: 11    meloxicam (Mobic) 15 mg tablet, Take 1 tablet (15 mg) by mouth once daily., Disp: 90 tablet, Rfl: 3    metFORMIN  mg 24 hr tablet, Take 2 tablets (1,000 mg) by mouth 2 times a day. Do not crush, chew, or split., Disp: 360 tablet, Rfl: 3    OLANZapine (ZyPREXA) 5 mg tablet, Take 1 tablet (5 mg) by mouth once daily at bedtime. For 4 days starting the evening of treatment Do not fill before  January 29, 2025., Disp: 16 tablet, Rfl: 0    omeprazole (PriLOSEC) 20 mg DR capsule, TAKE 1 CAPSULE BY MOUTH EVERY DAY, Disp: 90 capsule, Rfl: 3    ondansetron (Zofran) 8 mg tablet, Take 1 tablet (8 mg) by mouth every 8 hours if needed for nausea or vomiting. Do not fill before January 29, 2025., Disp: 30 tablet, Rfl: 5    prochlorperazine (Compazine) 10 mg tablet, Take 1 tablet (10 mg) by mouth every 6 hours if needed for nausea or vomiting. Do not fill before January 29, 2025., Disp: 30 tablet, Rfl: 5    rosuvastatin (Crestor) 40 mg tablet, TAKE 1 TABLET BY MOUTH EVERY DAY, Disp: 90 tablet, Rfl: 3    sennosides (Senokot) 8.6 mg tablet, Take 2 tablets (17.2 mg) by mouth as needed at bedtime for constipation. Do not fill before January 29, 2025., Disp: 30 tablet, Rfl: 11    sildenafil (Viagra) 100 mg tablet, Take by mouth. TAKE 1 TABLET AS NEEDED APPROXIMATELY 1 HOUR BEFORE SEXUAL ACTIVITY, Disp: , Rfl:     Review of Systems   Constitutional:  Negative for chills, fatigue and fever.   HENT:  Negative for congestion and sore throat.    Eyes: Negative.    Respiratory:  Positive for shortness of breath. Negative for cough, chest tightness and wheezing.    Cardiovascular:  Positive for leg swelling. Negative for chest pain and palpitations.   Gastrointestinal:  Negative for abdominal pain, blood in stool, nausea and vomiting.   Endocrine: Negative for polydipsia and polyuria.   Genitourinary:  Negative for dysuria and flank pain.   Musculoskeletal:  Negative for back pain, gait problem and neck pain.   Skin: Negative.    Neurological:  Negative for dizziness, seizures, speech difficulty, weakness and numbness.   Psychiatric/Behavioral:  Negative for confusion.         Physical Exam  Constitutional:       Appearance: Normal appearance.   HENT:      Head: Normocephalic and atraumatic.      Mouth/Throat:      Mouth: Mucous membranes are moist.   Eyes:      Extraocular Movements: Extraocular movements intact.      Pupils:  Pupils are equal, round, and reactive to light.   Cardiovascular:      Rate and Rhythm: Normal rate and regular rhythm.      Pulses: Normal pulses.      Heart sounds: Normal heart sounds.      Comments: Mediport in place  Pulmonary:      Effort: Pulmonary effort is normal.      Comments: Patient with diminished breath sounds in the right base.  Pleurx catheter is in place  Abdominal:      General: Abdomen is flat. Bowel sounds are normal.      Palpations: Abdomen is soft.      Tenderness: There is no abdominal tenderness.   Musculoskeletal:         General: Normal range of motion.      Right lower leg: Edema (1+ ankle edema) present.      Left lower leg: Edema (1+ ankle edema) present.   Skin:     Coloration: Skin is not jaundiced.      Findings: No erythema or rash.   Neurological:      General: No focal deficit present.      Mental Status: He is alert and oriented to person, place, and time.   Psychiatric:         Mood and Affect: Mood normal.         Thought Content: Thought content normal.         Judgment: Judgment normal.          Last Recorded Vitals  /73   Pulse 84   Temp 36.6 °C (97.8 °F) (Temporal)   Resp (!) 22   Wt 84.4 kg (186 lb)   SpO2 99%     Relevant Results     Results for orders placed or performed during the hospital encounter of 03/20/25 (from the past 24 hours)   CBC and Auto Differential   Result Value Ref Range    WBC 4.8 4.4 - 11.3 x10*3/uL    nRBC 0.0 0.0 - 0.0 /100 WBCs    RBC 3.69 (L) 4.50 - 5.90 x10*6/uL    Hemoglobin 10.1 (L) 13.5 - 17.5 g/dL    Hematocrit 31.3 (L) 41.0 - 52.0 %    MCV 85 80 - 100 fL    MCH 27.4 26.0 - 34.0 pg    MCHC 32.3 32.0 - 36.0 g/dL    RDW 16.1 (H) 11.5 - 14.5 %    Platelets 66 (L) 150 - 450 x10*3/uL    Neutrophils % 60.6 40.0 - 80.0 %    Immature Granulocytes %, Automated 0.8 0.0 - 0.9 %    Lymphocytes % 29.6 13.0 - 44.0 %    Monocytes % 7.8 2.0 - 10.0 %    Eosinophils % 0.8 0.0 - 6.0 %    Basophils % 0.4 0.0 - 2.0 %    Neutrophils Absolute 2.89 1.60  - 5.50 x10*3/uL    Immature Granulocytes Absolute, Automated 0.04 0.00 - 0.50 x10*3/uL    Lymphocytes Absolute 1.41 0.80 - 3.00 x10*3/uL    Monocytes Absolute 0.37 0.05 - 0.80 x10*3/uL    Eosinophils Absolute 0.04 0.00 - 0.40 x10*3/uL    Basophils Absolute 0.02 0.00 - 0.10 x10*3/uL   Magnesium   Result Value Ref Range    Magnesium 1.63 1.60 - 2.40 mg/dL   Comprehensive metabolic panel   Result Value Ref Range    Glucose 205 (H) 74 - 99 mg/dL    Sodium 133 (L) 136 - 145 mmol/L    Potassium 3.4 (L) 3.5 - 5.3 mmol/L    Chloride 98 98 - 107 mmol/L    Bicarbonate 26 21 - 32 mmol/L    Anion Gap 12 10 - 20 mmol/L    Urea Nitrogen 22 6 - 23 mg/dL    Creatinine 0.83 0.50 - 1.30 mg/dL    eGFR >90 >60 mL/min/1.73m*2    Calcium 7.9 (L) 8.6 - 10.3 mg/dL    Albumin 3.1 (L) 3.4 - 5.0 g/dL    Alkaline Phosphatase 84 33 - 136 U/L    Total Protein 5.7 (L) 6.4 - 8.2 g/dL    AST 22 9 - 39 U/L    Bilirubin, Total 0.6 0.0 - 1.2 mg/dL    ALT 24 10 - 52 U/L   Lactate   Result Value Ref Range    Lactate 2.2 (H) 0.4 - 2.0 mmol/L   Protime-INR   Result Value Ref Range    Protime 13.3 (H) 9.8 - 12.4 seconds    INR 1.2 (H) 0.9 - 1.1   B-Type Natriuretic Peptide   Result Value Ref Range    BNP 9 0 - 99 pg/mL   Troponin I, High Sensitivity, Initial   Result Value Ref Range    Troponin I, High Sensitivity 11 0 - 20 ng/L   Morphology   Result Value Ref Range    RBC Morphology See Below     Polychromasia Mild     Ovalocytes Few     London Cells Few    aPTT - baseline   Result Value Ref Range    aPTT 29 26 - 36 seconds   Troponin, High Sensitivity, 1 Hour   Result Value Ref Range    Troponin I, High Sensitivity 10 0 - 20 ng/L     *Note: Due to a large number of results and/or encounters for the requested time period, some results have not been displayed. A complete set of results can be found in Results Review.        Recent Imaging  CT angio chest for pulmonary embolism    Result Date: 3/20/2025  Interpreted By:  Jose A Vyas, STUDY: CT ANGIO CHEST  FOR PULMONARY EMBOLISM;  3/20/2025 10:01 pm   INDICATION: Signs/Symptoms:lung cancer/ syncope.   COMPARISON: 01/21/2025   ACCESSION NUMBER(S): [id number]   ORDERING CLINICIAN: RATNA JOHNSTON   TECHNIQUE: Helical data acquisition of the chest was obtained after intravenous administration of 75 mL Omnipaque 350, as per PE protocol. Images were reformatted in coronal and sagittal planes. Axial and coronal maximum intensity projection (MIP) images were created and reviewed.   FINDINGS: POTENTIAL LIMITATIONS OF THE STUDY: None   HEART AND VESSELS:   Filling defect within the right middle lobe branches suggestive of acute or subacute pulmonary emboli. There remainder of the bilateral pulmonary artery branches are patent..   Main pulmonary artery and its branches are normal in caliber.   The thoracic aorta normal in course and caliber.   Moderate coronary artery calcifications are seen. Please note,the study is not optimized for evaluation of coronary arteries.   The cardiac chambers are not enlarged.   There is no pericardial effusion seen.   MEDIASTINUM AND GELA, LOWER NECK AND AXILLA:   The visualized thyroid gland is within normal limits.   Borderline enlarged mediastinal and hilar lymph nodes similar to prior, improved in size when compared to previous exam. Findings are nonspecific in etiology. Evaluation somewhat limited due to early phase of contrast enhancement.   Esophagus appears within normal limits as seen.   LUNGS AND AIRWAYS: Loculated pleural effusions within the right lower lobe similar to previous exam, improved in size when compared to prior study. Patient with right-sided pleural catheter in place. Moderate atelectasis or infiltrate within the right lung similar to prior. Right upper lobe nodule has decreased in size when compared to previous exam. It measures up to 12 x 17 mm in size from 17 x 18 mm previously. Minimal left basilar atelectasis or scarring.   Remainder of the lung fields are clear  bilaterally. No pneumothorax.     UPPER ABDOMEN: The visualized subdiaphragmatic structures demonstrate no remarkable findings.   CHEST WALL AND OSSEOUS STRUCTURES:   Chest wall is within normal limits. There is a right-sided MediPort in place.There are no suspicious osseous lesions.Multilevel discogenic degenerative changes throughout the spine.       1. Small pulmonary emboli corresponding to right middle lobe pulmonary artery branches. Findings may be acute or chronic in nature. 2. Interval decrease in size of loculated right pleural effusions when compared to previous exam. Patient with right-sided pleural catheter in place. Associated moderate atelectasis or infiltrate persist. 3. Interval improvement in right upper lobe nodule/malignancy when compared to prior study. 4. Additional detailed findings as above.     MACRO: None   Signed by: Jose A Vyas 3/20/2025 11:02 PM Dictation workstation:   [id number]    CT head wo IV contrast    Result Date: 3/20/2025  Interpreted By:  Jose A Vyas, STUDY: CT HEAD WO IV CONTRAST;  3/20/2025 10:03 pm   INDICATION: Signs/Symptoms:syncope/seizure.   COMPARISON: 10/23/2024   ACCESSION NUMBER(S): [id number]   ORDERING CLINICIAN: RATNA JOHNSTON   TECHNIQUE: Noncontrast axial CT scan of head was performed. Angled reformats in brain and bone windows were generated. The images were reviewed in bone, brain, blood and soft tissue windows.   FINDINGS: CSF Spaces: The ventricles, sulci and cisterns are within normal limits for patient's age.  There is no extraaxial fluid collection.   Parenchyma: Confluent areas of subcortical and periventricular white matter changes which given patient's age are suggestive of chronic small vessel ischemic disease. Remote lacunar infarctions within bilateral basal ganglia suspected similar to prior. The grey-white differentiation is intact. There is no mass effect or midline shift. There is no intracranial hemorrhage.   Calvarium: The calvarium is  unremarkable.   Paranasal sinuses and mastoids: Mild opacification of the right frontal sinus and right anterior ethmoid air cells.       Mild inflammatory changes of the paranasal sinuses. No evidence of acute cortical infarct or intracranial hemorrhage. If symptoms persist, further evaluation with MRI per seizure protocol can be performed for better assessment as clinically warranted.   MACRO: None   Signed by: Jose A Vyas 3/20/2025 10:40 PM Dictation workstation:   [id number]      Assessment and Plan  #1 pulmonary emboli   Patient with a syncopal event.  Incidental findings of pulmonary emboli on CT of the chest.  Patient is not hypoxic nor is he tachycardic.  Patient will be started on Eliquis.  Watch for any acute bleeding.  Pulmonary consultation will be obtained for recommendations.  No evidence of any right heart strain on CT scan    #2 syncopal event  Patient was hypotensive upon arrival to the emergency room.  Received 2 L of normal saline with significant improvement of his feeling of lightheadedness.  No orthostatic vitals were obtained in the ER we will obtain those.    #3 non-small cell lung cancer with mets  Patient is approximately 10 days out from last chemo.  This may account for some of patient's anemia and thrombocytopenia.    #4 chronic recurrent right sided malignant effusion  Pleurx catheter in place will continue to be utilized to drain effusion.    #5 hypotension  Hold patient's lisinopril and hydrochlorothiazide.  Follow-up patient's blood pressure.  Patient does not have appearance of early sepsis.  Fluid resuscitation for clinical dehydration.    #6 diabetes mellitus type 2  Given patient's hypotension will hold patient's metformin to avoid lactic acidosis      Alejandro Krishnamurthy MD

## 2025-03-21 NOTE — PROGRESS NOTES
Jayant Holland  [id number]   Service: Internal Medicine / Hospitalist Date of service: 3/21/2025                          Full Code                  Subjective  History Of Present Illness  Jayant Holland is a 74 y.o. male with past medical history of non-small cell lung CA with mets last chemo approximately 10 days ago, Pleurx catheter drainage of recurrent malignant pleural effusion, hypertension, diabetes mellitus type 2 presenting with syncopal event.  Patient states he was sitting at the dinner table became lightheaded and passed out.  He states that he feels much better at present time denies having any chest pain shortness of breath cough sputum.  Upon arrival emergency room blood pressure was 98/69 with a pulse of 96 respirations 16 sat of 100% on room air patient is afebrile.  Patient did drop his blood pressure down to 85/54 during his ER visit.  I do not see orthostatic vitals obtained anywhere.  Blood work shows BMP significant for sodium 133 potassium of 3.4 patient's creatinine 0.83.  Glucose was 205.  CBC is unremarkable except for chronic hemoglobin 10.1 and platelets of only 66.  CT of the head shows mild inflammatory changes of the paranasal sinuses but no evidence of any acute cortical infarct or intracranial hemorrhage.  CT of the chest shows small pulmonary emboli corresponding to the right middle lobe pulmonary artery branches findings may be acute or chronic in nature.  Interval decrease in size of loculated right pleural effusions when compared to previous study.  Patient with right-sided pleural catheter in place.  Associated moderate atelectasis or infiltrate persist.  Interval improvement in right upper lobe nodule/malignancy when compared to previous studies  Patient is being admitted to the hospital for new pulmonary emboli and syncope.  Patient will be started on apixaban.  Patient only has 66,000 platelets so will observe closely for any bleeding.  Pulmonary consultation to be obtained for any  further recommendations.  Assuming that this is a PE as a result of increased clotting risk from malignancy.         3/21..............No reported: Hematuria, epistaxis, melena, hematochezia, chest pains, palpitations or dyspnea at rest    Review of Systems:   Review of system otherwise negative if not aforementioned above in subjective.    Objective              Physical Exam     Constitutional:       Appearance: Patient appeared in no acute cardiopulmonary distress.     Comments: Patient alert and oriented to person place time and situation.  HEENT:      Head: Normocephalic and atraumatic.Trachea midline      Nose:No observed congestion or rhinorrhea.     Mouth/Throat: Mucous membranes Moist, Trachea appeared  midline.  Eyes:      Extraocular Movements: Extraocular movements intact.      Pupils: Pupils are equal, round, and reactive to light.      Comments: No scleral icterus or conjunctival injection appreciated.   Cardiovascular:      Rate and Rhythm: Normal rate and regular rhythm. No clicks rubs or gallops, normal S1 and S2.No peripheral stigmata of endocarditis appreciated.     Pulmonary:      Lung bases diminished but no adventitious sound appreciated.  Abdominal:      General: Abdomen soft, nontender, active bowel sounds, no involuntary guarding or rebound tenderness appreciated.     Comments: None   Musculoskeletal:       Patient appeared to have full active range of motion for upper and lower extremities, no acute apparent joint deformity appreciated on examination.   No cyanosis appreciated.  Bilateral lower extremity 1+ pitting edema       Lymphadenopathy:      No appreciable palpable lymphadenopathy  Skin:     General: Skin is warm.      Coloration:  No jaundice     Findings: No abnormal appearing skin rashes or lesions that appeared acute noted on unclothed area of the skin..   Neurological:      General: No focal sensory or motor deficits appreciated, no meningeal signs or dysmetria noted.       Cranial Nerves: Cranial nerves II to XII appearing grossly intact.     Genitals:  Deferred  Psychiatric:         The patient appears to be displaying normal mood and affect at the time of evaluation.    Labs:     Lab Results   Component Value Date    GLUCOSE 114 (H) 03/21/2025    CALCIUM 7.5 (L) 03/21/2025     (L) 03/21/2025    K 3.6 03/21/2025    CO2 25 03/21/2025     03/21/2025    BUN 17 03/21/2025    CREATININE 0.64 03/21/2025      Lab Results   Component Value Date    WBC 3.3 (L) 03/21/2025    HGB 8.9 (L) 03/21/2025    HCT 27.4 (L) 03/21/2025    MCV 86 03/21/2025    PLT 60 (L) 03/21/2025      [unfilled]   [unfilled]   No results found for the last 90 days.              X-rays/ Images    [unfilled]   Radiology Results (last 21 days)    Procedure Component Value Units Date/Time   CT head wo IV contrast [[id number]] Collected: 03/20/25 2241   Order Status: Completed Updated: 03/20/25 2241   Narrative:     Interpreted By:  Jose A Vyas,  STUDY:  CT HEAD WO IV CONTRAST;  3/20/2025 10:03 pm      INDICATION:  Signs/Symptoms:syncope/seizure.      COMPARISON:  10/23/2024      ACCESSION NUMBER(S):  [id number]      ORDERING CLINICIAN:  RATNA JOHNSTON      TECHNIQUE:  Noncontrast axial CT scan of head was performed. Angled reformats in  brain and bone windows were generated. The images were reviewed in  bone, brain, blood and soft tissue windows.      FINDINGS:  CSF Spaces: The ventricles, sulci and cisterns are within normal  limits for patient's age.  There is no extraaxial fluid collection.      Parenchyma: Confluent areas of subcortical and periventricular white  matter changes which given patient's age are suggestive of chronic  small vessel ischemic disease. Remote lacunar infarctions within  bilateral basal ganglia suspected similar to prior. The grey-white  differentiation is intact. There is no mass effect or midline shift.  There is no intracranial hemorrhage.      Calvarium: The calvarium  is unremarkable.      Paranasal sinuses and mastoids: Mild opacification of the right  frontal sinus and right anterior ethmoid air cells.       Impression:     Mild inflammatory changes of the paranasal sinuses. No evidence of  acute cortical infarct or intracranial hemorrhage. If symptoms  persist, further evaluation with MRI per seizure protocol can be  performed for better assessment as clinically warranted.      MACRO:  None      Signed by: Jose A Vyas 3/20/2025 10:40 PM  Dictation workstation:   [id number]   CT angio chest for pulmonary embolism [[id number]] Collected: 03/20/25 2303   Order Status: Completed Updated: 03/20/25 2303   Narrative:     Interpreted By:  Jose A Vyas,  STUDY:  CT ANGIO CHEST FOR PULMONARY EMBOLISM;  3/20/2025 10:01 pm      INDICATION:  Signs/Symptoms:lung cancer/ syncope.      COMPARISON:  01/21/2025      ACCESSION NUMBER(S):  [id number]      ORDERING CLINICIAN:  RATNA JOHNSTON      TECHNIQUE:  Helical data acquisition of the chest was obtained after intravenous  administration of 75 mL Omnipaque 350, as per PE protocol. Images  were reformatted in coronal and sagittal planes. Axial and coronal  maximum intensity projection (MIP) images were created and reviewed.      FINDINGS:  POTENTIAL LIMITATIONS OF THE STUDY: None      HEART AND VESSELS:      Filling defect within the right middle lobe branches suggestive of  acute or subacute pulmonary emboli. There remainder of the bilateral  pulmonary artery branches are patent..      Main pulmonary artery and its branches are normal in caliber.      The thoracic aorta normal in course and caliber.      Moderate coronary artery calcifications are seen. Please note,the  study is not optimized for evaluation of coronary arteries.      The cardiac chambers are not enlarged.      There is no pericardial effusion seen.      MEDIASTINUM AND GELA, LOWER NECK AND AXILLA:      The visualized thyroid gland is within normal limits.      Borderline  enlarged mediastinal and hilar lymph nodes similar to  prior, improved in size when compared to previous exam. Findings are  nonspecific in etiology. Evaluation somewhat limited due to early  phase of contrast enhancement.      Esophagus appears within normal limits as seen.      LUNGS AND AIRWAYS:  Loculated pleural effusions within the right lower lobe similar to  previous exam, improved in size when compared to prior study. Patient  with right-sided pleural catheter in place. Moderate atelectasis or  infiltrate within the right lung similar to prior. Right upper lobe  nodule has decreased in size when compared to previous exam. It  measures up to 12 x 17 mm in size from 17 x 18 mm previously. Minimal  left basilar atelectasis or scarring.      Remainder of the lung fields are clear bilaterally. No pneumothorax.          UPPER ABDOMEN:  The visualized subdiaphragmatic structures demonstrate no remarkable  findings.      CHEST WALL AND OSSEOUS STRUCTURES:      Chest wall is within normal limits.  There is a right-sided MediPort in place.There are no suspicious  osseous lesions.Multilevel discogenic degenerative changes throughout  the spine.       Impression:     1. Small pulmonary emboli corresponding to right middle lobe  pulmonary artery branches. Findings may be acute or chronic in nature.  2. Interval decrease in size of loculated right pleural effusions  when compared to previous exam. Patient with right-sided pleural  catheter in place. Associated moderate atelectasis or infiltrate  persist.  3. Interval improvement in right upper lobe nodule/malignancy when  compared to prior study.  4. Additional detailed findings as above.          MACRO:  None      Signed by: Jose A Vyas 3/20/2025 11:02 PM  Dictation workstation:   [id number]             Medical Problems       Problem List       * (Principal) Syncope, unspecified syncope type    Arthritis of knee, degenerative    Benign essential hypertension     Diabetes mellitus (Multi)    Edema    Fatigue    GERD (gastroesophageal reflux disease)    Hematochezia    Hyperlipemia    Hypokalemia    Joint pain, knee    Left knee DJD    Left knee pain    Leg length discrepancy    Cervicalgia    Low back pain with left-sided sciatica    Low vitamin B12 level    Stiffness of left knee    Sciatica    Night sweats    Strain of right shoulder    Medicare annual wellness visit, subsequent    Contact with and (suspected) exposure to covid-19    Contact with and (suspected) exposure to other hazardous substances    Exposure to Agent Orange    Other chest pain    Hearing loss of right ear    Vitamin D deficiency    Knee stiffness    Arthralgia of knee    Osteoarthritis of knee    Osteoarthritis of left knee    Inequality of length of lower extremity    Erectile dysfunction    Inflammation of sacroiliac joint (CMS-HCC)    Strain of shoulder    Exertional dyspnea    Neck pain, bilateral    Bilateral shoulder pain    Neural foraminal stenosis of cervical spine    Arthritis of both shoulder regions    Impingement of shoulder    Chronic post-traumatic stress disorder    Paresthesia of skin    Reaction to severe stress, unspecified    Sensorineural hearing loss, bilateral    Unstable angina (Multi)    Pleural effusion on right    Malignant pleural effusion (CMS-HCC)    Adenocarcinoma (Multi)    Pleural effusion    Encounter for antineoplastic chemotherapy    Hypomagnesemia    Encounter for monoclonal antibody treatment for malignancy    Advance directive in chart    Pulmonary emboli               Above medical problems may be reflective of historical medical problems that may have resolved and may not related to acute clinical condition/medical problems.    Clinical impression/plan:    #1 pulmonary emboli   Patient with a syncopal event.  Incidental findings of pulmonary emboli on CT of the chest.  Patient is not hypoxic nor is he tachycardic.  Patient will be started on Eliquis.  Watch for  any acute bleeding.  Pulmonary consultation will be obtained for recommendations.  No evidence of any right heart strain on CT scan     #2 syncopal event  Patient was hypotensive upon arrival to the emergency room.  Received 2 L of normal saline with significant improvement of his feeling of lightheadedness.  No orthostatic vitals were obtained in the ER we will obtain those.     #3 non-small cell lung cancer with mets  Patient is approximately 10 days out from last chemo.  This may account for some of patient's anemia and thrombocytopenia.     #4 chronic recurrent right sided malignant effusion  Pleurx catheter in place will continue to be utilized to drain effusion.     #5 hypotension  Hold patient's lisinopril and hydrochlorothiazide.  Follow-up patient's blood pressure.  Patient does not have appearance of early sepsis.  Fluid resuscitation for clinical dehydration.     #6 diabetes mellitus type 2  Given patient's hypotension will hold patient's metformin to avoid lactic acidosis      Disposition/additional care plan/interventions: 3/21/2025    Suspected malignancy provoked VTE    Oncology consult    Continue monitor hemodynamic status closely    Consider limited echo if okay with hematology oncology    Continue anticoagulation therapy    Monitor volume status, avoid volume overload    Consult to PT /OT    Sliding scale with hypoglycemia protocol    Hold metformin given recent IVP dye use    Continue to hold antihypertensive therapy    Orthostatic BPs      Patient with high complexity characteristics and appeared to be at high risk for clinical decline/deterioration  and unpredictable clinical course.   The patient was informed of differential diagnosis , work up , plan of care and possible sequelae of clinical disposition.Patient in agreement with plan of care. Further recommendations forthcoming in accordance with patient's clinical disposition and response to care.    Discharge planning:Discharge timing to be  determined    Care time: >55 mins           Dictation performed with assistance of voice recognition device therefore transcription errors are possible.

## 2025-03-21 NOTE — PROGRESS NOTES
03/21/25 1236   Discharge Planning   Living Arrangements Spouse/significant other   Support Systems Spouse/significant other   Assistance Needed none   Type of Residence Private residence   Home or Post Acute Services None   Expected Discharge Disposition Home   Does the patient need discharge transport arranged? No   Stroke Family Assessment   Stroke Family Assessment Needed No   Intensity of Service   Intensity of Service 0-30 min     Discharge planning assessment completed with patient. His wife was also at the bedside. Patient states he is independent with mobility and self care needs. He is on chemotherapy and completes a cycle every 21 days. His 5th and last cycle is coming up next month. Patient plans to return home at discharge. He denies needs at this time.

## 2025-03-21 NOTE — ED PROVIDER NOTES
Department of Emergency Medicine   ED  Provider Note  Admit Date/RoomTime: 3/20/2025  7:44 PM  ED Room: 17/[facility]                  History of Present Illness:   Jayant Holland is a 74 y.o. male presenting to the ED for syncope, beginning tonight prior to arrival by EMS.  The complaint has been  a single event tonight at home. ,  Patient states he was sitting at the 9 room table when he got lightheaded and then apparently passed out at the dinner table.  There was no seizure-like activity reported.  Patient says he awakened but was not sure what happened.  Is brought in by EMS.  He denies any antecedent chest pain or palpitations.  No nausea vomiting diarrhea.  He says he feels back to baseline.  He is not having any pain at this time.      Review of Systems:   Pertinent positives and review of systems as noted above.  Remaining 10 review of systems is negative or noncontributory to today's episode of care.        --------------------------------------------- PAST HISTORY ---------------------------------------------  Past Medical History:  has a past medical history of Arthritis (20+ years), Asthma (20+ years), Cervical disc disorder, Diabetes mellitus (Multi) (20+ years), Exposure to potentially hazardous substance (12/06/2024), GERD (gastroesophageal reflux disease) (20+ years), and Hypertension (20+ years).    Past Surgical History:  has a past surgical history that includes Knee surgery (04/28/2015); Colonoscopy (04/28/2015); and Knee Arthroplasty (20 + years).    Social History:  reports that he has never smoked. He has never used smokeless tobacco. He reports current alcohol use of about 5.0 standard drinks of alcohol per week. He reports that he does not use drugs.    Family History: family history includes Cancer in his mother; Heart attack in his father; Leukemia in his mother; Memory loss in his mother. Unless otherwise noted, family history is non contributory    Patient's Medications   New Prescriptions     No medications on file   Previous Medications    ALBUTEROL 90 MCG/ACTUATION INHALER    INHALE 2 PUFFS EVERY 4 HOURS IF NEEDED FOR WHEEZING OR SHORTNESS OF BREATH.    BLOOD GLUCOSE MONITORING METER KIT KIT    1 each if needed.    BLOOD SUGAR DIAGNOSTIC (ACCU-CHEK ISAIAS PLUS TEST STRP) STRIP    2 times a day.    BLOOD SUGAR DIAGNOSTIC (BLOOD GLUCOSE TEST) STRIP    1 strip once daily.    BLOOD SUGAR DIAGNOSTIC (ONETOUCH VERIO TEST STRIPS) STRIP    1 strip once daily.    BLOOD-GLUCOSE METER MISC    1 Device once daily.    CHLORTHALIDONE (HYGROTON) 25 MG TABLET    Take 1 tablet (25 mg) by mouth once daily.    CLINDAMYCIN (CLEOCIN T) 1 % LOTION    Apply topically to affected area twice daily. Do not fill before January 29, 2025.    DEXAMETHASONE (DECADRON) 4 MG TABLET    Beginning with Cycle 2, take 1 tablet (4 mg) by mouth twice daily the day before PEMEtrexed treatment and twice daily the day after PEMEtrexed treatment.    DEXAMETHASONE (DECADRON) 4 MG TABLET    Take 2 tablets (8 mg) by mouth 2 times a day for 5 doses. Start 2 days prior to the first dose of Amivantamab on Cycle 1 only.    DEXAMETHASONE (DECADRON) 4 MG TABLET    Take 2 tablets (8 mg) by mouth 2 times a day for 5 doses. Start 2 days prior to the first dose of Amivantamab on Cycle 1 only. Do not fill before January 29, 2025.    DEXAMETHASONE (DECADRON) 4 MG TABLET    Beginning with Cycle 2, take 1 tablet (4 mg) by mouth twice daily the day before PEMEtrexed treatment and twice daily the day after PEMEtrexed treatment. Do not fill before January 29, 2025.    FOLIC ACID (FOLVITE) 1 MG TABLET    Take 1 tablet (1,000 mcg) by mouth once daily. Do not fill before January 29, 2025.    HYDROCORTISONE 1 % CREAM    Apply topically to face, hands, feet, neck, back, and chest once daily at bedtime for rash prevention. Do not fill before January 29, 2025.    LANCETS 30 GAUGE MISC    1 Device 3 times a day.    LISINOPRIL 20 MG TABLET    Take 1 tablet (20 mg) by  mouth once daily.    MAGNESIUM OXIDE (MAG-OX) 400 MG (241.3 MG MAGNESIUM) TABLET    Take 1 tablet (400 mg) by mouth 2 times a day.    MELOXICAM (MOBIC) 15 MG TABLET    Take 1 tablet (15 mg) by mouth once daily.    METFORMIN  MG 24 HR TABLET    Take 2 tablets (1,000 mg) by mouth 2 times a day. Do not crush, chew, or split.    OLANZAPINE (ZYPREXA) 5 MG TABLET    Take 1 tablet (5 mg) by mouth once daily at bedtime. For 4 days starting the evening of treatment Do not fill before January 29, 2025.    OMEPRAZOLE (PRILOSEC) 20 MG DR CAPSULE    TAKE 1 CAPSULE BY MOUTH EVERY DAY    ONDANSETRON (ZOFRAN) 8 MG TABLET    Take 1 tablet (8 mg) by mouth every 8 hours if needed for nausea or vomiting. Do not fill before January 29, 2025.    PROCHLORPERAZINE (COMPAZINE) 10 MG TABLET    Take 1 tablet (10 mg) by mouth every 6 hours if needed for nausea or vomiting. Do not fill before January 29, 2025.    ROSUVASTATIN (CRESTOR) 40 MG TABLET    TAKE 1 TABLET BY MOUTH EVERY DAY    SENNOSIDES (SENOKOT) 8.6 MG TABLET    Take 2 tablets (17.2 mg) by mouth as needed at bedtime for constipation. Do not fill before January 29, 2025.    SILDENAFIL (VIAGRA) 100 MG TABLET    Take by mouth. TAKE 1 TABLET AS NEEDED APPROXIMATELY 1 HOUR BEFORE SEXUAL ACTIVITY   Modified Medications    No medications on file   Discontinued Medications    No medications on file      The patient’s home medications have been reviewed.    Allergies: Patient has no known allergies.    -------------------------------------------------- RESULTS -------------------------------------------------  All laboratory and radiology results have been personally reviewed by myself   LABS:  Labs Reviewed   CBC WITH AUTO DIFFERENTIAL - Abnormal       Result Value    WBC 4.8      nRBC 0.0      RBC 3.69 (*)     Hemoglobin 10.1 (*)     Hematocrit 31.3 (*)     MCV 85      MCH 27.4      MCHC 32.3      RDW 16.1 (*)     Platelets 66 (*)     Neutrophils % 60.6      Immature Granulocytes %,  Automated 0.8      Lymphocytes % 29.6      Monocytes % 7.8      Eosinophils % 0.8      Basophils % 0.4      Neutrophils Absolute 2.89      Immature Granulocytes Absolute, Automated 0.04      Lymphocytes Absolute 1.41      Monocytes Absolute 0.37      Eosinophils Absolute 0.04      Basophils Absolute 0.02     COMPREHENSIVE METABOLIC PANEL - Abnormal    Glucose 205 (*)     Sodium 133 (*)     Potassium 3.4 (*)     Chloride 98      Bicarbonate 26      Anion Gap 12      Urea Nitrogen 22      Creatinine 0.83      eGFR >90      Calcium 7.9 (*)     Albumin 3.1 (*)     Alkaline Phosphatase 84      Total Protein 5.7 (*)     AST 22      Bilirubin, Total 0.6      ALT 24     LACTATE - Abnormal    Lactate 2.2 (*)     Narrative:     Venipuncture immediately after or during the administration of Metamizole may lead to falsely low results. Testing should be performed immediately prior to Metamizole dosing.   PROTIME-INR - Abnormal    Protime 13.3 (*)     INR 1.2 (*)    MAGNESIUM - Normal    Magnesium 1.63     B-TYPE NATRIURETIC PEPTIDE - Normal    BNP 9      Narrative:        <100 pg/mL - Heart failure unlikely  100-299 pg/mL - Intermediate probability of acute heart                  failure exacerbation. Correlate with clinical                  context and patient history.    >=300 pg/mL - Heart Failure likely. Correlate with clinical                  context and patient history.    BNP testing is performed using different testing methodology at [facility] than at other [facility]. Direct result comparisons should only be made within the same method.      SERIAL TROPONIN-INITIAL - Normal    Troponin I, High Sensitivity 11      Narrative:     Less than 99th percentile of normal range cutoff-  Female and children under 18 years old <14 ng/L; Male <21 ng/L: Negative  Repeat testing should be performed if clinically indicated.     Female and children under 18 years old 14-50 ng/L; Male 21-50 ng/L:  Consistent with  possible cardiac damage and possible increased clinical   risk. Serial measurements may help to assess extent of myocardial damage.     >50 ng/L: Consistent with cardiac damage, increased clinical risk and  myocardial infarction. Serial measurements may help assess extent of   myocardial damage.      NOTE: Children less than 1 year old may have higher baseline troponin   levels and results should be interpreted in conjunction with the overall   clinical context.     NOTE: Troponin I testing is performed using a different   testing methodology at [facility] than at other   [facility]. Direct result comparisons should only   be made within the same method.   TROPONIN SERIES- (INITIAL, 1 HR)    Narrative:     The following orders were created for panel order Troponin I Series, High Sensitivity (0, 1 HR).  Procedure                               Abnormality         Status                     ---------                               -----------         ------                     Troponin I, High Sensiti...[[id number]]  Normal              Final result               Troponin, High Sensitivi...[[id number]]                                                   Please view results for these tests on the individual orders.   SERIAL TROPONIN, 1 HOUR   LACTATE   MORPHOLOGY    RBC Morphology See Below      Polychromasia Mild      Ovalocytes Few      [place] Cells Few           RADIOLOGY:  Interpreted by Radiologist.  CT head wo IV contrast    (Results Pending)   CT angio chest for pulmonary embolism    (Results Pending)       Encounter Date: 01/21/25   Electrocardiogram, 12-lead PRN ACS symptoms   Result Value    Ventricular Rate 85    Atrial Rate 96    SD Interval 189    QRS Duration 93    QT Interval 386    QTC Calculation(Bazett) 459    P Axis 49    R Axis 171    T Axis -5    QRS Count 15    Q Onset 249    T Offset 442    QTC Fredericia 433    Narrative    Sinus rhythm  Multiform ventricular premature  "complexes  Left posterior fascicular block  Inferior infarct, old    See ED provider note for full interpretation and clinical correlation  Confirmed by Timothy Rhodes ([id number]) on 2/14/2025 3:38:57 PM     ------------------------- NURSING NOTES AND VITALS REVIEWED ---------------------------   The nursing notes within the ED encounter and vital signs as below have been reviewed.   BP 94/55   Pulse 89   Temp 36.6 °C (97.8 °F) (Temporal)   Resp 17   Ht 1.854 m (6' 1\")   Wt 84.4 kg (186 lb)   SpO2 96%   BMI 24.54 kg/m²   Oxygen Saturation Interpretation: Normal      ---------------------------------------------------PHYSICAL EXAM--------------------------------------  Physical Exam  Vitals and nursing note reviewed.   Constitutional:       General: He is not in acute distress.     Appearance: He is well-developed. He is not ill-appearing or toxic-appearing.   HENT:      Head: Normocephalic and atraumatic.      Nose: Nose normal.      Mouth/Throat:      Mouth: Mucous membranes are moist.      Pharynx: Oropharynx is clear. No oropharyngeal exudate or posterior oropharyngeal erythema.   Eyes:      General: No scleral icterus.     Extraocular Movements: Extraocular movements intact.      Conjunctiva/sclera: Conjunctivae normal.      Pupils: Pupils are equal, round, and reactive to light.   Cardiovascular:      Rate and Rhythm: Normal rate and regular rhythm.      Pulses: Normal pulses.      Heart sounds: Normal heart sounds. No murmur heard.  Pulmonary:      Effort: Pulmonary effort is normal. No respiratory distress.      Breath sounds: Normal breath sounds. No wheezing, rhonchi or rales.   Abdominal:      General: There is no distension.      Palpations: Abdomen is soft.      Tenderness: There is no abdominal tenderness. There is no guarding or rebound.   Musculoskeletal:         General: No swelling, tenderness, deformity or signs of injury. Normal range of motion.      Cervical back: Normal range of motion and " neck supple. No rigidity or tenderness.      Right lower leg: No edema.      Left lower leg: No edema.   Lymphadenopathy:      Cervical: No cervical adenopathy.   Skin:     General: Skin is warm and dry.      Capillary Refill: Capillary refill takes less than 2 seconds.      Findings: No rash.   Neurological:      Mental Status: He is alert and oriented to person, place, and time.   Psychiatric:         Mood and Affect: Mood normal.            Procedures  NONE  ------------------------------ ED COURSE/MEDICAL DECISION MAKING----------------------    Medical Decision Making:   Patient seen and examined by me.  An IV was started.  Appropriate labs obtained.  Patient had acute syncopal event of uncertain etiology.    ED Course as of 03/21/25 1652   Thu Mar 20, 2025   2021 EKG at 752 interpreted me at 756.  Normal sinus rhythm 88 bpm.  Normal axis.  NM, QRS, QT intervals are normal.  No acute ST-T change.  No STEMI. [EC]   2300 CT head showing mild inflammatory changes but no acute hemorrhage or infarct noted. [JG]   2305 CT PE showing small pulmonary embolism in RML. Patient SpO2 100% on RA. No evidence of right heart strain. PESI score 80, consistent with intermediate risk class 3. Per our protocol, patient can be admitted at this local facility.  Started on heparin gtt.  Patient admitted to [facility] for PE in the setting of known lung cancer with Pleur-evac.   [JG]      ED Course User Index  [EC] Rick Sanchez DO  [JG] Yolanda Pearce MD         Diagnoses as of 03/21/25 1652   Syncope, unspecified syncope type      Counseling:   The emergency provider has spoken with the patient and discussed today’s results, in addition to providing specific details for the plan of care and counseling regarding the diagnosis and prognosis.  Questions are answered at this time and they are agreeable with the plan.      --------------------------------- IMPRESSION AND DISPOSITION  ---------------------------------        IMPRESSION  No diagnosis found.    DISPOSITION  Disposition: Signed out at shift change the oncoming physician Dr. Yolanda Pearce  Patient condition is stable      Billing Provider Critical Care Time: 0 minutes     Rick Sanchez, DO  03/20/25 2149       Rick Sanchez, DO  03/21/25 [id number]

## 2025-03-21 NOTE — PROGRESS NOTES
Physical Therapy Screen                 Therapy Communication Note    Patient Name: Jayant Holland  MRN: [id number]  Department: [facility] ICU  Room: 16/16-A  Today's Date: 3/21/2025     Discipline: Physical Therapy      Comment: Patient screened in room 1016. Witnessed patient amb self out of bathroom back to bed. Patient then transferred OOB again, amb more in room, BP checked and was WFL. No dizziness/LOB or complaints. Patient feels he is at baseline functionally. Patient has no questions/concerns for therapy at this time, no identified skilled acute needs. Will discharge from PT.

## 2025-03-22 LAB
ANION GAP SERPL CALC-SCNC: 9 MMOL/L (ref 10–20)
BUN SERPL-MCNC: 14 MG/DL (ref 6–23)
CALCIUM SERPL-MCNC: 7.6 MG/DL (ref 8.6–10.3)
CHLORIDE SERPL-SCNC: 101 MMOL/L (ref 98–107)
CO2 SERPL-SCNC: 27 MMOL/L (ref 21–32)
CREAT SERPL-MCNC: 0.65 MG/DL (ref 0.5–1.3)
EGFRCR SERPLBLD CKD-EPI 2021: >90 ML/MIN/1.73M*2
ERYTHROCYTE [DISTWIDTH] IN BLOOD BY AUTOMATED COUNT: 16.2 % (ref 11.5–14.5)
GLUCOSE BLD MANUAL STRIP-MCNC: 129 MG/DL (ref 74–99)
GLUCOSE BLD MANUAL STRIP-MCNC: 130 MG/DL (ref 74–99)
GLUCOSE BLD MANUAL STRIP-MCNC: 136 MG/DL (ref 74–99)
GLUCOSE SERPL-MCNC: 105 MG/DL (ref 74–99)
HCT VFR BLD AUTO: 27.8 % (ref 41–52)
HGB BLD-MCNC: 8.9 G/DL (ref 13.5–17.5)
MCH RBC QN AUTO: 27.7 PG (ref 26–34)
MCHC RBC AUTO-ENTMCNC: 32 G/DL (ref 32–36)
MCV RBC AUTO: 87 FL (ref 80–100)
NRBC BLD-RTO: 0 /100 WBCS (ref 0–0)
PLATELET # BLD AUTO: 60 X10*3/UL (ref 150–450)
POTASSIUM SERPL-SCNC: 3.4 MMOL/L (ref 3.5–5.3)
RBC # BLD AUTO: 3.21 X10*6/UL (ref 4.5–5.9)
SODIUM SERPL-SCNC: 134 MMOL/L (ref 136–145)
WBC # BLD AUTO: 2.5 X10*3/UL (ref 4.4–11.3)

## 2025-03-22 PROCEDURE — 2060000001 HC INTERMEDIATE ICU ROOM DAILY

## 2025-03-22 PROCEDURE — 82374 ASSAY BLOOD CARBON DIOXIDE: CPT | Performed by: HOSPITALIST

## 2025-03-22 PROCEDURE — 82947 ASSAY GLUCOSE BLOOD QUANT: CPT

## 2025-03-22 PROCEDURE — 37799 UNLISTED PX VASCULAR SURGERY: CPT | Performed by: HOSPITALIST

## 2025-03-22 PROCEDURE — 85027 COMPLETE CBC AUTOMATED: CPT | Performed by: HOSPITALIST

## 2025-03-22 PROCEDURE — 2500000002 HC RX 250 W HCPCS SELF ADMINISTERED DRUGS (ALT 637 FOR MEDICARE OP, ALT 636 FOR OP/ED): Performed by: INTERNAL MEDICINE

## 2025-03-22 PROCEDURE — 2500000001 HC RX 250 WO HCPCS SELF ADMINISTERED DRUGS (ALT 637 FOR MEDICARE OP): Performed by: INTERNAL MEDICINE

## 2025-03-22 PROCEDURE — 2500000002 HC RX 250 W HCPCS SELF ADMINISTERED DRUGS (ALT 637 FOR MEDICARE OP, ALT 636 FOR OP/ED): Performed by: HOSPITALIST

## 2025-03-22 PROCEDURE — 2500000004 HC RX 250 GENERAL PHARMACY W/ HCPCS (ALT 636 FOR OP/ED): Performed by: INTERNAL MEDICINE

## 2025-03-22 PROCEDURE — 2500000004 HC RX 250 GENERAL PHARMACY W/ HCPCS (ALT 636 FOR OP/ED): Performed by: HOSPITALIST

## 2025-03-22 PROCEDURE — 99233 SBSQ HOSP IP/OBS HIGH 50: CPT | Performed by: HOSPITALIST

## 2025-03-22 RX ORDER — SODIUM CHLORIDE 9 MG/ML
42 INJECTION, SOLUTION INTRAVENOUS CONTINUOUS
Status: ACTIVE | OUTPATIENT
Start: 2025-03-22 | End: 2025-03-23

## 2025-03-22 RX ORDER — POTASSIUM CHLORIDE 20 MEQ/1
40 TABLET, EXTENDED RELEASE ORAL ONCE
Status: COMPLETED | OUTPATIENT
Start: 2025-03-22 | End: 2025-03-22

## 2025-03-22 RX ADMIN — SODIUM CHLORIDE 42 ML/HR: 900 INJECTION, SOLUTION INTRAVENOUS at 11:23

## 2025-03-22 RX ADMIN — Medication 400 MG: at 20:14

## 2025-03-22 RX ADMIN — ROSUVASTATIN 40 MG: 20 TABLET, FILM COATED ORAL at 20:14

## 2025-03-22 RX ADMIN — POTASSIUM CHLORIDE 40 MEQ: 1500 TABLET, EXTENDED RELEASE ORAL at 11:23

## 2025-03-22 RX ADMIN — APIXABAN 10 MG: 5 TABLET, FILM COATED ORAL at 20:14

## 2025-03-22 RX ADMIN — Medication 400 MG: at 10:08

## 2025-03-22 RX ADMIN — APIXABAN 10 MG: 5 TABLET, FILM COATED ORAL at 05:25

## 2025-03-22 RX ADMIN — OLANZAPINE 5 MG: 5 TABLET, FILM COATED ORAL at 20:14

## 2025-03-22 RX ADMIN — PANTOPRAZOLE SODIUM 40 MG: 40 TABLET, DELAYED RELEASE ORAL at 10:08

## 2025-03-22 ASSESSMENT — COGNITIVE AND FUNCTIONAL STATUS - GENERAL
MOBILITY SCORE: 24
DAILY ACTIVITIY SCORE: 24
DAILY ACTIVITIY SCORE: 24
MOBILITY SCORE: 24

## 2025-03-22 ASSESSMENT — PAIN - FUNCTIONAL ASSESSMENT
PAIN_FUNCTIONAL_ASSESSMENT: 0-10

## 2025-03-22 ASSESSMENT — PAIN SCALES - GENERAL
PAINLEVEL_OUTOF10: 0 - NO PAIN

## 2025-03-22 NOTE — CARE PLAN
Problem: Pain - Adult  Goal: Verbalizes/displays adequate comfort level or baseline comfort level  Outcome: Progressing     Problem: Safety - Adult  Goal: Free from fall injury  Outcome: Progressing     Problem: Discharge Planning  Goal: Discharge to home or other facility with appropriate resources  Outcome: Progressing     Problem: Chronic Conditions and Co-morbidities  Goal: Patient's chronic conditions and co-morbidity symptoms are monitored and maintained or improved  Outcome: Progressing     Problem: Nutrition  Goal: Nutrient intake appropriate for maintaining nutritional needs  Outcome: Progressing     Problem: Fall/Injury  Goal: Not fall by end of shift  Outcome: Progressing  Goal: Be free from injury by end of the shift  Outcome: Progressing  Goal: Verbalize understanding of personal risk factors for fall in the hospital  Outcome: Progressing       The clinical goals for the shift include pt to remain hemodynamically stable throughout the shift

## 2025-03-22 NOTE — PROGRESS NOTES
Jayant Holland  [id number]   Service: Internal Medicine / Hospitalist Date of service: 3/21/2025                                  Full Code                        Subjective  History Of Present Illness  Jayant Holland is a 74 y.o. male with past medical history of non-small cell lung CA with mets last chemo approximately 10 days ago, Pleurx catheter drainage of recurrent malignant pleural effusion, hypertension, diabetes mellitus type 2 presenting with syncopal event.  Patient states he was sitting at the dinner table became lightheaded and passed out.  He states that he feels much better at present time denies having any chest pain shortness of breath cough sputum.  Upon arrival emergency room blood pressure was 98/69 with a pulse of 96 respirations 16 sat of 100% on room air patient is afebrile.  Patient did drop his blood pressure down to 85/54 during his ER visit.  I do not see orthostatic vitals obtained anywhere.  Blood work shows BMP significant for sodium 133 potassium of 3.4 patient's creatinine 0.83.  Glucose was 205.  CBC is unremarkable except for chronic hemoglobin 10.1 and platelets of only 66.  CT of the head shows mild inflammatory changes of the paranasal sinuses but no evidence of any acute cortical infarct or intracranial hemorrhage.  CT of the chest shows small pulmonary emboli corresponding to the right middle lobe pulmonary artery branches findings may be acute or chronic in nature.  Interval decrease in size of loculated right pleural effusions when compared to previous study.  Patient with right-sided pleural catheter in place.  Associated moderate atelectasis or infiltrate persist.  Interval improvement in right upper lobe nodule/malignancy when compared to previous studies  Patient is being admitted to the hospital for new pulmonary emboli and syncope.  Patient will be started on apixaban.  Patient only has 66,000 platelets so will observe closely for any bleeding.  Pulmonary consultation to be  obtained for any further recommendations.  Assuming that this is a PE as a result of increased clotting risk from malignancy.           3/21..............No reported: Hematuria, epistaxis, melena, hematochezia, chest pains, palpitations or dyspnea at rest.    3/22.............................No reported: Melena, hematochezia, hematuria, syncope, presyncope, nausea or  vomiting.     Review of Systems:   Review of system otherwise negative if not aforementioned above in subjective.     Objective                    Physical Exam      Constitutional:       Appearance: Patient appeared in no acute cardiopulmonary distress.     Comments: Patient alert and oriented to person place time and situation.  HEENT:      Head: Normocephalic and atraumatic.Trachea midline      Nose:No observed congestion or rhinorrhea.     Mouth/Throat: Mucous membranes Moist, Trachea appeared  midline.  Eyes:      Extraocular Movements: Extraocular movements intact.      Pupils: Pupils are equal, round, and reactive to light.      Comments: No scleral icterus or conjunctival injection appreciated.   Cardiovascular:      Rate and Rhythm: Normal rate and regular rhythm. No clicks rubs or gallops, normal S1 and S2.No peripheral stigmata of endocarditis appreciated.     Pulmonary:      Lung bases diminished but no adventitious sound appreciated.  Abdominal:      General: Abdomen soft, nontender, active bowel sounds, no involuntary guarding or rebound tenderness appreciated.     Comments: None   Musculoskeletal:       Patient appeared to have full active range of motion for upper and lower extremities, no acute apparent joint deformity appreciated on examination.   No cyanosis appreciated.  Bilateral lower extremity 1+ pitting edema       Lymphadenopathy:      No appreciable palpable lymphadenopathy  Skin:     General: Skin is warm.      Coloration:  No jaundice     Findings: No abnormal appearing skin rashes or lesions that appeared acute noted on  unclothed area of the skin..   Neurological:      General: No focal sensory or motor deficits appreciated, no meningeal signs or dysmetria noted.      Cranial Nerves: Cranial nerves II to XII appearing grossly intact.     Genitals:  Deferred  Psychiatric:         The patient appears to be displaying normal mood and affect at the time of evaluation.     Labs:           Lab Results   Component Value Date     GLUCOSE 114 (H) 03/21/2025     CALCIUM 7.5 (L) 03/21/2025      (L) 03/21/2025     K 3.6 03/21/2025     CO2 25 03/21/2025      03/21/2025     BUN 17 03/21/2025     CREATININE 0.64 03/21/2025       Lab Results   Component Value Date    GLUCOSE 105 (H) 03/22/2025    CALCIUM 7.6 (L) 03/22/2025     (L) 03/22/2025    K 3.4 (L) 03/22/2025    CO2 27 03/22/2025     03/22/2025    BUN 14 03/22/2025    CREATININE 0.65 03/22/2025         Lab Results   Component Value Date     WBC 3.3 (L) 03/21/2025     HGB 8.9 (L) 03/21/2025     HCT 27.4 (L) 03/21/2025     MCV 86 03/21/2025     PLT 60 (L) 03/21/2025      Lab Results   Component Value Date    WBC 2.5 (L) 03/22/2025    HGB 8.9 (L) 03/22/2025    HCT 27.8 (L) 03/22/2025    MCV 87 03/22/2025    PLT 60 (L) 03/22/2025      [unfilled]   [unfilled]   No results found for the last 90 days.                  X-rays/ Images     [unfilled]   Radiology Results (last 21 days)     Procedure Component Value Units Date/Time   CT head wo IV contrast [[id number]] Collected: 03/20/25 2241   Order Status: Completed Updated: 03/20/25 2241   Narrative:     Interpreted By:  Jose A Vyas,  STUDY:  CT HEAD WO IV CONTRAST;  3/20/2025 10:03 pm      INDICATION:  Signs/Symptoms:syncope/seizure.      COMPARISON:  10/23/2024      ACCESSION NUMBER(S):  [id number]      ORDERING CLINICIAN:  RATNA JOHNSTON      TECHNIQUE:  Noncontrast axial CT scan of head was performed. Angled reformats in  brain and bone windows were generated. The images were reviewed in  bone, brain, blood  and soft tissue windows.      FINDINGS:  CSF Spaces: The ventricles, sulci and cisterns are within normal  limits for patient's age.  There is no extraaxial fluid collection.      Parenchyma: Confluent areas of subcortical and periventricular white  matter changes which given patient's age are suggestive of chronic  small vessel ischemic disease. Remote lacunar infarctions within  bilateral basal ganglia suspected similar to prior. The grey-white  differentiation is intact. There is no mass effect or midline shift.  There is no intracranial hemorrhage.      Calvarium: The calvarium is unremarkable.      Paranasal sinuses and mastoids: Mild opacification of the right  frontal sinus and right anterior ethmoid air cells.       Impression:     Mild inflammatory changes of the paranasal sinuses. No evidence of  acute cortical infarct or intracranial hemorrhage. If symptoms  persist, further evaluation with MRI per seizure protocol can be  performed for better assessment as clinically warranted.      MACRO:  None      Signed by: Jose A Vyas 3/20/2025 10:40 PM  Dictation workstation:   [id number]   CT angio chest for pulmonary embolism [[id number]] Collected: 03/20/25 2303   Order Status: Completed Updated: 03/20/25 2303   Narrative:     Interpreted By:  Jose A Vyas,  STUDY:  CT ANGIO CHEST FOR PULMONARY EMBOLISM;  3/20/2025 10:01 pm      INDICATION:  Signs/Symptoms:lung cancer/ syncope.      COMPARISON:  01/21/2025      ACCESSION NUMBER(S):  [id number]      ORDERING CLINICIAN:  RATNA JOHNSTON      TECHNIQUE:  Helical data acquisition of the chest was obtained after intravenous  administration of 75 mL Omnipaque 350, as per PE protocol. Images  were reformatted in coronal and sagittal planes. Axial and coronal  maximum intensity projection (MIP) images were created and reviewed.      FINDINGS:  POTENTIAL LIMITATIONS OF THE STUDY: None      HEART AND VESSELS:      Filling defect within the right middle lobe branches  suggestive of  acute or subacute pulmonary emboli. There remainder of the bilateral  pulmonary artery branches are patent..      Main pulmonary artery and its branches are normal in caliber.      The thoracic aorta normal in course and caliber.      Moderate coronary artery calcifications are seen. Please note,the  study is not optimized for evaluation of coronary arteries.      The cardiac chambers are not enlarged.      There is no pericardial effusion seen.      MEDIASTINUM AND GELA, LOWER NECK AND AXILLA:      The visualized thyroid gland is within normal limits.      Borderline enlarged mediastinal and hilar lymph nodes similar to  prior, improved in size when compared to previous exam. Findings are  nonspecific in etiology. Evaluation somewhat limited due to early  phase of contrast enhancement.      Esophagus appears within normal limits as seen.      LUNGS AND AIRWAYS:  Loculated pleural effusions within the right lower lobe similar to  previous exam, improved in size when compared to prior study. Patient  with right-sided pleural catheter in place. Moderate atelectasis or  infiltrate within the right lung similar to prior. Right upper lobe  nodule has decreased in size when compared to previous exam. It  measures up to 12 x 17 mm in size from 17 x 18 mm previously. Minimal  left basilar atelectasis or scarring.      Remainder of the lung fields are clear bilaterally. No pneumothorax.          UPPER ABDOMEN:  The visualized subdiaphragmatic structures demonstrate no remarkable  findings.      CHEST WALL AND OSSEOUS STRUCTURES:      Chest wall is within normal limits.  There is a right-sided MediPort in place.There are no suspicious  osseous lesions.Multilevel discogenic degenerative changes throughout  the spine.       Impression:     1. Small pulmonary emboli corresponding to right middle lobe  pulmonary artery branches. Findings may be acute or chronic in nature.  2. Interval decrease in size of loculated  right pleural effusions  when compared to previous exam. Patient with right-sided pleural  catheter in place. Associated moderate atelectasis or infiltrate  persist.  3. Interval improvement in right upper lobe nodule/malignancy when  compared to prior study.  4. Additional detailed findings as above.          MACRO:  None      Signed by: Jose A Vyas 3/20/2025 11:02 PM  Dictation workstation:   [id number]                  Medical Problems         Problem List         * (Principal) Syncope, unspecified syncope type     Arthritis of knee, degenerative     Benign essential hypertension     Diabetes mellitus (Multi)     Edema     Fatigue     GERD (gastroesophageal reflux disease)     Hematochezia     Hyperlipemia     Hypokalemia     Joint pain, knee     Left knee DJD     Left knee pain     Leg length discrepancy     Cervicalgia     Low back pain with left-sided sciatica     Low vitamin B12 level     Stiffness of left knee     Sciatica     Night sweats     Strain of right shoulder     Medicare annual wellness visit, subsequent     Contact with and (suspected) exposure to covid-19     Contact with and (suspected) exposure to other hazardous substances     Exposure to Agent Orange     Other chest pain     Hearing loss of right ear     Vitamin D deficiency     Knee stiffness     Arthralgia of knee     Osteoarthritis of knee     Osteoarthritis of left knee     Inequality of length of lower extremity     Erectile dysfunction     Inflammation of sacroiliac joint (CMS-HCC)     Strain of shoulder     Exertional dyspnea     Neck pain, bilateral     Bilateral shoulder pain     Neural foraminal stenosis of cervical spine     Arthritis of both shoulder regions     Impingement of shoulder     Chronic post-traumatic stress disorder     Paresthesia of skin     Reaction to severe stress, unspecified     Sensorineural hearing loss, bilateral     Unstable angina (Multi)     Pleural effusion on right     Malignant pleural effusion  (CMS-HCC)     Adenocarcinoma (Multi)     Pleural effusion     Encounter for antineoplastic chemotherapy     Hypomagnesemia     Encounter for monoclonal antibody treatment for malignancy     Advance directive in chart     Pulmonary emboli                  Above medical problems may be reflective of historical medical problems that may have resolved and may not related to acute clinical condition/medical problems.     Clinical impression/plan:     #1 pulmonary emboli   Patient with a syncopal event.  Incidental findings of pulmonary emboli on CT of the chest.  Patient is not hypoxic nor is he tachycardic.  Patient will be started on Eliquis.  Watch for any acute bleeding.  Pulmonary consultation will be obtained for recommendations.  No evidence of any right heart strain on CT scan     #2 syncopal event  Patient was hypotensive upon arrival to the emergency room.  Received 2 L of normal saline with significant improvement of his feeling of lightheadedness.  No orthostatic vitals were obtained in the ER we will obtain those.     #3 non-small cell lung cancer with mets  Patient is approximately 10 days out from last chemo.  This may account for some of patient's anemia and thrombocytopenia.     #4 chronic recurrent right sided malignant effusion  Pleurx catheter in place will continue to be utilized to drain effusion.     #5 hypotension  Hold patient's lisinopril and hydrochlorothiazide.  Follow-up patient's blood pressure.  Patient does not have appearance of early sepsis.  Fluid resuscitation for clinical dehydration.     #6 diabetes mellitus type 2  Given patient's hypotension will hold patient's metformin to avoid lactic acidosis    #7 Hypokalemia    - supplement potassium          #8.  Left lower extremity deep vein thrombosis    -Continue Eliquis      Disposition/additional care plan/interventions: 3/21/2025     Suspected malignancy provoked VTE     Oncology consult     Continue monitor hemodynamic status  closely     Consider limited echo if okay with hematology oncology     Continue anticoagulation therapy     Monitor volume status, avoid volume overload     Consult to PT /OT     Sliding scale with hypoglycemia protocol     Hold metformin given recent IVP dye use     Continue to hold antihypertensive therapy     Orthostatic BPs        Disposition/additional care plan/interventions: 3/22/2025    Pancytopenia................. suspected secondary to chemotherapy    Thrombocytopenia without clinical bleeding concomitant pulmonary embolism requiring anticoagulation, continue to monitor closely for clinical signs of bleeding.    Marginal blood pressures today    Continue to monitor thrombocytopenia............. No drop in hemoglobin appreciated today.    Continue home antihypertensives    Orthostatic BPs    Additional IV fluids today    Left lower extremity deep vein thrombosis/visualized in the popliteal posterior tibial and peroneal veins.    Hypokalemia  - supplement potassium      Patient with high complexity characteristics and appeared to be at high risk for clinical decline/deterioration  and unpredictable clinical course.       The patient was informed of differential diagnosis , work up , plan of care and possible sequelae of clinical disposition.Patient in agreement with plan of care. Further recommendations forthcoming in accordance with patient's clinical disposition and response to care.     Discharge planning:Anticipate possible discharge in next 24 hours.     Care time: >55 mins              Dictation performed with assistance of voice recognition device therefore transcription errors are possible.

## 2025-03-23 ENCOUNTER — PHARMACY VISIT (OUTPATIENT)
Dept: PHARMACY | Facility: CLINIC | Age: 75
End: 2025-03-23
Payer: COMMERCIAL

## 2025-03-23 VITALS
DIASTOLIC BLOOD PRESSURE: 69 MMHG | SYSTOLIC BLOOD PRESSURE: 107 MMHG | OXYGEN SATURATION: 100 % | HEIGHT: 73 IN | TEMPERATURE: 97.7 F | RESPIRATION RATE: 20 BRPM | WEIGHT: 179.9 LBS | BODY MASS INDEX: 23.84 KG/M2 | HEART RATE: 97 BPM

## 2025-03-23 LAB
ANION GAP SERPL CALC-SCNC: 7 MMOL/L (ref 10–20)
APPEARANCE UR: CLEAR
BACTERIA #/AREA URNS AUTO: ABNORMAL /HPF
BILIRUB UR STRIP.AUTO-MCNC: NEGATIVE MG/DL
BUN SERPL-MCNC: 13 MG/DL (ref 6–23)
CALCIUM SERPL-MCNC: 7.8 MG/DL (ref 8.6–10.3)
CHLORIDE SERPL-SCNC: 105 MMOL/L (ref 98–107)
CO2 SERPL-SCNC: 28 MMOL/L (ref 21–32)
COLOR UR: ABNORMAL
CREAT SERPL-MCNC: 0.63 MG/DL (ref 0.5–1.3)
EGFRCR SERPLBLD CKD-EPI 2021: >90 ML/MIN/1.73M*2
ERYTHROCYTE [DISTWIDTH] IN BLOOD BY AUTOMATED COUNT: 16.1 % (ref 11.5–14.5)
GLUCOSE BLD MANUAL STRIP-MCNC: 105 MG/DL (ref 74–99)
GLUCOSE BLD MANUAL STRIP-MCNC: 120 MG/DL (ref 74–99)
GLUCOSE SERPL-MCNC: 104 MG/DL (ref 74–99)
GLUCOSE UR STRIP.AUTO-MCNC: NORMAL MG/DL
HCT VFR BLD AUTO: 27.9 % (ref 41–52)
HGB BLD-MCNC: 8.9 G/DL (ref 13.5–17.5)
HOLD SPECIMEN: NORMAL
KETONES UR STRIP.AUTO-MCNC: NEGATIVE MG/DL
LEUKOCYTE ESTERASE UR QL STRIP.AUTO: NEGATIVE
MAGNESIUM SERPL-MCNC: 1.06 MG/DL (ref 1.6–2.4)
MCH RBC QN AUTO: 27.6 PG (ref 26–34)
MCHC RBC AUTO-ENTMCNC: 31.9 G/DL (ref 32–36)
MCV RBC AUTO: 87 FL (ref 80–100)
NITRITE UR QL STRIP.AUTO: NEGATIVE
NRBC BLD-RTO: 0 /100 WBCS (ref 0–0)
PH UR STRIP.AUTO: 7 [PH]
PLATELET # BLD AUTO: 60 X10*3/UL (ref 150–450)
POTASSIUM SERPL-SCNC: 3.6 MMOL/L (ref 3.5–5.3)
PROT UR STRIP.AUTO-MCNC: NEGATIVE MG/DL
RBC # BLD AUTO: 3.22 X10*6/UL (ref 4.5–5.9)
RBC # UR STRIP.AUTO: ABNORMAL MG/DL
RBC #/AREA URNS AUTO: >20 /HPF
SODIUM SERPL-SCNC: 136 MMOL/L (ref 136–145)
SP GR UR STRIP.AUTO: 1.01
UROBILINOGEN UR STRIP.AUTO-MCNC: NORMAL MG/DL
WBC # BLD AUTO: 2.8 X10*3/UL (ref 4.4–11.3)
WBC #/AREA URNS AUTO: ABNORMAL /HPF

## 2025-03-23 PROCEDURE — 2500000004 HC RX 250 GENERAL PHARMACY W/ HCPCS (ALT 636 FOR OP/ED): Performed by: INTERNAL MEDICINE

## 2025-03-23 PROCEDURE — 2500000001 HC RX 250 WO HCPCS SELF ADMINISTERED DRUGS (ALT 637 FOR MEDICARE OP): Performed by: INTERNAL MEDICINE

## 2025-03-23 PROCEDURE — RXMED WILLOW AMBULATORY MEDICATION CHARGE

## 2025-03-23 PROCEDURE — 37799 UNLISTED PX VASCULAR SURGERY: CPT | Performed by: HOSPITALIST

## 2025-03-23 PROCEDURE — 2500000004 HC RX 250 GENERAL PHARMACY W/ HCPCS (ALT 636 FOR OP/ED): Performed by: HOSPITALIST

## 2025-03-23 PROCEDURE — 99239 HOSP IP/OBS DSCHRG MGMT >30: CPT | Performed by: HOSPITALIST

## 2025-03-23 PROCEDURE — 83735 ASSAY OF MAGNESIUM: CPT | Performed by: HOSPITALIST

## 2025-03-23 PROCEDURE — 81001 URINALYSIS AUTO W/SCOPE: CPT | Performed by: HOSPITALIST

## 2025-03-23 PROCEDURE — 85027 COMPLETE CBC AUTOMATED: CPT | Performed by: HOSPITALIST

## 2025-03-23 PROCEDURE — 82947 ASSAY GLUCOSE BLOOD QUANT: CPT

## 2025-03-23 PROCEDURE — 82374 ASSAY BLOOD CARBON DIOXIDE: CPT | Performed by: HOSPITALIST

## 2025-03-23 RX ORDER — ACETAMINOPHEN 325 MG/1
650 TABLET ORAL EVERY 4 HOURS PRN
Start: 2025-03-23

## 2025-03-23 RX ORDER — HEPARIN 100 UNIT/ML
5 SYRINGE INTRAVENOUS AS NEEDED
Status: DISCONTINUED | OUTPATIENT
Start: 2025-03-23 | End: 2025-03-23 | Stop reason: HOSPADM

## 2025-03-23 RX ORDER — MAGNESIUM SULFATE HEPTAHYDRATE 40 MG/ML
4 INJECTION, SOLUTION INTRAVENOUS ONCE
Status: COMPLETED | OUTPATIENT
Start: 2025-03-23 | End: 2025-03-23

## 2025-03-23 RX ADMIN — PANTOPRAZOLE SODIUM 40 MG: 40 TABLET, DELAYED RELEASE ORAL at 08:32

## 2025-03-23 RX ADMIN — Medication 400 MG: at 08:32

## 2025-03-23 RX ADMIN — MAGNESIUM SULFATE HEPTAHYDRATE 4 G: 40 INJECTION, SOLUTION INTRAVENOUS at 11:12

## 2025-03-23 RX ADMIN — APIXABAN 10 MG: 5 TABLET, FILM COATED ORAL at 08:32

## 2025-03-23 RX ADMIN — SODIUM CHLORIDE 42 ML/HR: 900 INJECTION, SOLUTION INTRAVENOUS at 00:18

## 2025-03-23 ASSESSMENT — COGNITIVE AND FUNCTIONAL STATUS - GENERAL
MOBILITY SCORE: 24
DAILY ACTIVITIY SCORE: 24

## 2025-03-23 ASSESSMENT — PAIN SCALES - GENERAL
PAINLEVEL_OUTOF10: 0 - NO PAIN

## 2025-03-23 ASSESSMENT — PAIN - FUNCTIONAL ASSESSMENT
PAIN_FUNCTIONAL_ASSESSMENT: 0-10
PAIN_FUNCTIONAL_ASSESSMENT: 0-10

## 2025-03-23 NOTE — PROGRESS NOTES
Jayant Holland  [id number]   Service: Internal Medicine / Hospitalist Date of service: 3/22/2025                                  Full Code                        Subjective  History Of Present Illness  Jayant Holland is a 74 y.o. male with past medical history of non-small cell lung CA with mets last chemo approximately 10 days ago, Pleurx catheter drainage of recurrent malignant pleural effusion, hypertension, diabetes mellitus type 2 presenting with syncopal event.  Patient states he was sitting at the dinner table became lightheaded and passed out.  He states that he feels much better at present time denies having any chest pain shortness of breath cough sputum.  Upon arrival emergency room blood pressure was 98/69 with a pulse of 96 respirations 16 sat of 100% on room air patient is afebrile.  Patient did drop his blood pressure down to 85/54 during his ER visit.  I do not see orthostatic vitals obtained anywhere.  Blood work shows BMP significant for sodium 133 potassium of 3.4 patient's creatinine 0.83.  Glucose was 205.  CBC is unremarkable except for chronic hemoglobin 10.1 and platelets of only 66.  CT of the head shows mild inflammatory changes of the paranasal sinuses but no evidence of any acute cortical infarct or intracranial hemorrhage.  CT of the chest shows small pulmonary emboli corresponding to the right middle lobe pulmonary artery branches findings may be acute or chronic in nature.  Interval decrease in size of loculated right pleural effusions when compared to previous study.  Patient with right-sided pleural catheter in place.  Associated moderate atelectasis or infiltrate persist.  Interval improvement in right upper lobe nodule/malignancy when compared to previous studies  Patient is being admitted to the hospital for new pulmonary emboli and syncope.  Patient will be started on apixaban.  Patient only has 66,000 platelets so will observe closely for any bleeding.  Pulmonary consultation to be  obtained for any further recommendations.  Assuming that this is a PE as a result of increased clotting risk from malignancy.           3/21..............No reported: Hematuria, epistaxis, melena, hematochezia, chest pains, palpitations or dyspnea at rest.       3/22.............................No reported: Melena, hematochezia, hematuria, syncope, presyncope, nausea or  vomiting.      3/23..................Patient reported feeling well today patient requesting discharge home.  Orthostatic BPs performed yesterday patient asymptomatic.  No reported :headaches, chest pains, syncope, presyncope, nausea, vomiting, fevers, chills, headaches, abdominal pain or dyspnea.     Review of Systems:   Review of system otherwise negative if not aforementioned above in subjective.     Objective       Physical Exam      Constitutional:       Appearance: Patient appeared in no acute cardiopulmonary distress.     Comments: Patient alert and oriented to person place time and situation.Patient appeared clinically improved.  HEENT:      Head: Normocephalic and atraumatic.Trachea midline      Nose:No observed congestion or rhinorrhea.     Mouth/Throat: Mucous membranes Moist, Trachea appeared  midline.  Eyes:      Extraocular Movements: Extraocular movements intact.      Pupils: Pupils are equal, round, and reactive to light.      Comments: No scleral icterus or conjunctival injection appreciated.   Cardiovascular:      Rate and Rhythm: Normal rate and regular rhythm. No clicks rubs or gallops, normal S1 and S2.No peripheral stigmata of endocarditis appreciated.     Pulmonary:      Lung bases diminished but no adventitious sound appreciated.  Abdominal:      General: Abdomen soft, nontender, active bowel sounds, no involuntary guarding or rebound tenderness appreciated.     Comments: None   Musculoskeletal:       Patient appeared to have full active range of motion for upper and lower extremities, no acute apparent joint deformity  appreciated on examination.   No cyanosis appreciated.  Bilateral lower extremity 1+ pitting edema       Lymphadenopathy:      No appreciable palpable lymphadenopathy  Skin:     General: Skin is warm.      Coloration:  No jaundice     Findings: No abnormal appearing skin rashes or lesions that appeared acute noted on unclothed area of the skin..   Neurological:      General: No focal sensory or motor deficits appreciated, no meningeal signs or dysmetria noted.      Cranial Nerves: Cranial nerves II to XII appearing grossly intact.     Genitals:  Deferred  Psychiatric:         The patient appears to be displaying normal mood and affect at the time of evaluation.     Labs:               Lab Results   Component Value Date     GLUCOSE 114 (H) 03/21/2025     CALCIUM 7.5 (L) 03/21/2025      (L) 03/21/2025     K 3.6 03/21/2025     CO2 25 03/21/2025      03/21/2025     BUN 17 03/21/2025     CREATININE 0.64 03/21/2025              Lab Results   Component Value Date     GLUCOSE 105 (H) 03/22/2025     CALCIUM 7.6 (L) 03/22/2025      (L) 03/22/2025     K 3.4 (L) 03/22/2025     CO2 27 03/22/2025      03/22/2025     BUN 14 03/22/2025     CREATININE 0.65 03/22/2025           Lab Results   Component Value Date     WBC 3.3 (L) 03/21/2025     HGB 8.9 (L) 03/21/2025     HCT 27.4 (L) 03/21/2025     MCV 86 03/21/2025     PLT 60 (L) 03/21/2025            Lab Results   Component Value Date     WBC 2.5 (L) 03/22/2025     HGB 8.9 (L) 03/22/2025     HCT 27.8 (L) 03/22/2025     MCV 87 03/22/2025     PLT 60 (L) 03/22/2025      Lab Results   Component Value Date    GLUCOSE 104 (H) 03/23/2025    CALCIUM 7.8 (L) 03/23/2025     03/23/2025    K 3.6 03/23/2025    CO2 28 03/23/2025     03/23/2025    BUN 13 03/23/2025    CREATININE 0.63 03/23/2025      [unfilled]   [unfilled]   No results found for the last 90 days.                  X-rays/ Images     [unfilled]   Radiology Results (last 21 days)      Procedure Component Value Units Date/Time   CT head wo IV contrast [[id number]] Collected: 03/20/25 2241   Order Status: Completed Updated: 03/20/25 2241   Narrative:     Interpreted By:  Jose A Vyas,  STUDY:  CT HEAD WO IV CONTRAST;  3/20/2025 10:03 pm      INDICATION:  Signs/Symptoms:syncope/seizure.      COMPARISON:  10/23/2024      ACCESSION NUMBER(S):  [id number]      ORDERING CLINICIAN:  RATNA JOHNSTON      TECHNIQUE:  Noncontrast axial CT scan of head was performed. Angled reformats in  brain and bone windows were generated. The images were reviewed in  bone, brain, blood and soft tissue windows.      FINDINGS:  CSF Spaces: The ventricles, sulci and cisterns are within normal  limits for patient's age.  There is no extraaxial fluid collection.      Parenchyma: Confluent areas of subcortical and periventricular white  matter changes which given patient's age are suggestive of chronic  small vessel ischemic disease. Remote lacunar infarctions within  bilateral basal ganglia suspected similar to prior. The grey-white  differentiation is intact. There is no mass effect or midline shift.  There is no intracranial hemorrhage.      Calvarium: The calvarium is unremarkable.      Paranasal sinuses and mastoids: Mild opacification of the right  frontal sinus and right anterior ethmoid air cells.       Impression:     Mild inflammatory changes of the paranasal sinuses. No evidence of  acute cortical infarct or intracranial hemorrhage. If symptoms  persist, further evaluation with MRI per seizure protocol can be  performed for better assessment as clinically warranted.      MACRO:  None      Signed by: Jose A Vyas 3/20/2025 10:40 PM  Dictation workstation:   [id number]   CT angio chest for pulmonary embolism [[id number]] Collected: 03/20/25 2303   Order Status: Completed Updated: 03/20/25 2303   Narrative:     Interpreted By:  Jose A Vyas,  STUDY:  CT ANGIO CHEST FOR PULMONARY EMBOLISM;  3/20/2025 10:01 pm       INDICATION:  Signs/Symptoms:lung cancer/ syncope.      COMPARISON:  01/21/2025      ACCESSION NUMBER(S):  [id number]      ORDERING CLINICIAN:  RATNA JOHNSTON      TECHNIQUE:  Helical data acquisition of the chest was obtained after intravenous  administration of 75 mL Omnipaque 350, as per PE protocol. Images  were reformatted in coronal and sagittal planes. Axial and coronal  maximum intensity projection (MIP) images were created and reviewed.      FINDINGS:  POTENTIAL LIMITATIONS OF THE STUDY: None      HEART AND VESSELS:      Filling defect within the right middle lobe branches suggestive of  acute or subacute pulmonary emboli. There remainder of the bilateral  pulmonary artery branches are patent..      Main pulmonary artery and its branches are normal in caliber.      The thoracic aorta normal in course and caliber.      Moderate coronary artery calcifications are seen. Please note,the  study is not optimized for evaluation of coronary arteries.      The cardiac chambers are not enlarged.      There is no pericardial effusion seen.      MEDIASTINUM AND GELA, LOWER NECK AND AXILLA:      The visualized thyroid gland is within normal limits.      Borderline enlarged mediastinal and hilar lymph nodes similar to  prior, improved in size when compared to previous exam. Findings are  nonspecific in etiology. Evaluation somewhat limited due to early  phase of contrast enhancement.      Esophagus appears within normal limits as seen.      LUNGS AND AIRWAYS:  Loculated pleural effusions within the right lower lobe similar to  previous exam, improved in size when compared to prior study. Patient  with right-sided pleural catheter in place. Moderate atelectasis or  infiltrate within the right lung similar to prior. Right upper lobe  nodule has decreased in size when compared to previous exam. It  measures up to 12 x 17 mm in size from 17 x 18 mm previously. Minimal  left basilar atelectasis or scarring.      Remainder  of the lung fields are clear bilaterally. No pneumothorax.          UPPER ABDOMEN:  The visualized subdiaphragmatic structures demonstrate no remarkable  findings.      CHEST WALL AND OSSEOUS STRUCTURES:      Chest wall is within normal limits.  There is a right-sided MediPort in place.There are no suspicious  osseous lesions.Multilevel discogenic degenerative changes throughout  the spine.       Impression:     1. Small pulmonary emboli corresponding to right middle lobe  pulmonary artery branches. Findings may be acute or chronic in nature.  2. Interval decrease in size of loculated right pleural effusions  when compared to previous exam. Patient with right-sided pleural  catheter in place. Associated moderate atelectasis or infiltrate  persist.  3. Interval improvement in right upper lobe nodule/malignancy when  compared to prior study.  4. Additional detailed findings as above.          MACRO:  None      Signed by: Jose A Vyas 3/20/2025 11:02 PM  Dictation workstation:   [id number]                  Medical Problems         Problem List         * (Principal) Syncope, unspecified syncope type     Arthritis of knee, degenerative     Benign essential hypertension     Diabetes mellitus (Multi)     Edema     Fatigue     GERD (gastroesophageal reflux disease)     Hematochezia     Hyperlipemia     Hypokalemia     Joint pain, knee     Left knee DJD     Left knee pain     Leg length discrepancy     Cervicalgia     Low back pain with left-sided sciatica     Low vitamin B12 level     Stiffness of left knee     Sciatica     Night sweats     Strain of right shoulder     Medicare annual wellness visit, subsequent     Contact with and (suspected) exposure to covid-19     Contact with and (suspected) exposure to other hazardous substances     Exposure to Agent Orange     Other chest pain     Hearing loss of right ear     Vitamin D deficiency     Knee stiffness     Arthralgia of knee     Osteoarthritis of knee      Osteoarthritis of left knee     Inequality of length of lower extremity     Erectile dysfunction     Inflammation of sacroiliac joint (CMS-HCC)     Strain of shoulder     Exertional dyspnea     Neck pain, bilateral     Bilateral shoulder pain     Neural foraminal stenosis of cervical spine     Arthritis of both shoulder regions     Impingement of shoulder     Chronic post-traumatic stress disorder     Paresthesia of skin     Reaction to severe stress, unspecified     Sensorineural hearing loss, bilateral     Unstable angina (Multi)     Pleural effusion on right     Malignant pleural effusion (CMS-HCC)     Adenocarcinoma (Multi)     Pleural effusion     Encounter for antineoplastic chemotherapy     Hypomagnesemia     Encounter for monoclonal antibody treatment for malignancy     Advance directive in chart     Pulmonary emboli                  Above medical problems may be reflective of historical medical problems that may have resolved and may not related to acute clinical condition/medical problems.     Clinical impression/plan:     #1 pulmonary emboli   Patient with a syncopal event.  Incidental findings of pulmonary emboli on CT of the chest.  Patient is not hypoxic nor is he tachycardic.  Patient will be started on Eliquis.  Watch for any acute bleeding.  Pulmonary consultation will be obtained for recommendations.  No evidence of any right heart strain on CT scan     #2 syncopal event  Patient was hypotensive upon arrival to the emergency room.  Received 2 L of normal saline with significant improvement of his feeling of lightheadedness.  No orthostatic vitals were obtained in the ER we will obtain those.     #3 non-small cell lung cancer with mets  Patient is approximately 10 days out from last chemo.  This may account for some of patient's anemia and thrombocytopenia.     #4 chronic recurrent right sided malignant effusion  Pleurx catheter in place will continue to be utilized to drain effusion.     #5  hypotension  Hold patient's lisinopril and hydrochlorothiazide.  Follow-up patient's blood pressure.  Patient does not have appearance of early sepsis.  Fluid resuscitation for clinical dehydration.     #6 diabetes mellitus type 2  Given patient's hypotension will hold patient's metformin to avoid lactic acidosis     #7 Hypokalemia     - supplement potassium           #8.  Left lower extremity deep vein thrombosis     -Continue Eliquis        Disposition/additional care plan/interventions: 3/21/2025     Suspected malignancy provoked VTE     Oncology consult     Continue monitor hemodynamic status closely     Consider limited echo if okay with hematology oncology     Continue anticoagulation therapy     Monitor volume status, avoid volume overload     Consult to PT /OT     Sliding scale with hypoglycemia protocol     Hold metformin given recent IVP dye use     Continue to hold antihypertensive therapy     Orthostatic BPs        Disposition/additional care plan/interventions: 3/22/2025     Pancytopenia................. suspected secondary to chemotherapy     Thrombocytopenia without clinical bleeding concomitant pulmonary embolism requiring anticoagulation, continue to monitor closely for clinical signs of bleeding.     Marginal blood pressures today     Continue to monitor thrombocytopenia............. No drop in hemoglobin appreciated today.     Continue home antihypertensives     Orthostatic BPs     Additional IV fluids today     Left lower extremity deep vein thrombosis/visualized in the popliteal posterior tibial and peroneal veins.     Hypokalemia  - supplement potassium        Patient with high complexity characteristics and appeared to be at high risk for clinical decline/deterioration  and unpredictable clinical course.         The patient was informed of differential diagnosis , work up , plan of care and possible sequelae of clinical disposition.Patient in agreement with plan of care. Further  recommendations forthcoming in accordance with patient's clinical disposition and response to care.     Discharge planning:3/23/2025        Patient requested discharge home today.    Patient appeared hemodynamically stable and clinically fit for discharge.    Hypomagnesia.................. supplement magnesium.    Recommend holding blood pressure medication for systolic blood pressure less than 120.  Discussed in detail with patient.    Patient should seek medical attention immediately if condition should worsen, new symptoms develop , no further improvement or recurrence of presenting symptomatology, patient warned.    Patient appeared hemodynamically stable and clinically fit for discharge.  Plan discharge after supplementation of IV magnesium.  Patient reports that he usually get magnesium infusion outpatient was scheduled to get magnesium tomorrow.            Dictation performed with assistance of voice recognition device therefore transcription errors are possible.

## 2025-03-23 NOTE — DISCHARGE INSTRUCTIONS
Patient should seek medical attention immediately if condition should worsen, new symptoms develop , no further improvement or recurrence of presenting symptomatology, patient warned.    Please hold your blood pressure medication/water pill if your systolic blood pressure is less than 120.    Please follow-up with your oncologist at your earliest available appointment.    Fall precautions

## 2025-03-23 NOTE — DISCHARGE SUMMARY
Discharge Summary    Jayant Holland    [id number]     3/20/2025  7:44 PM , admit date    3/23/2025, discharge  date      .      Discharge Diagnosis:        1.  Acute pulmonary embolism    2.  Acute left lower extremity deep vein thrombosis    3.  Syncopal event suspected related to hypotension without recurrence    4.  Dehydration    5.  Non-small cell lung cancer with mets    6.  Chronic recurrent right-sided malignant pleural effusion    7.  Hypotension resolved    8.  Diabetes mellitus    9.  Hypomagnesia    10.  Hypokalemia    11.  Pancytopenia suspected secondary to chemotherapy    12.  Thrombocytopenia without clinical bleeding    Problem List Items Addressed This Visit             ICD-10-CM       Coag and Thromboembolic    Pulmonary emboli - Primary I26.99    Relevant Orders    Vascular US Lower Extremity Venous Duplex Bilateral (Completed)       Symptoms and Signs    Edema R60.9    Relevant Orders    Vascular US Lower Extremity Venous Duplex Bilateral (Completed)    * (Principal) Syncope, unspecified syncope type R55     Other Visit Diagnoses         Codes    Other pulmonary embolism without acute cor pulmonale     I26.99               Hospital Course/Progress note on the date of  discharge.    Jayant Holland  [id number]   Service: Internal Medicine / Hospitalist Date of service: 3/23/2025                                  Full Code                        Subjective  History Of Present Illness  Jayant Holland is a 74 y.o. male with past medical history of non-small cell lung CA with mets last chemo approximately 10 days ago, Pleurx catheter drainage of recurrent malignant pleural effusion, hypertension, diabetes mellitus type 2 presenting with syncopal event.  Patient states he was sitting at the dinner table became lightheaded and passed out.  He states that he feels much better at present time denies having any chest pain shortness of breath cough sputum.  Upon arrival emergency room blood pressure was 98/69 with a  pulse of 96 respirations 16 sat of 100% on room air patient is afebrile.  Patient did drop his blood pressure down to 85/54 during his ER visit.  I do not see orthostatic vitals obtained anywhere.  Blood work shows BMP significant for sodium 133 potassium of 3.4 patient's creatinine 0.83.  Glucose was 205.  CBC is unremarkable except for chronic hemoglobin 10.1 and platelets of only 66.  CT of the head shows mild inflammatory changes of the paranasal sinuses but no evidence of any acute cortical infarct or intracranial hemorrhage.  CT of the chest shows small pulmonary emboli corresponding to the right middle lobe pulmonary artery branches findings may be acute or chronic in nature.  Interval decrease in size of loculated right pleural effusions when compared to previous study.  Patient with right-sided pleural catheter in place.  Associated moderate atelectasis or infiltrate persist.  Interval improvement in right upper lobe nodule/malignancy when compared to previous studies  Patient is being admitted to the hospital for new pulmonary emboli and syncope.  Patient will be started on apixaban.  Patient only has 66,000 platelets so will observe closely for any bleeding.  Pulmonary consultation to be obtained for any further recommendations.  Assuming that this is a PE as a result of increased clotting risk from malignancy.           3/21..............No reported: Hematuria, epistaxis, melena, hematochezia, chest pains, palpitations or dyspnea at rest.        3/22.............................No reported: Melena, hematochezia, hematuria, syncope, presyncope, nausea or  vomiting.        3/23..................Patient reported feeling well today patient requesting discharge home.  Orthostatic BPs performed yesterday patient asymptomatic.  No reported :headaches, chest pains, syncope, presyncope, nausea, vomiting, fevers, chills, headaches, abdominal pain or dyspnea.     Review of Systems:   Review of system otherwise  negative if not aforementioned above in subjective.     Objective        Physical Exam      Constitutional:       Appearance: Patient appeared in no acute cardiopulmonary distress.     Comments: Patient alert and oriented to person place time and situation.Patient appeared clinically improved.  HEENT:      Head: Normocephalic and atraumatic.Trachea midline      Nose:No observed congestion or rhinorrhea.     Mouth/Throat: Mucous membranes Moist, Trachea appeared  midline.  Eyes:      Extraocular Movements: Extraocular movements intact.      Pupils: Pupils are equal, round, and reactive to light.      Comments: No scleral icterus or conjunctival injection appreciated.   Cardiovascular:      Rate and Rhythm: Normal rate and regular rhythm. No clicks rubs or gallops, normal S1 and S2.No peripheral stigmata of endocarditis appreciated.     Pulmonary:      Lung bases diminished but no adventitious sound appreciated.  Abdominal:      General: Abdomen soft, nontender, active bowel sounds, no involuntary guarding or rebound tenderness appreciated.     Comments: None   Musculoskeletal:       Patient appeared to have full active range of motion for upper and lower extremities, no acute apparent joint deformity appreciated on examination.   No cyanosis appreciated.  Bilateral lower extremity 1+ pitting edema       Lymphadenopathy:      No appreciable palpable lymphadenopathy  Skin:     General: Skin is warm.      Coloration:  No jaundice     Findings: No abnormal appearing skin rashes or lesions that appeared acute noted on unclothed area of the skin..   Neurological:      General: No focal sensory or motor deficits appreciated, no meningeal signs or dysmetria noted.      Cranial Nerves: Cranial nerves II to XII appearing grossly intact.     Genitals:  Deferred  Psychiatric:         The patient appears to be displaying normal mood and affect at the time of evaluation.     Labs:               Lab Results   Component Value Date      GLUCOSE 114 (H) 03/21/2025     CALCIUM 7.5 (L) 03/21/2025      (L) 03/21/2025     K 3.6 03/21/2025     CO2 25 03/21/2025      03/21/2025     BUN 17 03/21/2025     CREATININE 0.64 03/21/2025                  Lab Results   Component Value Date     GLUCOSE 105 (H) 03/22/2025     CALCIUM 7.6 (L) 03/22/2025      (L) 03/22/2025     K 3.4 (L) 03/22/2025     CO2 27 03/22/2025      03/22/2025     BUN 14 03/22/2025     CREATININE 0.65 03/22/2025           Lab Results   Component Value Date     WBC 3.3 (L) 03/21/2025     HGB 8.9 (L) 03/21/2025     HCT 27.4 (L) 03/21/2025     MCV 86 03/21/2025     PLT 60 (L) 03/21/2025                Lab Results   Component Value Date     WBC 2.5 (L) 03/22/2025     HGB 8.9 (L) 03/22/2025     HCT 27.8 (L) 03/22/2025     MCV 87 03/22/2025     PLT 60 (L) 03/22/2025            Lab Results   Component Value Date     GLUCOSE 104 (H) 03/23/2025     CALCIUM 7.8 (L) 03/23/2025      03/23/2025     K 3.6 03/23/2025     CO2 28 03/23/2025      03/23/2025     BUN 13 03/23/2025     CREATININE 0.63 03/23/2025      [unfilled]   [unfilled]   No results found for the last 90 days.                  X-rays/ Images     [unfilled]   Radiology Results (last 21 days)     Procedure Component Value Units Date/Time   CT head wo IV contrast [[id number]] Collected: 03/20/25 2241   Order Status: Completed Updated: 03/20/25 2241   Narrative:     Interpreted By:  Jose A Vyas,  STUDY:  CT HEAD WO IV CONTRAST;  3/20/2025 10:03 pm      INDICATION:  Signs/Symptoms:syncope/seizure.      COMPARISON:  10/23/2024      ACCESSION NUMBER(S):  [id number]      ORDERING CLINICIAN:  RATNA JOHNSTON      TECHNIQUE:  Noncontrast axial CT scan of head was performed. Angled reformats in  brain and bone windows were generated. The images were reviewed in  bone, brain, blood and soft tissue windows.      FINDINGS:  CSF Spaces: The ventricles, sulci and cisterns are within normal  limits for  patient's age.  There is no extraaxial fluid collection.      Parenchyma: Confluent areas of subcortical and periventricular white  matter changes which given patient's age are suggestive of chronic  small vessel ischemic disease. Remote lacunar infarctions within  bilateral basal ganglia suspected similar to prior. The grey-white  differentiation is intact. There is no mass effect or midline shift.  There is no intracranial hemorrhage.      Calvarium: The calvarium is unremarkable.      Paranasal sinuses and mastoids: Mild opacification of the right  frontal sinus and right anterior ethmoid air cells.       Impression:     Mild inflammatory changes of the paranasal sinuses. No evidence of  acute cortical infarct or intracranial hemorrhage. If symptoms  persist, further evaluation with MRI per seizure protocol can be  performed for better assessment as clinically warranted.      MACRO:  None      Signed by: Jose A Vyas 3/20/2025 10:40 PM  Dictation workstation:   [id number]   CT angio chest for pulmonary embolism [[id number]] Collected: 03/20/25 2303   Order Status: Completed Updated: 03/20/25 2303   Narrative:     Interpreted By:  Jose A Vyas,  STUDY:  CT ANGIO CHEST FOR PULMONARY EMBOLISM;  3/20/2025 10:01 pm      INDICATION:  Signs/Symptoms:lung cancer/ syncope.      COMPARISON:  01/21/2025      ACCESSION NUMBER(S):  [id number]      ORDERING CLINICIAN:  RATNA JOHNSTON      TECHNIQUE:  Helical data acquisition of the chest was obtained after intravenous  administration of 75 mL Omnipaque 350, as per PE protocol. Images  were reformatted in coronal and sagittal planes. Axial and coronal  maximum intensity projection (MIP) images were created and reviewed.      FINDINGS:  POTENTIAL LIMITATIONS OF THE STUDY: None      HEART AND VESSELS:      Filling defect within the right middle lobe branches suggestive of  acute or subacute pulmonary emboli. There remainder of the bilateral  pulmonary artery branches are  patent..      Main pulmonary artery and its branches are normal in caliber.      The thoracic aorta normal in course and caliber.      Moderate coronary artery calcifications are seen. Please note,the  study is not optimized for evaluation of coronary arteries.      The cardiac chambers are not enlarged.      There is no pericardial effusion seen.      MEDIASTINUM AND GELA, LOWER NECK AND AXILLA:      The visualized thyroid gland is within normal limits.      Borderline enlarged mediastinal and hilar lymph nodes similar to  prior, improved in size when compared to previous exam. Findings are  nonspecific in etiology. Evaluation somewhat limited due to early  phase of contrast enhancement.      Esophagus appears within normal limits as seen.      LUNGS AND AIRWAYS:  Loculated pleural effusions within the right lower lobe similar to  previous exam, improved in size when compared to prior study. Patient  with right-sided pleural catheter in place. Moderate atelectasis or  infiltrate within the right lung similar to prior. Right upper lobe  nodule has decreased in size when compared to previous exam. It  measures up to 12 x 17 mm in size from 17 x 18 mm previously. Minimal  left basilar atelectasis or scarring.      Remainder of the lung fields are clear bilaterally. No pneumothorax.          UPPER ABDOMEN:  The visualized subdiaphragmatic structures demonstrate no remarkable  findings.      CHEST WALL AND OSSEOUS STRUCTURES:      Chest wall is within normal limits.  There is a right-sided MediPort in place.There are no suspicious  osseous lesions.Multilevel discogenic degenerative changes throughout  the spine.       Impression:     1. Small pulmonary emboli corresponding to right middle lobe  pulmonary artery branches. Findings may be acute or chronic in nature.  2. Interval decrease in size of loculated right pleural effusions  when compared to previous exam. Patient with right-sided pleural  catheter in place.  Associated moderate atelectasis or infiltrate  persist.  3. Interval improvement in right upper lobe nodule/malignancy when  compared to prior study.  4. Additional detailed findings as above.          MACRO:  None      Signed by: Jose A Vyas 3/20/2025 11:02 PM  Dictation workstation:   [id number]                  Medical Problems         Problem List         * (Principal) Syncope, unspecified syncope type     Arthritis of knee, degenerative     Benign essential hypertension     Diabetes mellitus (Multi)     Edema     Fatigue     GERD (gastroesophageal reflux disease)     Hematochezia     Hyperlipemia     Hypokalemia     Joint pain, knee     Left knee DJD     Left knee pain     Leg length discrepancy     Cervicalgia     Low back pain with left-sided sciatica     Low vitamin B12 level     Stiffness of left knee     Sciatica     Night sweats     Strain of right shoulder     Medicare annual wellness visit, subsequent     Contact with and (suspected) exposure to covid-19     Contact with and (suspected) exposure to other hazardous substances     Exposure to Agent Orange     Other chest pain     Hearing loss of right ear     Vitamin D deficiency     Knee stiffness     Arthralgia of knee     Osteoarthritis of knee     Osteoarthritis of left knee     Inequality of length of lower extremity     Erectile dysfunction     Inflammation of sacroiliac joint (CMS-HCC)     Strain of shoulder     Exertional dyspnea     Neck pain, bilateral     Bilateral shoulder pain     Neural foraminal stenosis of cervical spine     Arthritis of both shoulder regions     Impingement of shoulder     Chronic post-traumatic stress disorder     Paresthesia of skin     Reaction to severe stress, unspecified     Sensorineural hearing loss, bilateral     Unstable angina (Multi)     Pleural effusion on right     Malignant pleural effusion (CMS-HCC)     Adenocarcinoma (Multi)     Pleural effusion     Encounter for antineoplastic chemotherapy      Hypomagnesemia     Encounter for monoclonal antibody treatment for malignancy     Advance directive in chart     Pulmonary emboli                  Above medical problems may be reflective of historical medical problems that may have resolved and may not related to acute clinical condition/medical problems.     Clinical impression/plan:     #1 pulmonary emboli   Patient with a syncopal event.  Incidental findings of pulmonary emboli on CT of the chest.  Patient is not hypoxic nor is he tachycardic.  Patient will be started on Eliquis.  Watch for any acute bleeding.  Pulmonary consultation will be obtained for recommendations.  No evidence of any right heart strain on CT scan     #2 syncopal event  Patient was hypotensive upon arrival to the emergency room.  Received 2 L of normal saline with significant improvement of his feeling of lightheadedness.  No orthostatic vitals were obtained in the ER we will obtain those.     #3 non-small cell lung cancer with mets  Patient is approximately 10 days out from last chemo.  This may account for some of patient's anemia and thrombocytopenia.     #4 chronic recurrent right sided malignant effusion  Pleurx catheter in place will continue to be utilized to drain effusion.     #5 hypotension  Hold patient's lisinopril and hydrochlorothiazide.  Follow-up patient's blood pressure.  Patient does not have appearance of early sepsis.  Fluid resuscitation for clinical dehydration.     #6 diabetes mellitus type 2  Given patient's hypotension will hold patient's metformin to avoid lactic acidosis     #7 Hypokalemia     - supplement potassium           #8.  Left lower extremity deep vein thrombosis     -Continue Eliquis        Disposition/additional care plan/interventions: 3/21/2025     Suspected malignancy provoked VTE     Oncology consult     Continue monitor hemodynamic status closely     Consider limited echo if okay with hematology oncology     Continue anticoagulation therapy      Monitor volume status, avoid volume overload     Consult to PT /OT     Sliding scale with hypoglycemia protocol     Hold metformin given recent IVP dye use     Continue to hold antihypertensive therapy     Orthostatic BPs        Disposition/additional care plan/interventions: 3/22/2025     Pancytopenia................. suspected secondary to chemotherapy     Thrombocytopenia without clinical bleeding concomitant pulmonary embolism requiring anticoagulation, continue to monitor closely for clinical signs of bleeding.     Marginal blood pressures today     Continue to monitor thrombocytopenia............. No drop in hemoglobin appreciated today.     Continue home antihypertensives     Orthostatic BPs     Additional IV fluids today     Left lower extremity deep vein thrombosis/visualized in the popliteal posterior tibial and peroneal veins.     Hypokalemia  - supplement potassium        Patient with high complexity characteristics and appeared to be at high risk for clinical decline/deterioration  and unpredictable clinical course.         The patient was informed of differential diagnosis , work up , plan of care and possible sequelae of clinical disposition.Patient in agreement with plan of care. Further recommendations forthcoming in accordance with patient's clinical disposition and response to care.     Discharge planning:3/23/2025         Patient requested discharge home today.     Patient appeared hemodynamically stable and clinically fit for discharge.     Hypomagnesia.................. supplement magnesium.     Recommend holding blood pressure medication for systolic blood pressure less than 120.  Discussed in detail with patient.     Patient should seek medical attention immediately if condition should worsen, new symptoms develop , no further improvement or recurrence of presenting symptomatology, patient warned.     Patient appeared hemodynamically stable and clinically fit for discharge.  Plan discharge  after supplementation of IV magnesium.  Patient reports that he usually get magnesium infusion outpatient was scheduled to get magnesium tomorrow.              Dictation performed with assistance of voice recognition device therefore transcription errors are possible.                   Consultants    None           Surgeries/Procedures:    None               Your medication list        CONTINUE taking these medications        Instructions Last Dose Given Next Dose Due   Blood glucose monitoring meter           blood-glucose meter misc      1 Device once daily.       lancets 30 gauge misc      1 Device 3 times a day.              ASK your doctor about these medications        Instructions Last Dose Given Next Dose Due   Accu-Chek Jessica Plus test strp strip  Generic drug: blood sugar diagnostic           OneTouch Verio test strips strip  Generic drug: blood sugar diagnostic      1 strip once daily.       Blood Glucose Test strip  Generic drug: blood sugar diagnostic      1 strip once daily.       albuterol 90 mcg/actuation inhaler      INHALE 2 PUFFS EVERY 4 HOURS IF NEEDED FOR WHEEZING OR SHORTNESS OF BREATH.       chlorthalidone 25 mg tablet  Commonly known as: Hygroton           clindamycin 1 % lotion  Commonly known as: Cleocin T      Apply topically to affected area twice daily. Do not fill before January 29, 2025.       dexAMETHasone 4 mg tablet  Commonly known as: Decadron      Beginning with Cycle 2, take 1 tablet (4 mg) by mouth twice daily the day before PEMEtrexed treatment and twice daily the day after PEMEtrexed treatment.       dexAMETHasone 4 mg tablet  Commonly known as: Decadron      Take 2 tablets (8 mg) by mouth 2 times a day for 5 doses. Start 2 days prior to the first dose of Amivantamab on Cycle 1 only.       dexAMETHasone 4 mg tablet  Commonly known as: Decadron      Take 2 tablets (8 mg) by mouth 2 times a day for 5 doses. Start 2 days prior to the first dose of Amivantamab on Cycle 1 only. Do  not fill before January 29, 2025.       dexAMETHasone 4 mg tablet  Commonly known as: Decadron      Beginning with Cycle 2, take 1 tablet (4 mg) by mouth twice daily the day before PEMEtrexed treatment and twice daily the day after PEMEtrexed treatment. Do not fill before January 29, 2025.       folic acid 1 mg tablet  Commonly known as: Folvite      Take 1 tablet (1,000 mcg) by mouth once daily. Do not fill before January 29, 2025.       hydrocortisone 1 % cream      Apply topically to face, hands, feet, neck, back, and chest once daily at bedtime for rash prevention. Do not fill before January 29, 2025.       lisinopril 20 mg tablet      Take 1 tablet (20 mg) by mouth once daily.       magnesium oxide 400 mg (241.3 mg magnesium) tablet  Commonly known as: Mag-Ox      Take 1 tablet (400 mg) by mouth 2 times a day.       meloxicam 15 mg tablet  Commonly known as: Mobic      Take 1 tablet (15 mg) by mouth once daily.       metFORMIN  mg 24 hr tablet  Commonly known as: Glucophage-XR      Take 2 tablets (1,000 mg) by mouth 2 times a day. Do not crush, chew, or split.       OLANZapine 5 mg tablet  Commonly known as: ZyPREXA      Take 1 tablet (5 mg) by mouth once daily at bedtime. For 4 days starting the evening of treatment Do not fill before January 29, 2025.       omeprazole 20 mg DR capsule  Commonly known as: PriLOSEC      TAKE 1 CAPSULE BY MOUTH EVERY DAY       ondansetron 8 mg tablet  Commonly known as: Zofran      Take 1 tablet (8 mg) by mouth every 8 hours if needed for nausea or vomiting. Do not fill before January 29, 2025.       prochlorperazine 10 mg tablet  Commonly known as: Compazine      Take 1 tablet (10 mg) by mouth every 6 hours if needed for nausea or vomiting. Do not fill before January 29, 2025.       rosuvastatin 40 mg tablet  Commonly known as: Crestor      TAKE 1 TABLET BY MOUTH EVERY DAY       sennosides 8.6 mg tablet  Commonly known as: Senokot      Take 2 tablets (17.2 mg) by mouth as  needed at bedtime for constipation. Do not fill before January 29, 2025.       sildenafil 100 mg tablet  Commonly known as: Viagra                       Disposition/Additional Hospital course/events:     74-year-old male with known history of none small cell lung CA with mets currently undergoing chemotherapy Pleurx catheter drainage for recurrent leg and pleural effusion with known history of diabetes as well as hypertension presented with what report is a syncopal event.  He was found to be hypotensive on presentation.  Initial diagnostic studies include but limited CT angiogram of the chest showed finding of pulmonary embolism.  Patient was placed on DOAC therapy with consultation to hematology oncology.  Hematology oncology service was not available at the time of presentation.    Patient responded well to supportive interventions implemented and appeared clinically stable and improved and fit for discharge today to home.  Hypomagnesia was noted today and supplementation given before discharge.  Patient was warned concerning hypotension and precipitation of such by his antihypertensive therapy.  He wished to continue his blood pressure medications however recommend holding parameters.  Outpatient follow-up with primary care physician as well as hematology oncology recommended.    Thrombocytopenia without clinical bleeding while on DOAC therapy and during hospitalization.  Recommend monitoring closely for signs of bleeding.  Overall patient hospital course appeared uneventful patient responded well to supportive interventions implemented.    Patient requested discharge home.    Patient in agreement with discharge.    Patient appeared hemodynamic stable and clinically fit for discharge.    Patient should seek medical attention immediately if condition should worsen, new symptoms develop , no further improvement or recurrence of presenting symptomatology, patient warned.    Follow-up:    Follow -up with family  physician within one week. Follow-up with hematology/ oncology recommended.      Discharge Time : 35 mins      Patient should seek medical attention immediately if condition should worsen , presenting symptoms/conditions do not resolve or new symptoms should developed,patient warned.     Dictation performed with assistance of voice recognition device therefore transcription errors are possible.

## 2025-03-23 NOTE — CARE PLAN
Problem: Diabetes  Goal: Maintain electrolyte levels within acceptable range throughout shift  Outcome: Progressing  Goal: Maintain glucose levels >70mg/dl to <250mg/dl throughout shift  Outcome: Progressing  Goal: Vital signs within normal range for age by end of shift  Outcome: Progressing     Problem: Pain - Adult  Goal: Verbalizes/displays adequate comfort level or baseline comfort level  Outcome: Progressing     Problem: Safety - Adult  Goal: Free from fall injury  Outcome: Progressing     Problem: Discharge Planning  Goal: Discharge to home or other facility with appropriate resources  Outcome: Progressing     Problem: Chronic Conditions and Co-morbidities  Goal: Patient's chronic conditions and co-morbidity symptoms are monitored and maintained or improved  Outcome: Progressing     Problem: Nutrition  Goal: Nutrient intake appropriate for maintaining nutritional needs  Outcome: Progressing     Problem: Fall/Injury  Goal: Not fall by end of shift  Outcome: Progressing  Goal: Be free from injury by end of the shift  Outcome: Progressing  Goal: Verbalize understanding of personal risk factors for fall in the hospital  Outcome: Progressing      The clinical goals for the shift include pt to remain hemodynamically stable throughout the shift

## 2025-03-24 ENCOUNTER — INFUSION (OUTPATIENT)
Dept: HEMATOLOGY/ONCOLOGY | Facility: CLINIC | Age: 75
End: 2025-03-24
Payer: MEDICARE

## 2025-03-24 ENCOUNTER — PATIENT OUTREACH (OUTPATIENT)
Dept: PRIMARY CARE | Facility: CLINIC | Age: 75
End: 2025-03-24

## 2025-03-24 VITALS
TEMPERATURE: 97.7 F | RESPIRATION RATE: 16 BRPM | SYSTOLIC BLOOD PRESSURE: 128 MMHG | HEART RATE: 108 BPM | DIASTOLIC BLOOD PRESSURE: 84 MMHG | OXYGEN SATURATION: 99 % | WEIGHT: 183 LBS | BODY MASS INDEX: 24.14 KG/M2

## 2025-03-24 DIAGNOSIS — E83.42 HYPOMAGNESEMIA: ICD-10-CM

## 2025-03-24 DIAGNOSIS — C80.1 ADENOCARCINOMA (MULTI): ICD-10-CM

## 2025-03-24 LAB — MAGNESIUM SERPL-MCNC: 1.33 MG/DL (ref 1.6–2.4)

## 2025-03-24 PROCEDURE — 83735 ASSAY OF MAGNESIUM: CPT

## 2025-03-24 PROCEDURE — 2500000004 HC RX 250 GENERAL PHARMACY W/ HCPCS (ALT 636 FOR OP/ED): Performed by: STUDENT IN AN ORGANIZED HEALTH CARE EDUCATION/TRAINING PROGRAM

## 2025-03-24 PROCEDURE — 2500000004 HC RX 250 GENERAL PHARMACY W/ HCPCS (ALT 636 FOR OP/ED): Performed by: NURSE PRACTITIONER

## 2025-03-24 PROCEDURE — 96365 THER/PROPH/DIAG IV INF INIT: CPT | Mod: INF

## 2025-03-24 RX ORDER — FAMOTIDINE 10 MG/ML
20 INJECTION, SOLUTION INTRAVENOUS ONCE AS NEEDED
Status: CANCELLED | OUTPATIENT
Start: 2025-03-26

## 2025-03-24 RX ORDER — MAGNESIUM SULFATE HEPTAHYDRATE 40 MG/ML
2 INJECTION, SOLUTION INTRAVENOUS ONCE
Status: COMPLETED | OUTPATIENT
Start: 2025-03-24 | End: 2025-03-24

## 2025-03-24 RX ORDER — MAGNESIUM SULFATE HEPTAHYDRATE 40 MG/ML
4 INJECTION, SOLUTION INTRAVENOUS ONCE
Status: DISCONTINUED | OUTPATIENT
Start: 2025-03-24 | End: 2025-03-24

## 2025-03-24 RX ORDER — EPINEPHRINE 0.3 MG/.3ML
0.3 INJECTION SUBCUTANEOUS EVERY 5 MIN PRN
Status: CANCELLED | OUTPATIENT
Start: 2025-03-26

## 2025-03-24 RX ORDER — HEPARIN 100 UNIT/ML
500 SYRINGE INTRAVENOUS AS NEEDED
Status: DISCONTINUED | OUTPATIENT
Start: 2025-03-24 | End: 2025-03-24 | Stop reason: HOSPADM

## 2025-03-24 RX ORDER — ALBUTEROL SULFATE 0.83 MG/ML
3 SOLUTION RESPIRATORY (INHALATION) AS NEEDED
Status: CANCELLED | OUTPATIENT
Start: 2025-03-26

## 2025-03-24 RX ORDER — HEPARIN SODIUM,PORCINE/PF 10 UNIT/ML
50 SYRINGE (ML) INTRAVENOUS AS NEEDED
Status: CANCELLED | OUTPATIENT
Start: 2025-03-24

## 2025-03-24 RX ORDER — HEPARIN 100 UNIT/ML
500 SYRINGE INTRAVENOUS AS NEEDED
Status: CANCELLED | OUTPATIENT
Start: 2025-03-24

## 2025-03-24 RX ORDER — DIPHENHYDRAMINE HYDROCHLORIDE 50 MG/ML
50 INJECTION, SOLUTION INTRAMUSCULAR; INTRAVENOUS AS NEEDED
Status: CANCELLED | OUTPATIENT
Start: 2025-03-26

## 2025-03-24 RX ADMIN — MAGNESIUM SULFATE IN WATER 2 G: 40 INJECTION, SOLUTION INTRAVENOUS at 09:22

## 2025-03-24 RX ADMIN — Medication 500 UNITS: at 10:20

## 2025-03-24 ASSESSMENT — PAIN SCALES - GENERAL: PAINLEVEL_OUTOF10: 0-NO PAIN

## 2025-03-24 NOTE — PROGRESS NOTES
Patient here for magnesium replacement. Magnesium level 1.33- qualified for 2gm. Patient tolerated well, AVS given to patient, discharged in stable condition.

## 2025-03-24 NOTE — PROGRESS NOTES
Discharge Facility: [facility]  Discharge Diagnosis: acute pulmonary embolism; acute left lower extremity deep vein thrombosis; syncopal event suspected related to hypotension without recurrence; dehydration; non-small cell lung cancer with mets;   Admission Date: 3/20/25  Discharge Date:  3/23/25    PCP Appointment Date:  4/8/25  Specialist Appointment Date: 4/2/25 - hem/onc  Hospital Encounter and Summary Linked: Yes  ED to Hosp-Admission (Discharged) with Storm Leach DO; Yolanda Pearce MD (03/20/2025)   See discharge assessment below for further details     Wrap Up  Wrap Up Additional Comments: Successful transitions of care outreach to patient.  Patient reports he has been doing okay since discharge, feeling better. Medications reviewed and he confirmed taking the Eliquis. Educated on importance of monitoring for any bleeding and reporting this to his provider.  Patient verbalized understanding. Reviewed falls prevention teaching and he is aware he needs to go to ER for any falls.  Patient is checking his blood pressures and understands the hold parameters on his BP medication. Encouraged him to keep a log of BP readings and take to his follow up appts. He is scheduled to see hem/onc 4/2/25. First available appt with PCP was 4/8/25 and this appt was scheduled, but message also sent to PCP office to determine if able to get in any sooner for hospital follow up. Patient had no additional questions or concerns. (3/24/2025  5:01 PM)    Engagement  Call Start Time: 1652 (3/24/2025  5:01 PM)    Medications  Medications reviewed with patient/caregiver?: Yes (3/24/2025  5:01 PM)  Is the patient having any side effects they believe may be caused by any medication additions or changes?: No (3/24/2025  5:01 PM)  Does the patient have all medications ordered at discharge?: Yes (3/24/2025  5:01 PM)  Care Management Interventions: Provided patient education (3/24/2025  5:01 PM)  Prescription  Comments: Start: acetaminophen; apixaban (Eliquis)  Stop: dexamethasone; meloxicam; olanzapine; sildenafil; albuterol inhaler (3/24/2025  5:01 PM)  Is the patient taking all medications as directed (includes completed medication regime)?: Yes (3/24/2025  5:01 PM)  Care Management Interventions: Provided patient education (3/24/2025  5:01 PM)  Medication Comments: Reviewed medications with patient. No questions related to new medications. (3/24/2025  5:01 PM)    Appointments  Does the patient have a primary care provider?: Yes (3/24/2025  5:01 PM)  Care Management Interventions: Educated patient on importance of making appointment (3/24/2025  5:01 PM)  Has the patient kept scheduled appointments due by today?: Yes (3/24/2025  5:01 PM)    Self Management  Has home health visited the patient within 72 hours of discharge?: Not applicable (3/24/2025  5:01 PM)    Patient Teaching  Does the patient have access to their discharge instructions?: Yes (3/24/2025  5:01 PM)  Care Management Interventions: Reviewed instructions with patient (3/24/2025  5:01 PM)  What is the patient's perception of their health status since discharge?: Improving (3/24/2025  5:01 PM)  Is the patient/caregiver able to teach back the hierarchy of who to call/visit for symptoms/problems? PCP, Specialist, Home Health nurse, Urgent Care, ED, 911: Yes (3/24/2025  5:01 PM)

## 2025-03-25 ENCOUNTER — TELEPHONE (OUTPATIENT)
Dept: PRIMARY CARE | Facility: CLINIC | Age: 75
End: 2025-03-25
Payer: MEDICARE

## 2025-03-26 DIAGNOSIS — E11.9 TYPE 2 DIABETES MELLITUS WITHOUT COMPLICATIONS: ICD-10-CM

## 2025-03-26 RX ORDER — METFORMIN HYDROCHLORIDE 500 MG/1
1000 TABLET, EXTENDED RELEASE ORAL 2 TIMES DAILY
Qty: 360 TABLET | Refills: 3 | Status: SHIPPED | OUTPATIENT
Start: 2025-03-26

## 2025-03-27 ENCOUNTER — INFUSION (OUTPATIENT)
Dept: HEMATOLOGY/ONCOLOGY | Facility: CLINIC | Age: 75
End: 2025-03-27
Payer: MEDICARE

## 2025-03-27 VITALS
DIASTOLIC BLOOD PRESSURE: 72 MMHG | WEIGHT: 187 LBS | OXYGEN SATURATION: 100 % | HEIGHT: 72 IN | SYSTOLIC BLOOD PRESSURE: 111 MMHG | RESPIRATION RATE: 16 BRPM | TEMPERATURE: 97.2 F | HEART RATE: 87 BPM | BODY MASS INDEX: 25.33 KG/M2

## 2025-03-27 DIAGNOSIS — C80.1 ADENOCARCINOMA (MULTI): ICD-10-CM

## 2025-03-27 DIAGNOSIS — E83.42 HYPOMAGNESEMIA: ICD-10-CM

## 2025-03-27 LAB — MAGNESIUM SERPL-MCNC: 1.13 MG/DL (ref 1.6–2.4)

## 2025-03-27 PROCEDURE — 2500000004 HC RX 250 GENERAL PHARMACY W/ HCPCS (ALT 636 FOR OP/ED): Performed by: STUDENT IN AN ORGANIZED HEALTH CARE EDUCATION/TRAINING PROGRAM

## 2025-03-27 PROCEDURE — 2500000004 HC RX 250 GENERAL PHARMACY W/ HCPCS (ALT 636 FOR OP/ED): Performed by: NURSE PRACTITIONER

## 2025-03-27 PROCEDURE — 83735 ASSAY OF MAGNESIUM: CPT

## 2025-03-27 PROCEDURE — 96365 THER/PROPH/DIAG IV INF INIT: CPT | Mod: INF

## 2025-03-27 RX ORDER — FAMOTIDINE 10 MG/ML
20 INJECTION, SOLUTION INTRAVENOUS ONCE AS NEEDED
Status: DISCONTINUED | OUTPATIENT
Start: 2025-03-27 | End: 2025-03-27 | Stop reason: HOSPADM

## 2025-03-27 RX ORDER — HEPARIN 100 UNIT/ML
500 SYRINGE INTRAVENOUS AS NEEDED
OUTPATIENT
Start: 2025-03-27

## 2025-03-27 RX ORDER — ALBUTEROL SULFATE 0.83 MG/ML
3 SOLUTION RESPIRATORY (INHALATION) AS NEEDED
OUTPATIENT
Start: 2025-03-28

## 2025-03-27 RX ORDER — EPINEPHRINE 0.3 MG/.3ML
0.3 INJECTION SUBCUTANEOUS EVERY 5 MIN PRN
Status: DISCONTINUED | OUTPATIENT
Start: 2025-03-27 | End: 2025-03-27 | Stop reason: HOSPADM

## 2025-03-27 RX ORDER — DIPHENHYDRAMINE HYDROCHLORIDE 50 MG/ML
50 INJECTION, SOLUTION INTRAMUSCULAR; INTRAVENOUS AS NEEDED
OUTPATIENT
Start: 2025-03-28

## 2025-03-27 RX ORDER — ALBUTEROL SULFATE 0.83 MG/ML
3 SOLUTION RESPIRATORY (INHALATION) AS NEEDED
Status: DISCONTINUED | OUTPATIENT
Start: 2025-03-27 | End: 2025-03-27 | Stop reason: HOSPADM

## 2025-03-27 RX ORDER — MAGNESIUM SULFATE HEPTAHYDRATE 40 MG/ML
2 INJECTION, SOLUTION INTRAVENOUS ONCE
Status: COMPLETED | OUTPATIENT
Start: 2025-03-27 | End: 2025-03-27

## 2025-03-27 RX ORDER — DIPHENHYDRAMINE HYDROCHLORIDE 50 MG/ML
50 INJECTION, SOLUTION INTRAMUSCULAR; INTRAVENOUS AS NEEDED
Status: DISCONTINUED | OUTPATIENT
Start: 2025-03-27 | End: 2025-03-27 | Stop reason: HOSPADM

## 2025-03-27 RX ORDER — HEPARIN SODIUM,PORCINE/PF 10 UNIT/ML
50 SYRINGE (ML) INTRAVENOUS AS NEEDED
OUTPATIENT
Start: 2025-03-27

## 2025-03-27 RX ORDER — HEPARIN 100 UNIT/ML
500 SYRINGE INTRAVENOUS AS NEEDED
Status: DISCONTINUED | OUTPATIENT
Start: 2025-03-27 | End: 2025-03-27 | Stop reason: HOSPADM

## 2025-03-27 RX ORDER — EPINEPHRINE 0.3 MG/.3ML
0.3 INJECTION SUBCUTANEOUS EVERY 5 MIN PRN
OUTPATIENT
Start: 2025-03-28

## 2025-03-27 RX ORDER — FAMOTIDINE 10 MG/ML
20 INJECTION, SOLUTION INTRAVENOUS ONCE AS NEEDED
OUTPATIENT
Start: 2025-03-28

## 2025-03-27 RX ADMIN — MAGNESIUM SULFATE IN WATER 2 G: 40 INJECTION, SOLUTION INTRAVENOUS at 09:37

## 2025-03-27 RX ADMIN — Medication 500 UNITS: at 10:39

## 2025-03-27 ASSESSMENT — PAIN SCALES - GENERAL: PAINLEVEL_OUTOF10: 0-NO PAIN

## 2025-03-27 NOTE — PROGRESS NOTES
Pt here for magnesium, 2g given. Tolerated well. He is aware of plan of care, will call with further questions. Will return next week for next infusion

## 2025-03-28 RX ORDER — MAGNESIUM SULFATE HEPTAHYDRATE 40 MG/ML
2 INJECTION, SOLUTION INTRAVENOUS ONCE
OUTPATIENT
Start: 2025-03-28

## 2025-03-28 RX ORDER — MAGNESIUM SULFATE HEPTAHYDRATE 40 MG/ML
4 INJECTION, SOLUTION INTRAVENOUS ONCE
OUTPATIENT
Start: 2025-03-28

## 2025-03-31 ENCOUNTER — APPOINTMENT (OUTPATIENT)
Dept: PRIMARY CARE | Facility: CLINIC | Age: 75
End: 2025-03-31
Payer: COMMERCIAL

## 2025-03-31 ENCOUNTER — INFUSION (OUTPATIENT)
Dept: HEMATOLOGY/ONCOLOGY | Facility: CLINIC | Age: 75
End: 2025-03-31
Payer: MEDICARE

## 2025-03-31 VITALS
RESPIRATION RATE: 16 BRPM | DIASTOLIC BLOOD PRESSURE: 71 MMHG | TEMPERATURE: 97.5 F | SYSTOLIC BLOOD PRESSURE: 114 MMHG | HEIGHT: 72 IN | WEIGHT: 190.1 LBS | BODY MASS INDEX: 25.75 KG/M2 | HEART RATE: 96 BPM | OXYGEN SATURATION: 100 %

## 2025-03-31 DIAGNOSIS — E83.42 HYPOMAGNESEMIA: ICD-10-CM

## 2025-03-31 DIAGNOSIS — J91.0 MALIGNANT PLEURAL EFFUSION (CMS-HCC): ICD-10-CM

## 2025-03-31 DIAGNOSIS — E87.6 HYPOKALEMIA: Primary | ICD-10-CM

## 2025-03-31 DIAGNOSIS — J91.0 MALIGNANT PLEURAL EFFUSION (CMS-HCC): Primary | ICD-10-CM

## 2025-03-31 DIAGNOSIS — C80.1 ADENOCARCINOMA (MULTI): ICD-10-CM

## 2025-03-31 DIAGNOSIS — D69.6 THROMBOCYTOPENIA (CMS-HCC): ICD-10-CM

## 2025-03-31 DIAGNOSIS — D69.6 THROMBOCYTOPENIA (CMS-HCC): Primary | ICD-10-CM

## 2025-03-31 LAB
ALBUMIN SERPL BCP-MCNC: 2.8 G/DL (ref 3.4–5)
ALP SERPL-CCNC: 89 U/L (ref 33–136)
ALT SERPL W P-5'-P-CCNC: 36 U/L (ref 10–52)
ANION GAP SERPL CALC-SCNC: 8 MMOL/L (ref 10–20)
AST SERPL W P-5'-P-CCNC: 26 U/L (ref 9–39)
BASOPHILS # BLD AUTO: 0.02 X10*3/UL (ref 0–0.1)
BASOPHILS NFR BLD AUTO: 0.6 %
BILIRUB SERPL-MCNC: 0.2 MG/DL (ref 0–1.2)
BUN SERPL-MCNC: 8 MG/DL (ref 6–23)
CALCIUM SERPL-MCNC: 7.8 MG/DL (ref 8.6–10.3)
CHLORIDE SERPL-SCNC: 105 MMOL/L (ref 98–107)
CO2 SERPL-SCNC: 26 MMOL/L (ref 21–32)
CREAT SERPL-MCNC: 0.59 MG/DL (ref 0.5–1.3)
EGFRCR SERPLBLD CKD-EPI 2021: >90 ML/MIN/1.73M*2
EOSINOPHIL # BLD AUTO: 0.21 X10*3/UL (ref 0–0.4)
EOSINOPHIL NFR BLD AUTO: 6 %
ERYTHROCYTE [DISTWIDTH] IN BLOOD BY AUTOMATED COUNT: 18.9 % (ref 11.5–14.5)
GLUCOSE SERPL-MCNC: 163 MG/DL (ref 74–99)
HCT VFR BLD AUTO: 29.7 % (ref 41–52)
HGB BLD-MCNC: 9.1 G/DL (ref 13.5–17.5)
IMM GRANULOCYTES # BLD AUTO: 0.08 X10*3/UL (ref 0–0.5)
IMM GRANULOCYTES NFR BLD AUTO: 2.3 % (ref 0–0.9)
LYMPHOCYTES # BLD AUTO: 1.22 X10*3/UL (ref 0.8–3)
LYMPHOCYTES NFR BLD AUTO: 35.1 %
MAGNESIUM SERPL-MCNC: 1.01 MG/DL (ref 1.6–2.4)
MCH RBC QN AUTO: 28 PG (ref 26–34)
MCHC RBC AUTO-ENTMCNC: 30.6 G/DL (ref 32–36)
MCV RBC AUTO: 91 FL (ref 80–100)
MONOCYTES # BLD AUTO: 0.48 X10*3/UL (ref 0.05–0.8)
MONOCYTES NFR BLD AUTO: 13.8 %
NEUTROPHILS # BLD AUTO: 1.47 X10*3/UL (ref 1.6–5.5)
NEUTROPHILS NFR BLD AUTO: 42.2 %
PLATELET # BLD AUTO: 209 X10*3/UL (ref 150–450)
POTASSIUM SERPL-SCNC: 3.2 MMOL/L (ref 3.5–5.3)
PROT SERPL-MCNC: 5.1 G/DL (ref 6.4–8.2)
RBC # BLD AUTO: 3.25 X10*6/UL (ref 4.5–5.9)
SODIUM SERPL-SCNC: 136 MMOL/L (ref 136–145)
WBC # BLD AUTO: 3.5 X10*3/UL (ref 4.4–11.3)

## 2025-03-31 PROCEDURE — 2500000004 HC RX 250 GENERAL PHARMACY W/ HCPCS (ALT 636 FOR OP/ED): Performed by: STUDENT IN AN ORGANIZED HEALTH CARE EDUCATION/TRAINING PROGRAM

## 2025-03-31 PROCEDURE — 83735 ASSAY OF MAGNESIUM: CPT

## 2025-03-31 PROCEDURE — 2500000004 HC RX 250 GENERAL PHARMACY W/ HCPCS (ALT 636 FOR OP/ED): Performed by: NURSE PRACTITIONER

## 2025-03-31 PROCEDURE — 84075 ASSAY ALKALINE PHOSPHATASE: CPT

## 2025-03-31 PROCEDURE — 96365 THER/PROPH/DIAG IV INF INIT: CPT | Mod: INF

## 2025-03-31 PROCEDURE — 85025 COMPLETE CBC W/AUTO DIFF WBC: CPT

## 2025-03-31 RX ORDER — FAMOTIDINE 10 MG/ML
20 INJECTION, SOLUTION INTRAVENOUS ONCE AS NEEDED
Status: CANCELLED | OUTPATIENT
Start: 2025-04-04

## 2025-03-31 RX ORDER — MAGNESIUM SULFATE HEPTAHYDRATE 40 MG/ML
2 INJECTION, SOLUTION INTRAVENOUS ONCE
Status: COMPLETED | OUTPATIENT
Start: 2025-03-31 | End: 2025-03-31

## 2025-03-31 RX ORDER — MAGNESIUM SULFATE HEPTAHYDRATE 40 MG/ML
4 INJECTION, SOLUTION INTRAVENOUS ONCE
Status: CANCELLED | OUTPATIENT
Start: 2025-04-04 | End: 2025-04-04

## 2025-03-31 RX ORDER — ALBUTEROL SULFATE 0.83 MG/ML
3 SOLUTION RESPIRATORY (INHALATION) AS NEEDED
Status: CANCELLED | OUTPATIENT
Start: 2025-04-04

## 2025-03-31 RX ORDER — HEPARIN 100 UNIT/ML
500 SYRINGE INTRAVENOUS AS NEEDED
Status: DISCONTINUED | OUTPATIENT
Start: 2025-03-31 | End: 2025-03-31 | Stop reason: HOSPADM

## 2025-03-31 RX ORDER — POTASSIUM CHLORIDE 20 MEQ/1
20 TABLET, EXTENDED RELEASE ORAL DAILY
Qty: 30 TABLET | Refills: 3 | Status: SHIPPED | OUTPATIENT
Start: 2025-03-31 | End: 2025-07-29

## 2025-03-31 RX ORDER — EPINEPHRINE 0.3 MG/.3ML
0.3 INJECTION SUBCUTANEOUS EVERY 5 MIN PRN
Status: CANCELLED | OUTPATIENT
Start: 2025-04-04

## 2025-03-31 RX ORDER — HEPARIN 100 UNIT/ML
500 SYRINGE INTRAVENOUS AS NEEDED
Status: CANCELLED | OUTPATIENT
Start: 2025-03-31

## 2025-03-31 RX ORDER — DIPHENHYDRAMINE HYDROCHLORIDE 50 MG/ML
50 INJECTION, SOLUTION INTRAMUSCULAR; INTRAVENOUS AS NEEDED
Status: DISCONTINUED | OUTPATIENT
Start: 2025-03-31 | End: 2025-03-31 | Stop reason: HOSPADM

## 2025-03-31 RX ORDER — FAMOTIDINE 10 MG/ML
20 INJECTION, SOLUTION INTRAVENOUS ONCE AS NEEDED
Status: DISCONTINUED | OUTPATIENT
Start: 2025-03-31 | End: 2025-03-31 | Stop reason: HOSPADM

## 2025-03-31 RX ORDER — MAGNESIUM SULFATE HEPTAHYDRATE 40 MG/ML
2 INJECTION, SOLUTION INTRAVENOUS ONCE
Status: CANCELLED | OUTPATIENT
Start: 2025-04-04 | End: 2025-04-04

## 2025-03-31 RX ORDER — ALBUTEROL SULFATE 0.83 MG/ML
3 SOLUTION RESPIRATORY (INHALATION) AS NEEDED
Status: DISCONTINUED | OUTPATIENT
Start: 2025-03-31 | End: 2025-03-31 | Stop reason: HOSPADM

## 2025-03-31 RX ORDER — HEPARIN SODIUM,PORCINE/PF 10 UNIT/ML
50 SYRINGE (ML) INTRAVENOUS AS NEEDED
Status: CANCELLED | OUTPATIENT
Start: 2025-03-31

## 2025-03-31 RX ORDER — DIPHENHYDRAMINE HYDROCHLORIDE 50 MG/ML
50 INJECTION, SOLUTION INTRAMUSCULAR; INTRAVENOUS AS NEEDED
Status: CANCELLED | OUTPATIENT
Start: 2025-04-04

## 2025-03-31 RX ORDER — EPINEPHRINE 0.3 MG/.3ML
0.3 INJECTION SUBCUTANEOUS EVERY 5 MIN PRN
Status: DISCONTINUED | OUTPATIENT
Start: 2025-03-31 | End: 2025-03-31 | Stop reason: HOSPADM

## 2025-03-31 RX ADMIN — MAGNESIUM SULFATE IN WATER 2 G: 40 INJECTION, SOLUTION INTRAVENOUS at 09:34

## 2025-03-31 RX ADMIN — Medication 500 UNITS: at 10:36

## 2025-03-31 ASSESSMENT — PAIN SCALES - GENERAL: PAINLEVEL_OUTOF10: 0-NO PAIN

## 2025-03-31 NOTE — PROGRESS NOTES
Pt here for mag infusion, received 2g for mag of 1.01. Tolerated infusion well. He is aware of plan of care, will call with further questions or concerns. Will return Friday for mag infusion, labs drawn today for possible start of tx 4/2

## 2025-04-01 ENCOUNTER — OFFICE VISIT (OUTPATIENT)
Dept: PRIMARY CARE | Facility: CLINIC | Age: 75
End: 2025-04-01
Payer: MEDICARE

## 2025-04-01 VITALS
OXYGEN SATURATION: 98 % | HEART RATE: 96 BPM | WEIGHT: 187 LBS | DIASTOLIC BLOOD PRESSURE: 81 MMHG | HEIGHT: 72 IN | SYSTOLIC BLOOD PRESSURE: 129 MMHG | BODY MASS INDEX: 25.33 KG/M2

## 2025-04-01 DIAGNOSIS — E11.69 TYPE 2 DIABETES MELLITUS WITH OTHER SPECIFIED COMPLICATION, WITHOUT LONG-TERM CURRENT USE OF INSULIN: ICD-10-CM

## 2025-04-01 DIAGNOSIS — Z86.711 HISTORY OF PULMONARY EMBOLUS (PE): ICD-10-CM

## 2025-04-01 DIAGNOSIS — I10 BENIGN ESSENTIAL HYPERTENSION: ICD-10-CM

## 2025-04-01 DIAGNOSIS — Z09 HOSPITAL DISCHARGE FOLLOW-UP: Primary | ICD-10-CM

## 2025-04-01 PROCEDURE — 1159F MED LIST DOCD IN RCRD: CPT | Performed by: NURSE PRACTITIONER

## 2025-04-01 PROCEDURE — 99495 TRANSJ CARE MGMT MOD F2F 14D: CPT | Performed by: NURSE PRACTITIONER

## 2025-04-01 PROCEDURE — 3008F BODY MASS INDEX DOCD: CPT | Performed by: NURSE PRACTITIONER

## 2025-04-01 PROCEDURE — 1157F ADVNC CARE PLAN IN RCRD: CPT | Performed by: NURSE PRACTITIONER

## 2025-04-01 PROCEDURE — 3074F SYST BP LT 130 MM HG: CPT | Performed by: NURSE PRACTITIONER

## 2025-04-01 PROCEDURE — 3061F NEG MICROALBUMINURIA REV: CPT | Performed by: NURSE PRACTITIONER

## 2025-04-01 PROCEDURE — 3048F LDL-C <100 MG/DL: CPT | Performed by: NURSE PRACTITIONER

## 2025-04-01 PROCEDURE — 4010F ACE/ARB THERAPY RXD/TAKEN: CPT | Performed by: NURSE PRACTITIONER

## 2025-04-01 PROCEDURE — 1111F DSCHRG MED/CURRENT MED MERGE: CPT | Performed by: NURSE PRACTITIONER

## 2025-04-01 PROCEDURE — 1160F RVW MEDS BY RX/DR IN RCRD: CPT | Performed by: NURSE PRACTITIONER

## 2025-04-01 PROCEDURE — 3079F DIAST BP 80-89 MM HG: CPT | Performed by: NURSE PRACTITIONER

## 2025-04-01 PROCEDURE — 3052F HG A1C>EQUAL 8.0%<EQUAL 9.0%: CPT | Performed by: NURSE PRACTITIONER

## 2025-04-01 PROCEDURE — 1036F TOBACCO NON-USER: CPT | Performed by: NURSE PRACTITIONER

## 2025-04-01 ASSESSMENT — ENCOUNTER SYMPTOMS
OCCASIONAL FEELINGS OF UNSTEADINESS: 0
DEPRESSION: 0
LOSS OF SENSATION IN FEET: 0

## 2025-04-01 NOTE — PATIENT INSTRUCTIONS
Continue taking all current medications as prescribed and keep your predetermined office visit on 9/5/2025.

## 2025-04-01 NOTE — PROGRESS NOTES
"Patient: Jayant Holland  : 1950  PCP: Jorge Luis Oconnor, APRN-CNP, DNP  MRN: [id number]  Program: Transitional Care Management  Status: Enrolled  Effective Dates: 3/24/2025 - present  Responsible Staff: Jil Dobbs RN  Social Drivers to be Addressed: No information to display         Jayant Holland is a 74 y.o. male presenting today for follow-up after being discharged from the hospital 10 days ago. The main problem requiring admission was acute pulmonary embolism; acute left lower extremity deep vein thrombosis; syncopal event suspected related to hypotension without recurrence; dehydration and non-small cell lung cancer with mets . The discharge summary and/or Transitional Care Management documentation was reviewed. Medication reconciliation was performed as indicated via the \"Huy as Reviewed\" timestamp.     Jayant Holland was contacted by Transitional Care Management services two days after his discharge. This encounter and supporting documentation was reviewed.    Review of Systems   All other systems reviewed and are negative.      /81 (BP Location: Right arm, Patient Position: Sitting)   Pulse 96   Ht 1.829 m (6')   Wt 84.8 kg (187 lb)   SpO2 98%   BMI 25.36 kg/m²     Physical Exam  Vitals reviewed.   Constitutional:       Appearance: Normal appearance.   HENT:      Head: Normocephalic and atraumatic.      Right Ear: External ear normal.      Left Ear: External ear normal.      Nose: Nose normal.      Mouth/Throat:      Mouth: Mucous membranes are moist.   Cardiovascular:      Rate and Rhythm: Normal rate and regular rhythm.      Pulses: Normal pulses.      Heart sounds: Normal heart sounds.   Pulmonary:      Effort: Pulmonary effort is normal.      Breath sounds: Normal breath sounds.   Abdominal:      General: Abdomen is flat. Bowel sounds are normal.      Palpations: Abdomen is soft.   Musculoskeletal:      Cervical back: Neck supple.   Skin:     General: Skin is warm and dry.   Neurological:     "  General: No focal deficit present.      Mental Status: He is alert and oriented to person, place, and time.   Psychiatric:         Mood and Affect: Mood normal.         Behavior: Behavior normal.         Thought Content: Thought content normal.         Judgment: Judgment normal.         The complexity of medical decision making for this patient's transitional care is moderate.    Assessment/Plan   Problem List Items Addressed This Visit             ICD-10-CM    Benign essential hypertension I10    Relevant Orders    Follow Up In [facility] - PCP - Established    Diabetes mellitus (Multi) E11.9    Relevant Orders    Follow Up In [facility] - PCP - Established     Other Visit Diagnoses         Codes    Hospital discharge follow-up    -  Primary Z09    History of pulmonary embolus (PE)     Z86.711

## 2025-04-02 ENCOUNTER — OFFICE VISIT (OUTPATIENT)
Dept: HEMATOLOGY/ONCOLOGY | Facility: CLINIC | Age: 75
End: 2025-04-02
Payer: MEDICARE

## 2025-04-02 ENCOUNTER — INFUSION (OUTPATIENT)
Dept: HEMATOLOGY/ONCOLOGY | Facility: CLINIC | Age: 75
End: 2025-04-02
Payer: MEDICARE

## 2025-04-02 VITALS
OXYGEN SATURATION: 95 % | SYSTOLIC BLOOD PRESSURE: 132 MMHG | WEIGHT: 191.58 LBS | BODY MASS INDEX: 25.98 KG/M2 | HEART RATE: 69 BPM | TEMPERATURE: 97.2 F | DIASTOLIC BLOOD PRESSURE: 84 MMHG | RESPIRATION RATE: 18 BRPM

## 2025-04-02 DIAGNOSIS — C80.1 ADENOCARCINOMA (MULTI): Primary | ICD-10-CM

## 2025-04-02 DIAGNOSIS — E83.42 HYPOMAGNESEMIA: ICD-10-CM

## 2025-04-02 DIAGNOSIS — C80.1 ADENOCARCINOMA (MULTI): ICD-10-CM

## 2025-04-02 PROCEDURE — 96367 TX/PROPH/DG ADDL SEQ IV INF: CPT

## 2025-04-02 PROCEDURE — 3079F DIAST BP 80-89 MM HG: CPT | Performed by: NURSE PRACTITIONER

## 2025-04-02 PROCEDURE — 2500000004 HC RX 250 GENERAL PHARMACY W/ HCPCS (ALT 636 FOR OP/ED): Performed by: NURSE PRACTITIONER

## 2025-04-02 PROCEDURE — 1036F TOBACCO NON-USER: CPT | Performed by: NURSE PRACTITIONER

## 2025-04-02 PROCEDURE — 1126F AMNT PAIN NOTED NONE PRSNT: CPT | Performed by: NURSE PRACTITIONER

## 2025-04-02 PROCEDURE — 3048F LDL-C <100 MG/DL: CPT | Performed by: NURSE PRACTITIONER

## 2025-04-02 PROCEDURE — 99213 OFFICE O/P EST LOW 20 MIN: CPT | Performed by: NURSE PRACTITIONER

## 2025-04-02 PROCEDURE — 96417 CHEMO IV INFUS EACH ADDL SEQ: CPT

## 2025-04-02 PROCEDURE — 2500000004 HC RX 250 GENERAL PHARMACY W/ HCPCS (ALT 636 FOR OP/ED): Performed by: STUDENT IN AN ORGANIZED HEALTH CARE EDUCATION/TRAINING PROGRAM

## 2025-04-02 PROCEDURE — 1159F MED LIST DOCD IN RCRD: CPT | Performed by: NURSE PRACTITIONER

## 2025-04-02 PROCEDURE — 1157F ADVNC CARE PLAN IN RCRD: CPT | Performed by: NURSE PRACTITIONER

## 2025-04-02 PROCEDURE — 96409 CHEMO IV PUSH SNGL DRUG: CPT

## 2025-04-02 PROCEDURE — 1111F DSCHRG MED/CURRENT MED MERGE: CPT | Performed by: NURSE PRACTITIONER

## 2025-04-02 PROCEDURE — 96415 CHEMO IV INFUSION ADDL HR: CPT

## 2025-04-02 PROCEDURE — 96413 CHEMO IV INFUSION 1 HR: CPT

## 2025-04-02 PROCEDURE — 96375 TX/PRO/DX INJ NEW DRUG ADDON: CPT | Mod: INF

## 2025-04-02 PROCEDURE — 3052F HG A1C>EQUAL 8.0%<EQUAL 9.0%: CPT | Performed by: NURSE PRACTITIONER

## 2025-04-02 PROCEDURE — 3061F NEG MICROALBUMINURIA REV: CPT | Performed by: NURSE PRACTITIONER

## 2025-04-02 PROCEDURE — 96372 THER/PROPH/DIAG INJ SC/IM: CPT

## 2025-04-02 PROCEDURE — 3075F SYST BP GE 130 - 139MM HG: CPT | Performed by: NURSE PRACTITIONER

## 2025-04-02 PROCEDURE — 4010F ACE/ARB THERAPY RXD/TAKEN: CPT | Performed by: NURSE PRACTITIONER

## 2025-04-02 PROCEDURE — 2500000001 HC RX 250 WO HCPCS SELF ADMINISTERED DRUGS (ALT 637 FOR MEDICARE OP): Performed by: NURSE PRACTITIONER

## 2025-04-02 RX ORDER — CYANOCOBALAMIN 1000 UG/ML
1000 INJECTION, SOLUTION INTRAMUSCULAR; SUBCUTANEOUS ONCE
Status: CANCELLED | OUTPATIENT
Start: 2025-04-02

## 2025-04-02 RX ORDER — HEPARIN 100 UNIT/ML
500 SYRINGE INTRAVENOUS AS NEEDED
Status: CANCELLED | OUTPATIENT
Start: 2025-04-02

## 2025-04-02 RX ORDER — DIPHENHYDRAMINE HYDROCHLORIDE 50 MG/ML
50 INJECTION, SOLUTION INTRAMUSCULAR; INTRAVENOUS AS NEEDED
Status: CANCELLED | OUTPATIENT
Start: 2025-04-02

## 2025-04-02 RX ORDER — FAMOTIDINE 10 MG/ML
20 INJECTION, SOLUTION INTRAVENOUS ONCE AS NEEDED
Status: DISCONTINUED | OUTPATIENT
Start: 2025-04-02 | End: 2025-04-02 | Stop reason: HOSPADM

## 2025-04-02 RX ORDER — DIPHENHYDRAMINE HCL 50 MG
50 CAPSULE ORAL ONCE
Status: COMPLETED | OUTPATIENT
Start: 2025-04-02 | End: 2025-04-02

## 2025-04-02 RX ORDER — PROCHLORPERAZINE EDISYLATE 5 MG/ML
10 INJECTION INTRAMUSCULAR; INTRAVENOUS EVERY 6 HOURS PRN
Status: CANCELLED | OUTPATIENT
Start: 2025-04-02

## 2025-04-02 RX ORDER — PALONOSETRON 0.05 MG/ML
0.25 INJECTION, SOLUTION INTRAVENOUS ONCE
Status: COMPLETED | OUTPATIENT
Start: 2025-04-02 | End: 2025-04-02

## 2025-04-02 RX ORDER — DIPHENHYDRAMINE HYDROCHLORIDE 50 MG/ML
50 INJECTION, SOLUTION INTRAMUSCULAR; INTRAVENOUS AS NEEDED
Status: DISCONTINUED | OUTPATIENT
Start: 2025-04-02 | End: 2025-04-02 | Stop reason: HOSPADM

## 2025-04-02 RX ORDER — ALBUTEROL SULFATE 0.83 MG/ML
3 SOLUTION RESPIRATORY (INHALATION) AS NEEDED
Status: CANCELLED | OUTPATIENT
Start: 2025-04-02

## 2025-04-02 RX ORDER — PROCHLORPERAZINE MALEATE 10 MG
10 TABLET ORAL EVERY 6 HOURS PRN
Status: DISCONTINUED | OUTPATIENT
Start: 2025-04-02 | End: 2025-04-02 | Stop reason: HOSPADM

## 2025-04-02 RX ORDER — ACETAMINOPHEN 325 MG/1
650 TABLET ORAL ONCE
Status: CANCELLED | OUTPATIENT
Start: 2025-04-02

## 2025-04-02 RX ORDER — ACETAMINOPHEN 325 MG/1
650 TABLET ORAL ONCE
Status: COMPLETED | OUTPATIENT
Start: 2025-04-02 | End: 2025-04-02

## 2025-04-02 RX ORDER — DIPHENHYDRAMINE HCL 50 MG
50 CAPSULE ORAL ONCE
Status: CANCELLED | OUTPATIENT
Start: 2025-04-02

## 2025-04-02 RX ORDER — PALONOSETRON 0.05 MG/ML
0.25 INJECTION, SOLUTION INTRAVENOUS ONCE
Status: CANCELLED | OUTPATIENT
Start: 2025-04-02

## 2025-04-02 RX ORDER — ALBUTEROL SULFATE 0.83 MG/ML
3 SOLUTION RESPIRATORY (INHALATION) AS NEEDED
Status: DISCONTINUED | OUTPATIENT
Start: 2025-04-02 | End: 2025-04-02 | Stop reason: HOSPADM

## 2025-04-02 RX ORDER — HEPARIN SODIUM,PORCINE/PF 10 UNIT/ML
50 SYRINGE (ML) INTRAVENOUS AS NEEDED
Status: CANCELLED | OUTPATIENT
Start: 2025-04-02

## 2025-04-02 RX ORDER — EPINEPHRINE 0.3 MG/.3ML
0.3 INJECTION SUBCUTANEOUS EVERY 5 MIN PRN
Status: CANCELLED | OUTPATIENT
Start: 2025-04-02

## 2025-04-02 RX ORDER — EPINEPHRINE 0.3 MG/.3ML
0.3 INJECTION SUBCUTANEOUS EVERY 5 MIN PRN
Status: DISCONTINUED | OUTPATIENT
Start: 2025-04-02 | End: 2025-04-02 | Stop reason: HOSPADM

## 2025-04-02 RX ORDER — PROCHLORPERAZINE MALEATE 10 MG
10 TABLET ORAL EVERY 6 HOURS PRN
Status: CANCELLED | OUTPATIENT
Start: 2025-04-02

## 2025-04-02 RX ORDER — CYANOCOBALAMIN 1000 UG/ML
1000 INJECTION, SOLUTION INTRAMUSCULAR; SUBCUTANEOUS ONCE
Status: COMPLETED | OUTPATIENT
Start: 2025-04-02 | End: 2025-04-02

## 2025-04-02 RX ORDER — FAMOTIDINE 10 MG/ML
20 INJECTION, SOLUTION INTRAVENOUS ONCE AS NEEDED
Status: CANCELLED | OUTPATIENT
Start: 2025-04-02

## 2025-04-02 RX ORDER — PROCHLORPERAZINE EDISYLATE 5 MG/ML
10 INJECTION INTRAMUSCULAR; INTRAVENOUS EVERY 6 HOURS PRN
Status: DISCONTINUED | OUTPATIENT
Start: 2025-04-02 | End: 2025-04-02 | Stop reason: HOSPADM

## 2025-04-02 RX ORDER — HEPARIN 100 UNIT/ML
500 SYRINGE INTRAVENOUS AS NEEDED
Status: DISCONTINUED | OUTPATIENT
Start: 2025-04-02 | End: 2025-04-02 | Stop reason: HOSPADM

## 2025-04-02 RX ADMIN — FOSAPREPITANT 150 MG: 150 INJECTION, POWDER, LYOPHILIZED, FOR SOLUTION INTRAVENOUS at 11:11

## 2025-04-02 RX ADMIN — DIPHENHYDRAMINE HYDROCHLORIDE 50 MG: 50 CAPSULE ORAL at 12:16

## 2025-04-02 RX ADMIN — ACETAMINOPHEN 650 MG: 325 TABLET ORAL at 12:16

## 2025-04-02 RX ADMIN — HEPARIN 500 UNITS: 100 SYRINGE at 15:07

## 2025-04-02 RX ADMIN — AMIVANTAMAB 2100 MG: 350 INJECTION INTRAVENOUS at 12:49

## 2025-04-02 RX ADMIN — PALONOSETRON HYDROCHLORIDE 0.25 MG: 0.25 INJECTION INTRAVENOUS at 10:46

## 2025-04-02 RX ADMIN — PEMETREXED DISODIUM 850 MG: 500 INJECTION, POWDER, LYOPHILIZED, FOR SOLUTION INTRAVENOUS at 11:52

## 2025-04-02 RX ADMIN — CARBOPLATIN 670 MG: 600 INJECTION, SOLUTION INTRAVENOUS at 12:10

## 2025-04-02 RX ADMIN — DEXAMETHASONE SODIUM PHOSPHATE 12 MG: 10 INJECTION, SOLUTION INTRAMUSCULAR; INTRAVENOUS at 10:48

## 2025-04-02 RX ADMIN — CYANOCOBALAMIN 1000 MCG: 1000 INJECTION, SOLUTION INTRAMUSCULAR at 10:49

## 2025-04-02 ASSESSMENT — ENCOUNTER SYMPTOMS
LOSS OF SENSATION IN FEET: 0
OCCASIONAL FEELINGS OF UNSTEADINESS: 0
DEPRESSION: 0

## 2025-04-02 ASSESSMENT — PAIN SCALES - GENERAL
PAINLEVEL_OUTOF10: 0 - NO PAIN
PAINLEVEL_OUTOF10: 0-NO PAIN

## 2025-04-02 NOTE — PROGRESS NOTES
Patient here today for follow up. Recent admission 3/20 - 3/23 admission for syncope, found to have RML PE and was discharged on Eliquis.     Fatigue: no worse, able to perform ADLs and IADLs  PO intake: adequate  Weight: stable   N/V/D/C: denies    Medications and pharmacy preference reviewed with patient.

## 2025-04-02 NOTE — PROGRESS NOTES
[facility] - Medical Oncology Follow-Up Visit  Patient ID: Jayant Holland is a 74 y.o. male with NSCLC adenocarcinoma     Current therapy: cyanocobalamin (Vitamin B-12) injection 1,000 mcg, 1,000 mcg, intramuscular, Once, 1 of 6 cycles  Administration: 1,000 mcg (1/29/2025)    fosaprepitant (Emend) 150 mg in sodium chloride 0.9% 250 mL IV, 150 mg, intravenous, Once, 1 of 4 cycles  Administration: 150 mg (1/29/2025)    CARBOplatin (Paraplatin) 600 mg in sodium chloride 0.9% 170 mL IV, 600 mg, intravenous, Once, 1 of 4 cycles  Administration: 600 mg (1/29/2025)    amivantamab-vmjw (Rybrevant) 350 mg in sodium chloride 0.9% 250 mL IV, 350 mg, intravenous, Once, 1 of 18 cycles  Administration: 350 mg (1/29/2025), 1,400 mg (1/30/2025), 1,750 mg (2/4/2025), 1,750 mg (2/11/2025)  methylPREDNISolone sod succinate (SOLU-Medrol) 40 mg/mL injection 40 mg, 40 mg, intravenous, As needed, 1 of 18 cycles    palonosetron (Aloxi) injection 250 mcg, 250 mcg, intravenous, Once, 1 of 4 cycles  Administration: 250 mcg (1/29/2025)    PEMEtrexed disodium (Alimta) 1,100 mg in sodium chloride 0.9% 154 mL IV, 500 mg/m2 = 1,100 mg, intravenous, Once, 1 of 18 cycles  Dose modification: 400 mg/m2 (original dose 500 mg/m2, Cycle 2)  Administration: 1,100 mg (1/29/2025)       Chief Concern: follow-up and readiness to treat     Oncologic History:     DIAGNOSIS  NSCLC adenocarcinoma     STAGING  [id number]     CURRENT SITES OF DISEASE  RUL, R pleural effusion/pleural carcinomatosis, R hilar, mediastinal, retrocaval nodes      MOLECULAR GENOMICS  PD-L1 5%  EGFR [id number] ([id number])      PRIOR THERAPY      CURRENT THERAPY  Carboplatin/Pemetrexed/Amivantamab C1D1 1/29/25 -   Pemetrexed dose reduced 400mg/m2 C2 -      CURRENT ONCOLOGICAL PROBLEMS  Unintentional wt loss - stabilized  Hypomagnesia         HISTORY OF PRESENT ILLNESS  Mr. Holland is a 75 yo with PMH significant for DMII, HTN, and  EMANUEL who had a CXR done on 12/5/24 for persistent coughing after pneumonia about a month prior, which was concerning for moderate pleural effusion of the R lung. He went to the ER, where a CT chest w/contrast was done with spiculated RUL mass measuring 2.2 x 2.1 cm and large R pleural effusion. He was referred to diagnostic clinic and seen on 12/9/24 by Kirstin where he noted persistent cough for 6 weeks, constant dyspnea, wheezing, and unintentional 30 lb weight loss over the last year. He was referred to pulmonology and had thoracentesis on 12/10/24 with 980 ml removed, cytology with adenocarcinoma, PD-L1 5%, and NGS with EGFR exon 20 mutation. He had repeat thoracentesis on 12/17/24 with 2.4L removed. PET/CT 12/20/24 with FDV avidity of the RL nodule SUV max 9.2, multiple RLL avid nodules SUV max 4.1, FDG-avid pleural thickening on the right, multiple R hilar, mediastinal, subcarinal nodes, and moderate R pleural effusion. Mildly avid GGO within JANELLE SUV max 2.0. Brain MRI is rescheduled for 1/15/25.   Consented for carboplatin/pemetrexed/amivantamab to start 1/29/25.  Treatment delayed d/t hospitalization.      1/21/25 - 1/23/25 - hospitalization 2/2 recurrent pleural effusion.        PAST MEDICAL HISTORY  DMII   HTN  HLD   Osteoarthritis (neck)  asthma     SOCIAL HISTORY  Lives at home with his wife. Retired from [employer], worked as a technician for 48 years, notes hazardous chemical exposures.  Tob: never  EtOH: occasionally  Illicits: none     FAMILY HISTORY  Mother - breast cancer    HPI   Present for readiness to treat - C4. He is alone today. He was hospitalized [facility] March 20-23, 2025. Had syncopal episode at home found to have PE and left lower extremity DVT. Started on Eliquis. He denies any fevers, chills or night sweats. No cough, chest pain or shortness of breath. No nausea or vomiting. No constipation or diarrhea. No urinary symptoms. No rash. No neuropathy. Mild lower extremity edema, ankles.      Review of Systems:  A review of systems has been completed and are negative for complaints except what is stated in the HPI and/or past medical history        Meds (Current):    Current Outpatient Medications:     acetaminophen (Tylenol) 325 mg tablet, Take 2 tablets (650 mg) by mouth every 4 hours if needed for fever (temp greater than 38.0 C), headaches or mild pain (1 - 3) (greater than or equal to 38 C)., Disp: , Rfl:     apixaban (Eliquis) 5 mg tablet, Take 2 tablets (10 mg) by mouth every 12 hours for 5 days, THEN 1 tablet (5 mg) every 12 hours., Disp: 140 tablet, Rfl: 0    Blood glucose monitoring meter kit kit, 1 each if needed., Disp: , Rfl:     blood-glucose meter misc, 1 Device once daily., Disp: 1 each, Rfl: 0    chlorthalidone (Hygroton) 25 mg tablet, Take 1 tablet (25 mg) by mouth once daily., Disp: , Rfl:     clindamycin (Cleocin T) 1 % lotion, Apply topically to affected area twice daily. Do not fill before January 29, 2025., Disp: 60 mL, Rfl: 3    folic acid (Folvite) 1 mg tablet, Take 1 tablet (1,000 mcg) by mouth once daily. Do not fill before January 29, 2025., Disp: 30 tablet, Rfl: 11    hydrocortisone 1 % cream, Apply topically to face, hands, feet, neck, back, and chest once daily at bedtime for rash prevention. Do not fill before January 29, 2025., Disp: 453.6 g, Rfl: 3    lancets 30 gauge misc, 1 Device 3 times a day., Disp: 200 each, Rfl: 3    lisinopril 20 mg tablet, Take 1 tablet (20 mg) by mouth once daily., Disp: 100 tablet, Rfl: 3    metFORMIN  mg 24 hr tablet, TAKE 2 TABLETS (1,000 MG) BY MOUTH 2 TIMES A DAY. DO NOT CRUSH, CHEW, OR SPLIT., Disp: 360 tablet, Rfl: 3    omeprazole (PriLOSEC) 20 mg DR capsule, TAKE 1 CAPSULE BY MOUTH EVERY DAY, Disp: 90 capsule, Rfl: 3    ondansetron (Zofran) 8 mg tablet, Take 1 tablet (8 mg) by mouth every 8 hours if needed for nausea or vomiting. Do not fill before January 29, 2025., Disp: 30 tablet, Rfl: 5    potassium chloride CR 20 mEq ER  tablet, Take 1 tablet (20 mEq) by mouth once daily. Do not crush, chew, or split., Disp: 30 tablet, Rfl: 3    prochlorperazine (Compazine) 10 mg tablet, Take 1 tablet (10 mg) by mouth every 6 hours if needed for nausea or vomiting. Do not fill before January 29, 2025., Disp: 30 tablet, Rfl: 5    rosuvastatin (Crestor) 40 mg tablet, TAKE 1 TABLET BY MOUTH EVERY DAY, Disp: 90 tablet, Rfl: 3    sennosides (Senokot) 8.6 mg tablet, Take 2 tablets (17.2 mg) by mouth as needed at bedtime for constipation. Do not fill before January 29, 2025., Disp: 30 tablet, Rfl: 11  No current facility-administered medications for this visit.    Facility-Administered Medications Ordered in Other Visits:     albuterol 2.5 mg /3 mL (0.083 %) nebulizer solution 3 mL, 3 mL, nebulization, PRN, Tito Matos APRN-CNP    dextrose 5 % in water (D5W) bolus 500 mL, 500 mL, intravenous, PRN, Tito Matos, APRN-CNP    diphenhydrAMINE (BENADryl) injection 50 mg, 50 mg, intravenous, PRN, Tito Matos APRN-CNP    EPINEPHrine (Epipen) injection syringe 0.3 mg, 0.3 mg, intramuscular, q5 min PRN, Tito Matos APRN-CNP    famotidine PF (Pepcid) injection 20 mg, 20 mg, intravenous, Once PRN, Tito Matos APRN-CNP    heparin flush 100 unit/mL syringe 500 Units, 500 Units, intra-catheter, PRN, Alisha Solitario MD, 500 Units at 04/02/25 1507    methylPREDNISolone sod succinate (SOLU-Medrol) 40 mg/mL injection 40 mg, 40 mg, intravenous, PRN, Tito Matos APRN-CNP    prochlorperazine (Compazine) injection 10 mg, 10 mg, intravenous, q6h PRN, Tito Matos, APRN-CNP    prochlorperazine (Compazine) tablet 10 mg, 10 mg, oral, q6h PRN, Tito Matos APRN-CNP    sodium chloride 0.9 % bolus 500 mL, 500 mL, intravenous, PRN, FORTINO Osorio-CNP      Objective   Vital Sign   /84   Pulse 69   Temp 36.2 °C (97.2 °F) (Core)   Resp 18   Wt 86.9 kg (191 lb 9.3 oz)   SpO2 95%   BMI 25.98 kg/m²     Wt Readings from Last 5 Encounters:    25 86.9 kg (191 lb 9.3 oz)   25 84.8 kg (187 lb)   25 86.2 kg (190 lb 1.6 oz)   25 84.8 kg (187 lb)   25 83 kg (183 lb)       Physical Exam:  ECO  Pain: 0  Constitutional: Well developed, awake/alert/oriented x3, no distress, alert and cooperative  Eyes: PER. sclera anicteric  ENMT: Oral mucosa moist, no lesions or thrush identified  Respiratory/Thorax: Breathing is non-labored. Lungs are clear to auscultation bilaterally. No adventitious breath sounds. Plerux drain.   Cardiovascular: S1-S2. Regular rate and rhythm. No murmurs, rubs, or gallops appreciated  Gastrointestinal: Abdomen soft nontender, nondistended, normal active bowel sounds.  Musculoskeletal: ROM intact, no joint swelling, normal strength  Extremities: normal extremities, no cyanosis, +ankle edema, no clubbing  Lymphatics: no palpable lymphadenopathy   Skin: faint rash bridge of nose and cheeks   Neurologic: alert and oriented x3. Nonfocal exam. No myoclonus  Psychological: Pleasant, appropriate and easily engaged          Results:  Lab  Lab Results   Component Value Date    WBC 3.5 (L) 2025    HGB 9.1 (L) 2025    HCT 29.7 (L) 2025    MCV 91 2025     2025      Lab Results   Component Value Date    NEUTROABS 1.47 (L) 2025      Lab Results   Component Value Date    GLUCOSE 163 (H) 2025    CALCIUM 7.8 (L) 2025     2025    K 3.2 (L) 2025    CO2 26 2025     2025    BUN 8 2025    CREATININE 0.59 2025    MG 1.01 (L) 2025     Lab Results   Component Value Date    ALT 36 2025    AST 26 2025    ALKPHOS 89 2025    BILITOT 0.2 2025    BILIDIR 0.1 2019      Lab Results   Component Value Date    TSH 2.12 2025       Imaging:  No new imaging.       Assessment/Plan      Jayant Holland is a 74 y.o. male here for follow up of NSCLC adenocarcinoma    # NSCLC adenocarcinoma  - Y4pAcA9h (pleural  effusion)  - pending brain MRI  - PD-L1 5%, EGFR [id number] ([id number])   - discussed that given EGFR exon 20 mutation, recommend combination chemotherapy+amivantamab, consented  - planned to start C1D1 1/28/25  - plan for surveillance CT scans after C4 - CT scheduled 4/18/25.   - C1D1 dexamethasone Rx started today - for 3 doses.  Discussed potential infusion reaction.  Pt verbalized understanding.    - Per pt request, 2/19/25 C2 infusion and visit transferred to [facility].  Visit with FORTINO Grace.  Scan review visits with Dr. Solitario at [facility].    - C4D1 4/2/25 at [facility], labs prior to visit.   - C5 - visit changed to Dr. Solitario for scan review.    - Per pt request, order placed for mediport placement.      # Malignant pleural effusion  - s/p thoracentesis and contacted by IP while in visit for repeat thora.   - Right pleurx catheter placed 12/17/24 with HH referral in place.  Drained 2x/wk.  - continue to monitor    # Hypomagnesia, ongoing with current Amivantamab treatment  - Mg replacement plan created, next IV mag replacement dates include: 3/14, 3/17, 3/20, 3/24, 3/27, 3/31, 4/4, 4/7, 4/9, 4/11, 4/14 at [place].  Replacement dates ongoing. Treatment parameters:    Serum magnesium 1 - 1.6 mg/dL - administer 2 grams over 1 hour  Serum magnesium less than 1 mg/dL - administer 4 grams over 2 hours   - 3/12:  Mg 1.32 - 2g IV Mg with today's treatment.      # Macular rash to face, bridge of nose and checks  - Continue routine Doxycycline and treatment rash protocols.      # Unintentional wt loss  - Recommended pt add Ensure/Boost supplements and snacks throughout the day. Will try increased nutritional intake vs pharm intervention for now.    - Dietician referral placed.   - 2/11:  wt loss stabilized, pt started daily Boost supplements    # Leukocytosis 12.6 3/12/25  - no clinical s/sx's infection  - Continue to monitor     # Advanced care planning  - discussed incurable but treatable  nature of metastatic disease, and goal of therapy is to prolong life while maintaining or improving quality of life.  - 1/30/25 - Supportive onc referral placed.  Pt declined need for visit at this time.  Supportive onc will follow-up in 2-3 weeks.      Tito Matos, FORTINO-CNP  [facility]  Thoracic + H&N Medical Oncology     I spent a total of 30+ minutes on the date of the service which included preparing to see the patient, face-to-face patient care, completing clinical documentation, obtaining and / or reviewing separately obtained history, counseling and educating the patient/family/caregiver, ordering medications, tests, or procedures, communicating with other healthcare providers (not separately reported), independently interpreting results, not separately reported, and communicating results to the patient/family/caregiver, Name and date of birth verified.

## 2025-04-04 ENCOUNTER — INFUSION (OUTPATIENT)
Dept: HEMATOLOGY/ONCOLOGY | Facility: CLINIC | Age: 75
End: 2025-04-04
Payer: MEDICARE

## 2025-04-04 VITALS
TEMPERATURE: 96.8 F | BODY MASS INDEX: 26.43 KG/M2 | HEART RATE: 81 BPM | OXYGEN SATURATION: 99 % | WEIGHT: 195.1 LBS | SYSTOLIC BLOOD PRESSURE: 107 MMHG | HEIGHT: 72 IN | RESPIRATION RATE: 16 BRPM | DIASTOLIC BLOOD PRESSURE: 69 MMHG

## 2025-04-04 DIAGNOSIS — M17.10 PRIMARY OSTEOARTHRITIS OF KNEE, UNSPECIFIED LATERALITY: Primary | ICD-10-CM

## 2025-04-04 DIAGNOSIS — C80.1 ADENOCARCINOMA (MULTI): ICD-10-CM

## 2025-04-04 DIAGNOSIS — E83.42 HYPOMAGNESEMIA: ICD-10-CM

## 2025-04-04 LAB — MAGNESIUM SERPL-MCNC: 1.07 MG/DL (ref 1.6–2.4)

## 2025-04-04 PROCEDURE — 96365 THER/PROPH/DIAG IV INF INIT: CPT | Mod: INF

## 2025-04-04 PROCEDURE — 2500000004 HC RX 250 GENERAL PHARMACY W/ HCPCS (ALT 636 FOR OP/ED): Performed by: NURSE PRACTITIONER

## 2025-04-04 PROCEDURE — 83735 ASSAY OF MAGNESIUM: CPT

## 2025-04-04 PROCEDURE — 2500000004 HC RX 250 GENERAL PHARMACY W/ HCPCS (ALT 636 FOR OP/ED): Performed by: STUDENT IN AN ORGANIZED HEALTH CARE EDUCATION/TRAINING PROGRAM

## 2025-04-04 RX ORDER — EPINEPHRINE 0.3 MG/.3ML
0.3 INJECTION SUBCUTANEOUS EVERY 5 MIN PRN
OUTPATIENT
Start: 2025-04-07

## 2025-04-04 RX ORDER — FAMOTIDINE 10 MG/ML
20 INJECTION, SOLUTION INTRAVENOUS ONCE AS NEEDED
Status: DISCONTINUED | OUTPATIENT
Start: 2025-04-04 | End: 2025-04-04 | Stop reason: HOSPADM

## 2025-04-04 RX ORDER — FAMOTIDINE 10 MG/ML
20 INJECTION, SOLUTION INTRAVENOUS ONCE AS NEEDED
OUTPATIENT
Start: 2025-04-07

## 2025-04-04 RX ORDER — HEPARIN 100 UNIT/ML
500 SYRINGE INTRAVENOUS AS NEEDED
Status: DISCONTINUED | OUTPATIENT
Start: 2025-04-04 | End: 2025-04-04 | Stop reason: HOSPADM

## 2025-04-04 RX ORDER — DIPHENHYDRAMINE HYDROCHLORIDE 50 MG/ML
50 INJECTION, SOLUTION INTRAMUSCULAR; INTRAVENOUS AS NEEDED
Status: DISCONTINUED | OUTPATIENT
Start: 2025-04-04 | End: 2025-04-04 | Stop reason: HOSPADM

## 2025-04-04 RX ORDER — DIPHENHYDRAMINE HYDROCHLORIDE 50 MG/ML
50 INJECTION, SOLUTION INTRAMUSCULAR; INTRAVENOUS AS NEEDED
OUTPATIENT
Start: 2025-04-07

## 2025-04-04 RX ORDER — HEPARIN SODIUM,PORCINE/PF 10 UNIT/ML
50 SYRINGE (ML) INTRAVENOUS AS NEEDED
OUTPATIENT
Start: 2025-04-04

## 2025-04-04 RX ORDER — EPINEPHRINE 0.3 MG/.3ML
0.3 INJECTION SUBCUTANEOUS EVERY 5 MIN PRN
Status: DISCONTINUED | OUTPATIENT
Start: 2025-04-04 | End: 2025-04-04 | Stop reason: HOSPADM

## 2025-04-04 RX ORDER — MAGNESIUM SULFATE HEPTAHYDRATE 40 MG/ML
2 INJECTION, SOLUTION INTRAVENOUS ONCE
OUTPATIENT
Start: 2025-04-07 | End: 2025-04-07

## 2025-04-04 RX ORDER — MAGNESIUM SULFATE HEPTAHYDRATE 40 MG/ML
4 INJECTION, SOLUTION INTRAVENOUS ONCE
OUTPATIENT
Start: 2025-04-07 | End: 2025-04-07

## 2025-04-04 RX ORDER — HEPARIN 100 UNIT/ML
500 SYRINGE INTRAVENOUS AS NEEDED
OUTPATIENT
Start: 2025-04-04

## 2025-04-04 RX ORDER — MELOXICAM 15 MG/1
15 TABLET ORAL DAILY
Qty: 90 TABLET | Refills: 3 | Status: SHIPPED | OUTPATIENT
Start: 2025-04-04 | End: 2026-04-04

## 2025-04-04 RX ORDER — ALBUTEROL SULFATE 0.83 MG/ML
3 SOLUTION RESPIRATORY (INHALATION) AS NEEDED
Status: DISCONTINUED | OUTPATIENT
Start: 2025-04-04 | End: 2025-04-04 | Stop reason: HOSPADM

## 2025-04-04 RX ORDER — MAGNESIUM SULFATE HEPTAHYDRATE 40 MG/ML
2 INJECTION, SOLUTION INTRAVENOUS ONCE
Status: COMPLETED | OUTPATIENT
Start: 2025-04-04 | End: 2025-04-04

## 2025-04-04 RX ORDER — ALBUTEROL SULFATE 0.83 MG/ML
3 SOLUTION RESPIRATORY (INHALATION) AS NEEDED
OUTPATIENT
Start: 2025-04-07

## 2025-04-04 RX ADMIN — MAGNESIUM SULFATE IN WATER 2 G: 40 INJECTION, SOLUTION INTRAVENOUS at 11:56

## 2025-04-04 RX ADMIN — Medication 500 UNITS: at 12:59

## 2025-04-04 ASSESSMENT — PAIN SCALES - GENERAL: PAINLEVEL_OUTOF10: 0-NO PAIN

## 2025-04-04 NOTE — PROGRESS NOTES
Pt here for mag infusion, 2g given for 1.07 mag. Tolerated well. He is aware of plan of care, will call with further questions or concerns. Will return next week for next appt

## 2025-04-07 ENCOUNTER — DOCUMENTATION (OUTPATIENT)
Dept: HEMATOLOGY/ONCOLOGY | Facility: HOSPITAL | Age: 75
End: 2025-04-07
Payer: MEDICARE

## 2025-04-07 ENCOUNTER — INFUSION (OUTPATIENT)
Dept: HEMATOLOGY/ONCOLOGY | Facility: CLINIC | Age: 75
End: 2025-04-07
Payer: MEDICARE

## 2025-04-07 VITALS
OXYGEN SATURATION: 98 % | DIASTOLIC BLOOD PRESSURE: 80 MMHG | HEART RATE: 95 BPM | TEMPERATURE: 97.7 F | WEIGHT: 190.3 LBS | RESPIRATION RATE: 16 BRPM | SYSTOLIC BLOOD PRESSURE: 121 MMHG | BODY MASS INDEX: 25.78 KG/M2 | HEIGHT: 72 IN

## 2025-04-07 DIAGNOSIS — C80.1 ADENOCARCINOMA (MULTI): ICD-10-CM

## 2025-04-07 DIAGNOSIS — E83.42 HYPOMAGNESEMIA: ICD-10-CM

## 2025-04-07 LAB — MAGNESIUM SERPL-MCNC: 0.65 MG/DL (ref 1.6–2.4)

## 2025-04-07 PROCEDURE — 2500000004 HC RX 250 GENERAL PHARMACY W/ HCPCS (ALT 636 FOR OP/ED): Performed by: NURSE PRACTITIONER

## 2025-04-07 PROCEDURE — 2500000004 HC RX 250 GENERAL PHARMACY W/ HCPCS (ALT 636 FOR OP/ED): Performed by: STUDENT IN AN ORGANIZED HEALTH CARE EDUCATION/TRAINING PROGRAM

## 2025-04-07 PROCEDURE — 83735 ASSAY OF MAGNESIUM: CPT

## 2025-04-07 PROCEDURE — 96365 THER/PROPH/DIAG IV INF INIT: CPT | Mod: INF

## 2025-04-07 PROCEDURE — 96366 THER/PROPH/DIAG IV INF ADDON: CPT | Mod: INF

## 2025-04-07 RX ORDER — HEPARIN 100 UNIT/ML
500 SYRINGE INTRAVENOUS AS NEEDED
Status: DISCONTINUED | OUTPATIENT
Start: 2025-04-07 | End: 2025-04-07 | Stop reason: HOSPADM

## 2025-04-07 RX ORDER — MAGNESIUM SULFATE HEPTAHYDRATE 40 MG/ML
4 INJECTION, SOLUTION INTRAVENOUS ONCE
Status: COMPLETED | OUTPATIENT
Start: 2025-04-07 | End: 2025-04-07

## 2025-04-07 RX ORDER — MAGNESIUM SULFATE HEPTAHYDRATE 40 MG/ML
4 INJECTION, SOLUTION INTRAVENOUS ONCE
Status: CANCELLED | OUTPATIENT
Start: 2025-04-09 | End: 2025-04-09

## 2025-04-07 RX ORDER — ALBUTEROL SULFATE 0.83 MG/ML
3 SOLUTION RESPIRATORY (INHALATION) AS NEEDED
Status: CANCELLED | OUTPATIENT
Start: 2025-04-09

## 2025-04-07 RX ORDER — FAMOTIDINE 10 MG/ML
20 INJECTION, SOLUTION INTRAVENOUS ONCE AS NEEDED
Status: CANCELLED | OUTPATIENT
Start: 2025-04-09

## 2025-04-07 RX ORDER — HEPARIN SODIUM,PORCINE/PF 10 UNIT/ML
50 SYRINGE (ML) INTRAVENOUS AS NEEDED
Status: CANCELLED | OUTPATIENT
Start: 2025-04-07

## 2025-04-07 RX ORDER — MAGNESIUM SULFATE HEPTAHYDRATE 40 MG/ML
2 INJECTION, SOLUTION INTRAVENOUS ONCE
Status: CANCELLED | OUTPATIENT
Start: 2025-04-09 | End: 2025-04-09

## 2025-04-07 RX ORDER — EPINEPHRINE 0.3 MG/.3ML
0.3 INJECTION SUBCUTANEOUS EVERY 5 MIN PRN
Status: CANCELLED | OUTPATIENT
Start: 2025-04-09

## 2025-04-07 RX ORDER — HEPARIN 100 UNIT/ML
500 SYRINGE INTRAVENOUS AS NEEDED
Status: CANCELLED | OUTPATIENT
Start: 2025-04-07

## 2025-04-07 RX ORDER — DIPHENHYDRAMINE HYDROCHLORIDE 50 MG/ML
50 INJECTION, SOLUTION INTRAMUSCULAR; INTRAVENOUS AS NEEDED
Status: CANCELLED | OUTPATIENT
Start: 2025-04-09

## 2025-04-07 RX ADMIN — Medication 500 UNITS: at 11:29

## 2025-04-07 RX ADMIN — MAGNESIUM SULFATE IN WATER FOR 4 G: 40 INJECTION INTRAVENOUS at 09:30

## 2025-04-07 ASSESSMENT — PAIN SCALES - GENERAL: PAINLEVEL_OUTOF10: 0-NO PAIN

## 2025-04-07 NOTE — PROGRESS NOTES
Notified by Laura Floyd in Lab Services that Magnesium 0.65. Secure Chat sent to Jamaica Rehman NP. Pt currently at [facility] and receiving 4gm Magnesium per Jamaica Rehman NP.

## 2025-04-07 NOTE — PROGRESS NOTES
Patient here for magnesium replacement. Magnesium critical at 0.65, JUNIOR FREITAS notified. Patient given 4gms. AVS given to patient, aware next infusion is at [facility]. Discharged in stable condition.

## 2025-04-08 ENCOUNTER — APPOINTMENT (OUTPATIENT)
Dept: PRIMARY CARE | Facility: CLINIC | Age: 75
End: 2025-04-08
Payer: MEDICARE

## 2025-04-09 ENCOUNTER — INFUSION (OUTPATIENT)
Dept: HEMATOLOGY/ONCOLOGY | Facility: CLINIC | Age: 75
End: 2025-04-09
Payer: MEDICARE

## 2025-04-09 VITALS
TEMPERATURE: 98.6 F | SYSTOLIC BLOOD PRESSURE: 117 MMHG | OXYGEN SATURATION: 97 % | DIASTOLIC BLOOD PRESSURE: 81 MMHG | BODY MASS INDEX: 25.56 KG/M2 | RESPIRATION RATE: 18 BRPM | HEART RATE: 95 BPM | WEIGHT: 188.49 LBS

## 2025-04-09 DIAGNOSIS — C80.1 ADENOCARCINOMA (MULTI): ICD-10-CM

## 2025-04-09 DIAGNOSIS — E83.42 HYPOMAGNESEMIA: ICD-10-CM

## 2025-04-09 LAB — MAGNESIUM SERPL-MCNC: 1.04 MG/DL (ref 1.6–2.4)

## 2025-04-09 PROCEDURE — 83735 ASSAY OF MAGNESIUM: CPT

## 2025-04-09 PROCEDURE — 2500000004 HC RX 250 GENERAL PHARMACY W/ HCPCS (ALT 636 FOR OP/ED): Performed by: STUDENT IN AN ORGANIZED HEALTH CARE EDUCATION/TRAINING PROGRAM

## 2025-04-09 PROCEDURE — 96366 THER/PROPH/DIAG IV INF ADDON: CPT | Mod: INF

## 2025-04-09 PROCEDURE — 2500000004 HC RX 250 GENERAL PHARMACY W/ HCPCS (ALT 636 FOR OP/ED): Performed by: NURSE PRACTITIONER

## 2025-04-09 PROCEDURE — 96365 THER/PROPH/DIAG IV INF INIT: CPT | Mod: INF

## 2025-04-09 RX ORDER — DIPHENHYDRAMINE HYDROCHLORIDE 50 MG/ML
50 INJECTION, SOLUTION INTRAMUSCULAR; INTRAVENOUS AS NEEDED
Status: CANCELLED | OUTPATIENT
Start: 2025-04-11

## 2025-04-09 RX ORDER — ALBUTEROL SULFATE 0.83 MG/ML
3 SOLUTION RESPIRATORY (INHALATION) AS NEEDED
Status: CANCELLED | OUTPATIENT
Start: 2025-04-11

## 2025-04-09 RX ORDER — FAMOTIDINE 10 MG/ML
20 INJECTION, SOLUTION INTRAVENOUS ONCE AS NEEDED
Status: DISCONTINUED | OUTPATIENT
Start: 2025-04-09 | End: 2025-04-09 | Stop reason: HOSPADM

## 2025-04-09 RX ORDER — HEPARIN 100 UNIT/ML
500 SYRINGE INTRAVENOUS AS NEEDED
Status: DISCONTINUED | OUTPATIENT
Start: 2025-04-09 | End: 2025-04-09 | Stop reason: HOSPADM

## 2025-04-09 RX ORDER — EPINEPHRINE 0.3 MG/.3ML
0.3 INJECTION SUBCUTANEOUS EVERY 5 MIN PRN
Status: DISCONTINUED | OUTPATIENT
Start: 2025-04-09 | End: 2025-04-09 | Stop reason: HOSPADM

## 2025-04-09 RX ORDER — MAGNESIUM SULFATE HEPTAHYDRATE 40 MG/ML
2 INJECTION, SOLUTION INTRAVENOUS ONCE
Status: CANCELLED | OUTPATIENT
Start: 2025-04-11 | End: 2025-04-11

## 2025-04-09 RX ORDER — EPINEPHRINE 0.3 MG/.3ML
0.3 INJECTION SUBCUTANEOUS EVERY 5 MIN PRN
Status: CANCELLED | OUTPATIENT
Start: 2025-04-11

## 2025-04-09 RX ORDER — HEPARIN SODIUM,PORCINE/PF 10 UNIT/ML
50 SYRINGE (ML) INTRAVENOUS AS NEEDED
OUTPATIENT
Start: 2025-04-09

## 2025-04-09 RX ORDER — HEPARIN SODIUM,PORCINE/PF 10 UNIT/ML
50 SYRINGE (ML) INTRAVENOUS AS NEEDED
Status: DISCONTINUED | OUTPATIENT
Start: 2025-04-09 | End: 2025-04-09 | Stop reason: HOSPADM

## 2025-04-09 RX ORDER — DIPHENHYDRAMINE HYDROCHLORIDE 50 MG/ML
50 INJECTION, SOLUTION INTRAMUSCULAR; INTRAVENOUS AS NEEDED
Status: DISCONTINUED | OUTPATIENT
Start: 2025-04-09 | End: 2025-04-09 | Stop reason: HOSPADM

## 2025-04-09 RX ORDER — MAGNESIUM SULFATE HEPTAHYDRATE 40 MG/ML
4 INJECTION, SOLUTION INTRAVENOUS ONCE
Status: COMPLETED | OUTPATIENT
Start: 2025-04-09 | End: 2025-04-09

## 2025-04-09 RX ORDER — MAGNESIUM SULFATE HEPTAHYDRATE 40 MG/ML
4 INJECTION, SOLUTION INTRAVENOUS ONCE
Status: CANCELLED | OUTPATIENT
Start: 2025-04-11 | End: 2025-04-11

## 2025-04-09 RX ORDER — ALBUTEROL SULFATE 0.83 MG/ML
3 SOLUTION RESPIRATORY (INHALATION) AS NEEDED
Status: DISCONTINUED | OUTPATIENT
Start: 2025-04-09 | End: 2025-04-09 | Stop reason: HOSPADM

## 2025-04-09 RX ORDER — FAMOTIDINE 10 MG/ML
20 INJECTION, SOLUTION INTRAVENOUS ONCE AS NEEDED
Status: CANCELLED | OUTPATIENT
Start: 2025-04-11

## 2025-04-09 RX ORDER — HEPARIN 100 UNIT/ML
500 SYRINGE INTRAVENOUS AS NEEDED
OUTPATIENT
Start: 2025-04-09

## 2025-04-09 RX ADMIN — MAGNESIUM SULFATE HEPTAHYDRATE 4 G: 4 INJECTION, SOLUTION INTRAVENOUS at 07:52

## 2025-04-09 RX ADMIN — HEPARIN 500 UNITS: 100 SYRINGE at 09:59

## 2025-04-09 ASSESSMENT — PAIN SCALES - GENERAL: PAINLEVEL_OUTOF10: 0-NO PAIN

## 2025-04-11 ENCOUNTER — DOCUMENTATION (OUTPATIENT)
Dept: HEMATOLOGY/ONCOLOGY | Facility: HOSPITAL | Age: 75
End: 2025-04-11
Payer: MEDICARE

## 2025-04-11 ENCOUNTER — INFUSION (OUTPATIENT)
Dept: HEMATOLOGY/ONCOLOGY | Facility: CLINIC | Age: 75
End: 2025-04-11
Payer: MEDICARE

## 2025-04-11 VITALS
OXYGEN SATURATION: 100 % | SYSTOLIC BLOOD PRESSURE: 111 MMHG | TEMPERATURE: 97.9 F | RESPIRATION RATE: 16 BRPM | DIASTOLIC BLOOD PRESSURE: 75 MMHG | BODY MASS INDEX: 25.15 KG/M2 | WEIGHT: 185.7 LBS | HEART RATE: 102 BPM | HEIGHT: 72 IN

## 2025-04-11 DIAGNOSIS — E83.42 HYPOMAGNESEMIA: ICD-10-CM

## 2025-04-11 DIAGNOSIS — C80.1 ADENOCARCINOMA (MULTI): ICD-10-CM

## 2025-04-11 LAB — MAGNESIUM SERPL-MCNC: 0.84 MG/DL (ref 1.6–2.4)

## 2025-04-11 PROCEDURE — 96365 THER/PROPH/DIAG IV INF INIT: CPT | Mod: INF

## 2025-04-11 PROCEDURE — 2500000004 HC RX 250 GENERAL PHARMACY W/ HCPCS (ALT 636 FOR OP/ED): Performed by: NURSE PRACTITIONER

## 2025-04-11 PROCEDURE — 83735 ASSAY OF MAGNESIUM: CPT

## 2025-04-11 PROCEDURE — 96366 THER/PROPH/DIAG IV INF ADDON: CPT | Mod: INF

## 2025-04-11 RX ORDER — MAGNESIUM SULFATE HEPTAHYDRATE 40 MG/ML
4 INJECTION, SOLUTION INTRAVENOUS ONCE
Status: COMPLETED | OUTPATIENT
Start: 2025-04-11 | End: 2025-04-11

## 2025-04-11 RX ORDER — ALBUTEROL SULFATE 0.83 MG/ML
3 SOLUTION RESPIRATORY (INHALATION) AS NEEDED
Status: DISCONTINUED | OUTPATIENT
Start: 2025-04-11 | End: 2025-04-11 | Stop reason: HOSPADM

## 2025-04-11 RX ORDER — DIPHENHYDRAMINE HYDROCHLORIDE 50 MG/ML
50 INJECTION, SOLUTION INTRAMUSCULAR; INTRAVENOUS AS NEEDED
OUTPATIENT
Start: 2025-04-14

## 2025-04-11 RX ORDER — MAGNESIUM SULFATE HEPTAHYDRATE 40 MG/ML
2 INJECTION, SOLUTION INTRAVENOUS ONCE
OUTPATIENT
Start: 2025-04-14 | End: 2025-04-14

## 2025-04-11 RX ORDER — FAMOTIDINE 10 MG/ML
20 INJECTION, SOLUTION INTRAVENOUS ONCE AS NEEDED
Status: DISCONTINUED | OUTPATIENT
Start: 2025-04-11 | End: 2025-04-11 | Stop reason: HOSPADM

## 2025-04-11 RX ORDER — FAMOTIDINE 10 MG/ML
20 INJECTION, SOLUTION INTRAVENOUS ONCE AS NEEDED
OUTPATIENT
Start: 2025-04-14

## 2025-04-11 RX ORDER — MAGNESIUM SULFATE HEPTAHYDRATE 40 MG/ML
4 INJECTION, SOLUTION INTRAVENOUS ONCE
OUTPATIENT
Start: 2025-04-14 | End: 2025-04-14

## 2025-04-11 RX ORDER — DIPHENHYDRAMINE HYDROCHLORIDE 50 MG/ML
50 INJECTION, SOLUTION INTRAMUSCULAR; INTRAVENOUS AS NEEDED
Status: DISCONTINUED | OUTPATIENT
Start: 2025-04-11 | End: 2025-04-11 | Stop reason: HOSPADM

## 2025-04-11 RX ORDER — ALBUTEROL SULFATE 0.83 MG/ML
3 SOLUTION RESPIRATORY (INHALATION) AS NEEDED
OUTPATIENT
Start: 2025-04-14

## 2025-04-11 RX ORDER — EPINEPHRINE 0.3 MG/.3ML
0.3 INJECTION SUBCUTANEOUS EVERY 5 MIN PRN
Status: DISCONTINUED | OUTPATIENT
Start: 2025-04-11 | End: 2025-04-11 | Stop reason: HOSPADM

## 2025-04-11 RX ORDER — EPINEPHRINE 0.3 MG/.3ML
0.3 INJECTION SUBCUTANEOUS EVERY 5 MIN PRN
OUTPATIENT
Start: 2025-04-14

## 2025-04-11 RX ADMIN — MAGNESIUM SULFATE IN WATER FOR 4 G: 40 INJECTION INTRAVENOUS at 09:33

## 2025-04-11 ASSESSMENT — PAIN SCALES - GENERAL: PAINLEVEL_OUTOF10: 0-NO PAIN

## 2025-04-11 NOTE — PROGRESS NOTES
Pt here for mag infusion. Critical lab reported to team. Pt received 4g mag total. Tolerated infusion well. He is aware of plan of care, will call with further questions or concerns. Will return next week for next lab check and infusion

## 2025-04-14 ENCOUNTER — APPOINTMENT (OUTPATIENT)
Dept: LAB | Facility: HOSPITAL | Age: 75
End: 2025-04-14
Payer: MEDICARE

## 2025-04-14 ENCOUNTER — OFFICE VISIT (OUTPATIENT)
Dept: PULMONOLOGY | Facility: HOSPITAL | Age: 75
End: 2025-04-14
Payer: MEDICARE

## 2025-04-14 ENCOUNTER — INFUSION (OUTPATIENT)
Dept: HEMATOLOGY/ONCOLOGY | Facility: HOSPITAL | Age: 75
End: 2025-04-14
Payer: MEDICARE

## 2025-04-14 VITALS
DIASTOLIC BLOOD PRESSURE: 86 MMHG | BODY MASS INDEX: 24.84 KG/M2 | OXYGEN SATURATION: 100 % | RESPIRATION RATE: 17 BRPM | TEMPERATURE: 97.7 F | HEART RATE: 97 BPM | WEIGHT: 183.2 LBS | SYSTOLIC BLOOD PRESSURE: 128 MMHG

## 2025-04-14 DIAGNOSIS — C80.1 ADENOCARCINOMA (MULTI): ICD-10-CM

## 2025-04-14 DIAGNOSIS — J91.0 MALIGNANT PLEURAL EFFUSION (CMS-HCC): Primary | ICD-10-CM

## 2025-04-14 DIAGNOSIS — R04.0 EPISTAXIS: ICD-10-CM

## 2025-04-14 DIAGNOSIS — E83.42 HYPOMAGNESEMIA: ICD-10-CM

## 2025-04-14 LAB — MAGNESIUM SERPL-MCNC: 0.81 MG/DL (ref 1.6–2.4)

## 2025-04-14 PROCEDURE — 96365 THER/PROPH/DIAG IV INF INIT: CPT | Mod: INF

## 2025-04-14 PROCEDURE — 96366 THER/PROPH/DIAG IV INF ADDON: CPT | Mod: INF

## 2025-04-14 PROCEDURE — 2500000004 HC RX 250 GENERAL PHARMACY W/ HCPCS (ALT 636 FOR OP/ED): Performed by: STUDENT IN AN ORGANIZED HEALTH CARE EDUCATION/TRAINING PROGRAM

## 2025-04-14 PROCEDURE — 1160F RVW MEDS BY RX/DR IN RCRD: CPT | Performed by: INTERNAL MEDICINE

## 2025-04-14 PROCEDURE — 1157F ADVNC CARE PLAN IN RCRD: CPT | Performed by: INTERNAL MEDICINE

## 2025-04-14 PROCEDURE — G2211 COMPLEX E/M VISIT ADD ON: HCPCS | Performed by: INTERNAL MEDICINE

## 2025-04-14 PROCEDURE — 3061F NEG MICROALBUMINURIA REV: CPT | Performed by: INTERNAL MEDICINE

## 2025-04-14 PROCEDURE — 83735 ASSAY OF MAGNESIUM: CPT

## 2025-04-14 PROCEDURE — 3052F HG A1C>EQUAL 8.0%<EQUAL 9.0%: CPT | Performed by: INTERNAL MEDICINE

## 2025-04-14 PROCEDURE — 3074F SYST BP LT 130 MM HG: CPT | Performed by: INTERNAL MEDICINE

## 2025-04-14 PROCEDURE — 3048F LDL-C <100 MG/DL: CPT | Performed by: INTERNAL MEDICINE

## 2025-04-14 PROCEDURE — 99213 OFFICE O/P EST LOW 20 MIN: CPT | Performed by: INTERNAL MEDICINE

## 2025-04-14 PROCEDURE — 1126F AMNT PAIN NOTED NONE PRSNT: CPT | Performed by: INTERNAL MEDICINE

## 2025-04-14 PROCEDURE — 99213 OFFICE O/P EST LOW 20 MIN: CPT | Mod: 25 | Performed by: INTERNAL MEDICINE

## 2025-04-14 PROCEDURE — 3079F DIAST BP 80-89 MM HG: CPT | Performed by: INTERNAL MEDICINE

## 2025-04-14 PROCEDURE — 2500000004 HC RX 250 GENERAL PHARMACY W/ HCPCS (ALT 636 FOR OP/ED): Performed by: NURSE PRACTITIONER

## 2025-04-14 PROCEDURE — 1111F DSCHRG MED/CURRENT MED MERGE: CPT | Performed by: INTERNAL MEDICINE

## 2025-04-14 PROCEDURE — 1159F MED LIST DOCD IN RCRD: CPT | Performed by: INTERNAL MEDICINE

## 2025-04-14 PROCEDURE — 4010F ACE/ARB THERAPY RXD/TAKEN: CPT | Performed by: INTERNAL MEDICINE

## 2025-04-14 PROCEDURE — 1036F TOBACCO NON-USER: CPT | Performed by: INTERNAL MEDICINE

## 2025-04-14 RX ORDER — DIPHENHYDRAMINE HYDROCHLORIDE 50 MG/ML
50 INJECTION, SOLUTION INTRAMUSCULAR; INTRAVENOUS AS NEEDED
Status: CANCELLED | OUTPATIENT
Start: 2025-04-16

## 2025-04-14 RX ORDER — EPINEPHRINE 0.3 MG/.3ML
0.3 INJECTION SUBCUTANEOUS EVERY 5 MIN PRN
Status: CANCELLED | OUTPATIENT
Start: 2025-04-16

## 2025-04-14 RX ORDER — MAGNESIUM SULFATE HEPTAHYDRATE 40 MG/ML
2 INJECTION, SOLUTION INTRAVENOUS ONCE
Status: CANCELLED | OUTPATIENT
Start: 2025-04-16 | End: 2025-04-16

## 2025-04-14 RX ORDER — HEPARIN 100 UNIT/ML
500 SYRINGE INTRAVENOUS AS NEEDED
Status: DISCONTINUED | OUTPATIENT
Start: 2025-04-14 | End: 2025-04-14 | Stop reason: HOSPADM

## 2025-04-14 RX ORDER — HEPARIN SODIUM,PORCINE/PF 10 UNIT/ML
50 SYRINGE (ML) INTRAVENOUS AS NEEDED
Status: CANCELLED | OUTPATIENT
Start: 2025-04-14

## 2025-04-14 RX ORDER — MAGNESIUM SULFATE HEPTAHYDRATE 40 MG/ML
4 INJECTION, SOLUTION INTRAVENOUS ONCE
Status: CANCELLED | OUTPATIENT
Start: 2025-04-16 | End: 2025-04-16

## 2025-04-14 RX ORDER — ALBUTEROL SULFATE 0.83 MG/ML
3 SOLUTION RESPIRATORY (INHALATION) AS NEEDED
Status: CANCELLED | OUTPATIENT
Start: 2025-04-16

## 2025-04-14 RX ORDER — FAMOTIDINE 10 MG/ML
20 INJECTION, SOLUTION INTRAVENOUS ONCE AS NEEDED
Status: CANCELLED | OUTPATIENT
Start: 2025-04-16

## 2025-04-14 RX ORDER — MAGNESIUM SULFATE HEPTAHYDRATE 40 MG/ML
4 INJECTION, SOLUTION INTRAVENOUS ONCE
Status: COMPLETED | OUTPATIENT
Start: 2025-04-14 | End: 2025-04-14

## 2025-04-14 RX ORDER — HEPARIN 100 UNIT/ML
500 SYRINGE INTRAVENOUS AS NEEDED
Status: CANCELLED | OUTPATIENT
Start: 2025-04-14

## 2025-04-14 RX ADMIN — MAGNESIUM SULFATE HEPTAHYDRATE 4 G: 40 INJECTION, SOLUTION INTRAVENOUS at 13:06

## 2025-04-14 RX ADMIN — HEPARIN 500 UNITS: 100 SYRINGE at 15:06

## 2025-04-14 ASSESSMENT — PAIN SCALES - GENERAL: PAINLEVEL_OUTOF10: 0-NO PAIN

## 2025-04-14 NOTE — PATIENT INSTRUCTIONS
Assessment and Plan / Recommendations:  Problem List Items Addressed This Visit       Malignant pleural effusion (CMS-HCC) - Primary    Current Assessment & Plan     Right side- sight looks clean dry and intact with tegaderm overlying.     Wife is caregiver for PleurX- continue good work with maintaining site, and keep record of output quantity, and appearance of fluid    Once amount goes to < 100-150--> would move to weekly drainage, then if continues < 100-150 can space out to every other week and then re-address to see if PleurX can be removed.    Please call with any questions/concerns of PleurX sight.      Office #[phone], can use  option or nurses line    Josse Ferrera # [phone]     Radiology Scheduling #[phone]    Procedure team- Alia MULLER #[phone] and Panfilo DOUGLASS #[phone]    RTC 6 months, sooner if needed          Epistaxis    Current Assessment & Plan     You have had bloody nose- epistaxis x 2 weeks- appears to have crusting in L nare--> referral to ENT, consider hydrating air at home, using nasal saline spray from over the counter to hydrate, discuss with Oncology team symptoms              Relevant Orders    Referral to ENT            RTC 6 months, sooner if needed

## 2025-04-14 NOTE — PROGRESS NOTES
Department of Medicine  Division of Pulmonary, Critical Care, and Sleep Medicine  Follow-Up Visit  [address] [facility]    Background:  follow up PleurX placed 1/10/2025    HPI (DATE):    PCP- NP Jorge Luis Oconnor  Oncology- NP Tito Matos and Jamaica Rehman    75 yo M with NSCLC adenocarcinoma diagnosed 12/5/2024 after evaluation for cough, found to have RUL speculated nodule and R pleural effusionà diagnosed with pleural fluid cytology.      Undergoing treatment with Oncology team.     Had PleurX placed 1/10/25 with Dr. Solorzano- here with wife.  Has been draining about 400 ml 1 to 2 times a week- cranberry colored, no issues with skin site or pus, infectious concerns.     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    Immunization History:  Immunization History   Administered Date(s) Administered    COVID-19, mRNA, LNP-S, PF, 30 mcg/0.3 mL dose 07/30/2021, 08/20/2021    Influenza, Unspecified 11/13/2019    Influenza, seasonal, injectable 11/13/2019    Tdap vaccine, age 7 year and older (BOOSTRIX, ADACEL) 04/28/2010       Current Medications:  Current Outpatient Medications   Medication Instructions    acetaminophen (TYLENOL) 650 mg, oral, Every 4 hours PRN    apixaban (Eliquis) 5 mg tablet Take 2 tablets (10 mg) by mouth every 12 hours for 5 days, THEN 1 tablet (5 mg) every 12 hours.    Blood glucose monitoring meter kit kit 1 each, As needed    blood-glucose meter misc 1 Device, miscellaneous, Daily    chlorthalidone (HYGROTON) 25 mg, Daily    clindamycin (Cleocin T) 1 % lotion Apply topically to affected area twice daily.    folic acid (FOLVITE) 1,000 mcg, oral, Daily    hydrocortisone 1 % cream Apply topically to face, hands, feet, neck, back, and chest once daily at bedtime for rash prevention.    lancets  30 gauge misc 1 Device, miscellaneous, 3 times daily    lisinopril 20 mg, oral, Daily    meloxicam (MOBIC) 15 mg, oral, Daily    metFORMIN XR (GLUCOPHAGE-XR) 1,000 mg, oral, 2 times daily, Do not crush, chew, or split    omeprazole (PriLOSEC) 20 mg DR capsule TAKE 1 CAPSULE BY MOUTH EVERY DAY    ondansetron (ZOFRAN) 8 mg, oral, Every 8 hours PRN    potassium chloride CR 20 mEq ER tablet 20 mEq, oral, Daily, Do not crush, chew, or split.    prochlorperazine (COMPAZINE) 10 mg, oral, Every 6 hours PRN    rosuvastatin (Crestor) 40 mg tablet TAKE 1 TABLET BY MOUTH EVERY DAY    sennosides (SENOKOT) 17.2 mg, oral, Nightly PRN        Drug Allergies/Intolerances:  No Known Allergies     Physical Examination:  /86 (BP Location: Right arm, Patient Position: Sitting, BP Cuff Size: Adult)   Pulse 97   Temp 36.5 °C (97.7 °F) (Temporal)   Resp 17   Wt 83.1 kg (183 lb 3.2 oz) Comment: pt. had shoes on  SpO2 100%   BMI 24.84 kg/m²      General: ambulated independently; no acute distress; well-nourished; work of breathing was not increased; normal vocal character  HEENT: normocephalic; anicteric sclerae; conjunctivae not injected; nasal mucosa was clear on Right, dried blood on Left, no visible lesions; oropharynx was clear without evidence of thrush; dentition was good.  Neck: supple; no lymphadenopathy or thyromegaly.  Chest: clear to auscultation bilaterally; no chest wall deformity.  Cardiac: regular rhythm; no gallop or murmur.  Extremities: no leg edema; no digital clubbing;   Psychiatric: did not appear depressed or anxious.    Pulmonary Function Test Results   None        Chest Radiograph     XR chest 1 view 01/10/2025    Narrative  Interpreted By:  Viral Salcido and Omar Mahmoud  STUDY:  XR CHEST 1 VIEW;  1/10/2025 3:21 pm    INDICATION:  Signs/Symptoms:s/p pleurx placement.      COMPARISON:  Chest radiograph 12/10/2024 5:02 p.m., CT chest 12/05/2024    ACCESSION NUMBER(S):  [id number]    ORDERING  CLINICIAN:  TATO JOSE    FINDINGS:  AP radiograph of the chest was provided.    Interval placement of right pleural catheter/chest tube.    CARDIOMEDIASTINAL SILHOUETTE:  Cardiomediastinal silhouette is stable in size and configuration.  Aortic arch calcifications.    LUNGS:  Hazy gradient opacity within the right lower lung and blunting of the  right costophrenic angle, similar as compared to prior. Right basilar  linear opacity, mildly increased as compared to prior. Left  costophrenic angles clear. There is no consolidation within left  lung. There is no evidence of pneumothorax.    ABDOMEN:  No remarkable upper abdominal findings.    BONES:  No acute osseous changes.    Impression  1. Interval placement of right pleural catheter/chest tube. No  pneumothorax.  2. Similar large right pleural effusion with increased right basilar  subsegmental atelectasis.    I personally reviewed the images/study and I agree with the findings  as stated by Miryam Su MD (PGY-2). This study was interpreted at  [place], [place].    MACRO:  None    Signed by: Viral Salcido 1/11/2025 2:28 PM  Dictation workstation:   [id number]      Echocardiogram     1/22/25    CONCLUSIONS:   1. The left ventricular systolic function is low normal, with a Tejada's biplane calculated ejection fraction of 53%.   2. No regional wall motion abnormalities.   3. Spectral Doppler shows a normal pattern of left ventricular diastolic filling.   4. There is low normal right ventricular systolic function.   5. Aortic valve stenosis is not present.   6. Left Ventricular Global Longitudinal Strain - 13.0 %.       Chest CT Scan     CT angio chest for pulmonary embolism 03/20/2025    Narrative  Interpreted By:  Jose A Vyas,  STUDY:  CT ANGIO CHEST FOR PULMONARY EMBOLISM;  3/20/2025 10:01 pm    INDICATION:  Signs/Symptoms:lung cancer/ syncope.    COMPARISON:  01/21/2025    ACCESSION  NUMBER(S):  [id number]    ORDERING CLINICIAN:  RATNA JOHNSTON    TECHNIQUE:  Helical data acquisition of the chest was obtained after intravenous  administration of 75 mL Omnipaque 350, as per PE protocol. Images  were reformatted in coronal and sagittal planes. Axial and coronal  maximum intensity projection (MIP) images were created and reviewed.    FINDINGS:  POTENTIAL LIMITATIONS OF THE STUDY: None    HEART AND VESSELS:    Filling defect within the right middle lobe branches suggestive of  acute or subacute pulmonary emboli. There remainder of the bilateral  pulmonary artery branches are patent..    Main pulmonary artery and its branches are normal in caliber.    The thoracic aorta normal in course and caliber.    Moderate coronary artery calcifications are seen. Please note,the  study is not optimized for evaluation of coronary arteries.    The cardiac chambers are not enlarged.    There is no pericardial effusion seen.    MEDIASTINUM AND GELA, LOWER NECK AND AXILLA:    The visualized thyroid gland is within normal limits.    Borderline enlarged mediastinal and hilar lymph nodes similar to  prior, improved in size when compared to previous exam. Findings are  nonspecific in etiology. Evaluation somewhat limited due to early  phase of contrast enhancement.    Esophagus appears within normal limits as seen.    LUNGS AND AIRWAYS:  Loculated pleural effusions within the right lower lobe similar to  previous exam, improved in size when compared to prior study. Patient  with right-sided pleural catheter in place. Moderate atelectasis or  infiltrate within the right lung similar to prior. Right upper lobe  nodule has decreased in size when compared to previous exam. It  measures up to 12 x 17 mm in size from 17 x 18 mm previously. Minimal  left basilar atelectasis or scarring.    Remainder of the lung fields are clear bilaterally. No pneumothorax.      UPPER ABDOMEN:  The visualized subdiaphragmatic structures  demonstrate no remarkable  findings.    CHEST WALL AND OSSEOUS STRUCTURES:    Chest wall is within normal limits.  There is a right-sided MediPort in place.There are no suspicious  osseous lesions.Multilevel discogenic degenerative changes throughout  the spine.    Impression  1. Small pulmonary emboli corresponding to right middle lobe  pulmonary artery branches. Findings may be acute or chronic in nature.  2. Interval decrease in size of loculated right pleural effusions  when compared to previous exam. Patient with right-sided pleural  catheter in place. Associated moderate atelectasis or infiltrate  persist.  3. Interval improvement in right upper lobe nodule/malignancy when  compared to prior study.  4. Additional detailed findings as above.      MACRO:  None    Signed by: Jose A Vyas 3/20/2025 11:02 PM  Dictation workstation:   [id number]       Laboratory Studies    Metabolic Parameters     Sodium   Date/Time Value Ref Range Status   03/31/2025 10:45  136 - 145 mmol/L Final     Potassium   Date/Time Value Ref Range Status   03/31/2025 10:45 AM 3.2 (L) 3.5 - 5.3 mmol/L Final     Chloride   Date/Time Value Ref Range Status   03/31/2025 10:45  98 - 107 mmol/L Final     Bicarbonate   Date/Time Value Ref Range Status   03/31/2025 10:45 AM 26 21 - 32 mmol/L Final     Anion Gap   Date/Time Value Ref Range Status   03/31/2025 10:45 AM 8 (L) 10 - 20 mmol/L Final     Urea Nitrogen   Date/Time Value Ref Range Status   03/31/2025 10:45 AM 8 6 - 23 mg/dL Final     Creatinine   Date/Time Value Ref Range Status   03/31/2025 10:45 AM 0.59 0.50 - 1.30 mg/dL Final     GFR MALE   Date/Time Value Ref Range Status   03/07/2023 10:03 AM 90 >90 mL/min/1.73m2 Final     Comment:      CALCULATIONS OF ESTIMATED GFR ARE PERFORMED   USING THE 2021 CKD-EPI STUDY REFIT EQUATION   WITHOUT THE RACE VARIABLE FOR THE IDMS-TRACEABLE   CREATININE METHODS.    https://jasn.asnjournals.org/content/early/2021/09/22/ASN.3661484543        Glucose   Date/Time Value Ref Range Status   03/31/2025 10:45  (H) 74 - 99 mg/dL Final     Calcium   Date/Time Value Ref Range Status   03/31/2025 10:45 AM 7.8 (L) 8.6 - 10.3 mg/dL Final       Hematologic Parameters     WBC   Date/Time Value Ref Range Status   03/31/2025 10:45 AM 3.5 (L) 4.4 - 11.3 x10*3/uL Final     Neutrophils Absolute   Date/Time Value Ref Range Status   03/31/2025 10:45 AM 1.47 (L) 1.60 - 5.50 x10*3/uL Final     Comment:     Percent differential counts (%) should be interpreted in the context of the absolute cell counts (cells/uL).     Neutrophils %   Date/Time Value Ref Range Status   03/31/2025 10:45 AM 42.2 40.0 - 80.0 % Final     Lymphocytes Absolute, Manual   Date/Time Value Ref Range Status   02/18/2025 12:35 PM 0.60 (L) 0.80 - 3.00 x10*3/uL Final     Lymphocytes %, Manual   Date/Time Value Ref Range Status   02/18/2025 12:35 PM 10.0 13.0 - 44.0 % Final     Lymphocytes %   Date/Time Value Ref Range Status   03/31/2025 10:45 AM 35.1 13.0 - 44.0 % Final     Monocytes Absolute, Manual   Date/Time Value Ref Range Status   02/18/2025 12:35 PM 0.48 0.05 - 0.80 x10*3/uL Final     Monocytes %, Manual   Date/Time Value Ref Range Status   02/18/2025 12:35 PM 8.0 2.0 - 10.0 % Final     Monocytes %   Date/Time Value Ref Range Status   03/31/2025 10:45 AM 13.8 2.0 - 10.0 % Final     Basophils %, Manual   Date/Time Value Ref Range Status   02/18/2025 12:35 PM 0.0 0.0 - 2.0 % Final     Basophils %   Date/Time Value Ref Range Status   03/31/2025 10:45 AM 0.6 0.0 - 2.0 % Final     Eosinophils Absolute   Date/Time Value Ref Range Status   03/31/2025 10:45 AM 0.21 0.00 - 0.40 x10*3/uL Final     Eosinophils Absolute, Manual   Date/Time Value Ref Range Status   02/18/2025 12:35 PM 0.18 0.00 - 0.40 x10*3/uL Final     Eosinophils %, Manual   Date/Time Value Ref Range Status   02/18/2025 12:35 PM 3.0 0.0 - 6.0 % Final     Eosinophils %   Date/Time Value Ref Range Status   03/31/2025 10:45 AM 6.0 0.0 -  6.0 % Final     Hemoglobin   Date/Time Value Ref Range Status   03/31/2025 10:45 AM 9.1 (L) 13.5 - 17.5 g/dL Final     Hematocrit   Date/Time Value Ref Range Status   03/31/2025 10:45 AM 29.7 (L) 41.0 - 52.0 % Final     RBC   Date/Time Value Ref Range Status   03/31/2025 10:45 AM 3.25 (L) 4.50 - 5.90 x10*6/uL Final     MCV   Date/Time Value Ref Range Status   03/31/2025 10:45 AM 91 80 - 100 fL Final     MCHC   Date/Time Value Ref Range Status   03/31/2025 10:45 AM 30.6 (L) 32.0 - 36.0 g/dL Final     Platelets   Date/Time Value Ref Range Status   03/31/2025 10:45  150 - 450 x10*3/uL Final       Fat-Soluble Vitamin Levels     Vitamin D, 25-Hydroxy, Total   Date/Time Value Ref Range Status   01/28/2025 08:33 AM 32 30 - 100 ng/mL Final       LFT and Associated Parameters     AST   Date/Time Value Ref Range Status   03/31/2025 10:45 AM 26 9 - 39 U/L Final     ALT   Date/Time Value Ref Range Status   03/31/2025 10:45 AM 36 10 - 52 U/L Final     Comment:     Patients treated with Sulfasalazine may generate falsely decreased results for ALT.     Alkaline Phosphatase   Date/Time Value Ref Range Status   03/31/2025 10:45 AM 89 33 - 136 U/L Final     Bilirubin, Total   Date/Time Value Ref Range Status   03/31/2025 10:45 AM 0.2 0.0 - 1.2 mg/dL Final     Bilirubin, Direct   Date/Time Value Ref Range Status   11/18/2019 11:31 AM 0.1 0.0 - 0.3 mg/dL Final     Albumin   Date/Time Value Ref Range Status   03/31/2025 10:45 AM 2.8 (L) 3.4 - 5.0 g/dL Final     Total Protein   Date/Time Value Ref Range Status   03/31/2025 10:45 AM 5.1 (L) 6.4 - 8.2 g/dL Final       Coagulation Parameters     Protime   Date/Time Value Ref Range Status   03/20/2025 08:11 PM 13.3 (H) 9.8 - 12.4 seconds Final     INR   Date/Time Value Ref Range Status   03/20/2025 08:11 PM 1.2 (H) 0.9 - 1.1 Final       Diabetes Laboratory Tests     Hemoglobin A1C   Date/Time Value Ref Range Status   03/12/2025 09:01 AM 8.2 (H) See comment % Final      Albumin/Creatinine Ratio   Date/Time Value Ref Range Status   01/28/2025 08:47 AM 8.5 <30.0 ug/mg Creat Final        Assessment and Plan / Recommendations:  Problem List Items Addressed This Visit       Malignant pleural effusion (CMS-HCC) - Primary    Current Assessment & Plan     Right side- sight looks clean dry and intact with tegaderm overlying.     Wife is caregiver for PleurX- continue good work with maintaining site, and keep record of output quantity, and appearance of fluid    Once amount goes to < 100-150--> would move to weekly drainage, then if continues < 100-150 can space out to every other week and then re-address to see if PleurX can be removed.    Please call with any questions/concerns of PleurX sight.      Office #[phone], can use  option or nurses line    Josse Ferrera # [phone]     Radiology Scheduling #[phone]    Procedure team- Alia MULLER #[phone] and Panfilo DOUGLASS #[phone]    RTC 6 months, sooner if needed          Epistaxis    Current Assessment & Plan     You have had bloody nose- epistaxis x 2 weeks- appears to have crusting in L nare--> referral to ENT, consider hydrating air at home, using nasal saline spray from over the counter to hydrate, discuss with Oncology team symptoms              Relevant Orders    Referral to ENT          Follow-up: Visit date not found  Future Appointments   Date Time Provider Department Center   4/14/2025 11:45 AM [facility]   4/16/2025 12:30 PM INF 03 [place] [id number] [facility]   4/16/2025  1:00 PM Birgit Hilario RDN, LD [id number] [facility]   4/18/2025  8:45 AM POR CT 1 PORCT [facility]   4/18/2025 10:00 AM INF 05 [place] [id number] [facility]   4/21/2025 10:00 AM INF 03 [place] [id number] [facility]   4/22/2025  1:00 PM Alisha Solitario MD [id number] [facility]   4/22/2025  1:30 PM INF 28 [facility] [id number] [facility]   4/25/2025  9:30 AM INF 04 [place] [id number] [facility]   4/28/2025  8:30 AM INF 07  [place] [id number] [facility]   4/30/2025  9:30 AM INF 04 [place] [id number] [facility]   5/2/2025  8:30 AM INF 08 [place] [id number] [facility]   9/5/2025 10:40 AM FORTINO Garland-CNP, DNP [id number] [facility]   9/8/2025 10:00 AM JENNIFER Garland, DNP [id number] [facility]   3/6/2026 11:00 AM JENNIFER Garland, DNP [id number] [facility]        Patti Brooks MD   04/14/2025

## 2025-04-14 NOTE — PROGRESS NOTES
Patient arrived to infusion with no complaints.  He rec'd his 4 Gm Mg+ replacement per lab parameter order with no adverse reactions.  He was discharged instable condition.

## 2025-04-14 NOTE — ASSESSMENT & PLAN NOTE
You have had bloody nose- epistaxis x 2 weeks- appears to have crusting in L nare--> referral to ENT, consider hydrating air at home, using nasal saline spray from over the counter to hydrate, discuss with Oncology team symptoms

## 2025-04-14 NOTE — ASSESSMENT & PLAN NOTE
Right side- sight looks clean dry and intact with tegaderm overlying.     Wife is caregiver for PleurX- continue good work with maintaining site, and keep record of output quantity, and appearance of fluid    Once amount goes to < 100-150--> would move to weekly drainage, then if continues < 100-150 can space out to every other week and then re-address to see if PleurX can be removed.    Please call with any questions/concerns of PleurX sight.      Office #[phone], can use  option or nurses line    Josse Ferrera # [phone]     Radiology Scheduling #[phone]    Procedure team- Alia MULLER #[phone] and Panfilo DOUGLASS #[phone]    RTC 6 months, sooner if needed

## 2025-04-15 ENCOUNTER — TELEPHONE (OUTPATIENT)
Dept: HEMATOLOGY/ONCOLOGY | Facility: CLINIC | Age: 75
End: 2025-04-15
Payer: MEDICARE

## 2025-04-15 DIAGNOSIS — C80.1 ADENOCARCINOMA (MULTI): Primary | ICD-10-CM

## 2025-04-15 DIAGNOSIS — E83.42 HYPOMAGNESEMIA: ICD-10-CM

## 2025-04-15 NOTE — TELEPHONE ENCOUNTER
Pt's wife wanted to update team that she has noticed spots of blood on bathroom floor and towels 1-2 times daily for the past 2 weeks. Pt states they are from nosebleeds- she states she does hear him blowing his nose- does not hear any coughing. She said she is finding at least 4-6 quarter size drops of blood on floor- no clots, just bright red blood.   She said pt denies pain, dyspnea, dizziness.   Pt did see pulmonology yesterday- no concerns on their end.   Pt has CT on 4/18, next FUV 4/22.

## 2025-04-16 ENCOUNTER — INFUSION (OUTPATIENT)
Dept: HEMATOLOGY/ONCOLOGY | Facility: CLINIC | Age: 75
End: 2025-04-16
Payer: MEDICARE

## 2025-04-16 ENCOUNTER — NUTRITION (OUTPATIENT)
Dept: HEMATOLOGY/ONCOLOGY | Facility: CLINIC | Age: 75
End: 2025-04-16
Payer: MEDICARE

## 2025-04-16 ENCOUNTER — DOCUMENTATION (OUTPATIENT)
Dept: HEMATOLOGY/ONCOLOGY | Facility: HOSPITAL | Age: 75
End: 2025-04-16
Payer: MEDICARE

## 2025-04-16 VITALS
HEART RATE: 103 BPM | HEIGHT: 72 IN | SYSTOLIC BLOOD PRESSURE: 108 MMHG | OXYGEN SATURATION: 100 % | RESPIRATION RATE: 16 BRPM | DIASTOLIC BLOOD PRESSURE: 68 MMHG | BODY MASS INDEX: 24.71 KG/M2 | WEIGHT: 182.4 LBS | TEMPERATURE: 97.3 F

## 2025-04-16 DIAGNOSIS — C80.1 ADENOCARCINOMA (MULTI): ICD-10-CM

## 2025-04-16 DIAGNOSIS — E83.42 HYPOMAGNESEMIA: ICD-10-CM

## 2025-04-16 LAB — MAGNESIUM SERPL-MCNC: 0.82 MG/DL (ref 1.6–2.4)

## 2025-04-16 PROCEDURE — 96365 THER/PROPH/DIAG IV INF INIT: CPT | Mod: INF

## 2025-04-16 PROCEDURE — 2500000004 HC RX 250 GENERAL PHARMACY W/ HCPCS (ALT 636 FOR OP/ED): Mod: JZ | Performed by: STUDENT IN AN ORGANIZED HEALTH CARE EDUCATION/TRAINING PROGRAM

## 2025-04-16 PROCEDURE — 83735 ASSAY OF MAGNESIUM: CPT | Performed by: NURSE PRACTITIONER

## 2025-04-16 PROCEDURE — 96366 THER/PROPH/DIAG IV INF ADDON: CPT | Mod: INF

## 2025-04-16 PROCEDURE — 2500000004 HC RX 250 GENERAL PHARMACY W/ HCPCS (ALT 636 FOR OP/ED): Mod: JZ | Performed by: NURSE PRACTITIONER

## 2025-04-16 RX ORDER — MAGNESIUM SULFATE HEPTAHYDRATE 40 MG/ML
4 INJECTION, SOLUTION INTRAVENOUS ONCE
Status: CANCELLED | OUTPATIENT
Start: 2025-04-18 | End: 2025-04-18

## 2025-04-16 RX ORDER — HEPARIN 100 UNIT/ML
500 SYRINGE INTRAVENOUS AS NEEDED
Status: CANCELLED | OUTPATIENT
Start: 2025-04-16

## 2025-04-16 RX ORDER — MAGNESIUM SULFATE HEPTAHYDRATE 40 MG/ML
4 INJECTION, SOLUTION INTRAVENOUS ONCE
Status: COMPLETED | OUTPATIENT
Start: 2025-04-16 | End: 2025-04-16

## 2025-04-16 RX ORDER — ALBUTEROL SULFATE 0.83 MG/ML
3 SOLUTION RESPIRATORY (INHALATION) AS NEEDED
Status: CANCELLED | OUTPATIENT
Start: 2025-04-18

## 2025-04-16 RX ORDER — EPINEPHRINE 0.3 MG/.3ML
0.3 INJECTION SUBCUTANEOUS EVERY 5 MIN PRN
Status: CANCELLED | OUTPATIENT
Start: 2025-04-18

## 2025-04-16 RX ORDER — MAGNESIUM SULFATE HEPTAHYDRATE 40 MG/ML
2 INJECTION, SOLUTION INTRAVENOUS ONCE
Status: CANCELLED | OUTPATIENT
Start: 2025-04-18 | End: 2025-04-18

## 2025-04-16 RX ORDER — DIPHENHYDRAMINE HYDROCHLORIDE 50 MG/ML
50 INJECTION, SOLUTION INTRAMUSCULAR; INTRAVENOUS AS NEEDED
Status: CANCELLED | OUTPATIENT
Start: 2025-04-18

## 2025-04-16 RX ORDER — MAGNESIUM SULFATE HEPTAHYDRATE 40 MG/ML
4 INJECTION, SOLUTION INTRAVENOUS ONCE
Status: CANCELLED | OUTPATIENT
Start: 2025-04-16 | End: 2025-04-16

## 2025-04-16 RX ORDER — FAMOTIDINE 10 MG/ML
20 INJECTION, SOLUTION INTRAVENOUS ONCE AS NEEDED
Status: CANCELLED | OUTPATIENT
Start: 2025-04-18

## 2025-04-16 RX ORDER — HEPARIN SODIUM,PORCINE/PF 10 UNIT/ML
50 SYRINGE (ML) INTRAVENOUS AS NEEDED
Status: CANCELLED | OUTPATIENT
Start: 2025-04-16

## 2025-04-16 RX ORDER — HEPARIN 100 UNIT/ML
500 SYRINGE INTRAVENOUS AS NEEDED
Status: DISCONTINUED | OUTPATIENT
Start: 2025-04-16 | End: 2025-04-16 | Stop reason: HOSPADM

## 2025-04-16 RX ADMIN — MAGNESIUM SULFATE IN WATER FOR 4 G: 40 INJECTION INTRAVENOUS at 14:03

## 2025-04-16 RX ADMIN — Medication 500 UNITS: at 16:03

## 2025-04-16 ASSESSMENT — PAIN SCALES - GENERAL: PAINLEVEL_OUTOF10: 0-NO PAIN

## 2025-04-16 NOTE — PROGRESS NOTES
Notified by Laura Floyd in Lab Services that Magnesium 0.82. Secure Chat sent to Jamaica Rehman NP. [facility].

## 2025-04-16 NOTE — PROGRESS NOTES
Pt here for magnesium infusion, 4g given for critical mag of 0.82, FORTINO Rehman notified. Pt tolerated infusion well. He is aware of plan of care, will call with further questions or concerns.

## 2025-04-16 NOTE — PROGRESS NOTES
NUTRITION Follow up NOTE    Nutrition Assessment     Reason for Visit:  Jayant Holland is a 74 y.o. male who presents for NSCLC Adenocarcinoma, Staging [id number], RUL, R pleaural effusion/pleural carcinomatosis, R hilar, mediastinal retrocaval nodes.  PleurX placed 1/10/2025     PMH: T2DM, HTN, HLD    ACTIVE TREATMENT: Amivantamab + Pemetrexed/Carboplatin, 21 Day Cycles    Assessed by my colleague at [facility] on 3/12/25 and seen for follow up today during infusion     Admitted 3/20-3/23 with acute pulmonary embolism, Acute left lower extremity deep vein thrombosis and dehydration    SOCIAL HISTORY  Lives at home with his wife. Retired from [employer], worked as a technician for 48 years, notes hazardous chemical exposures.    Patient Active Problem List   Diagnosis    Arthritis of knee, degenerative    Benign essential hypertension    Diabetes mellitus (Multi)    Edema    Fatigue    GERD (gastroesophageal reflux disease)    Hematochezia    Hyperlipemia    Hypokalemia    Joint pain, knee    Left knee DJD    Left knee pain    Leg length discrepancy    Cervicalgia    Low back pain with left-sided sciatica    Low vitamin B12 level    Stiffness of left knee    Sciatica    Night sweats    Strain of right shoulder    Medicare annual wellness visit, subsequent    Contact with and (suspected) exposure to covid-19    Contact with and (suspected) exposure to other hazardous substances    Exposure to Agent Orange    Other chest pain    Hearing loss of right ear    Vitamin D deficiency    Knee stiffness    Arthralgia of knee    Osteoarthritis of knee    Osteoarthritis of left knee    Inequality of length of lower extremity    Erectile dysfunction    Inflammation of sacroiliac joint (CMS-HCC)    Strain of shoulder    Exertional dyspnea    Neck pain, bilateral    Bilateral shoulder pain    Neural foraminal stenosis of cervical spine    Arthritis of both shoulder regions    Impingement of shoulder    Chronic post-traumatic stress disorder     Paresthesia of skin    Reaction to severe stress, unspecified    Sensorineural hearing loss, bilateral    Unstable angina (Multi)    Pleural effusion on right    Malignant pleural effusion (CMS-HCC)    Adenocarcinoma (Multi)    Pleural effusion    Encounter for antineoplastic chemotherapy    Hypomagnesemia    Encounter for monoclonal antibody treatment for malignancy    Advance directive in chart    Syncope, unspecified syncope type    Pulmonary emboli    Hospital discharge follow-up    History of pulmonary embolus (PE)    Epistaxis       Nutrition Significant Labs:  Lab Results   Component Value Date/Time    GLUCOSE 163 (H) 03/31/2025 1045     03/31/2025 1045    K 3.2 (L) 03/31/2025 1045     03/31/2025 1045    CO2 26 03/31/2025 1045    ANIONGAP 8 (L) 03/31/2025 1045    BUN 8 03/31/2025 1045    CREATININE 0.59 03/31/2025 1045    EGFR >90 03/31/2025 1045    CALCIUM 7.8 (L) 03/31/2025 1045    ALBUMIN 2.8 (L) 03/31/2025 1045    ALKPHOS 89 03/31/2025 1045    PROT 5.1 (L) 03/31/2025 1045    AST 26 03/31/2025 1045    BILITOT 0.2 03/31/2025 1045    ALT 36 03/31/2025 1045    MG 0.82 (LL) 04/16/2025 1234   Calcium corrected at 8.76 for low albumin  Hypomagnesia, ongoing with current Amivantamab treatment- pt receiving IV mag replacement    Taking K+ pills    Hemoglobin A1C              Component  Ref Range & Units 1 mo ago  (3/12/25) 2 mo ago  (1/28/25) 6 mo ago  (10/2/24) 1 yr ago  (2/21/24) 2 yr ago  (3/7/23) 2 yr ago  (1/24/23) 3 yr ago  (4/7/22)   Hemoglobin A1C  See comment % 8.2 High  7.1 High  6.4 High  7.0 High  R 6.8 Abnormal  R, CM 7.0 Abnormal  R, CM 6.8 Abnormal  R, CM   Estimated Average Glucose  Not Established mg/dL 189 157 137 154 148 R 154 R 148 R   Resulting Agency [facility]              Narrative  Performed by: [facility]  Diagnosis of Diabetes-Adults  Non-Diabetic: < or = 5.6%  Increased risk for developing diabetes:  "5.7-6.4%  Diagnostic of diabetes: > or = 6.5%     Specimen Collected: 03/12/25 09:01 Last Resulted: 03/12/25 11:01     Lab Results   Component Value Date/Time    VITD25 32 01/28/2025 0833         Anthropometrics:  Height: 181.3 cm (5' 11.38\")   Weight: 82.7 kg (182 lb 6.4 oz)   BMI (Calculated): 25.17    IBW/kg (Dietitian Calculated): 79.3 kg   Percent of IBW: 104.3 %        Wt fluctuating likely r/t pleurX draining  Weight History:   Wt Readings from Last 20 Encounters:   04/16/25 82.7 kg (182 lb 6.4 oz)   04/14/25 83.1 kg (183 lb 3.2 oz)   04/11/25 84.2 kg (185 lb 11.2 oz)   04/09/25 85.5 kg (188 lb 7.9 oz)   03/31/25 86.2 kg (190 lb 1.6 oz)- pt reports he had a lot of fluid and required a    03/20/25 83 kg (182 lb 14.4 oz)-additional loss of 2.2 kg  (3.6%) in 1 week- significant   03/17/25 84.6 kg (186 lb 9.6 oz)   03/14/25 87.7 kg (193 lb 6.4 oz)   3/12/25 85.2 kg (187 lb 13.3 oz)- 9.7% loss in 6.5 months    03/05/25 84.8 kg (187 lb)   8/26/24: 94.8 kg    Weight Change %:  Weight History / % Weight Change: Overall stable in 1 month after signifincat losses  Significant Weight Loss:  (recent h/o)    Nutrition History:  Food & Nutrition History: Reports his appetite comes and goes.  Sometimes he is hungry in the morning and sometimes he is not.  If not, he is usually. is hungry for dinner.  He does not snack.  He is not checking his BS.  Reports he is not a big \"sweets\" eater.  Food Allergies:    Food Intolerances:    Vitamin/mineral intake:       Herbal supplements:    Medication and Complementary/Alternative Medicine Use: Prescription Medication Use: Taking metformin as directed  Dentition:    Sleep Habits:      Diet Recall:  Meal 1: Maybe eats breakfast twice weekly- depends on appetite.  Had a Boost VHC yesterday.  Snack 1:    Meal 2:    Snack 2:    Meal 3: pasta with red meat sauce and broccoli with butter  Snack 3: Boost VHC  Food Variety:    Oral Nutrition Supplement Use: Oral Nutrition Supplements: Boost " Very High Calorie Frequency: twice a day Calories: 1060 Protein: 44  Fluid Intake: 2-3 each 16.9 oz water bottles, juice, diet gingerale  Energy Intake: Fair 50-75 %~ 1000 kcal  Protein intake: 60 gm/day  Food Preparation:  Cooking:    Grocery Shopping:    Dining Out:      Medications:  Current Outpatient Medications   Medication Instructions    acetaminophen (TYLENOL) 650 mg, oral, Every 4 hours PRN    apixaban (Eliquis) 5 mg tablet Take 2 tablets (10 mg) by mouth every 12 hours for 5 days, THEN 1 tablet (5 mg) every 12 hours.    Blood glucose monitoring meter kit kit 1 each, As needed    blood-glucose meter misc 1 Device, miscellaneous, Daily    chlorthalidone (HYGROTON) 25 mg, Daily    clindamycin (Cleocin T) 1 % lotion Apply topically to affected area twice daily.    folic acid (FOLVITE) 1,000 mcg, oral, Daily    hydrocortisone 1 % cream Apply topically to face, hands, feet, neck, back, and chest once daily at bedtime for rash prevention.    lancets 30 gauge misc 1 Device, miscellaneous, 3 times daily    lisinopril 20 mg, oral, Daily    meloxicam (MOBIC) 15 mg, oral, Daily    metFORMIN XR (GLUCOPHAGE-XR) 1,000 mg, oral, 2 times daily, Do not crush, chew, or split    omeprazole (PriLOSEC) 20 mg DR capsule TAKE 1 CAPSULE BY MOUTH EVERY DAY    ondansetron (ZOFRAN) 8 mg, oral, Every 8 hours PRN    potassium chloride CR 20 mEq ER tablet 20 mEq, oral, Daily, Do not crush, chew, or split.    prochlorperazine (COMPAZINE) 10 mg, oral, Every 6 hours PRN    rosuvastatin (Crestor) 40 mg tablet TAKE 1 TABLET BY MOUTH EVERY DAY    sennosides (SENOKOT) 17.2 mg, oral, Nightly PRN       Nutrition Focused Physical Exam Findings:    Subcutaneous Fat Loss:   Orbital Fat Pads: Mild-Moderate (slight dark circles and slight hollowing)  Buccal Fat Pads: Mild-Moderate (flat cheeks, minimal bounce)  Triceps: Mild-Moderate (less than ample fat tissue)  Ribs: Defer    Muscle Wasting:  Temporalis: Mild-Moderate (slight  depression)  Pectoralis (Clavicular Region): Mild-Moderate (some protrusion of clavicle)  Deltoid/Trapezius: Mild-Moderate (slight protrusion of acromion process)  Interosseous: Defer  Trapezius/Infraspinatus/Supraspinatus (Scapular Region): Defer  Quadriceps: Defer    Physical Findings:     Edema: none  Denies N/V   Moving bowels regularly    Estimated Needs:       Total Energy Estimated Needs in 24 hours (kCal): 2398 kCal  Energy Estimated Needs per kg Body Weight in 24 hours (kCal/kg): 29 kCal/kg  Total Protein Estimated Needs in 24 Hours (g): 107 g  Protein Estimated Needs per kg Body Weight in 24 Hours (g/kg): 1.3 g/kg  Total Fluid Estimated Needs in 24 Hours (mL): 2315 mL  Total Fluid Estimated Needs in 24 hours (mL/kg): 28 mL/kg         Nutrition Diagnosis   Malnutrition Diagnosis  Patient has Malnutrition Diagnosis: Yes  Diagnosis Status: Active  Malnutrition Diagnosis: Moderate malnutrition related to chronic disease or condition  Related to: increased nutrient demands of cancer and inadequate intake  As Evidenced by: intake <75% of nutrition needs, significant wt loss as documented, and physical findings per NFPE.  Nutrition Diagnosis  Patient has Nutrition Diagnosis: Yes  Diagnosis Status (1): New  Nutrition Diagnosis 1: Altered nutrition related to laboratory values  Related to (1): suboptimal BS control  As Evidenced by (1): HgbA1c of 8.2 (Highest value in the past 3 years)  Additional Assessment Information (1): Pt would benefit from BS monitoring to assist MD in proper interventions to improve BS control     Nutrition Interventions/Recommendations   Nutrition Prescription: Oral nutrition Moderate CHO/limited concentrated sweets YADIRA (Especially during steroid administration- day of chemo and 2-3 days after)    Nutrition Interventions:   Food and Nutrient Delivery: Meals & Snacks: Modification of schedule of oral intake, Modify composition of oral intake  Goals: Small frequent meals and snacks- paired  with a protein (to include ONS)  Medical Food Supplement: Commercial beverage medical food supplement therapy  Goals: 2 each Boost VHC daily to provide 1060 calories, 44 g of protein per day (Pt is purchasing from  [facility].  Did provide with Ensure Plus and Complete sample per request- and coupons)     Coordination of Care:       Nutrition Education:   Nutrition Education Content:       Provided verbal and written instruction on High-Calorie, High Protein diet with suggestions for snack ideas.  Provided recommendation to increase  or chose nutrient density of foods, and/or increase Boost to 1 carton BID or consider Boost VHC at least 1 time day.  Provided dietitian contact information.    Readiness to Change : Good  Level of Understanding : Good  Anticipated Compliant : Good         Nutrition Monitoring and Evaluation   Food and Nutrient Intake  Monitoring and Evaluation Plan: Fluid intake, Amount of food, Meal/snack pattern, Carbohydrate intake  Fluid Intake: Estimated fluid intake  Criteria: Meet daily needs for hydration  Amount of Food: Estimated amout of food  Criteria: Meet energy and protein needs  Meal/Snack Pattern: Estimated meal and snack pattern  Criteria: 5-6 daily to include ONS  Estimated carbohydrate intake: Estimated carbohydrate intake  Criteria: 40-50% of caloric intake    Anthropometric measurements  Monitoring and Evaluation Plan: Weight  Body Weight: Body weight  Criteria: Maintain  Biochemical Data, Medical Tests and Procedures  Monitoring and Evaluation Plan: Electrolyte/renal panel, Glucose/endocrine profile  Criteria: WNL  Glucose/Endocrine Profile: Glucose, casual, Hemoglobin A1c (HgbA1c)  Criteria: < 200 and < 7.0 respectivelyNutrition Focused Physical Findings  Monitoring and Evaluation Plan: Adipose, Digestive System, Muscle  Adipose Finding: Loss of subcutaneous fat  Criteria: None further  Digestive System Finding: Other (Comment) (GI symptoms as needed)  Criteria: Manage  appropriately wioth diet and meds per MD/CNP  Muscle Finding: Muscle atrophy  Criteria: None further       Time Spent  Prep time on day of patient encounter: 5 minutes  Time spent directly with patient, family or caregiver: 20 minutes  Additional Time Spent on Patient Care Activities: 5 minutes  Documentation Time: 15 minutes  Other Time Spent: 0 minutes  Total: 45 minutes

## 2025-04-17 VITALS — HEIGHT: 71 IN | WEIGHT: 182.4 LBS | BODY MASS INDEX: 25.54 KG/M2

## 2025-04-17 RX ORDER — MAGNESIUM SULFATE HEPTAHYDRATE 40 MG/ML
4 INJECTION, SOLUTION INTRAVENOUS ONCE
Status: CANCELLED | OUTPATIENT
Start: 2025-04-18 | End: 2025-04-18

## 2025-04-17 RX ORDER — MAGNESIUM SULFATE HEPTAHYDRATE 40 MG/ML
2-4 INJECTION, SOLUTION INTRAVENOUS ONCE
Status: CANCELLED | OUTPATIENT
Start: 2025-04-18 | End: 2025-04-18

## 2025-04-17 RX ORDER — MAGNESIUM SULFATE HEPTAHYDRATE 40 MG/ML
2 INJECTION, SOLUTION INTRAVENOUS ONCE
OUTPATIENT
Start: 2025-04-18

## 2025-04-17 RX ORDER — MAGNESIUM SULFATE HEPTAHYDRATE 40 MG/ML
2 INJECTION, SOLUTION INTRAVENOUS ONCE
Status: CANCELLED | OUTPATIENT
Start: 2025-04-18 | End: 2025-04-18

## 2025-04-17 RX ORDER — MAGNESIUM SULFATE HEPTAHYDRATE 40 MG/ML
4 INJECTION, SOLUTION INTRAVENOUS ONCE
OUTPATIENT
Start: 2025-04-18

## 2025-04-18 ENCOUNTER — HOSPITAL ENCOUNTER (OUTPATIENT)
Dept: RADIOLOGY | Facility: HOSPITAL | Age: 75
Discharge: HOME | End: 2025-04-18
Payer: MEDICARE

## 2025-04-18 ENCOUNTER — INFUSION (OUTPATIENT)
Dept: HEMATOLOGY/ONCOLOGY | Facility: CLINIC | Age: 75
End: 2025-04-18
Payer: MEDICARE

## 2025-04-18 VITALS
WEIGHT: 181.5 LBS | TEMPERATURE: 98.2 F | SYSTOLIC BLOOD PRESSURE: 125 MMHG | RESPIRATION RATE: 16 BRPM | HEART RATE: 95 BPM | BODY MASS INDEX: 24.58 KG/M2 | HEIGHT: 72 IN | DIASTOLIC BLOOD PRESSURE: 76 MMHG | OXYGEN SATURATION: 100 %

## 2025-04-18 DIAGNOSIS — C80.1 ADENOCARCINOMA (MULTI): ICD-10-CM

## 2025-04-18 DIAGNOSIS — J91.0 MALIGNANT PLEURAL EFFUSION (CMS-HCC): ICD-10-CM

## 2025-04-18 DIAGNOSIS — C80.1 ADENOCARCINOMA (MULTI): Primary | ICD-10-CM

## 2025-04-18 DIAGNOSIS — E83.42 HYPOMAGNESEMIA: ICD-10-CM

## 2025-04-18 LAB — MAGNESIUM SERPL-MCNC: 0.97 MG/DL (ref 1.6–2.4)

## 2025-04-18 PROCEDURE — 83735 ASSAY OF MAGNESIUM: CPT

## 2025-04-18 PROCEDURE — 96366 THER/PROPH/DIAG IV INF ADDON: CPT | Mod: INF

## 2025-04-18 PROCEDURE — 2500000004 HC RX 250 GENERAL PHARMACY W/ HCPCS (ALT 636 FOR OP/ED): Mod: JZ | Performed by: STUDENT IN AN ORGANIZED HEALTH CARE EDUCATION/TRAINING PROGRAM

## 2025-04-18 PROCEDURE — 96365 THER/PROPH/DIAG IV INF INIT: CPT | Mod: INF

## 2025-04-18 PROCEDURE — 2500000004 HC RX 250 GENERAL PHARMACY W/ HCPCS (ALT 636 FOR OP/ED): Mod: JZ | Performed by: NURSE PRACTITIONER

## 2025-04-18 PROCEDURE — 2550000001 HC RX 255 CONTRASTS: Mod: JZ | Performed by: NURSE PRACTITIONER

## 2025-04-18 PROCEDURE — 74177 CT ABD & PELVIS W/CONTRAST: CPT

## 2025-04-18 RX ORDER — MAGNESIUM SULFATE HEPTAHYDRATE 40 MG/ML
4 INJECTION, SOLUTION INTRAVENOUS ONCE
Status: COMPLETED | OUTPATIENT
Start: 2025-04-18 | End: 2025-04-18

## 2025-04-18 RX ORDER — MAGNESIUM SULFATE HEPTAHYDRATE 40 MG/ML
4 INJECTION, SOLUTION INTRAVENOUS ONCE
Status: CANCELLED | OUTPATIENT
Start: 2025-04-21 | End: 2025-04-21

## 2025-04-18 RX ORDER — MAGNESIUM SULFATE HEPTAHYDRATE 40 MG/ML
2 INJECTION, SOLUTION INTRAVENOUS ONCE
OUTPATIENT
Start: 2025-04-18 | End: 2025-04-18

## 2025-04-18 RX ORDER — EPINEPHRINE 0.3 MG/.3ML
0.3 INJECTION SUBCUTANEOUS EVERY 5 MIN PRN
OUTPATIENT
Start: 2025-04-21

## 2025-04-18 RX ORDER — HEPARIN SODIUM,PORCINE/PF 10 UNIT/ML
50 SYRINGE (ML) INTRAVENOUS AS NEEDED
OUTPATIENT
Start: 2025-04-18

## 2025-04-18 RX ORDER — SODIUM CHLORIDE 0.9 % (FLUSH) 0.9 %
10 SYRINGE (ML) INJECTION EVERY 8 HOURS SCHEDULED
Status: DISCONTINUED | OUTPATIENT
Start: 2025-04-18 | End: 2025-04-19 | Stop reason: HOSPADM

## 2025-04-18 RX ORDER — MAGNESIUM SULFATE HEPTAHYDRATE 40 MG/ML
2-4 INJECTION, SOLUTION INTRAVENOUS ONCE
Status: CANCELLED | OUTPATIENT
Start: 2025-04-21 | End: 2025-04-21

## 2025-04-18 RX ORDER — HEPARIN 100 UNIT/ML
500 SYRINGE INTRAVENOUS AS NEEDED
Status: DISCONTINUED | OUTPATIENT
Start: 2025-04-18 | End: 2025-04-19 | Stop reason: HOSPADM

## 2025-04-18 RX ORDER — HEPARIN 100 UNIT/ML
500 SYRINGE INTRAVENOUS AS NEEDED
Status: DISCONTINUED | OUTPATIENT
Start: 2025-04-18 | End: 2025-04-18 | Stop reason: HOSPADM

## 2025-04-18 RX ORDER — FAMOTIDINE 10 MG/ML
20 INJECTION, SOLUTION INTRAVENOUS ONCE AS NEEDED
OUTPATIENT
Start: 2025-04-21

## 2025-04-18 RX ORDER — MAGNESIUM SULFATE HEPTAHYDRATE 40 MG/ML
2 INJECTION, SOLUTION INTRAVENOUS ONCE
Status: CANCELLED | OUTPATIENT
Start: 2025-04-21 | End: 2025-04-21

## 2025-04-18 RX ORDER — MAGNESIUM SULFATE HEPTAHYDRATE 40 MG/ML
2-4 INJECTION, SOLUTION INTRAVENOUS ONCE
OUTPATIENT
Start: 2025-04-18 | End: 2025-04-18

## 2025-04-18 RX ORDER — MAGNESIUM SULFATE HEPTAHYDRATE 40 MG/ML
4 INJECTION, SOLUTION INTRAVENOUS ONCE
OUTPATIENT
Start: 2025-04-18 | End: 2025-04-18

## 2025-04-18 RX ORDER — ALBUTEROL SULFATE 0.83 MG/ML
3 SOLUTION RESPIRATORY (INHALATION) AS NEEDED
OUTPATIENT
Start: 2025-04-21

## 2025-04-18 RX ORDER — HEPARIN 100 UNIT/ML
500 SYRINGE INTRAVENOUS AS NEEDED
OUTPATIENT
Start: 2025-04-18

## 2025-04-18 RX ORDER — DIPHENHYDRAMINE HYDROCHLORIDE 50 MG/ML
50 INJECTION, SOLUTION INTRAMUSCULAR; INTRAVENOUS AS NEEDED
OUTPATIENT
Start: 2025-04-21

## 2025-04-18 RX ADMIN — MAGNESIUM SULFATE IN WATER FOR 4 G: 40 INJECTION INTRAVENOUS at 10:34

## 2025-04-18 RX ADMIN — Medication 500 UNITS: at 12:30

## 2025-04-18 RX ADMIN — IOHEXOL 75 ML: 350 INJECTION, SOLUTION INTRAVENOUS at 09:15

## 2025-04-18 ASSESSMENT — PAIN SCALES - GENERAL: PAINLEVEL_OUTOF10: 0-NO PAIN

## 2025-04-18 NOTE — PROGRESS NOTES
Patient is here for magnesium replacement. Tolerated well. Patient is aware of plan of care and discharged in stable condition.

## 2025-04-21 ENCOUNTER — INFUSION (OUTPATIENT)
Dept: HEMATOLOGY/ONCOLOGY | Facility: CLINIC | Age: 75
End: 2025-04-21
Payer: MEDICARE

## 2025-04-21 VITALS
SYSTOLIC BLOOD PRESSURE: 125 MMHG | RESPIRATION RATE: 16 BRPM | WEIGHT: 182.76 LBS | HEART RATE: 90 BPM | DIASTOLIC BLOOD PRESSURE: 76 MMHG | BODY MASS INDEX: 24.78 KG/M2 | TEMPERATURE: 96.4 F

## 2025-04-21 DIAGNOSIS — C80.1 ADENOCARCINOMA (MULTI): ICD-10-CM

## 2025-04-21 DIAGNOSIS — E83.42 HYPOMAGNESEMIA: Primary | ICD-10-CM

## 2025-04-21 DIAGNOSIS — E83.42 HYPOMAGNESEMIA: ICD-10-CM

## 2025-04-21 LAB
ALBUMIN SERPL BCP-MCNC: 3.4 G/DL (ref 3.4–5)
ALP SERPL-CCNC: 99 U/L (ref 33–136)
ALT SERPL W P-5'-P-CCNC: 21 U/L (ref 10–52)
ANION GAP SERPL CALC-SCNC: 14 MMOL/L (ref 10–20)
AST SERPL W P-5'-P-CCNC: 25 U/L (ref 9–39)
BASOPHILS # BLD AUTO: 0.01 X10*3/UL (ref 0–0.1)
BASOPHILS NFR BLD AUTO: 0.4 %
BILIRUB SERPL-MCNC: 0.3 MG/DL (ref 0–1.2)
BUN SERPL-MCNC: 15 MG/DL (ref 6–23)
CALCIUM SERPL-MCNC: 8.1 MG/DL (ref 8.6–10.6)
CHLORIDE SERPL-SCNC: 101 MMOL/L (ref 98–107)
CO2 SERPL-SCNC: 26 MMOL/L (ref 21–32)
CREAT SERPL-MCNC: 0.92 MG/DL (ref 0.5–1.3)
EGFRCR SERPLBLD CKD-EPI 2021: 87 ML/MIN/1.73M*2
EOSINOPHIL # BLD AUTO: 0.11 X10*3/UL (ref 0–0.4)
EOSINOPHIL NFR BLD AUTO: 3.9 %
ERYTHROCYTE [DISTWIDTH] IN BLOOD BY AUTOMATED COUNT: 18.3 % (ref 11.5–14.5)
GLUCOSE SERPL-MCNC: 120 MG/DL (ref 74–99)
HCT VFR BLD AUTO: 27.8 % (ref 41–52)
HGB BLD-MCNC: 9 G/DL (ref 13.5–17.5)
IMM GRANULOCYTES # BLD AUTO: 0.01 X10*3/UL (ref 0–0.5)
IMM GRANULOCYTES NFR BLD AUTO: 0.4 % (ref 0–0.9)
LYMPHOCYTES # BLD AUTO: 1.29 X10*3/UL (ref 0.8–3)
LYMPHOCYTES NFR BLD AUTO: 45.3 %
MCH RBC QN AUTO: 29.5 PG (ref 26–34)
MCHC RBC AUTO-ENTMCNC: 32.4 G/DL (ref 32–36)
MCV RBC AUTO: 91 FL (ref 80–100)
MONOCYTES # BLD AUTO: 0.5 X10*3/UL (ref 0.05–0.8)
MONOCYTES NFR BLD AUTO: 17.5 %
NEUTROPHILS # BLD AUTO: 0.93 X10*3/UL (ref 1.6–5.5)
NEUTROPHILS NFR BLD AUTO: 32.5 %
NRBC BLD-RTO: ABNORMAL /100{WBCS}
PLATELET # BLD AUTO: 168 X10*3/UL (ref 150–450)
POTASSIUM SERPL-SCNC: 3.5 MMOL/L (ref 3.5–5.3)
PROT SERPL-MCNC: 6.1 G/DL (ref 6.4–8.2)
RBC # BLD AUTO: 3.05 X10*6/UL (ref 4.5–5.9)
SODIUM SERPL-SCNC: 137 MMOL/L (ref 136–145)
WBC # BLD AUTO: 2.9 X10*3/UL (ref 4.4–11.3)

## 2025-04-21 PROCEDURE — 96365 THER/PROPH/DIAG IV INF INIT: CPT | Mod: INF

## 2025-04-21 PROCEDURE — 85025 COMPLETE CBC W/AUTO DIFF WBC: CPT

## 2025-04-21 PROCEDURE — 2500000004 HC RX 250 GENERAL PHARMACY W/ HCPCS (ALT 636 FOR OP/ED): Mod: JZ | Performed by: STUDENT IN AN ORGANIZED HEALTH CARE EDUCATION/TRAINING PROGRAM

## 2025-04-21 PROCEDURE — 2500000004 HC RX 250 GENERAL PHARMACY W/ HCPCS (ALT 636 FOR OP/ED): Mod: JZ | Performed by: NURSE PRACTITIONER

## 2025-04-21 PROCEDURE — 80053 COMPREHEN METABOLIC PANEL: CPT

## 2025-04-21 PROCEDURE — 96366 THER/PROPH/DIAG IV INF ADDON: CPT | Mod: INF

## 2025-04-21 RX ORDER — ALBUTEROL SULFATE 0.83 MG/ML
3 SOLUTION RESPIRATORY (INHALATION) AS NEEDED
Status: CANCELLED | OUTPATIENT
Start: 2025-04-25

## 2025-04-21 RX ORDER — MAGNESIUM SULFATE HEPTAHYDRATE 40 MG/ML
4 INJECTION, SOLUTION INTRAVENOUS ONCE
Status: CANCELLED | OUTPATIENT
Start: 2025-04-25 | End: 2025-04-25

## 2025-04-21 RX ORDER — EPINEPHRINE 0.3 MG/.3ML
0.3 INJECTION SUBCUTANEOUS EVERY 5 MIN PRN
Status: DISCONTINUED | OUTPATIENT
Start: 2025-04-21 | End: 2025-04-21 | Stop reason: HOSPADM

## 2025-04-21 RX ORDER — MAGNESIUM SULFATE HEPTAHYDRATE 40 MG/ML
4 INJECTION, SOLUTION INTRAVENOUS ONCE
Status: COMPLETED | OUTPATIENT
Start: 2025-04-21 | End: 2025-04-21

## 2025-04-21 RX ORDER — HEPARIN 100 UNIT/ML
500 SYRINGE INTRAVENOUS AS NEEDED
Status: DISCONTINUED | OUTPATIENT
Start: 2025-04-21 | End: 2025-04-21 | Stop reason: HOSPADM

## 2025-04-21 RX ORDER — MAGNESIUM SULFATE HEPTAHYDRATE 40 MG/ML
2-4 INJECTION, SOLUTION INTRAVENOUS ONCE
Status: CANCELLED | OUTPATIENT
Start: 2025-04-25 | End: 2025-04-25

## 2025-04-21 RX ORDER — MAGNESIUM SULFATE HEPTAHYDRATE 40 MG/ML
2 INJECTION, SOLUTION INTRAVENOUS ONCE
Status: CANCELLED | OUTPATIENT
Start: 2025-04-25 | End: 2025-04-25

## 2025-04-21 RX ORDER — DIPHENHYDRAMINE HYDROCHLORIDE 50 MG/ML
50 INJECTION, SOLUTION INTRAMUSCULAR; INTRAVENOUS AS NEEDED
Status: DISCONTINUED | OUTPATIENT
Start: 2025-04-21 | End: 2025-04-21 | Stop reason: HOSPADM

## 2025-04-21 RX ORDER — FAMOTIDINE 10 MG/ML
20 INJECTION, SOLUTION INTRAVENOUS ONCE AS NEEDED
Status: CANCELLED | OUTPATIENT
Start: 2025-04-25

## 2025-04-21 RX ORDER — EPINEPHRINE 0.3 MG/.3ML
0.3 INJECTION SUBCUTANEOUS EVERY 5 MIN PRN
Status: CANCELLED | OUTPATIENT
Start: 2025-04-25

## 2025-04-21 RX ORDER — DIPHENHYDRAMINE HYDROCHLORIDE 50 MG/ML
50 INJECTION, SOLUTION INTRAMUSCULAR; INTRAVENOUS AS NEEDED
Status: CANCELLED | OUTPATIENT
Start: 2025-04-25

## 2025-04-21 RX ORDER — HEPARIN 100 UNIT/ML
500 SYRINGE INTRAVENOUS AS NEEDED
Status: CANCELLED | OUTPATIENT
Start: 2025-04-21

## 2025-04-21 RX ORDER — HEPARIN SODIUM,PORCINE/PF 10 UNIT/ML
50 SYRINGE (ML) INTRAVENOUS AS NEEDED
Status: DISCONTINUED | OUTPATIENT
Start: 2025-04-21 | End: 2025-04-21 | Stop reason: HOSPADM

## 2025-04-21 RX ORDER — HEPARIN SODIUM,PORCINE/PF 10 UNIT/ML
50 SYRINGE (ML) INTRAVENOUS AS NEEDED
Status: CANCELLED | OUTPATIENT
Start: 2025-04-21

## 2025-04-21 RX ORDER — ALBUTEROL SULFATE 0.83 MG/ML
3 SOLUTION RESPIRATORY (INHALATION) AS NEEDED
Status: DISCONTINUED | OUTPATIENT
Start: 2025-04-21 | End: 2025-04-21 | Stop reason: HOSPADM

## 2025-04-21 RX ORDER — FAMOTIDINE 10 MG/ML
20 INJECTION, SOLUTION INTRAVENOUS ONCE AS NEEDED
Status: DISCONTINUED | OUTPATIENT
Start: 2025-04-21 | End: 2025-04-21 | Stop reason: HOSPADM

## 2025-04-21 RX ADMIN — MAGNESIUM SULFATE HEPTAHYDRATE 4 G: 4 INJECTION, SOLUTION INTRAVENOUS at 10:47

## 2025-04-21 RX ADMIN — HEPARIN 500 UNITS: 100 SYRINGE at 12:48

## 2025-04-21 ASSESSMENT — PAIN SCALES - GENERAL: PAINLEVEL_OUTOF10: 0-NO PAIN

## 2025-04-22 ENCOUNTER — INFUSION (OUTPATIENT)
Dept: HEMATOLOGY/ONCOLOGY | Facility: HOSPITAL | Age: 75
End: 2025-04-22
Payer: MEDICARE

## 2025-04-22 ENCOUNTER — OFFICE VISIT (OUTPATIENT)
Dept: HEMATOLOGY/ONCOLOGY | Facility: HOSPITAL | Age: 75
End: 2025-04-22
Payer: MEDICARE

## 2025-04-22 VITALS
SYSTOLIC BLOOD PRESSURE: 108 MMHG | HEART RATE: 78 BPM | RESPIRATION RATE: 16 BRPM | BODY MASS INDEX: 24.31 KG/M2 | TEMPERATURE: 96.1 F | WEIGHT: 179.3 LBS | OXYGEN SATURATION: 100 % | DIASTOLIC BLOOD PRESSURE: 70 MMHG

## 2025-04-22 DIAGNOSIS — E83.42 HYPOMAGNESEMIA: ICD-10-CM

## 2025-04-22 DIAGNOSIS — C80.1 ADENOCARCINOMA (MULTI): ICD-10-CM

## 2025-04-22 DIAGNOSIS — C80.1 ADENOCARCINOMA (MULTI): Primary | ICD-10-CM

## 2025-04-22 PROCEDURE — 2500000004 HC RX 250 GENERAL PHARMACY W/ HCPCS (ALT 636 FOR OP/ED): Mod: JZ | Performed by: NURSE PRACTITIONER

## 2025-04-22 PROCEDURE — 1111F DSCHRG MED/CURRENT MED MERGE: CPT | Performed by: STUDENT IN AN ORGANIZED HEALTH CARE EDUCATION/TRAINING PROGRAM

## 2025-04-22 PROCEDURE — 2500000004 HC RX 250 GENERAL PHARMACY W/ HCPCS (ALT 636 FOR OP/ED): Mod: JZ | Performed by: STUDENT IN AN ORGANIZED HEALTH CARE EDUCATION/TRAINING PROGRAM

## 2025-04-22 PROCEDURE — 3048F LDL-C <100 MG/DL: CPT | Performed by: STUDENT IN AN ORGANIZED HEALTH CARE EDUCATION/TRAINING PROGRAM

## 2025-04-22 PROCEDURE — 4010F ACE/ARB THERAPY RXD/TAKEN: CPT | Performed by: STUDENT IN AN ORGANIZED HEALTH CARE EDUCATION/TRAINING PROGRAM

## 2025-04-22 PROCEDURE — 3052F HG A1C>EQUAL 8.0%<EQUAL 9.0%: CPT | Performed by: STUDENT IN AN ORGANIZED HEALTH CARE EDUCATION/TRAINING PROGRAM

## 2025-04-22 PROCEDURE — 1159F MED LIST DOCD IN RCRD: CPT | Performed by: STUDENT IN AN ORGANIZED HEALTH CARE EDUCATION/TRAINING PROGRAM

## 2025-04-22 PROCEDURE — G2211 COMPLEX E/M VISIT ADD ON: HCPCS | Performed by: STUDENT IN AN ORGANIZED HEALTH CARE EDUCATION/TRAINING PROGRAM

## 2025-04-22 PROCEDURE — 96365 THER/PROPH/DIAG IV INF INIT: CPT | Mod: INF

## 2025-04-22 PROCEDURE — 99215 OFFICE O/P EST HI 40 MIN: CPT | Performed by: STUDENT IN AN ORGANIZED HEALTH CARE EDUCATION/TRAINING PROGRAM

## 2025-04-22 PROCEDURE — 3074F SYST BP LT 130 MM HG: CPT | Performed by: STUDENT IN AN ORGANIZED HEALTH CARE EDUCATION/TRAINING PROGRAM

## 2025-04-22 PROCEDURE — 3078F DIAST BP <80 MM HG: CPT | Performed by: STUDENT IN AN ORGANIZED HEALTH CARE EDUCATION/TRAINING PROGRAM

## 2025-04-22 PROCEDURE — 3061F NEG MICROALBUMINURIA REV: CPT | Performed by: STUDENT IN AN ORGANIZED HEALTH CARE EDUCATION/TRAINING PROGRAM

## 2025-04-22 PROCEDURE — 1157F ADVNC CARE PLAN IN RCRD: CPT | Performed by: STUDENT IN AN ORGANIZED HEALTH CARE EDUCATION/TRAINING PROGRAM

## 2025-04-22 PROCEDURE — 1126F AMNT PAIN NOTED NONE PRSNT: CPT | Performed by: STUDENT IN AN ORGANIZED HEALTH CARE EDUCATION/TRAINING PROGRAM

## 2025-04-22 RX ORDER — HEPARIN 100 UNIT/ML
500 SYRINGE INTRAVENOUS AS NEEDED
Status: DISCONTINUED | OUTPATIENT
Start: 2025-04-22 | End: 2025-04-22 | Stop reason: HOSPADM

## 2025-04-22 RX ORDER — FAMOTIDINE 10 MG/ML
20 INJECTION, SOLUTION INTRAVENOUS ONCE AS NEEDED
Status: CANCELLED | OUTPATIENT
Start: 2025-04-25

## 2025-04-22 RX ORDER — MAGNESIUM SULFATE HEPTAHYDRATE 40 MG/ML
2-4 INJECTION, SOLUTION INTRAVENOUS ONCE
Status: CANCELLED | OUTPATIENT
Start: 2025-04-25 | End: 2025-04-25

## 2025-04-22 RX ORDER — MAGNESIUM SULFATE HEPTAHYDRATE 40 MG/ML
2 INJECTION, SOLUTION INTRAVENOUS ONCE
Status: CANCELLED | OUTPATIENT
Start: 2025-04-25 | End: 2025-04-25

## 2025-04-22 RX ORDER — HEPARIN SODIUM,PORCINE/PF 10 UNIT/ML
50 SYRINGE (ML) INTRAVENOUS AS NEEDED
Status: CANCELLED | OUTPATIENT
Start: 2025-04-22

## 2025-04-22 RX ORDER — HEPARIN 100 UNIT/ML
500 SYRINGE INTRAVENOUS AS NEEDED
Status: CANCELLED | OUTPATIENT
Start: 2025-04-22

## 2025-04-22 RX ORDER — PROCHLORPERAZINE MALEATE 10 MG
10 TABLET ORAL EVERY 6 HOURS PRN
Status: CANCELLED | OUTPATIENT
Start: 2025-04-25

## 2025-04-22 RX ORDER — ACETAMINOPHEN 325 MG/1
650 TABLET ORAL ONCE
Status: CANCELLED | OUTPATIENT
Start: 2025-04-25

## 2025-04-22 RX ORDER — MAGNESIUM SULFATE HEPTAHYDRATE 40 MG/ML
2 INJECTION, SOLUTION INTRAVENOUS ONCE
Status: COMPLETED | OUTPATIENT
Start: 2025-04-22 | End: 2025-04-22

## 2025-04-22 RX ORDER — DIPHENHYDRAMINE HCL 50 MG
50 CAPSULE ORAL ONCE
Status: CANCELLED | OUTPATIENT
Start: 2025-04-25

## 2025-04-22 RX ORDER — DIPHENHYDRAMINE HYDROCHLORIDE 50 MG/ML
50 INJECTION, SOLUTION INTRAMUSCULAR; INTRAVENOUS AS NEEDED
Status: CANCELLED | OUTPATIENT
Start: 2025-04-25

## 2025-04-22 RX ORDER — MAGNESIUM SULFATE HEPTAHYDRATE 40 MG/ML
2 INJECTION, SOLUTION INTRAVENOUS ONCE
OUTPATIENT
Start: 2025-04-22 | End: 2025-04-22

## 2025-04-22 RX ORDER — ALBUTEROL SULFATE 0.83 MG/ML
3 SOLUTION RESPIRATORY (INHALATION) AS NEEDED
Status: CANCELLED | OUTPATIENT
Start: 2025-04-25

## 2025-04-22 RX ORDER — PROCHLORPERAZINE EDISYLATE 5 MG/ML
10 INJECTION INTRAMUSCULAR; INTRAVENOUS EVERY 6 HOURS PRN
Status: CANCELLED | OUTPATIENT
Start: 2025-04-25

## 2025-04-22 RX ORDER — MAGNESIUM SULFATE HEPTAHYDRATE 40 MG/ML
4 INJECTION, SOLUTION INTRAVENOUS ONCE
Status: CANCELLED | OUTPATIENT
Start: 2025-04-25 | End: 2025-04-25

## 2025-04-22 RX ORDER — EPINEPHRINE 0.3 MG/.3ML
0.3 INJECTION SUBCUTANEOUS EVERY 5 MIN PRN
Status: CANCELLED | OUTPATIENT
Start: 2025-04-25

## 2025-04-22 RX ADMIN — HEPARIN 500 UNITS: 100 SYRINGE at 15:50

## 2025-04-22 RX ADMIN — MAGNESIUM SULFATE IN WATER 2 G: 40 INJECTION, SOLUTION INTRAVENOUS at 14:45

## 2025-04-22 ASSESSMENT — PAIN SCALES - GENERAL: PAINLEVEL_OUTOF10: 0-NO PAIN

## 2025-04-22 NOTE — PROGRESS NOTES
Patient arrives for scheduled infusion.  Seen prior by Dr. Solitario and this nurse notified that chemo will be held today due to ANC result. She will like for him to recieve Magnesium Sulfate 2 grams for low magnesium level.  Tolerated infusion well and discharged in stable condition.

## 2025-04-22 NOTE — PROGRESS NOTES
[facility] - Medical Oncology Follow-Up Visit  Patient ID: Jayant Holland is a 74 y.o. male with NSCLC adenocarcinoma     Current therapy: cyanocobalamin (Vitamin B-12) injection 1,000 mcg, 1,000 mcg, intramuscular, Once, 1 of 6 cycles  Administration: 1,000 mcg (1/29/2025)    fosaprepitant (Emend) 150 mg in sodium chloride 0.9% 250 mL IV, 150 mg, intravenous, Once, 1 of 4 cycles  Administration: 150 mg (1/29/2025)    CARBOplatin (Paraplatin) 600 mg in sodium chloride 0.9% 170 mL IV, 600 mg, intravenous, Once, 1 of 4 cycles  Administration: 600 mg (1/29/2025)    amivantamab-vmjw (Rybrevant) 350 mg in sodium chloride 0.9% 250 mL IV, 350 mg, intravenous, Once, 1 of 18 cycles  Administration: 350 mg (1/29/2025), 1,400 mg (1/30/2025), 1,750 mg (2/4/2025), 1,750 mg (2/11/2025)  methylPREDNISolone sod succinate (SOLU-Medrol) 40 mg/mL injection 40 mg, 40 mg, intravenous, As needed, 1 of 18 cycles    palonosetron (Aloxi) injection 250 mcg, 250 mcg, intravenous, Once, 1 of 4 cycles  Administration: 250 mcg (1/29/2025)    PEMEtrexed disodium (Alimta) 1,100 mg in sodium chloride 0.9% 154 mL IV, 500 mg/m2 = 1,100 mg, intravenous, Once, 1 of 18 cycles  Dose modification: 400 mg/m2 (original dose 500 mg/m2, Cycle 2)  Administration: 1,100 mg (1/29/2025)       Chief Concern: follow-up and readiness to treat     Oncologic History:     DIAGNOSIS  NSCLC adenocarcinoma     STAGING  [id number]     CURRENT SITES OF DISEASE  RUL, R pleural effusion/pleural carcinomatosis, R hilar, mediastinal, retrocaval nodes      MOLECULAR GENOMICS  PD-L1 5%  EGFR [id number] ([id number])      PRIOR THERAPY      CURRENT THERAPY  Carboplatin/Pemetrexed/Amivantamab C1D1 1/29/25 -   Pemetrexed dose reduced 400mg/m2 C2 -   Maintenance pemetrexed/amivantamab delayed 1 week for neutropenia to start 4/25/25     CURRENT ONCOLOGICAL PROBLEMS  Unintentional wt loss - stabilized  Hypomagnesia         HISTORY  OF PRESENT ILLNESS  Mr. Holland is a 75 yo with PMH significant for DMII, HTN, and HLD who had a CXR done on 12/5/24 for persistent coughing after pneumonia about a month prior, which was concerning for moderate pleural effusion of the R lung. He went to the ER, where a CT chest w/contrast was done with spiculated RUL mass measuring 2.2 x 2.1 cm and large R pleural effusion. He was referred to diagnostic clinic and seen on 12/9/24 by Kirstin where he noted persistent cough for 6 weeks, constant dyspnea, wheezing, and unintentional 30 lb weight loss over the last year. He was referred to pulmonology and had thoracentesis on 12/10/24 with 980 ml removed, cytology with adenocarcinoma, PD-L1 5%, and NGS with EGFR exon 20 mutation. He had repeat thoracentesis on 12/17/24 with 2.4L removed. PET/CT 12/20/24 with FDV avidity of the RL nodule SUV max 9.2, multiple RLL avid nodules SUV max 4.1, FDG-avid pleural thickening on the right, multiple R hilar, mediastinal, subcarinal nodes, and moderate R pleural effusion. Mildly avid GGO within JANELLE SUV max 2.0. Brain MRI is rescheduled for 1/15/25.   Consented for carboplatin/pemetrexed/amivantamab started 1/29/25.  Treatment delayed d/t hospitalization. Treatment has been c/b hypomagnesemia. Pemetrexed dose-reduced to 400 mg/m2 with C2. Maintenance pemetrexed/amivantamab delayed 1 week for neutropenia.    1/21/25 - 1/23/25 - hospitalization 2/2 recurrent pleural effusion.        PAST MEDICAL HISTORY  DMII   HTN  HLD   Osteoarthritis (neck)  asthma     SOCIAL HISTORY  Lives at home with his wife. Retired from [employer], worked as a technician for 48 years, notes hazardous chemical exposures.  Tob: never  EtOH: occasionally  Illicits: none     FAMILY HISTORY  Mother - breast cancer    HPI   He is here today with his wife. Still has some blood in his nose every day. His breathing is pretty good - got 350 off yesterday.     Review of Systems:  A review of systems has been completed and are  negative for complaints except what is stated in the HPI and/or past medical history        Meds (Current):    Current Outpatient Medications:     acetaminophen (Tylenol) 325 mg tablet, Take 2 tablets (650 mg) by mouth every 4 hours if needed for fever (temp greater than 38.0 C), headaches or mild pain (1 - 3) (greater than or equal to 38 C)., Disp: , Rfl:     apixaban (Eliquis) 5 mg tablet, Take 2 tablets (10 mg) by mouth every 12 hours for 5 days, THEN 1 tablet (5 mg) every 12 hours., Disp: 140 tablet, Rfl: 0    Blood glucose monitoring meter kit kit, 1 each if needed., Disp: , Rfl:     blood-glucose meter misc, 1 Device once daily., Disp: 1 each, Rfl: 0    chlorthalidone (Hygroton) 25 mg tablet, Take 1 tablet (25 mg) by mouth once daily., Disp: , Rfl:     clindamycin (Cleocin T) 1 % lotion, Apply topically to affected area twice daily. Do not fill before January 29, 2025., Disp: 60 mL, Rfl: 3    folic acid (Folvite) 1 mg tablet, Take 1 tablet (1,000 mcg) by mouth once daily. Do not fill before January 29, 2025., Disp: 30 tablet, Rfl: 11    hydrocortisone 1 % cream, Apply topically to face, hands, feet, neck, back, and chest once daily at bedtime for rash prevention. Do not fill before January 29, 2025., Disp: 453.6 g, Rfl: 3    lancets 30 gauge misc, 1 Device 3 times a day., Disp: 200 each, Rfl: 3    lisinopril 20 mg tablet, Take 1 tablet (20 mg) by mouth once daily., Disp: 100 tablet, Rfl: 3    meloxicam (Mobic) 15 mg tablet, Take 1 tablet (15 mg) by mouth once daily., Disp: 90 tablet, Rfl: 3    metFORMIN  mg 24 hr tablet, TAKE 2 TABLETS (1,000 MG) BY MOUTH 2 TIMES A DAY. DO NOT CRUSH, CHEW, OR SPLIT., Disp: 360 tablet, Rfl: 3    omeprazole (PriLOSEC) 20 mg DR capsule, TAKE 1 CAPSULE BY MOUTH EVERY DAY, Disp: 90 capsule, Rfl: 3    ondansetron (Zofran) 8 mg tablet, Take 1 tablet (8 mg) by mouth every 8 hours if needed for nausea or vomiting. Do not fill before January 29, 2025., Disp: 30 tablet, Rfl: 5     potassium chloride CR 20 mEq ER tablet, Take 1 tablet (20 mEq) by mouth once daily. Do not crush, chew, or split., Disp: 30 tablet, Rfl: 3    prochlorperazine (Compazine) 10 mg tablet, Take 1 tablet (10 mg) by mouth every 6 hours if needed for nausea or vomiting. Do not fill before 2025., Disp: 30 tablet, Rfl: 5    rosuvastatin (Crestor) 40 mg tablet, TAKE 1 TABLET BY MOUTH EVERY DAY, Disp: 90 tablet, Rfl: 3    sennosides (Senokot) 8.6 mg tablet, Take 2 tablets (17.2 mg) by mouth as needed at bedtime for constipation. Do not fill before 2025., Disp: 30 tablet, Rfl: 11  No current facility-administered medications for this visit.      Objective   Vital Sign   /70 (BP Location: Right arm, Patient Position: Sitting, BP Cuff Size: Adult)   Pulse 78   Temp 35.6 °C (96.1 °F) (Skin)   Resp 16   Wt 81.3 kg (179 lb 4.8 oz)   SpO2 100%   BMI 24.31 kg/m²     Wt Readings from Last 5 Encounters:   25 81.3 kg (179 lb 4.8 oz)   25 82.9 kg (182 lb 12.2 oz)   25 82.3 kg (181 lb 8 oz)   25 82.7 kg (182 lb 6.4 oz)   25 82.7 kg (182 lb 6.4 oz)       Physical Exam:  ECO  Pain: 0  Constitutional: Well developed, awake/alert/oriented x3, no distress, alert and cooperative  Eyes: PER. sclera anicteric  ENMT: Oral mucosa moist, no lesions or thrush identified  Respiratory/Thorax: Breathing is non-labored. Lungs are clear to auscultation bilaterally. No adventitious breath sounds. Plerux drain.   Cardiovascular: S1-S2. Regular rate and rhythm. No murmurs, rubs, or gallops appreciated  Gastrointestinal: Abdomen soft nontender, nondistended, normal active bowel sounds.  Musculoskeletal: ROM intact, no joint swelling, normal strength  Extremities: normal extremities, no cyanosis, +ankle edema, no clubbing  Lymphatics: no palpable lymphadenopathy   Skin: faint rash bridge of nose and cheeks   Neurologic: alert and oriented x3. Nonfocal exam. No myoclonus  Psychological:  Pleasant, appropriate and easily engaged          Results:  Lab  Lab Results   Component Value Date    WBC 2.9 (L) 04/21/2025    HGB 9.0 (L) 04/21/2025    HCT 27.8 (L) 04/21/2025    MCV 91 04/21/2025     04/21/2025      Lab Results   Component Value Date    NEUTROABS 0.93 (L) 04/21/2025      Lab Results   Component Value Date    GLUCOSE 120 (H) 04/21/2025    CALCIUM 8.1 (L) 04/21/2025     04/21/2025    K 3.5 04/21/2025    CO2 26 04/21/2025     04/21/2025    BUN 15 04/21/2025    CREATININE 0.92 04/21/2025    MG 0.97 (LL) 04/18/2025     Lab Results   Component Value Date    ALT 21 04/21/2025    AST 25 04/21/2025    ALKPHOS 99 04/21/2025    BILITOT 0.3 04/21/2025    BILIDIR 0.1 11/18/2019      Lab Results   Component Value Date    TSH 2.12 01/28/2025       Imaging:  I have personally reviewed the below imaging and concur with the reported findings unless otherwise stated:    CT chest abdomen pelvis w IV contrast  Result Date: 4/19/2025  Impression: Adenocarcinoma of the lung restaging scan compared to CTA chest 03/20/2025 and PET-CT 12/20/2024: 1. Redemonstration of spiculated nodule in the apical segment right upper lobe with decreased attenuation compared to prior CTs dating back to 01/21/2025 consistent with treatment response. Remaining pulmonary nodules and mediastinal lymph nodes remain stable in size. No new measurable disease in the chest abdomen pelvis. 2. Similar pleural thickening of the right lower lobe pleura with small complex right pleural effusion consistent with known pleural metastasis and malignant effusion. 3. PleurX catheter terminates within the pleural space abutting the superior segment of right lower lobe unchanged compared to prior exam. 4. Fluid-filled esophagus which can be seen in reflux esophagitis. 5. Additional incidental non-acute findings as detailed above.     I personally reviewed the images/study and I agree with the findings as stated by Dr. Zach Rocha. This study  was interpreted at [facility], [place], [place].   MACRO: None.   Signed by: Albert Garcia 4/19/2025 12:06 PM Dictation workstation:   [id number]        Assessment/Plan      Jayant Holland is a 74 y.o. male here for follow up of NSCLC adenocarcinoma    # NSCLC adenocarcinoma  - [id number] (pleural effusion)  - PD-L1 5%, EGFR p.[id number] ([id number])   - discussed that given EGFR exon 20 mutation, recommend combination chemotherapy+amivantamab, consented  - started C1D1 1/29/25, pemetrexed dose-reduced with C2 to 400 mg/m2  - C1D1 dexamethasone Rx started - for 3 doses.  Discussed potential infusion reaction.  Pt verbalized understanding.    - Per pt request, C2 infusion and visit transferred to [facility].  Visits with FORTINO Grace.  Scan review visits with Dr. Solitario at [facility].   - Per pt request, order placed for mediport placement.   - personally reviewed restaging scans after 4 cycles of carbo/pem/amivantamab with some response. Discussed continuing on maintenance pemetrexed/amivantamab. Neutropenic today and will delay to 4/25/25  - will continue to monitor, repeat scans in 3 months  - will obtain brain MRI to monitor brain metastases     # Malignant pleural effusion  - s/p thoracentesis and contacted by IP while in visit for repeat thora.   - Right pleurx catheter placed 12/17/24 with HH referral in place.  Drained 2x/wk.  - continue to monitor    # Hypomagnesia, ongoing with current Amivantamab treatment  - Mg replacement plan created, next IV mag replacement dates include: 3/14, 3/17, 3/20, 3/24, 3/27, 3/31, 4/4, 4/7, 4/9, 4/11, 4/14 at [place].  Replacement dates ongoing. Treatment parameters:    Serum magnesium 1 - 1.6 mg/dL - administer 2 grams over 1 hour  Serum magnesium less than 1 mg/dL - administer 4 grams over 2 hours   - 3/12:  Mg 1.32 - 2g IV Mg with today's treatment.      # Macular rash to face, bridge of nose and checks  - Continue  routine Doxycycline and treatment rash protocols.      # Unintentional wt loss  - Recommended pt add Ensure/Boost supplements and snacks throughout the day. Will try increased nutritional intake vs pharm intervention for now.    - Dietician referral placed.   - 2/11:  wt loss stabilized, pt started daily Boost supplements    # Leukocytosis 12.6 3/12/25  - no clinical s/sx's infection  - Continue to monitor     # Advanced care planning  - discussed incurable but treatable nature of metastatic disease, and goal of therapy is to prolong life while maintaining or improving quality of life.  - 1/30/25 - Supportive onc referral placed.  Pt declined need for visit at this time.  Supportive onc will follow-up in 2-3 weeks.

## 2025-04-23 ENCOUNTER — APPOINTMENT (OUTPATIENT)
Dept: HEMATOLOGY/ONCOLOGY | Facility: CLINIC | Age: 75
End: 2025-04-23
Payer: MEDICARE

## 2025-04-25 ENCOUNTER — APPOINTMENT (OUTPATIENT)
Dept: HEMATOLOGY/ONCOLOGY | Facility: CLINIC | Age: 75
End: 2025-04-25
Payer: MEDICARE

## 2025-04-25 ENCOUNTER — INFUSION (OUTPATIENT)
Dept: HEMATOLOGY/ONCOLOGY | Facility: CLINIC | Age: 75
End: 2025-04-25
Payer: MEDICARE

## 2025-04-25 ENCOUNTER — TELEPHONE (OUTPATIENT)
Dept: HEMATOLOGY/ONCOLOGY | Facility: CLINIC | Age: 75
End: 2025-04-25
Payer: MEDICARE

## 2025-04-25 ENCOUNTER — SOCIAL WORK (OUTPATIENT)
Dept: HEMATOLOGY/ONCOLOGY | Facility: CLINIC | Age: 75
End: 2025-04-25
Payer: MEDICARE

## 2025-04-25 VITALS
BODY MASS INDEX: 24.68 KG/M2 | HEART RATE: 96 BPM | SYSTOLIC BLOOD PRESSURE: 101 MMHG | HEIGHT: 72 IN | RESPIRATION RATE: 16 BRPM | OXYGEN SATURATION: 100 % | WEIGHT: 182.2 LBS | TEMPERATURE: 97.7 F | DIASTOLIC BLOOD PRESSURE: 69 MMHG

## 2025-04-25 DIAGNOSIS — E83.42 HYPOMAGNESEMIA: ICD-10-CM

## 2025-04-25 DIAGNOSIS — C80.1 ADENOCARCINOMA (MULTI): ICD-10-CM

## 2025-04-25 LAB
ALBUMIN SERPL BCP-MCNC: 3.3 G/DL (ref 3.4–5)
ALP SERPL-CCNC: 87 U/L (ref 33–136)
ALT SERPL W P-5'-P-CCNC: 21 U/L (ref 10–52)
ANION GAP SERPL CALC-SCNC: <7 MMOL/L (ref 10–20)
AST SERPL W P-5'-P-CCNC: 24 U/L (ref 9–39)
BASOPHILS # BLD AUTO: 0.01 X10*3/UL (ref 0–0.1)
BASOPHILS NFR BLD AUTO: 0.2 %
BILIRUB SERPL-MCNC: 0.3 MG/DL (ref 0–1.2)
BUN SERPL-MCNC: 11 MG/DL (ref 6–23)
CALCIUM SERPL-MCNC: 8.5 MG/DL (ref 8.6–10.3)
CHLORIDE SERPL-SCNC: 102 MMOL/L (ref 98–107)
CO2 SERPL-SCNC: 29 MMOL/L (ref 21–32)
CREAT SERPL-MCNC: 0.89 MG/DL (ref 0.5–1.3)
EGFRCR SERPLBLD CKD-EPI 2021: 90 ML/MIN/1.73M*2
EOSINOPHIL # BLD AUTO: 0.09 X10*3/UL (ref 0–0.4)
EOSINOPHIL NFR BLD AUTO: 2.2 %
ERYTHROCYTE [DISTWIDTH] IN BLOOD BY AUTOMATED COUNT: 18.9 % (ref 11.5–14.5)
GLUCOSE SERPL-MCNC: 127 MG/DL (ref 74–99)
HCT VFR BLD AUTO: 30.7 % (ref 41–52)
HGB BLD-MCNC: 9.6 G/DL (ref 13.5–17.5)
IMM GRANULOCYTES # BLD AUTO: 0.03 X10*3/UL (ref 0–0.5)
IMM GRANULOCYTES NFR BLD AUTO: 0.7 % (ref 0–0.9)
LYMPHOCYTES # BLD AUTO: 1.37 X10*3/UL (ref 0.8–3)
LYMPHOCYTES NFR BLD AUTO: 33.7 %
MAGNESIUM SERPL-MCNC: 0.81 MG/DL (ref 1.6–2.4)
MCH RBC QN AUTO: 29.3 PG (ref 26–34)
MCHC RBC AUTO-ENTMCNC: 31.3 G/DL (ref 32–36)
MCV RBC AUTO: 94 FL (ref 80–100)
MONOCYTES # BLD AUTO: 0.5 X10*3/UL (ref 0.05–0.8)
MONOCYTES NFR BLD AUTO: 12.3 %
NEUTROPHILS # BLD AUTO: 2.07 X10*3/UL (ref 1.6–5.5)
NEUTROPHILS NFR BLD AUTO: 50.9 %
PLATELET # BLD AUTO: 181 X10*3/UL (ref 150–450)
POTASSIUM SERPL-SCNC: 3.3 MMOL/L (ref 3.5–5.3)
PROT SERPL-MCNC: 6.4 G/DL (ref 6.4–8.2)
RBC # BLD AUTO: 3.28 X10*6/UL (ref 4.5–5.9)
SODIUM SERPL-SCNC: 134 MMOL/L (ref 136–145)
WBC # BLD AUTO: 4.1 X10*3/UL (ref 4.4–11.3)

## 2025-04-25 PROCEDURE — 96409 CHEMO IV PUSH SNGL DRUG: CPT

## 2025-04-25 PROCEDURE — 2500000004 HC RX 250 GENERAL PHARMACY W/ HCPCS (ALT 636 FOR OP/ED): Mod: JZ | Performed by: NURSE PRACTITIONER

## 2025-04-25 PROCEDURE — 2500000002 HC RX 250 W HCPCS SELF ADMINISTERED DRUGS (ALT 637 FOR MEDICARE OP, ALT 636 FOR OP/ED): Performed by: NURSE PRACTITIONER

## 2025-04-25 PROCEDURE — 2500000001 HC RX 250 WO HCPCS SELF ADMINISTERED DRUGS (ALT 637 FOR MEDICARE OP): Performed by: STUDENT IN AN ORGANIZED HEALTH CARE EDUCATION/TRAINING PROGRAM

## 2025-04-25 PROCEDURE — 2500000004 HC RX 250 GENERAL PHARMACY W/ HCPCS (ALT 636 FOR OP/ED): Mod: JZ,TB | Performed by: STUDENT IN AN ORGANIZED HEALTH CARE EDUCATION/TRAINING PROGRAM

## 2025-04-25 PROCEDURE — 83735 ASSAY OF MAGNESIUM: CPT

## 2025-04-25 PROCEDURE — 96375 TX/PRO/DX INJ NEW DRUG ADDON: CPT | Mod: INF

## 2025-04-25 PROCEDURE — 96367 TX/PROPH/DG ADDL SEQ IV INF: CPT

## 2025-04-25 PROCEDURE — 96413 CHEMO IV INFUSION 1 HR: CPT

## 2025-04-25 PROCEDURE — 85025 COMPLETE CBC W/AUTO DIFF WBC: CPT

## 2025-04-25 PROCEDURE — 80053 COMPREHEN METABOLIC PANEL: CPT

## 2025-04-25 PROCEDURE — 96366 THER/PROPH/DIAG IV INF ADDON: CPT | Mod: INF

## 2025-04-25 PROCEDURE — 96415 CHEMO IV INFUSION ADDL HR: CPT

## 2025-04-25 PROCEDURE — 2500000004 HC RX 250 GENERAL PHARMACY W/ HCPCS (ALT 636 FOR OP/ED): Mod: JZ | Performed by: STUDENT IN AN ORGANIZED HEALTH CARE EDUCATION/TRAINING PROGRAM

## 2025-04-25 RX ORDER — MAGNESIUM SULFATE HEPTAHYDRATE 40 MG/ML
2-4 INJECTION, SOLUTION INTRAVENOUS ONCE
OUTPATIENT
Start: 2025-04-28 | End: 2025-04-28

## 2025-04-25 RX ORDER — HEPARIN SODIUM,PORCINE/PF 10 UNIT/ML
50 SYRINGE (ML) INTRAVENOUS AS NEEDED
OUTPATIENT
Start: 2025-04-25

## 2025-04-25 RX ORDER — PROCHLORPERAZINE MALEATE 10 MG
10 TABLET ORAL EVERY 6 HOURS PRN
Status: DISCONTINUED | OUTPATIENT
Start: 2025-04-25 | End: 2025-04-25 | Stop reason: HOSPADM

## 2025-04-25 RX ORDER — ACETAMINOPHEN 325 MG/1
650 TABLET ORAL ONCE
Status: COMPLETED | OUTPATIENT
Start: 2025-04-25 | End: 2025-04-25

## 2025-04-25 RX ORDER — HEPARIN 100 UNIT/ML
500 SYRINGE INTRAVENOUS AS NEEDED
Status: DISCONTINUED | OUTPATIENT
Start: 2025-04-25 | End: 2025-04-25 | Stop reason: HOSPADM

## 2025-04-25 RX ORDER — MAGNESIUM SULFATE HEPTAHYDRATE 40 MG/ML
4 INJECTION, SOLUTION INTRAVENOUS ONCE
OUTPATIENT
Start: 2025-04-28 | End: 2025-04-28

## 2025-04-25 RX ORDER — HEPARIN 100 UNIT/ML
500 SYRINGE INTRAVENOUS AS NEEDED
OUTPATIENT
Start: 2025-04-25

## 2025-04-25 RX ORDER — POTASSIUM CHLORIDE 750 MG/1
40 TABLET, FILM COATED, EXTENDED RELEASE ORAL ONCE
Status: COMPLETED | OUTPATIENT
Start: 2025-04-25 | End: 2025-04-25

## 2025-04-25 RX ORDER — DIPHENHYDRAMINE HYDROCHLORIDE 50 MG/ML
50 INJECTION, SOLUTION INTRAMUSCULAR; INTRAVENOUS AS NEEDED
Status: DISCONTINUED | OUTPATIENT
Start: 2025-04-25 | End: 2025-04-25 | Stop reason: HOSPADM

## 2025-04-25 RX ORDER — EPINEPHRINE 0.3 MG/.3ML
0.3 INJECTION SUBCUTANEOUS EVERY 5 MIN PRN
Status: DISCONTINUED | OUTPATIENT
Start: 2025-04-25 | End: 2025-04-25 | Stop reason: HOSPADM

## 2025-04-25 RX ORDER — DIPHENHYDRAMINE HCL 25 MG
50 CAPSULE ORAL ONCE
Status: COMPLETED | OUTPATIENT
Start: 2025-04-25 | End: 2025-04-25

## 2025-04-25 RX ORDER — MAGNESIUM SULFATE HEPTAHYDRATE 40 MG/ML
4 INJECTION, SOLUTION INTRAVENOUS ONCE
Status: COMPLETED | OUTPATIENT
Start: 2025-04-25 | End: 2025-04-25

## 2025-04-25 RX ORDER — FAMOTIDINE 10 MG/ML
20 INJECTION, SOLUTION INTRAVENOUS ONCE AS NEEDED
Status: DISCONTINUED | OUTPATIENT
Start: 2025-04-25 | End: 2025-04-25 | Stop reason: HOSPADM

## 2025-04-25 RX ORDER — MAGNESIUM SULFATE HEPTAHYDRATE 40 MG/ML
2 INJECTION, SOLUTION INTRAVENOUS ONCE
OUTPATIENT
Start: 2025-04-28 | End: 2025-04-28

## 2025-04-25 RX ORDER — POTASSIUM CHLORIDE 20 MEQ/1
40 TABLET, EXTENDED RELEASE ORAL ONCE
Status: CANCELLED | OUTPATIENT
Start: 2025-04-25

## 2025-04-25 RX ORDER — PROCHLORPERAZINE EDISYLATE 5 MG/ML
10 INJECTION INTRAMUSCULAR; INTRAVENOUS EVERY 6 HOURS PRN
Status: DISCONTINUED | OUTPATIENT
Start: 2025-04-25 | End: 2025-04-25 | Stop reason: HOSPADM

## 2025-04-25 RX ORDER — ALBUTEROL SULFATE 0.83 MG/ML
3 SOLUTION RESPIRATORY (INHALATION) AS NEEDED
Status: DISCONTINUED | OUTPATIENT
Start: 2025-04-25 | End: 2025-04-25 | Stop reason: HOSPADM

## 2025-04-25 RX ADMIN — MAGNESIUM SULFATE IN WATER FOR 4 G: 40 INJECTION INTRAVENOUS at 09:42

## 2025-04-25 RX ADMIN — PEMETREXED DISODIUM 850 MG: 500 INJECTION, POWDER, LYOPHILIZED, FOR SOLUTION INTRAVENOUS at 11:47

## 2025-04-25 RX ADMIN — ACETAMINOPHEN 650 MG: 325 TABLET ORAL at 10:53

## 2025-04-25 RX ADMIN — DEXAMETHASONE SODIUM PHOSPHATE 10 MG: 4 INJECTION INTRA-ARTICULAR; INTRALESIONAL; INTRAMUSCULAR; INTRAVENOUS; SOFT TISSUE at 10:53

## 2025-04-25 RX ADMIN — Medication 500 UNITS: at 14:37

## 2025-04-25 RX ADMIN — POTASSIUM CHLORIDE 20 MEQ: 750 TABLET, FILM COATED, EXTENDED RELEASE ORAL at 10:59

## 2025-04-25 RX ADMIN — DIPHENHYDRAMINE HYDROCHLORIDE 50 MG: 25 CAPSULE ORAL at 10:53

## 2025-04-25 RX ADMIN — SODIUM CHLORIDE 2100 MG: 0.9 INJECTION, SOLUTION INTRAVENOUS at 12:02

## 2025-04-25 ASSESSMENT — PAIN SCALES - GENERAL: PAINLEVEL_OUTOF10: 0-NO PAIN

## 2025-04-25 NOTE — PROGRESS NOTES
(SW) reviewed chart and met with patient today for introduction, to assess needs, and offer support.  Patient was A&Ox3 with appropriate and congruent mood and affect.  Patient is quiet and soft-spoken.  Patient resides in [place] with his wife.  He stated that he has a good support system.  SW provided a Handout on How A  Can Help and asked if he can think of anything he needs.  Patient stated that he did not think so.  SW can check in with him during his infusions.  SW is available if needed.      Ronald Forrester MSW, LSW

## 2025-04-25 NOTE — PROGRESS NOTES
Pt here for amivantamab/alimta infusion. Also received 4g Mag IV and 20mEq PO potassium per orders. Pt tolerated infusion without incident. Discharged in stable condition, has schedule for follow up.

## 2025-04-28 ENCOUNTER — DOCUMENTATION (OUTPATIENT)
Dept: HEMATOLOGY/ONCOLOGY | Facility: HOSPITAL | Age: 75
End: 2025-04-28
Payer: MEDICARE

## 2025-04-28 ENCOUNTER — INFUSION (OUTPATIENT)
Dept: HEMATOLOGY/ONCOLOGY | Facility: CLINIC | Age: 75
End: 2025-04-28
Payer: MEDICARE

## 2025-04-28 VITALS
WEIGHT: 180.8 LBS | TEMPERATURE: 96.1 F | RESPIRATION RATE: 16 BRPM | BODY MASS INDEX: 24.49 KG/M2 | HEIGHT: 72 IN | DIASTOLIC BLOOD PRESSURE: 79 MMHG | SYSTOLIC BLOOD PRESSURE: 126 MMHG | HEART RATE: 98 BPM | OXYGEN SATURATION: 98 %

## 2025-04-28 DIAGNOSIS — C80.1 ADENOCARCINOMA (MULTI): ICD-10-CM

## 2025-04-28 DIAGNOSIS — E83.42 HYPOMAGNESEMIA: ICD-10-CM

## 2025-04-28 LAB — MAGNESIUM SERPL-MCNC: 0.76 MG/DL (ref 1.6–2.4)

## 2025-04-28 PROCEDURE — 96365 THER/PROPH/DIAG IV INF INIT: CPT | Mod: INF

## 2025-04-28 PROCEDURE — 2500000004 HC RX 250 GENERAL PHARMACY W/ HCPCS (ALT 636 FOR OP/ED): Mod: JZ | Performed by: STUDENT IN AN ORGANIZED HEALTH CARE EDUCATION/TRAINING PROGRAM

## 2025-04-28 PROCEDURE — 2500000004 HC RX 250 GENERAL PHARMACY W/ HCPCS (ALT 636 FOR OP/ED): Mod: JZ | Performed by: NURSE PRACTITIONER

## 2025-04-28 PROCEDURE — 96366 THER/PROPH/DIAG IV INF ADDON: CPT | Mod: INF

## 2025-04-28 PROCEDURE — 83735 ASSAY OF MAGNESIUM: CPT

## 2025-04-28 RX ORDER — MAGNESIUM SULFATE HEPTAHYDRATE 40 MG/ML
4 INJECTION, SOLUTION INTRAVENOUS ONCE
Status: COMPLETED | OUTPATIENT
Start: 2025-04-28 | End: 2025-04-28

## 2025-04-28 RX ORDER — HEPARIN SODIUM,PORCINE/PF 10 UNIT/ML
50 SYRINGE (ML) INTRAVENOUS AS NEEDED
Status: CANCELLED | OUTPATIENT
Start: 2025-04-28

## 2025-04-28 RX ORDER — HEPARIN 100 UNIT/ML
500 SYRINGE INTRAVENOUS AS NEEDED
Status: CANCELLED | OUTPATIENT
Start: 2025-04-28

## 2025-04-28 RX ORDER — MAGNESIUM SULFATE HEPTAHYDRATE 40 MG/ML
4 INJECTION, SOLUTION INTRAVENOUS ONCE
Status: CANCELLED | OUTPATIENT
Start: 2025-04-30 | End: 2025-04-30

## 2025-04-28 RX ORDER — MAGNESIUM SULFATE HEPTAHYDRATE 40 MG/ML
2-4 INJECTION, SOLUTION INTRAVENOUS ONCE
Status: CANCELLED | OUTPATIENT
Start: 2025-04-30 | End: 2025-04-30

## 2025-04-28 RX ORDER — MAGNESIUM SULFATE HEPTAHYDRATE 40 MG/ML
2 INJECTION, SOLUTION INTRAVENOUS ONCE
Status: CANCELLED | OUTPATIENT
Start: 2025-04-30 | End: 2025-04-30

## 2025-04-28 RX ORDER — HEPARIN 100 UNIT/ML
500 SYRINGE INTRAVENOUS AS NEEDED
Status: DISCONTINUED | OUTPATIENT
Start: 2025-04-28 | End: 2025-04-28 | Stop reason: HOSPADM

## 2025-04-28 RX ADMIN — MAGNESIUM SULFATE IN WATER FOR 4 G: 40 INJECTION INTRAVENOUS at 09:26

## 2025-04-28 RX ADMIN — Medication 500 UNITS: at 09:26

## 2025-04-28 ASSESSMENT — PAIN SCALES - GENERAL: PAINLEVEL_OUTOF10: 0-NO PAIN

## 2025-04-28 NOTE — PROGRESS NOTES
Notified by Rebekah Pina in Lab Services that Magnesium 0.76. Secure Chat sent to Jamaica Rehman NP. Pt has appointment at [facility] and NP advises that patient is getting IV Mag replacement today.

## 2025-04-28 NOTE — PROGRESS NOTES
Patient identified by name & ; denies acute distress- none noted at this time. Serum Magnesium resulted at 0.76- 4 Grams magnesium administered per standing orders for Magnesium <1. Completed without deficits. Patient returns  for Labs + magnesium replacement. Patient discharged home in stable condition.

## 2025-04-29 DIAGNOSIS — C80.1 ADENOCARCINOMA (MULTI): Primary | ICD-10-CM

## 2025-04-29 DIAGNOSIS — E83.42 HYPOMAGNESEMIA: ICD-10-CM

## 2025-04-29 RX ORDER — MAGNESIUM SULFATE HEPTAHYDRATE 40 MG/ML
4 INJECTION, SOLUTION INTRAVENOUS ONCE
Status: CANCELLED | OUTPATIENT
Start: 2025-04-29 | End: 2025-04-29

## 2025-04-29 RX ORDER — MAGNESIUM SULFATE HEPTAHYDRATE 40 MG/ML
2-4 INJECTION, SOLUTION INTRAVENOUS ONCE
Status: CANCELLED | OUTPATIENT
Start: 2025-04-29 | End: 2025-04-29

## 2025-04-29 RX ORDER — MAGNESIUM SULFATE HEPTAHYDRATE 40 MG/ML
2 INJECTION, SOLUTION INTRAVENOUS ONCE
Status: CANCELLED | OUTPATIENT
Start: 2025-04-29 | End: 2025-04-29

## 2025-04-30 ENCOUNTER — INFUSION (OUTPATIENT)
Dept: HEMATOLOGY/ONCOLOGY | Facility: CLINIC | Age: 75
End: 2025-04-30
Payer: MEDICARE

## 2025-04-30 ENCOUNTER — DOCUMENTATION (OUTPATIENT)
Dept: HEMATOLOGY/ONCOLOGY | Facility: HOSPITAL | Age: 75
End: 2025-04-30
Payer: MEDICARE

## 2025-04-30 VITALS
DIASTOLIC BLOOD PRESSURE: 77 MMHG | OXYGEN SATURATION: 100 % | SYSTOLIC BLOOD PRESSURE: 113 MMHG | HEART RATE: 97 BPM | HEIGHT: 72 IN | WEIGHT: 181.8 LBS | RESPIRATION RATE: 16 BRPM | BODY MASS INDEX: 24.62 KG/M2 | TEMPERATURE: 97.2 F

## 2025-04-30 DIAGNOSIS — J91.0 MALIGNANT PLEURAL EFFUSION (CMS-HCC): ICD-10-CM

## 2025-04-30 DIAGNOSIS — C80.1 ADENOCARCINOMA (MULTI): ICD-10-CM

## 2025-04-30 DIAGNOSIS — E83.42 HYPOMAGNESEMIA: ICD-10-CM

## 2025-04-30 LAB — MAGNESIUM SERPL-MCNC: 0.88 MG/DL (ref 1.6–2.4)

## 2025-04-30 PROCEDURE — 2500000004 HC RX 250 GENERAL PHARMACY W/ HCPCS (ALT 636 FOR OP/ED): Mod: JZ | Performed by: STUDENT IN AN ORGANIZED HEALTH CARE EDUCATION/TRAINING PROGRAM

## 2025-04-30 PROCEDURE — 2500000004 HC RX 250 GENERAL PHARMACY W/ HCPCS (ALT 636 FOR OP/ED): Mod: JZ | Performed by: NURSE PRACTITIONER

## 2025-04-30 PROCEDURE — 96366 THER/PROPH/DIAG IV INF ADDON: CPT | Mod: INF

## 2025-04-30 PROCEDURE — 96365 THER/PROPH/DIAG IV INF INIT: CPT | Mod: INF

## 2025-04-30 PROCEDURE — 96374 THER/PROPH/DIAG INJ IV PUSH: CPT | Mod: INF

## 2025-04-30 PROCEDURE — 83735 ASSAY OF MAGNESIUM: CPT

## 2025-04-30 RX ORDER — MAGNESIUM SULFATE HEPTAHYDRATE 40 MG/ML
4 INJECTION, SOLUTION INTRAVENOUS ONCE
Status: CANCELLED | OUTPATIENT
Start: 2025-05-02 | End: 2025-05-02

## 2025-04-30 RX ORDER — MAGNESIUM SULFATE HEPTAHYDRATE 40 MG/ML
2 INJECTION, SOLUTION INTRAVENOUS ONCE
Status: CANCELLED | OUTPATIENT
Start: 2025-05-02 | End: 2025-05-02

## 2025-04-30 RX ORDER — MAGNESIUM SULFATE HEPTAHYDRATE 40 MG/ML
4 INJECTION, SOLUTION INTRAVENOUS ONCE
Status: COMPLETED | OUTPATIENT
Start: 2025-04-30 | End: 2025-04-30

## 2025-04-30 RX ORDER — HEPARIN 100 UNIT/ML
500 SYRINGE INTRAVENOUS AS NEEDED
Status: CANCELLED | OUTPATIENT
Start: 2025-04-30

## 2025-04-30 RX ORDER — HEPARIN 100 UNIT/ML
500 SYRINGE INTRAVENOUS AS NEEDED
Status: DISCONTINUED | OUTPATIENT
Start: 2025-04-30 | End: 2025-04-30 | Stop reason: HOSPADM

## 2025-04-30 RX ORDER — HEPARIN SODIUM,PORCINE/PF 10 UNIT/ML
50 SYRINGE (ML) INTRAVENOUS AS NEEDED
Status: CANCELLED | OUTPATIENT
Start: 2025-04-30

## 2025-04-30 RX ORDER — MAGNESIUM SULFATE HEPTAHYDRATE 40 MG/ML
2-4 INJECTION, SOLUTION INTRAVENOUS ONCE
Status: CANCELLED | OUTPATIENT
Start: 2025-05-02 | End: 2025-05-02

## 2025-04-30 RX ADMIN — Medication 500 UNITS: at 13:10

## 2025-04-30 RX ADMIN — MAGNESIUM SULFATE IN WATER FOR 4 G: 40 INJECTION INTRAVENOUS at 11:09

## 2025-04-30 ASSESSMENT — PAIN SCALES - GENERAL: PAINLEVEL_OUTOF10: 0-NO PAIN

## 2025-04-30 NOTE — PROGRESS NOTES
Patient presents for mag treatment,  has no complaints alert and oriented x 4. PIV placed per protocol, labs drawn per orders. Patient meets parameters for treatment. Patient to get 4 grams of mag per order for mag level of 0.88. Patient's team is aware of lab value.  Patient tolerated treatment well, no reaction noted. After treatment, PIV flushed and removed per practice policy and procedure, site care given with gauze, tape and coban. Pt tolerated procedure well. Patient feels well and has no complaints, vital signs stable. Patient discharged in stable condition with no further needs.      
Cough in adult

## 2025-04-30 NOTE — PROGRESS NOTES
Notified by Rebekah Pina in Lab Services that Magnesium 0.88. Secure Chat sent to Jamaica Rehman NP. Pt has appointment at [facility].

## 2025-05-02 ENCOUNTER — INFUSION (OUTPATIENT)
Dept: HEMATOLOGY/ONCOLOGY | Facility: CLINIC | Age: 75
End: 2025-05-02
Payer: MEDICARE

## 2025-05-02 VITALS
SYSTOLIC BLOOD PRESSURE: 102 MMHG | HEIGHT: 72 IN | RESPIRATION RATE: 16 BRPM | BODY MASS INDEX: 24.65 KG/M2 | HEART RATE: 88 BPM | TEMPERATURE: 97.3 F | WEIGHT: 182 LBS | DIASTOLIC BLOOD PRESSURE: 70 MMHG | OXYGEN SATURATION: 98 %

## 2025-05-02 DIAGNOSIS — C80.1 ADENOCARCINOMA (MULTI): ICD-10-CM

## 2025-05-02 DIAGNOSIS — E83.42 HYPOMAGNESEMIA: ICD-10-CM

## 2025-05-02 LAB — MAGNESIUM SERPL-MCNC: 1.04 MG/DL (ref 1.6–2.4)

## 2025-05-02 PROCEDURE — 83735 ASSAY OF MAGNESIUM: CPT | Performed by: NURSE PRACTITIONER

## 2025-05-02 PROCEDURE — 96365 THER/PROPH/DIAG IV INF INIT: CPT | Mod: INF

## 2025-05-02 PROCEDURE — 2500000004 HC RX 250 GENERAL PHARMACY W/ HCPCS (ALT 636 FOR OP/ED): Mod: JZ | Performed by: STUDENT IN AN ORGANIZED HEALTH CARE EDUCATION/TRAINING PROGRAM

## 2025-05-02 PROCEDURE — 2500000004 HC RX 250 GENERAL PHARMACY W/ HCPCS (ALT 636 FOR OP/ED): Mod: JZ | Performed by: NURSE PRACTITIONER

## 2025-05-02 RX ORDER — HEPARIN SODIUM,PORCINE/PF 10 UNIT/ML
50 SYRINGE (ML) INTRAVENOUS AS NEEDED
OUTPATIENT
Start: 2025-05-02

## 2025-05-02 RX ORDER — MAGNESIUM SULFATE HEPTAHYDRATE 40 MG/ML
4 INJECTION, SOLUTION INTRAVENOUS ONCE
OUTPATIENT
Start: 2025-05-05 | End: 2025-05-05

## 2025-05-02 RX ORDER — HEPARIN 100 UNIT/ML
500 SYRINGE INTRAVENOUS AS NEEDED
Status: DISCONTINUED | OUTPATIENT
Start: 2025-05-02 | End: 2025-05-02 | Stop reason: HOSPADM

## 2025-05-02 RX ORDER — MAGNESIUM SULFATE HEPTAHYDRATE 40 MG/ML
2 INJECTION, SOLUTION INTRAVENOUS ONCE
Status: COMPLETED | OUTPATIENT
Start: 2025-05-02 | End: 2025-05-02

## 2025-05-02 RX ORDER — MAGNESIUM SULFATE HEPTAHYDRATE 40 MG/ML
2-4 INJECTION, SOLUTION INTRAVENOUS ONCE
OUTPATIENT
Start: 2025-05-05 | End: 2025-05-05

## 2025-05-02 RX ORDER — MAGNESIUM SULFATE HEPTAHYDRATE 40 MG/ML
2 INJECTION, SOLUTION INTRAVENOUS ONCE
OUTPATIENT
Start: 2025-05-05 | End: 2025-05-05

## 2025-05-02 RX ORDER — HEPARIN 100 UNIT/ML
500 SYRINGE INTRAVENOUS AS NEEDED
OUTPATIENT
Start: 2025-05-02

## 2025-05-02 RX ADMIN — Medication 500 UNITS: at 10:52

## 2025-05-02 RX ADMIN — MAGNESIUM SULFATE IN WATER 2 G: 40 INJECTION, SOLUTION INTRAVENOUS at 09:51

## 2025-05-02 ASSESSMENT — PAIN SCALES - GENERAL: PAINLEVEL_OUTOF10: 0-NO PAIN

## 2025-05-02 NOTE — PROGRESS NOTES
Patient presents for mag treatment,  has no complaints alert and oriented x 4. CVAD accessed, labs drawn per orders. Patient meets parameters for treatment 2 grams of mag over 1 hour per orders for Mag of 1.04. Patient tolerated treatment well, no reaction noted. After treatment, PIV flushed and removed per practice policy and procedure, site care given with gauze, tape and coban. Pt tolerated procedure well. Patient feels well and has no complaints, vital signs stable. Patient given AVS with new schedule. Patient verbalized understanding of all teaching and education. Patient discharged in stable condition with no further needs.

## 2025-05-05 ENCOUNTER — DOCUMENTATION (OUTPATIENT)
Dept: HEMATOLOGY/ONCOLOGY | Facility: HOSPITAL | Age: 75
End: 2025-05-05
Payer: MEDICARE

## 2025-05-05 ENCOUNTER — INFUSION (OUTPATIENT)
Dept: HEMATOLOGY/ONCOLOGY | Facility: CLINIC | Age: 75
End: 2025-05-05
Payer: MEDICARE

## 2025-05-05 ENCOUNTER — APPOINTMENT (OUTPATIENT)
Dept: HEMATOLOGY/ONCOLOGY | Facility: CLINIC | Age: 75
End: 2025-05-05
Payer: MEDICARE

## 2025-05-05 VITALS
DIASTOLIC BLOOD PRESSURE: 73 MMHG | HEART RATE: 104 BPM | BODY MASS INDEX: 24.62 KG/M2 | WEIGHT: 181.8 LBS | RESPIRATION RATE: 16 BRPM | TEMPERATURE: 97 F | OXYGEN SATURATION: 99 % | SYSTOLIC BLOOD PRESSURE: 115 MMHG | HEIGHT: 72 IN

## 2025-05-05 DIAGNOSIS — E83.42 HYPOMAGNESEMIA: ICD-10-CM

## 2025-05-05 DIAGNOSIS — C80.1 ADENOCARCINOMA (MULTI): ICD-10-CM

## 2025-05-05 LAB — MAGNESIUM SERPL-MCNC: 0.78 MG/DL (ref 1.6–2.4)

## 2025-05-05 PROCEDURE — 2500000004 HC RX 250 GENERAL PHARMACY W/ HCPCS (ALT 636 FOR OP/ED): Mod: JZ | Performed by: NURSE PRACTITIONER

## 2025-05-05 PROCEDURE — 96365 THER/PROPH/DIAG IV INF INIT: CPT | Mod: INF

## 2025-05-05 PROCEDURE — 83735 ASSAY OF MAGNESIUM: CPT

## 2025-05-05 PROCEDURE — 96366 THER/PROPH/DIAG IV INF ADDON: CPT | Mod: INF

## 2025-05-05 RX ORDER — MAGNESIUM SULFATE HEPTAHYDRATE 40 MG/ML
4 INJECTION, SOLUTION INTRAVENOUS ONCE
Status: CANCELLED | OUTPATIENT
Start: 2025-05-07 | End: 2025-05-07

## 2025-05-05 RX ORDER — MAGNESIUM SULFATE HEPTAHYDRATE 40 MG/ML
2 INJECTION, SOLUTION INTRAVENOUS ONCE
Status: CANCELLED | OUTPATIENT
Start: 2025-05-07 | End: 2025-05-07

## 2025-05-05 RX ORDER — HEPARIN SODIUM,PORCINE/PF 10 UNIT/ML
50 SYRINGE (ML) INTRAVENOUS AS NEEDED
Status: CANCELLED | OUTPATIENT
Start: 2025-05-05

## 2025-05-05 RX ORDER — HEPARIN 100 UNIT/ML
500 SYRINGE INTRAVENOUS AS NEEDED
Status: DISCONTINUED | OUTPATIENT
Start: 2025-05-05 | End: 2025-05-05 | Stop reason: HOSPADM

## 2025-05-05 RX ORDER — MAGNESIUM SULFATE HEPTAHYDRATE 40 MG/ML
4 INJECTION, SOLUTION INTRAVENOUS ONCE
Status: COMPLETED | OUTPATIENT
Start: 2025-05-05 | End: 2025-05-05

## 2025-05-05 RX ORDER — MAGNESIUM SULFATE HEPTAHYDRATE 40 MG/ML
2-4 INJECTION, SOLUTION INTRAVENOUS ONCE
Status: CANCELLED | OUTPATIENT
Start: 2025-05-07 | End: 2025-05-07

## 2025-05-05 RX ORDER — HEPARIN 100 UNIT/ML
500 SYRINGE INTRAVENOUS AS NEEDED
Status: CANCELLED | OUTPATIENT
Start: 2025-05-05

## 2025-05-05 RX ADMIN — MAGNESIUM SULFATE IN WATER FOR 4 G: 40 INJECTION INTRAVENOUS at 09:37

## 2025-05-05 ASSESSMENT — PAIN SCALES - GENERAL: PAINLEVEL_OUTOF10: 0-NO PAIN

## 2025-05-05 NOTE — PROGRESS NOTES
Patient presents for magnesium treatment,  has no complaints alert and oriented x 4. CVAD accessed per protocol, labs drawn per orders. Patient meets parameters for treatment. Per orders, patients mag is 0.78, so he will get 4grams of mag over 2  hours, per policy. Patient tolerated treatment well, no reaction noted. After treatment, Mediport flushed with 10 ml NS followed by 5 ml of 100mg/ml Heparin Flush, Moody Needle removed per practice policy and procedure, site care given with bandaid applied. Pt tolerated procedure well. Patient feels well and has no complaints, vital signs stable. Patient returns on 5/7/2025 for next treatment. Patient verbalized understanding of all teaching and education. Patient discharged in stable condition with no further needs.

## 2025-05-07 ENCOUNTER — INFUSION (OUTPATIENT)
Dept: HEMATOLOGY/ONCOLOGY | Facility: CLINIC | Age: 75
End: 2025-05-07
Payer: MEDICARE

## 2025-05-07 ENCOUNTER — APPOINTMENT (OUTPATIENT)
Dept: HEMATOLOGY/ONCOLOGY | Facility: CLINIC | Age: 75
End: 2025-05-07
Payer: MEDICARE

## 2025-05-07 VITALS
TEMPERATURE: 97.5 F | WEIGHT: 181.3 LBS | HEIGHT: 72 IN | OXYGEN SATURATION: 100 % | SYSTOLIC BLOOD PRESSURE: 120 MMHG | BODY MASS INDEX: 24.56 KG/M2 | HEART RATE: 96 BPM | DIASTOLIC BLOOD PRESSURE: 72 MMHG | RESPIRATION RATE: 16 BRPM

## 2025-05-07 DIAGNOSIS — E83.42 HYPOMAGNESEMIA: Primary | ICD-10-CM

## 2025-05-07 DIAGNOSIS — C80.1 ADENOCARCINOMA (MULTI): ICD-10-CM

## 2025-05-07 DIAGNOSIS — E83.42 HYPOMAGNESEMIA: ICD-10-CM

## 2025-05-07 LAB — MAGNESIUM SERPL-MCNC: 0.94 MG/DL (ref 1.6–2.4)

## 2025-05-07 PROCEDURE — 96365 THER/PROPH/DIAG IV INF INIT: CPT | Mod: INF

## 2025-05-07 PROCEDURE — 83735 ASSAY OF MAGNESIUM: CPT

## 2025-05-07 PROCEDURE — 2500000004 HC RX 250 GENERAL PHARMACY W/ HCPCS (ALT 636 FOR OP/ED): Mod: JZ | Performed by: NURSE PRACTITIONER

## 2025-05-07 PROCEDURE — 96366 THER/PROPH/DIAG IV INF ADDON: CPT | Mod: INF

## 2025-05-07 RX ORDER — MAGNESIUM SULFATE HEPTAHYDRATE 40 MG/ML
2 INJECTION, SOLUTION INTRAVENOUS ONCE
Status: CANCELLED | OUTPATIENT
Start: 2025-05-09 | End: 2025-05-09

## 2025-05-07 RX ORDER — MAGNESIUM SULFATE HEPTAHYDRATE 40 MG/ML
2-4 INJECTION, SOLUTION INTRAVENOUS ONCE
Status: CANCELLED | OUTPATIENT
Start: 2025-05-09 | End: 2025-05-09

## 2025-05-07 RX ORDER — MAGNESIUM SULFATE HEPTAHYDRATE 40 MG/ML
4 INJECTION, SOLUTION INTRAVENOUS ONCE
Status: COMPLETED | OUTPATIENT
Start: 2025-05-07 | End: 2025-05-07

## 2025-05-07 RX ORDER — MAGNESIUM SULFATE HEPTAHYDRATE 40 MG/ML
4 INJECTION, SOLUTION INTRAVENOUS ONCE
Status: CANCELLED | OUTPATIENT
Start: 2025-05-09 | End: 2025-05-09

## 2025-05-07 RX ADMIN — MAGNESIUM SULFATE IN WATER FOR 4 G: 40 INJECTION INTRAVENOUS at 11:34

## 2025-05-07 ASSESSMENT — PAIN SCALES - GENERAL: PAINLEVEL_OUTOF10: 0-NO PAIN

## 2025-05-07 NOTE — PROGRESS NOTES
Patient here for Magnesium, tolerated well. Patient to return 05/09 for labs and treatment. Patient denies any questions at this time. Discharged in stable condition.

## 2025-05-09 ENCOUNTER — INFUSION (OUTPATIENT)
Dept: HEMATOLOGY/ONCOLOGY | Facility: CLINIC | Age: 75
End: 2025-05-09
Payer: MEDICARE

## 2025-05-09 VITALS
WEIGHT: 181.6 LBS | BODY MASS INDEX: 24.6 KG/M2 | HEART RATE: 92 BPM | TEMPERATURE: 97.5 F | RESPIRATION RATE: 16 BRPM | SYSTOLIC BLOOD PRESSURE: 108 MMHG | DIASTOLIC BLOOD PRESSURE: 73 MMHG | HEIGHT: 72 IN | OXYGEN SATURATION: 100 %

## 2025-05-09 DIAGNOSIS — E83.42 HYPOMAGNESEMIA: Primary | ICD-10-CM

## 2025-05-09 DIAGNOSIS — C80.1 ADENOCARCINOMA (MULTI): ICD-10-CM

## 2025-05-09 DIAGNOSIS — E83.42 HYPOMAGNESEMIA: ICD-10-CM

## 2025-05-09 LAB — MAGNESIUM SERPL-MCNC: 1.07 MG/DL (ref 1.6–2.4)

## 2025-05-09 PROCEDURE — 96365 THER/PROPH/DIAG IV INF INIT: CPT | Mod: INF

## 2025-05-09 PROCEDURE — 2500000004 HC RX 250 GENERAL PHARMACY W/ HCPCS (ALT 636 FOR OP/ED): Mod: JZ | Performed by: STUDENT IN AN ORGANIZED HEALTH CARE EDUCATION/TRAINING PROGRAM

## 2025-05-09 PROCEDURE — 2500000004 HC RX 250 GENERAL PHARMACY W/ HCPCS (ALT 636 FOR OP/ED): Mod: JZ | Performed by: NURSE PRACTITIONER

## 2025-05-09 PROCEDURE — 83735 ASSAY OF MAGNESIUM: CPT

## 2025-05-09 RX ORDER — HEPARIN SODIUM,PORCINE/PF 10 UNIT/ML
50 SYRINGE (ML) INTRAVENOUS AS NEEDED
OUTPATIENT
Start: 2025-05-09

## 2025-05-09 RX ORDER — HEPARIN 100 UNIT/ML
500 SYRINGE INTRAVENOUS AS NEEDED
OUTPATIENT
Start: 2025-05-09

## 2025-05-09 RX ORDER — MAGNESIUM SULFATE HEPTAHYDRATE 40 MG/ML
2 INJECTION, SOLUTION INTRAVENOUS ONCE
OUTPATIENT
Start: 2025-05-12 | End: 2025-05-12

## 2025-05-09 RX ORDER — MAGNESIUM SULFATE HEPTAHYDRATE 40 MG/ML
2 INJECTION, SOLUTION INTRAVENOUS ONCE
Status: COMPLETED | OUTPATIENT
Start: 2025-05-09 | End: 2025-05-09

## 2025-05-09 RX ORDER — MAGNESIUM SULFATE HEPTAHYDRATE 40 MG/ML
4 INJECTION, SOLUTION INTRAVENOUS ONCE
OUTPATIENT
Start: 2025-05-12 | End: 2025-05-12

## 2025-05-09 RX ORDER — MAGNESIUM SULFATE HEPTAHYDRATE 40 MG/ML
2-4 INJECTION, SOLUTION INTRAVENOUS ONCE
OUTPATIENT
Start: 2025-05-12 | End: 2025-05-12

## 2025-05-09 RX ORDER — HEPARIN 100 UNIT/ML
500 SYRINGE INTRAVENOUS AS NEEDED
Status: DISCONTINUED | OUTPATIENT
Start: 2025-05-09 | End: 2025-05-09 | Stop reason: HOSPADM

## 2025-05-09 RX ADMIN — MAGNESIUM SULFATE IN WATER 2 G: 40 INJECTION, SOLUTION INTRAVENOUS at 09:23

## 2025-05-09 RX ADMIN — Medication 500 UNITS: at 10:17

## 2025-05-09 ASSESSMENT — PAIN SCALES - GENERAL: PAINLEVEL_OUTOF10: 0-NO PAIN

## 2025-05-09 NOTE — PROGRESS NOTES
Patient identified by name &   Denies acute distress- none noted.  Magnesium 2 gram infused for level of 1.07; completed and patient discharged home in stable condition. Returns  for labs and magnesium replacement.

## 2025-05-12 ENCOUNTER — DOCUMENTATION (OUTPATIENT)
Dept: HEMATOLOGY/ONCOLOGY | Facility: HOSPITAL | Age: 75
End: 2025-05-12
Payer: MEDICARE

## 2025-05-12 ENCOUNTER — INFUSION (OUTPATIENT)
Dept: HEMATOLOGY/ONCOLOGY | Facility: CLINIC | Age: 75
End: 2025-05-12
Payer: MEDICARE

## 2025-05-12 VITALS
SYSTOLIC BLOOD PRESSURE: 115 MMHG | BODY MASS INDEX: 24.77 KG/M2 | WEIGHT: 182.9 LBS | HEIGHT: 72 IN | OXYGEN SATURATION: 100 % | HEART RATE: 93 BPM | TEMPERATURE: 97.3 F | DIASTOLIC BLOOD PRESSURE: 79 MMHG | RESPIRATION RATE: 16 BRPM

## 2025-05-12 DIAGNOSIS — E83.42 HYPOMAGNESEMIA: Primary | ICD-10-CM

## 2025-05-12 DIAGNOSIS — C80.1 ADENOCARCINOMA (MULTI): ICD-10-CM

## 2025-05-12 DIAGNOSIS — E83.42 HYPOMAGNESEMIA: ICD-10-CM

## 2025-05-12 LAB — MAGNESIUM SERPL-MCNC: 0.8 MG/DL (ref 1.6–2.4)

## 2025-05-12 PROCEDURE — 2500000004 HC RX 250 GENERAL PHARMACY W/ HCPCS (ALT 636 FOR OP/ED): Mod: JZ | Performed by: NURSE PRACTITIONER

## 2025-05-12 PROCEDURE — 96366 THER/PROPH/DIAG IV INF ADDON: CPT | Mod: INF

## 2025-05-12 PROCEDURE — 2500000004 HC RX 250 GENERAL PHARMACY W/ HCPCS (ALT 636 FOR OP/ED): Mod: JZ | Performed by: STUDENT IN AN ORGANIZED HEALTH CARE EDUCATION/TRAINING PROGRAM

## 2025-05-12 PROCEDURE — 96365 THER/PROPH/DIAG IV INF INIT: CPT | Mod: INF

## 2025-05-12 PROCEDURE — 83735 ASSAY OF MAGNESIUM: CPT

## 2025-05-12 RX ORDER — HEPARIN 100 UNIT/ML
500 SYRINGE INTRAVENOUS AS NEEDED
Status: DISCONTINUED | OUTPATIENT
Start: 2025-05-12 | End: 2025-05-12 | Stop reason: HOSPADM

## 2025-05-12 RX ORDER — MAGNESIUM SULFATE HEPTAHYDRATE 40 MG/ML
2 INJECTION, SOLUTION INTRAVENOUS ONCE
Status: CANCELLED | OUTPATIENT
Start: 2025-05-16 | End: 2025-05-16

## 2025-05-12 RX ORDER — HEPARIN SODIUM,PORCINE/PF 10 UNIT/ML
50 SYRINGE (ML) INTRAVENOUS AS NEEDED
Status: CANCELLED | OUTPATIENT
Start: 2025-05-12

## 2025-05-12 RX ORDER — MAGNESIUM SULFATE HEPTAHYDRATE 40 MG/ML
4 INJECTION, SOLUTION INTRAVENOUS ONCE
Status: COMPLETED | OUTPATIENT
Start: 2025-05-12 | End: 2025-05-12

## 2025-05-12 RX ORDER — HEPARIN 100 UNIT/ML
500 SYRINGE INTRAVENOUS AS NEEDED
Status: CANCELLED | OUTPATIENT
Start: 2025-05-12

## 2025-05-12 RX ORDER — MAGNESIUM SULFATE HEPTAHYDRATE 40 MG/ML
2-4 INJECTION, SOLUTION INTRAVENOUS ONCE
Status: CANCELLED | OUTPATIENT
Start: 2025-05-16 | End: 2025-05-16

## 2025-05-12 RX ORDER — MAGNESIUM SULFATE HEPTAHYDRATE 40 MG/ML
4 INJECTION, SOLUTION INTRAVENOUS ONCE
Status: CANCELLED | OUTPATIENT
Start: 2025-05-16 | End: 2025-05-16

## 2025-05-12 RX ADMIN — Medication 500 UNITS: at 15:31

## 2025-05-12 RX ADMIN — MAGNESIUM SULFATE 4 G: 4 INJECTION INTRAVENOUS at 13:33

## 2025-05-12 ASSESSMENT — PAIN SCALES - GENERAL: PAINLEVEL_OUTOF10: 0-NO PAIN

## 2025-05-12 NOTE — PROGRESS NOTES
Pt here for magnesium infusion. Critical lab value reported to team, 4g of mag given. Tolerated well. He is aware of plan of care, will call with further questions or concerns. Will return this week for lab draw and mag replacement

## 2025-05-12 NOTE — PROGRESS NOTES
Notified by Seda Moy in Lab Services that Magnesium 0.80. Secure Chat sent to JUNIOR Rehman NP. Pt has appointment at [facility].

## 2025-05-14 ENCOUNTER — INFUSION (OUTPATIENT)
Dept: HEMATOLOGY/ONCOLOGY | Facility: CLINIC | Age: 75
End: 2025-05-14
Payer: MEDICARE

## 2025-05-14 VITALS
SYSTOLIC BLOOD PRESSURE: 116 MMHG | HEIGHT: 72 IN | RESPIRATION RATE: 16 BRPM | HEART RATE: 96 BPM | BODY MASS INDEX: 24.85 KG/M2 | TEMPERATURE: 97.7 F | DIASTOLIC BLOOD PRESSURE: 74 MMHG | OXYGEN SATURATION: 100 % | WEIGHT: 183.5 LBS

## 2025-05-14 DIAGNOSIS — C80.1 ADENOCARCINOMA (MULTI): ICD-10-CM

## 2025-05-14 DIAGNOSIS — E83.42 HYPOMAGNESEMIA: ICD-10-CM

## 2025-05-14 LAB — MAGNESIUM SERPL-MCNC: 0.8 MG/DL (ref 1.6–2.4)

## 2025-05-14 PROCEDURE — 2500000004 HC RX 250 GENERAL PHARMACY W/ HCPCS (ALT 636 FOR OP/ED): Mod: JZ | Performed by: NURSE PRACTITIONER

## 2025-05-14 PROCEDURE — 83735 ASSAY OF MAGNESIUM: CPT | Performed by: STUDENT IN AN ORGANIZED HEALTH CARE EDUCATION/TRAINING PROGRAM

## 2025-05-14 PROCEDURE — 96366 THER/PROPH/DIAG IV INF ADDON: CPT | Mod: INF

## 2025-05-14 PROCEDURE — 2500000004 HC RX 250 GENERAL PHARMACY W/ HCPCS (ALT 636 FOR OP/ED): Mod: JZ | Performed by: STUDENT IN AN ORGANIZED HEALTH CARE EDUCATION/TRAINING PROGRAM

## 2025-05-14 PROCEDURE — 96365 THER/PROPH/DIAG IV INF INIT: CPT | Mod: INF

## 2025-05-14 RX ORDER — HEPARIN SODIUM,PORCINE/PF 10 UNIT/ML
50 SYRINGE (ML) INTRAVENOUS AS NEEDED
Status: CANCELLED | OUTPATIENT
Start: 2025-05-14

## 2025-05-14 RX ORDER — HEPARIN 100 UNIT/ML
500 SYRINGE INTRAVENOUS AS NEEDED
Status: DISCONTINUED | OUTPATIENT
Start: 2025-05-14 | End: 2025-05-14 | Stop reason: HOSPADM

## 2025-05-14 RX ORDER — MAGNESIUM SULFATE HEPTAHYDRATE 40 MG/ML
4 INJECTION, SOLUTION INTRAVENOUS ONCE
Status: CANCELLED | OUTPATIENT
Start: 2025-05-16 | End: 2025-05-16

## 2025-05-14 RX ORDER — MAGNESIUM SULFATE HEPTAHYDRATE 40 MG/ML
4 INJECTION, SOLUTION INTRAVENOUS ONCE
Status: COMPLETED | OUTPATIENT
Start: 2025-05-14 | End: 2025-05-14

## 2025-05-14 RX ORDER — HEPARIN 100 UNIT/ML
500 SYRINGE INTRAVENOUS AS NEEDED
Status: CANCELLED | OUTPATIENT
Start: 2025-05-14

## 2025-05-14 RX ORDER — MAGNESIUM SULFATE HEPTAHYDRATE 40 MG/ML
2 INJECTION, SOLUTION INTRAVENOUS ONCE
Status: CANCELLED | OUTPATIENT
Start: 2025-05-16 | End: 2025-05-16

## 2025-05-14 RX ORDER — MAGNESIUM SULFATE HEPTAHYDRATE 40 MG/ML
2-4 INJECTION, SOLUTION INTRAVENOUS ONCE
Status: CANCELLED | OUTPATIENT
Start: 2025-05-16 | End: 2025-05-16

## 2025-05-14 RX ADMIN — Medication 500 UNITS: at 13:56

## 2025-05-14 RX ADMIN — MAGNESIUM SULFATE IN WATER FOR 4 G: 40 INJECTION INTRAVENOUS at 11:57

## 2025-05-14 ASSESSMENT — PAIN SCALES - GENERAL: PAINLEVEL_OUTOF10: 0-NO PAIN

## 2025-05-14 NOTE — PROGRESS NOTES
Pt here for magnesium infusion. Tolerated well. He is aware of plan of care, will call with further questions or concerns. Will return in 2 days for labs and mag infusion

## 2025-05-16 ENCOUNTER — INFUSION (OUTPATIENT)
Dept: HEMATOLOGY/ONCOLOGY | Facility: CLINIC | Age: 75
End: 2025-05-16
Payer: MEDICARE

## 2025-05-16 VITALS
WEIGHT: 184.3 LBS | HEART RATE: 85 BPM | DIASTOLIC BLOOD PRESSURE: 77 MMHG | TEMPERATURE: 97.7 F | HEIGHT: 72 IN | SYSTOLIC BLOOD PRESSURE: 119 MMHG | OXYGEN SATURATION: 99 % | BODY MASS INDEX: 24.96 KG/M2 | RESPIRATION RATE: 16 BRPM

## 2025-05-16 DIAGNOSIS — C80.1 ADENOCARCINOMA (MULTI): ICD-10-CM

## 2025-05-16 DIAGNOSIS — E83.42 HYPOMAGNESEMIA: ICD-10-CM

## 2025-05-16 LAB — MAGNESIUM SERPL-MCNC: 1.04 MG/DL (ref 1.6–2.4)

## 2025-05-16 PROCEDURE — 2500000004 HC RX 250 GENERAL PHARMACY W/ HCPCS (ALT 636 FOR OP/ED): Mod: JZ | Performed by: NURSE PRACTITIONER

## 2025-05-16 PROCEDURE — 96365 THER/PROPH/DIAG IV INF INIT: CPT | Mod: INF

## 2025-05-16 PROCEDURE — 83735 ASSAY OF MAGNESIUM: CPT | Performed by: STUDENT IN AN ORGANIZED HEALTH CARE EDUCATION/TRAINING PROGRAM

## 2025-05-16 PROCEDURE — 2500000004 HC RX 250 GENERAL PHARMACY W/ HCPCS (ALT 636 FOR OP/ED): Mod: JZ | Performed by: STUDENT IN AN ORGANIZED HEALTH CARE EDUCATION/TRAINING PROGRAM

## 2025-05-16 RX ORDER — HEPARIN 100 UNIT/ML
500 SYRINGE INTRAVENOUS AS NEEDED
Status: DISCONTINUED | OUTPATIENT
Start: 2025-05-16 | End: 2025-05-16 | Stop reason: HOSPADM

## 2025-05-16 RX ORDER — MAGNESIUM SULFATE HEPTAHYDRATE 40 MG/ML
2 INJECTION, SOLUTION INTRAVENOUS ONCE
OUTPATIENT
Start: 2025-05-19 | End: 2025-05-19

## 2025-05-16 RX ORDER — HEPARIN 100 UNIT/ML
500 SYRINGE INTRAVENOUS AS NEEDED
OUTPATIENT
Start: 2025-05-16

## 2025-05-16 RX ORDER — MAGNESIUM SULFATE HEPTAHYDRATE 40 MG/ML
4 INJECTION, SOLUTION INTRAVENOUS ONCE
OUTPATIENT
Start: 2025-05-19 | End: 2025-05-19

## 2025-05-16 RX ORDER — MAGNESIUM SULFATE HEPTAHYDRATE 40 MG/ML
2-4 INJECTION, SOLUTION INTRAVENOUS ONCE
OUTPATIENT
Start: 2025-05-19 | End: 2025-05-19

## 2025-05-16 RX ORDER — HEPARIN SODIUM,PORCINE/PF 10 UNIT/ML
50 SYRINGE (ML) INTRAVENOUS AS NEEDED
OUTPATIENT
Start: 2025-05-16

## 2025-05-16 RX ORDER — MAGNESIUM SULFATE HEPTAHYDRATE 40 MG/ML
2 INJECTION, SOLUTION INTRAVENOUS ONCE
Status: COMPLETED | OUTPATIENT
Start: 2025-05-16 | End: 2025-05-16

## 2025-05-16 RX ADMIN — Medication 500 UNITS: at 13:50

## 2025-05-16 RX ADMIN — MAGNESIUM SULFATE IN WATER 2 G: 40 INJECTION, SOLUTION INTRAVENOUS at 12:53

## 2025-05-16 ASSESSMENT — PAIN SCALES - GENERAL: PAINLEVEL_OUTOF10: 0-NO PAIN

## 2025-05-16 NOTE — PROGRESS NOTES
Patient identified by name &   Denies acute distress- none noted at this time. Serum Magnesium resulted at 1.04- 2 grams magnesium replaced per standing orders. Completed without deficits. Patient discharged home, ambulatory, in stable condition.

## 2025-05-19 ENCOUNTER — INFUSION (OUTPATIENT)
Dept: HEMATOLOGY/ONCOLOGY | Facility: CLINIC | Age: 75
End: 2025-05-19
Payer: MEDICARE

## 2025-05-19 VITALS
HEIGHT: 72 IN | OXYGEN SATURATION: 99 % | RESPIRATION RATE: 16 BRPM | HEART RATE: 69 BPM | DIASTOLIC BLOOD PRESSURE: 74 MMHG | TEMPERATURE: 97.3 F | BODY MASS INDEX: 24.91 KG/M2 | WEIGHT: 183.9 LBS | SYSTOLIC BLOOD PRESSURE: 119 MMHG

## 2025-05-19 DIAGNOSIS — E83.42 HYPOMAGNESEMIA: ICD-10-CM

## 2025-05-19 DIAGNOSIS — C80.1 ADENOCARCINOMA (MULTI): ICD-10-CM

## 2025-05-19 DIAGNOSIS — E87.6 HYPOKALEMIA: ICD-10-CM

## 2025-05-19 LAB
ALBUMIN SERPL BCP-MCNC: 2.9 G/DL (ref 3.4–5)
ALP SERPL-CCNC: 86 U/L (ref 33–136)
ALT SERPL W P-5'-P-CCNC: 19 U/L (ref 10–52)
ANION GAP SERPL CALC-SCNC: 8 MMOL/L (ref 10–20)
AST SERPL W P-5'-P-CCNC: 22 U/L (ref 9–39)
BASOPHILS # BLD AUTO: 0.03 X10*3/UL (ref 0–0.1)
BASOPHILS NFR BLD AUTO: 0.7 %
BILIRUB SERPL-MCNC: 0.3 MG/DL (ref 0–1.2)
BUN SERPL-MCNC: 6 MG/DL (ref 6–23)
CALCIUM SERPL-MCNC: 7.5 MG/DL (ref 8.6–10.3)
CHLORIDE SERPL-SCNC: 106 MMOL/L (ref 98–107)
CO2 SERPL-SCNC: 27 MMOL/L (ref 21–32)
CREAT SERPL-MCNC: 0.71 MG/DL (ref 0.5–1.3)
EGFRCR SERPLBLD CKD-EPI 2021: >90 ML/MIN/1.73M*2
EOSINOPHIL # BLD AUTO: 0.33 X10*3/UL (ref 0–0.4)
EOSINOPHIL NFR BLD AUTO: 7.3 %
ERYTHROCYTE [DISTWIDTH] IN BLOOD BY AUTOMATED COUNT: 16.9 % (ref 11.5–14.5)
GLUCOSE SERPL-MCNC: 127 MG/DL (ref 74–99)
HCT VFR BLD AUTO: 29.4 % (ref 41–52)
HGB BLD-MCNC: 9.1 G/DL (ref 13.5–17.5)
IMM GRANULOCYTES # BLD AUTO: 0.02 X10*3/UL (ref 0–0.5)
IMM GRANULOCYTES NFR BLD AUTO: 0.4 % (ref 0–0.9)
LYMPHOCYTES # BLD AUTO: 1.39 X10*3/UL (ref 0.8–3)
LYMPHOCYTES NFR BLD AUTO: 30.5 %
MAGNESIUM SERPL-MCNC: 0.92 MG/DL (ref 1.6–2.4)
MCH RBC QN AUTO: 30.1 PG (ref 26–34)
MCHC RBC AUTO-ENTMCNC: 31 G/DL (ref 32–36)
MCV RBC AUTO: 97 FL (ref 80–100)
MONOCYTES # BLD AUTO: 0.48 X10*3/UL (ref 0.05–0.8)
MONOCYTES NFR BLD AUTO: 10.5 %
NEUTROPHILS # BLD AUTO: 2.3 X10*3/UL (ref 1.6–5.5)
NEUTROPHILS NFR BLD AUTO: 50.6 %
PLATELET # BLD AUTO: 227 X10*3/UL (ref 150–450)
POTASSIUM SERPL-SCNC: 3.2 MMOL/L (ref 3.5–5.3)
PROT SERPL-MCNC: 5.6 G/DL (ref 6.4–8.2)
RBC # BLD AUTO: 3.02 X10*6/UL (ref 4.5–5.9)
SODIUM SERPL-SCNC: 138 MMOL/L (ref 136–145)
WBC # BLD AUTO: 4.6 X10*3/UL (ref 4.4–11.3)

## 2025-05-19 PROCEDURE — 2500000004 HC RX 250 GENERAL PHARMACY W/ HCPCS (ALT 636 FOR OP/ED): Mod: JZ | Performed by: NURSE PRACTITIONER

## 2025-05-19 PROCEDURE — 83735 ASSAY OF MAGNESIUM: CPT | Performed by: STUDENT IN AN ORGANIZED HEALTH CARE EDUCATION/TRAINING PROGRAM

## 2025-05-19 PROCEDURE — 80053 COMPREHEN METABOLIC PANEL: CPT

## 2025-05-19 PROCEDURE — 96366 THER/PROPH/DIAG IV INF ADDON: CPT | Mod: INF

## 2025-05-19 PROCEDURE — 96365 THER/PROPH/DIAG IV INF INIT: CPT | Mod: INF

## 2025-05-19 PROCEDURE — 85025 COMPLETE CBC W/AUTO DIFF WBC: CPT

## 2025-05-19 PROCEDURE — 2500000004 HC RX 250 GENERAL PHARMACY W/ HCPCS (ALT 636 FOR OP/ED): Mod: JZ | Performed by: STUDENT IN AN ORGANIZED HEALTH CARE EDUCATION/TRAINING PROGRAM

## 2025-05-19 RX ORDER — MAGNESIUM SULFATE HEPTAHYDRATE 40 MG/ML
2-4 INJECTION, SOLUTION INTRAVENOUS ONCE
Status: CANCELLED | OUTPATIENT
Start: 2025-05-21 | End: 2025-05-21

## 2025-05-19 RX ORDER — POTASSIUM CHLORIDE 20 MEQ/1
40 TABLET, EXTENDED RELEASE ORAL DAILY
Qty: 30 TABLET | Refills: 3 | Status: SHIPPED | OUTPATIENT
Start: 2025-05-19 | End: 2025-09-16

## 2025-05-19 RX ORDER — MAGNESIUM SULFATE HEPTAHYDRATE 40 MG/ML
2 INJECTION, SOLUTION INTRAVENOUS ONCE
Status: CANCELLED | OUTPATIENT
Start: 2025-05-21 | End: 2025-05-21

## 2025-05-19 RX ORDER — HEPARIN 100 UNIT/ML
500 SYRINGE INTRAVENOUS AS NEEDED
Status: CANCELLED | OUTPATIENT
Start: 2025-05-19

## 2025-05-19 RX ORDER — HEPARIN SODIUM,PORCINE/PF 10 UNIT/ML
50 SYRINGE (ML) INTRAVENOUS AS NEEDED
Status: CANCELLED | OUTPATIENT
Start: 2025-05-19

## 2025-05-19 RX ORDER — MAGNESIUM SULFATE HEPTAHYDRATE 40 MG/ML
4 INJECTION, SOLUTION INTRAVENOUS ONCE
Status: CANCELLED | OUTPATIENT
Start: 2025-05-21 | End: 2025-05-21

## 2025-05-19 RX ORDER — MAGNESIUM SULFATE HEPTAHYDRATE 40 MG/ML
4 INJECTION, SOLUTION INTRAVENOUS ONCE
Status: COMPLETED | OUTPATIENT
Start: 2025-05-19 | End: 2025-05-19

## 2025-05-19 RX ORDER — HEPARIN 100 UNIT/ML
500 SYRINGE INTRAVENOUS AS NEEDED
Status: DISCONTINUED | OUTPATIENT
Start: 2025-05-19 | End: 2025-05-19 | Stop reason: HOSPADM

## 2025-05-19 RX ADMIN — Medication 500 UNITS: at 11:30

## 2025-05-19 RX ADMIN — MAGNESIUM SULFATE IN WATER FOR 4 G: 40 INJECTION INTRAVENOUS at 09:33

## 2025-05-19 ASSESSMENT — PAIN SCALES - GENERAL: PAINLEVEL_OUTOF10: 0-NO PAIN

## 2025-05-21 ENCOUNTER — INFUSION (OUTPATIENT)
Dept: HEMATOLOGY/ONCOLOGY | Facility: CLINIC | Age: 75
End: 2025-05-21
Payer: MEDICARE

## 2025-05-21 ENCOUNTER — APPOINTMENT (OUTPATIENT)
Dept: HEMATOLOGY/ONCOLOGY | Facility: CLINIC | Age: 75
End: 2025-05-21
Payer: MEDICARE

## 2025-05-21 ENCOUNTER — OFFICE VISIT (OUTPATIENT)
Dept: HEMATOLOGY/ONCOLOGY | Facility: CLINIC | Age: 75
End: 2025-05-21
Payer: MEDICARE

## 2025-05-21 ENCOUNTER — TELEPHONE (OUTPATIENT)
Dept: HEMATOLOGY/ONCOLOGY | Facility: HOSPITAL | Age: 75
End: 2025-05-21
Payer: MEDICARE

## 2025-05-21 ENCOUNTER — NUTRITION (OUTPATIENT)
Dept: HEMATOLOGY/ONCOLOGY | Facility: CLINIC | Age: 75
End: 2025-05-21
Payer: MEDICARE

## 2025-05-21 VITALS
BODY MASS INDEX: 25.38 KG/M2 | HEART RATE: 80 BPM | RESPIRATION RATE: 16 BRPM | WEIGHT: 187.17 LBS | DIASTOLIC BLOOD PRESSURE: 78 MMHG | SYSTOLIC BLOOD PRESSURE: 122 MMHG | OXYGEN SATURATION: 95 % | TEMPERATURE: 98.1 F

## 2025-05-21 DIAGNOSIS — C80.1 ADENOCARCINOMA (MULTI): ICD-10-CM

## 2025-05-21 DIAGNOSIS — K62.5 BRIGHT RED RECTAL BLEEDING: ICD-10-CM

## 2025-05-21 DIAGNOSIS — C80.1 ADENOCARCINOMA (MULTI): Primary | ICD-10-CM

## 2025-05-21 DIAGNOSIS — E83.42 HYPOMAGNESEMIA: ICD-10-CM

## 2025-05-21 PROCEDURE — 96375 TX/PRO/DX INJ NEW DRUG ADDON: CPT | Mod: INF

## 2025-05-21 PROCEDURE — 2500000004 HC RX 250 GENERAL PHARMACY W/ HCPCS (ALT 636 FOR OP/ED): Performed by: NURSE PRACTITIONER

## 2025-05-21 PROCEDURE — 96411 CHEMO IV PUSH ADDL DRUG: CPT

## 2025-05-21 PROCEDURE — 96413 CHEMO IV INFUSION 1 HR: CPT

## 2025-05-21 PROCEDURE — 99214 OFFICE O/P EST MOD 30 MIN: CPT | Performed by: NURSE PRACTITIONER

## 2025-05-21 PROCEDURE — 3061F NEG MICROALBUMINURIA REV: CPT | Performed by: NURSE PRACTITIONER

## 2025-05-21 PROCEDURE — 96415 CHEMO IV INFUSION ADDL HR: CPT

## 2025-05-21 PROCEDURE — 3048F LDL-C <100 MG/DL: CPT | Performed by: NURSE PRACTITIONER

## 2025-05-21 PROCEDURE — 3052F HG A1C>EQUAL 8.0%<EQUAL 9.0%: CPT | Performed by: NURSE PRACTITIONER

## 2025-05-21 PROCEDURE — 4010F ACE/ARB THERAPY RXD/TAKEN: CPT | Performed by: NURSE PRACTITIONER

## 2025-05-21 PROCEDURE — 2500000004 HC RX 250 GENERAL PHARMACY W/ HCPCS (ALT 636 FOR OP/ED): Mod: JZ,TB | Performed by: NURSE PRACTITIONER

## 2025-05-21 PROCEDURE — 2500000001 HC RX 250 WO HCPCS SELF ADMINISTERED DRUGS (ALT 637 FOR MEDICARE OP): Performed by: NURSE PRACTITIONER

## 2025-05-21 PROCEDURE — 2500000004 HC RX 250 GENERAL PHARMACY W/ HCPCS (ALT 636 FOR OP/ED): Mod: JZ | Performed by: STUDENT IN AN ORGANIZED HEALTH CARE EDUCATION/TRAINING PROGRAM

## 2025-05-21 RX ORDER — HEPARIN 100 UNIT/ML
500 SYRINGE INTRAVENOUS AS NEEDED
Status: CANCELLED | OUTPATIENT
Start: 2025-05-21

## 2025-05-21 RX ORDER — ACETAMINOPHEN 325 MG/1
650 TABLET ORAL ONCE
Status: COMPLETED | OUTPATIENT
Start: 2025-05-21 | End: 2025-05-21

## 2025-05-21 RX ORDER — PROCHLORPERAZINE MALEATE 10 MG
10 TABLET ORAL EVERY 6 HOURS PRN
Status: DISCONTINUED | OUTPATIENT
Start: 2025-05-21 | End: 2025-05-21 | Stop reason: HOSPADM

## 2025-05-21 RX ORDER — FAMOTIDINE 10 MG/ML
20 INJECTION, SOLUTION INTRAVENOUS ONCE AS NEEDED
Status: CANCELLED | OUTPATIENT
Start: 2025-05-21

## 2025-05-21 RX ORDER — EPINEPHRINE 0.3 MG/.3ML
0.3 INJECTION SUBCUTANEOUS EVERY 5 MIN PRN
Status: DISCONTINUED | OUTPATIENT
Start: 2025-05-21 | End: 2025-05-21 | Stop reason: HOSPADM

## 2025-05-21 RX ORDER — EPINEPHRINE 0.3 MG/.3ML
0.3 INJECTION SUBCUTANEOUS EVERY 5 MIN PRN
Status: CANCELLED | OUTPATIENT
Start: 2025-05-21

## 2025-05-21 RX ORDER — HEPARIN SODIUM,PORCINE/PF 10 UNIT/ML
50 SYRINGE (ML) INTRAVENOUS AS NEEDED
Status: DISCONTINUED | OUTPATIENT
Start: 2025-05-21 | End: 2025-05-21 | Stop reason: HOSPADM

## 2025-05-21 RX ORDER — ACETAMINOPHEN 325 MG/1
650 TABLET ORAL ONCE
Status: CANCELLED | OUTPATIENT
Start: 2025-05-21

## 2025-05-21 RX ORDER — PROCHLORPERAZINE EDISYLATE 5 MG/ML
10 INJECTION INTRAMUSCULAR; INTRAVENOUS EVERY 6 HOURS PRN
Status: CANCELLED | OUTPATIENT
Start: 2025-05-21

## 2025-05-21 RX ORDER — DIPHENHYDRAMINE HCL 50 MG
50 CAPSULE ORAL ONCE
Status: CANCELLED | OUTPATIENT
Start: 2025-05-21

## 2025-05-21 RX ORDER — HEPARIN 100 UNIT/ML
500 SYRINGE INTRAVENOUS AS NEEDED
Status: DISCONTINUED | OUTPATIENT
Start: 2025-05-21 | End: 2025-05-21 | Stop reason: HOSPADM

## 2025-05-21 RX ORDER — FAMOTIDINE 10 MG/ML
20 INJECTION, SOLUTION INTRAVENOUS ONCE AS NEEDED
Status: DISCONTINUED | OUTPATIENT
Start: 2025-05-21 | End: 2025-05-21 | Stop reason: HOSPADM

## 2025-05-21 RX ORDER — PROCHLORPERAZINE MALEATE 10 MG
10 TABLET ORAL EVERY 6 HOURS PRN
Status: CANCELLED | OUTPATIENT
Start: 2025-05-21

## 2025-05-21 RX ORDER — DIPHENHYDRAMINE HYDROCHLORIDE 50 MG/ML
50 INJECTION, SOLUTION INTRAMUSCULAR; INTRAVENOUS AS NEEDED
Status: DISCONTINUED | OUTPATIENT
Start: 2025-05-21 | End: 2025-05-21 | Stop reason: HOSPADM

## 2025-05-21 RX ORDER — HEPARIN SODIUM,PORCINE/PF 10 UNIT/ML
50 SYRINGE (ML) INTRAVENOUS AS NEEDED
Status: CANCELLED | OUTPATIENT
Start: 2025-05-21

## 2025-05-21 RX ORDER — PROCHLORPERAZINE EDISYLATE 5 MG/ML
10 INJECTION INTRAMUSCULAR; INTRAVENOUS EVERY 6 HOURS PRN
Status: DISCONTINUED | OUTPATIENT
Start: 2025-05-21 | End: 2025-05-21 | Stop reason: HOSPADM

## 2025-05-21 RX ORDER — ALBUTEROL SULFATE 0.83 MG/ML
3 SOLUTION RESPIRATORY (INHALATION) AS NEEDED
Status: DISCONTINUED | OUTPATIENT
Start: 2025-05-21 | End: 2025-05-21 | Stop reason: HOSPADM

## 2025-05-21 RX ORDER — ALBUTEROL SULFATE 0.83 MG/ML
3 SOLUTION RESPIRATORY (INHALATION) AS NEEDED
Status: CANCELLED | OUTPATIENT
Start: 2025-05-21

## 2025-05-21 RX ORDER — DIPHENHYDRAMINE HCL 50 MG
50 CAPSULE ORAL ONCE
Status: COMPLETED | OUTPATIENT
Start: 2025-05-21 | End: 2025-05-21

## 2025-05-21 RX ORDER — DIPHENHYDRAMINE HYDROCHLORIDE 50 MG/ML
50 INJECTION, SOLUTION INTRAMUSCULAR; INTRAVENOUS AS NEEDED
Status: CANCELLED | OUTPATIENT
Start: 2025-05-21

## 2025-05-21 RX ADMIN — DIPHENHYDRAMINE HYDROCHLORIDE 50 MG: 50 CAPSULE ORAL at 09:07

## 2025-05-21 RX ADMIN — PEMETREXED DISODIUM 850 MG: 500 INJECTION, POWDER, LYOPHILIZED, FOR SOLUTION INTRAVENOUS at 09:29

## 2025-05-21 RX ADMIN — ACETAMINOPHEN 650 MG: 325 TABLET ORAL at 09:06

## 2025-05-21 RX ADMIN — AMIVANTAMAB 2100 MG: 350 INJECTION INTRAVENOUS at 09:48

## 2025-05-21 RX ADMIN — DEXAMETHASONE SODIUM PHOSPHATE 10 MG: 4 INJECTION INTRA-ARTICULAR; INTRALESIONAL; INTRAMUSCULAR; INTRAVENOUS; SOFT TISSUE at 09:10

## 2025-05-21 RX ADMIN — HEPARIN 500 UNITS: 100 SYRINGE at 12:03

## 2025-05-21 ASSESSMENT — PAIN SCALES - GENERAL: PAINLEVEL_OUTOF10: 0-NO PAIN

## 2025-05-21 NOTE — TELEPHONE ENCOUNTER
RN called and left a message for Jayant in regards to his mychart message that he sent in. Clinic contact information was given for callback.

## 2025-05-21 NOTE — PROGRESS NOTES
[facility] - Medical Oncology Follow-Up Visit  Patient ID: Jayant Holland is a 74 y.o. male with NSCLC adenocarcinoma     Current therapy: cyanocobalamin (Vitamin B-12) injection 1,000 mcg, 1,000 mcg, intramuscular, Once, 1 of 6 cycles  Administration: 1,000 mcg (1/29/2025)    fosaprepitant (Emend) 150 mg in sodium chloride 0.9% 250 mL IV, 150 mg, intravenous, Once, 1 of 4 cycles  Administration: 150 mg (1/29/2025)    CARBOplatin (Paraplatin) 600 mg in sodium chloride 0.9% 170 mL IV, 600 mg, intravenous, Once, 1 of 4 cycles  Administration: 600 mg (1/29/2025)    amivantamab-vmjw (Rybrevant) 350 mg in sodium chloride 0.9% 250 mL IV, 350 mg, intravenous, Once, 1 of 18 cycles  Administration: 350 mg (1/29/2025), 1,400 mg (1/30/2025), 1,750 mg (2/4/2025), 1,750 mg (2/11/2025)  methylPREDNISolone sod succinate (SOLU-Medrol) 40 mg/mL injection 40 mg, 40 mg, intravenous, As needed, 1 of 18 cycles    palonosetron (Aloxi) injection 250 mcg, 250 mcg, intravenous, Once, 1 of 4 cycles  Administration: 250 mcg (1/29/2025)    PEMEtrexed disodium (Alimta) 1,100 mg in sodium chloride 0.9% 154 mL IV, 500 mg/m2 = 1,100 mg, intravenous, Once, 1 of 18 cycles  Dose modification: 400 mg/m2 (original dose 500 mg/m2, Cycle 2)  Administration: 1,100 mg (1/29/2025)       Chief Concern: follow-up and readiness to treat     Oncologic History:     DIAGNOSIS  NSCLC adenocarcinoma     STAGING  [id number]     CURRENT SITES OF DISEASE  RUL, R pleural effusion/pleural carcinomatosis, R hilar, mediastinal, retrocaval nodes      MOLECULAR GENOMICS  PD-L1 5%  EGFR [id number] ([id number])      PRIOR THERAPY      CURRENT THERAPY  Carboplatin/Pemetrexed/Amivantamab C1D1 1/29/25 -   Pemetrexed dose reduced 400mg/m2 C2 -   Maintenance pemetrexed/amivantamab delayed 1 week for neutropenia to start 4/25/25     CURRENT ONCOLOGICAL PROBLEMS  Unintentional wt loss - stabilized  Hypomagnesia         HISTORY  OF PRESENT ILLNESS  Mr. Holland is a 73 yo with PMH significant for DMII, HTN, and HLD who had a CXR done on 12/5/24 for persistent coughing after pneumonia about a month prior, which was concerning for moderate pleural effusion of the R lung. He went to the ER, where a CT chest w/contrast was done with spiculated RUL mass measuring 2.2 x 2.1 cm and large R pleural effusion. He was referred to diagnostic clinic and seen on 12/9/24 by Kirstin where he noted persistent cough for 6 weeks, constant dyspnea, wheezing, and unintentional 30 lb weight loss over the last year. He was referred to pulmonology and had thoracentesis on 12/10/24 with 980 ml removed, cytology with adenocarcinoma, PD-L1 5%, and NGS with EGFR exon 20 mutation. He had repeat thoracentesis on 12/17/24 with 2.4L removed. PET/CT 12/20/24 with FDV avidity of the RL nodule SUV max 9.2, multiple RLL avid nodules SUV max 4.1, FDG-avid pleural thickening on the right, multiple R hilar, mediastinal, subcarinal nodes, and moderate R pleural effusion. Mildly avid GGO within JANELLE SUV max 2.0. Brain MRI is rescheduled for 1/15/25.   Consented for carboplatin/pemetrexed/amivantamab started 1/29/25.  Treatment delayed d/t hospitalization. Treatment has been c/b hypomagnesemia. Pemetrexed dose-reduced to 400 mg/m2 with C2. Maintenance pemetrexed/amivantamab delayed 1 week for neutropenia.    1/21/25 - 1/23/25 - hospitalization 2/2 recurrent pleural effusion.        PAST MEDICAL HISTORY  DMII   HTN  HLD   Osteoarthritis (neck)  asthma     SOCIAL HISTORY  Lives at home with his wife. Retired from [employer], worked as a technician for 48 years, notes hazardous chemical exposures.  Tob: never  EtOH: occasionally  Illicits: none     FAMILY HISTORY  Mother - breast cancer    HPI   5/21/2025  Patient presents today for treatment readiness visit. He is alone at today's visit. His primary concern is rectal bleeding with bowel movements. He notes blood on toilet paper. He does not  believe he has hemorrhoids. No recent constipation or straining. Most recent colonoscopy 2023, one rectal polyp. Otherwise without complaint and he denies any fevers, chills or night sweats. No cough, chest pain or shortness of breath. No further epitaxies. No nausea or vomiting. No constipation or diarrhea. No urinary symptoms. No rash. No neuropathy.   PleurX draining 250 twice weekly.     Review of Systems:  A review of systems has been completed and are negative for complaints except what is stated in the HPI and/or past medical history      Meds (Current):  Current Medications[1]    Objective   Vital Signs  /78, HR 80, resp 16    Wt Readings from Last 5 Encounters:   25 84.9 kg (187 lb 2.7 oz)   25 83.4 kg (183 lb 14.4 oz)   25 83.6 kg (184 lb 4.8 oz)   25 83.2 kg (183 lb 8 oz)   25 83 kg (182 lb 14.4 oz)       Physical Exam:  ECO  Pain: 0  Constitutional: Well developed, awake/alert/oriented x3, no distress, alert and cooperative  Eyes: PER. sclera anicteric  ENMT: Oral mucosa moist, no lesions or thrush identified  Respiratory/Thorax: Breathing is non-labored. Lungs are clear to auscultation bilaterally. No adventitious breath sounds. Plerux drain.   Cardiovascular: S1-S2. Regular rate and rhythm. No murmurs, rubs, or gallops appreciated  Gastrointestinal: Abdomen soft nontender, nondistended, normal active bowel sounds. No external hemorrhoids  Musculoskeletal: ROM intact, no joint swelling, normal strength  Extremities: normal extremities, no cyanosis, +ankle edema, no clubbing  Lymphatics: no palpable lymphadenopathy   Skin: faint rash bridge of nose and cheeks   Neurologic: alert and oriented x3. Nonfocal exam. No myoclonus  Psychological: Pleasant, appropriate and easily engaged          Results:  Lab Results   Component Value Date    WBC 4.6 2025    HGB 9.1 (L) 2025    HCT 29.4 (L) 2025    MCV 97 2025     2025      Lab  Results   Component Value Date    NEUTROABS 2.30 05/19/2025      Lab Results   Component Value Date    GLUCOSE 127 (H) 05/19/2025    CALCIUM 7.5 (L) 05/19/2025     05/19/2025    K 3.2 (L) 05/19/2025    CO2 27 05/19/2025     05/19/2025    BUN 6 05/19/2025    CREATININE 0.71 05/19/2025    MG 0.92 (L) 05/19/2025     Lab Results   Component Value Date    ALT 19 05/19/2025    AST 22 05/19/2025    ALKPHOS 86 05/19/2025    BILITOT 0.3 05/19/2025    BILIDIR 0.1 11/18/2019      Lab Results   Component Value Date    TSH 2.12 01/28/2025           Imaging:  I have personally reviewed the below imaging and concur with the reported findings unless otherwise stated:    CT chest abdomen pelvis w IV contrast  Result Date: 4/19/2025  Impression: Adenocarcinoma of the lung restaging scan compared to CTA chest 03/20/2025 and PET-CT 12/20/2024: 1. Redemonstration of spiculated nodule in the apical segment right upper lobe with decreased attenuation compared to prior CTs dating back to 01/21/2025 consistent with treatment response. Remaining pulmonary nodules and mediastinal lymph nodes remain stable in size. No new measurable disease in the chest abdomen pelvis. 2. Similar pleural thickening of the right lower lobe pleura with small complex right pleural effusion consistent with known pleural metastasis and malignant effusion. 3. PleurX catheter terminates within the pleural space abutting the superior segment of right lower lobe unchanged compared to prior exam. 4. Fluid-filled esophagus which can be seen in reflux esophagitis. 5. Additional incidental non-acute findings as detailed above.     I personally reviewed the images/study and I agree with the findings as stated by Dr. Zach Rocha. This study was interpreted at [facility], [place], [place].   MACRO: None.   Signed by: Albert Garcia 4/19/2025 12:06 PM Dictation workstation:   [id number]        Assessment/Plan      Jayant jackson a  74 y.o. male here for follow up of NSCLC adenocarcinoma    # NSCLC adenocarcinoma  - [id number] (pleural effusion)  - PD-L1 5%, EGFR [id number] ([id number])   - discussed that given EGFR exon 20 mutation, recommend combination chemotherapy+amivantamab, consented  - started C1D1 1/29/25, pemetrexed dose-reduced with C2 to 400 mg/m2  - C1D1 dexamethasone Rx started - for 3 doses.  Discussed potential infusion reaction.  Pt verbalized understanding.    - Per pt request, C2 infusion and visit transferred to [facility].  Visits with FORTINO Grace.  Scan review visits with Dr. Solitario at [facility] [place].   - Per pt request, order placed for mediport placement. Mediport Placed 3/17/2025  - personally reviewed restaging scans after 4 cycles of carbo/pem/amivantamab with some response. Discussed continuing on maintenance pemetrexed/amivantamab. Neutropenic today and will delay to 4/25/25  - will continue to monitor, repeat scans in 3 months  - will obtain brain MRI to monitor brain metastases     # Malignant pleural effusion  - s/p thoracentesis and contacted by IP while in visit for repeat thora.   - Right pleurx catheter placed 12/17/24 with HH referral in place.  Drained 2x/wk.  - continue to monitor    # Hypomagnesia, ongoing with current Amivantamab treatment  - Mg replacement plan created, next IV mag replacement dates include: 3/14, 3/17, 3/20, 3/24, 3/27, 3/31, 4/4, 4/7, 4/9, 4/11, 4/14 at [facility].  Replacement dates ongoing. Treatment parameters:    Serum magnesium 1 - 1.6 mg/dL - administer 2 grams over 1 hour  Serum magnesium less than 1 mg/dL - administer 4 grams over 2 hours     # Macular rash to face, bridge of nose and checks  - Continue routine Doxycycline and treatment rash protocols.      # Unintentional wt loss  - Recommended pt add Ensure/Boost supplements and snacks throughout the day. Will try increased nutritional intake vs pharm intervention for now.    - Dietician referral placed.   -  2/11:  wt loss stabilized, pt started daily Boost supplements    # Leukocytosis 12.6 3/12/25  - no clinical s/sx's infection  - Continue to monitor     # Advanced care planning  - discussed incurable but treatable nature of metastatic disease, and goal of therapy is to prolong life while maintaining or improving quality of life.  - 1/30/25 - Supportive onc referral placed.  Pt declined need for visit at this time.  Supportive onc will follow-up in 2-3 weeks.      # GI  - Recent rectal bleeding. No external hemorrhoids. Refer to GI for evaluation Dr. Warner Matos, APRN-CNP  [facility]  Thoracic + H&N Medical Oncology     I spent a total of 30+ minutes on the date of the service which included preparing to see the patient, face-to-face patient care, completing clinical documentation, obtaining and / or reviewing separately obtained history, counseling and educating the patient/family/caregiver, ordering medications, tests, or procedures, communicating with other healthcare providers (not separately reported), independently interpreting results, not separately reported, and communicating results to the patient/family/caregiver, Name and date of birth verified.              [1]   Current Outpatient Medications:     acetaminophen (Tylenol) 325 mg tablet, Take 2 tablets (650 mg) by mouth every 4 hours if needed for fever (temp greater than 38.0 C), headaches or mild pain (1 - 3) (greater than or equal to 38 C)., Disp: , Rfl:     apixaban (Eliquis) 5 mg tablet, Take 2 tablets (10 mg) by mouth every 12 hours for 5 days, THEN 1 tablet (5 mg) every 12 hours., Disp: 140 tablet, Rfl: 0    Blood glucose monitoring meter kit kit, 1 each if needed., Disp: , Rfl:     blood-glucose meter misc, 1 Device once daily., Disp: 1 each, Rfl: 0    chlorthalidone (Hygroton) 25 mg tablet, Take 1 tablet (25 mg) by mouth once daily., Disp: , Rfl:     clindamycin (Cleocin T) 1 % lotion, Apply topically to  affected area twice daily. Do not fill before January 29, 2025., Disp: 60 mL, Rfl: 3    folic acid (Folvite) 1 mg tablet, Take 1 tablet (1,000 mcg) by mouth once daily. Do not fill before January 29, 2025., Disp: 30 tablet, Rfl: 11    hydrocortisone 1 % cream, Apply topically to face, hands, feet, neck, back, and chest once daily at bedtime for rash prevention. Do not fill before January 29, 2025., Disp: 453.6 g, Rfl: 3    lancets 30 gauge misc, 1 Device 3 times a day., Disp: 200 each, Rfl: 3    lisinopril 20 mg tablet, Take 1 tablet (20 mg) by mouth once daily., Disp: 100 tablet, Rfl: 3    metFORMIN  mg 24 hr tablet, TAKE 2 TABLETS (1,000 MG) BY MOUTH 2 TIMES A DAY. DO NOT CRUSH, CHEW, OR SPLIT., Disp: 360 tablet, Rfl: 3    omeprazole (PriLOSEC) 20 mg DR capsule, TAKE 1 CAPSULE BY MOUTH EVERY DAY, Disp: 90 capsule, Rfl: 3    ondansetron (Zofran) 8 mg tablet, Take 1 tablet (8 mg) by mouth every 8 hours if needed for nausea or vomiting. Do not fill before January 29, 2025., Disp: 30 tablet, Rfl: 5    potassium chloride CR 20 mEq ER tablet, Take 2 tablets (40 mEq) by mouth once daily. Do not crush, chew, or split., Disp: 30 tablet, Rfl: 3    prochlorperazine (Compazine) 10 mg tablet, Take 1 tablet (10 mg) by mouth every 6 hours if needed for nausea or vomiting. Do not fill before January 29, 2025., Disp: 30 tablet, Rfl: 5    rosuvastatin (Crestor) 40 mg tablet, TAKE 1 TABLET BY MOUTH EVERY DAY, Disp: 90 tablet, Rfl: 3    sennosides (Senokot) 8.6 mg tablet, Take 2 tablets (17.2 mg) by mouth as needed at bedtime for constipation. Do not fill before January 29, 2025., Disp: 30 tablet, Rfl: 11

## 2025-05-21 NOTE — PROGRESS NOTES
NUTRITION Follow up NOTE    Nutrition Assessment     Reason for Visit:  Jayant Holland is a 74 y.o. male who presents for NSCLC Adenocarcinoma, Staging [id number], RUL, R pleaural effusion/pleural carcinomatosis, R hilar, mediastinal retrocaval nodes.  PleurX placed 1/10/2025     PMH: T2DM, HTN, HLD    ACTIVE TREATMENT: Amivantamab + Pemetrexed/Carboplatin, 21 Day Cycles    Admitted 3/20-3/23 with acute pulmonary embolism, Acute left lower extremity deep vein thrombosis and dehydration    SOCIAL HISTORY  Lives at home with his wife. Retired from [employer], worked as a technician for 48 years, notes hazardous chemical exposures.    Met with patient today for follow up visit during infusion during Cycle 6 treatment     Patient Active Problem List   Diagnosis    Arthritis of knee, degenerative    Benign essential hypertension    Diabetes mellitus (Multi)    Edema    Fatigue    GERD (gastroesophageal reflux disease)    Hematochezia    Hyperlipemia    Hypokalemia    Joint pain, knee    Left knee DJD    Left knee pain    Leg length discrepancy    Cervicalgia    Low back pain with left-sided sciatica    Low vitamin B12 level    Stiffness of left knee    Sciatica    Night sweats    Strain of right shoulder    Medicare annual wellness visit, subsequent    Contact with and (suspected) exposure to covid-19    Contact with and (suspected) exposure to other hazardous substances    Exposure to Agent Orange    Other chest pain    Hearing loss of right ear    Vitamin D deficiency    Knee stiffness    Arthralgia of knee    Osteoarthritis of knee    Osteoarthritis of left knee    Inequality of length of lower extremity    Erectile dysfunction    Inflammation of sacroiliac joint    Strain of shoulder    Exertional dyspnea    Neck pain, bilateral    Bilateral shoulder pain    Neural foraminal stenosis of cervical spine    Arthritis of both shoulder regions    Impingement of shoulder    Chronic post-traumatic stress disorder    Paresthesia of  skin    Reaction to severe stress, unspecified    Sensorineural hearing loss, bilateral    Unstable angina (Multi)    Pleural effusion on right    Malignant pleural effusion    Adenocarcinoma (Multi)    Pleural effusion    Encounter for antineoplastic chemotherapy    Hypomagnesemia    Encounter for monoclonal antibody treatment for malignancy    Advance directive in chart    Syncope, unspecified syncope type    Pulmonary emboli    Hospital discharge follow-up    History of pulmonary embolus (PE)    Epistaxis       Nutrition Significant Labs:  Lab Results   Component Value Date/Time    GLUCOSE 127 (H) 05/19/2025 0836     05/19/2025 0836    K 3.2 (L) 05/19/2025 0836     05/19/2025 0836    CO2 27 05/19/2025 0836    ANIONGAP 8 (L) 05/19/2025 0836    BUN 6 05/19/2025 0836    CREATININE 0.71 05/19/2025 0836    EGFR >90 05/19/2025 0836    CALCIUM 7.5 (L) 05/19/2025 0836    ALBUMIN 2.9 (L) 05/19/2025 0836    ALKPHOS 86 05/19/2025 0836    PROT 5.6 (L) 05/19/2025 0836    AST 22 05/19/2025 0836    BILITOT 0.3 05/19/2025 0836    ALT 19 05/19/2025 0836    MG 0.92 (L) 05/19/2025 0838   Calcium corrected at 8.38 for low albumin  Hypomagnesia, ongoing with current Amivantamab treatment- pt receiving IV mag replacement    Taking K+  and Mag pills    Lab Results   Component Value Date    WBC 4.6 05/19/2025    HGB 9.1 (L) 05/19/2025    HCT 29.4 (L) 05/19/2025    MCV 97 05/19/2025     05/19/2025                  Lab Results   Component Value Date/Time    VITD25 32 01/28/2025 0833     This SmartLink has not been configured with any valid records.      Lab Results   Component Value Date    HGBA1C 8.2 (H) 03/12/2025        Anthropometrics:                            Wt Readings from Last 20 Encounters:   05/21/25 84.9 kg (187 lb 2.7 oz)   05/19/25 83.4 kg (183 lb 14.4 oz)   05/16/25 83.6 kg (184 lb 4.8 oz)   05/14/25 83.2 kg (183 lb 8 oz)   05/12/25 83 kg (182 lb 14.4 oz)   05/09/25 82.4 kg (181 lb 9.6 oz)   05/07/25  82.2 kg (181 lb 4.8 oz)   05/05/25 82.5 kg (181 lb 12.8 oz)   05/02/25 82.6 kg (182 lb)   04/30/25 82.5 kg (181 lb 12.8 oz)   04/28/25 82 kg (180 lb 12.8 oz)   04/25/25 82.6 kg (182 lb 3.2 oz)   04/22/25 81.3 kg (179 lb 4.8 oz)   04/21/25 82.9 kg (182 lb 12.2 oz)   04/18/25 82.3 kg (181 lb 8 oz)   04/16/25 82.7 kg (182 lb 6.4 oz)   04/16/25 82.7 kg (182 lb 6.4 oz)   04/14/25 83.1 kg (183 lb 3.2 oz)   04/11/25 84.2 kg (185 lb 11.2 oz)   04/09/25 85.5 kg (188 lb 7.9 oz)       Weight Change %: Initial 9.7% loss over 6 months  Recently weight has remained relatively  and up 6  lbs over th past few weeks.  Note:  Patient does have b/l LE edema and PleurX cath with fluid losses, thus weight fluctuating with fluid shifts        Nutrition History:  Food & Nutrition History: Patient reporting good appetite.  He states that he eats 2 -3 meals daily and is drinking 2 Boost [facility] shakes daily.    He states he is not much of a snacker but is drinking the Boost shakes consistently.  He feels that he is drinking adequate fluid (~3 water bottles) + other fluids such as juice and protein shakes. Denies n/v/d.  He does report decreased physical activity and +fatigue most of the day.  He still tries to get out to do yard work and does cook also.  Diet Recall:  Meal 1: Occasionally breakfast  Snack 1: Boost VHC    Meal 3: Fish or pasta, veggie,  Snack 3: [facility]    Oral Nutrition Supplement Use: Oral Nutrition Supplements: Boost Very High Calorie Frequency: bid Calories: 1060 Protein: 44  Fluid Intake:  Good   Medications:  Current Outpatient Medications   Medication Instructions    acetaminophen (TYLENOL) 650 mg, oral, Every 4 hours PRN    apixaban (Eliquis) 5 mg tablet Take 2 tablets (10 mg) by mouth every 12 hours for 5 days, THEN 1 tablet (5 mg) every 12 hours.    Blood glucose monitoring meter kit kit 1 each, As needed    blood-glucose meter misc 1 Device, miscellaneous, Daily    chlorthalidone (HYGROTON) 25 mg, Daily     clindamycin (Cleocin T) 1 % lotion Apply topically to affected area twice daily.    folic acid (FOLVITE) 1,000 mcg, oral, Daily    hydrocortisone 1 % cream Apply topically to face, hands, feet, neck, back, and chest once daily at bedtime for rash prevention.    lancets 30 gauge misc 1 Device, miscellaneous, 3 times daily    lisinopril 20 mg, oral, Daily    metFORMIN XR (GLUCOPHAGE-XR) 1,000 mg, oral, 2 times daily, Do not crush, chew, or split    omeprazole (PriLOSEC) 20 mg DR capsule TAKE 1 CAPSULE BY MOUTH EVERY DAY    ondansetron (ZOFRAN) 8 mg, oral, Every 8 hours PRN    potassium chloride CR 20 mEq ER tablet 40 mEq, oral, Daily, Do not crush, chew, or split.    prochlorperazine (COMPAZINE) 10 mg, oral, Every 6 hours PRN    rosuvastatin (Crestor) 40 mg tablet TAKE 1 TABLET BY MOUTH EVERY DAY    sennosides (SENOKOT) 17.2 mg, oral, Nightly PRN       Nutrition Focused Physical Exam Findings:    Subcutaneous Fat Loss:   Orbital Fat Pads:  (Well nourished)  Buccal Fat Pads:  (Mild)  Triceps:  (Mild)    Muscle Wasting:  Temporalis: Mild-Moderate (slight depression)  Pectoralis (Clavicular Region): Defer  Deltoid/Trapezius: Defer  Interosseous: Defer  Trapezius/Infraspinatus/Supraspinatus (Scapular Region): Defer  Quadriceps: Defer    Physical Findings:        Denies N/V   Moving bowels regularly    Estimated Needs:       Total Energy Estimated Needs in 24 hours (kCal): 2398 kCal  Energy Estimated Needs per kg Body Weight in 24 hours (kCal/kg): 29 kCal/kg  Total Protein Estimated Needs in 24 Hours (g): 107 g  Protein Estimated Needs per kg Body Weight in 24 Hours (g/kg): 1.3 g/kg  Total Fluid Estimated Needs in 24 Hours (mL): 2315 mL  Total Fluid Estimated Needs in 24 hours (mL/kg): 28 mL/kg         Nutrition Diagnosis   Malnutrition Diagnosis  Patient has Malnutrition Diagnosis: Yes  Diagnosis Status: Active  Malnutrition Diagnosis: Moderate malnutrition related to chronic disease or condition  Related to: increased  nutrient demands of cancer and inadequate intake  As Evidenced by: intake <75% of nutrition needs, significant wt loss as documented, and physical findings per NFPE. (Patient making progress with weight stabilization and consistently taking Boost VHC bid.)  Nutrition Diagnosis  Patient has Nutrition Diagnosis: Yes  Diagnosis Status (1): Active  Nutrition Diagnosis 1: Altered nutrition related to laboratory values  Related to (1): suboptimal BS control  As Evidenced by (1): HgbA1c of 8.2 (Highest value in the past 3 years)  Additional Assessment Information (1): Pt would benefit from BS monitoring to assist MD in proper interventions to improve BS control     Nutrition Interventions/Recommendations   Nutrition Prescription: Oral nutrition  (Especially during steroid administration- day of chemo and 2-3 days after)    Nutrition Interventions:   Food and Nutrient Delivery: Meals & Snacks: Modification of schedule of oral intake, Modify composition of oral intake  Goals: Small frequent meals and snacks- paired with a protein (to include ONS)  Medical Food Supplement: Commercial beverage medical food supplement therapy  Goals: 2 each Boost VHC daily to provide 1060 calories, 44 g of protein per day (Pt is purchasing from  [facility].  Did provide with Ensure Plus and Complete sample per request- and coupons)     Coordination of Care: Collaboration and referral of nutrition care: Collaboration and Referral of Nutrition Care     Nutrition Education:   Nutrition Education Content: Content related nutrition education   Continue with Boost VHC bid   Boost VHC 2 x/day provides: 1060 kCal, 44 g PRO, 52 g fat, 104 g CHO, 0 g fiber, and 318 mL free water      Continue to work on incorporation of high rosa/high protein foods at all meals/snacks for ongoing weight stabilization     Readiness to Change : Good  Level of Understanding : Good  Anticipated Compliant : Good         Nutrition Monitoring and Evaluation   Food and  Nutrient Intake  Monitoring and Evaluation Plan: Fluid intake, Amount of food, Meal/snack pattern, Carbohydrate intake  Fluid Intake: Estimated fluid intake  Criteria: Meet daily needs for hydration  Amount of Food: Estimated amout of food  Criteria: Meet energy and protein needs  Meal/Snack Pattern: Estimated meal and snack pattern  Criteria: 5-6 daily to include ONS    Anthropometric measurements  Monitoring and Evaluation Plan: Weight  Body Weight: Body weight  Criteria: Maintain  Biochemical Data, Medical Tests and Procedures  Monitoring and Evaluation Plan: Electrolyte/renal panel, Glucose/endocrine profile  Criteria: WNL  Glucose/Endocrine Profile: Glucose, casual, Hemoglobin A1c (HgbA1c)  Criteria: < 200 and < 7.0 respectivelyNutrition Focused Physical Findings  Monitoring and Evaluation Plan: Adipose, Digestive System, Muscle  Adipose Finding: Loss of subcutaneous fat  Criteria: None further  Digestive System Finding: Other (Comment) (GI symptoms as needed)  Criteria: Manage appropriately wioth diet and meds per MD/CNP  Muscle Finding: Muscle atrophy  Criteria: None further       Time Spent  Prep time on day of patient encounter: 15 minutes  Time spent directly with patient, family or caregiver: 15 minutes  Additional Time Spent on Patient Care Activities: 0 minutes  Documentation Time: 25 minutes  Other Time Spent: 0 minutes  Total: 55 minutes

## 2025-05-23 ENCOUNTER — TELEPHONE (OUTPATIENT)
Dept: HEMATOLOGY/ONCOLOGY | Facility: HOSPITAL | Age: 75
End: 2025-05-23

## 2025-05-23 ENCOUNTER — INFUSION (OUTPATIENT)
Dept: HEMATOLOGY/ONCOLOGY | Facility: CLINIC | Age: 75
End: 2025-05-23
Payer: MEDICARE

## 2025-05-23 VITALS
HEART RATE: 79 BPM | RESPIRATION RATE: 16 BRPM | TEMPERATURE: 97.7 F | HEIGHT: 72 IN | DIASTOLIC BLOOD PRESSURE: 69 MMHG | SYSTOLIC BLOOD PRESSURE: 114 MMHG | BODY MASS INDEX: 25.56 KG/M2 | WEIGHT: 188.7 LBS | OXYGEN SATURATION: 100 %

## 2025-05-23 DIAGNOSIS — E83.42 HYPOMAGNESEMIA: ICD-10-CM

## 2025-05-23 DIAGNOSIS — C80.1 ADENOCARCINOMA (MULTI): ICD-10-CM

## 2025-05-23 LAB — MAGNESIUM SERPL-MCNC: 0.83 MG/DL (ref 1.6–2.4)

## 2025-05-23 PROCEDURE — 96366 THER/PROPH/DIAG IV INF ADDON: CPT | Mod: INF

## 2025-05-23 PROCEDURE — 83735 ASSAY OF MAGNESIUM: CPT | Performed by: STUDENT IN AN ORGANIZED HEALTH CARE EDUCATION/TRAINING PROGRAM

## 2025-05-23 PROCEDURE — 2500000004 HC RX 250 GENERAL PHARMACY W/ HCPCS (ALT 636 FOR OP/ED): Mod: JZ | Performed by: STUDENT IN AN ORGANIZED HEALTH CARE EDUCATION/TRAINING PROGRAM

## 2025-05-23 PROCEDURE — 96365 THER/PROPH/DIAG IV INF INIT: CPT | Mod: INF

## 2025-05-23 PROCEDURE — 2500000004 HC RX 250 GENERAL PHARMACY W/ HCPCS (ALT 636 FOR OP/ED): Mod: JZ | Performed by: NURSE PRACTITIONER

## 2025-05-23 RX ORDER — HEPARIN 100 UNIT/ML
500 SYRINGE INTRAVENOUS AS NEEDED
Status: DISCONTINUED | OUTPATIENT
Start: 2025-05-23 | End: 2025-05-23 | Stop reason: HOSPADM

## 2025-05-23 RX ORDER — MAGNESIUM SULFATE HEPTAHYDRATE 40 MG/ML
2 INJECTION, SOLUTION INTRAVENOUS ONCE
OUTPATIENT
Start: 2025-05-27 | End: 2025-05-27

## 2025-05-23 RX ORDER — HEPARIN SODIUM,PORCINE/PF 10 UNIT/ML
50 SYRINGE (ML) INTRAVENOUS AS NEEDED
OUTPATIENT
Start: 2025-05-23

## 2025-05-23 RX ORDER — MAGNESIUM SULFATE HEPTAHYDRATE 40 MG/ML
4 INJECTION, SOLUTION INTRAVENOUS ONCE
OUTPATIENT
Start: 2025-05-27 | End: 2025-05-27

## 2025-05-23 RX ORDER — HEPARIN 100 UNIT/ML
500 SYRINGE INTRAVENOUS AS NEEDED
OUTPATIENT
Start: 2025-05-23

## 2025-05-23 RX ORDER — MAGNESIUM SULFATE HEPTAHYDRATE 40 MG/ML
2-4 INJECTION, SOLUTION INTRAVENOUS ONCE
OUTPATIENT
Start: 2025-05-27 | End: 2025-05-27

## 2025-05-23 RX ORDER — MAGNESIUM SULFATE HEPTAHYDRATE 40 MG/ML
4 INJECTION, SOLUTION INTRAVENOUS ONCE
Status: COMPLETED | OUTPATIENT
Start: 2025-05-23 | End: 2025-05-23

## 2025-05-23 RX ADMIN — MAGNESIUM SULFATE IN WATER FOR 4 G: 40 INJECTION INTRAVENOUS at 09:28

## 2025-05-23 RX ADMIN — Medication 500 UNITS: at 09:28

## 2025-05-23 ASSESSMENT — PAIN SCALES - GENERAL: PAINLEVEL_OUTOF10: 0-NO PAIN

## 2025-05-23 NOTE — PROGRESS NOTES
Patient identified by name &    Denies acute distress- none noted at this time. Serum Magnesium resulted at 0.83- 4 grams magnesium replaced per orders. Patient discharged home in stable condition.

## 2025-05-23 NOTE — TELEPHONE ENCOUNTER
RN called and spoke to Jayant in regards to his [employer] 2000 message. RN informed him that it was still early in his treatment, but based off his last scan, it showed that the treatment was working. Jayant wanted to know the life expectancy and RN told him that based off of studies that have been done on the medication that he is receiving and his diagnosis, the average is two years. However, stressed the importance that that is the average on the studies that were done and it could be longer or shorter. RN informed him that he will get scans on a regular basis to make sure that the treatment is working. Jayant was appreciative of the call and had no further questions at this time.

## 2025-05-27 ENCOUNTER — INFUSION (OUTPATIENT)
Dept: HEMATOLOGY/ONCOLOGY | Facility: CLINIC | Age: 75
End: 2025-05-27
Payer: MEDICARE

## 2025-05-27 VITALS
HEART RATE: 95 BPM | DIASTOLIC BLOOD PRESSURE: 79 MMHG | SYSTOLIC BLOOD PRESSURE: 124 MMHG | BODY MASS INDEX: 24.78 KG/M2 | WEIGHT: 182.76 LBS | OXYGEN SATURATION: 98 % | RESPIRATION RATE: 18 BRPM | TEMPERATURE: 98.6 F

## 2025-05-27 DIAGNOSIS — E83.42 HYPOMAGNESEMIA: ICD-10-CM

## 2025-05-27 DIAGNOSIS — C80.1 ADENOCARCINOMA (MULTI): ICD-10-CM

## 2025-05-27 LAB — MAGNESIUM SERPL-MCNC: 0.89 MG/DL (ref 1.6–2.4)

## 2025-05-27 PROCEDURE — 2500000004 HC RX 250 GENERAL PHARMACY W/ HCPCS (ALT 636 FOR OP/ED): Mod: JZ | Performed by: STUDENT IN AN ORGANIZED HEALTH CARE EDUCATION/TRAINING PROGRAM

## 2025-05-27 PROCEDURE — 83735 ASSAY OF MAGNESIUM: CPT | Performed by: STUDENT IN AN ORGANIZED HEALTH CARE EDUCATION/TRAINING PROGRAM

## 2025-05-27 PROCEDURE — 2500000004 HC RX 250 GENERAL PHARMACY W/ HCPCS (ALT 636 FOR OP/ED): Mod: JZ | Performed by: NURSE PRACTITIONER

## 2025-05-27 PROCEDURE — 96365 THER/PROPH/DIAG IV INF INIT: CPT | Mod: INF

## 2025-05-27 PROCEDURE — 96366 THER/PROPH/DIAG IV INF ADDON: CPT | Mod: INF

## 2025-05-27 RX ORDER — HEPARIN SODIUM,PORCINE/PF 10 UNIT/ML
50 SYRINGE (ML) INTRAVENOUS AS NEEDED
Status: CANCELLED | OUTPATIENT
Start: 2025-05-27

## 2025-05-27 RX ORDER — MAGNESIUM SULFATE HEPTAHYDRATE 40 MG/ML
2-4 INJECTION, SOLUTION INTRAVENOUS ONCE
Status: CANCELLED | OUTPATIENT
Start: 2025-05-30 | End: 2025-05-30

## 2025-05-27 RX ORDER — HEPARIN 100 UNIT/ML
500 SYRINGE INTRAVENOUS AS NEEDED
Status: CANCELLED | OUTPATIENT
Start: 2025-05-27

## 2025-05-27 RX ORDER — MAGNESIUM SULFATE HEPTAHYDRATE 40 MG/ML
2 INJECTION, SOLUTION INTRAVENOUS ONCE
Status: CANCELLED | OUTPATIENT
Start: 2025-05-30 | End: 2025-05-30

## 2025-05-27 RX ORDER — MAGNESIUM SULFATE HEPTAHYDRATE 40 MG/ML
4 INJECTION, SOLUTION INTRAVENOUS ONCE
Status: CANCELLED | OUTPATIENT
Start: 2025-05-30 | End: 2025-05-30

## 2025-05-27 RX ORDER — MAGNESIUM SULFATE HEPTAHYDRATE 40 MG/ML
4 INJECTION, SOLUTION INTRAVENOUS ONCE
Status: COMPLETED | OUTPATIENT
Start: 2025-05-27 | End: 2025-05-27

## 2025-05-27 RX ORDER — HEPARIN 100 UNIT/ML
500 SYRINGE INTRAVENOUS AS NEEDED
Status: DISCONTINUED | OUTPATIENT
Start: 2025-05-27 | End: 2025-05-27 | Stop reason: HOSPADM

## 2025-05-27 RX ADMIN — HEPARIN 500 UNITS: 100 SYRINGE at 10:06

## 2025-05-27 RX ADMIN — MAGNESIUM SULFATE HEPTAHYDRATE 4 G: 4 INJECTION, SOLUTION INTRAVENOUS at 08:12

## 2025-05-27 ASSESSMENT — PAIN SCALES - GENERAL: PAINLEVEL_OUTOF10: 6

## 2025-05-27 NOTE — PROGRESS NOTES
Patient magnesium drawn prior to infusion. [facility] infusion center cannot get same day results. Dr. Solitario and NP Tito notified and they said okay to proceed with 4g of IV magnesium.

## 2025-05-30 ENCOUNTER — INFUSION (OUTPATIENT)
Dept: HEMATOLOGY/ONCOLOGY | Facility: CLINIC | Age: 75
End: 2025-05-30
Payer: MEDICARE

## 2025-05-30 VITALS
OXYGEN SATURATION: 98 % | TEMPERATURE: 97.2 F | SYSTOLIC BLOOD PRESSURE: 106 MMHG | RESPIRATION RATE: 16 BRPM | BODY MASS INDEX: 24.76 KG/M2 | DIASTOLIC BLOOD PRESSURE: 67 MMHG | WEIGHT: 182.8 LBS | HEIGHT: 72 IN | HEART RATE: 100 BPM

## 2025-05-30 DIAGNOSIS — C80.1 ADENOCARCINOMA (MULTI): ICD-10-CM

## 2025-05-30 DIAGNOSIS — E83.42 HYPOMAGNESEMIA: ICD-10-CM

## 2025-05-30 LAB — MAGNESIUM SERPL-MCNC: 0.97 MG/DL (ref 1.6–2.4)

## 2025-05-30 PROCEDURE — 96365 THER/PROPH/DIAG IV INF INIT: CPT | Mod: INF

## 2025-05-30 PROCEDURE — 2500000004 HC RX 250 GENERAL PHARMACY W/ HCPCS (ALT 636 FOR OP/ED): Mod: JZ | Performed by: STUDENT IN AN ORGANIZED HEALTH CARE EDUCATION/TRAINING PROGRAM

## 2025-05-30 PROCEDURE — 96366 THER/PROPH/DIAG IV INF ADDON: CPT | Mod: INF

## 2025-05-30 PROCEDURE — 83735 ASSAY OF MAGNESIUM: CPT | Performed by: STUDENT IN AN ORGANIZED HEALTH CARE EDUCATION/TRAINING PROGRAM

## 2025-05-30 PROCEDURE — 2500000004 HC RX 250 GENERAL PHARMACY W/ HCPCS (ALT 636 FOR OP/ED): Mod: JZ | Performed by: NURSE PRACTITIONER

## 2025-05-30 RX ORDER — HEPARIN 100 UNIT/ML
500 SYRINGE INTRAVENOUS AS NEEDED
Status: DISCONTINUED | OUTPATIENT
Start: 2025-05-30 | End: 2025-05-30 | Stop reason: HOSPADM

## 2025-05-30 RX ORDER — MAGNESIUM SULFATE HEPTAHYDRATE 40 MG/ML
2-4 INJECTION, SOLUTION INTRAVENOUS ONCE
OUTPATIENT
Start: 2025-06-02 | End: 2025-06-02

## 2025-05-30 RX ORDER — MAGNESIUM SULFATE HEPTAHYDRATE 40 MG/ML
2 INJECTION, SOLUTION INTRAVENOUS ONCE
OUTPATIENT
Start: 2025-06-02 | End: 2025-06-02

## 2025-05-30 RX ORDER — MAGNESIUM SULFATE HEPTAHYDRATE 40 MG/ML
4 INJECTION, SOLUTION INTRAVENOUS ONCE
Status: COMPLETED | OUTPATIENT
Start: 2025-05-30 | End: 2025-05-30

## 2025-05-30 RX ORDER — MAGNESIUM SULFATE HEPTAHYDRATE 40 MG/ML
4 INJECTION, SOLUTION INTRAVENOUS ONCE
OUTPATIENT
Start: 2025-06-02 | End: 2025-06-02

## 2025-05-30 RX ORDER — HEPARIN 100 UNIT/ML
500 SYRINGE INTRAVENOUS AS NEEDED
OUTPATIENT
Start: 2025-05-30

## 2025-05-30 RX ORDER — HEPARIN SODIUM,PORCINE/PF 10 UNIT/ML
50 SYRINGE (ML) INTRAVENOUS AS NEEDED
OUTPATIENT
Start: 2025-05-30

## 2025-05-30 RX ADMIN — MAGNESIUM SULFATE IN WATER FOR 4 G: 40 INJECTION INTRAVENOUS at 09:45

## 2025-05-30 RX ADMIN — Medication 500 UNITS: at 09:45

## 2025-05-30 ASSESSMENT — PAIN SCALES - GENERAL: PAINLEVEL_OUTOF10: 0-NO PAIN

## 2025-05-30 NOTE — PROGRESS NOTES
Patient identified by name & . Denies acute distress- none noted at this time. Serum magnesium resulted at 0.97- given 4 gram replacement per order. Patient requesting labs from Nephrologist be drawn but only had paper requisition. Contacted office and given Hadley FREITAS's number to facilitate entry- voicemail left. Also [facility] sent. No response. Instructed patient can either have labs drawn with [facility] or wait till Monday to see if he can be contacted. Patient wishes to wait till Monday. Patient discharged home, ambulatory, in stable condition. Returns 25 for port labs and magnesium.

## 2025-06-02 ENCOUNTER — INFUSION (OUTPATIENT)
Dept: HEMATOLOGY/ONCOLOGY | Facility: CLINIC | Age: 75
End: 2025-06-02
Payer: MEDICARE

## 2025-06-02 VITALS
BODY MASS INDEX: 24.39 KG/M2 | OXYGEN SATURATION: 99 % | HEIGHT: 72 IN | WEIGHT: 180.1 LBS | TEMPERATURE: 96.6 F | SYSTOLIC BLOOD PRESSURE: 89 MMHG | HEART RATE: 113 BPM | RESPIRATION RATE: 16 BRPM | DIASTOLIC BLOOD PRESSURE: 63 MMHG

## 2025-06-02 DIAGNOSIS — E83.42 HYPOMAGNESEMIA: ICD-10-CM

## 2025-06-02 DIAGNOSIS — C80.1 ADENOCARCINOMA (MULTI): ICD-10-CM

## 2025-06-02 LAB — MAGNESIUM SERPL-MCNC: 0.95 MG/DL (ref 1.6–2.4)

## 2025-06-02 PROCEDURE — 96365 THER/PROPH/DIAG IV INF INIT: CPT | Mod: INF

## 2025-06-02 PROCEDURE — 83735 ASSAY OF MAGNESIUM: CPT | Performed by: STUDENT IN AN ORGANIZED HEALTH CARE EDUCATION/TRAINING PROGRAM

## 2025-06-02 PROCEDURE — 2500000004 HC RX 250 GENERAL PHARMACY W/ HCPCS (ALT 636 FOR OP/ED): Performed by: NURSE PRACTITIONER

## 2025-06-02 PROCEDURE — 2500000004 HC RX 250 GENERAL PHARMACY W/ HCPCS (ALT 636 FOR OP/ED): Performed by: STUDENT IN AN ORGANIZED HEALTH CARE EDUCATION/TRAINING PROGRAM

## 2025-06-02 PROCEDURE — 96366 THER/PROPH/DIAG IV INF ADDON: CPT | Mod: INF

## 2025-06-02 RX ORDER — MAGNESIUM SULFATE HEPTAHYDRATE 40 MG/ML
4 INJECTION, SOLUTION INTRAVENOUS ONCE
Status: COMPLETED | OUTPATIENT
Start: 2025-06-02 | End: 2025-06-02

## 2025-06-02 RX ORDER — HEPARIN SODIUM,PORCINE/PF 10 UNIT/ML
50 SYRINGE (ML) INTRAVENOUS AS NEEDED
OUTPATIENT
Start: 2025-06-02

## 2025-06-02 RX ORDER — MAGNESIUM SULFATE HEPTAHYDRATE 40 MG/ML
2-4 INJECTION, SOLUTION INTRAVENOUS ONCE
Status: CANCELLED | OUTPATIENT
Start: 2025-06-06 | End: 2025-06-06

## 2025-06-02 RX ORDER — MAGNESIUM SULFATE HEPTAHYDRATE 40 MG/ML
2 INJECTION, SOLUTION INTRAVENOUS ONCE
Status: CANCELLED | OUTPATIENT
Start: 2025-06-06 | End: 2025-06-06

## 2025-06-02 RX ORDER — MAGNESIUM SULFATE HEPTAHYDRATE 40 MG/ML
4 INJECTION, SOLUTION INTRAVENOUS ONCE
Status: CANCELLED | OUTPATIENT
Start: 2025-06-06 | End: 2025-06-06

## 2025-06-02 RX ORDER — HEPARIN 100 UNIT/ML
500 SYRINGE INTRAVENOUS AS NEEDED
OUTPATIENT
Start: 2025-06-02

## 2025-06-02 RX ORDER — HEPARIN 100 UNIT/ML
500 SYRINGE INTRAVENOUS AS NEEDED
Status: DISCONTINUED | OUTPATIENT
Start: 2025-06-02 | End: 2025-06-02 | Stop reason: HOSPADM

## 2025-06-02 RX ADMIN — MAGNESIUM SULFATE IN WATER FOR 4 G: 40 INJECTION INTRAVENOUS at 09:35

## 2025-06-02 RX ADMIN — Medication 500 UNITS: at 11:34

## 2025-06-02 ASSESSMENT — PAIN SCALES - GENERAL: PAINLEVEL_OUTOF10: 0-NO PAIN

## 2025-06-02 NOTE — PROGRESS NOTES
Jayant is here for magnesium infusion - 4g given for mag level of 0.95 today. Pt tolerated without incident. Discharged in stable condition, no additional needs at this time. Has schedule for follow up.    In regards to labs from Nephrologist - Spoke with LISHA Stone at [facility] who states she is able to pull lab records for Jayant and that since we are drawing a CMP/Mag next week, they can use those results for their purposes as well. She states aJyant will need to go to [facility] to have urine tests completed before his next visit with them. Informed pt of plan and he is agreeable.

## 2025-06-03 DIAGNOSIS — E83.42 HYPOMAGNESEMIA: ICD-10-CM

## 2025-06-03 DIAGNOSIS — C80.1 ADENOCARCINOMA (MULTI): Primary | ICD-10-CM

## 2025-06-03 RX ORDER — MAGNESIUM SULFATE HEPTAHYDRATE 40 MG/ML
4 INJECTION, SOLUTION INTRAVENOUS ONCE
Status: CANCELLED | OUTPATIENT
Start: 2025-06-03 | End: 2025-06-03

## 2025-06-03 RX ORDER — MAGNESIUM SULFATE HEPTAHYDRATE 40 MG/ML
2 INJECTION, SOLUTION INTRAVENOUS ONCE
Status: CANCELLED | OUTPATIENT
Start: 2025-06-03 | End: 2025-06-03

## 2025-06-04 DIAGNOSIS — I26.99 ACUTE PULMONARY EMBOLISM WITHOUT ACUTE COR PULMONALE, UNSPECIFIED PULMONARY EMBOLISM TYPE (MULTI): ICD-10-CM

## 2025-06-06 ENCOUNTER — INFUSION (OUTPATIENT)
Dept: HEMATOLOGY/ONCOLOGY | Facility: CLINIC | Age: 75
End: 2025-06-06
Payer: MEDICARE

## 2025-06-06 VITALS
OXYGEN SATURATION: 97 % | HEIGHT: 72 IN | BODY MASS INDEX: 24.26 KG/M2 | WEIGHT: 179.1 LBS | TEMPERATURE: 97.5 F | SYSTOLIC BLOOD PRESSURE: 115 MMHG | RESPIRATION RATE: 16 BRPM | HEART RATE: 92 BPM | DIASTOLIC BLOOD PRESSURE: 76 MMHG

## 2025-06-06 DIAGNOSIS — C80.1 ADENOCARCINOMA (MULTI): ICD-10-CM

## 2025-06-06 DIAGNOSIS — E83.42 HYPOMAGNESEMIA: ICD-10-CM

## 2025-06-06 LAB — MAGNESIUM SERPL-MCNC: 0.99 MG/DL (ref 1.6–2.4)

## 2025-06-06 PROCEDURE — 96365 THER/PROPH/DIAG IV INF INIT: CPT | Mod: INF

## 2025-06-06 PROCEDURE — 2500000004 HC RX 250 GENERAL PHARMACY W/ HCPCS (ALT 636 FOR OP/ED): Performed by: NURSE PRACTITIONER

## 2025-06-06 PROCEDURE — 96366 THER/PROPH/DIAG IV INF ADDON: CPT | Mod: INF

## 2025-06-06 PROCEDURE — 83735 ASSAY OF MAGNESIUM: CPT | Performed by: NURSE PRACTITIONER

## 2025-06-06 RX ORDER — MAGNESIUM SULFATE HEPTAHYDRATE 40 MG/ML
2-4 INJECTION, SOLUTION INTRAVENOUS ONCE
OUTPATIENT
Start: 2025-06-09 | End: 2025-06-09

## 2025-06-06 RX ORDER — MAGNESIUM SULFATE HEPTAHYDRATE 40 MG/ML
4 INJECTION, SOLUTION INTRAVENOUS ONCE
Status: COMPLETED | OUTPATIENT
Start: 2025-06-06 | End: 2025-06-06

## 2025-06-06 RX ORDER — MAGNESIUM SULFATE HEPTAHYDRATE 40 MG/ML
4 INJECTION, SOLUTION INTRAVENOUS ONCE
OUTPATIENT
Start: 2025-06-09 | End: 2025-06-09

## 2025-06-06 RX ORDER — MAGNESIUM SULFATE HEPTAHYDRATE 40 MG/ML
2 INJECTION, SOLUTION INTRAVENOUS ONCE
OUTPATIENT
Start: 2025-06-09 | End: 2025-06-09

## 2025-06-06 RX ADMIN — MAGNESIUM SULFATE 4 G: 4 INJECTION INTRAVENOUS at 09:33

## 2025-06-06 ASSESSMENT — PAIN SCALES - GENERAL: PAINLEVEL_OUTOF10: 4

## 2025-06-06 NOTE — PROGRESS NOTES
Patient identified by name &    Denies acute distress- none noted at this time. Serum Magnesium resulted at 0.99- 4 grams magnesium replaced. Patient to return  for port labs/magnesium. Discharged home in stable condition.

## 2025-06-09 ENCOUNTER — INFUSION (OUTPATIENT)
Dept: HEMATOLOGY/ONCOLOGY | Facility: CLINIC | Age: 75
End: 2025-06-09
Payer: MEDICARE

## 2025-06-09 VITALS
HEART RATE: 97 BPM | DIASTOLIC BLOOD PRESSURE: 80 MMHG | WEIGHT: 180.1 LBS | RESPIRATION RATE: 16 BRPM | BODY MASS INDEX: 24.39 KG/M2 | SYSTOLIC BLOOD PRESSURE: 121 MMHG | OXYGEN SATURATION: 99 % | HEIGHT: 72 IN | TEMPERATURE: 96.8 F

## 2025-06-09 DIAGNOSIS — R91.8 MASS OF UPPER LOBE OF RIGHT LUNG: ICD-10-CM

## 2025-06-09 DIAGNOSIS — C80.1 ADENOCARCINOMA (MULTI): ICD-10-CM

## 2025-06-09 DIAGNOSIS — E83.42 HYPOMAGNESEMIA: ICD-10-CM

## 2025-06-09 LAB — MAGNESIUM SERPL-MCNC: 1.09 MG/DL (ref 1.6–2.4)

## 2025-06-09 PROCEDURE — 2500000004 HC RX 250 GENERAL PHARMACY W/ HCPCS (ALT 636 FOR OP/ED): Performed by: NURSE PRACTITIONER

## 2025-06-09 PROCEDURE — 96365 THER/PROPH/DIAG IV INF INIT: CPT | Mod: INF

## 2025-06-09 PROCEDURE — 2500000004 HC RX 250 GENERAL PHARMACY W/ HCPCS (ALT 636 FOR OP/ED): Performed by: STUDENT IN AN ORGANIZED HEALTH CARE EDUCATION/TRAINING PROGRAM

## 2025-06-09 PROCEDURE — 83735 ASSAY OF MAGNESIUM: CPT | Performed by: NURSE PRACTITIONER

## 2025-06-09 PROCEDURE — 84075 ASSAY ALKALINE PHOSPHATASE: CPT

## 2025-06-09 RX ORDER — HEPARIN SODIUM,PORCINE/PF 10 UNIT/ML
50 SYRINGE (ML) INTRAVENOUS AS NEEDED
Status: CANCELLED | OUTPATIENT
Start: 2025-06-09

## 2025-06-09 RX ORDER — MAGNESIUM SULFATE HEPTAHYDRATE 40 MG/ML
2 INJECTION, SOLUTION INTRAVENOUS ONCE
Status: COMPLETED | OUTPATIENT
Start: 2025-06-09 | End: 2025-06-09

## 2025-06-09 RX ORDER — HEPARIN 100 UNIT/ML
500 SYRINGE INTRAVENOUS AS NEEDED
Status: DISCONTINUED | OUTPATIENT
Start: 2025-06-09 | End: 2025-06-09 | Stop reason: HOSPADM

## 2025-06-09 RX ORDER — HEPARIN 100 UNIT/ML
500 SYRINGE INTRAVENOUS AS NEEDED
Status: CANCELLED | OUTPATIENT
Start: 2025-06-09

## 2025-06-09 RX ORDER — MAGNESIUM SULFATE HEPTAHYDRATE 40 MG/ML
2 INJECTION, SOLUTION INTRAVENOUS ONCE
Status: CANCELLED | OUTPATIENT
Start: 2025-06-13 | End: 2025-06-13

## 2025-06-09 RX ORDER — MAGNESIUM SULFATE HEPTAHYDRATE 40 MG/ML
4 INJECTION, SOLUTION INTRAVENOUS ONCE
Status: CANCELLED | OUTPATIENT
Start: 2025-06-13 | End: 2025-06-13

## 2025-06-09 RX ORDER — MAGNESIUM SULFATE HEPTAHYDRATE 40 MG/ML
2-4 INJECTION, SOLUTION INTRAVENOUS ONCE
Status: CANCELLED | OUTPATIENT
Start: 2025-06-13 | End: 2025-06-13

## 2025-06-09 RX ADMIN — Medication 500 UNITS: at 11:26

## 2025-06-09 RX ADMIN — MAGNESIUM SULFATE IN WATER 2 G: 40 INJECTION, SOLUTION INTRAVENOUS at 10:26

## 2025-06-09 ASSESSMENT — PAIN SCALES - GENERAL: PAINLEVEL_OUTOF10: 0-NO PAIN

## 2025-06-09 NOTE — PROGRESS NOTES
Patient identified by name &   Denies acute distress- none noted at this time. Serum mg resulted at 1.09- 2 grams magnesium replaced per orders. Patient returns  to [facility] for FUV and replacement. Discharged home, ambulatory, in stable condition.

## 2025-06-10 LAB
ALBUMIN SERPL BCP-MCNC: 3 G/DL (ref 3.4–5)
ALP SERPL-CCNC: 80 U/L (ref 33–136)
ALT SERPL W P-5'-P-CCNC: 19 U/L (ref 10–52)
ANION GAP SERPL CALC-SCNC: 11 MMOL/L (ref 10–20)
AST SERPL W P-5'-P-CCNC: 27 U/L (ref 9–39)
BILIRUB SERPL-MCNC: 0.4 MG/DL (ref 0–1.2)
BUN SERPL-MCNC: 7 MG/DL (ref 6–23)
CALCIUM SERPL-MCNC: 8.4 MG/DL (ref 8.6–10.3)
CHLORIDE SERPL-SCNC: 102 MMOL/L (ref 98–107)
CO2 SERPL-SCNC: 23 MMOL/L (ref 21–32)
CREAT SERPL-MCNC: 0.76 MG/DL (ref 0.5–1.3)
EGFRCR SERPLBLD CKD-EPI 2021: >90 ML/MIN/1.73M*2
GLUCOSE SERPL-MCNC: 107 MG/DL (ref 74–99)
POTASSIUM SERPL-SCNC: 3.8 MMOL/L (ref 3.5–5.3)
PROT SERPL-MCNC: 6.8 G/DL (ref 6.4–8.2)
SODIUM SERPL-SCNC: 132 MMOL/L (ref 136–145)

## 2025-06-11 ENCOUNTER — INFUSION (OUTPATIENT)
Dept: HEMATOLOGY/ONCOLOGY | Facility: CLINIC | Age: 75
End: 2025-06-11
Payer: MEDICARE

## 2025-06-11 ENCOUNTER — OFFICE VISIT (OUTPATIENT)
Dept: HEMATOLOGY/ONCOLOGY | Facility: CLINIC | Age: 75
End: 2025-06-11
Payer: MEDICARE

## 2025-06-11 VITALS
BODY MASS INDEX: 24.33 KG/M2 | WEIGHT: 179.4 LBS | RESPIRATION RATE: 18 BRPM | HEART RATE: 96 BPM | SYSTOLIC BLOOD PRESSURE: 128 MMHG | OXYGEN SATURATION: 96 % | DIASTOLIC BLOOD PRESSURE: 86 MMHG | TEMPERATURE: 97.2 F

## 2025-06-11 DIAGNOSIS — R91.8 MASS OF UPPER LOBE OF RIGHT LUNG: ICD-10-CM

## 2025-06-11 DIAGNOSIS — C80.1 ADENOCARCINOMA (MULTI): ICD-10-CM

## 2025-06-11 DIAGNOSIS — E83.42 HYPOMAGNESEMIA: Primary | ICD-10-CM

## 2025-06-11 LAB
BASOPHILS # BLD AUTO: 0.01 X10*3/UL (ref 0–0.1)
BASOPHILS NFR BLD AUTO: 0.2 %
EOSINOPHIL # BLD AUTO: 0.16 X10*3/UL (ref 0–0.4)
EOSINOPHIL NFR BLD AUTO: 2.7 %
ERYTHROCYTE [DISTWIDTH] IN BLOOD BY AUTOMATED COUNT: 15.6 % (ref 11.5–14.5)
HCT VFR BLD AUTO: 30.2 % (ref 41–52)
HGB BLD-MCNC: 9.3 G/DL (ref 13.5–17.5)
IMM GRANULOCYTES # BLD AUTO: 0.01 X10*3/UL (ref 0–0.5)
IMM GRANULOCYTES NFR BLD AUTO: 0.2 % (ref 0–0.9)
LYMPHOCYTES # BLD AUTO: 1.25 X10*3/UL (ref 0.8–3)
LYMPHOCYTES NFR BLD AUTO: 21 %
MCH RBC QN AUTO: 29.1 PG (ref 26–34)
MCHC RBC AUTO-ENTMCNC: 30.8 G/DL (ref 32–36)
MCV RBC AUTO: 94 FL (ref 80–100)
MONOCYTES # BLD AUTO: 0.73 X10*3/UL (ref 0.05–0.8)
MONOCYTES NFR BLD AUTO: 12.3 %
NEUTROPHILS # BLD AUTO: 3.78 X10*3/UL (ref 1.6–5.5)
NEUTROPHILS NFR BLD AUTO: 63.6 %
NRBC BLD-RTO: ABNORMAL /100{WBCS}
PLATELET # BLD AUTO: 401 X10*3/UL (ref 150–450)
RBC # BLD AUTO: 3.2 X10*6/UL (ref 4.5–5.9)
WBC # BLD AUTO: 5.9 X10*3/UL (ref 4.4–11.3)

## 2025-06-11 PROCEDURE — 99213 OFFICE O/P EST LOW 20 MIN: CPT | Performed by: NURSE PRACTITIONER

## 2025-06-11 PROCEDURE — 1160F RVW MEDS BY RX/DR IN RCRD: CPT | Performed by: NURSE PRACTITIONER

## 2025-06-11 PROCEDURE — 96375 TX/PRO/DX INJ NEW DRUG ADDON: CPT | Mod: INF

## 2025-06-11 PROCEDURE — 1159F MED LIST DOCD IN RCRD: CPT | Performed by: NURSE PRACTITIONER

## 2025-06-11 PROCEDURE — 2500000004 HC RX 250 GENERAL PHARMACY W/ HCPCS (ALT 636 FOR OP/ED): Performed by: STUDENT IN AN ORGANIZED HEALTH CARE EDUCATION/TRAINING PROGRAM

## 2025-06-11 PROCEDURE — 3074F SYST BP LT 130 MM HG: CPT | Performed by: NURSE PRACTITIONER

## 2025-06-11 PROCEDURE — 1125F AMNT PAIN NOTED PAIN PRSNT: CPT | Performed by: NURSE PRACTITIONER

## 2025-06-11 PROCEDURE — 3048F LDL-C <100 MG/DL: CPT | Performed by: NURSE PRACTITIONER

## 2025-06-11 PROCEDURE — 3052F HG A1C>EQUAL 8.0%<EQUAL 9.0%: CPT | Performed by: NURSE PRACTITIONER

## 2025-06-11 PROCEDURE — 96411 CHEMO IV PUSH ADDL DRUG: CPT

## 2025-06-11 PROCEDURE — 4010F ACE/ARB THERAPY RXD/TAKEN: CPT | Performed by: NURSE PRACTITIONER

## 2025-06-11 PROCEDURE — 1036F TOBACCO NON-USER: CPT | Performed by: NURSE PRACTITIONER

## 2025-06-11 PROCEDURE — 96413 CHEMO IV INFUSION 1 HR: CPT

## 2025-06-11 PROCEDURE — 3061F NEG MICROALBUMINURIA REV: CPT | Performed by: NURSE PRACTITIONER

## 2025-06-11 PROCEDURE — 96372 THER/PROPH/DIAG INJ SC/IM: CPT

## 2025-06-11 PROCEDURE — 85025 COMPLETE CBC W/AUTO DIFF WBC: CPT

## 2025-06-11 PROCEDURE — 2500000001 HC RX 250 WO HCPCS SELF ADMINISTERED DRUGS (ALT 637 FOR MEDICARE OP): Performed by: STUDENT IN AN ORGANIZED HEALTH CARE EDUCATION/TRAINING PROGRAM

## 2025-06-11 PROCEDURE — 3079F DIAST BP 80-89 MM HG: CPT | Performed by: NURSE PRACTITIONER

## 2025-06-11 RX ORDER — HEPARIN SODIUM,PORCINE/PF 10 UNIT/ML
50 SYRINGE (ML) INTRAVENOUS AS NEEDED
Status: DISCONTINUED | OUTPATIENT
Start: 2025-06-11 | End: 2025-06-11 | Stop reason: HOSPADM

## 2025-06-11 RX ORDER — HEPARIN 100 UNIT/ML
500 SYRINGE INTRAVENOUS AS NEEDED
Status: DISCONTINUED | OUTPATIENT
Start: 2025-06-11 | End: 2025-06-11 | Stop reason: HOSPADM

## 2025-06-11 RX ORDER — ALBUTEROL SULFATE 0.83 MG/ML
3 SOLUTION RESPIRATORY (INHALATION) AS NEEDED
Status: DISCONTINUED | OUTPATIENT
Start: 2025-06-11 | End: 2025-06-11 | Stop reason: HOSPADM

## 2025-06-11 RX ORDER — HEPARIN SODIUM,PORCINE/PF 10 UNIT/ML
50 SYRINGE (ML) INTRAVENOUS AS NEEDED
Status: CANCELLED | OUTPATIENT
Start: 2025-06-11

## 2025-06-11 RX ORDER — CYANOCOBALAMIN 1000 UG/ML
1000 INJECTION, SOLUTION INTRAMUSCULAR; SUBCUTANEOUS ONCE
Status: COMPLETED | OUTPATIENT
Start: 2025-06-11 | End: 2025-06-11

## 2025-06-11 RX ORDER — EPINEPHRINE 0.3 MG/.3ML
0.3 INJECTION SUBCUTANEOUS EVERY 5 MIN PRN
Status: CANCELLED | OUTPATIENT
Start: 2025-06-11

## 2025-06-11 RX ORDER — PROCHLORPERAZINE MALEATE 10 MG
10 TABLET ORAL EVERY 6 HOURS PRN
Status: CANCELLED | OUTPATIENT
Start: 2025-06-11

## 2025-06-11 RX ORDER — ACETAMINOPHEN 325 MG/1
650 TABLET ORAL ONCE
Status: COMPLETED | OUTPATIENT
Start: 2025-06-11 | End: 2025-06-11

## 2025-06-11 RX ORDER — DIPHENHYDRAMINE HCL 50 MG
50 CAPSULE ORAL ONCE
Status: COMPLETED | OUTPATIENT
Start: 2025-06-11 | End: 2025-06-11

## 2025-06-11 RX ORDER — FAMOTIDINE 10 MG/ML
20 INJECTION, SOLUTION INTRAVENOUS ONCE AS NEEDED
Status: DISCONTINUED | OUTPATIENT
Start: 2025-06-11 | End: 2025-06-11 | Stop reason: HOSPADM

## 2025-06-11 RX ORDER — PROCHLORPERAZINE EDISYLATE 5 MG/ML
10 INJECTION INTRAMUSCULAR; INTRAVENOUS EVERY 6 HOURS PRN
Status: CANCELLED | OUTPATIENT
Start: 2025-06-11

## 2025-06-11 RX ORDER — PROCHLORPERAZINE MALEATE 10 MG
10 TABLET ORAL EVERY 6 HOURS PRN
Status: DISCONTINUED | OUTPATIENT
Start: 2025-06-11 | End: 2025-06-11 | Stop reason: HOSPADM

## 2025-06-11 RX ORDER — DIPHENHYDRAMINE HYDROCHLORIDE 50 MG/ML
50 INJECTION, SOLUTION INTRAMUSCULAR; INTRAVENOUS AS NEEDED
Status: DISCONTINUED | OUTPATIENT
Start: 2025-06-11 | End: 2025-06-11 | Stop reason: HOSPADM

## 2025-06-11 RX ORDER — ACETAMINOPHEN 325 MG/1
650 TABLET ORAL ONCE
Status: CANCELLED | OUTPATIENT
Start: 2025-06-11

## 2025-06-11 RX ORDER — PROCHLORPERAZINE EDISYLATE 5 MG/ML
10 INJECTION INTRAMUSCULAR; INTRAVENOUS EVERY 6 HOURS PRN
Status: DISCONTINUED | OUTPATIENT
Start: 2025-06-11 | End: 2025-06-11 | Stop reason: HOSPADM

## 2025-06-11 RX ORDER — ALBUTEROL SULFATE 0.83 MG/ML
3 SOLUTION RESPIRATORY (INHALATION) AS NEEDED
Status: CANCELLED | OUTPATIENT
Start: 2025-06-11

## 2025-06-11 RX ORDER — DIPHENHYDRAMINE HCL 50 MG
50 CAPSULE ORAL ONCE
Status: CANCELLED | OUTPATIENT
Start: 2025-06-11

## 2025-06-11 RX ORDER — DIPHENHYDRAMINE HYDROCHLORIDE 50 MG/ML
50 INJECTION, SOLUTION INTRAMUSCULAR; INTRAVENOUS AS NEEDED
Status: CANCELLED | OUTPATIENT
Start: 2025-06-11

## 2025-06-11 RX ORDER — CYANOCOBALAMIN 1000 UG/ML
1000 INJECTION, SOLUTION INTRAMUSCULAR; SUBCUTANEOUS ONCE
Status: CANCELLED | OUTPATIENT
Start: 2025-06-11

## 2025-06-11 RX ORDER — FAMOTIDINE 10 MG/ML
20 INJECTION, SOLUTION INTRAVENOUS ONCE AS NEEDED
Status: CANCELLED | OUTPATIENT
Start: 2025-06-11

## 2025-06-11 RX ORDER — HEPARIN 100 UNIT/ML
500 SYRINGE INTRAVENOUS AS NEEDED
Status: CANCELLED | OUTPATIENT
Start: 2025-06-11

## 2025-06-11 RX ORDER — EPINEPHRINE 0.3 MG/.3ML
0.3 INJECTION SUBCUTANEOUS EVERY 5 MIN PRN
Status: DISCONTINUED | OUTPATIENT
Start: 2025-06-11 | End: 2025-06-11 | Stop reason: HOSPADM

## 2025-06-11 RX ADMIN — DIPHENHYDRAMINE HYDROCHLORIDE 50 MG: 50 CAPSULE ORAL at 10:16

## 2025-06-11 RX ADMIN — DEXAMETHASONE SODIUM PHOSPHATE 10 MG: 4 INJECTION INTRA-ARTICULAR; INTRALESIONAL; INTRAMUSCULAR; INTRAVENOUS; SOFT TISSUE at 10:16

## 2025-06-11 RX ADMIN — PEMETREXED DISODIUM 850 MG: 500 INJECTION, POWDER, LYOPHILIZED, FOR SOLUTION INTRAVENOUS at 10:50

## 2025-06-11 RX ADMIN — CYANOCOBALAMIN 1000 MCG: 1000 INJECTION INTRAMUSCULAR; SUBCUTANEOUS at 10:21

## 2025-06-11 RX ADMIN — HEPARIN 500 UNITS: 100 SYRINGE at 13:17

## 2025-06-11 RX ADMIN — ACETAMINOPHEN 650 MG: 325 TABLET ORAL at 10:15

## 2025-06-11 RX ADMIN — AMIVANTAMAB 2100 MG: 350 INJECTION INTRAVENOUS at 11:05

## 2025-06-11 ASSESSMENT — PAIN SCALES - GENERAL: PAINLEVEL_OUTOF10: 5

## 2025-06-11 NOTE — PROGRESS NOTES
[facility] - Medical Oncology Follow-Up Visit  Patient ID: Jayant Holland is a 74 y.o. male with NSCLC adenocarcinoma     Current therapy: cyanocobalamin (Vitamin B-12) injection 1,000 mcg, 1,000 mcg, intramuscular, Once, 1 of 6 cycles  Administration: 1,000 mcg (1/29/2025)    fosaprepitant (Emend) 150 mg in sodium chloride 0.9% 250 mL IV, 150 mg, intravenous, Once, 1 of 4 cycles  Administration: 150 mg (1/29/2025)    CARBOplatin (Paraplatin) 600 mg in sodium chloride 0.9% 170 mL IV, 600 mg, intravenous, Once, 1 of 4 cycles  Administration: 600 mg (1/29/2025)    amivantamab-vmjw ([place]) 350 mg in sodium chloride 0.9% 250 mL IV, 350 mg, intravenous, Once, 1 of 18 cycles  Administration: 350 mg (1/29/2025), 1,400 mg (1/30/2025), 1,750 mg (2/4/2025), 1,750 mg (2/11/2025)  methylPREDNISolone sod succinate (SOLU-Medrol) 40 mg/mL injection 40 mg, 40 mg, intravenous, As needed, 1 of 18 cycles    palonosetron (Aloxi) injection 250 mcg, 250 mcg, intravenous, Once, 1 of 4 cycles  Administration: 250 mcg (1/29/2025)    PEMEtrexed disodium (Alimta) 1,100 mg in sodium chloride 0.9% 154 mL IV, 500 mg/m2 = 1,100 mg, intravenous, Once, 1 of 18 cycles  Dose modification: 400 mg/m2 (original dose 500 mg/m2, Cycle 2)  Administration: 1,100 mg (1/29/2025)       Chief Concern: follow-up and readiness to treat     Oncologic History:     DIAGNOSIS  NSCLC adenocarcinoma     STAGING  [id number]     CURRENT SITES OF DISEASE  RUL, R pleural effusion/pleural carcinomatosis, R hilar, mediastinal, retrocaval nodes      MOLECULAR GENOMICS  PD-L1 5%  EGFR [id number] ([id number])      PRIOR THERAPY      CURRENT THERAPY  Carboplatin/Pemetrexed/Amivantamab C1D1 1/29/25 -   Pemetrexed dose reduced 400mg/m2 C2 -   Maintenance pemetrexed/amivantamab delayed 1 week for neutropenia to start 4/25/25     CURRENT ONCOLOGICAL PROBLEMS  Unintentional wt loss - stabilized  Hypomagnesia         HISTORY  OF PRESENT ILLNESS  Mr. Holland is a 75 yo with PMH significant for DMII, HTN, and HLD who had a CXR done on 12/5/24 for persistent coughing after pneumonia about a month prior, which was concerning for moderate pleural effusion of the R lung. He went to the ER, where a CT chest w/contrast was done with spiculated RUL mass measuring 2.2 x 2.1 cm and large R pleural effusion. He was referred to diagnostic clinic and seen on 12/9/24 by Kirstin where he noted persistent cough for 6 weeks, constant dyspnea, wheezing, and unintentional 30 lb weight loss over the last year. He was referred to pulmonology and had thoracentesis on 12/10/24 with 980 ml removed, cytology with adenocarcinoma, PD-L1 5%, and NGS with EGFR exon 20 mutation. He had repeat thoracentesis on 12/17/24 with 2.4L removed. PET/CT 12/20/24 with FDV avidity of the RL nodule SUV max 9.2, multiple RLL avid nodules SUV max 4.1, FDG-avid pleural thickening on the right, multiple R hilar, mediastinal, subcarinal nodes, and moderate R pleural effusion. Mildly avid GGO within JANELLE SUV max 2.0. Brain MRI is rescheduled for 1/15/25.   Consented for carboplatin/pemetrexed/amivantamab started 1/29/25.  Treatment delayed d/t hospitalization. Treatment has been c/b hypomagnesemia. Pemetrexed dose-reduced to 400 mg/m2 with C2. Maintenance pemetrexed/amivantamab delayed 1 week for neutropenia.    1/21/25 - 1/23/25 - hospitalization 2/2 recurrent pleural effusion.        PAST MEDICAL HISTORY  DMII   HTN  HLD   Osteoarthritis (neck)  asthma     SOCIAL HISTORY  Lives at home with his wife. Retired from [employer], worked as a technician for 48 years, notes hazardous chemical exposures.  Tob: never  EtOH: occasionally  Illicits: none     FAMILY HISTORY  Mother - breast cancer    HPI   6/11/2025  Patient presents today for treatment readiness visit. He is alone at today's visit. His primary complaint is fatigue. He denies any fevers, chills or night sweats. No cough, chest pain or  shortness of breath. No further epitaxies. No nausea or vomiting. No constipation or diarrhea. Occasional rectal bleeding with bowel movements. Reports less frequent. No urinary symptoms. No rash. No neuropathy. Following with Dr. Bala Gray, nephrology Omeprazole has been discontinued in hopes to improve dietary magnesium absorption. Oral magnesium started.     Most recent colonoscopy 2023, one rectal polyp.     PleurX draining 250 twice weekly.       Review of Systems:  A review of systems has been completed and are negative for complaints except what is stated in the HPI and/or past medical history      Meds (Current):  Current Medications[1]      Objective   Vital Signs  /86   Pulse 96   Temp 36.2 °C (97.2 °F) (Core)   Resp 18   Wt 81.4 kg (179 lb 6.4 oz)   SpO2 96%   BMI 24.33 kg/m²     Wt Readings from Last 5 Encounters:   25 81.4 kg (179 lb 6.4 oz)   25 81.7 kg (180 lb 1.6 oz)   25 81.2 kg (179 lb 1.6 oz)   25 81.7 kg (180 lb 1.6 oz)   25 82.9 kg (182 lb 12.8 oz)       Physical Exam:  ECO  Pain: 0  Constitutional: Well developed, awake/alert/oriented x3, no distress, alert and cooperative  Eyes: PER. sclera anicteric  ENMT: Oral mucosa moist, no lesions or thrush identified  Respiratory/Thorax: Breathing is non-labored. Lungs are clear to auscultation bilaterally. No adventitious breath sounds. Plerux drain.   Cardiovascular: S1-S2. Regular rate and rhythm. No murmurs, rubs, or gallops appreciated  Gastrointestinal: Abdomen soft nontender, nondistended, normal active bowel sounds. No external hemorrhoids  Musculoskeletal: ROM intact, no joint swelling, normal strength  Extremities: normal extremities, no cyanosis, +ankle edema, no clubbing  Lymphatics: no palpable lymphadenopathy   Skin: faint rash bridge of nose and cheeks   Neurologic: alert and oriented x3. Nonfocal exam. No myoclonus  Psychological: Pleasant, appropriate and easily engaged         Results:  Lab Results   Component Value Date    WBC 5.9 06/11/2025    HGB 9.3 (L) 06/11/2025    HCT 30.2 (L) 06/11/2025    MCV 94 06/11/2025     06/11/2025      Lab Results   Component Value Date    NEUTROABS 3.78 06/11/2025      Lab Results   Component Value Date    GLUCOSE 107 (H) 06/09/2025    CALCIUM 8.4 (L) 06/09/2025     (L) 06/09/2025    K 3.8 06/09/2025    CO2 23 06/09/2025     06/09/2025    BUN 7 06/09/2025    CREATININE 0.76 06/09/2025    MG 1.09 (L) 06/09/2025     Lab Results   Component Value Date    ALT 19 06/09/2025    AST 27 06/09/2025    ALKPHOS 80 06/09/2025    BILITOT 0.4 06/09/2025    BILIDIR 0.1 11/18/2019      Lab Results   Component Value Date    TSH 2.12 01/28/2025         Imaging:  I have personally reviewed the below imaging and concur with the reported findings unless otherwise stated:    CT chest abdomen pelvis w IV contrast  Result Date: 4/19/2025  Impression: Adenocarcinoma of the lung restaging scan compared to CTA chest 03/20/2025 and PET-CT 12/20/2024: 1. Redemonstration of spiculated nodule in the apical segment right upper lobe with decreased attenuation compared to prior CTs dating back to 01/21/2025 consistent with treatment response. Remaining pulmonary nodules and mediastinal lymph nodes remain stable in size. No new measurable disease in the chest abdomen pelvis. 2. Similar pleural thickening of the right lower lobe pleura with small complex right pleural effusion consistent with known pleural metastasis and malignant effusion. 3. PleurX catheter terminates within the pleural space abutting the superior segment of right lower lobe unchanged compared to prior exam. 4. Fluid-filled esophagus which can be seen in reflux esophagitis. 5. Additional incidental non-acute findings as detailed above.     I personally reviewed the images/study and I agree with the findings as stated by Dr. Zach Rocha. This study was interpreted at [facility]  [place], [place].   MACRO: None.   Signed by: Albert Garcia 4/19/2025 12:06 PM Dictation workstation:   [id number]        Assessment/Plan      Jayant Holland is a 74 y.o. male here for follow up of NSCLC adenocarcinoma    # NSCLC adenocarcinoma  - [id number] (pleural effusion)  - PD-L1 5%, EGFR [id number] ([id number])   - discussed that given EGFR exon 20 mutation, recommend combination chemotherapy+amivantamab, consented  - started C1D1 1/29/25, pemetrexed dose-reduced with C2 to 400 mg/m2  - C1D1 dexamethasone Rx started - for 3 doses.  Discussed potential infusion reaction.  Pt verbalized understanding.    - Per pt request, C2 infusion and visit transferred to [facility].  Visits with FORTINO Grace.  Scan review visits with Dr. Solitario at [facility].   - Per pt request, order placed for mediport placement. Mediport Placed 3/17/2025  - personally reviewed restaging scans after 4 cycles of carbo/pem/amivantamab with some response. Discussed continuing on maintenance pemetrexed/amivantamab. Neutropenic today and will delay to 4/25/25  - will continue to monitor, repeat scans in 3 months  - will obtain brain MRI to monitor brain metastases     # Malignant pleural effusion  - s/p thoracentesis and contacted by IP while in visit for repeat thora.   - Right pleurx catheter placed 12/17/24 with HH referral in place.  Drained 2x/wk.  - continue to monitor    # Hypomagnesia, ongoing with current Amivantamab treatment  - Mg replacement plan created, next IV mag replacement dates include: 3/14, 3/17, 3/20, 3/24, 3/27, 3/31, 4/4, 4/7, 4/9, 4/11, 4/14 at [place].  Replacement dates ongoing. Treatment parameters:    Serum magnesium 1 - 1.6 mg/dL - administer 2 grams over 1 hour  Serum magnesium less than 1 mg/dL - administer 4 grams over 2 hours   - Following with Dr. Bala Gray,nephrology Omeprazole has been discontinued in hopes to improve dietary magnesium absorption. Oral magnesium  started.     # Macular rash to face, bridge of nose and checks  - Continue routine Doxycycline and treatment rash protocols.      # Unintentional wt loss  - Recommended pt add Ensure/Boost supplements and snacks throughout the day. Will try increased nutritional intake vs pharm intervention for now.    - Dietician referral placed.   - 2/11:  wt loss stabilized, pt started daily Boost supplements    # Leukocytosis 12.6 3/12/25  - no clinical s/sx's infection  - Continue to monitor     # Advanced care planning  - discussed incurable but treatable nature of metastatic disease, and goal of therapy is to prolong life while maintaining or improving quality of life.  - 1/30/25 - Supportive onc referral placed.  Pt declined need for visit at this time.  Supportive onc will follow-up in 2-3 weeks.      # GI  - Recent rectal bleeding. No external hemorrhoids. Refer to GI for evaluation Dr. Warner Matos, APRN-CNP  [facility]  Thoracic + [facility] Medical Oncology     I spent a total of 30+ minutes on the date of the service which included preparing to see the patient, face-to-face patient care, completing clinical documentation, obtaining and / or reviewing separately obtained history, counseling and educating the patient/family/caregiver, ordering medications, tests, or procedures, communicating with other healthcare providers (not separately reported), independently interpreting results, not separately reported, and communicating results to the patient/family/caregiver, Name and date of birth verified.            [1]   Current Outpatient Medications:     apixaban (Eliquis) 5 mg tablet, Take 1 tablet (5 mg) by mouth 2 times a day., Disp: 180 tablet, Rfl: 1    Blood glucose monitoring meter kit kit, 1 each if needed., Disp: , Rfl:     blood-glucose meter misc, 1 Device once daily., Disp: 1 each, Rfl: 0    clindamycin (Cleocin T) 1 % lotion, Apply topically to affected area twice daily. Do not fill  before January 29, 2025., Disp: 60 mL, Rfl: 3    folic acid (Folvite) 1 mg tablet, Take 1 tablet (1,000 mcg) by mouth once daily. Do not fill before January 29, 2025., Disp: 30 tablet, Rfl: 11    hydrocortisone 1 % cream, Apply topically to face, hands, feet, neck, back, and chest once daily at bedtime for rash prevention. Do not fill before January 29, 2025., Disp: 453.6 g, Rfl: 3    lancets 30 gauge misc, 1 Device 3 times a day., Disp: 200 each, Rfl: 3    lisinopril 20 mg tablet, Take 1 tablet (20 mg) by mouth once daily., Disp: 100 tablet, Rfl: 3    metFORMIN  mg 24 hr tablet, TAKE 2 TABLETS (1,000 MG) BY MOUTH 2 TIMES A DAY. DO NOT CRUSH, CHEW, OR SPLIT., Disp: 360 tablet, Rfl: 3    omeprazole (PriLOSEC) 20 mg DR capsule, TAKE 1 CAPSULE BY MOUTH EVERY DAY, Disp: 90 capsule, Rfl: 3    ondansetron (Zofran) 8 mg tablet, Take 1 tablet (8 mg) by mouth every 8 hours if needed for nausea or vomiting. Do not fill before January 29, 2025., Disp: 30 tablet, Rfl: 5    potassium chloride CR 20 mEq ER tablet, Take 2 tablets (40 mEq) by mouth once daily. Do not crush, chew, or split., Disp: 30 tablet, Rfl: 3    prochlorperazine (Compazine) 10 mg tablet, Take 1 tablet (10 mg) by mouth every 6 hours if needed for nausea or vomiting. Do not fill before January 29, 2025., Disp: 30 tablet, Rfl: 5    rosuvastatin (Crestor) 40 mg tablet, TAKE 1 TABLET BY MOUTH EVERY DAY, Disp: 90 tablet, Rfl: 3    sennosides (Senokot) 8.6 mg tablet, Take 2 tablets (17.2 mg) by mouth as needed at bedtime for constipation. Do not fill before January 29, 2025., Disp: 30 tablet, Rfl: 11  No current facility-administered medications for this visit.    Facility-Administered Medications Ordered in Other Visits:     acetaminophen (Tylenol) tablet 650 mg, 650 mg, oral, Once, Alisha Solitario MD    albuterol 2.5 mg /3 mL (0.083 %) nebulizer solution 3 mL, 3 mL, nebulization, PRN, MD marlene [facility] ([place]) 2,100 mg in sodium  chloride 0.9% 250 mL IV, 2,100 mg, intravenous, Once, Alisha Solitario MD    cyanocobalamin (Vitamin B-12) injection 1,000 mcg, 1,000 mcg, intramuscular, Once, Alisha Solitario MD    dexAMETHasone (Decadron) injection 10 mg, 10 mg, intravenous, Once, Alisha Solitario MD    dextrose 5 % in water (D5W) bolus 500 mL, 500 mL, intravenous, PRN, Alisha Solitario MD    diphenhydrAMINE (BENADryl) capsule 50 mg, 50 mg, oral, Once, Alisha Solitario MD    diphenhydrAMINE (BENADryl) injection 50 mg, 50 mg, intravenous, PRN, Alisha Solitario MD    EPINEPHrine (Epipen) injection syringe 0.3 mg, 0.3 mg, intramuscular, q5 min PRN, Alisha Solitario MD    famotidine PF (Pepcid) injection 20 mg, 20 mg, intravenous, Once PRN, Alisha Solitario MD    methylPREDNISolone sod succinate (SOLU-Medrol) 40 mg/mL injection 40 mg, 40 mg, intravenous, PRN, Alisha Solitario MD    PEMEtrexed disodium (Alimta) 850 mg in sodium chloride 0.9% 134 mL IV, 400 mg/m2 (Treatment Plan Recorded), intravenous, Once, Alisha Solitario MD    prochlorperazine (Compazine) injection 10 mg, 10 mg, intravenous, q6h PRN, Alisha Solitario MD    prochlorperazine (Compazine) tablet 10 mg, 10 mg, oral, q6h PRN, Alisha Solitario MD    sodium chloride 0.9 % bolus 500 mL, 500 mL, intravenous, PRN, Alisha Solitario MD

## 2025-06-13 ENCOUNTER — INFUSION (OUTPATIENT)
Dept: HEMATOLOGY/ONCOLOGY | Facility: CLINIC | Age: 75
End: 2025-06-13
Payer: MEDICARE

## 2025-06-13 ENCOUNTER — DOCUMENTATION (OUTPATIENT)
Dept: HEMATOLOGY/ONCOLOGY | Facility: HOSPITAL | Age: 75
End: 2025-06-13
Payer: MEDICARE

## 2025-06-13 VITALS
DIASTOLIC BLOOD PRESSURE: 70 MMHG | WEIGHT: 182.3 LBS | HEIGHT: 72 IN | BODY MASS INDEX: 24.69 KG/M2 | HEART RATE: 102 BPM | RESPIRATION RATE: 16 BRPM | TEMPERATURE: 97.7 F | OXYGEN SATURATION: 98 % | SYSTOLIC BLOOD PRESSURE: 114 MMHG

## 2025-06-13 DIAGNOSIS — C80.1 ADENOCARCINOMA (MULTI): ICD-10-CM

## 2025-06-13 DIAGNOSIS — E83.42 HYPOMAGNESEMIA: ICD-10-CM

## 2025-06-13 LAB — MAGNESIUM SERPL-MCNC: 0.97 MG/DL (ref 1.6–2.4)

## 2025-06-13 PROCEDURE — 83735 ASSAY OF MAGNESIUM: CPT | Performed by: STUDENT IN AN ORGANIZED HEALTH CARE EDUCATION/TRAINING PROGRAM

## 2025-06-13 PROCEDURE — 96365 THER/PROPH/DIAG IV INF INIT: CPT | Mod: INF

## 2025-06-13 PROCEDURE — 2500000004 HC RX 250 GENERAL PHARMACY W/ HCPCS (ALT 636 FOR OP/ED): Performed by: NURSE PRACTITIONER

## 2025-06-13 PROCEDURE — 2500000004 HC RX 250 GENERAL PHARMACY W/ HCPCS (ALT 636 FOR OP/ED): Performed by: STUDENT IN AN ORGANIZED HEALTH CARE EDUCATION/TRAINING PROGRAM

## 2025-06-13 PROCEDURE — 96366 THER/PROPH/DIAG IV INF ADDON: CPT | Mod: INF

## 2025-06-13 RX ORDER — HEPARIN SODIUM,PORCINE/PF 10 UNIT/ML
50 SYRINGE (ML) INTRAVENOUS AS NEEDED
OUTPATIENT
Start: 2025-06-13

## 2025-06-13 RX ORDER — HEPARIN 100 UNIT/ML
500 SYRINGE INTRAVENOUS AS NEEDED
Status: DISCONTINUED | OUTPATIENT
Start: 2025-06-13 | End: 2025-06-13 | Stop reason: HOSPADM

## 2025-06-13 RX ORDER — HEPARIN 100 UNIT/ML
500 SYRINGE INTRAVENOUS AS NEEDED
OUTPATIENT
Start: 2025-06-13

## 2025-06-13 RX ORDER — MAGNESIUM SULFATE HEPTAHYDRATE 40 MG/ML
2-4 INJECTION, SOLUTION INTRAVENOUS ONCE
OUTPATIENT
Start: 2025-06-16 | End: 2025-06-16

## 2025-06-13 RX ORDER — MAGNESIUM SULFATE HEPTAHYDRATE 40 MG/ML
2 INJECTION, SOLUTION INTRAVENOUS ONCE
OUTPATIENT
Start: 2025-06-16 | End: 2025-06-16

## 2025-06-13 RX ORDER — MAGNESIUM SULFATE HEPTAHYDRATE 40 MG/ML
4 INJECTION, SOLUTION INTRAVENOUS ONCE
Status: COMPLETED | OUTPATIENT
Start: 2025-06-13 | End: 2025-06-13

## 2025-06-13 RX ORDER — MAGNESIUM SULFATE HEPTAHYDRATE 40 MG/ML
4 INJECTION, SOLUTION INTRAVENOUS ONCE
OUTPATIENT
Start: 2025-06-16 | End: 2025-06-16

## 2025-06-13 RX ADMIN — MAGNESIUM SULFATE 4 G: 4 INJECTION INTRAVENOUS at 09:49

## 2025-06-13 RX ADMIN — Medication 500 UNITS: at 11:44

## 2025-06-13 ASSESSMENT — PAIN SCALES - GENERAL: PAINLEVEL_OUTOF10: 0-NO PAIN

## 2025-06-13 NOTE — PROGRESS NOTES
Provider and nurse partner notified of critical lab reported by Laura from [facility] lab. Mag 0.97.

## 2025-06-13 NOTE — PROGRESS NOTES
Jayant is here for IV magnesium replacement. 4g given per orders for mag level 0.97. Pt tolerated well. Discharged in stable condition, no further needs at this time. Has schedule for follow up.

## 2025-06-16 ENCOUNTER — INFUSION (OUTPATIENT)
Dept: HEMATOLOGY/ONCOLOGY | Facility: CLINIC | Age: 75
End: 2025-06-16
Payer: MEDICARE

## 2025-06-16 ENCOUNTER — DOCUMENTATION (OUTPATIENT)
Dept: HEMATOLOGY/ONCOLOGY | Facility: HOSPITAL | Age: 75
End: 2025-06-16
Payer: MEDICARE

## 2025-06-16 VITALS
DIASTOLIC BLOOD PRESSURE: 75 MMHG | WEIGHT: 177.2 LBS | SYSTOLIC BLOOD PRESSURE: 112 MMHG | TEMPERATURE: 97.9 F | BODY MASS INDEX: 24.03 KG/M2 | OXYGEN SATURATION: 99 % | RESPIRATION RATE: 16 BRPM | HEART RATE: 106 BPM

## 2025-06-16 DIAGNOSIS — E83.42 HYPOMAGNESEMIA: ICD-10-CM

## 2025-06-16 DIAGNOSIS — C80.1 ADENOCARCINOMA (MULTI): ICD-10-CM

## 2025-06-16 LAB — MAGNESIUM SERPL-MCNC: 1 MG/DL (ref 1.6–2.4)

## 2025-06-16 PROCEDURE — 96365 THER/PROPH/DIAG IV INF INIT: CPT | Mod: INF

## 2025-06-16 PROCEDURE — 2500000004 HC RX 250 GENERAL PHARMACY W/ HCPCS (ALT 636 FOR OP/ED): Performed by: NURSE PRACTITIONER

## 2025-06-16 PROCEDURE — 83735 ASSAY OF MAGNESIUM: CPT

## 2025-06-16 PROCEDURE — 2500000004 HC RX 250 GENERAL PHARMACY W/ HCPCS (ALT 636 FOR OP/ED): Performed by: STUDENT IN AN ORGANIZED HEALTH CARE EDUCATION/TRAINING PROGRAM

## 2025-06-16 RX ORDER — MAGNESIUM SULFATE HEPTAHYDRATE 40 MG/ML
4 INJECTION, SOLUTION INTRAVENOUS ONCE
Status: CANCELLED | OUTPATIENT
Start: 2025-06-18 | End: 2025-06-18

## 2025-06-16 RX ORDER — MAGNESIUM SULFATE HEPTAHYDRATE 40 MG/ML
2-4 INJECTION, SOLUTION INTRAVENOUS ONCE
Status: CANCELLED | OUTPATIENT
Start: 2025-06-18 | End: 2025-06-18

## 2025-06-16 RX ORDER — MAGNESIUM SULFATE HEPTAHYDRATE 40 MG/ML
2 INJECTION, SOLUTION INTRAVENOUS ONCE
Status: COMPLETED | OUTPATIENT
Start: 2025-06-16 | End: 2025-06-16

## 2025-06-16 RX ORDER — HEPARIN SODIUM,PORCINE/PF 10 UNIT/ML
50 SYRINGE (ML) INTRAVENOUS AS NEEDED
Status: CANCELLED | OUTPATIENT
Start: 2025-06-16

## 2025-06-16 RX ORDER — HEPARIN 100 UNIT/ML
500 SYRINGE INTRAVENOUS AS NEEDED
Status: DISCONTINUED | OUTPATIENT
Start: 2025-06-16 | End: 2025-06-16 | Stop reason: HOSPADM

## 2025-06-16 RX ORDER — HEPARIN 100 UNIT/ML
500 SYRINGE INTRAVENOUS AS NEEDED
Status: CANCELLED | OUTPATIENT
Start: 2025-06-16

## 2025-06-16 RX ORDER — MAGNESIUM SULFATE HEPTAHYDRATE 40 MG/ML
2 INJECTION, SOLUTION INTRAVENOUS ONCE
Status: CANCELLED | OUTPATIENT
Start: 2025-06-18 | End: 2025-06-18

## 2025-06-16 RX ADMIN — MAGNESIUM SULFATE IN WATER 2 G: 40 INJECTION, SOLUTION INTRAVENOUS at 09:34

## 2025-06-16 RX ADMIN — Medication 500 UNITS: at 09:34

## 2025-06-16 ASSESSMENT — PAIN SCALES - GENERAL: PAINLEVEL_OUTOF10: 0-NO PAIN

## 2025-06-16 NOTE — PROGRESS NOTES
Notified by Jennyfer Alamo in Lab Services that Magnesium 1.00. Secure Chat sent to Dr. Solitario. Pt has appointment at [facility].

## 2025-06-16 NOTE — PROGRESS NOTES
Patient presents for IV magnesium treatment,  has no complaints alert and oriented x 4. CVAD accessed per order, labs drawn per orders. Patient meets parameters for treatment, 2 grams of mag over 1 hour per order. Patient tolerated treatment well, no reaction noted. After treatment, Mediport flushed with 10 ml NS followed by 5 ml of 100mg/ml Heparin Flush, Moody Needle removed per practice policy and procedure, site care given with bandaid applied. Pt tolerated procedure well, discharged in stable condition with no further needs.

## 2025-06-17 DIAGNOSIS — J90 PLEURAL EFFUSION: ICD-10-CM

## 2025-06-18 ENCOUNTER — INFUSION (OUTPATIENT)
Dept: HEMATOLOGY/ONCOLOGY | Facility: CLINIC | Age: 75
End: 2025-06-18
Payer: MEDICARE

## 2025-06-18 VITALS
WEIGHT: 177 LBS | HEART RATE: 108 BPM | SYSTOLIC BLOOD PRESSURE: 117 MMHG | HEIGHT: 72 IN | BODY MASS INDEX: 23.98 KG/M2 | TEMPERATURE: 97.7 F | DIASTOLIC BLOOD PRESSURE: 81 MMHG | RESPIRATION RATE: 16 BRPM | OXYGEN SATURATION: 99 %

## 2025-06-18 DIAGNOSIS — C80.1 ADENOCARCINOMA (MULTI): ICD-10-CM

## 2025-06-18 DIAGNOSIS — E83.42 HYPOMAGNESEMIA: ICD-10-CM

## 2025-06-18 LAB — MAGNESIUM SERPL-MCNC: 1.1 MG/DL (ref 1.6–2.4)

## 2025-06-18 PROCEDURE — 2500000004 HC RX 250 GENERAL PHARMACY W/ HCPCS (ALT 636 FOR OP/ED): Performed by: STUDENT IN AN ORGANIZED HEALTH CARE EDUCATION/TRAINING PROGRAM

## 2025-06-18 PROCEDURE — 83735 ASSAY OF MAGNESIUM: CPT | Performed by: NURSE PRACTITIONER

## 2025-06-18 PROCEDURE — 96365 THER/PROPH/DIAG IV INF INIT: CPT | Mod: INF

## 2025-06-18 PROCEDURE — 2500000004 HC RX 250 GENERAL PHARMACY W/ HCPCS (ALT 636 FOR OP/ED): Performed by: NURSE PRACTITIONER

## 2025-06-18 RX ORDER — HEPARIN SODIUM,PORCINE/PF 10 UNIT/ML
50 SYRINGE (ML) INTRAVENOUS AS NEEDED
Status: CANCELLED | OUTPATIENT
Start: 2025-06-18

## 2025-06-18 RX ORDER — MAGNESIUM SULFATE HEPTAHYDRATE 40 MG/ML
2 INJECTION, SOLUTION INTRAVENOUS ONCE
Status: COMPLETED | OUTPATIENT
Start: 2025-06-18 | End: 2025-06-18

## 2025-06-18 RX ORDER — MAGNESIUM SULFATE HEPTAHYDRATE 40 MG/ML
4 INJECTION, SOLUTION INTRAVENOUS ONCE
Status: CANCELLED | OUTPATIENT
Start: 2025-06-20 | End: 2025-06-20

## 2025-06-18 RX ORDER — HEPARIN 100 UNIT/ML
500 SYRINGE INTRAVENOUS AS NEEDED
Status: CANCELLED | OUTPATIENT
Start: 2025-06-18

## 2025-06-18 RX ORDER — MAGNESIUM SULFATE HEPTAHYDRATE 40 MG/ML
2 INJECTION, SOLUTION INTRAVENOUS ONCE
Status: CANCELLED | OUTPATIENT
Start: 2025-06-20 | End: 2025-06-20

## 2025-06-18 RX ORDER — HEPARIN 100 UNIT/ML
500 SYRINGE INTRAVENOUS AS NEEDED
Status: DISCONTINUED | OUTPATIENT
Start: 2025-06-18 | End: 2025-06-18 | Stop reason: HOSPADM

## 2025-06-18 RX ORDER — MAGNESIUM SULFATE HEPTAHYDRATE 40 MG/ML
2-4 INJECTION, SOLUTION INTRAVENOUS ONCE
Status: CANCELLED | OUTPATIENT
Start: 2025-06-20 | End: 2025-06-20

## 2025-06-18 RX ADMIN — MAGNESIUM SULFATE IN WATER 2 G: 40 INJECTION, SOLUTION INTRAVENOUS at 11:00

## 2025-06-18 RX ADMIN — Medication 500 UNITS: at 11:57

## 2025-06-18 ASSESSMENT — PAIN SCALES - GENERAL: PAINLEVEL_OUTOF10: 0-NO PAIN

## 2025-06-18 NOTE — PROGRESS NOTES
Pt here for mag infusion. Mag today was 1.10 meet parameters for 2g infusion. Tolerated well. He is aware of plan of care, will call with further questions or concerns.

## 2025-06-20 ENCOUNTER — INFUSION (OUTPATIENT)
Dept: HEMATOLOGY/ONCOLOGY | Facility: CLINIC | Age: 75
End: 2025-06-20
Payer: MEDICARE

## 2025-06-20 VITALS
DIASTOLIC BLOOD PRESSURE: 78 MMHG | SYSTOLIC BLOOD PRESSURE: 113 MMHG | HEART RATE: 112 BPM | WEIGHT: 175.7 LBS | BODY MASS INDEX: 23.8 KG/M2 | OXYGEN SATURATION: 100 % | RESPIRATION RATE: 16 BRPM | TEMPERATURE: 97.3 F | HEIGHT: 72 IN

## 2025-06-20 DIAGNOSIS — C80.1 ADENOCARCINOMA (MULTI): ICD-10-CM

## 2025-06-20 DIAGNOSIS — E83.42 HYPOMAGNESEMIA: ICD-10-CM

## 2025-06-20 LAB — MAGNESIUM SERPL-MCNC: 1.18 MG/DL (ref 1.6–2.4)

## 2025-06-20 PROCEDURE — 2500000004 HC RX 250 GENERAL PHARMACY W/ HCPCS (ALT 636 FOR OP/ED): Performed by: STUDENT IN AN ORGANIZED HEALTH CARE EDUCATION/TRAINING PROGRAM

## 2025-06-20 PROCEDURE — 83735 ASSAY OF MAGNESIUM: CPT | Performed by: NURSE PRACTITIONER

## 2025-06-20 PROCEDURE — 2500000004 HC RX 250 GENERAL PHARMACY W/ HCPCS (ALT 636 FOR OP/ED): Performed by: NURSE PRACTITIONER

## 2025-06-20 PROCEDURE — 96365 THER/PROPH/DIAG IV INF INIT: CPT | Mod: INF

## 2025-06-20 RX ORDER — HEPARIN 100 UNIT/ML
500 SYRINGE INTRAVENOUS AS NEEDED
Status: DISCONTINUED | OUTPATIENT
Start: 2025-06-20 | End: 2025-06-20 | Stop reason: HOSPADM

## 2025-06-20 RX ORDER — HEPARIN 100 UNIT/ML
500 SYRINGE INTRAVENOUS AS NEEDED
OUTPATIENT
Start: 2025-06-20

## 2025-06-20 RX ORDER — MAGNESIUM SULFATE HEPTAHYDRATE 40 MG/ML
2 INJECTION, SOLUTION INTRAVENOUS ONCE
Status: COMPLETED | OUTPATIENT
Start: 2025-06-20 | End: 2025-06-20

## 2025-06-20 RX ORDER — HEPARIN SODIUM,PORCINE/PF 10 UNIT/ML
50 SYRINGE (ML) INTRAVENOUS AS NEEDED
OUTPATIENT
Start: 2025-06-20

## 2025-06-20 RX ORDER — MAGNESIUM SULFATE HEPTAHYDRATE 40 MG/ML
2 INJECTION, SOLUTION INTRAVENOUS ONCE
OUTPATIENT
Start: 2025-06-23 | End: 2025-06-23

## 2025-06-20 RX ORDER — MAGNESIUM SULFATE HEPTAHYDRATE 40 MG/ML
2-4 INJECTION, SOLUTION INTRAVENOUS ONCE
OUTPATIENT
Start: 2025-06-23 | End: 2025-06-23

## 2025-06-20 RX ORDER — MAGNESIUM SULFATE HEPTAHYDRATE 40 MG/ML
4 INJECTION, SOLUTION INTRAVENOUS ONCE
OUTPATIENT
Start: 2025-06-23 | End: 2025-06-23

## 2025-06-20 RX ORDER — HEPARIN SODIUM,PORCINE/PF 10 UNIT/ML
50 SYRINGE (ML) INTRAVENOUS AS NEEDED
Status: DISCONTINUED | OUTPATIENT
Start: 2025-06-20 | End: 2025-06-20 | Stop reason: HOSPADM

## 2025-06-20 RX ADMIN — MAGNESIUM SULFATE IN WATER 2 G: 40 INJECTION, SOLUTION INTRAVENOUS at 13:30

## 2025-06-20 RX ADMIN — HEPARIN 500 UNITS: 100 SYRINGE at 14:31

## 2025-06-20 ASSESSMENT — PAIN SCALES - GENERAL: PAINLEVEL_OUTOF10: 0-NO PAIN

## 2025-06-20 NOTE — PROGRESS NOTES
Patient presents for magnesium,  has no complaints alert and oriented x 4. CVAD accessed, positive for blood return and flushes easily, labs drawn per orders. Patient meets parameters for treatment. Patient tolerated treatment well, no reaction noted. After treatment, Mediport flushed with 10 ml NS followed by 5 ml of 100mg/ml Heparin Flush, Moody Needle removed per practice policy and procedure, site care given with bandaid applied. Pt tolerated procedure well. Patient feels well and has no complaints, vital signs stable. Returns on 6/23/2025 for next treatment. Patient given AVS with updated treatment appts. Patient verbalized understanding of all teaching and education. Patient discharged in stable condition with no further needs.

## 2025-06-23 ENCOUNTER — INFUSION (OUTPATIENT)
Dept: HEMATOLOGY/ONCOLOGY | Facility: CLINIC | Age: 75
End: 2025-06-23
Payer: MEDICARE

## 2025-06-23 VITALS
DIASTOLIC BLOOD PRESSURE: 77 MMHG | OXYGEN SATURATION: 94 % | TEMPERATURE: 97.2 F | BODY MASS INDEX: 23.74 KG/M2 | SYSTOLIC BLOOD PRESSURE: 122 MMHG | HEART RATE: 112 BPM | RESPIRATION RATE: 18 BRPM | WEIGHT: 175.1 LBS

## 2025-06-23 DIAGNOSIS — E83.42 HYPOMAGNESEMIA: ICD-10-CM

## 2025-06-23 DIAGNOSIS — C80.1 ADENOCARCINOMA (MULTI): ICD-10-CM

## 2025-06-23 LAB — MAGNESIUM SERPL-MCNC: 1.38 MG/DL (ref 1.6–2.4)

## 2025-06-23 PROCEDURE — 96366 THER/PROPH/DIAG IV INF ADDON: CPT | Mod: INF

## 2025-06-23 PROCEDURE — 96365 THER/PROPH/DIAG IV INF INIT: CPT | Mod: INF

## 2025-06-23 PROCEDURE — 83735 ASSAY OF MAGNESIUM: CPT | Performed by: NURSE PRACTITIONER

## 2025-06-23 PROCEDURE — 2500000004 HC RX 250 GENERAL PHARMACY W/ HCPCS (ALT 636 FOR OP/ED): Performed by: NURSE PRACTITIONER

## 2025-06-23 PROCEDURE — 2500000004 HC RX 250 GENERAL PHARMACY W/ HCPCS (ALT 636 FOR OP/ED): Performed by: STUDENT IN AN ORGANIZED HEALTH CARE EDUCATION/TRAINING PROGRAM

## 2025-06-23 RX ORDER — MAGNESIUM SULFATE HEPTAHYDRATE 40 MG/ML
2-4 INJECTION, SOLUTION INTRAVENOUS ONCE
Status: CANCELLED | OUTPATIENT
Start: 2025-06-25 | End: 2025-06-25

## 2025-06-23 RX ORDER — HEPARIN 100 UNIT/ML
500 SYRINGE INTRAVENOUS AS NEEDED
Status: DISCONTINUED | OUTPATIENT
Start: 2025-06-23 | End: 2025-06-23 | Stop reason: HOSPADM

## 2025-06-23 RX ORDER — MAGNESIUM SULFATE HEPTAHYDRATE 40 MG/ML
4 INJECTION, SOLUTION INTRAVENOUS ONCE
Status: CANCELLED | OUTPATIENT
Start: 2025-06-25 | End: 2025-06-25

## 2025-06-23 RX ORDER — HEPARIN 100 UNIT/ML
500 SYRINGE INTRAVENOUS AS NEEDED
Status: CANCELLED | OUTPATIENT
Start: 2025-06-23

## 2025-06-23 RX ORDER — MAGNESIUM SULFATE HEPTAHYDRATE 40 MG/ML
4 INJECTION, SOLUTION INTRAVENOUS ONCE
Status: COMPLETED | OUTPATIENT
Start: 2025-06-23 | End: 2025-06-23

## 2025-06-23 RX ORDER — HEPARIN SODIUM,PORCINE/PF 10 UNIT/ML
50 SYRINGE (ML) INTRAVENOUS AS NEEDED
Status: DISCONTINUED | OUTPATIENT
Start: 2025-06-23 | End: 2025-06-23 | Stop reason: HOSPADM

## 2025-06-23 RX ORDER — HEPARIN SODIUM,PORCINE/PF 10 UNIT/ML
50 SYRINGE (ML) INTRAVENOUS AS NEEDED
Status: CANCELLED | OUTPATIENT
Start: 2025-06-23

## 2025-06-23 RX ORDER — MAGNESIUM SULFATE HEPTAHYDRATE 40 MG/ML
2 INJECTION, SOLUTION INTRAVENOUS ONCE
Status: CANCELLED | OUTPATIENT
Start: 2025-06-25 | End: 2025-06-25

## 2025-06-23 RX ADMIN — MAGNESIUM SULFATE HEPTAHYDRATE 4 G: 4 INJECTION, SOLUTION INTRAVENOUS at 13:46

## 2025-06-23 RX ADMIN — HEPARIN 500 UNITS: 100 SYRINGE at 15:51

## 2025-06-23 ASSESSMENT — PAIN SCALES - GENERAL: PAINLEVEL_OUTOF10: 0-NO PAIN

## 2025-06-24 ENCOUNTER — HOSPITAL ENCOUNTER (OUTPATIENT)
Dept: GASTROENTEROLOGY | Facility: HOSPITAL | Age: 75
Discharge: HOME | End: 2025-06-24
Payer: MEDICARE

## 2025-06-24 VITALS
WEIGHT: 175 LBS | HEART RATE: 96 BPM | RESPIRATION RATE: 18 BRPM | SYSTOLIC BLOOD PRESSURE: 127 MMHG | OXYGEN SATURATION: 97 % | HEIGHT: 72 IN | BODY MASS INDEX: 23.7 KG/M2 | TEMPERATURE: 97.4 F | DIASTOLIC BLOOD PRESSURE: 90 MMHG

## 2025-06-24 DIAGNOSIS — J90 PLEURAL EFFUSION: ICD-10-CM

## 2025-06-24 DIAGNOSIS — C80.1 ADENOCARCINOMA (MULTI): Primary | ICD-10-CM

## 2025-06-24 PROCEDURE — 76604 US EXAM CHEST: CPT | Performed by: INTERNAL MEDICINE

## 2025-06-24 ASSESSMENT — COLUMBIA-SUICIDE SEVERITY RATING SCALE - C-SSRS
2. HAVE YOU ACTUALLY HAD ANY THOUGHTS OF KILLING YOURSELF?: NO
6. HAVE YOU EVER DONE ANYTHING, STARTED TO DO ANYTHING, OR PREPARED TO DO ANYTHING TO END YOUR LIFE?: NO
1. IN THE PAST MONTH, HAVE YOU WISHED YOU WERE DEAD OR WISHED YOU COULD GO TO SLEEP AND NOT WAKE UP?: NO

## 2025-06-24 ASSESSMENT — PAIN SCALES - GENERAL: PAINLEVEL_OUTOF10: 0 - NO PAIN

## 2025-06-24 ASSESSMENT — PAIN - FUNCTIONAL ASSESSMENT: PAIN_FUNCTIONAL_ASSESSMENT: 0-10

## 2025-06-25 ENCOUNTER — INFUSION (OUTPATIENT)
Dept: HEMATOLOGY/ONCOLOGY | Facility: CLINIC | Age: 75
End: 2025-06-25
Payer: MEDICARE

## 2025-06-25 VITALS
TEMPERATURE: 97.9 F | RESPIRATION RATE: 18 BRPM | SYSTOLIC BLOOD PRESSURE: 117 MMHG | HEART RATE: 98 BPM | OXYGEN SATURATION: 95 % | DIASTOLIC BLOOD PRESSURE: 80 MMHG | WEIGHT: 177.7 LBS | BODY MASS INDEX: 24.1 KG/M2

## 2025-06-25 DIAGNOSIS — C80.1 ADENOCARCINOMA (MULTI): ICD-10-CM

## 2025-06-25 DIAGNOSIS — E83.42 HYPOMAGNESEMIA: ICD-10-CM

## 2025-06-25 DIAGNOSIS — E78.5 HYPERLIPIDEMIA, UNSPECIFIED: ICD-10-CM

## 2025-06-25 LAB
ALBUMIN SERPL BCP-MCNC: 2.7 G/DL (ref 3.4–5)
ALP SERPL-CCNC: 89 U/L (ref 33–136)
ALT SERPL W P-5'-P-CCNC: 26 U/L (ref 10–52)
ANION GAP SERPL CALC-SCNC: 12 MMOL/L (ref 10–20)
AST SERPL W P-5'-P-CCNC: 22 U/L (ref 9–39)
BILIRUB SERPL-MCNC: 0.3 MG/DL (ref 0–1.2)
BUN SERPL-MCNC: 7 MG/DL (ref 6–23)
CALCIUM SERPL-MCNC: 8 MG/DL (ref 8.6–10.6)
CHLORIDE SERPL-SCNC: 103 MMOL/L (ref 98–107)
CO2 SERPL-SCNC: 25 MMOL/L (ref 21–32)
CREAT SERPL-MCNC: 0.6 MG/DL (ref 0.5–1.3)
EGFRCR SERPLBLD CKD-EPI 2021: >90 ML/MIN/1.73M*2
GLUCOSE SERPL-MCNC: 146 MG/DL (ref 74–99)
MAGNESIUM SERPL-MCNC: 1.38 MG/DL (ref 1.6–2.4)
POTASSIUM SERPL-SCNC: 3.7 MMOL/L (ref 3.5–5.3)
PROT SERPL-MCNC: 5.9 G/DL (ref 6.4–8.2)
SODIUM SERPL-SCNC: 136 MMOL/L (ref 136–145)

## 2025-06-25 PROCEDURE — 2500000004 HC RX 250 GENERAL PHARMACY W/ HCPCS (ALT 636 FOR OP/ED): Performed by: NURSE PRACTITIONER

## 2025-06-25 PROCEDURE — 83735 ASSAY OF MAGNESIUM: CPT | Performed by: NURSE PRACTITIONER

## 2025-06-25 PROCEDURE — 80053 COMPREHEN METABOLIC PANEL: CPT | Performed by: STUDENT IN AN ORGANIZED HEALTH CARE EDUCATION/TRAINING PROGRAM

## 2025-06-25 PROCEDURE — 96365 THER/PROPH/DIAG IV INF INIT: CPT | Mod: INF

## 2025-06-25 RX ORDER — MAGNESIUM SULFATE HEPTAHYDRATE 40 MG/ML
2-4 INJECTION, SOLUTION INTRAVENOUS ONCE
Status: CANCELLED | OUTPATIENT
Start: 2025-06-27 | End: 2025-06-27

## 2025-06-25 RX ORDER — HEPARIN 100 UNIT/ML
500 SYRINGE INTRAVENOUS AS NEEDED
Status: CANCELLED | OUTPATIENT
Start: 2025-06-25

## 2025-06-25 RX ORDER — HEPARIN 100 UNIT/ML
500 SYRINGE INTRAVENOUS AS NEEDED
Status: DISCONTINUED | OUTPATIENT
Start: 2025-06-25 | End: 2025-06-25 | Stop reason: HOSPADM

## 2025-06-25 RX ORDER — HEPARIN SODIUM,PORCINE/PF 10 UNIT/ML
50 SYRINGE (ML) INTRAVENOUS AS NEEDED
Status: CANCELLED | OUTPATIENT
Start: 2025-06-25

## 2025-06-25 RX ORDER — MAGNESIUM SULFATE HEPTAHYDRATE 40 MG/ML
4 INJECTION, SOLUTION INTRAVENOUS ONCE
Status: CANCELLED | OUTPATIENT
Start: 2025-06-27 | End: 2025-06-27

## 2025-06-25 RX ORDER — ROSUVASTATIN CALCIUM 40 MG/1
40 TABLET, COATED ORAL DAILY
Qty: 90 TABLET | Refills: 3 | Status: SHIPPED | OUTPATIENT
Start: 2025-06-25

## 2025-06-25 RX ORDER — HEPARIN SODIUM,PORCINE/PF 10 UNIT/ML
50 SYRINGE (ML) INTRAVENOUS AS NEEDED
Status: DISCONTINUED | OUTPATIENT
Start: 2025-06-25 | End: 2025-06-25 | Stop reason: HOSPADM

## 2025-06-25 RX ORDER — MAGNESIUM SULFATE HEPTAHYDRATE 40 MG/ML
2 INJECTION, SOLUTION INTRAVENOUS ONCE
Status: CANCELLED | OUTPATIENT
Start: 2025-06-27 | End: 2025-06-27

## 2025-06-25 RX ORDER — MAGNESIUM SULFATE HEPTAHYDRATE 40 MG/ML
2 INJECTION, SOLUTION INTRAVENOUS ONCE
Status: COMPLETED | OUTPATIENT
Start: 2025-06-25 | End: 2025-06-25

## 2025-06-25 RX ADMIN — MAGNESIUM SULFATE 2 G: 2 INJECTION INTRAVENOUS at 14:29

## 2025-06-25 ASSESSMENT — PAIN SCALES - GENERAL: PAINLEVEL_OUTOF10: 0-NO PAIN

## 2025-06-25 NOTE — ADDENDUM NOTE
Encounter addended by: Juancho Gage RN on: 6/25/2025 1:33 PM   Actions taken: Contacts section saved, Flowsheet accepted

## 2025-06-27 ENCOUNTER — INFUSION (OUTPATIENT)
Dept: HEMATOLOGY/ONCOLOGY | Facility: CLINIC | Age: 75
End: 2025-06-27
Payer: MEDICARE

## 2025-06-27 VITALS
SYSTOLIC BLOOD PRESSURE: 114 MMHG | DIASTOLIC BLOOD PRESSURE: 74 MMHG | OXYGEN SATURATION: 100 % | HEIGHT: 72 IN | WEIGHT: 178.2 LBS | BODY MASS INDEX: 24.14 KG/M2 | RESPIRATION RATE: 16 BRPM | TEMPERATURE: 98.1 F | HEART RATE: 99 BPM

## 2025-06-27 DIAGNOSIS — C80.1 ADENOCARCINOMA (MULTI): ICD-10-CM

## 2025-06-27 DIAGNOSIS — E83.42 HYPOMAGNESEMIA: ICD-10-CM

## 2025-06-27 LAB — MAGNESIUM SERPL-MCNC: 1.16 MG/DL (ref 1.6–2.4)

## 2025-06-27 PROCEDURE — 96365 THER/PROPH/DIAG IV INF INIT: CPT | Mod: INF

## 2025-06-27 PROCEDURE — 83735 ASSAY OF MAGNESIUM: CPT | Performed by: NURSE PRACTITIONER

## 2025-06-27 PROCEDURE — 2500000004 HC RX 250 GENERAL PHARMACY W/ HCPCS (ALT 636 FOR OP/ED): Performed by: NURSE PRACTITIONER

## 2025-06-27 RX ORDER — MAGNESIUM SULFATE HEPTAHYDRATE 40 MG/ML
2 INJECTION, SOLUTION INTRAVENOUS ONCE
OUTPATIENT
Start: 2025-06-30 | End: 2025-06-30

## 2025-06-27 RX ORDER — HEPARIN 100 UNIT/ML
500 SYRINGE INTRAVENOUS AS NEEDED
Status: DISCONTINUED | OUTPATIENT
Start: 2025-06-27 | End: 2025-06-27 | Stop reason: HOSPADM

## 2025-06-27 RX ORDER — MAGNESIUM SULFATE HEPTAHYDRATE 40 MG/ML
4 INJECTION, SOLUTION INTRAVENOUS ONCE
OUTPATIENT
Start: 2025-06-30 | End: 2025-06-30

## 2025-06-27 RX ORDER — MAGNESIUM SULFATE HEPTAHYDRATE 40 MG/ML
2 INJECTION, SOLUTION INTRAVENOUS ONCE
Status: COMPLETED | OUTPATIENT
Start: 2025-06-27 | End: 2025-06-27

## 2025-06-27 RX ORDER — HEPARIN 100 UNIT/ML
500 SYRINGE INTRAVENOUS AS NEEDED
OUTPATIENT
Start: 2025-06-27

## 2025-06-27 RX ORDER — HEPARIN SODIUM,PORCINE/PF 10 UNIT/ML
50 SYRINGE (ML) INTRAVENOUS AS NEEDED
OUTPATIENT
Start: 2025-06-27

## 2025-06-27 RX ORDER — MAGNESIUM SULFATE HEPTAHYDRATE 40 MG/ML
2-4 INJECTION, SOLUTION INTRAVENOUS ONCE
OUTPATIENT
Start: 2025-06-30 | End: 2025-06-30

## 2025-06-27 RX ADMIN — MAGNESIUM SULFATE IN WATER 2 G: 40 INJECTION, SOLUTION INTRAVENOUS at 13:07

## 2025-06-27 ASSESSMENT — PAIN SCALES - GENERAL: PAINLEVEL_OUTOF10: 0-NO PAIN

## 2025-06-27 NOTE — PROGRESS NOTES
Patient identified by name &   Denies acute distress- none noted at this time.   Serum Magnesium resulted at - 2 grams magnesium replaced per order parameters. Patient returns  for port labs and replacement. Discharged home ambulatory, in stable condition.

## 2025-06-30 ENCOUNTER — APPOINTMENT (OUTPATIENT)
Dept: HEMATOLOGY/ONCOLOGY | Facility: CLINIC | Age: 75
End: 2025-06-30
Payer: MEDICARE

## 2025-07-02 ENCOUNTER — APPOINTMENT (OUTPATIENT)
Dept: CARDIOLOGY | Facility: HOSPITAL | Age: 75
DRG: 180 | End: 2025-07-02
Payer: MEDICARE

## 2025-07-02 ENCOUNTER — OFFICE VISIT (OUTPATIENT)
Dept: HEMATOLOGY/ONCOLOGY | Facility: CLINIC | Age: 75
End: 2025-07-02
Payer: MEDICARE

## 2025-07-02 ENCOUNTER — APPOINTMENT (OUTPATIENT)
Dept: RADIOLOGY | Facility: HOSPITAL | Age: 75
DRG: 180 | End: 2025-07-02
Payer: MEDICARE

## 2025-07-02 ENCOUNTER — HOSPITAL ENCOUNTER (INPATIENT)
Facility: HOSPITAL | Age: 75
End: 2025-07-02
Attending: EMERGENCY MEDICINE | Admitting: INTERNAL MEDICINE
Payer: MEDICARE

## 2025-07-02 ENCOUNTER — INFUSION (OUTPATIENT)
Dept: HEMATOLOGY/ONCOLOGY | Facility: CLINIC | Age: 75
End: 2025-07-02
Payer: MEDICARE

## 2025-07-02 VITALS
SYSTOLIC BLOOD PRESSURE: 128 MMHG | BODY MASS INDEX: 23.7 KG/M2 | DIASTOLIC BLOOD PRESSURE: 78 MMHG | RESPIRATION RATE: 18 BRPM | OXYGEN SATURATION: 97 % | WEIGHT: 174.8 LBS | TEMPERATURE: 98.8 F | HEART RATE: 117 BPM

## 2025-07-02 DIAGNOSIS — J90 PLEURAL EFFUSION: Primary | ICD-10-CM

## 2025-07-02 DIAGNOSIS — C80.1 ADENOCARCINOMA (MULTI): ICD-10-CM

## 2025-07-02 DIAGNOSIS — K92.1 HEMATOCHEZIA: ICD-10-CM

## 2025-07-02 DIAGNOSIS — K21.9 GASTROESOPHAGEAL REFLUX DISEASE WITHOUT ESOPHAGITIS: ICD-10-CM

## 2025-07-02 DIAGNOSIS — J86.9 EMPYEMA (MULTI): ICD-10-CM

## 2025-07-02 DIAGNOSIS — J91.0 MALIGNANT PLEURAL EFFUSION: ICD-10-CM

## 2025-07-02 DIAGNOSIS — C80.1 ADENOCARCINOMA (MULTI): Primary | ICD-10-CM

## 2025-07-02 DIAGNOSIS — R06.02 SOB (SHORTNESS OF BREATH): ICD-10-CM

## 2025-07-02 DIAGNOSIS — R05.9 COUGH, UNSPECIFIED TYPE: ICD-10-CM

## 2025-07-02 DIAGNOSIS — R07.9 CHEST PAIN, UNSPECIFIED TYPE: ICD-10-CM

## 2025-07-02 LAB
ALBUMIN SERPL BCP-MCNC: 2.5 G/DL (ref 3.4–5)
ALP SERPL-CCNC: 101 U/L (ref 33–136)
ALT SERPL W P-5'-P-CCNC: 31 U/L (ref 10–52)
ANION GAP SERPL CALC-SCNC: 15 MMOL/L (ref 10–20)
AST SERPL W P-5'-P-CCNC: 45 U/L (ref 9–39)
BASOPHILS # BLD AUTO: 0.02 X10*3/UL (ref 0–0.1)
BASOPHILS NFR BLD AUTO: 0.2 %
BILIRUB SERPL-MCNC: 0.6 MG/DL (ref 0–1.2)
BNP SERPL-MCNC: 25 PG/ML (ref 0–99)
BUN SERPL-MCNC: 8 MG/DL (ref 6–23)
CALCIUM SERPL-MCNC: 7.4 MG/DL (ref 8.6–10.3)
CARDIAC TROPONIN I PNL SERPL HS: 12 NG/L (ref 0–20)
CARDIAC TROPONIN I PNL SERPL HS: 618 NG/L (ref 0–20)
CARDIAC TROPONIN I PNL SERPL HS: 7 NG/L (ref 0–20)
CHLORIDE SERPL-SCNC: 103 MMOL/L (ref 98–107)
CO2 SERPL-SCNC: 21 MMOL/L (ref 21–32)
CREAT SERPL-MCNC: 0.83 MG/DL (ref 0.5–1.3)
EGFRCR SERPLBLD CKD-EPI 2021: >90 ML/MIN/1.73M*2
EOSINOPHIL # BLD AUTO: 0 X10*3/UL (ref 0–0.4)
EOSINOPHIL NFR BLD AUTO: 0 %
ERYTHROCYTE [DISTWIDTH] IN BLOOD BY AUTOMATED COUNT: 16.7 % (ref 11.5–14.5)
FERRITIN SERPL-MCNC: 1935 NG/ML (ref 20–300)
FLUAV RNA RESP QL NAA+PROBE: NOT DETECTED
FLUBV RNA RESP QL NAA+PROBE: NOT DETECTED
GLUCOSE BLD MANUAL STRIP-MCNC: 115 MG/DL (ref 74–99)
GLUCOSE SERPL-MCNC: 186 MG/DL (ref 74–99)
HCT VFR BLD AUTO: 25.1 % (ref 41–52)
HGB BLD-MCNC: 7.7 G/DL (ref 13.5–17.5)
IMM GRANULOCYTES # BLD AUTO: 0.04 X10*3/UL (ref 0–0.5)
IMM GRANULOCYTES NFR BLD AUTO: 0.5 % (ref 0–0.9)
IRON SATN MFR SERPL: ABNORMAL %
IRON SERPL-MCNC: <10 UG/DL (ref 35–150)
LYMPHOCYTES # BLD AUTO: 0.38 X10*3/UL (ref 0.8–3)
LYMPHOCYTES NFR BLD AUTO: 4.7 %
MAGNESIUM SERPL-MCNC: 1.31 MG/DL (ref 1.6–2.4)
MCH RBC QN AUTO: 28.3 PG (ref 26–34)
MCHC RBC AUTO-ENTMCNC: 30.7 G/DL (ref 32–36)
MCV RBC AUTO: 92 FL (ref 80–100)
MONOCYTES # BLD AUTO: 0.84 X10*3/UL (ref 0.05–0.8)
MONOCYTES NFR BLD AUTO: 10.4 %
NEUTROPHILS # BLD AUTO: 6.78 X10*3/UL (ref 1.6–5.5)
NEUTROPHILS NFR BLD AUTO: 84.2 %
NRBC BLD-RTO: 0 /100 WBCS (ref 0–0)
PLATELET # BLD AUTO: 342 X10*3/UL (ref 150–450)
POTASSIUM SERPL-SCNC: 3.7 MMOL/L (ref 3.5–5.3)
PROT SERPL-MCNC: 5.5 G/DL (ref 6.4–8.2)
RBC # BLD AUTO: 2.72 X10*6/UL (ref 4.5–5.9)
SARS-COV-2 RNA RESP QL NAA+PROBE: NOT DETECTED
SODIUM SERPL-SCNC: 135 MMOL/L (ref 136–145)
TIBC SERPL-MCNC: ABNORMAL UG/DL
UIBC SERPL-MCNC: 143 UG/DL (ref 110–370)
WBC # BLD AUTO: 8.1 X10*3/UL (ref 4.4–11.3)

## 2025-07-02 PROCEDURE — 84484 ASSAY OF TROPONIN QUANT: CPT | Performed by: EMERGENCY MEDICINE

## 2025-07-02 PROCEDURE — 71045 X-RAY EXAM CHEST 1 VIEW: CPT

## 2025-07-02 PROCEDURE — 93005 ELECTROCARDIOGRAM TRACING: CPT

## 2025-07-02 PROCEDURE — 82728 ASSAY OF FERRITIN: CPT | Performed by: NURSE PRACTITIONER

## 2025-07-02 PROCEDURE — 2500000004 HC RX 250 GENERAL PHARMACY W/ HCPCS (ALT 636 FOR OP/ED): Mod: JZ | Performed by: NURSE PRACTITIONER

## 2025-07-02 PROCEDURE — 2550000001 HC RX 255 CONTRASTS: Performed by: EMERGENCY MEDICINE

## 2025-07-02 PROCEDURE — 96374 THER/PROPH/DIAG INJ IV PUSH: CPT

## 2025-07-02 PROCEDURE — 83540 ASSAY OF IRON: CPT | Performed by: NURSE PRACTITIONER

## 2025-07-02 PROCEDURE — 99285 EMERGENCY DEPT VISIT HI MDM: CPT | Mod: 25 | Performed by: EMERGENCY MEDICINE

## 2025-07-02 PROCEDURE — 3052F HG A1C>EQUAL 8.0%<EQUAL 9.0%: CPT | Performed by: NURSE PRACTITIONER

## 2025-07-02 PROCEDURE — 2500000004 HC RX 250 GENERAL PHARMACY W/ HCPCS (ALT 636 FOR OP/ED): Mod: JZ | Performed by: EMERGENCY MEDICINE

## 2025-07-02 PROCEDURE — 1036F TOBACCO NON-USER: CPT | Performed by: NURSE PRACTITIONER

## 2025-07-02 PROCEDURE — 2500000001 HC RX 250 WO HCPCS SELF ADMINISTERED DRUGS (ALT 637 FOR MEDICARE OP): Performed by: EMERGENCY MEDICINE

## 2025-07-02 PROCEDURE — 87636 SARSCOV2 & INF A&B AMP PRB: CPT | Performed by: EMERGENCY MEDICINE

## 2025-07-02 PROCEDURE — 71275 CT ANGIOGRAPHY CHEST: CPT

## 2025-07-02 PROCEDURE — 99212 OFFICE O/P EST SF 10 MIN: CPT | Performed by: NURSE PRACTITIONER

## 2025-07-02 PROCEDURE — 85025 COMPLETE CBC W/AUTO DIFF WBC: CPT | Performed by: EMERGENCY MEDICINE

## 2025-07-02 PROCEDURE — 83735 ASSAY OF MAGNESIUM: CPT | Performed by: EMERGENCY MEDICINE

## 2025-07-02 PROCEDURE — 71045 X-RAY EXAM CHEST 1 VIEW: CPT | Performed by: RADIOLOGY

## 2025-07-02 PROCEDURE — 3061F NEG MICROALBUMINURIA REV: CPT | Performed by: NURSE PRACTITIONER

## 2025-07-02 PROCEDURE — 99223 1ST HOSP IP/OBS HIGH 75: CPT | Performed by: NURSE PRACTITIONER

## 2025-07-02 PROCEDURE — 83880 ASSAY OF NATRIURETIC PEPTIDE: CPT | Performed by: EMERGENCY MEDICINE

## 2025-07-02 PROCEDURE — 1200000002 HC GENERAL ROOM WITH TELEMETRY DAILY

## 2025-07-02 PROCEDURE — 84075 ASSAY ALKALINE PHOSPHATASE: CPT | Performed by: EMERGENCY MEDICINE

## 2025-07-02 PROCEDURE — 82947 ASSAY GLUCOSE BLOOD QUANT: CPT

## 2025-07-02 PROCEDURE — 3048F LDL-C <100 MG/DL: CPT | Performed by: NURSE PRACTITIONER

## 2025-07-02 RX ORDER — ACETAMINOPHEN 160 MG/5ML
650 SOLUTION ORAL EVERY 4 HOURS PRN
Status: ACTIVE | OUTPATIENT
Start: 2025-07-02

## 2025-07-02 RX ORDER — NAPROXEN SODIUM 220 MG/1
324 TABLET, FILM COATED ORAL ONCE
Status: COMPLETED | OUTPATIENT
Start: 2025-07-02 | End: 2025-07-02

## 2025-07-02 RX ORDER — MAGNESIUM SULFATE HEPTAHYDRATE 40 MG/ML
2 INJECTION, SOLUTION INTRAVENOUS ONCE
Status: DISCONTINUED | OUTPATIENT
Start: 2025-07-02 | End: 2025-07-03

## 2025-07-02 RX ORDER — MELOXICAM 7.5 MG/1
15 TABLET ORAL DAILY
Status: DISCONTINUED | OUTPATIENT
Start: 2025-07-03 | End: 2025-07-02

## 2025-07-02 RX ORDER — INSULIN LISPRO 100 [IU]/ML
0-5 INJECTION, SOLUTION INTRAVENOUS; SUBCUTANEOUS
Status: DISPENSED | OUTPATIENT
Start: 2025-07-03

## 2025-07-02 RX ORDER — POTASSIUM CHLORIDE 20 MEQ/1
40 TABLET, EXTENDED RELEASE ORAL DAILY
Status: DISPENSED | OUTPATIENT
Start: 2025-07-03

## 2025-07-02 RX ORDER — FOLIC ACID 1 MG/1
1000 TABLET ORAL DAILY
Status: DISPENSED | OUTPATIENT
Start: 2025-07-02

## 2025-07-02 RX ORDER — ACETAMINOPHEN 650 MG/1
650 SUPPOSITORY RECTAL EVERY 4 HOURS PRN
Status: ACTIVE | OUTPATIENT
Start: 2025-07-02

## 2025-07-02 RX ORDER — OMEPRAZOLE 20 MG/1
20 CAPSULE, DELAYED RELEASE ORAL DAILY
Status: ON HOLD | COMMUNITY

## 2025-07-02 RX ORDER — HYDROMORPHONE HYDROCHLORIDE 0.2 MG/ML
0.2 INJECTION INTRAMUSCULAR; INTRAVENOUS; SUBCUTANEOUS EVERY 4 HOURS PRN
Status: DISPENSED | OUTPATIENT
Start: 2025-07-02

## 2025-07-02 RX ORDER — ROSUVASTATIN CALCIUM 20 MG/1
40 TABLET, COATED ORAL DAILY
Status: DISPENSED | OUTPATIENT
Start: 2025-07-03

## 2025-07-02 RX ORDER — LISINOPRIL 20 MG/1
20 TABLET ORAL DAILY
Status: ON HOLD | COMMUNITY

## 2025-07-02 RX ORDER — LISINOPRIL 20 MG/1
20 TABLET ORAL DAILY
Status: DISPENSED | OUTPATIENT
Start: 2025-07-03

## 2025-07-02 RX ORDER — PANTOPRAZOLE SODIUM 40 MG/1
40 TABLET, DELAYED RELEASE ORAL
Status: DISPENSED | OUTPATIENT
Start: 2025-07-03

## 2025-07-02 RX ORDER — MELOXICAM 15 MG/1
15 TABLET ORAL DAILY
Status: ON HOLD | COMMUNITY

## 2025-07-02 RX ORDER — DEXTROSE 50 % IN WATER (D50W) INTRAVENOUS SYRINGE
25
Status: ACTIVE | OUTPATIENT
Start: 2025-07-02

## 2025-07-02 RX ORDER — ACETAMINOPHEN 325 MG/1
650 TABLET ORAL EVERY 4 HOURS PRN
Status: DISPENSED | OUTPATIENT
Start: 2025-07-02

## 2025-07-02 RX ORDER — DEXTROSE 50 % IN WATER (D50W) INTRAVENOUS SYRINGE
12.5
Status: ACTIVE | OUTPATIENT
Start: 2025-07-02

## 2025-07-02 RX ADMIN — ASPIRIN 324 MG: 81 TABLET, CHEWABLE ORAL at 13:48

## 2025-07-02 RX ADMIN — HYDROMORPHONE HYDROCHLORIDE 0.2 MG: 0.2 INJECTION, SOLUTION INTRAMUSCULAR; INTRAVENOUS; SUBCUTANEOUS at 19:13

## 2025-07-02 RX ADMIN — HYDROMORPHONE HYDROCHLORIDE 0.5 MG: 1 INJECTION, SOLUTION INTRAMUSCULAR; INTRAVENOUS; SUBCUTANEOUS at 11:04

## 2025-07-02 RX ADMIN — IOHEXOL 75 ML: 350 INJECTION, SOLUTION INTRAVENOUS at 14:29

## 2025-07-02 SDOH — SOCIAL STABILITY: SOCIAL INSECURITY: WITHIN THE LAST YEAR, HAVE YOU BEEN AFRAID OF YOUR PARTNER OR EX-PARTNER?: NO

## 2025-07-02 SDOH — ECONOMIC STABILITY: HOUSING INSECURITY: IN THE LAST 12 MONTHS, WAS THERE A TIME WHEN YOU WERE NOT ABLE TO PAY THE MORTGAGE OR RENT ON TIME?: NO

## 2025-07-02 SDOH — HEALTH STABILITY: PHYSICAL HEALTH: ON AVERAGE, HOW MANY DAYS PER WEEK DO YOU ENGAGE IN MODERATE TO STRENUOUS EXERCISE (LIKE A BRISK WALK)?: 0 DAYS

## 2025-07-02 SDOH — SOCIAL STABILITY: SOCIAL INSECURITY: WITHIN THE LAST YEAR, HAVE YOU BEEN HUMILIATED OR EMOTIONALLY ABUSED IN OTHER WAYS BY YOUR PARTNER OR EX-PARTNER?: NO

## 2025-07-02 SDOH — SOCIAL STABILITY: SOCIAL INSECURITY: HAVE YOU HAD THOUGHTS OF HARMING ANYONE ELSE?: NO

## 2025-07-02 SDOH — HEALTH STABILITY: PHYSICAL HEALTH: ON AVERAGE, HOW MANY MINUTES DO YOU ENGAGE IN EXERCISE AT THIS LEVEL?: 0 MIN

## 2025-07-02 SDOH — ECONOMIC STABILITY: INCOME INSECURITY: IN THE PAST 12 MONTHS HAS THE ELECTRIC, GAS, OIL, OR WATER COMPANY THREATENED TO SHUT OFF SERVICES IN YOUR HOME?: NO

## 2025-07-02 SDOH — SOCIAL STABILITY: SOCIAL INSECURITY: DO YOU FEEL UNSAFE GOING BACK TO THE PLACE WHERE YOU ARE LIVING?: NO

## 2025-07-02 SDOH — ECONOMIC STABILITY: FOOD INSECURITY: WITHIN THE PAST 12 MONTHS, YOU WORRIED THAT YOUR FOOD WOULD RUN OUT BEFORE YOU GOT THE MONEY TO BUY MORE.: NEVER TRUE

## 2025-07-02 SDOH — ECONOMIC STABILITY: FOOD INSECURITY: WITHIN THE PAST 12 MONTHS, THE FOOD YOU BOUGHT JUST DIDN'T LAST AND YOU DIDN'T HAVE MONEY TO GET MORE.: NEVER TRUE

## 2025-07-02 SDOH — SOCIAL STABILITY: SOCIAL INSECURITY: HAVE YOU HAD ANY THOUGHTS OF HARMING ANYONE ELSE?: NO

## 2025-07-02 SDOH — ECONOMIC STABILITY: FOOD INSECURITY: HOW HARD IS IT FOR YOU TO PAY FOR THE VERY BASICS LIKE FOOD, HOUSING, MEDICAL CARE, AND HEATING?: NOT HARD AT ALL

## 2025-07-02 SDOH — SOCIAL STABILITY: SOCIAL INSECURITY: ABUSE: ADULT

## 2025-07-02 SDOH — SOCIAL STABILITY: SOCIAL INSECURITY: HAS ANYONE EVER THREATENED TO HURT YOUR FAMILY OR YOUR PETS?: NO

## 2025-07-02 SDOH — ECONOMIC STABILITY: HOUSING INSECURITY: IN THE PAST 12 MONTHS, HOW MANY TIMES HAVE YOU MOVED WHERE YOU WERE LIVING?: 0

## 2025-07-02 SDOH — SOCIAL STABILITY: SOCIAL INSECURITY: WERE YOU ABLE TO COMPLETE ALL THE BEHAVIORAL HEALTH SCREENINGS?: YES

## 2025-07-02 SDOH — SOCIAL STABILITY: SOCIAL INSECURITY: DOES ANYONE TRY TO KEEP YOU FROM HAVING/CONTACTING OTHER FRIENDS OR DOING THINGS OUTSIDE YOUR HOME?: NO

## 2025-07-02 SDOH — SOCIAL STABILITY: SOCIAL INSECURITY: ARE YOU OR HAVE YOU BEEN THREATENED OR ABUSED PHYSICALLY, EMOTIONALLY, OR SEXUALLY BY ANYONE?: NO

## 2025-07-02 SDOH — ECONOMIC STABILITY: TRANSPORTATION INSECURITY: IN THE PAST 12 MONTHS, HAS LACK OF TRANSPORTATION KEPT YOU FROM MEDICAL APPOINTMENTS OR FROM GETTING MEDICATIONS?: NO

## 2025-07-02 SDOH — SOCIAL STABILITY: SOCIAL INSECURITY: ARE THERE ANY APPARENT SIGNS OF INJURIES/BEHAVIORS THAT COULD BE RELATED TO ABUSE/NEGLECT?: NO

## 2025-07-02 SDOH — SOCIAL STABILITY: SOCIAL INSECURITY: DO YOU FEEL ANYONE HAS EXPLOITED OR TAKEN ADVANTAGE OF YOU FINANCIALLY OR OF YOUR PERSONAL PROPERTY?: NO

## 2025-07-02 ASSESSMENT — COGNITIVE AND FUNCTIONAL STATUS - GENERAL
DAILY ACTIVITIY SCORE: 24
PATIENT BASELINE BEDBOUND: NO
MOBILITY SCORE: 24

## 2025-07-02 ASSESSMENT — PAIN SCALES - GENERAL
PAINLEVEL_OUTOF10: 8
PAINLEVEL_OUTOF10: 8
PAINLEVEL_OUTOF10: 0 - NO PAIN

## 2025-07-02 ASSESSMENT — ENCOUNTER SYMPTOMS
NEUROLOGICAL NEGATIVE: 1
COUGH: 1
PSYCHIATRIC NEGATIVE: 1
SHORTNESS OF BREATH: 1
MUSCULOSKELETAL NEGATIVE: 1
BLOOD IN STOOL: 1

## 2025-07-02 ASSESSMENT — PAIN DESCRIPTION - LOCATION
LOCATION: CHEST
LOCATION: CHEST

## 2025-07-02 ASSESSMENT — PAIN - FUNCTIONAL ASSESSMENT
PAIN_FUNCTIONAL_ASSESSMENT: 0-10

## 2025-07-02 ASSESSMENT — LIFESTYLE VARIABLES
AUDIT-C TOTAL SCORE: 0
AUDIT-C TOTAL SCORE: 0
HOW OFTEN DO YOU HAVE 6 OR MORE DRINKS ON ONE OCCASION: NEVER
HOW MANY STANDARD DRINKS CONTAINING ALCOHOL DO YOU HAVE ON A TYPICAL DAY: PATIENT DOES NOT DRINK
HOW OFTEN DO YOU HAVE A DRINK CONTAINING ALCOHOL: NEVER
SKIP TO QUESTIONS 9-10: 1

## 2025-07-02 ASSESSMENT — ACTIVITIES OF DAILY LIVING (ADL)
PATIENT'S MEMORY ADEQUATE TO SAFELY COMPLETE DAILY ACTIVITIES?: YES
BATHING: INDEPENDENT
DRESSING YOURSELF: INDEPENDENT
LACK_OF_TRANSPORTATION: NO
GROOMING: INDEPENDENT
FEEDING YOURSELF: INDEPENDENT
LACK_OF_TRANSPORTATION: NO
HEARING - RIGHT EAR: FUNCTIONAL
ADEQUATE_TO_COMPLETE_ADL: YES
JUDGMENT_ADEQUATE_SAFELY_COMPLETE_DAILY_ACTIVITIES: YES
HEARING - LEFT EAR: FUNCTIONAL
TOILETING: INDEPENDENT
WALKS IN HOME: INDEPENDENT

## 2025-07-02 ASSESSMENT — PAIN DESCRIPTION - ORIENTATION: ORIENTATION: RIGHT

## 2025-07-02 NOTE — ED TRIAGE NOTES
Pt presents to ED via EMS from cancer tx with c/o R sided cp and SOB. Pt states it has been going on for 1 week. Endorsing SOB with the cp. Denies any flu symptoms, no cardiac hx.

## 2025-07-02 NOTE — PROGRESS NOTES
[facility] - Medical Oncology Follow-Up Visit  Patient ID: Jayant Holland is a 74 y.o. male with NSCLC adenocarcinoma     Current therapy: cyanocobalamin (Vitamin B-12) injection 1,000 mcg, 1,000 mcg, intramuscular, Once, 1 of 6 cycles  Administration: 1,000 mcg (1/29/2025)    fosaprepitant (Emend) 150 mg in sodium chloride 0.9% 250 mL IV, 150 mg, intravenous, Once, 1 of 4 cycles  Administration: 150 mg (1/29/2025)    CARBOplatin (Paraplatin) 600 mg in sodium chloride 0.9% 170 mL IV, 600 mg, intravenous, Once, 1 of 4 cycles  Administration: 600 mg (1/29/2025)    amivantamab-vmjw (Rybrevant) 350 mg in sodium chloride 0.9% 250 mL IV, 350 mg, intravenous, Once, 1 of 18 cycles  Administration: 350 mg (1/29/2025), 1,400 mg (1/30/2025), 1,750 mg (2/4/2025), 1,750 mg (2/11/2025)  methylPREDNISolone sod succinate (SOLU-Medrol) 40 mg/mL injection 40 mg, 40 mg, intravenous, As needed, 1 of 18 cycles    palonosetron (Aloxi) injection 250 mcg, 250 mcg, intravenous, Once, 1 of 4 cycles  Administration: 250 mcg (1/29/2025)    PEMEtrexed disodium (Alimta) 1,100 mg in sodium chloride 0.9% 154 mL IV, 500 mg/m2 = 1,100 mg, intravenous, Once, 1 of 18 cycles  Dose modification: 400 mg/m2 (original dose 500 mg/m2, Cycle 2)  Administration: 1,100 mg (1/29/2025)       Chief Concern: follow-up and readiness to treat     Oncologic History:     DIAGNOSIS  NSCLC adenocarcinoma     STAGING  [id number]     CURRENT SITES OF DISEASE  RUL, R pleural effusion/pleural carcinomatosis, R hilar, mediastinal, retrocaval nodes      MOLECULAR GENOMICS  PD-L1 5%  EGFR [id number] ([id number])      PRIOR THERAPY      CURRENT THERAPY  Carboplatin/Pemetrexed/Amivantamab C1D1 1/29/25 -   Pemetrexed dose reduced 400mg/m2 C2 -   Maintenance pemetrexed/amivantamab delayed 1 week for neutropenia to start 4/25/25     CURRENT ONCOLOGICAL PROBLEMS  Unintentional wt loss - stabilized  Hypomagnesia         HISTORY  OF PRESENT ILLNESS  Mr. Holland is a 73 yo with PMH significant for DMII, HTN, and HLD who had a CXR done on 12/5/24 for persistent coughing after pneumonia about a month prior, which was concerning for moderate pleural effusion of the R lung. He went to the ER, where a CT chest w/contrast was done with spiculated RUL mass measuring 2.2 x 2.1 cm and large R pleural effusion. He was referred to diagnostic clinic and seen on 12/9/24 by Kirstin where he noted persistent cough for 6 weeks, constant dyspnea, wheezing, and unintentional 30 lb weight loss over the last year. He was referred to pulmonology and had thoracentesis on 12/10/24 with 980 ml removed, cytology with adenocarcinoma, PD-L1 5%, and NGS with EGFR exon 20 mutation. He had repeat thoracentesis on 12/17/24 with 2.4L removed. PET/CT 12/20/24 with FDV avidity of the RL nodule SUV max 9.2, multiple RLL avid nodules SUV max 4.1, FDG-avid pleural thickening on the right, multiple R hilar, mediastinal, subcarinal nodes, and moderate R pleural effusion. Mildly avid GGO within JANELLE SUV max 2.0. Brain MRI is rescheduled for 1/15/25.   Consented for carboplatin/pemetrexed/amivantamab started 1/29/25.  Treatment delayed d/t hospitalization. Treatment has been c/b hypomagnesemia. Pemetrexed dose-reduced to 400 mg/m2 with C2. Maintenance pemetrexed/amivantamab delayed 1 week for neutropenia.    1/21/25 - 1/23/25 - hospitalization 2/2 recurrent pleural effusion.        PAST MEDICAL HISTORY  DMII   HTN  HLD   Osteoarthritis (neck)  asthma     SOCIAL HISTORY  Lives at home with his wife. Retired from [employer], worked as a technician for 48 years, notes hazardous chemical exposures.  Tob: never  EtOH: occasionally  Illicits: none     FAMILY HISTORY  Mother - breast cancer    HPI   6/11/2025  Patient presents today for treatment readiness visit. He is alone at today's visit. His primary complaint is fatigue. He denies any fevers, chills or night sweats. No cough, chest pain or  shortness of breath. No further epitaxies. No nausea or vomiting. No constipation or diarrhea. Occasional rectal bleeding with bowel movements. Reports less frequent. No urinary symptoms. No rash. No neuropathy. Following with Dr. Bala Gray, nephrology Omeprazole has been discontinued in hopes to improve dietary magnesium absorption. Oral magnesium started.     Most recent colonoscopy March 2023, one rectal polyp.     PleurX draining 250 twice weekly.     7/2/2025  Present today for cycle #8. Pemetrexed + Amivantamab. He reports chest pain starting last week constant in nature has progressively worsened. Also, now with worsening SOB and cough.       Review of Systems:  A review of systems has been completed and are negative for complaints except what is stated in the HPI and/or past medical history      Meds (Current):  Medications Ordered Prior to Encounter[1]      Objective   Vital Signs  /78, , Temp 37.1    Wt Readings from Last 5 Encounters:   07/02/25 79.3 kg (174 lb 12.8 oz)   06/27/25 80.8 kg (178 lb 3.2 oz)   06/25/25 80.6 kg (177 lb 11.2 oz)   06/24/25 79.4 kg (175 lb)   06/23/25 79.4 kg (175 lb 1.6 oz)          Results:  Lab Results   Component Value Date    WBC 5.9 06/11/2025    HGB 9.3 (L) 06/11/2025    HCT 30.2 (L) 06/11/2025    MCV 94 06/11/2025     06/11/2025      Lab Results   Component Value Date    NEUTROABS 3.78 06/11/2025      Lab Results   Component Value Date    GLUCOSE 146 (H) 06/25/2025    CALCIUM 8.0 (L) 06/25/2025     06/25/2025    K 3.7 06/25/2025    CO2 25 06/25/2025     06/25/2025    BUN 7 06/25/2025    CREATININE 0.60 06/25/2025    MG 1.16 (L) 06/27/2025     Lab Results   Component Value Date    ALT 26 06/25/2025    AST 22 06/25/2025    ALKPHOS 89 06/25/2025    BILITOT 0.3 06/25/2025    BILIDIR 0.1 11/18/2019      Lab Results   Component Value Date    TSH 2.12 01/28/2025         Imaging:  I have personally reviewed the below imaging and concur with the  reported findings unless otherwise stated:    CT chest abdomen pelvis w IV contrast  Result Date: 4/19/2025  Impression: Adenocarcinoma of the lung restaging scan compared to CTA chest 03/20/2025 and PET-CT 12/20/2024: 1. Redemonstration of spiculated nodule in the apical segment right upper lobe with decreased attenuation compared to prior CTs dating back to 01/21/2025 consistent with treatment response. Remaining pulmonary nodules and mediastinal lymph nodes remain stable in size. No new measurable disease in the chest abdomen pelvis. 2. Similar pleural thickening of the right lower lobe pleura with small complex right pleural effusion consistent with known pleural metastasis and malignant effusion. 3. PleurX catheter terminates within the pleural space abutting the superior segment of right lower lobe unchanged compared to prior exam. 4. Fluid-filled esophagus which can be seen in reflux esophagitis. 5. Additional incidental non-acute findings as detailed above.     I personally reviewed the images/study and I agree with the findings as stated by Dr. Zach Rocha. This study was interpreted at [place], [place].   MACRO: None.   Signed by: Albert Garcia 4/19/2025 12:06 PM Dictation workstation:   [id number]        Assessment/Plan      Jayant Holland is a 74 y.o. male here for follow up of NSCLC adenocarcinoma    # NSCLC adenocarcinoma  - [id number] (pleural effusion)  - PD-L1 5%, EGFR [id number] ([id number])   - discussed that given EGFR exon 20 mutation, recommend combination chemotherapy+amivantamab, consented  - started C1D1 1/29/25, pemetrexed dose-reduced with C2 to 400 mg/m2  - C1D1 dexamethasone Rx started - for 3 doses.  Discussed potential infusion reaction.  Pt verbalized understanding.    - Per pt request, C2 infusion and visit transferred to [facility].  Visits with FORTINO Grace.  Scan review visits with Dr. Solitario at [facility] [place].   - Per  pt request, order placed for mediport placement. Mediport Placed 3/17/2025  - personally reviewed restaging scans after 4 cycles of carbo/pem/amivantamab with some response. Discussed continuing on maintenance pemetrexed/amivantamab. Neutropenic today and will delay to 4/25/25  - will continue to monitor, repeat scans in 3 months  - will obtain brain MRI to monitor brain metastases     # Malignant pleural effusion  - s/p thoracentesis and contacted by IP while in visit for repeat thora.   - Right pleurx catheter placed 12/17/24 with HH referral in place.  Drained 2x/wk.  - continue to monitor    # Hypomagnesia, ongoing with current Amivantamab treatment  - Mg replacement plan created, next IV mag replacement dates include: 3/14, 3/17, 3/20, 3/24, 3/27, 3/31, 4/4, 4/7, 4/9, 4/11, 4/14 at [place].  Replacement dates ongoing. Treatment parameters:    Serum magnesium 1 - 1.6 mg/dL - administer 2 grams over 1 hour  Serum magnesium less than 1 mg/dL - administer 4 grams over 2 hours   - Following with Dr. Bala Gray,nephrology Omeprazole has been discontinued in hopes to improve dietary magnesium absorption. Oral magnesium started.     # Macular rash to face, bridge of nose and checks  - Continue routine Doxycycline and treatment rash protocols.      # Unintentional wt loss  - Recommended pt add Ensure/Boost supplements and snacks throughout the day. Will try increased nutritional intake vs pharm intervention for now.    - Dietician referral placed.   - 2/11:  wt loss stabilized, pt started daily Boost supplements    # Leukocytosis 12.6 3/12/25  - no clinical s/sx's infection  - Continue to monitor     # Advanced care planning  - discussed incurable but treatable nature of metastatic disease, and goal of therapy is to prolong life while maintaining or improving quality of life.  - 1/30/25 - Supportive onc referral placed.  Pt declined need for visit at this time.  Supportive onc will follow-up in 2-3 weeks.      # GI  -  Recent rectal bleeding. No external hemorrhoids. Refer to GI for evaluation Dr. Warner Santizo     # Chest Pain  7/2/2025 Chest pain starting last week constant in nature has progressively worsened. Also, now with worsening SOB and cough. Patient sent to [facility] via EMS    Tito Matos, APRN-CNP  [facility]  Thoracic + H&N Medical Oncology     I spent a total of 15+ minutes on the date of the service which included preparing to see the patient, face-to-face patient care, completing clinical documentation, obtaining and / or reviewing separately obtained history, counseling and educating the patient/family/caregiver, ordering medications, tests, or procedures, communicating with other healthcare providers (not separately reported), independently interpreting results, not separately reported, and communicating results to the patient/family/caregiver, Name and date of birth verified.          [1]   Current Outpatient Medications on File Prior to Visit   Medication Sig Dispense Refill    apixaban (Eliquis) 5 mg tablet Take 1 tablet (5 mg) by mouth 2 times a day. 180 tablet 1    Blood glucose monitoring meter kit kit 1 each if needed.      blood-glucose meter misc 1 Device once daily. 1 each 0    clindamycin (Cleocin T) 1 % lotion Apply topically to affected area twice daily. Do not fill before January 29, 2025. 60 mL 3    folic acid (Folvite) 1 mg tablet Take 1 tablet (1,000 mcg) by mouth once daily. Do not fill before January 29, 2025. 30 tablet 11    hydrocortisone 1 % cream Apply topically to face, hands, feet, neck, back, and chest once daily at bedtime for rash prevention. Do not fill before January 29, 2025. 453.6 g 3    lancets 30 gauge misc 1 Device 3 times a day. 200 each 3    metFORMIN  mg 24 hr tablet TAKE 2 TABLETS (1,000 MG) BY MOUTH 2 TIMES A DAY. DO NOT CRUSH, CHEW, OR SPLIT. 360 tablet 3    ondansetron (Zofran) 8 mg tablet Take 1 tablet (8 mg) by mouth every 8 hours if needed for  nausea or vomiting. Do not fill before January 29, 2025. 30 tablet 5    potassium chloride CR 20 mEq ER tablet Take 2 tablets (40 mEq) by mouth once daily. Do not crush, chew, or split. 30 tablet 3    prochlorperazine (Compazine) 10 mg tablet Take 1 tablet (10 mg) by mouth every 6 hours if needed for nausea or vomiting. Do not fill before January 29, 2025. 30 tablet 5    rosuvastatin (Crestor) 40 mg tablet TAKE 1 TABLET BY MOUTH EVERY DAY 90 tablet 3    sennosides (Senokot) 8.6 mg tablet Take 2 tablets (17.2 mg) by mouth as needed at bedtime for constipation. Do not fill before January 29, 2025. 30 tablet 11     No current facility-administered medications on file prior to visit.

## 2025-07-02 NOTE — H&P
History Of Present Illness  Jayant Holland is a 74 y.o. male with a past medical history of non-small cell lung cancer with mets with Pleurx catheter inserted on the right side due to recurrent malignant pleural effusion, hypertension, type 2 diabetes mellitus, asthma, GERD, low magnesium follows with outpatient nephrology admitted in March 2025 after syncopal episode due to hypotension incidentally found to have a pulmonary embolism and was started on Eliquis.  Patient is presenting from his oncology office with complaints of shortness of breath and right-sided chest pain he reports started over this past weekend.  His pain worsens with cough.  CT angio chest for PE was obtained and revealed no pulmonary embolism, Increase in right pleural effusion since the prior study with loculated appearance, particularly subpulmonic collection. Pleural catheter remains in place and is unchanged in position.  Right upper lobe mass is unchanged in size from the prior study.  Previously noted pleural nodules are obscured by the pleural effusion currently.  Patient WBC 8.1, hemoglobin 7.7, hematocrit 25.1, platelets 342.  Patient troponin was 7 with a repeat of 618 and then 12.  Patient is negative for flu and COVID.  Patient does admit to chills, he denies fever.  Patient reports off and on bright red blood in his stools.  He recently restarted taking his Eliquis 2 weeks ago, as it was held,  he is on sure of which provider told him to hold it, but he did resume.  Patient currently on room air.  Patient admitted for further workup.    Past Medical History  He has a past medical history of Arthritis (20+ years), Asthma (20+ years), Cervical disc disorder, Diabetes mellitus (Multi) (20+ years), Exposure to potentially hazardous substance (12/06/2024), GERD (gastroesophageal reflux disease) (20+ years), and Hypertension (20+ years).    Surgical History  He has a past surgical history that includes Knee surgery (04/28/2015); Colonoscopy  (04/28/2015); and Knee Arthroplasty (20 + years).     Social History  He reports that he has never smoked. He has never used smokeless tobacco. He reports current alcohol use of about 5.0 standard drinks of alcohol per week. He reports that he does not use drugs.    Family History  Family History[1]     Allergies  Patient has no known allergies.    Review of Systems   Respiratory:  Positive for cough and shortness of breath.    Cardiovascular:  Positive for chest pain.   Gastrointestinal:  Positive for blood in stool.   Genitourinary: Negative.    Musculoskeletal: Negative.    Neurological: Negative.    Psychiatric/Behavioral: Negative.          Physical Exam  HENT:      Head: Normocephalic.      Mouth/Throat:      Mouth: Mucous membranes are moist.   Cardiovascular:      Rate and Rhythm: Normal rate.   Pulmonary:      Effort: Pulmonary effort is normal.   Abdominal:      General: Bowel sounds are normal.      Palpations: Abdomen is soft.   Musculoskeletal:         General: Normal range of motion.   Skin:     General: Skin is warm.   Neurological:      Mental Status: He is alert and oriented to person, place, and time.   Psychiatric:         Mood and Affect: Mood normal.         Behavior: Behavior normal.          Last Recorded Vitals  /76   Pulse 82   Temp 36.9 °C (98.4 °F) (Oral)   Resp 16   Wt 78.9 kg (174 lb)   SpO2 96%     Relevant Results        Results for orders placed or performed during the hospital encounter of 07/02/25 (from the past 24 hours)   CBC and Auto Differential   Result Value Ref Range    WBC 8.1 4.4 - 11.3 x10*3/uL    nRBC 0.0 0.0 - 0.0 /100 WBCs    RBC 2.72 (L) 4.50 - 5.90 x10*6/uL    Hemoglobin 7.7 (L) 13.5 - 17.5 g/dL    Hematocrit 25.1 (L) 41.0 - 52.0 %    MCV 92 80 - 100 fL    MCH 28.3 26.0 - 34.0 pg    MCHC 30.7 (L) 32.0 - 36.0 g/dL    RDW 16.7 (H) 11.5 - 14.5 %    Platelets 342 150 - 450 x10*3/uL    Neutrophils % 84.2 40.0 - 80.0 %    Immature Granulocytes %, Automated 0.5  0.0 - 0.9 %    Lymphocytes % 4.7 13.0 - 44.0 %    Monocytes % 10.4 2.0 - 10.0 %    Eosinophils % 0.0 0.0 - 6.0 %    Basophils % 0.2 0.0 - 2.0 %    Neutrophils Absolute 6.78 (H) 1.60 - 5.50 x10*3/uL    Immature Granulocytes Absolute, Automated 0.04 0.00 - 0.50 x10*3/uL    Lymphocytes Absolute 0.38 (L) 0.80 - 3.00 x10*3/uL    Monocytes Absolute 0.84 (H) 0.05 - 0.80 x10*3/uL    Eosinophils Absolute 0.00 0.00 - 0.40 x10*3/uL    Basophils Absolute 0.02 0.00 - 0.10 x10*3/uL   Comprehensive metabolic panel   Result Value Ref Range    Glucose 186 (H) 74 - 99 mg/dL    Sodium 135 (L) 136 - 145 mmol/L    Potassium 3.7 3.5 - 5.3 mmol/L    Chloride 103 98 - 107 mmol/L    Bicarbonate 21 21 - 32 mmol/L    Anion Gap 15 10 - 20 mmol/L    Urea Nitrogen 8 6 - 23 mg/dL    Creatinine 0.83 0.50 - 1.30 mg/dL    eGFR >90 >60 mL/min/1.73m*2    Calcium 7.4 (L) 8.6 - 10.3 mg/dL    Albumin 2.5 (L) 3.4 - 5.0 g/dL    Alkaline Phosphatase 101 33 - 136 U/L    Total Protein 5.5 (L) 6.4 - 8.2 g/dL    AST 45 (H) 9 - 39 U/L    Bilirubin, Total 0.6 0.0 - 1.2 mg/dL    ALT 31 10 - 52 U/L   B-Type Natriuretic Peptide   Result Value Ref Range    BNP 25 0 - 99 pg/mL   Troponin I, High Sensitivity, Initial   Result Value Ref Range    Troponin I, High Sensitivity 7 0 - 20 ng/L   Sars-CoV-2 and Influenza A/B PCR   Result Value Ref Range    Flu A Result Not Detected Not Detected    Flu B Result Not Detected Not Detected    Coronavirus 2019, PCR Not Detected Not Detected   Troponin, High Sensitivity, 1 Hour   Result Value Ref Range    Troponin I, High Sensitivity 618 (HH) 0 - 20 ng/L   Troponin I, High Sensitivity   Result Value Ref Range    Troponin I, High Sensitivity 12 0 - 20 ng/L   Magnesium   Result Value Ref Range    Magnesium 1.31 (L) 1.60 - 2.40 mg/dL     *Note: Due to a large number of results and/or encounters for the requested time period, some results have not been displayed. A complete set of results can be found in Results Review.            Assessment/Plan   Assessment & Plan  Pleural effusion  Anemia  Chronic low magnesium    Jayant Holland is a 74 y.o. male with a past medical history of non-small cell lung cancer with mets with Pleurx catheter inserted on the right side due to recurrent malignant pleural effusion, hypertension, type 2 diabetes mellitus, asthma, GERD, low magnesium follows with outpatient nephrology admitted in March 2025 after syncopal episode due to hypotension incidentally found to have a pulmonary embolism and was started on Eliquis.  Patient is presenting from his oncology office with complaints of shortness of breath and right-sided chest pain he reports started over this past weekend.  His pain worsens with cough.  CT angio chest for PE was obtained and revealed no pulmonary embolism, Increase in right pleural effusion since the prior study with loculated appearance, particularly subpulmonic collection. Pleural catheter remains in place and is unchanged in position.  Right upper lobe mass is unchanged in size from the prior study.  Previously noted pleural nodules are obscured by the pleural effusion currently.  Patient WBC 8.1, hemoglobin 7.7, hematocrit 25.1, platelets 342.  Patient troponin was 7 with a repeat of 618 and then 12.  Patient is negative for flu and COVID.  Patient does admit to chills, he denies fever.  Patient reports off and on bright red blood in his stools.  He recently restarted taking his Eliquis 2 weeks ago, as it was held,  he is on sure of which provider told him to hold it, but he did resume.  Patient currently on room air.  Patient admitted for further workup.  Plan:  Consult thoracic surgery given loculated effusion with Pleurx catheter in place  Hold Eliquis for now, GI consult, check stool occult  IV mag 2 g, repeat mag in a.m.  Type and screen   Hold anticoagulation   Check iron TIBC, ferritin, B12, folate  Consider hematology consult  No fever or leukocytosis, did report chills, will consult ID  to evaluate for antibiotic need  tele    non-small cell lung cancer with mets with Pleurx catheter inserted on the right side due to recurrent malignant pleural effusion,   Hypertension-lisinopril  type 2 diabetes mellitus-hold metformin, lispro sliding scale  GERD- pantoprazole   admitted in March 2025 after syncopal episode due to hypotension incidentally found to have a pulmonary embolism      DVT prophylaxis-patient on Eliquis, hold for now given anemia       Nikko Telles, APRN-CNP         [1]   Family History  Problem Relation Name Age of Onset    Leukemia Mother Annette Holland     Memory loss Mother Annette Holland     Cancer Mother Annette Holland     Heart attack Father

## 2025-07-02 NOTE — ED PROVIDER NOTES
HPI   Chief Complaint   Patient presents with    Chest Pain       The patient is a 74-year-old male past medical history of lung cancer currently undergoing therapy presenting with right-sided chest pain.  Of note, patient with recent admission for pleural effusion status post Pleurx catheter placement patient notes approximately 1 week of right-sided chest pain, without radiation to the center of his chest or back.  Notes pain is worsened with deep inspiration.  Notes moderate dyspnea as well.  Denies any lower extremity edema or pain.  Denies any radiation of pain to his abdomen, denies any nausea or vomiting.              Patient History   Medical History[1]  Surgical History[2]  Family History[3]  Social History[4]    Physical Exam   ED Triage Vitals [07/02/25 1003]   Temperature Heart Rate Respirations BP   36.9 °C (98.4 °F) (!) 110 17 122/82      Pulse Ox Temp Source Heart Rate Source Patient Position   100 % Oral -- --      BP Location FiO2 (%)     -- --       Physical Exam  Vitals and nursing note reviewed.   Constitutional:       General: He is not in acute distress.     Appearance: Normal appearance. He is not ill-appearing or toxic-appearing.   HENT:      Head: Normocephalic and atraumatic.      Nose: No congestion or rhinorrhea.      Mouth/Throat:      Mouth: Mucous membranes are moist.      Pharynx: Oropharynx is clear. No oropharyngeal exudate or posterior oropharyngeal erythema.   Eyes:      Extraocular Movements: Extraocular movements intact.      Right eye: Normal extraocular motion.      Left eye: Normal extraocular motion.      Conjunctiva/sclera: Conjunctivae normal.      Pupils: Pupils are equal, round, and reactive to light.   Cardiovascular:      Rate and Rhythm: Normal rate and regular rhythm.      Pulses: Normal pulses.      Heart sounds: Normal heart sounds, S1 normal and S2 normal. No murmur heard.     No friction rub. No gallop.   Pulmonary:      Effort: Pulmonary effort is normal. No  respiratory distress.      Breath sounds: Normal breath sounds. No stridor. No wheezing, rhonchi or rales.      Comments: Diminished breath sounds over right lower lung fields.  Abdominal:      General: Abdomen is flat. Bowel sounds are normal. There is no distension.      Palpations: Abdomen is soft.      Tenderness: There is no abdominal tenderness. There is no right CVA tenderness, left CVA tenderness, guarding or rebound. Negative signs include Steve's sign and McBurney's sign.      Hernia: No hernia is present.   Musculoskeletal:      Cervical back: Full passive range of motion without pain.      Right lower leg: No edema.      Left lower leg: No edema.   Skin:     General: Skin is warm and dry.   Neurological:      General: No focal deficit present.      Mental Status: He is alert and oriented to person, place, and time.      GCS: GCS eye subscore is 4. GCS verbal subscore is 5. GCS motor subscore is 6.      Cranial Nerves: No cranial nerve deficit.      Sensory: No sensory deficit.      Motor: No weakness, tremor or abnormal muscle tone.   Psychiatric:         Mood and Affect: Mood normal.           ED Course & MDM   ED Course as of 07/04/25 2350   Wed Jul 02, 2025   1340 Patient's initial troponin within normal limits at 7, ECG nonischemic.  Second troponin elevated at 618.  Third troponin was ordered, will load with aspirin, time of my reassessment patient notes chest pain is improving after hydromorphone administration. [BR]   1405 Repeat ECG interpreted by me at 1:52 PM: Sinus rhythm, rate 94, QRS 82, QTc 447.  T wave inversions in lead III, no dynamic ST changes from prior ECG, no ST changes meeting STEMI criteria. [BR]      ED Course User Index  [BR] Nahun Martinez MD         Diagnoses as of 07/04/25 2350   Pleural effusion                 No data recorded     Stephon Coma Scale Score: 15 (07/02/25 1005 : Judith Garner RN)                           Medical Decision Making  ECG interpreted by me: Sinus  tachycardia, rate 103, cures 76, QTc 424.  No ST changes meeting STEMI criteria.    Patient presenting with right-sided pleuritic type chest pain  in the setting of known lung cancer with ongoing active therapy.  On examination patient is mildly tachycardic but appears to be in sinus rhythm on monitor, normotensive, saturating well on room air.  Does have some diminished lung sounds of the right lower lung fields but otherwise unremarkable throughout.  Consideration for pneumonia, pneumothorax, pulmonary embolism, also for ACS/AMI although seems less consistent as pain is not localized over the left/center of the chest and is largely pleuritic in nature.  Patient was assessed with labs, troponin, ECG, CT angiography of the chest to rule out PE.  Workup remarkable for enlarging right-sided pleural effusion, on reassessment patient continues to saturate well on room air, does note that Pleurx catheter has been draining less recently.  Concern for catheter dysfunction, fluid is loculated as well.  Given enlarging pleural effusion despite presence of Pleurx catheter will admit for further evaluation and management and potential thoracentesis.  Patient admitted in stable condition.        Procedure  Procedures       [1]   Past Medical History:  Diagnosis Date    Arthritis 20+ years    Asthma 20+ years    Cervical disc disorder     Diabetes mellitus (Multi) 20+ years    Exposure to potentially hazardous substance 12/06/2024 Apr 24, 2024 Entered By: LALA RIBERA Comment: Entered automatically through [facility] Problem List documentation program      GERD (gastroesophageal reflux disease) 20+ years    Hypertension 20+ years   [2]   Past Surgical History:  Procedure Laterality Date    COLONOSCOPY  04/28/2015    Complete Colonoscopy    KNEE ARTHROPLASTY  20 + years    KNEE SURGERY  04/28/2015    Knee Surgery   [3]   Family History  Problem Relation Name Age of Onset    Leukemia Mother Annette Holland     Memory loss  Mother Annette Holland     Cancer Mother Annette Holland     Heart attack Father     [4]   Social History  Tobacco Use    Smoking status: Never    Smokeless tobacco: Never   Substance Use Topics    Alcohol use: Yes     Alcohol/week: 5.0 standard drinks of alcohol     Types: 2 Glasses of wine, 3 Standard drinks or equivalent per week    Drug use: Never        Nahun Martinez MD  07/04/25 [id number]

## 2025-07-02 NOTE — PROGRESS NOTES
"Patient in clinic for treatment today, C/O right sided chest pain. Describes it as \"squeezing\". SOB with minimal movement. VSS with heart rate of 117. Pox 97% on RA. Tito RODRIGUEZ assessed patient. 911 called. Report given to Cherelle KERR.  "

## 2025-07-02 NOTE — PROGRESS NOTES
Pharmacy Medication History     Source of Information: Per patient     Additional concerns with the patient's PTA list.   N/a  Notified Provider via [facility] : No    The following updates were made to the Prior to Admission medication list:     Medications ADDED:   Lisinopril  Meloxicam  Omeprazole   Medications CHANGED:  N/a  Medications REMOVED:   N/a  Medications NOT TAKING:   Folic acid ( hasn't filled since January. Says supposed to be on but ran out )     Allergy reviewed : Yes    Meds 2 Beds : No    Outpatient pharmacy confirmed and updated in chart : Yes    Pharmacy name[facility] [place]     The list below reflectives the updated PTA list. Please review each medication in order reconciliation for additional clarification and justification.    Prior to Admission Medications   Prescriptions Last Dose   Blood glucose monitoring meter kit kit    Si each if needed.   apixaban (Eliquis) 5 mg tablet 2025   Sig: Take 1 tablet (5 mg) by mouth 2 times a day.   blood-glucose meter misc    Si Device once daily.   clindamycin (Cleocin T) 1 % lotion    Sig: Apply topically to affected area twice daily. Do not fill before 2025.   Patient taking differently: Apply 1 Application topically 2 times a day as needed. Apply topically to affected area twice daily.   folic acid (Folvite) 1 mg tablet    Sig: Take 1 tablet (1,000 mcg) by mouth once daily. Do not fill before 2025.   hydrocortisone 1 % cream    Sig: Apply topically to face, hands, feet, neck, back, and chest once daily at bedtime for rash prevention. Do not fill before 2025.   Patient taking differently: Apply 1 Application topically as needed at bedtime for rash or irritation. Apply topically to face, hands, feet, neck, back, and chest once daily at bedtime for rash prevention.   lancets 30 gauge misc    Si Device 3 times a day.   lisinopril 20 mg tablet 2025   Sig: Take 1 tablet (20 mg) by mouth once  daily.   meloxicam (Mobic) 15 mg tablet 7/2/2025   Sig: Take 1 tablet (15 mg) by mouth once daily.   metFORMIN  mg 24 hr tablet 7/2/2025 Morning   Sig: TAKE 2 TABLETS (1,000 MG) BY MOUTH 2 TIMES A DAY. DO NOT CRUSH, CHEW, OR SPLIT.   omeprazole (PriLOSEC) 20 mg DR capsule 7/2/2025   Sig: Take 1 capsule (20 mg) by mouth once daily. Do not crush or chew.   ondansetron (Zofran) 8 mg tablet    Sig: Take 1 tablet (8 mg) by mouth every 8 hours if needed for nausea or vomiting. Do not fill before January 29, 2025.   potassium chloride CR 20 mEq ER tablet 7/2/2025   Sig: Take 2 tablets (40 mEq) by mouth once daily. Do not crush, chew, or split.   prochlorperazine (Compazine) 10 mg tablet    Sig: Take 1 tablet (10 mg) by mouth every 6 hours if needed for nausea or vomiting. Do not fill before January 29, 2025.   rosuvastatin (Crestor) 40 mg tablet 7/2/2025   Sig: TAKE 1 TABLET BY MOUTH EVERY DAY   sennosides (Senokot) 8.6 mg tablet    Sig: Take 2 tablets (17.2 mg) by mouth as needed at bedtime for constipation. Do not fill before January 29, 2025.      Facility-Administered Medications: None       The list below reflectives the updated allergy list. Please review each documented allergy for additional clarification and justification.    No Known Allergies       07/02/25 at 5:02 PM - Elly Romero

## 2025-07-03 ENCOUNTER — APPOINTMENT (OUTPATIENT)
Dept: HEMATOLOGY/ONCOLOGY | Facility: CLINIC | Age: 75
End: 2025-07-03
Payer: MEDICARE

## 2025-07-03 LAB
ABO GROUP (TYPE) IN BLOOD: NORMAL
ABO GROUP (TYPE) IN BLOOD: NORMAL
ANION GAP SERPL CALC-SCNC: 11 MMOL/L (ref 10–20)
ANTIBODY SCREEN: NORMAL
ATRIAL RATE: 103 BPM
ATRIAL RATE: 94 BPM
BASOPHILS # BLD AUTO: 0.01 X10*3/UL (ref 0–0.1)
BASOPHILS NFR BLD AUTO: 0.1 %
BLOOD EXPIRATION DATE: NORMAL
BUN SERPL-MCNC: 9 MG/DL (ref 6–23)
CALCIUM SERPL-MCNC: 8.5 MG/DL (ref 8.6–10.3)
CHLORIDE SERPL-SCNC: 103 MMOL/L (ref 98–107)
CO2 SERPL-SCNC: 25 MMOL/L (ref 21–32)
CREAT SERPL-MCNC: 0.72 MG/DL (ref 0.5–1.3)
DISPENSE STATUS: NORMAL
EGFRCR SERPLBLD CKD-EPI 2021: >90 ML/MIN/1.73M*2
EOSINOPHIL # BLD AUTO: 0.04 X10*3/UL (ref 0–0.4)
EOSINOPHIL NFR BLD AUTO: 0.5 %
ERYTHROCYTE [DISTWIDTH] IN BLOOD BY AUTOMATED COUNT: 16.9 % (ref 11.5–14.5)
FOLATE SERPL-MCNC: 7.2 NG/ML
GLUCOSE BLD MANUAL STRIP-MCNC: 113 MG/DL (ref 74–99)
GLUCOSE BLD MANUAL STRIP-MCNC: 117 MG/DL (ref 74–99)
GLUCOSE BLD MANUAL STRIP-MCNC: 119 MG/DL (ref 74–99)
GLUCOSE BLD MANUAL STRIP-MCNC: 165 MG/DL (ref 74–99)
GLUCOSE SERPL-MCNC: 108 MG/DL (ref 74–99)
HCT VFR BLD AUTO: 23.1 % (ref 41–52)
HEMOCCULT SP1 STL QL: POSITIVE
HGB BLD-MCNC: 6.9 G/DL (ref 13.5–17.5)
IMM GRANULOCYTES # BLD AUTO: 0.05 X10*3/UL (ref 0–0.5)
IMM GRANULOCYTES NFR BLD AUTO: 0.6 % (ref 0–0.9)
LYMPHOCYTES # BLD AUTO: 0.76 X10*3/UL (ref 0.8–3)
LYMPHOCYTES NFR BLD AUTO: 9.1 %
MAGNESIUM SERPL-MCNC: 1.35 MG/DL (ref 1.6–2.4)
MCH RBC QN AUTO: 27.1 PG (ref 26–34)
MCHC RBC AUTO-ENTMCNC: 29.9 G/DL (ref 32–36)
MCV RBC AUTO: 91 FL (ref 80–100)
MONOCYTES # BLD AUTO: 1.03 X10*3/UL (ref 0.05–0.8)
MONOCYTES NFR BLD AUTO: 12.3 %
NEUTROPHILS # BLD AUTO: 6.47 X10*3/UL (ref 1.6–5.5)
NEUTROPHILS NFR BLD AUTO: 77.4 %
NRBC BLD-RTO: 0 /100 WBCS (ref 0–0)
P AXIS: -26 DEGREES
P AXIS: -28 DEGREES
P OFFSET: 186 MS
P OFFSET: 192 MS
P ONSET: 124 MS
P ONSET: 131 MS
PLATELET # BLD AUTO: 335 X10*3/UL (ref 150–450)
POTASSIUM SERPL-SCNC: 3.6 MMOL/L (ref 3.5–5.3)
PR INTERVAL: 174 MS
PR INTERVAL: 186 MS
PRODUCT BLOOD TYPE: NORMAL
PRODUCT CODE: NORMAL
Q ONSET: 217 MS
Q ONSET: 218 MS
QRS COUNT: 16 BEATS
QRS COUNT: 17 BEATS
QRS DURATION: 76 MS
QRS DURATION: 82 MS
QT INTERVAL: 324 MS
QT INTERVAL: 358 MS
QTC CALCULATION(BAZETT): 424 MS
QTC CALCULATION(BAZETT): 447 MS
QTC FREDERICIA: 388 MS
QTC FREDERICIA: 415 MS
R AXIS: 168 DEGREES
R AXIS: 42 DEGREES
RBC # BLD AUTO: 2.55 X10*6/UL (ref 4.5–5.9)
RH FACTOR (ANTIGEN D): NORMAL
RH FACTOR (ANTIGEN D): NORMAL
SODIUM SERPL-SCNC: 135 MMOL/L (ref 136–145)
T AXIS: -13 DEGREES
T AXIS: -22 DEGREES
T OFFSET: 380 MS
T OFFSET: 396 MS
UNIT ABO: NORMAL
UNIT NUMBER: NORMAL
UNIT RH: NORMAL
UNIT VOLUME: 350
VENTRICULAR RATE: 103 BPM
VENTRICULAR RATE: 94 BPM
VIT B12 SERPL-MCNC: 805 PG/ML (ref 211–911)
WBC # BLD AUTO: 8.4 X10*3/UL (ref 4.4–11.3)
XM INTEP: NORMAL

## 2025-07-03 PROCEDURE — 99233 SBSQ HOSP IP/OBS HIGH 50: CPT | Performed by: STUDENT IN AN ORGANIZED HEALTH CARE EDUCATION/TRAINING PROGRAM

## 2025-07-03 PROCEDURE — 80048 BASIC METABOLIC PNL TOTAL CA: CPT | Performed by: NURSE PRACTITIONER

## 2025-07-03 PROCEDURE — P9016 RBC LEUKOCYTES REDUCED: HCPCS

## 2025-07-03 PROCEDURE — 2500000001 HC RX 250 WO HCPCS SELF ADMINISTERED DRUGS (ALT 637 FOR MEDICARE OP): Performed by: NURSE PRACTITIONER

## 2025-07-03 PROCEDURE — 36430 TRANSFUSION BLD/BLD COMPNT: CPT

## 2025-07-03 PROCEDURE — 86850 RBC ANTIBODY SCREEN: CPT | Performed by: NURSE PRACTITIONER

## 2025-07-03 PROCEDURE — 2500000004 HC RX 250 GENERAL PHARMACY W/ HCPCS (ALT 636 FOR OP/ED)

## 2025-07-03 PROCEDURE — 87449 NOS EACH ORGANISM AG IA: CPT | Mod: AHULAB | Performed by: NURSE PRACTITIONER

## 2025-07-03 PROCEDURE — 2500000004 HC RX 250 GENERAL PHARMACY W/ HCPCS (ALT 636 FOR OP/ED): Mod: JZ | Performed by: NURSE PRACTITIONER

## 2025-07-03 PROCEDURE — 2060000001 HC INTERMEDIATE ICU ROOM DAILY

## 2025-07-03 PROCEDURE — 2500000002 HC RX 250 W HCPCS SELF ADMINISTERED DRUGS (ALT 637 FOR MEDICARE OP, ALT 636 FOR OP/ED): Performed by: NURSE PRACTITIONER

## 2025-07-03 PROCEDURE — 82607 VITAMIN B-12: CPT | Mod: AHULAB | Performed by: NURSE PRACTITIONER

## 2025-07-03 PROCEDURE — 82947 ASSAY GLUCOSE BLOOD QUANT: CPT

## 2025-07-03 PROCEDURE — 99221 1ST HOSP IP/OBS SF/LOW 40: CPT | Performed by: NURSE PRACTITIONER

## 2025-07-03 PROCEDURE — 83735 ASSAY OF MAGNESIUM: CPT | Performed by: NURSE PRACTITIONER

## 2025-07-03 PROCEDURE — 85025 COMPLETE CBC W/AUTO DIFF WBC: CPT | Performed by: NURSE PRACTITIONER

## 2025-07-03 PROCEDURE — 82746 ASSAY OF FOLIC ACID SERUM: CPT | Mod: AHULAB | Performed by: NURSE PRACTITIONER

## 2025-07-03 PROCEDURE — 82270 OCCULT BLOOD FECES: CPT | Performed by: NURSE PRACTITIONER

## 2025-07-03 PROCEDURE — 86923 COMPATIBILITY TEST ELECTRIC: CPT

## 2025-07-03 PROCEDURE — 99222 1ST HOSP IP/OBS MODERATE 55: CPT | Performed by: INTERNAL MEDICINE

## 2025-07-03 PROCEDURE — 87899 AGENT NOS ASSAY W/OPTIC: CPT | Mod: AHULAB | Performed by: NURSE PRACTITIONER

## 2025-07-03 PROCEDURE — 2500000001 HC RX 250 WO HCPCS SELF ADMINISTERED DRUGS (ALT 637 FOR MEDICARE OP): Performed by: INTERNAL MEDICINE

## 2025-07-03 RX ORDER — MAGNESIUM SULFATE HEPTAHYDRATE 40 MG/ML
2 INJECTION, SOLUTION INTRAVENOUS
Status: COMPLETED | OUTPATIENT
Start: 2025-07-03 | End: 2025-07-03

## 2025-07-03 RX ORDER — ZINC OXIDE 20 G/100G
1 OINTMENT TOPICAL 2 TIMES DAILY
Status: DISPENSED | OUTPATIENT
Start: 2025-07-03

## 2025-07-03 RX ORDER — MAGNESIUM SULFATE HEPTAHYDRATE 40 MG/ML
2 INJECTION, SOLUTION INTRAVENOUS
Status: DISCONTINUED | OUTPATIENT
Start: 2025-07-03 | End: 2025-07-03

## 2025-07-03 RX ADMIN — MAGNESIUM SULFATE HEPTAHYDRATE 2 G: 40 INJECTION, SOLUTION INTRAVENOUS at 19:22

## 2025-07-03 RX ADMIN — ROSUVASTATIN 40 MG: 20 TABLET, FILM COATED ORAL at 09:34

## 2025-07-03 RX ADMIN — MAGNESIUM SULFATE HEPTAHYDRATE 2 G: 40 INJECTION, SOLUTION INTRAVENOUS at 17:24

## 2025-07-03 RX ADMIN — ZINC OXIDE 1 APPLICATION: 200 OINTMENT TOPICAL at 21:33

## 2025-07-03 RX ADMIN — FOLIC ACID 1 MG: 1 TABLET ORAL at 09:33

## 2025-07-03 RX ADMIN — HYDROMORPHONE HYDROCHLORIDE 0.2 MG: 0.2 INJECTION, SOLUTION INTRAMUSCULAR; INTRAVENOUS; SUBCUTANEOUS at 21:33

## 2025-07-03 RX ADMIN — POTASSIUM CHLORIDE EXTENDED-RELEASE 40 MEQ: 1500 TABLET ORAL at 09:33

## 2025-07-03 RX ADMIN — PANTOPRAZOLE SODIUM 40 MG: 40 TABLET, DELAYED RELEASE ORAL at 06:51

## 2025-07-03 RX ADMIN — LISINOPRIL 20 MG: 20 TABLET ORAL at 09:33

## 2025-07-03 ASSESSMENT — COGNITIVE AND FUNCTIONAL STATUS - GENERAL
MOBILITY SCORE: 24
DAILY ACTIVITIY SCORE: 24

## 2025-07-03 ASSESSMENT — PAIN DESCRIPTION - DESCRIPTORS: DESCRIPTORS: ACHING;SHOOTING;THROBBING

## 2025-07-03 ASSESSMENT — PAIN SCALES - GENERAL
PAINLEVEL_OUTOF10: 3
PAINLEVEL_OUTOF10: 0 - NO PAIN
PAINLEVEL_OUTOF10: 8

## 2025-07-03 ASSESSMENT — PAIN - FUNCTIONAL ASSESSMENT
PAIN_FUNCTIONAL_ASSESSMENT: 0-10
PAIN_FUNCTIONAL_ASSESSMENT: 0-10

## 2025-07-03 ASSESSMENT — ACTIVITIES OF DAILY LIVING (ADL): LACK_OF_TRANSPORTATION: NO

## 2025-07-03 NOTE — PROGRESS NOTES
"Jayant Holland is a 74 y.o. male on day 1 of admission presenting with Pleural effusion.    Subjective   Pt denies CP, SOB. No overnight events.        Objective     Physical Exam  Vitals and nursing note reviewed.   Constitutional:       General: He is not in acute distress.     Appearance: Normal appearance. He is not ill-appearing or toxic-appearing.   HENT:      Head: Normocephalic and atraumatic.      Nose: Nose normal.      Mouth/Throat:      Mouth: Mucous membranes are moist.   Eyes:      Extraocular Movements: Extraocular movements intact.      Conjunctiva/sclera: Conjunctivae normal.      Pupils: Pupils are equal, round, and reactive to light.   Cardiovascular:      Rate and Rhythm: Normal rate and regular rhythm.      Heart sounds: No murmur heard.     No gallop.   Pulmonary:      Effort: Pulmonary effort is normal. No respiratory distress.      Breath sounds: Rhonchi present. No wheezing or rales.      Comments: + pleurx cath on right side  Abdominal:      General: Abdomen is flat. Bowel sounds are normal. There is no distension.      Palpations: Abdomen is soft. There is no mass.      Tenderness: There is no abdominal tenderness.   Musculoskeletal:         General: No swelling or tenderness. Normal range of motion.      Cervical back: Normal range of motion and neck supple.   Skin:     General: Skin is warm and dry.   Neurological:      General: No focal deficit present.      Mental Status: He is alert and oriented to person, place, and time. Mental status is at baseline.   Psychiatric:         Mood and Affect: Mood normal.         Behavior: Behavior normal.         Thought Content: Thought content normal.         Judgment: Judgment normal.         Last Recorded Vitals:  /74 (BP Location: Left arm, Patient Position: Lying)   Pulse 95   Temp 37.1 °C (98.8 °F) (Temporal)   Resp 20   Ht 1.854 m (6' 1\")   Wt 78.9 kg (174 lb)   SpO2 95%   BMI 22.96 kg/m²      Scheduled medications:  Scheduled " Medications[1]  Continuous medications:  Continuous Medications[2]  PRN medications:  PRN Medications[3]     Relevant Results:  Results for orders placed or performed during the hospital encounter of 07/02/25 (from the past 24 hours)   ECG 12 lead   Result Value Ref Range    Ventricular Rate 103 BPM    Atrial Rate 103 BPM    SC Interval 174 ms    QRS Duration 76 ms    QT Interval 324 ms    QTC Calculation(Bazett) 424 ms    P Axis -28 degrees    R Axis 168 degrees    T Axis -22 degrees    QRS Count 17 beats    Q Onset 218 ms    P Onset 131 ms    P Offset 192 ms    T Offset 380 ms    QTC Fredericia 388 ms   CBC and Auto Differential   Result Value Ref Range    WBC 8.1 4.4 - 11.3 x10*3/uL    nRBC 0.0 0.0 - 0.0 /100 WBCs    RBC 2.72 (L) 4.50 - 5.90 x10*6/uL    Hemoglobin 7.7 (L) 13.5 - 17.5 g/dL    Hematocrit 25.1 (L) 41.0 - 52.0 %    MCV 92 80 - 100 fL    MCH 28.3 26.0 - 34.0 pg    MCHC 30.7 (L) 32.0 - 36.0 g/dL    RDW 16.7 (H) 11.5 - 14.5 %    Platelets 342 150 - 450 x10*3/uL    Neutrophils % 84.2 40.0 - 80.0 %    Immature Granulocytes %, Automated 0.5 0.0 - 0.9 %    Lymphocytes % 4.7 13.0 - 44.0 %    Monocytes % 10.4 2.0 - 10.0 %    Eosinophils % 0.0 0.0 - 6.0 %    Basophils % 0.2 0.0 - 2.0 %    Neutrophils Absolute 6.78 (H) 1.60 - 5.50 x10*3/uL    Immature Granulocytes Absolute, Automated 0.04 0.00 - 0.50 x10*3/uL    Lymphocytes Absolute 0.38 (L) 0.80 - 3.00 x10*3/uL    Monocytes Absolute 0.84 (H) 0.05 - 0.80 x10*3/uL    Eosinophils Absolute 0.00 0.00 - 0.40 x10*3/uL    Basophils Absolute 0.02 0.00 - 0.10 x10*3/uL   Comprehensive metabolic panel   Result Value Ref Range    Glucose 186 (H) 74 - 99 mg/dL    Sodium 135 (L) 136 - 145 mmol/L    Potassium 3.7 3.5 - 5.3 mmol/L    Chloride 103 98 - 107 mmol/L    Bicarbonate 21 21 - 32 mmol/L    Anion Gap 15 10 - 20 mmol/L    Urea Nitrogen 8 6 - 23 mg/dL    Creatinine 0.83 0.50 - 1.30 mg/dL    eGFR >90 >60 mL/min/1.73m*2    Calcium 7.4 (L) 8.6 - 10.3 mg/dL    Albumin 2.5 (L)  3.4 - 5.0 g/dL    Alkaline Phosphatase 101 33 - 136 U/L    Total Protein 5.5 (L) 6.4 - 8.2 g/dL    AST 45 (H) 9 - 39 U/L    Bilirubin, Total 0.6 0.0 - 1.2 mg/dL    ALT 31 10 - 52 U/L   B-Type Natriuretic Peptide   Result Value Ref Range    BNP 25 0 - 99 pg/mL   Troponin I, High Sensitivity, Initial   Result Value Ref Range    Troponin I, High Sensitivity 7 0 - 20 ng/L   Sars-CoV-2 and Influenza A/B PCR   Result Value Ref Range    Flu A Result Not Detected Not Detected    Flu B Result Not Detected Not Detected    Coronavirus 2019, PCR Not Detected Not Detected   Troponin, High Sensitivity, 1 Hour   Result Value Ref Range    Troponin I, High Sensitivity 618 (HH) 0 - 20 ng/L   Troponin I, High Sensitivity   Result Value Ref Range    Troponin I, High Sensitivity 12 0 - 20 ng/L   Magnesium   Result Value Ref Range    Magnesium 1.31 (L) 1.60 - 2.40 mg/dL   Iron and TIBC   Result Value Ref Range    Iron <10 (L) 35 - 150 ug/dL    UIBC 143 110 - 370 ug/dL    TIBC      % Saturation     Ferritin   Result Value Ref Range    Ferritin 1,935 (H) 20 - 300 ng/mL   ECG 12 lead   Result Value Ref Range    Ventricular Rate 94 BPM    Atrial Rate 94 BPM    KY Interval 186 ms    QRS Duration 82 ms    QT Interval 358 ms    QTC Calculation(Bazett) 447 ms    P Axis -26 degrees    R Axis 42 degrees    T Axis -13 degrees    QRS Count 16 beats    Q Onset 217 ms    P Onset 124 ms    P Offset 186 ms    T Offset 396 ms    QTC Fredericia 415 ms   POCT GLUCOSE   Result Value Ref Range    POCT Glucose 115 (H) 74 - 99 mg/dL   Type And Screen   Result Value Ref Range    ABO TYPE O     Rh TYPE POS     ANTIBODY SCREEN NEG    CBC and Auto Differential   Result Value Ref Range    WBC 8.4 4.4 - 11.3 x10*3/uL    nRBC 0.0 0.0 - 0.0 /100 WBCs    RBC 2.55 (L) 4.50 - 5.90 x10*6/uL    Hemoglobin 6.9 (L) 13.5 - 17.5 g/dL    Hematocrit 23.1 (L) 41.0 - 52.0 %    MCV 91 80 - 100 fL    MCH 27.1 26.0 - 34.0 pg    MCHC 29.9 (L) 32.0 - 36.0 g/dL    RDW 16.9 (H) 11.5 -  14.5 %    Platelets 335 150 - 450 x10*3/uL    Neutrophils % 77.4 40.0 - 80.0 %    Immature Granulocytes %, Automated 0.6 0.0 - 0.9 %    Lymphocytes % 9.1 13.0 - 44.0 %    Monocytes % 12.3 2.0 - 10.0 %    Eosinophils % 0.5 0.0 - 6.0 %    Basophils % 0.1 0.0 - 2.0 %    Neutrophils Absolute 6.47 (H) 1.60 - 5.50 x10*3/uL    Immature Granulocytes Absolute, Automated 0.05 0.00 - 0.50 x10*3/uL    Lymphocytes Absolute 0.76 (L) 0.80 - 3.00 x10*3/uL    Monocytes Absolute 1.03 (H) 0.05 - 0.80 x10*3/uL    Eosinophils Absolute 0.04 0.00 - 0.40 x10*3/uL    Basophils Absolute 0.01 0.00 - 0.10 x10*3/uL   Basic metabolic panel   Result Value Ref Range    Glucose 108 (H) 74 - 99 mg/dL    Sodium 135 (L) 136 - 145 mmol/L    Potassium 3.6 3.5 - 5.3 mmol/L    Chloride 103 98 - 107 mmol/L    Bicarbonate 25 21 - 32 mmol/L    Anion Gap 11 10 - 20 mmol/L    Urea Nitrogen 9 6 - 23 mg/dL    Creatinine 0.72 0.50 - 1.30 mg/dL    eGFR >90 >60 mL/min/1.73m*2    Calcium 8.5 (L) 8.6 - 10.3 mg/dL   Magnesium   Result Value Ref Range    Magnesium 1.35 (L) 1.60 - 2.40 mg/dL   POCT GLUCOSE   Result Value Ref Range    POCT Glucose 113 (H) 74 - 99 mg/dL     *Note: Due to a large number of results and/or encounters for the requested time period, some results have not been displayed. A complete set of results can be found in Results Review.       Imaging  CT angio chest for pulmonary embolism  Result Date: 7/2/2025  1.  No pulmonary embolism 2. Increase in right pleural effusion since the prior study with loculated appearance, particularly subpulmonic collection. Pleural catheter remains in place and is unchanged in position. 3. Right upper lobe mass is unchanged in size from the prior study. Previously noted pleural nodules are obscured by the pleural effusion currently     MACRO: None   Signed by: Kimmy Alves 7/2/2025 3:05 PM Dictation workstation:   [id number]    XR chest 1 view  Result Date: 7/2/2025  1. History of lung carcinoma. 2. Small right  pleural effusion extending into the major and minor fissures. 3. Chronic moderate elevation right hemidiaphragm.   MACRO: None   Signed by: Glory Everett 7/2/2025 11:04 AM Dictation workstation:   [id number]      Cardiology, Vascular, and Other Imaging  ECG 12 lead  Result Date: 7/3/2025  Sinus rhythm with Premature atrial complexes Inferior infarct (cited on or before 02-JUL-2025) Abnormal ECG When compared with ECG of 02-JUL-2025 10:08, (unconfirmed) Premature atrial complexes are now Present    ECG 12 lead  Result Date: 7/3/2025  Sinus tachycardia Indeterminate axis Inferior infarct , age undetermined Abnormal ECG When compared with ECG of 20-MAR-2025 19:52, PREVIOUS ECG IS PRESENT                       Assessment/Plan   Assessment & Plan  Pleural effusion    Jayant Holland is a 74 y.o. male with a past medical history of non-small cell lung cancer with mets with Pleurx catheter, HTN, DM, asthma, PE on eliquis was admitted for SOB and right sided chest pain.     non-small cell lung cancer with mets with Pleurx catheter inserted on the right side due to recurrent malignant pleural effusion  Concern for infection  - thoracic surg following  - tpa per thoracic surg  - ID consult  - tylenol PRN    Concern for GI bleed  Hx GERD  - GI consult  - Hg 6.9   - transfuse 1U PRBC  - hold eliquis  - occult stool  - ppi    HypoMg  - replace    HTN  - lisinopril    PE  - hold eliquis    DM  - hold metformin  - ISS    Diet: diabetic  DVT ppx: SCD  Dispo: monitor clinically, transfuse 1U PRBC, tpa per thoracic           Sandra Goff MD  Hospitalist         [1] [Held by provider] apixaban, 5 mg, oral, BID  folic acid, 1,000 mcg, oral, Daily  insulin lispro, 0-5 Units, subcutaneous, TID AC  lisinopril, 20 mg, oral, Daily  magnesium sulfate, 2 g, intravenous, Once  pantoprazole, 40 mg, oral, Daily before breakfast  potassium chloride CR, 40 mEq, oral, Daily  rosuvastatin, 40 mg, oral, Daily  [2]    [3] PRN medications:  acetaminophen **OR** acetaminophen **OR** acetaminophen, dextrose, dextrose, glucagon, glucagon, HYDROmorphone

## 2025-07-03 NOTE — CONSULTS
INFECTIOUS DISEASE INPATIENT INITIAL CONSULTATION    Referred By: Nikko Telles    Reason For Consult:  Right-sided loculated pleural effusion    HPI:  This is a 74 y.o. male with PMH of NSCLC, recurrent malignant effusion s/p right pleurex catheter who presented with right side chest pain and shortness of breath.    Has loculated effusion in the right lung. Plans are for TPA/dornase through his tube to break this up and drain. No fever and WBC is normal. Not on abx.     Allergies:  Patient has no known allergies.     Vitals (Last 24 Hours):  Heart Rate:  [80-95]   Temp:  [36.4 °C (97.5 °F)-37.7 °C (99.9 °F)]   Resp:  [16-25]   BP: (102-125)/(57-83)   SpO2:  [93 %-99 %]      PHYSICAL EXAM:  Gen - NAD  Heart - RRR  Lungs - diminished RLL, pleurex present  Abd - soft, no ttp, BS present  Skin - no rash    MEDS:  Current Medications[1]     LABS:  Lab Results   Component Value Date    WBC 8.4 07/03/2025    HGB 6.9 (L) 07/03/2025    HCT 23.1 (L) 07/03/2025    MCV 91 07/03/2025     07/03/2025      Results from last 72 hours   Lab Units 07/03/25  0655   SODIUM mmol/L 135*   POTASSIUM mmol/L 3.6   CHLORIDE mmol/L 103   CO2 mmol/L 25   BUN mg/dL 9   CREATININE mg/dL 0.72   GLUCOSE mg/dL 108*   CALCIUM mg/dL 8.5*   ANION GAP mmol/L 11   EGFR mL/min/1.73m*2 >90     Results from last 72 hours   Lab Units 07/02/25  1101   ALK PHOS U/L 101   BILIRUBIN TOTAL mg/dL 0.6   PROTEIN TOTAL g/dL 5.5*   ALT U/L 31   AST U/L 45*   ALBUMIN g/dL 2.5*     Estimated Creatinine Clearance: 100.5 mL/min (by C-G formula based on SCr of 0.72 mg/dL).      IMAGING:  CT/PE 7/2  Impression:     1.  No pulmonary embolism  2. Increase in right pleural effusion since the prior study with  loculated appearance, particularly subpulmonic collection. Pleural  catheter remains in place and is unchanged in position.  3. Right upper lobe mass is unchanged in size from the prior study.  Previously noted pleural nodules are obscured by the pleural  effusion  currently       ASSESSMENT/PLAN:    NSCLC with Malignant Right Effusion - loculated now and concern for potential infection    Agree with drainage and TPA/dornase. Send fluid for cell/count/diff, LDH, protein, glucose, pH, cytology. If these labs seem consistent with infection could start on IV Unasyn 3g Q6H otherwise OK to hold abx.    Will follow peripherally over the holiday weekend. Please call me with questions. Thanks!    Harman Connelly MD  [facility]  Office #[phone]           [1]   Current Facility-Administered Medications:     acetaminophen (Tylenol) tablet 650 mg, 650 mg, oral, q4h PRN **OR** acetaminophen (Tylenol) oral liquid 650 mg, 650 mg, oral, q4h PRN **OR** acetaminophen (Tylenol) suppository 650 mg, 650 mg, rectal, q4h PRN, JENNIFER Waite    [Held by provider] apixaban (Eliquis) tablet 5 mg, 5 mg, oral, BID, FORTINO Waite-CNP    dextrose 50 % injection 12.5 g, 12.5 g, intravenous, q15 min PRN, FORTINO Waite-CNP    dextrose 50 % injection 25 g, 25 g, intravenous, q15 min PRN, FORTINO Waite-CNP    folic acid (Folvite) tablet 1 mg, 1,000 mcg, oral, Daily, FORTINO Waite-CNP, 1 mg at 07/03/25 0933    glucagon (Glucagen) injection 1 mg, 1 mg, intramuscular, q15 min PRN, FORTINO Waite-CNP    glucagon (Glucagen) injection 1 mg, 1 mg, intramuscular, q15 min PRN, FORTINO Waite-CNP    HYDROmorphone PF (Dilaudid) injection 0.2 mg, 0.2 mg, intravenous, q4h PRN, FORTINO Waite-CNP, 0.2 mg at 07/02/25 1913    insulin lispro injection 0-5 Units, 0-5 Units, subcutaneous, TID AC, JENNIFER Waite    lisinopril tablet 20 mg, 20 mg, oral, Daily, JENNIFER Waite, 20 mg at 07/03/25 0933    magnesium sulfate 2 g in sterile water for injection 50 mL, 2 g, intravenous, q2h, Sandra Goff MD    pantoprazole (ProtoNix) EC tablet 40 mg, 40 mg, oral, Daily before breakfast, JENNIFER Waite, 40 mg at 07/03/25 0651     potassium chloride CR (Klor-Con M20) ER tablet 40 mEq, 40 mEq, oral, Daily, JENNIFER Waite, 40 mEq at 07/03/25 0933    rosuvastatin (Crestor) tablet 40 mg, 40 mg, oral, Daily, FORTINO Waite-CNP, 40 mg at 07/03/25 0934

## 2025-07-03 NOTE — CONSULTS
Nutrition Consult Note  Nutrition Assessment      Reason for Assessment: Admission nursing screening    Jayant Holland is a 74 y.o. year old male patient with Pleural effusion [J90]    referred for MST-5 wt loss and poor appetite   .   Chart reviewed and pt visited.  Medical History[1]    Per chart review:  -Pt admitted c/o SOB and rt sided chest pain  -HO NSCLC with mets  -Active Ca treatment  -PlueurX drain    Per pt:  -No food allergies  -No difficulties chewing/swallowing  -Fair appetite, (but only 1 meal daily)  -50# wt loss in 6-7 months (not shown in wt hx but does have wt loss)  -Declines nutritional supplements    Scheduled medications  Scheduled Medications[2]  Continuous medications  Continuous Medications[3]  PRN medications  PRN Medications[4]    Nutrition Significant Labs:  BMP Trend:   Results from last 7 days   Lab Units 07/03/25  0655 07/02/25  1101   GLUCOSE mg/dL 108* 186*   CALCIUM mg/dL 8.5* 7.4*   SODIUM mmol/L 135* 135*   POTASSIUM mmol/L 3.6 3.7   CO2 mmol/L 25 21   CHLORIDE mmol/L 103 103   BUN mg/dL 9 8   CREATININE mg/dL 0.72 0.83    , BG POCT trend:   Results from last 7 days   Lab Units 07/03/25  1226 07/03/25  0759 07/02/25 2013   POCT GLUCOSE mg/dL 165* 113* 115*    , Liver Function Trend:   Results from last 7 days   Lab Units 07/02/25  1101   ALK PHOS U/L 101   AST U/L 45*   ALT U/L 31   BILIRUBIN TOTAL mg/dL 0.6    , Renal Lab Trend:   Results from last 7 days   Lab Units 07/03/25  0655 07/02/25  1356 07/02/25  1101   POTASSIUM mmol/L 3.6  --  3.7   SODIUM mmol/L 135*  --  135*   MAGNESIUM mg/dL 1.35*   < >  --    EGFR mL/min/1.73m*2 >90  --  >90   BUN mg/dL 9  --  8   CREATININE mg/dL 0.72  --  0.83    < > = values in this interval not displayed.    , TPN/PPN Labs:   Results from last 7 days   Lab Units 07/03/25  0655 07/02/25  1356 07/02/25  1101   GLUCOSE mg/dL 108*  --  186*   POTASSIUM mmol/L 3.6  --  3.7   MAGNESIUM mg/dL 1.35*   < >  --    SODIUM mmol/L 135*  --  135*  "  CHLORIDE mmol/L 103  --  103   ALT U/L  --   --  31   AST U/L  --   --  45*   ALK PHOS U/L  --   --  101   BILIRUBIN TOTAL mg/dL  --   --  0.6    < > = values in this interval not displayed.    , Lipid Panel:   Lab Results   Component Value Date    CHOL 171 01/28/2025    HDL 56.4 01/28/2025    CHHDL 3.0 01/28/2025    LDLF 96 01/24/2023    VLDL 31 01/28/2025    TRIG 156 (H) 01/28/2025    , Vit D:   Lab Results   Component Value Date    VITD25 32 01/28/2025    , Vit B12:   Lab Results   Component Value Date    NSQIKJTY06 805 07/03/2025    , Iron Panel:   Lab Results   Component Value Date    IRON <10 (L) 07/02/2025    TIBC  07/02/2025      Comment:      One or more of the analytes used in this calculation is outside of the analytical measurement range.      FERRITIN 1,935 (H) 07/02/2025    , Folate:   Lab Results   Component Value Date    FOLATE 7.2 07/03/2025      Dietary Orders (From admission, onward)       Start     Ordered    07/02/25 1847  May Participate in Room Service  ( ROOM SERVICE MAY PARTICIPATE)  Once        Question:  .  Answer:  Yes    07/02/25 1846 07/02/25 1818  Adult diet Cardiac; 70 gm fat; 2 - 3 grams Sodium  Diet effective now        Question Answer Comment   Diet type Cardiac    Fat restriction: 70 gm fat    Sodium restriction: 2 - 3 grams Sodium        07/02/25 1817                  History:  Energy Intake: Poor < 50 %  Food and Nutrient History: Per RN- po intake poor, 50% of breakfast this morning, 0% of lunch. Per pt- fair appetite but only takes in 1 meal daily    Anthropometrics:  Height: 185.4 cm (6' 0.99\")  Weight: 78.9 kg (173 lb 15.1 oz)  BMI (Calculated): 22.95    Weight Change: -0.03    Wt Readings from Last 90 Encounters:  07/03/25 78.9 kg (173 lb 15.1 oz)  07/02/25 79.3 kg (174 lb 12.8 oz)  06/27/25 80.8 kg (178 lb 3.2 oz)  06/25/25 80.6 kg (177 lb 11.2 oz)  06/24/25 79.4 kg (175 lb)  06/23/25 79.4 kg (175 lb 1.6 oz)  06/20/25 79.7 kg (175 lb 11.2 oz)  06/18/25 80.3 kg (177 " lb)  06/16/25 80.4 kg (177 lb 3.2 oz)  06/13/25 82.7 kg (182 lb 4.8 oz)  06/11/25 81.4 kg (179 lb 6.4 oz)  06/09/25 81.7 kg (180 lb 1.6 oz)  06/06/25 81.2 kg (179 lb 1.6 oz)  06/02/25 81.7 kg (180 lb 1.6 oz)  05/30/25 82.9 kg (182 lb 12.8 oz)  05/27/25 82.9 kg (182 lb 12.2 oz)  05/23/25 85.6 kg (188 lb 11.2 oz)  05/21/25 84.9 kg (187 lb 2.7 oz)  05/19/25 83.4 kg (183 lb 14.4 oz)  05/16/25 83.6 kg (184 lb 4.8 oz)  05/14/25 83.2 kg (183 lb 8 oz)  05/12/25 83 kg (182 lb 14.4 oz)  05/09/25 82.4 kg (181 lb 9.6 oz)  05/07/25 82.2 kg (181 lb 4.8 oz)  05/05/25 82.5 kg (181 lb 12.8 oz)  05/02/25 82.6 kg (182 lb)  04/30/25 82.5 kg (181 lb 12.8 oz)  04/28/25 82 kg (180 lb 12.8 oz)  04/25/25 82.6 kg (182 lb 3.2 oz)  04/22/25 81.3 kg (179 lb 4.8 oz)  04/21/25 82.9 kg (182 lb 12.2 oz)  04/18/25 82.3 kg (181 lb 8 oz)  04/16/25 82.7 kg (182 lb 6.4 oz)  04/16/25 82.7 kg (182 lb 6.4 oz)  04/14/25 83.1 kg (183 lb 3.2 oz)  04/11/25 84.2 kg (185 lb 11.2 oz)  04/09/25 85.5 kg (188 lb 7.9 oz)  04/07/25 86.3 kg (190 lb 4.8 oz)  04/04/25 88.5 kg (195 lb 1.6 oz)  04/02/25 86.9 kg (191 lb 9.3 oz)  04/01/25 84.8 kg (187 lb)  03/31/25 86.2 kg (190 lb 1.6 oz)  03/27/25 84.8 kg (187 lb)  03/24/25 83 kg (183 lb)  03/23/25 81.6 kg (179 lb 14.3 oz)  03/20/25 83 kg (182 lb 14.4 oz)  03/17/25 87.5 kg (193 lb)  03/17/25 84.6 kg (186 lb 9.6 oz)  03/14/25 87.7 kg (193 lb 6.4 oz)  03/12/25 85.2 kg (187 lb 13.3 oz)  03/09/25 85.5 kg (188 lb 7.9 oz)  03/06/25 84.8 kg (187 lb)  03/05/25 84.8 kg (187 lb)  03/03/25 83.6 kg (184 lb 6.4 oz)  02/27/25 82.7 kg (182 lb 6.4 oz)  02/24/25 85.3 kg (188 lb)  02/21/25 85.4 kg (188 lb 4.8 oz)  02/19/25 84.3 kg (185 lb 13.6 oz)  02/18/25 84.6 kg (186 lb 8 oz)  02/11/25 82.1 kg (181 lb)  02/04/25 82.3 kg (181 lb 7 oz)  01/30/25 86.2 kg (190 lb 0.6 oz)  01/29/25 84.9 kg (187 lb 2.7 oz)  01/28/25 84.1 kg (185 lb 4.8 oz)  01/21/25 86.2 kg (190 lb)  01/16/25 87.1 kg (192 lb)  01/10/25 87.1 kg (192 lb)  01/07/25 87.5 kg (193  "lb)  12/18/24 87.1 kg (192 lb)  12/17/24 88 kg (194 lb)  12/17/24 88.4 kg (194 lb 14.4 oz)  01/15/25 86.2 kg (190 lb)  12/10/24 90.7 kg (200 lb)  12/10/24 90.7 kg (200 lb)  12/09/24 90.7 kg (200 lb)  12/05/24 90.7 kg (200 lb)  11/06/24 91.1 kg (200 lb 12.8 oz)  10/23/24 91.9 kg (202 lb 9.6 oz)  10/21/24 94.3 kg (208 lb)  10/08/24 94.3 kg (208 lb)  08/26/24 94.8 kg (209 lb)  02/28/24 94.8 kg (209 lb)    Significant Weight Loss: Yes  Interpretation of Weight Loss: >7.5% in 3 months       IBW/kg (Dietitian Calculated): 83.6 kg  Percent of IBW: 93 %       Energy Needs:  Height: 185.4 cm (6' 0.99\")  Temp: 37.4 °C (99.3 °F)    Total Energy Estimated Needs in 24 hours (kCal): 2375 kCal  Energy Estimated Needs per kg Body Weight in 24 hours (kCal/kg): 2600 kCal/kg  Method for Estimating Needs: 30-33kcal/kg    Total Protein Estimated Needs in 24 Hours (g): 80 g  Protein Estimated Needs per kg Body Weight in 24 Hours (g/kg): 95 g/kg  Method for Estimating 24 Hour Protein Needs: 1.0-1.2g/kg    Method for Estimating 24 Hour Fluid Needs: 1mL/kcal or MD recommendations       Nutrition Focused Physical Findings:  Orbital Fat Pads: Mild-Moderate (slight dark circles and slight hollowing)  Buccal Fat Pads: Mild-Moderate (flat cheeks, minimal bounce)    Temporalis: Mild-Moderate (slight depression)  Pectoralis (Clavicular Region): Mild-Moderate (some protrusion of clavicle)  Interosseous: Mild-Moderate (slightly depressed area between thumb and forefinger)    Edema Location: non-pitting (LLE)       Skin: Negative       Nutrition Diagnosis   Malnutrition Diagnosis  Patient has Malnutrition Diagnosis: Yes  Diagnosis Status: New  Malnutrition Diagnosis: Moderate malnutrition related to chronic disease or condition  Related to: cancer  As Evidenced by: Greater than 7.5% weight loss in 3 months, poor intake less than 75% of estimated energy needs greater than 1 month, mild fat loss and moderate muscle loss.    Nutrition " Interventions/Recommendations   Nutrition Prescription: Nutrition prescription for oral nutrition  Individualized Nutrition Prescription Provided for : Continue diet as ordered. Should po intake decrease, may consider ONS on f/u. Should appetite remain suboptimal, consider appetite stimulant    Food and/or Nutrient Delivery Interventions  Meals and Snacks: General healthful diet, Fat-modified diet, Mineral-modified diet  Goal: Tolerate diet; >75% consumed         Collaboration and Referral of Nutrition Care: Collaboration by nutrition professional with other providers  Coordination of Care with Providers: Nursing    Education Documentation  N/A    Nutrition Monitoring and Evaluation   Food and Nutrient Related History  Estimated Energy Intake: Energy intake greater or equal to 75% of estimated energy needs, Energy intake 50 -75% of estimated energy needs    Fluid Intake: Estimated fluid intake    Intake / Amount of food: Meets > 75% estimated energy needs, Consumes at least 75% or more of meals/snacks/supplements, Consumes at least 50% or more of meals/snacks/supplements    Anthropometrics: Body Composition/Growth/Weight History  Body Weight: Body weight - Maintain stable weight    Biochemical Data, Medical Tests and Procedures  Electrolyte and Renal Panel: Other (Comment), Sodium, Magnesium  Criteria: As clinically indicated    Gastrointestinal Profile: Other (Comment), Aspartate aminotransferase (AST)  Criteria: As clinically indicated    Glucose/Endocrine Profile: Glucose within normal limits ( mg/dL)  Criteria: As clinically indicated    Nutritional Anemia Profile: Other (Comment)  Criteria: As clinically indicated    Vitamin Profile: Other (Comment)  Criteria: As clinically indicated    Nutrition Focused Physical Findings  Adipose Finding: Loss of subcutaneous fat    Bones Finding: Other (Comment)       Muscle Finding: Muscle atrophy    Skin Finding: Promote intact skin - Promote skin integrity    Edema  Finding:  (non-pitting)    Time Spent (min): 60 minutes  Last Date of Nutrition Visit: 07/03/25  Nutrition Follow-Up Needed?: Dietitian to reassess per policy  Follow up Comment: MUNDO Hoffman            [1]   Past Medical History:  Diagnosis Date    Arthritis 20+ years    Asthma 20+ years    Cervical disc disorder     Diabetes mellitus (Multi) 20+ years    Exposure to potentially hazardous substance 12/06/2024 Apr 24, 2024 Entered By: LALA RIBERA Comment: Entered automatically through [facility] Problem List documentation program      GERD (gastroesophageal reflux disease) 20+ years    Hypertension 20+ years   [2] [Held by provider] apixaban, 5 mg, oral, BID  folic acid, 1,000 mcg, oral, Daily  insulin lispro, 0-5 Units, subcutaneous, TID AC  lisinopril, 20 mg, oral, Daily  magnesium sulfate, 2 g, intravenous, q2h  pantoprazole, 40 mg, oral, Daily before breakfast  potassium chloride CR, 40 mEq, oral, Daily  rosuvastatin, 40 mg, oral, Daily  zinc oxide, 1 Application, Topical, BID     [3]    [4] PRN medications: acetaminophen **OR** acetaminophen **OR** acetaminophen, dextrose, dextrose, glucagon, glucagon, HYDROmorphone

## 2025-07-03 NOTE — CARE PLAN
Problem: Pain - Adult  Goal: Verbalizes/displays adequate comfort level or baseline comfort level  Outcome: Progressing     Problem: Safety - Adult  Goal: Free from fall injury  Outcome: Progressing     Problem: Discharge Planning  Goal: Discharge to home or other facility with appropriate resources  Outcome: Progressing     Problem: Chronic Conditions and Co-morbidities  Goal: Patient's chronic conditions and co-morbidity symptoms are monitored and maintained or improved  Outcome: Progressing     Problem: Nutrition  Goal: Nutrient intake appropriate for maintaining nutritional needs  Outcome: Progressing     Problem: Skin  Goal: Decreased wound size/increased tissue granulation at next dressing change  Outcome: Progressing  Goal: Participates in plan/prevention/treatment measures  Outcome: Progressing  Goal: Prevent/manage excess moisture  Outcome: Progressing  Flowsheets (Taken 7/3/2025 9480)  Prevent/manage excess moisture: Moisturize dry skin  Goal: Prevent/minimize sheer/friction injuries  Outcome: Progressing  Goal: Promote/optimize nutrition  Outcome: Progressing  Goal: Promote skin healing  Outcome: Progressing   The patient's goals for the shift include      The clinical goals for the shift include pt will remain HDS by end of day

## 2025-07-03 NOTE — CARE PLAN
The patient's goals for the shift include      The clinical goals for the shift include pt will have decresed shortness of breath    Over the shift, the patient did not make progress toward the following goals. Barriers to progression include . Recommendations to address these barriers include .

## 2025-07-03 NOTE — CONSULTS
"Reason For Consult  GI bleed, anemia    History Of Present Illness  Jayant Holland is a 74 y.o. male presenting with PMH of PE, DM2, HTN, GERD, HLD, Asthma, back pain, non-small cell carcinoma with METS, low mag. Consult for GI bleed and anemia. Upon arriving to the patients room he had just returned from using the bathroom and visualization of output noted no red blood noted with urine or stool, stool was liquid and brown. Patient states he sees blood \"on and off\" over the past few months in the toilet bowl and on toilet paper, does not see blood mixed within the stool. Denies history of hemorrhoids and fissures. Denies pain or burning in rectum, denies abdominal pain. Pt takes ibuprofen approximately 1x weekly. Restarted eliquis 2 weeks ago, last dose was 7/2/25 AM. Pt has not noticed any bleeding since restarting eliquis. Denies fever, chills, dysphagia, odynophagia, hematemesis, N/V, lightheadedness, and dizziness.     Last colonoscopy in 2023 - 1 polyp removed, hyperplastic   No EGD on file    Labs h&h 6.9 & 23.1, ferritin 1,935, iron <10, Mg 1.35    Past Medical H/istory  He has a past medical history of Arthritis (20+ years), Asthma (20+ years), Cervical disc disorder, Diabetes mellitus (Multi) (20+ years), Exposure to potentially hazardous substance (12/06/2024), GERD (gastroesophageal reflux disease) (20+ years), and Hypertension (20+ years).    Surgical History  He has a past surgical history that includes Knee surgery (04/28/2015); Colonoscopy (04/28/2015); and Knee Arthroplasty (20 + years).     Social History  He reports that he has never smoked. He has never used smokeless tobacco. He reports current alcohol use of about 5.0 standard drinks of alcohol per week. He reports that he does not use drugs.    Family History  Family History[1]     Allergies  Patient has no known allergies.    Review of Systems  Ten systems reviewed and negative other than HPI.      Physical Exam  Constitutional:       Appearance: " "Normal appearance.   Cardiovascular:      Rate and Rhythm: Normal rate and regular rhythm.      Heart sounds: Normal heart sounds.   Pulmonary:      Effort: Pulmonary effort is normal.      Breath sounds: Normal breath sounds.   Abdominal:      General: Bowel sounds are normal.      Palpations: Abdomen is soft.   Skin:     General: Skin is warm and dry.   Neurological:      General: No focal deficit present.      Mental Status: He is alert and oriented to person, place, and time.   Psychiatric:         Mood and Affect: Mood normal.         Behavior: Behavior normal.         Thought Content: Thought content normal.           Last Recorded Vitals  Blood pressure 121/74, pulse 95, temperature 37.1 °C (98.8 °F), temperature source Temporal, resp. rate 20, height 1.854 m (6' 1\"), weight 78.9 kg (174 lb), SpO2 95%.    Relevant Results  Scheduled medications  Scheduled Medications[2]  Continuous medications  Continuous Medications[3]  PRN medications  PRN Medications[4]     Results for orders placed or performed during the hospital encounter of 07/02/25 (from the past 24 hours)   Troponin I, High Sensitivity   Result Value Ref Range    Troponin I, High Sensitivity 12 0 - 20 ng/L   Magnesium   Result Value Ref Range    Magnesium 1.31 (L) 1.60 - 2.40 mg/dL   Iron and TIBC   Result Value Ref Range    Iron <10 (L) 35 - 150 ug/dL    UIBC 143 110 - 370 ug/dL    TIBC      % Saturation     Ferritin   Result Value Ref Range    Ferritin 1,935 (H) 20 - 300 ng/mL   ECG 12 lead   Result Value Ref Range    Ventricular Rate 94 BPM    Atrial Rate 94 BPM    IN Interval 186 ms    QRS Duration 82 ms    QT Interval 358 ms    QTC Calculation(Bazett) 447 ms    P Axis -26 degrees    R Axis 42 degrees    T Axis -13 degrees    QRS Count 16 beats    Q Onset 217 ms    P Onset 124 ms    P Offset 186 ms    T Offset 396 ms    QTC Fredericia 415 ms   POCT GLUCOSE   Result Value Ref Range    POCT Glucose 115 (H) 74 - 99 mg/dL   Type And Screen   Result " Value Ref Range    ABO TYPE O     Rh TYPE POS     ANTIBODY SCREEN NEG    CBC and Auto Differential   Result Value Ref Range    WBC 8.4 4.4 - 11.3 x10*3/uL    nRBC 0.0 0.0 - 0.0 /100 WBCs    RBC 2.55 (L) 4.50 - 5.90 x10*6/uL    Hemoglobin 6.9 (L) 13.5 - 17.5 g/dL    Hematocrit 23.1 (L) 41.0 - 52.0 %    MCV 91 80 - 100 fL    MCH 27.1 26.0 - 34.0 pg    MCHC 29.9 (L) 32.0 - 36.0 g/dL    RDW 16.9 (H) 11.5 - 14.5 %    Platelets 335 150 - 450 x10*3/uL    Neutrophils % 77.4 40.0 - 80.0 %    Immature Granulocytes %, Automated 0.6 0.0 - 0.9 %    Lymphocytes % 9.1 13.0 - 44.0 %    Monocytes % 12.3 2.0 - 10.0 %    Eosinophils % 0.5 0.0 - 6.0 %    Basophils % 0.1 0.0 - 2.0 %    Neutrophils Absolute 6.47 (H) 1.60 - 5.50 x10*3/uL    Immature Granulocytes Absolute, Automated 0.05 0.00 - 0.50 x10*3/uL    Lymphocytes Absolute 0.76 (L) 0.80 - 3.00 x10*3/uL    Monocytes Absolute 1.03 (H) 0.05 - 0.80 x10*3/uL    Eosinophils Absolute 0.04 0.00 - 0.40 x10*3/uL    Basophils Absolute 0.01 0.00 - 0.10 x10*3/uL   Basic metabolic panel   Result Value Ref Range    Glucose 108 (H) 74 - 99 mg/dL    Sodium 135 (L) 136 - 145 mmol/L    Potassium 3.6 3.5 - 5.3 mmol/L    Chloride 103 98 - 107 mmol/L    Bicarbonate 25 21 - 32 mmol/L    Anion Gap 11 10 - 20 mmol/L    Urea Nitrogen 9 6 - 23 mg/dL    Creatinine 0.72 0.50 - 1.30 mg/dL    eGFR >90 >60 mL/min/1.73m*2    Calcium 8.5 (L) 8.6 - 10.3 mg/dL   Magnesium   Result Value Ref Range    Magnesium 1.35 (L) 1.60 - 2.40 mg/dL   POCT GLUCOSE   Result Value Ref Range    POCT Glucose 113 (H) 74 - 99 mg/dL   Occult Blood, Stool    Specimen: Stool   Result Value Ref Range    Occult Blood, Stool X1 Positive (A) Negative   VERIFY ABO/Rh Group Test   Result Value Ref Range    ABO TYPE O     Rh TYPE POS    Prepare RBC: 1 Units   Result Value Ref Range    PRODUCT CODE D5242S67     Unit Number [id number]     Unit ABO O     Unit RH POS     XM INTEP COMP     Dispense Status IS     Blood Expiration Date 7/14/2025  11:59:00 PM EDT     PRODUCT BLOOD TYPE      UNIT VOLUME 350    POCT GLUCOSE   Result Value Ref Range    POCT Glucose 165 (H) 74 - 99 mg/dL     *Note: Due to a large number of results and/or encounters for the requested time period, some results have not been displayed. A complete set of results can be found in Results Review.      ECG 12 lead  Result Date: 7/3/2025  Sinus rhythm with Premature atrial complexes Inferior infarct (cited on or before 02-JUL-2025) Abnormal ECG When compared with ECG of 02-JUL-2025 10:08, (unconfirmed) Premature atrial complexes are now Present    ECG 12 lead  Result Date: 7/3/2025  Sinus tachycardia Indeterminate axis Inferior infarct , age undetermined Abnormal ECG When compared with ECG of 20-MAR-2025 19:52, PREVIOUS ECG IS PRESENT    CT angio chest for pulmonary embolism  Result Date: 7/2/2025  Interpreted By:  Kimmy Alves, STUDY: CT ANGIO CHEST FOR PULMONARY EMBOLISM;  7/2/2025 2:38 pm   INDICATION: Signs/Symptoms:right sided chest pain, hx lung ca, hx PE, prior right sided effusion.     COMPARISON: 04/18/2025   ACCESSION NUMBER(S): [id number]   ORDERING CLINICIAN: YOHAN MASTERSON   TECHNIQUE: Helical data acquisition of the chest was obtained contrast volume:without IV contrast material/Optiray 350/Isovue 370.  Images were reformatted in coronal and sagittal planes. Axial and coronal MIP images were created and reviewed.   FINDINGS: POTENTIAL LIMITATIONS OF THE STUDY: None   HEART AND VESSELS: No discrete filling defects within the main pulmonary artery or its branches.   Right internal jugular port catheter tip terminates in the right atrium. Superior vena cava is normal in size.   Main pulmonary artery and its branches are normal in caliber.   The thoracic aorta is of normal course and caliber with patchy vascular calcifications.   Mild coronary artery calcifications are seen.The study is not optimized for evaluation of coronary arteries.   The cardiac chambers are not enlarged.    No evidence of pericardial effusion.   MEDIASTINUM AND GELA, LOWER NECK AND AXILLA: The visualized thyroid gland is within normal limits.   Mediastinal and right hilar lymph nodes appear similar to the prior study. Largest short axis currently is about 1 cm. No interval enlarging lymph nodes are identified.   Esophagus appears within normal limits as seen.   LUNGS AND AIRWAYS: The trachea and central airways are patent. No endobronchial lesion.   Right pleural catheter terminates medial posterior right hemithorax at T5 level and appears unchanged in position. Pleural effusion is increased from the prior study and has contour suggesting loculation. The largest portion of the collection is subpulmonic. There is also fluid noted in the major and minor fissures. Right lower lobe is subtotally collapsed. Spiculated solid mass posterior right apex measures 2.4 x 1.8 cm, previously measuring 2.3 x 1.8 cm. Previously noted pleural-based nodules are obscured by the current pleural effusion. No effusion or acute infiltrate is noted on the left.   UPPER ABDOMEN: The visualized subdiaphragmatic structures demonstrate no remarkable findings.   CHEST WALL AND OSSEOUS STRUCTURES: There are no suspicious osseous lesions. Multilevel degenerative changes are present       1.  No pulmonary embolism 2. Increase in right pleural effusion since the prior study with loculated appearance, particularly subpulmonic collection. Pleural catheter remains in place and is unchanged in position. 3. Right upper lobe mass is unchanged in size from the prior study. Previously noted pleural nodules are obscured by the pleural effusion currently     MACRO: None   Signed by: Kimmy Alves 7/2/2025 3:05 PM Dictation workstation:   [id number]    XR chest 1 view  Result Date: 7/2/2025  Interpreted By:  Glory Everett, STUDY: XR CHEST 1 VIEW;  7/2/2025 10:53 am   INDICATION: Signs/Symptoms:Chest pain, right side, hx lung ca, prior right sided  effusion.   COMPARISON: 01/10/2025   ACCESSION NUMBER(S): [id number]   ORDERING CLINICIAN: YOHAN MASTERSON   FINDINGS: CARDIOMEDIASTINAL SILHOUETTE: Cardiomediastinal silhouette is normal in size and configuration. There is a right internal jugular port with the tip in the superior vena cava.   LUNGS: History of lung carcinoma. There is a small right pleural effusion extending into the minor and major fissures. There is moderate chronic elevation of the right hemidiaphragm. The left lung is clear.   ABDOMEN: No remarkable upper abdominal findings.     BONES: No acute osseous changes.       1. History of lung carcinoma. 2. Small right pleural effusion extending into the major and minor fissures. 3. Chronic moderate elevation right hemidiaphragm.   MACRO: None   Signed by: Glory Everett 7/2/2025 11:04 AM Dictation workstation:   [id number]    Chest Ultrasound  Result Date: 6/24/2025  Chest ultrasound [facility] Indication: Pleural effusion, right Ultrasound was used to examine the right hemithorax.  Findings:  small-moderate simple appearing pleural effusion on right with PleurX in place.  Impression: right chest ultrasound performed, as described above. Recommendations: Draining 50 ml twice a week--> given continued effusion on US imaging, will move to once a week drainage and follow up in 2-4 weeks with Procedure coordinators to determine next US evaluation/PleruX drain removal consideration.  Patient expressed understanding and agreement with plan.           Assessment/Plan   Jayant Holland is a 74 y.o. male presenting with PMH of PE, DM2, HTN, GERD, HLD, Asthma, back pain, non-small cell carcinoma with METS, low mag. Pt seen today for c/o red blood with stools. Intermittent blood seen in toilet and on toilet paper. No blood seen recently. Due to intermittent bleeding likely hemorrhoidal or fissure, unlikely colon cancer or diverticular bleed based off colonoscopy 2 years ago. Other  etiologies include colonic polyps.     - Will sign off, may contact GI if bleeding begins.  - Supportive measures per primary   - Apply zinc oxide BID to perirectal area        Mayra Sotomayor, FORTINO-CNP         [1]   Family History  Problem Relation Name Age of Onset    Leukemia Mother Annette Holland     Memory loss Mother Annette Holland     Cancer Mother Annette Holland     Heart attack Father     [2] [Held by provider] apixaban, 5 mg, oral, BID  folic acid, 1,000 mcg, oral, Daily  insulin lispro, 0-5 Units, subcutaneous, TID AC  lisinopril, 20 mg, oral, Daily  magnesium sulfate, 2 g, intravenous, q2h  pantoprazole, 40 mg, oral, Daily before breakfast  potassium chloride CR, 40 mEq, oral, Daily  rosuvastatin, 40 mg, oral, Daily  zinc oxide, 1 Application, Topical, BID  [3]    [4] PRN medications: acetaminophen **OR** acetaminophen **OR** acetaminophen, dextrose, dextrose, glucagon, glucagon, HYDROmorphone

## 2025-07-03 NOTE — SIGNIFICANT EVENT
74 y.o. male with a previous medical history of non-small cell lung cancer with mets with Pleurx catheter inserted on the right side due to recurrent malignant pleural effusion, PE ( on Apixaban)  hypertension, type 2 diabetes mellitus, asthma, GERD, low magnesium  presented to [facility] ED from his Oncology office due to c/o of SOB and right sided Chest Pain. He underwent a CT which showed a loculated right pleural effusion which had increased in size since prior imaging.  Thoracic surgery was consulted for further recommendations     #Loculated pleural effusion with prior pleurx placement  -Transfer to the SDU   -Plan for TPA/dornase   -Daily CXR   -Appreciate consult  -Will continue to follow  -Plan formulated with Dr. Ordaz and discussed with primary team    Karen Armenta, FORTINO-CNP, DNP

## 2025-07-03 NOTE — PROGRESS NOTES
07/03/25 0922   Discharge Planning   Living Arrangements Spouse/significant other   Support Systems Spouse/significant other   Type of Residence Private residence   Number of Stairs to Enter Residence 2   Number of Stairs Within Residence 14   Expected Discharge Disposition Home   Does the patient need discharge transport arranged? No   Financial Resource Strain   How hard is it for you to pay for the very basics like food, housing, medical care, and heating? Not hard   Housing Stability   In the last 12 months, was there a time when you were not able to pay the mortgage or rent on time? N   At any time in the past 12 months, were you homeless or living in a shelter (including now)? N   Transportation Needs   In the past 12 months, has lack of transportation kept you from medical appointments or from getting medications? no   In the past 12 months, has lack of transportation kept you from meetings, work, or from getting things needed for daily living? No   Intensity of Service   Intensity of Service 0-30 min     TCC met with pt at bedside. Explained my role as discharge planner. Pt lives with wife. Is on chemo cycle every 21 days. Independent with ADLs. Wife is independent with pleurx cath. Wife will drive pt  home. Pt has no home going needs at this time.

## 2025-07-03 NOTE — CONSULTS
Reason For Consult  Reccurrent Pleural Effusion (RIGHT),  CT finding Right Loculated Pleural effusions     History Of Present Illness  Jayant Holland is a 74 y.o. male with Pmhx  non-small cell lung cancer with mets with Pleurx catheter inserted on the right side due to recurrent malignant pleural effusion, PE ( on Apixaban)  hypertension, type 2 diabetes mellitus, asthma, GERD, low magnesium  presented to [facility] ED from his Oncology office due to c/o of SOB and right sided Chest Pain.     According to the patient patient started while he was at the office and had some SOB. He stated he was SOB free during interview and CP. He does admit with non productive cough, and occasional can have SOB. Denies Fever, chills N/V and abdominal Pain. He last pelurx Drain was last week and it was 25ml. Last Eliquis Dose was yesterday morning. He currently on room air.     CT showed increase size Right Sided Pleural effusion: Loculated.  Sub pulmonic collection. Pleurx cath unchanged.     Thoracic is consulted the CT findings  Right loculated pleural effusion, recurrent effusion s/p Right pleurx.      Past Medical History  He has a past medical history of Arthritis (20+ years), Asthma (20+ years), Cervical disc disorder, Diabetes mellitus (Multi) (20+ years), Exposure to potentially hazardous substance (12/06/2024), GERD (gastroesophageal reflux disease) (20+ years), and Hypertension (20+ years).    Surgical History  He has a past surgical history that includes Knee surgery (04/28/2015); Colonoscopy (04/28/2015); and Knee Arthroplasty (20 + years).     Social History  He reports that he has never smoked. He has never used smokeless tobacco. He reports current alcohol use of about 5.0 standard drinks of alcohol per week. He reports that he does not use drugs.    Family History  Family History[1]     Allergies  Patient has no known allergies.       Physical Exam  Physical Exam      Last Recorded Vitals  Blood pressure  "114/57, pulse 95, temperature 37.7 °C (99.9 °F), temperature source Tympanic, resp. rate 16, height 1.854 m (6' 1\"), weight 78.9 kg (174 lb), SpO2 94%.    Relevant Results  Results for orders placed or performed during the hospital encounter of 07/02/25 (from the past 24 hours)   CBC and Auto Differential   Result Value Ref Range    WBC 8.1 4.4 - 11.3 x10*3/uL    nRBC 0.0 0.0 - 0.0 /100 WBCs    RBC 2.72 (L) 4.50 - 5.90 x10*6/uL    Hemoglobin 7.7 (L) 13.5 - 17.5 g/dL    Hematocrit 25.1 (L) 41.0 - 52.0 %    MCV 92 80 - 100 fL    MCH 28.3 26.0 - 34.0 pg    MCHC 30.7 (L) 32.0 - 36.0 g/dL    RDW 16.7 (H) 11.5 - 14.5 %    Platelets 342 150 - 450 x10*3/uL    Neutrophils % 84.2 40.0 - 80.0 %    Immature Granulocytes %, Automated 0.5 0.0 - 0.9 %    Lymphocytes % 4.7 13.0 - 44.0 %    Monocytes % 10.4 2.0 - 10.0 %    Eosinophils % 0.0 0.0 - 6.0 %    Basophils % 0.2 0.0 - 2.0 %    Neutrophils Absolute 6.78 (H) 1.60 - 5.50 x10*3/uL    Immature Granulocytes Absolute, Automated 0.04 0.00 - 0.50 x10*3/uL    Lymphocytes Absolute 0.38 (L) 0.80 - 3.00 x10*3/uL    Monocytes Absolute 0.84 (H) 0.05 - 0.80 x10*3/uL    Eosinophils Absolute 0.00 0.00 - 0.40 x10*3/uL    Basophils Absolute 0.02 0.00 - 0.10 x10*3/uL   Comprehensive metabolic panel   Result Value Ref Range    Glucose 186 (H) 74 - 99 mg/dL    Sodium 135 (L) 136 - 145 mmol/L    Potassium 3.7 3.5 - 5.3 mmol/L    Chloride 103 98 - 107 mmol/L    Bicarbonate 21 21 - 32 mmol/L    Anion Gap 15 10 - 20 mmol/L    Urea Nitrogen 8 6 - 23 mg/dL    Creatinine 0.83 0.50 - 1.30 mg/dL    eGFR >90 >60 mL/min/1.73m*2    Calcium 7.4 (L) 8.6 - 10.3 mg/dL    Albumin 2.5 (L) 3.4 - 5.0 g/dL    Alkaline Phosphatase 101 33 - 136 U/L    Total Protein 5.5 (L) 6.4 - 8.2 g/dL    AST 45 (H) 9 - 39 U/L    Bilirubin, Total 0.6 0.0 - 1.2 mg/dL    ALT 31 10 - 52 U/L   B-Type Natriuretic Peptide   Result Value Ref Range    BNP 25 0 - 99 pg/mL   Troponin I, High Sensitivity, Initial   Result Value Ref Range    " Troponin I, High Sensitivity 7 0 - 20 ng/L   Sars-CoV-2 and Influenza A/B PCR   Result Value Ref Range    Flu A Result Not Detected Not Detected    Flu B Result Not Detected Not Detected    Coronavirus 2019, PCR Not Detected Not Detected   Troponin, High Sensitivity, 1 Hour   Result Value Ref Range    Troponin I, High Sensitivity 618 (HH) 0 - 20 ng/L   Troponin I, High Sensitivity   Result Value Ref Range    Troponin I, High Sensitivity 12 0 - 20 ng/L   Magnesium   Result Value Ref Range    Magnesium 1.31 (L) 1.60 - 2.40 mg/dL   Iron and TIBC   Result Value Ref Range    Iron <10 (L) 35 - 150 ug/dL    UIBC 143 110 - 370 ug/dL    TIBC      % Saturation     Ferritin   Result Value Ref Range    Ferritin 1,935 (H) 20 - 300 ng/mL   POCT GLUCOSE   Result Value Ref Range    POCT Glucose 115 (H) 74 - 99 mg/dL     *Note: Due to a large number of results and/or encounters for the requested time period, some results have not been displayed. A complete set of results can be found in Results Review.      .CT angio chest for pulmonary embolism  Result Date: 7/2/2025  Interpreted By:  Kimmy Alves, STUDY: CT ANGIO CHEST FOR PULMONARY EMBOLISM;  7/2/2025 2:38 pm   INDICATION: Signs/Symptoms:right sided chest pain, hx lung ca, hx PE, prior right sided effusion.     COMPARISON: 04/18/2025   ACCESSION NUMBER(S): [id number]   ORDERING CLINICIAN: YOHAN MASTERSON   TECHNIQUE: Helical data acquisition of the chest was obtained contrast volume:without IV contrast material/Optiray 350/Isovue 370.  Images were reformatted in coronal and sagittal planes. Axial and coronal MIP images were created and reviewed.   FINDINGS: POTENTIAL LIMITATIONS OF THE STUDY: None   HEART AND VESSELS: No discrete filling defects within the main pulmonary artery or its branches.   Right internal jugular port catheter tip terminates in the right atrium. Superior vena cava is normal in size.   Main pulmonary artery and its branches are normal in caliber.   The  thoracic aorta is of normal course and caliber with patchy vascular calcifications.   Mild coronary artery calcifications are seen.The study is not optimized for evaluation of coronary arteries.   The cardiac chambers are not enlarged.   No evidence of pericardial effusion.   MEDIASTINUM AND GELA, LOWER NECK AND AXILLA: The visualized thyroid gland is within normal limits.   Mediastinal and right hilar lymph nodes appear similar to the prior study. Largest short axis currently is about 1 cm. No interval enlarging lymph nodes are identified.   Esophagus appears within normal limits as seen.   LUNGS AND AIRWAYS: The trachea and central airways are patent. No endobronchial lesion.   Right pleural catheter terminates medial posterior right hemithorax at T5 level and appears unchanged in position. Pleural effusion is increased from the prior study and has contour suggesting loculation. The largest portion of the collection is subpulmonic. There is also fluid noted in the major and minor fissures. Right lower lobe is subtotally collapsed. Spiculated solid mass posterior right apex measures 2.4 x 1.8 cm, previously measuring 2.3 x 1.8 cm. Previously noted pleural-based nodules are obscured by the current pleural effusion. No effusion or acute infiltrate is noted on the left.   UPPER ABDOMEN: The visualized subdiaphragmatic structures demonstrate no remarkable findings.   CHEST WALL AND OSSEOUS STRUCTURES: There are no suspicious osseous lesions. Multilevel degenerative changes are present       1.  No pulmonary embolism 2. Increase in right pleural effusion since the prior study with loculated appearance, particularly subpulmonic collection. Pleural catheter remains in place and is unchanged in position. 3. Right upper lobe mass is unchanged in size from the prior study. Previously noted pleural nodules are obscured by the pleural effusion currently     MACRO: None   Signed by: Kimmy Alves 7/2/2025 3:05 PM Dictation  workstation:   [id number]    XR chest 1 view  Result Date: 7/2/2025  Interpreted By:  Glory Everett, STUDY: XR CHEST 1 VIEW;  7/2/2025 10:53 am   INDICATION: Signs/Symptoms:Chest pain, right side, hx lung ca, prior right sided effusion.   COMPARISON: 01/10/2025   ACCESSION NUMBER(S): [id number]   ORDERING CLINICIAN: YOHAN MASTERSON   FINDINGS: CARDIOMEDIASTINAL SILHOUETTE: Cardiomediastinal silhouette is normal in size and configuration. There is a right internal jugular port with the tip in the superior vena cava.   LUNGS: History of lung carcinoma. There is a small right pleural effusion extending into the minor and major fissures. There is moderate chronic elevation of the right hemidiaphragm. The left lung is clear.   ABDOMEN: No remarkable upper abdominal findings.     BONES: No acute osseous changes.       1. History of lung carcinoma. 2. Small right pleural effusion extending into the major and minor fissures. 3. Chronic moderate elevation right hemidiaphragm.   MACRO: None   Signed by: Glory Everett 7/2/2025 11:04 AM Dictation workstation:   [id number]    Chest Ultrasound  Result Date: 6/24/2025  Chest ultrasound [facility] Indication: Pleural effusion, right Ultrasound was used to examine the right hemithorax.  Findings:  small-moderate simple appearing pleural effusion on right with PleurX in place.  Impression: right chest ultrasound performed, as described above. Recommendations: Draining 50 ml twice a week--> given continued effusion on US imaging, will move to once a week drainage and follow up in 2-4 weeks with Procedure coordinators to determine next US evaluation/PleruX drain removal consideration.  Patient expressed understanding and agreement with plan.           Assessment/Plan     Recurrent  Right Pleural effusion, Loculated  S/p Pluerx Cath   -hold Eliquis for now  -We will touch base with Dr Ordaz in the morning if TPA/Dornase will be  needed  -Supportive care  -We will continue to follow.     I spent 35 minutes in the professional and overall care of this patient.      Michelle Story, APRN-CNP         [1]   Family History  Problem Relation Name Age of Onset    Leukemia Mother Annette Holland     Memory loss Mother Annette Holland     Cancer Mother Annette Holland     Heart attack Father

## 2025-07-04 LAB
ALBUMIN SERPL BCP-MCNC: 2.4 G/DL (ref 3.4–5)
ALP SERPL-CCNC: 111 U/L (ref 33–136)
ALT SERPL W P-5'-P-CCNC: 46 U/L (ref 10–52)
ANION GAP SERPL CALC-SCNC: 9 MMOL/L (ref 10–20)
AST SERPL W P-5'-P-CCNC: 69 U/L (ref 9–39)
BASOPHILS # BLD AUTO: 0.02 X10*3/UL (ref 0–0.1)
BASOPHILS NFR BLD AUTO: 0.2 %
BILIRUB SERPL-MCNC: 0.6 MG/DL (ref 0–1.2)
BUN SERPL-MCNC: 7 MG/DL (ref 6–23)
CALCIUM SERPL-MCNC: 8.3 MG/DL (ref 8.6–10.3)
CHLORIDE SERPL-SCNC: 104 MMOL/L (ref 98–107)
CLARITY FLD: ABNORMAL
CO2 SERPL-SCNC: 26 MMOL/L (ref 21–32)
COLOR FLD: ABNORMAL
CREAT SERPL-MCNC: 0.73 MG/DL (ref 0.5–1.3)
EGFRCR SERPLBLD CKD-EPI 2021: >90 ML/MIN/1.73M*2
EOSINOPHIL # BLD AUTO: 0.07 X10*3/UL (ref 0–0.4)
EOSINOPHIL NFR BLD AUTO: 0.8 %
EOSINOPHIL NFR FLD MANUAL: 1 %
ERYTHROCYTE [DISTWIDTH] IN BLOOD BY AUTOMATED COUNT: 16.3 % (ref 11.5–14.5)
ERYTHROCYTE [DISTWIDTH] IN BLOOD BY AUTOMATED COUNT: 16.4 % (ref 11.5–14.5)
GLUCOSE BLD MANUAL STRIP-MCNC: 120 MG/DL (ref 74–99)
GLUCOSE BLD MANUAL STRIP-MCNC: 132 MG/DL (ref 74–99)
GLUCOSE BLD MANUAL STRIP-MCNC: 189 MG/DL (ref 74–99)
GLUCOSE BLD MANUAL STRIP-MCNC: 202 MG/DL (ref 74–99)
GLUCOSE FLD-MCNC: 117 MG/DL
GLUCOSE SERPL-MCNC: 134 MG/DL (ref 74–99)
HCT VFR BLD AUTO: 23.9 % (ref 41–52)
HCT VFR BLD AUTO: 26.2 % (ref 41–52)
HGB BLD-MCNC: 7.7 G/DL (ref 13.5–17.5)
HGB BLD-MCNC: 8.2 G/DL (ref 13.5–17.5)
IMM GRANULOCYTES # BLD AUTO: 0.04 X10*3/UL (ref 0–0.5)
IMM GRANULOCYTES NFR BLD AUTO: 0.4 % (ref 0–0.9)
LDH FLD L TO P-CCNC: 273 U/L
LDH SERPL L TO P-CCNC: 270 U/L (ref 84–246)
LEGIONELLA AG UR QL: NEGATIVE
LYMPHOCYTES # BLD AUTO: 0.7 X10*3/UL (ref 0.8–3)
LYMPHOCYTES NFR BLD AUTO: 7.9 %
LYMPHOCYTES NFR FLD MANUAL: 58 %
MAGNESIUM SERPL-MCNC: 1.96 MG/DL (ref 1.6–2.4)
MCH RBC QN AUTO: 28.4 PG (ref 26–34)
MCH RBC QN AUTO: 28.8 PG (ref 26–34)
MCHC RBC AUTO-ENTMCNC: 31.3 G/DL (ref 32–36)
MCHC RBC AUTO-ENTMCNC: 32.2 G/DL (ref 32–36)
MCV RBC AUTO: 90 FL (ref 80–100)
MCV RBC AUTO: 91 FL (ref 80–100)
MONOCYTES # BLD AUTO: 0.93 X10*3/UL (ref 0.05–0.8)
MONOCYTES NFR BLD AUTO: 10.4 %
MONOS+MACROS NFR FLD MANUAL: 18 %
NEUTROPHILS # BLD AUTO: 7.14 X10*3/UL (ref 1.6–5.5)
NEUTROPHILS NFR BLD AUTO: 80.3 %
NEUTROPHILS NFR FLD MANUAL: 20 %
NRBC BLD-RTO: 0 /100 WBCS (ref 0–0)
NRBC BLD-RTO: 0 /100 WBCS (ref 0–0)
OTHER CELLS NFR FLD MANUAL: 3 % (ref ?–0)
PLATELET # BLD AUTO: 297 X10*3/UL (ref 150–450)
PLATELET # BLD AUTO: 315 X10*3/UL (ref 150–450)
POTASSIUM SERPL-SCNC: 3.6 MMOL/L (ref 3.5–5.3)
PROT FLD-MCNC: 3.4 G/DL
PROT SERPL-MCNC: 6.3 G/DL (ref 6.4–8.2)
RBC # BLD AUTO: 2.67 X10*6/UL (ref 4.5–5.9)
RBC # BLD AUTO: 2.89 X10*6/UL (ref 4.5–5.9)
RBC # FLD AUTO: ABNORMAL /UL
S PNEUM AG UR QL: NEGATIVE
SODIUM SERPL-SCNC: 135 MMOL/L (ref 136–145)
TOTAL CELLS COUNTED FLD: 100
WBC # BLD AUTO: 10.5 X10*3/UL (ref 4.4–11.3)
WBC # BLD AUTO: 8.9 X10*3/UL (ref 4.4–11.3)
WBC # FLD AUTO: 3370 /UL

## 2025-07-04 PROCEDURE — 99232 SBSQ HOSP IP/OBS MODERATE 35: CPT | Performed by: INTERNAL MEDICINE

## 2025-07-04 PROCEDURE — 2500000004 HC RX 250 GENERAL PHARMACY W/ HCPCS (ALT 636 FOR OP/ED)

## 2025-07-04 PROCEDURE — 83615 LACTATE (LD) (LDH) ENZYME: CPT

## 2025-07-04 PROCEDURE — 87075 CULTR BACTERIA EXCEPT BLOOD: CPT | Mod: AHULAB

## 2025-07-04 PROCEDURE — 3E0L317 INTRODUCTION OF OTHER THROMBOLYTIC INTO PLEURAL CAVITY, PERCUTANEOUS APPROACH: ICD-10-PCS

## 2025-07-04 PROCEDURE — 2500000002 HC RX 250 W HCPCS SELF ADMINISTERED DRUGS (ALT 637 FOR MEDICARE OP, ALT 636 FOR OP/ED): Performed by: NURSE PRACTITIONER

## 2025-07-04 PROCEDURE — 83615 LACTATE (LD) (LDH) ENZYME: CPT | Mod: AHULAB

## 2025-07-04 PROCEDURE — 2500000001 HC RX 250 WO HCPCS SELF ADMINISTERED DRUGS (ALT 637 FOR MEDICARE OP): Performed by: NURSE PRACTITIONER

## 2025-07-04 PROCEDURE — 2060000001 HC INTERMEDIATE ICU ROOM DAILY

## 2025-07-04 PROCEDURE — 82945 GLUCOSE OTHER FLUID: CPT | Mod: AHULAB

## 2025-07-04 PROCEDURE — 83735 ASSAY OF MAGNESIUM: CPT | Performed by: STUDENT IN AN ORGANIZED HEALTH CARE EDUCATION/TRAINING PROGRAM

## 2025-07-04 PROCEDURE — 84157 ASSAY OF PROTEIN OTHER: CPT | Mod: AHULAB

## 2025-07-04 PROCEDURE — 87077 CULTURE AEROBIC IDENTIFY: CPT | Mod: AHULAB

## 2025-07-04 PROCEDURE — 84075 ASSAY ALKALINE PHOSPHATASE: CPT | Performed by: STUDENT IN AN ORGANIZED HEALTH CARE EDUCATION/TRAINING PROGRAM

## 2025-07-04 PROCEDURE — 85027 COMPLETE CBC AUTOMATED: CPT

## 2025-07-04 PROCEDURE — 89051 BODY FLUID CELL COUNT: CPT

## 2025-07-04 PROCEDURE — 2500000004 HC RX 250 GENERAL PHARMACY W/ HCPCS (ALT 636 FOR OP/ED): Mod: JZ | Performed by: NURSE PRACTITIONER

## 2025-07-04 PROCEDURE — 85025 COMPLETE CBC W/AUTO DIFF WBC: CPT | Performed by: STUDENT IN AN ORGANIZED HEALTH CARE EDUCATION/TRAINING PROGRAM

## 2025-07-04 PROCEDURE — 82947 ASSAY GLUCOSE BLOOD QUANT: CPT

## 2025-07-04 PROCEDURE — 2500000004 HC RX 250 GENERAL PHARMACY W/ HCPCS (ALT 636 FOR OP/ED): Performed by: INTERNAL MEDICINE

## 2025-07-04 PROCEDURE — 99233 SBSQ HOSP IP/OBS HIGH 50: CPT

## 2025-07-04 RX ADMIN — POTASSIUM CHLORIDE EXTENDED-RELEASE 40 MEQ: 1500 TABLET ORAL at 09:59

## 2025-07-04 RX ADMIN — LISINOPRIL 20 MG: 20 TABLET ORAL at 09:59

## 2025-07-04 RX ADMIN — DORNASE ALFA 5 MG: 1 SOLUTION RESPIRATORY (INHALATION) at 14:30

## 2025-07-04 RX ADMIN — ACETAMINOPHEN 650 MG: 325 TABLET ORAL at 16:46

## 2025-07-04 RX ADMIN — FOLIC ACID 1 MG: 1 TABLET ORAL at 09:59

## 2025-07-04 RX ADMIN — HYDROMORPHONE HYDROCHLORIDE 0.2 MG: 0.2 INJECTION, SOLUTION INTRAMUSCULAR; INTRAVENOUS; SUBCUTANEOUS at 14:01

## 2025-07-04 RX ADMIN — HYDROMORPHONE HYDROCHLORIDE 0.2 MG: 0.2 INJECTION, SOLUTION INTRAMUSCULAR; INTRAVENOUS; SUBCUTANEOUS at 18:51

## 2025-07-04 RX ADMIN — ALTEPLASE 10 MG: 2.2 INJECTION, POWDER, LYOPHILIZED, FOR SOLUTION INTRAVENOUS at 14:30

## 2025-07-04 RX ADMIN — AMPICILLIN SODIUM AND SULBACTAM SODIUM 3 G: 2; 1 INJECTION, POWDER, FOR SOLUTION INTRAMUSCULAR; INTRAVENOUS at 17:41

## 2025-07-04 RX ADMIN — ROSUVASTATIN 40 MG: 20 TABLET, FILM COATED ORAL at 09:59

## 2025-07-04 RX ADMIN — AMPICILLIN SODIUM AND SULBACTAM SODIUM 3 G: 2; 1 INJECTION, POWDER, FOR SOLUTION INTRAMUSCULAR; INTRAVENOUS at 23:53

## 2025-07-04 RX ADMIN — SODIUM CHLORIDE: 9 INJECTION, SOLUTION INTRAVENOUS at 14:30

## 2025-07-04 RX ADMIN — PANTOPRAZOLE SODIUM 40 MG: 40 TABLET, DELAYED RELEASE ORAL at 06:37

## 2025-07-04 RX ADMIN — HYDROMORPHONE HYDROCHLORIDE 0.2 MG: 0.2 INJECTION, SOLUTION INTRAMUSCULAR; INTRAVENOUS; SUBCUTANEOUS at 23:57

## 2025-07-04 ASSESSMENT — PAIN - FUNCTIONAL ASSESSMENT
PAIN_FUNCTIONAL_ASSESSMENT: 0-10

## 2025-07-04 ASSESSMENT — PAIN DESCRIPTION - DESCRIPTORS
DESCRIPTORS: ACHING;SORE
DESCRIPTORS: THROBBING
DESCRIPTORS: DISCOMFORT
DESCRIPTORS: ACHING;DULL

## 2025-07-04 ASSESSMENT — COGNITIVE AND FUNCTIONAL STATUS - GENERAL
DAILY ACTIVITIY SCORE: 24
MOBILITY SCORE: 24
DAILY ACTIVITIY SCORE: 24
MOBILITY SCORE: 24

## 2025-07-04 ASSESSMENT — PAIN SCALES - GENERAL
PAINLEVEL_OUTOF10: 0 - NO PAIN
PAINLEVEL_OUTOF10: 0 - NO PAIN
PAINLEVEL_OUTOF10: 8
PAINLEVEL_OUTOF10: 8
PAINLEVEL_OUTOF10: 0 - NO PAIN
PAINLEVEL_OUTOF10: 5 - MODERATE PAIN
PAINLEVEL_OUTOF10: 0 - NO PAIN
PAINLEVEL_OUTOF10: 8

## 2025-07-04 ASSESSMENT — PAIN DESCRIPTION - ORIENTATION
ORIENTATION: RIGHT
ORIENTATION: RIGHT

## 2025-07-04 ASSESSMENT — PAIN DESCRIPTION - LOCATION
LOCATION: CHEST
LOCATION: CHEST

## 2025-07-04 NOTE — CARE PLAN
"The patient's goals for the shift include \"get my back to stop hurting please\"    The clinical goals for the shift include pt will report <5/10 back pain by end of shift    "

## 2025-07-04 NOTE — ASSESSMENT & PLAN NOTE
Anemia  Chronic low magnesium    Jayant Holland is a 74 y.o. male with a past medical history of non-small cell lung cancer with mets with Pleurx catheter, HTN, DM, asthma, PE on eliquis was admitted for SOB and right sided chest pain.      non-small cell lung cancer with mets with Pleurx catheter inserted on the right side due to recurrent malignant pleural effusion  Concern for infection  - thoracic surg following: Initiate management recommendations  - Planned tpa per thoracic surg  - ID consult: Agree with drainage and TPA/dornase. Send fluid for cell/count/diff, LDH, protein, glucose, pH, cytology. If these labs seem consistent with infection could start on IV Unasyn 3g Q6H otherwise OK to hold abx.   - tylenol PRN     Concern for GI bleed  Hx GERD  - GI consult  - Hg 6.9   - transfuse 1U PRBC  - hold eliquis  - occult stool  - ppi     HypoMg  - replace     HTN  - lisinopril     PE  - hold eliquis     DM  - hold metformin  - ISS     Diet: diabetic    DVT ppx: SCD    Disposition: Presenting with shortness of breath in the setting of recurrent pleural effusion, need further management, discharge pending clinical stability.

## 2025-07-04 NOTE — PROGRESS NOTES
Jayant Holland is a 74 y.o. male on day 2 of admission presenting with Pleural effusion.      Subjective   Patient was seen and examined bedside this morning, stated that he is feeling better, but still experienced shortness of breath with exertion, denies fever, chills, nausea, vomiting or any other symptoms at this time.       Objective     Last Recorded Vitals  /56 (BP Location: Right arm, Patient Position: Lying)   Pulse 86   Temp 37.3 °C (99.1 °F) (Oral)   Resp 18   Wt 78.9 kg (173 lb 15.1 oz)   SpO2 94%   Intake/Output last 3 Shifts:    Intake/Output Summary (Last 24 hours) at 7/4/2025 1300  Last data filed at 7/4/2025 0900  Gross per 24 hour   Intake 800 ml   Output --   Net 800 ml       Admission Weight  Weight: 78.9 kg (174 lb) (07/02/25 1003)    Daily Weight  07/03/25 : 78.9 kg (173 lb 15.1 oz)    Image Results  ECG 12 lead  Sinus rhythm with Premature atrial complexes  Inferior infarct (cited on or before 02-JUL-2025)  Abnormal ECG  When compared with ECG of 02-JUL-2025 10:08, (unconfirmed)  Premature atrial complexes are now Present  ECG 12 lead  Sinus tachycardia  Indeterminate axis  Inferior infarct , age undetermined  Abnormal ECG  When compared with ECG of 20-MAR-2025 19:52,  PREVIOUS ECG IS PRESENT      Physical Exam  Constitutional: Pleasant elderly gentleman, in room air, conversant without dyspnea, alert active, cooperative not in acute distress  Eyes: PERRLA, clear sclera  ENMT: Moist mucosal membranes, no exudate  Head / Neck: Atraumatic, normocephalic, supple neck, JVP not visualized  Lungs: Patent airways, CTABL.  PleurX catheter on the right flank, covered with dressing, no drainage.  Heart: RRR, S1S2, no murmurs appreciated, palpable pulses in all extremities  GI: Soft, NT, ND, bowel sounds present in all quadrants  MSK: Moves all extremities freely, no restriction  of ROM, no joint edema  Extremities: Intact x 4, no peripheral edema  : No Titus catheter inserted  Breast:  Deferred  Neurological: AAO x 3 to person, place and date, facial muscles symmetrical, sensation intact, strength 4/4, no acute focal neurological deficits appreciated  Psychological: Appropriate mood and behavior    Relevant Results               This patient currently has cardiac telemetry ordered; if you would like to modify or discontinue the telemetry order, click here to go to the orders activity to modify/discontinue the order.    Scheduled medications  Scheduled Medications[1]  Continuous medications  Continuous Medications[2]  PRN medications  PRN Medications[3]      This patient has a central line   Reason for the central line remaining today? Dialysis/Hemapheresis            Assessment & Plan  Pleural effusion  Anemia  Chronic low magnesium    Jayant Holland is a 74 y.o. male with a past medical history of non-small cell lung cancer with mets with Pleurx catheter, HTN, DM, asthma, PE on eliquis was admitted for SOB and right sided chest pain.      non-small cell lung cancer with mets with Pleurx catheter inserted on the right side due to recurrent malignant pleural effusion  Concern for infection  - thoracic surg following: Initiate management recommendations  - Planned tpa per thoracic surg  - ID consult: Agree with drainage and TPA/dornase. Send fluid for cell/count/diff, LDH, protein, glucose, pH, cytology. If these labs seem consistent with infection could start on IV Unasyn 3g Q6H otherwise OK to hold abx.   - tylenol PRN     Concern for GI bleed  Hx GERD  - GI consult  - Hg 6.9   - transfuse 1U PRBC  - hold eliquis  - occult stool  - ppi     HypoMg  - replace     HTN  - lisinopril     PE  - hold eliquis     DM  - hold metformin  - ISS     Diet: diabetic    DVT ppx: SCD    Disposition: Presenting with shortness of breath in the setting of recurrent pleural effusion, need further management, discharge pending clinical stability.        Malnutrition Diagnosis Status: New  Malnutrition Diagnosis: Moderate  malnutrition related to chronic disease or condition  Related to: cancer  As Evidenced by: Greater than 7.5% weight loss in 3 months, poor intake less than 75% of estimated energy needs greater than 1 month, mild fat loss and moderate muscle loss.  I agree with the dietitian's malnutrition diagnosis.      Ramone Bustos DO           [1] alteplase (Activase) 10 mg in sodium chloride 0.9% 50 mL syringe, 10 mg, intrapleural, Once  [Held by provider] apixaban, 5 mg, oral, BID  dornase jose (Pulmozyme) 5 mg in sodium chloride 0.9% 50 mL syringe, 5 mg, intrapleural, Once  folic acid, 1,000 mcg, oral, Daily  insulin lispro, 0-5 Units, subcutaneous, TID AC  lisinopril, 20 mg, oral, Daily  pantoprazole, 40 mg, oral, Daily before breakfast  potassium chloride CR, 40 mEq, oral, Daily  rosuvastatin, 40 mg, oral, Daily  sodium chloride 0.9 % 60 mL intrapleural syringe, , intrapleural, Once  zinc oxide, 1 Application, Topical, BID  [2]    [3] PRN medications: acetaminophen **OR** acetaminophen **OR** acetaminophen, dextrose, dextrose, glucagon, glucagon, HYDROmorphone

## 2025-07-04 NOTE — PROGRESS NOTES
"Jayant Holland is a 74 y.o. male on day 2 of admission presenting with Pleural effusion.    Subjective   Pt reports doing well this morning. Denies any pain or SOB.      Objective     Physical Exam  Vitals reviewed.   Constitutional:       General: He is not in acute distress.  HENT:      Head: Normocephalic.      Nose: Nose normal.      Mouth/Throat:      Mouth: Mucous membranes are dry.      Pharynx: Oropharynx is clear.   Eyes:      Extraocular Movements: Extraocular movements intact.      Pupils: Pupils are equal, round, and reactive to light.   Cardiovascular:      Rate and Rhythm: Normal rate and regular rhythm.      Heart sounds: Normal heart sounds.   Pulmonary:      Effort: No respiratory distress.      Comments: Diminished breath sounds, R chest pleurex catheter  Musculoskeletal:         General: Normal range of motion.      Cervical back: Normal range of motion.   Skin:     General: Skin is warm and dry.      Capillary Refill: Capillary refill takes less than 2 seconds.   Neurological:      Mental Status: He is oriented to person, place, and time.         Last Recorded Vitals  Blood pressure 102/56, pulse 86, temperature 37.3 °C (99.1 °F), temperature source Oral, resp. rate 18, height 1.854 m (6' 0.99\"), weight 78.9 kg (173 lb 15.1 oz), SpO2 94%.  Intake/Output last 3 Shifts:  I/O last 3 completed shifts:  In: 1300 (16.5 mL/kg) [P.O.:980; Blood:320]  Out: 200 (2.5 mL/kg) [Urine:200 (0.1 mL/kg/hr)]  Weight: 78.9 kg     Relevant Results  Current Medications[1]     Results for orders placed or performed during the hospital encounter of 07/02/25 (from the past 24 hours)   POCT GLUCOSE   Result Value Ref Range    POCT Glucose 117 (H) 74 - 99 mg/dL   POCT GLUCOSE   Result Value Ref Range    POCT Glucose 119 (H) 74 - 99 mg/dL   Comprehensive Metabolic Panel   Result Value Ref Range    Glucose 134 (H) 74 - 99 mg/dL    Sodium 135 (L) 136 - 145 mmol/L    Potassium 3.6 3.5 - 5.3 mmol/L    Chloride 104 98 - 107 mmol/L "    Bicarbonate 26 21 - 32 mmol/L    Anion Gap 9 (L) 10 - 20 mmol/L    Urea Nitrogen 7 6 - 23 mg/dL    Creatinine 0.73 0.50 - 1.30 mg/dL    eGFR >90 >60 mL/min/1.73m*2    Calcium 8.3 (L) 8.6 - 10.3 mg/dL    Albumin 2.4 (L) 3.4 - 5.0 g/dL    Alkaline Phosphatase 111 33 - 136 U/L    Total Protein 6.3 (L) 6.4 - 8.2 g/dL    AST 69 (H) 9 - 39 U/L    Bilirubin, Total 0.6 0.0 - 1.2 mg/dL    ALT 46 10 - 52 U/L   CBC and Auto Differential   Result Value Ref Range    WBC 8.9 4.4 - 11.3 x10*3/uL    nRBC 0.0 0.0 - 0.0 /100 WBCs    RBC 2.67 (L) 4.50 - 5.90 x10*6/uL    Hemoglobin 7.7 (L) 13.5 - 17.5 g/dL    Hematocrit 23.9 (L) 41.0 - 52.0 %    MCV 90 80 - 100 fL    MCH 28.8 26.0 - 34.0 pg    MCHC 32.2 32.0 - 36.0 g/dL    RDW 16.3 (H) 11.5 - 14.5 %    Platelets 297 150 - 450 x10*3/uL    Neutrophils % 80.3 40.0 - 80.0 %    Immature Granulocytes %, Automated 0.4 0.0 - 0.9 %    Lymphocytes % 7.9 13.0 - 44.0 %    Monocytes % 10.4 2.0 - 10.0 %    Eosinophils % 0.8 0.0 - 6.0 %    Basophils % 0.2 0.0 - 2.0 %    Neutrophils Absolute 7.14 (H) 1.60 - 5.50 x10*3/uL    Immature Granulocytes Absolute, Automated 0.04 0.00 - 0.50 x10*3/uL    Lymphocytes Absolute 0.70 (L) 0.80 - 3.00 x10*3/uL    Monocytes Absolute 0.93 (H) 0.05 - 0.80 x10*3/uL    Eosinophils Absolute 0.07 0.00 - 0.40 x10*3/uL    Basophils Absolute 0.02 0.00 - 0.10 x10*3/uL   Magnesium   Result Value Ref Range    Magnesium 1.96 1.60 - 2.40 mg/dL   POCT GLUCOSE   Result Value Ref Range    POCT Glucose 132 (H) 74 - 99 mg/dL   POCT GLUCOSE   Result Value Ref Range    POCT Glucose 189 (H) 74 - 99 mg/dL     *Note: Due to a large number of results and/or encounters for the requested time period, some results have not been displayed. A complete set of results can be found in Results Review.          Assessment & plan  Jayant Holland is a 74 y.o. male with a previous medical history of non-small cell lung cancer with mets with Pleurx catheter inserted on the right side due to recurrent  malignant pleural effusion, PE ( on Apixaban)  hypertension, type 2 diabetes mellitus, asthma, GERD, low magnesium  presented to [facility] ED from his Oncology office due to c/o of SOB and right sided Chest Pain. He underwent a CT which showed a loculated right pleural effusion which had increased in size since prior imaging.  Thoracic surgery was consulted for further recommendations.    Plan   #Right Loculated pleural effusion with prior pleurx placement  -Intrapleural TPA/dornase administration today  -Daily CXR   -Appreciate consult  -Will continue to follow    Plan discussed with Dr Orlin GATES spent 53 minutes in the professional and overall care of this patient.      JENNIFER Camarena         [1]   Current Facility-Administered Medications:     acetaminophen (Tylenol) tablet 650 mg, 650 mg, oral, q4h PRN **OR** acetaminophen (Tylenol) oral liquid 650 mg, 650 mg, oral, q4h PRN **OR** acetaminophen (Tylenol) suppository 650 mg, 650 mg, rectal, q4h PRN, JENNIFER Waite    alteplase (Activase) 10 mg in sodium chloride 0.9% 50 mL syringe, 10 mg, intrapleural, Once, JENNIFER Camarena    [Held by provider] apixaban (Eliquis) tablet 5 mg, 5 mg, oral, BID, JENNIFER Waite    dextrose 50 % injection 12.5 g, 12.5 g, intravenous, q15 min PRN, JENNIFER Waite    dextrose 50 % injection 25 g, 25 g, intravenous, q15 min PRN, JENNIFER Waite    dornase jose (Pulmozyme) 5 mg in sodium chloride 0.9% 50 mL syringe, 5 mg, intrapleural, Once, JENNIFER Camarena    folic acid (Folvite) tablet 1 mg, 1,000 mcg, oral, Daily, JENNIFER Waite, 1 mg at 07/04/25 0959    glucagon (Glucagen) injection 1 mg, 1 mg, intramuscular, q15 min PRN, JENNIFER Waite    glucagon (Glucagen) injection 1 mg, 1 mg, intramuscular, q15 min PRN, JENNIFER Waite    HYDROmorphone PF (Dilaudid) injection 0.2 mg, 0.2 mg, intravenous, q4h PRN, JENNIFER Waite, 0.2 mg at  07/03/25 2133    insulin lispro injection 0-5 Units, 0-5 Units, subcutaneous, TID AC, JENNIFER Waite    lisinopril tablet 20 mg, 20 mg, oral, Daily, FORTINO Waite-CNP, 20 mg at 07/04/25 0959    pantoprazole (ProtoNix) EC tablet 40 mg, 40 mg, oral, Daily before breakfast, FORTINO Waite-CNP, 40 mg at 07/04/25 0637    potassium chloride CR (Klor-Con M20) ER tablet 40 mEq, 40 mEq, oral, Daily, FORTINO Waite-CNP, 40 mEq at 07/04/25 0959    rosuvastatin (Crestor) tablet 40 mg, 40 mg, oral, Daily, FORTINO Waite-CNP, 40 mg at 07/04/25 0959    sodium chloride 0.9 % 60 mL intrapleural syringe, , intrapleural, Once, JENNIFER Camarena    zinc oxide 20 % ointment 1 Application, 1 Application, Topical, BID, Justyn Ramirez MD, 1 Application at 07/03/25 2133

## 2025-07-04 NOTE — CARE PLAN
"The patient's goals for the shift include \"get my back to stop hurting please\"    The clinical goals for the shift include pt will report <5/10 back pain by end of shift      Problem: Skin  Goal: Decreased wound size/increased tissue granulation at next dressing change  Outcome: Progressing  Flowsheets (Taken 7/4/2025 1615)  Decreased wound size/increased tissue granulation at next dressing change:   Protective dressings over bony prominences   Promote sleep for wound healing   Utilize specialty bed per algorithm  Goal: Participates in plan/prevention/treatment measures  Outcome: Progressing  Flowsheets (Taken 7/4/2025 1615)  Participates in plan/prevention/treatment measures:   Elevate heels   Increase activity/out of bed for meals  Goal: Prevent/manage excess moisture  Outcome: Progressing  Flowsheets (Taken 7/4/2025 1615)  Prevent/manage excess moisture:   Cleanse incontinence/protect with barrier cream   Follow provider orders for dressing changes  Goal: Prevent/minimize sheer/friction injuries  Outcome: Progressing  Flowsheets (Taken 7/4/2025 1615)  Prevent/minimize sheer/friction injuries:   Turn/reposition every 2 hours/use positioning/transfer devices   HOB 30 degrees or less   Increase activity/out of bed for meals  Goal: Promote/optimize nutrition  Outcome: Progressing  Flowsheets (Taken 7/4/2025 1615)  Promote/optimize nutrition:   Consume > 50% meals/supplements   Discuss with provider if NPO > 2 days   Monitor/record intake including meals  Goal: Promote skin healing  Outcome: Progressing  Flowsheets (Taken 7/4/2025 1615)  Promote skin healing:   Assess skin/pad under line(s)/device(s)   Protective dressings over bony prominences   Turn/reposition every 2 hours/use positioning/transfer devices     "

## 2025-07-04 NOTE — PROCEDURES
At 1435 tPA/Dornase administered to patient. Patient advised regarding recommendations including adjusting position every 15min (lay on side, sit, stand, walk, etc) during one hour instillation and verbalized understanding.  Reviewed red flag symptoms that warrant emergency evaluation including severe pain or dyspnea.  Bedside RN updated on plan of care.  All questions addressed.    Total amount of fluid instilled: 50ml TpA, 50ml Dornase, 60ml NS  Amount in atrium prior to instillation: 0 ml    140ml of serosanguinous output immediately after unclamping Pleurex catheter. Will continue to monitor output and characteristics

## 2025-07-05 ENCOUNTER — APPOINTMENT (OUTPATIENT)
Dept: CARDIOLOGY | Facility: HOSPITAL | Age: 75
DRG: 180 | End: 2025-07-05
Payer: MEDICARE

## 2025-07-05 ENCOUNTER — APPOINTMENT (OUTPATIENT)
Dept: RADIOLOGY | Facility: HOSPITAL | Age: 75
DRG: 180 | End: 2025-07-05
Payer: MEDICARE

## 2025-07-05 LAB
ALBUMIN SERPL BCP-MCNC: 2.2 G/DL (ref 3.4–5)
ALP SERPL-CCNC: 132 U/L (ref 33–136)
ALT SERPL W P-5'-P-CCNC: 56 U/L (ref 10–52)
ANION GAP SERPL CALC-SCNC: 14 MMOL/L (ref 10–20)
AST SERPL W P-5'-P-CCNC: 77 U/L (ref 9–39)
BASOPHILS # BLD AUTO: 0.01 X10*3/UL (ref 0–0.1)
BASOPHILS NFR BLD AUTO: 0.1 %
BILIRUB SERPL-MCNC: 0.5 MG/DL (ref 0–1.2)
BUN SERPL-MCNC: 7 MG/DL (ref 6–23)
CALCIUM SERPL-MCNC: 7.3 MG/DL (ref 8.6–10.3)
CHLORIDE SERPL-SCNC: 105 MMOL/L (ref 98–107)
CO2 SERPL-SCNC: 21 MMOL/L (ref 21–32)
CREAT SERPL-MCNC: 0.68 MG/DL (ref 0.5–1.3)
EGFRCR SERPLBLD CKD-EPI 2021: >90 ML/MIN/1.73M*2
EOSINOPHIL # BLD AUTO: 0.05 X10*3/UL (ref 0–0.4)
EOSINOPHIL NFR BLD AUTO: 0.5 %
ERYTHROCYTE [DISTWIDTH] IN BLOOD BY AUTOMATED COUNT: 16.2 % (ref 11.5–14.5)
GLUCOSE BLD MANUAL STRIP-MCNC: 126 MG/DL (ref 74–99)
GLUCOSE BLD MANUAL STRIP-MCNC: 136 MG/DL (ref 74–99)
GLUCOSE BLD MANUAL STRIP-MCNC: 173 MG/DL (ref 74–99)
GLUCOSE BLD MANUAL STRIP-MCNC: 179 MG/DL (ref 74–99)
GLUCOSE SERPL-MCNC: 137 MG/DL (ref 74–99)
HCT VFR BLD AUTO: 24.8 % (ref 41–52)
HGB BLD-MCNC: 8.2 G/DL (ref 13.5–17.5)
IMM GRANULOCYTES # BLD AUTO: 0.09 X10*3/UL (ref 0–0.5)
IMM GRANULOCYTES NFR BLD AUTO: 0.8 % (ref 0–0.9)
LYMPHOCYTES # BLD AUTO: 0.75 X10*3/UL (ref 0.8–3)
LYMPHOCYTES NFR BLD AUTO: 6.9 %
MCH RBC QN AUTO: 28.5 PG (ref 26–34)
MCHC RBC AUTO-ENTMCNC: 33.1 G/DL (ref 32–36)
MCV RBC AUTO: 86 FL (ref 80–100)
MONOCYTES # BLD AUTO: 1.01 X10*3/UL (ref 0.05–0.8)
MONOCYTES NFR BLD AUTO: 9.4 %
MRSA DNA SPEC QL NAA+PROBE: NOT DETECTED
NEUTROPHILS # BLD AUTO: 8.89 X10*3/UL (ref 1.6–5.5)
NEUTROPHILS NFR BLD AUTO: 82.3 %
NRBC BLD-RTO: 0 /100 WBCS (ref 0–0)
PLATELET # BLD AUTO: 314 X10*3/UL (ref 150–450)
POTASSIUM SERPL-SCNC: 3.9 MMOL/L (ref 3.5–5.3)
PROT SERPL-MCNC: 5.2 G/DL (ref 6.4–8.2)
RBC # BLD AUTO: 2.88 X10*6/UL (ref 4.5–5.9)
SODIUM SERPL-SCNC: 136 MMOL/L (ref 136–145)
WBC # BLD AUTO: 10.8 X10*3/UL (ref 4.4–11.3)

## 2025-07-05 PROCEDURE — 99232 SBSQ HOSP IP/OBS MODERATE 35: CPT | Performed by: NURSE PRACTITIONER

## 2025-07-05 PROCEDURE — 36415 COLL VENOUS BLD VENIPUNCTURE: CPT | Performed by: STUDENT IN AN ORGANIZED HEALTH CARE EDUCATION/TRAINING PROGRAM

## 2025-07-05 PROCEDURE — 87641 MR-STAPH DNA AMP PROBE: CPT

## 2025-07-05 PROCEDURE — 2500000004 HC RX 250 GENERAL PHARMACY W/ HCPCS (ALT 636 FOR OP/ED): Mod: JZ | Performed by: NURSE PRACTITIONER

## 2025-07-05 PROCEDURE — 2500000004 HC RX 250 GENERAL PHARMACY W/ HCPCS (ALT 636 FOR OP/ED)

## 2025-07-05 PROCEDURE — 85025 COMPLETE CBC W/AUTO DIFF WBC: CPT | Performed by: STUDENT IN AN ORGANIZED HEALTH CARE EDUCATION/TRAINING PROGRAM

## 2025-07-05 PROCEDURE — 36415 COLL VENOUS BLD VENIPUNCTURE: CPT

## 2025-07-05 PROCEDURE — 2500000002 HC RX 250 W HCPCS SELF ADMINISTERED DRUGS (ALT 637 FOR MEDICARE OP, ALT 636 FOR OP/ED): Performed by: NURSE PRACTITIONER

## 2025-07-05 PROCEDURE — 93005 ELECTROCARDIOGRAM TRACING: CPT

## 2025-07-05 PROCEDURE — 71045 X-RAY EXAM CHEST 1 VIEW: CPT | Performed by: RADIOLOGY

## 2025-07-05 PROCEDURE — 87040 BLOOD CULTURE FOR BACTERIA: CPT | Mod: AHULAB

## 2025-07-05 PROCEDURE — 99233 SBSQ HOSP IP/OBS HIGH 50: CPT

## 2025-07-05 PROCEDURE — 80053 COMPREHEN METABOLIC PANEL: CPT | Performed by: STUDENT IN AN ORGANIZED HEALTH CARE EDUCATION/TRAINING PROGRAM

## 2025-07-05 PROCEDURE — 87640 STAPH A DNA AMP PROBE: CPT

## 2025-07-05 PROCEDURE — 2500000001 HC RX 250 WO HCPCS SELF ADMINISTERED DRUGS (ALT 637 FOR MEDICARE OP): Performed by: NURSE PRACTITIONER

## 2025-07-05 PROCEDURE — 71045 X-RAY EXAM CHEST 1 VIEW: CPT

## 2025-07-05 PROCEDURE — 2500000004 HC RX 250 GENERAL PHARMACY W/ HCPCS (ALT 636 FOR OP/ED): Performed by: INTERNAL MEDICINE

## 2025-07-05 PROCEDURE — 2060000001 HC INTERMEDIATE ICU ROOM DAILY

## 2025-07-05 PROCEDURE — 82947 ASSAY GLUCOSE BLOOD QUANT: CPT

## 2025-07-05 RX ORDER — VANCOMYCIN HYDROCHLORIDE 1 G/200ML
1000 INJECTION, SOLUTION INTRAVENOUS EVERY 12 HOURS
Status: DISPENSED | OUTPATIENT
Start: 2025-07-05

## 2025-07-05 RX ORDER — VANCOMYCIN HYDROCHLORIDE 1 G/20ML
INJECTION, POWDER, LYOPHILIZED, FOR SOLUTION INTRAVENOUS DAILY PRN
Status: DISPENSED | OUTPATIENT
Start: 2025-07-05

## 2025-07-05 RX ADMIN — PANTOPRAZOLE SODIUM 40 MG: 40 TABLET, DELAYED RELEASE ORAL at 06:37

## 2025-07-05 RX ADMIN — VANCOMYCIN HYDROCHLORIDE 1000 MG: 1 INJECTION, SOLUTION INTRAVENOUS at 10:32

## 2025-07-05 RX ADMIN — AMPICILLIN SODIUM AND SULBACTAM SODIUM 3 G: 2; 1 INJECTION, POWDER, FOR SOLUTION INTRAMUSCULAR; INTRAVENOUS at 17:49

## 2025-07-05 RX ADMIN — AMPICILLIN SODIUM AND SULBACTAM SODIUM 3 G: 2; 1 INJECTION, POWDER, FOR SOLUTION INTRAMUSCULAR; INTRAVENOUS at 13:02

## 2025-07-05 RX ADMIN — LISINOPRIL 20 MG: 20 TABLET ORAL at 08:13

## 2025-07-05 RX ADMIN — FOLIC ACID 1 MG: 1 TABLET ORAL at 08:13

## 2025-07-05 RX ADMIN — ROSUVASTATIN 40 MG: 20 TABLET, FILM COATED ORAL at 08:13

## 2025-07-05 RX ADMIN — AMPICILLIN SODIUM AND SULBACTAM SODIUM 3 G: 2; 1 INJECTION, POWDER, FOR SOLUTION INTRAMUSCULAR; INTRAVENOUS at 23:48

## 2025-07-05 RX ADMIN — POTASSIUM CHLORIDE EXTENDED-RELEASE 40 MEQ: 1500 TABLET ORAL at 08:13

## 2025-07-05 RX ADMIN — HYDROMORPHONE HYDROCHLORIDE 0.2 MG: 0.2 INJECTION, SOLUTION INTRAMUSCULAR; INTRAVENOUS; SUBCUTANEOUS at 08:31

## 2025-07-05 RX ADMIN — AMPICILLIN SODIUM AND SULBACTAM SODIUM 3 G: 2; 1 INJECTION, POWDER, FOR SOLUTION INTRAMUSCULAR; INTRAVENOUS at 06:37

## 2025-07-05 RX ADMIN — VANCOMYCIN HYDROCHLORIDE 1000 MG: 1 INJECTION, SOLUTION INTRAVENOUS at 22:33

## 2025-07-05 ASSESSMENT — COGNITIVE AND FUNCTIONAL STATUS - GENERAL
DAILY ACTIVITIY SCORE: 21
CLIMB 3 TO 5 STEPS WITH RAILING: A LITTLE
DAILY ACTIVITIY SCORE: 24
TOILETING: A LITTLE
MOVING TO AND FROM BED TO CHAIR: A LITTLE
MOBILITY SCORE: 19
DRESSING REGULAR UPPER BODY CLOTHING: A LITTLE
TURNING FROM BACK TO SIDE WHILE IN FLAT BAD: A LITTLE
STANDING UP FROM CHAIR USING ARMS: A LITTLE
DRESSING REGULAR LOWER BODY CLOTHING: A LITTLE
MOBILITY SCORE: 24
WALKING IN HOSPITAL ROOM: A LITTLE

## 2025-07-05 ASSESSMENT — PAIN DESCRIPTION - DESCRIPTORS
DESCRIPTORS: DISCOMFORT;ACHING
DESCRIPTORS: DISCOMFORT;ACHING

## 2025-07-05 ASSESSMENT — ACTIVITIES OF DAILY LIVING (ADL)
EFFECT OF PAIN ON DAILY ACTIVITIES: UNABLE TO COMPLETE ADL
EFFECT OF PAIN ON DAILY ACTIVITIES: UNABLE TO COMPLETE ADL

## 2025-07-05 ASSESSMENT — PAIN - FUNCTIONAL ASSESSMENT
PAIN_FUNCTIONAL_ASSESSMENT: 0-10

## 2025-07-05 ASSESSMENT — PAIN SCALES - GENERAL
PAINLEVEL_OUTOF10: 0 - NO PAIN
PAINLEVEL_OUTOF10: 6
PAINLEVEL_OUTOF10: 2
PAINLEVEL_OUTOF10: 8
PAINLEVEL_OUTOF10: 0 - NO PAIN
PAINLEVEL_OUTOF10: 0 - NO PAIN

## 2025-07-05 NOTE — PROGRESS NOTES
"Jayant Holland is a 74 y.o. male on day 3 of admission presenting with Pleural effusion.    Subjective   ALDAIR reported  Chest tube in place clamped  No verbalization of distress noted.        Objective     Physical Exam  Constitutional:       Appearance: Normal appearance.   Cardiovascular:      Rate and Rhythm: Normal rate and regular rhythm.      Pulses: Normal pulses.   Pulmonary:      Comments: Diminished breath sounds bilaterally, symmetrical chest expansion noted.  Right sided chest tube clamped and attached to atrium.   Musculoskeletal:         General: Normal range of motion.   Skin:     General: Skin is warm and dry.      Capillary Refill: Capillary refill takes less than 2 seconds.      Findings: Rash: .meds.   Neurological:      General: No focal deficit present.      Mental Status: He is alert and oriented to person, place, and time. Mental status is at baseline.   Psychiatric:         Mood and Affect: Mood normal.         Last Recorded Vitals  Blood pressure 114/79, pulse 88, temperature 36.7 °C (98 °F), temperature source Temporal, resp. rate 18, height 1.854 m (6' 0.99\"), weight 78.9 kg (173 lb 15.1 oz), SpO2 96%.  Intake/Output last 3 Shifts:  I/O last 3 completed shifts:  In: 680 (8.6 mL/kg) [P.O.:480; IV Piggyback:200]  Out: 1320 (16.7 mL/kg) [Chest Tube:1320]  Weight: 78.9 kg     Relevant Results    Scheduled medications  Scheduled Medications[1]  Continuous medications  Continuous Medications[2]  PRN medications  PRN Medications[3]    Results for orders placed or performed during the hospital encounter of 07/02/25 (from the past 24 hours)   POCT GLUCOSE   Result Value Ref Range    POCT Glucose 120 (H) 74 - 99 mg/dL   Sterile Fluid Culture/Smear    Specimen: Pleural; Fluid   Result Value Ref Range    Gram Stain (3+) Moderate Polymorphonuclear leukocytes (AA)     Gram Stain (1+) Rare Gram positive cocci (AA)    Body Fluid Cell Count   Result Value Ref Range    Color, Fluid Red (A) Colorless, Straw, " Yellow    Clarity, Fluid Turbid (A) Clear    WBC, Fluid 3,370 See Comment /uL    RBC, Fluid 129,000 see comment /uL   Body Fluid Differential   Result Value Ref Range    Neutrophils %, Manual, Fluid 20 see comment %    Lymphocytes %, Manual, Fluid 58 see comment %    Mono/Macrophages %, Manual, Fluid 18 see comment %    Eosinophils %, Manual, Fluid 1 see comment %    Unclassified Cells %, Manual, Fluid 3 (H) not established %    Total Cells Counted, Fluid 100    Protein, Total, Body Fluid   Result Value Ref Range    Protein, Total Fluid 3.4 Not established g/dL   Lactate Dehydrogenase, Body Fluid   Result Value Ref Range    LD, Fluid 273 Not established. U/L   Glucose, Body Fluid   Result Value Ref Range    Glucose, Fluid 117 Not established mg/dL   POCT GLUCOSE   Result Value Ref Range    POCT Glucose 202 (H) 74 - 99 mg/dL   CBC   Result Value Ref Range    WBC 10.5 4.4 - 11.3 x10*3/uL    nRBC 0.0 0.0 - 0.0 /100 WBCs    RBC 2.89 (L) 4.50 - 5.90 x10*6/uL    Hemoglobin 8.2 (L) 13.5 - 17.5 g/dL    Hematocrit 26.2 (L) 41.0 - 52.0 %    MCV 91 80 - 100 fL    MCH 28.4 26.0 - 34.0 pg    MCHC 31.3 (L) 32.0 - 36.0 g/dL    RDW 16.4 (H) 11.5 - 14.5 %    Platelets 315 150 - 450 x10*3/uL   Lactate dehydrogenase   Result Value Ref Range     (H) 84 - 246 U/L   Comprehensive Metabolic Panel   Result Value Ref Range    Glucose 137 (H) 74 - 99 mg/dL    Sodium 136 136 - 145 mmol/L    Potassium 3.9 3.5 - 5.3 mmol/L    Chloride 105 98 - 107 mmol/L    Bicarbonate 21 21 - 32 mmol/L    Anion Gap 14 10 - 20 mmol/L    Urea Nitrogen 7 6 - 23 mg/dL    Creatinine 0.68 0.50 - 1.30 mg/dL    eGFR >90 >60 mL/min/1.73m*2    Calcium 7.3 (L) 8.6 - 10.3 mg/dL    Albumin 2.2 (L) 3.4 - 5.0 g/dL    Alkaline Phosphatase 132 33 - 136 U/L    Total Protein 5.2 (L) 6.4 - 8.2 g/dL    AST 77 (H) 9 - 39 U/L    Bilirubin, Total 0.5 0.0 - 1.2 mg/dL    ALT 56 (H) 10 - 52 U/L   CBC and Auto Differential   Result Value Ref Range    WBC 10.8 4.4 - 11.3 x10*3/uL     nRBC 0.0 0.0 - 0.0 /100 WBCs    RBC 2.88 (L) 4.50 - 5.90 x10*6/uL    Hemoglobin 8.2 (L) 13.5 - 17.5 g/dL    Hematocrit 24.8 (L) 41.0 - 52.0 %    MCV 86 80 - 100 fL    MCH 28.5 26.0 - 34.0 pg    MCHC 33.1 32.0 - 36.0 g/dL    RDW 16.2 (H) 11.5 - 14.5 %    Platelets 314 150 - 450 x10*3/uL    Neutrophils % 82.3 40.0 - 80.0 %    Immature Granulocytes %, Automated 0.8 0.0 - 0.9 %    Lymphocytes % 6.9 13.0 - 44.0 %    Monocytes % 9.4 2.0 - 10.0 %    Eosinophils % 0.5 0.0 - 6.0 %    Basophils % 0.1 0.0 - 2.0 %    Neutrophils Absolute 8.89 (H) 1.60 - 5.50 x10*3/uL    Immature Granulocytes Absolute, Automated 0.09 0.00 - 0.50 x10*3/uL    Lymphocytes Absolute 0.75 (L) 0.80 - 3.00 x10*3/uL    Monocytes Absolute 1.01 (H) 0.05 - 0.80 x10*3/uL    Eosinophils Absolute 0.05 0.00 - 0.40 x10*3/uL    Basophils Absolute 0.01 0.00 - 0.10 x10*3/uL   POCT GLUCOSE   Result Value Ref Range    POCT Glucose 179 (H) 74 - 99 mg/dL   POCT GLUCOSE   Result Value Ref Range    POCT Glucose 173 (H) 74 - 99 mg/dL     *Note: Due to a large number of results and/or encounters for the requested time period, some results have not been displayed. A complete set of results can be found in Results Review.     XR chest 1 view   Final Result   1. PleurX catheter in the right lower hemithorax.   2. Decreased size of a moderate-sized loculated right pleural   effusion.        MACRO:   None        Signed by: Glory Everett 7/5/2025 1:00 PM   Dictation workstation:   [id number]      CT angio chest for pulmonary embolism   Final Result   1.  No pulmonary embolism   2. Increase in right pleural effusion since the prior study with   loculated appearance, particularly subpulmonic collection. Pleural   catheter remains in place and is unchanged in position.   3. Right upper lobe mass is unchanged in size from the prior study.   Previously noted pleural nodules are obscured by the pleural effusion   currently             MACRO:   None        Signed by: Kimmy  Pretorius 7/2/2025 3:05 PM   Dictation workstation:   [id number]      XR chest 1 view   Final Result   1. History of lung carcinoma.   2. Small right pleural effusion extending into the major and minor   fissures.   3. Chronic moderate elevation right hemidiaphragm.        MACRO:   None        Signed by: Gloryjoe Everett 7/2/2025 11:04 AM   Dictation workstation:   [id number]          Assessment & Plan  Pleural effusion    Jayant Holland is a 74 y.o. male with a previous medical history of non-small cell lung cancer with mets with Pleurx catheter inserted on the right side due to recurrent malignant pleural effusion, PE ( on Apixaban)  hypertension, type 2 diabetes mellitus, asthma, GERD, low magnesium  presented to [facility] ED from his Oncology office due to c/o of SOB and right sided Chest Pain. He underwent a CT which showed a loculated right pleural effusion which had increased in size since prior imaging.  Thoracic surgery was consulted for further recommendations.     Plan  Right Loculated pleural effusion with prior pleurx placement  - hold off on lytic therapy for today  - Place drain to water seal and document output  - Chest xray daily   - Medical management per primary service and remaining consulting services.      Plan discussed with Dr Orlin GATES spent 30 minutes in the professional and overall care of this patient.      Teo Horner, APRN-CNP         [1] ampicillin-sulbactam, 3 g, intravenous, q6h  [Held by provider] apixaban, 5 mg, oral, BID  folic acid, 1,000 mcg, oral, Daily  insulin lispro, 0-5 Units, subcutaneous, TID AC  lisinopril, 20 mg, oral, Daily  pantoprazole, 40 mg, oral, Daily before breakfast  potassium chloride CR, 40 mEq, oral, Daily  rosuvastatin, 40 mg, oral, Daily  vancomycin, 1,000 mg, intravenous, q12h  zinc oxide, 1 Application, Topical, BID  [2]    [3] PRN medications: acetaminophen **OR** acetaminophen **OR** acetaminophen, dextrose, dextrose, glucagon,  glucagon, HYDROmorphone, vancomycin

## 2025-07-05 NOTE — ASSESSMENT & PLAN NOTE
Anemia  Chronic low magnesium    Jayant Holland is a 74 y.o. male with a past medical history of non-small cell lung cancer with mets with Pleurx catheter, HTN, DM, asthma, PE on eliquis was admitted for SOB and right sided chest pain.      non-small cell lung cancer with mets with Pleurx catheter inserted on the right side due to recurrent malignant pleural effusion  Concern for infection  - thoracic surg following: Initiate management recommendations  - Planned tpa per thoracic surg  - ID consult: Agree with drainage and TPA/dornase. Send fluid for cell/count/diff, LDH, protein, glucose, pH, cytology. If these labs seem consistent with infection could start on IV Unasyn 3g Q6H otherwise OK to hold abx.   - Pleural studies consistent with exudative effusion based on Light's criteria  - Pleural fluid stain: GPC.   - BCX pending  - start Vancomycin, ID in agreement with Vanc        Concern for GI bleed  Hx GERD  - GI consult  - Hg 6.9 -> remains stable at 8.2  - transfuse 1U PRBC  - hold eliquis, will restart eliquis if HgB remains stable  - occult stool  - ppi     HypoMg  - replace     HTN  - lisinopril     PE  - hold eliquis     DM  - hold metformin  - ISS     Diet: diabetic    DVT ppx: SCD    Disposition: Presenting with shortness of breath in the setting of recurrent pleural effusion, need further management, discharge pending clinical stability.

## 2025-07-05 NOTE — PROGRESS NOTES
"  INFECTIOUS DISEASE DAILY PROGRESS NOTE    SUBJECTIVE:    No overnight events. No new complaints. Afebrile. No rash/itching/diarrhea.    OBJECTIVE:  VITALS (Last 24 Hours)  /83 (BP Location: Right arm, Patient Position: Lying)   Pulse 88   Temp 36.9 °C (98.4 °F) (Temporal)   Resp 16   Ht 1.854 m (6' 0.99\")   Wt 78.9 kg (173 lb 15.1 oz)   SpO2 96%   BMI 22.95 kg/m²     PHYSICAL EXAM:  Gen - NAD  Heart - RRR  Lungs - diminished RLL, pleurex present  Abd - soft, no ttp, BS present  Skin - no rash    ABX: None    LABS:  Lab Results   Component Value Date    WBC 10.8 07/05/2025    HGB 8.2 (L) 07/05/2025    HCT 24.8 (L) 07/05/2025    MCV 86 07/05/2025     07/05/2025     Lab Results   Component Value Date    GLUCOSE 137 (H) 07/05/2025    CALCIUM 7.3 (L) 07/05/2025     07/05/2025    K 3.9 07/05/2025    CO2 21 07/05/2025     07/05/2025    BUN 7 07/05/2025    CREATININE 0.68 07/05/2025     Results from last 72 hours   Lab Units 07/05/25  0514   ALK PHOS U/L 132   BILIRUBIN TOTAL mg/dL 0.5   PROTEIN TOTAL g/dL 5.2*   ALT U/L 56*   AST U/L 77*   ALBUMIN g/dL 2.2*     Estimated Creatinine Clearance: 106.4 mL/min (by C-G formula based on SCr of 0.68 mg/dL).    ASSESSMENT/PLAN:     NSCLC with Malignant Right Effusion c/b Superinfection/Empyema    Fluid analysis consistent with infection. GPC on gram stain.    D/w Dr. Goyal and IV Vancomycin being started.    Will follow peripherally over the weekend. Please call me with questions. Thanks!    Harman Connelly MD  [facility]  Office #[phone]      "

## 2025-07-05 NOTE — PROGRESS NOTES
Jayant Holland is a 74 y.o. male on day 3 of admission presenting with Pleural effusion.      Subjective   Seen and examined at bedside. NAEON. Has no new complaints. Denies fever, chills, night sweats palpitations. Pleural studies consistent with exudative effusion based on Light's criteria, pleural fluid stain: GPC. BCX and start Vancomycin       Objective     Last Recorded Vitals  /83 (BP Location: Right arm, Patient Position: Lying)   Pulse 88   Temp 36.9 °C (98.4 °F) (Temporal)   Resp 16   Wt 78.9 kg (173 lb 15.1 oz)   SpO2 96%   Intake/Output last 3 Shifts:    Intake/Output Summary (Last 24 hours) at 7/5/2025 1108  Last data filed at 7/5/2025 0749  Gross per 24 hour   Intake 420 ml   Output 1320 ml   Net -900 ml       Admission Weight  Weight: 78.9 kg (174 lb) (07/02/25 1003)    Daily Weight  07/03/25 : 78.9 kg (173 lb 15.1 oz)    Image Results  ECG 12 lead  Sinus rhythm with Premature atrial complexes  Inferior infarct (cited on or before 02-JUL-2025)  Abnormal ECG  When compared with ECG of 02-JUL-2025 10:08, (unconfirmed)  Premature atrial complexes are now Present  ECG 12 lead  Sinus tachycardia  Indeterminate axis  Inferior infarct , age undetermined  Abnormal ECG  When compared with ECG of 20-MAR-2025 19:52,  PREVIOUS ECG IS PRESENT      Physical Exam  Constitutional: Pleasant elderly gentleman, in room air, conversant without dyspnea, alert active, cooperative not in acute distress, appears anxious  Eyes: PERRLA, clear sclera  ENMT: Moist mucosal membranes, no exudate  Head / Neck: Atraumatic, normocephalic, supple neck, JVP not visualized  Lungs: Patent airways, CTABL.  PleurX catheter on the right flank, covered with dressing, no drainage.  Heart: RRR, S1S2, no murmurs appreciated, palpable pulses in all extremities  GI: Soft, NT, ND, bowel sounds present in all quadrants  MSK: Moves all extremities freely, no restriction  of ROM, no joint edema  Extremities: Intact x 4, no peripheral  edema  : No Titus catheter inserted  Breast: Deferred  Neurological: AAO x 3 to person, place and date, facial muscles symmetrical, sensation intact, strength 4/4, no acute focal neurological deficits appreciated  Psychological: Appropriate mood and behavior  Relevant Results               This patient currently has cardiac telemetry ordered; if you would like to modify or discontinue the telemetry order, click here to go to the orders activity to modify/discontinue the order.              Assessment & Plan  Pleural effusion  Anemia  Chronic low magnesium    Jayant Holland is a 74 y.o. male with a past medical history of non-small cell lung cancer with mets with Pleurx catheter, HTN, DM, asthma, PE on eliquis was admitted for SOB and right sided chest pain.      non-small cell lung cancer with mets with Pleurx catheter inserted on the right side due to recurrent malignant pleural effusion  Concern for infection  - thoracic surg following: Initiate management recommendations  - Planned tpa per thoracic surg  - ID consult: Agree with drainage and TPA/dornase. Send fluid for cell/count/diff, LDH, protein, glucose, pH, cytology. If these labs seem consistent with infection could start on IV Unasyn 3g Q6H otherwise OK to hold abx.   - Pleural studies consistent with exudative effusion based on Light's criteria  - Pleural fluid stain: GPC.   - BCX pending  - start Vancomycin, ID in agreement with Vanc        Concern for GI bleed  Hx GERD  - GI consult  - Hg 6.9 -> remains stable at 8.2  - transfuse 1U PRBC  - hold eliquis, will restart eliquis if HgB remains stable  - occult stool  - ppi     HypoMg  - replace     HTN  - lisinopril     PE  - hold eliquis     DM  - hold metformin  - ISS     Diet: diabetic    DVT ppx: SCD    Disposition: Presenting with shortness of breath in the setting of recurrent pleural effusion, need further management, discharge pending clinical stability.     Complexity high         Miguel Goyal,  DO

## 2025-07-05 NOTE — CARE PLAN
"The patient's goals for the shift include \"get my back to stop hurting please\"    The clinical goals for the shift include patient will remain free from falls and injury through out my shift.    "

## 2025-07-05 NOTE — CARE PLAN
"The patient's goals for the shift include \"get my back to stop hurting please\"    The clinical goals for the shift include patient will remain HDS throughout the shift      Problem: Pain - Adult  Goal: Verbalizes/displays adequate comfort level or baseline comfort level  Outcome: Progressing     Problem: Safety - Adult  Goal: Free from fall injury  Outcome: Progressing     Problem: Discharge Planning  Goal: Discharge to home or other facility with appropriate resources  Outcome: Progressing     Problem: Chronic Conditions and Co-morbidities  Goal: Patient's chronic conditions and co-morbidity symptoms are monitored and maintained or improved  Outcome: Progressing     Problem: Nutrition  Goal: Nutrient intake appropriate for maintaining nutritional needs  Outcome: Progressing     Problem: Skin  Goal: Decreased wound size/increased tissue granulation at next dressing change  Outcome: Progressing  Goal: Participates in plan/prevention/treatment measures  Outcome: Progressing  Goal: Prevent/manage excess moisture  Outcome: Progressing  Goal: Prevent/minimize sheer/friction injuries  Outcome: Progressing  Goal: Promote/optimize nutrition  Outcome: Progressing  Goal: Promote skin healing  Outcome: Progressing     Problem: Diabetes  Goal: Decrease in ketones present in urine by end of shift  Outcome: Progressing  Goal: Maintain electrolyte levels within acceptable range throughout shift  Outcome: Progressing  Goal: Maintain glucose levels >70mg/dl to <250mg/dl throughout shift  Outcome: Progressing  Goal: No changes in neurological exam by end of shift  Outcome: Progressing  Goal: Learn about and adhere to nutrition recommendations by end of shift  Outcome: Progressing  Goal: Vital signs within normal range for age by end of shift  Outcome: Progressing     Problem: Pain  Goal: Takes deep breaths with improved pain control throughout the shift  Outcome: Progressing  Goal: Turns in bed with improved pain control throughout " the shift  Outcome: Progressing  Goal: Walks with improved pain control throughout the shift  Outcome: Progressing  Goal: Performs ADL's with improved pain control throughout shift  Outcome: Progressing  Goal: Participates in PT with improved pain control throughout the shift  Outcome: Progressing  Goal: Free from opioid side effects throughout the shift  Outcome: Progressing  Goal: Free from acute confusion related to pain meds throughout the shift  Outcome: Progressing     Problem: Fall/Injury  Goal: Not fall by end of shift  Outcome: Progressing  Goal: Be free from injury by end of the shift  Outcome: Progressing  Goal: Use assistive devices by end of the shift  Outcome: Progressing  Goal: Pace activities to prevent fatigue by end of the shift  Outcome: Progressing

## 2025-07-05 NOTE — PROGRESS NOTES
Vancomycin Dosing by Pharmacy- INITIAL    Jayant Holland is a 74 y.o. year old male who Pharmacy has been consulted for vancomycin dosing for pneumonia (Lung abscess). Based on the patient's indication and renal status this patient will be dosed based on a goal AUC of 400-600.     Renal function is currently stable.    Visit Vitals  /83 (BP Location: Right arm, Patient Position: Lying)   Pulse 88   Temp 36.9 °C (98.4 °F) (Temporal)   Resp 16        Lab Results   Component Value Date    CREATININE 0.68 2025    CREATININE 0.73 2025    CREATININE 0.72 2025    CREATININE 0.83 2025        Patient weight is as follows:   Vitals:    25 1547   Weight: 78.9 kg (173 lb 15.1 oz)       Cultures:  No results found for the encounter in last 14 days.        I/O last 3 completed shifts:  In: 680 (8.6 mL/kg) [P.O.:480; IV Piggyback:200]  Out: 1320 (16.7 mL/kg) [Chest Tube:1320]  Weight: 78.9 kg   I/O during current shift:  I/O this shift:  In: 220 [P.O.:120; IV Piggyback:100]  Out: -     Temp (24hrs), Av.4 °C (99.3 °F), Min:36.7 °C (98.1 °F), Max:38.6 °C (101.5 °F)         Assessment/Plan     Patient will not be given a loading dose.  Will initiate vancomycin maintenance, 1000 mg every 12 hours.    This dosing regimen is predicted by [employer] to result in the following pharmacokinetic parameters:  Exposure target: AUC24 (range) 400-600 mg/L.hr   JOQ32-27: 390 mg/L.hr  AUC24,ss: 489 mg/L.hr  Probability of AUC24 > 400: 71 %  Ctrough,ss: 15.2 mg/L  Probability of Ctrough,ss > 20: 28 %    Follow-up level will be ordered on  at 0500 unless clinically indicated sooner.  Will continue to monitor renal function daily while on vancomycin and order serum creatinine at least every 48 hours if not already ordered.  Follow for continued vancomycin needs, clinical response, and signs/symptoms of toxicity.       Jenny Lopez PharmD

## 2025-07-06 ENCOUNTER — APPOINTMENT (OUTPATIENT)
Dept: RADIOLOGY | Facility: HOSPITAL | Age: 75
DRG: 180 | End: 2025-07-06
Payer: MEDICARE

## 2025-07-06 VITALS
HEART RATE: 93 BPM | TEMPERATURE: 98.5 F | OXYGEN SATURATION: 95 % | DIASTOLIC BLOOD PRESSURE: 86 MMHG | RESPIRATION RATE: 17 BRPM | WEIGHT: 173.94 LBS | BODY MASS INDEX: 23.05 KG/M2 | SYSTOLIC BLOOD PRESSURE: 132 MMHG | HEIGHT: 73 IN

## 2025-07-06 LAB
ALBUMIN SERPL BCP-MCNC: 2.3 G/DL (ref 3.4–5)
ALP SERPL-CCNC: 164 U/L (ref 33–136)
ALT SERPL W P-5'-P-CCNC: 85 U/L (ref 10–52)
ANION GAP SERPL CALC-SCNC: 13 MMOL/L (ref 10–20)
AST SERPL W P-5'-P-CCNC: 123 U/L (ref 9–39)
BACTERIA BLD CULT: NORMAL
BACTERIA BLD CULT: NORMAL
BACTERIA FLD CULT: ABNORMAL
BASOPHILS # BLD AUTO: 0.02 X10*3/UL (ref 0–0.1)
BASOPHILS NFR BLD AUTO: 0.2 %
BILIRUB SERPL-MCNC: 0.6 MG/DL (ref 0–1.2)
BUN SERPL-MCNC: 8 MG/DL (ref 6–23)
CALCIUM SERPL-MCNC: 8.2 MG/DL (ref 8.6–10.3)
CHLORIDE SERPL-SCNC: 104 MMOL/L (ref 98–107)
CO2 SERPL-SCNC: 24 MMOL/L (ref 21–32)
CREAT SERPL-MCNC: 0.75 MG/DL (ref 0.5–1.3)
EGFRCR SERPLBLD CKD-EPI 2021: >90 ML/MIN/1.73M*2
EOSINOPHIL # BLD AUTO: 0.1 X10*3/UL (ref 0–0.4)
EOSINOPHIL NFR BLD AUTO: 1.1 %
ERYTHROCYTE [DISTWIDTH] IN BLOOD BY AUTOMATED COUNT: 16.6 % (ref 11.5–14.5)
GLUCOSE BLD MANUAL STRIP-MCNC: 136 MG/DL (ref 74–99)
GLUCOSE BLD MANUAL STRIP-MCNC: 145 MG/DL (ref 74–99)
GLUCOSE BLD MANUAL STRIP-MCNC: 212 MG/DL (ref 74–99)
GLUCOSE SERPL-MCNC: 120 MG/DL (ref 74–99)
GRAM STN SPEC: ABNORMAL
GRAM STN SPEC: ABNORMAL
HCT VFR BLD AUTO: 27.1 % (ref 41–52)
HGB BLD-MCNC: 8.4 G/DL (ref 13.5–17.5)
IMM GRANULOCYTES # BLD AUTO: 0.07 X10*3/UL (ref 0–0.5)
IMM GRANULOCYTES NFR BLD AUTO: 0.7 % (ref 0–0.9)
LYMPHOCYTES # BLD AUTO: 1.19 X10*3/UL (ref 0.8–3)
LYMPHOCYTES NFR BLD AUTO: 12.6 %
MCH RBC QN AUTO: 28.2 PG (ref 26–34)
MCHC RBC AUTO-ENTMCNC: 31 G/DL (ref 32–36)
MCV RBC AUTO: 91 FL (ref 80–100)
MONOCYTES # BLD AUTO: 0.97 X10*3/UL (ref 0.05–0.8)
MONOCYTES NFR BLD AUTO: 10.3 %
NEUTROPHILS # BLD AUTO: 7.08 X10*3/UL (ref 1.6–5.5)
NEUTROPHILS NFR BLD AUTO: 75.1 %
NRBC BLD-RTO: 0.2 /100 WBCS (ref 0–0)
PLATELET # BLD AUTO: 298 X10*3/UL (ref 150–450)
POTASSIUM SERPL-SCNC: 3.8 MMOL/L (ref 3.5–5.3)
PROT SERPL-MCNC: 5.6 G/DL (ref 6.4–8.2)
RBC # BLD AUTO: 2.98 X10*6/UL (ref 4.5–5.9)
SODIUM SERPL-SCNC: 137 MMOL/L (ref 136–145)
VANCOMYCIN SERPL-MCNC: 10.9 UG/ML (ref 5–20)
WBC # BLD AUTO: 9.4 X10*3/UL (ref 4.4–11.3)

## 2025-07-06 PROCEDURE — 80202 ASSAY OF VANCOMYCIN: CPT

## 2025-07-06 PROCEDURE — 2500000004 HC RX 250 GENERAL PHARMACY W/ HCPCS (ALT 636 FOR OP/ED)

## 2025-07-06 PROCEDURE — 99232 SBSQ HOSP IP/OBS MODERATE 35: CPT | Performed by: NURSE PRACTITIONER

## 2025-07-06 PROCEDURE — 2500000001 HC RX 250 WO HCPCS SELF ADMINISTERED DRUGS (ALT 637 FOR MEDICARE OP): Performed by: NURSE PRACTITIONER

## 2025-07-06 PROCEDURE — 2500000004 HC RX 250 GENERAL PHARMACY W/ HCPCS (ALT 636 FOR OP/ED): Mod: JZ | Performed by: NURSE PRACTITIONER

## 2025-07-06 PROCEDURE — 71045 X-RAY EXAM CHEST 1 VIEW: CPT | Performed by: RADIOLOGY

## 2025-07-06 PROCEDURE — 85025 COMPLETE CBC W/AUTO DIFF WBC: CPT | Performed by: STUDENT IN AN ORGANIZED HEALTH CARE EDUCATION/TRAINING PROGRAM

## 2025-07-06 PROCEDURE — 99233 SBSQ HOSP IP/OBS HIGH 50: CPT

## 2025-07-06 PROCEDURE — 2500000004 HC RX 250 GENERAL PHARMACY W/ HCPCS (ALT 636 FOR OP/ED): Performed by: INTERNAL MEDICINE

## 2025-07-06 PROCEDURE — 2500000004 HC RX 250 GENERAL PHARMACY W/ HCPCS (ALT 636 FOR OP/ED): Performed by: NURSE PRACTITIONER

## 2025-07-06 PROCEDURE — 80053 COMPREHEN METABOLIC PANEL: CPT | Performed by: STUDENT IN AN ORGANIZED HEALTH CARE EDUCATION/TRAINING PROGRAM

## 2025-07-06 PROCEDURE — 76705 ECHO EXAM OF ABDOMEN: CPT

## 2025-07-06 PROCEDURE — 82947 ASSAY GLUCOSE BLOOD QUANT: CPT

## 2025-07-06 PROCEDURE — 76705 ECHO EXAM OF ABDOMEN: CPT | Performed by: RADIOLOGY

## 2025-07-06 PROCEDURE — 2500000002 HC RX 250 W HCPCS SELF ADMINISTERED DRUGS (ALT 637 FOR MEDICARE OP, ALT 636 FOR OP/ED): Performed by: NURSE PRACTITIONER

## 2025-07-06 PROCEDURE — 2060000001 HC INTERMEDIATE ICU ROOM DAILY

## 2025-07-06 PROCEDURE — 71045 X-RAY EXAM CHEST 1 VIEW: CPT

## 2025-07-06 RX ADMIN — PANTOPRAZOLE SODIUM 40 MG: 40 TABLET, DELAYED RELEASE ORAL at 06:06

## 2025-07-06 RX ADMIN — AMPICILLIN SODIUM AND SULBACTAM SODIUM 3 G: 2; 1 INJECTION, POWDER, FOR SOLUTION INTRAMUSCULAR; INTRAVENOUS at 18:12

## 2025-07-06 RX ADMIN — ALTEPLASE 10 MG: 2.2 INJECTION, POWDER, LYOPHILIZED, FOR SOLUTION INTRAVENOUS at 11:07

## 2025-07-06 RX ADMIN — AMPICILLIN SODIUM AND SULBACTAM SODIUM 3 G: 2; 1 INJECTION, POWDER, FOR SOLUTION INTRAMUSCULAR; INTRAVENOUS at 11:45

## 2025-07-06 RX ADMIN — FOLIC ACID 1 MG: 1 TABLET ORAL at 08:33

## 2025-07-06 RX ADMIN — AMPICILLIN SODIUM AND SULBACTAM SODIUM 3 G: 2; 1 INJECTION, POWDER, FOR SOLUTION INTRAMUSCULAR; INTRAVENOUS at 06:06

## 2025-07-06 RX ADMIN — VANCOMYCIN HYDROCHLORIDE 1000 MG: 1 INJECTION, SOLUTION INTRAVENOUS at 10:28

## 2025-07-06 RX ADMIN — DORNASE ALFA 5 MG: 1 SOLUTION RESPIRATORY (INHALATION) at 11:07

## 2025-07-06 RX ADMIN — HYDROMORPHONE HYDROCHLORIDE 0.2 MG: 0.2 INJECTION, SOLUTION INTRAMUSCULAR; INTRAVENOUS; SUBCUTANEOUS at 18:17

## 2025-07-06 RX ADMIN — LISINOPRIL 20 MG: 20 TABLET ORAL at 08:33

## 2025-07-06 RX ADMIN — SODIUM CHLORIDE: 9 INJECTION, SOLUTION INTRAVENOUS at 11:06

## 2025-07-06 RX ADMIN — VANCOMYCIN HYDROCHLORIDE 1000 MG: 1 INJECTION, SOLUTION INTRAVENOUS at 21:52

## 2025-07-06 RX ADMIN — POTASSIUM CHLORIDE EXTENDED-RELEASE 40 MEQ: 1500 TABLET ORAL at 08:33

## 2025-07-06 RX ADMIN — AMPICILLIN SODIUM AND SULBACTAM SODIUM 3 G: 2; 1 INJECTION, POWDER, FOR SOLUTION INTRAMUSCULAR; INTRAVENOUS at 23:08

## 2025-07-06 RX ADMIN — ROSUVASTATIN 40 MG: 20 TABLET, FILM COATED ORAL at 08:33

## 2025-07-06 ASSESSMENT — PAIN - FUNCTIONAL ASSESSMENT
PAIN_FUNCTIONAL_ASSESSMENT: 0-10

## 2025-07-06 ASSESSMENT — COGNITIVE AND FUNCTIONAL STATUS - GENERAL
MOBILITY SCORE: 24
DAILY ACTIVITIY SCORE: 24

## 2025-07-06 ASSESSMENT — PAIN SCALES - GENERAL
PAINLEVEL_OUTOF10: 7
PAINLEVEL_OUTOF10: 0 - NO PAIN

## 2025-07-06 ASSESSMENT — PAIN DESCRIPTION - LOCATION: LOCATION: RIB CAGE

## 2025-07-06 ASSESSMENT — PAIN DESCRIPTION - ORIENTATION: ORIENTATION: RIGHT

## 2025-07-06 ASSESSMENT — PAIN DESCRIPTION - DESCRIPTORS: DESCRIPTORS: ACHING

## 2025-07-06 NOTE — CARE PLAN
"The patient's goals for the shift include \"get my back to stop hurting please\"    The clinical goals for the shift include Patient will remain free from falls and injury throughout shift.    Over the shift, the patient did make progress toward the following goals.       Problem: Pain - Adult  Goal: Verbalizes/displays adequate comfort level or baseline comfort level  Outcome: Progressing     Problem: Safety - Adult  Goal: Free from fall injury  Outcome: Progressing     Problem: Chronic Conditions and Co-morbidities  Goal: Patient's chronic conditions and co-morbidity symptoms are monitored and maintained or improved  Outcome: Progressing     Problem: Skin  Goal: Prevent/manage excess moisture  Outcome: Progressing  Goal: Prevent/minimize sheer/friction injuries  Outcome: Progressing     Problem: Diabetes  Goal: Maintain glucose levels >70mg/dl to <250mg/dl throughout shift  Outcome: Progressing     "

## 2025-07-06 NOTE — PROGRESS NOTES
Jayant Holland is a 74 y.o. male on day 4 of admission presenting with Pleural effusion.      Subjective   Seen and examined at bedside. NAEON. Has no new complaints. Denies fever, chills, cold sweats, chest pain, SOB, abdominal pain, N/V. Has a new transaminitis that is worsening. Plan for RUQ US today. BCX NTD         Objective     Last Recorded Vitals  /73 (BP Location: Left arm, Patient Position: Lying)   Pulse 79   Temp 36.5 °C (97.7 °F) (Skin)   Resp 24   Wt 78.9 kg (173 lb 15.1 oz)   SpO2 97%   Intake/Output last 3 Shifts:    Intake/Output Summary (Last 24 hours) at 7/6/2025 1050  Last data filed at 7/6/2025 0900  Gross per 24 hour   Intake 400 ml   Output 500 ml   Net -100 ml       Admission Weight  Weight: 78.9 kg (174 lb) (07/02/25 1003)    Daily Weight  07/03/25 : 78.9 kg (173 lb 15.1 oz)    Image Results  US right upper quadrant  Narrative: Interpreted By:  J Luis Bangura,   STUDY:  US RIGHT UPPER QUADRANT  7/6/2025 8:35 am      INDICATION:  73 y/o   M with  Signs/Symptoms:Known NSCLC, empyema, new  transaminitis.          COMPARISON:  None.      ACCESSION NUMBER(S):  [id number]      ORDERING CLINICIAN:  JANICE TALLEY      TECHNIQUE:  Routine ultrasound of the right upper quadrant was performed. Static  images were obtained for remote interpretation.      FINDINGS:  LIVER:  The hepatic parenchyma is slightly coarsened and hyperechoic.  Liver  is normal in size. Liver measures 14.1 cm in greatest sagittal  dimension. No focal intrahepatic mass lesion is identified on the  provided images.      BILE DUCTS:  No intrahepatic biliary dilatation. Common bile duct is within normal  limits, measuring 4mm in diameter.      GALLBLADDER:  No gallbladder wall thickening or pericholecystic fluid is observed.  There is debris/sludge within the gallbladder. No definite shadowing  gallstones.      PANCREAS:  The pancreas is inadequately evaluated secondary to significant  shadowing from overlying bowel gas.       RIGHT KIDNEY:  Visible portions of the right kidney appears sonographically  unremarkable.      PERITONEAL FLUID:  None seen in the right upper quadrant.      Impression: Coarsened and hyperechoic hepatic parenchyma, compatible with  steatosis or hepatocellular disease. Debris/sludge within the  gallbladder.      MACRO:  None.      Signed by: J Luis Bangura 7/6/2025 10:06 AM  Dictation workstation:   [id number]      Physical Exam  Constitutional: Pleasant elderly gentleman, in room air, conversant without dyspnea, alert active, cooperative not in acute distress, appears anxious  Eyes: PERRLA, clear sclera  ENMT: Moist mucosal membranes, no exudate  Head / Neck: Atraumatic, normocephalic, supple neck, JVP not visualized  Lungs: Patent airways, CTABL.  PleurX catheter on the right flank, covered with dressing, no drainage. Attached to chest tube and clamped, bloody output noted  Heart: RRR, S1S2, no murmurs appreciated, palpable pulses in all extremities  GI: Soft, NT, ND, bowel sounds present in all quadrants  MSK: Moves all extremities freely, no restriction  of ROM, no joint edema  Extremities: Intact x 4, no peripheral edema  : No Titus catheter inserted  Breast: Deferred  Neurological: AAO x 3 to person, place and date, facial muscles symmetrical, sensation intact, strength 4/4, no acute focal neurological deficits appreciated  Psychological: Appropriate mood and behavior  Relevant Results               This patient currently has cardiac telemetry ordered; if you would like to modify or discontinue the telemetry order, click here to go to the orders activity to modify/discontinue the order.    This patient has a central line   Reason for the central line remaining today? Parenteral medication            Assessment & Plan  Pleural effusion  Anemia  Chronic low magnesium    Jayant Holland is a 74 y.o. male with a past medical history of non-small cell lung cancer with mets with Pleurx catheter, HTN, DM, asthma, PE  on eliquis was admitted for SOB and right sided chest pain.      non-small cell lung cancer with mets with Pleurx catheter inserted on the right side due to recurrent malignant pleural effusion  Concern for infection  - thoracic surg following: Initiate management recommendations  - Planned tpa per thoracic surg  - ID consult: Agree with drainage and TPA/dornase. Send fluid for cell/count/diff, LDH, protein, glucose, pH, cytology. If these labs seem consistent with infection could start on IV Unasyn 3g Q6H otherwise OK to hold abx.   - Pleural studies consistent with exudative effusion based on Light's criteria  - Pleural fluid stain: GPC.   - BCX pending: NTD   - start Vancomycin, ID in agreement with Vanc  - Start Unasyn      Concern for GI bleed  Hx GERD  - GI consult: No EGD/colonoscopy warranted   - Hg 6.9 -> remains stable at 8.2 -> 8.4  - transfuse 1U PRBC  - hold eliquis, will restart eliquis if HgB remains stable  - ppi     HypoMg  - replace     HTN  - lisinopril     PE  - hold eliquis     DM  - hold metformin  - ISS     Diet: diabetic    DVT ppx: SCD    Disposition: Presenting with shortness of breath in the setting of recurrent pleural effusion, need further management, discharge pending clinical stability.             Miguel Goyal, DO

## 2025-07-06 NOTE — ASSESSMENT & PLAN NOTE
Anemia  Chronic low magnesium    Jayant Holland is a 74 y.o. male with a past medical history of non-small cell lung cancer with mets with Pleurx catheter, HTN, DM, asthma, PE on eliquis was admitted for SOB and right sided chest pain.      non-small cell lung cancer with mets with Pleurx catheter inserted on the right side due to recurrent malignant pleural effusion  Concern for infection  - thoracic surg following: Initiate management recommendations  - Planned tpa per thoracic surg  - ID consult: Agree with drainage and TPA/dornase. Send fluid for cell/count/diff, LDH, protein, glucose, pH, cytology. If these labs seem consistent with infection could start on IV Unasyn 3g Q6H otherwise OK to hold abx.   - Pleural studies consistent with exudative effusion based on Light's criteria  - Pleural fluid stain: GPC.   - BCX pending: NTD   - start Vancomycin, ID in agreement with Vanc  - Start Unasyn      Concern for GI bleed  Hx GERD  - GI consult: No EGD/colonoscopy warranted   - Hg 6.9 -> remains stable at 8.2 -> 8.4  - transfuse 1U PRBC  - hold eliquis, will restart eliquis if HgB remains stable  - ppi     HypoMg  - replace     HTN  - lisinopril     PE  - hold eliquis     DM  - hold metformin  - ISS     Diet: diabetic    DVT ppx: SCD    Disposition: Presenting with shortness of breath in the setting of recurrent pleural effusion, need further management, discharge pending clinical stability.

## 2025-07-06 NOTE — CARE PLAN
"  Problem: Pain - Adult  Goal: Verbalizes/displays adequate comfort level or baseline comfort level  Outcome: Progressing     Problem: Safety - Adult  Goal: Free from fall injury  Outcome: Progressing     Problem: Discharge Planning  Goal: Discharge to home or other facility with appropriate resources  Outcome: Progressing     Problem: Nutrition  Goal: Nutrient intake appropriate for maintaining nutritional needs  Outcome: Progressing   The patient's goals for the shift include \"get my back to stop hurting please\"    The clinical goals for the shift include pt. will remain hds    Over the shift, the patient did not make progress toward the following goals. Barriers to progression include . Recommendations to address these barriers include .    "

## 2025-07-06 NOTE — PROGRESS NOTES
Vancomycin Dosing by Pharmacy- FOLLOW UP    Jayant Holland is a 74 y.o. year old male who Pharmacy has been consulted for vancomycin dosing for pneumonia. Based on the patient's indication and renal status this patient is being dosed based on a goal AUC of 400-600.     Renal function is currently stable.    Current vancomycin dose: 1000 mg given every 12 hours    Estimated vancomycin AUC on current dose: 497 mg/L.hr     Visit Vitals  /74 (BP Location: Right arm, Patient Position: Lying)   Pulse 79   Temp 36.6 °C (97.9 °F) (Temporal)   Resp 18        Lab Results   Component Value Date    CREATININE 0.75 2025    CREATININE 0.68 2025    CREATININE 0.73 2025    CREATININE 0.72 2025        Patient weight is as follows:   Vitals:    25 1547   Weight: 78.9 kg (173 lb 15.1 oz)       Cultures:  No results found for the encounter in last 14 days.       I/O last 3 completed shifts:  In: 860 (10.9 mL/kg) [P.O.:360; IV Piggyback:500]  Out: 1470 (18.6 mL/kg) [Chest Tube:1470]  Weight: 78.9 kg   I/O during current shift:  I/O this shift:  In: -   Out: 300 [Chest Tube:300]    Temp (24hrs), Av.8 °C (98.3 °F), Min:36.6 °C (97.9 °F), Max:37.1 °C (98.8 °F)      Assessment/Plan    Within goal AUC range. Continue current vancomycin regimen.    This dosing regimen is predicted by [employer] to result in the following pharmacokinetic parameters:  Exposure target: AUC24 (range) 400-600 mg/L.hr   JOJ57-03: 436 mg/L.hr  AUC24,ss: 497 mg/L.hr  Probability of AUC24 > 400: 81 %  Ctrough,ss: 16.3 mg/L  Probability of Ctrough,ss > 20: 28 %    The next level will be obtained on  at AM labs. May be obtained sooner if clinically indicated.   Will continue to monitor renal function daily while on vancomycin and order serum creatinine at least every 48 hours if not already ordered.  Follow for continued vancomycin needs, clinical response, and signs/symptoms of toxicity.       Cliff Tuttle, PharmD

## 2025-07-07 ENCOUNTER — APPOINTMENT (OUTPATIENT)
Dept: RADIOLOGY | Facility: HOSPITAL | Age: 75
DRG: 180 | End: 2025-07-07
Payer: MEDICARE

## 2025-07-07 ENCOUNTER — APPOINTMENT (OUTPATIENT)
Dept: HEMATOLOGY/ONCOLOGY | Facility: CLINIC | Age: 75
End: 2025-07-07
Payer: MEDICARE

## 2025-07-07 LAB
ALBUMIN SERPL BCP-MCNC: 2.2 G/DL (ref 3.4–5)
ALP SERPL-CCNC: 173 U/L (ref 33–136)
ALT SERPL W P-5'-P-CCNC: 92 U/L (ref 10–52)
ANION GAP SERPL CALC-SCNC: 9 MMOL/L (ref 10–20)
AST SERPL W P-5'-P-CCNC: 107 U/L (ref 9–39)
ATRIAL RATE: 24 BPM
ATRIAL RATE: 98 BPM
BASOPHILS # BLD AUTO: 0.01 X10*3/UL (ref 0–0.1)
BASOPHILS NFR BLD AUTO: 0.1 %
BILIRUB SERPL-MCNC: 0.6 MG/DL (ref 0–1.2)
BUN SERPL-MCNC: 7 MG/DL (ref 6–23)
CALCIUM SERPL-MCNC: 8 MG/DL (ref 8.6–10.3)
CHLORIDE SERPL-SCNC: 104 MMOL/L (ref 98–107)
CO2 SERPL-SCNC: 26 MMOL/L (ref 21–32)
CREAT SERPL-MCNC: 0.63 MG/DL (ref 0.5–1.3)
EGFRCR SERPLBLD CKD-EPI 2021: >90 ML/MIN/1.73M*2
EOSINOPHIL # BLD AUTO: 0.07 X10*3/UL (ref 0–0.4)
EOSINOPHIL NFR BLD AUTO: 0.8 %
ERYTHROCYTE [DISTWIDTH] IN BLOOD BY AUTOMATED COUNT: 16.9 % (ref 11.5–14.5)
GLUCOSE BLD MANUAL STRIP-MCNC: 124 MG/DL (ref 74–99)
GLUCOSE BLD MANUAL STRIP-MCNC: 130 MG/DL (ref 74–99)
GLUCOSE BLD MANUAL STRIP-MCNC: 132 MG/DL (ref 74–99)
GLUCOSE BLD MANUAL STRIP-MCNC: 159 MG/DL (ref 74–99)
GLUCOSE BLD MANUAL STRIP-MCNC: 177 MG/DL (ref 74–99)
GLUCOSE SERPL-MCNC: 132 MG/DL (ref 74–99)
HCT VFR BLD AUTO: 25.6 % (ref 41–52)
HGB BLD-MCNC: 8.2 G/DL (ref 13.5–17.5)
IMM GRANULOCYTES # BLD AUTO: 0.08 X10*3/UL (ref 0–0.5)
IMM GRANULOCYTES NFR BLD AUTO: 0.9 % (ref 0–0.9)
LYMPHOCYTES # BLD AUTO: 0.94 X10*3/UL (ref 0.8–3)
LYMPHOCYTES NFR BLD AUTO: 10.7 %
MAGNESIUM SERPL-MCNC: 1.28 MG/DL (ref 1.6–2.4)
MCH RBC QN AUTO: 27.8 PG (ref 26–34)
MCHC RBC AUTO-ENTMCNC: 32 G/DL (ref 32–36)
MCV RBC AUTO: 87 FL (ref 80–100)
MONOCYTES # BLD AUTO: 0.92 X10*3/UL (ref 0.05–0.8)
MONOCYTES NFR BLD AUTO: 10.5 %
NEUTROPHILS # BLD AUTO: 6.78 X10*3/UL (ref 1.6–5.5)
NEUTROPHILS NFR BLD AUTO: 77 %
NRBC BLD-RTO: 0 /100 WBCS (ref 0–0)
P AXIS: 10 DEGREES
P OFFSET: 189 MS
P ONSET: 129 MS
PATH REVIEW-CELL CT,FLUID: NORMAL
PLATELET # BLD AUTO: 286 X10*3/UL (ref 150–450)
POTASSIUM SERPL-SCNC: 4 MMOL/L (ref 3.5–5.3)
PR INTERVAL: 176 MS
PROT SERPL-MCNC: 5.5 G/DL (ref 6.4–8.2)
Q ONSET: 217 MS
Q ONSET: 221 MS
QRS COUNT: 16 BEATS
QRS COUNT: 17 BEATS
QRS DURATION: 80 MS
QRS DURATION: 94 MS
QT INTERVAL: 346 MS
QT INTERVAL: 396 MS
QTC CALCULATION(BAZETT): 441 MS
QTC CALCULATION(BAZETT): 528 MS
QTC FREDERICIA: 407 MS
QTC FREDERICIA: 480 MS
R AXIS: 19 DEGREES
R AXIS: 5 DEGREES
RBC # BLD AUTO: 2.95 X10*6/UL (ref 4.5–5.9)
SODIUM SERPL-SCNC: 135 MMOL/L (ref 136–145)
T AXIS: 25 DEGREES
T AXIS: 97 DEGREES
T OFFSET: 390 MS
T OFFSET: 419 MS
VANCOMYCIN SERPL-MCNC: 12.4 UG/ML (ref 5–20)
VENTRICULAR RATE: 107 BPM
VENTRICULAR RATE: 98 BPM
WBC # BLD AUTO: 8.8 X10*3/UL (ref 4.4–11.3)

## 2025-07-07 PROCEDURE — 2500000004 HC RX 250 GENERAL PHARMACY W/ HCPCS (ALT 636 FOR OP/ED)

## 2025-07-07 PROCEDURE — 71045 X-RAY EXAM CHEST 1 VIEW: CPT | Performed by: RADIOLOGY

## 2025-07-07 PROCEDURE — 85025 COMPLETE CBC W/AUTO DIFF WBC: CPT | Performed by: STUDENT IN AN ORGANIZED HEALTH CARE EDUCATION/TRAINING PROGRAM

## 2025-07-07 PROCEDURE — 83735 ASSAY OF MAGNESIUM: CPT | Performed by: HOSPITALIST

## 2025-07-07 PROCEDURE — 99232 SBSQ HOSP IP/OBS MODERATE 35: CPT | Performed by: NURSE PRACTITIONER

## 2025-07-07 PROCEDURE — 2500000002 HC RX 250 W HCPCS SELF ADMINISTERED DRUGS (ALT 637 FOR MEDICARE OP, ALT 636 FOR OP/ED): Performed by: NURSE PRACTITIONER

## 2025-07-07 PROCEDURE — 99233 SBSQ HOSP IP/OBS HIGH 50: CPT | Performed by: HOSPITALIST

## 2025-07-07 PROCEDURE — 80202 ASSAY OF VANCOMYCIN: CPT | Performed by: PHARMACIST

## 2025-07-07 PROCEDURE — 82947 ASSAY GLUCOSE BLOOD QUANT: CPT

## 2025-07-07 PROCEDURE — 1200000002 HC GENERAL ROOM WITH TELEMETRY DAILY

## 2025-07-07 PROCEDURE — 2500000001 HC RX 250 WO HCPCS SELF ADMINISTERED DRUGS (ALT 637 FOR MEDICARE OP): Performed by: HOSPITALIST

## 2025-07-07 PROCEDURE — 2500000001 HC RX 250 WO HCPCS SELF ADMINISTERED DRUGS (ALT 637 FOR MEDICARE OP): Performed by: NURSE PRACTITIONER

## 2025-07-07 PROCEDURE — 80053 COMPREHEN METABOLIC PANEL: CPT | Performed by: STUDENT IN AN ORGANIZED HEALTH CARE EDUCATION/TRAINING PROGRAM

## 2025-07-07 PROCEDURE — 2500000004 HC RX 250 GENERAL PHARMACY W/ HCPCS (ALT 636 FOR OP/ED): Performed by: INTERNAL MEDICINE

## 2025-07-07 PROCEDURE — 2500000004 HC RX 250 GENERAL PHARMACY W/ HCPCS (ALT 636 FOR OP/ED): Performed by: HOSPITALIST

## 2025-07-07 PROCEDURE — 71045 X-RAY EXAM CHEST 1 VIEW: CPT

## 2025-07-07 PROCEDURE — 2500000004 HC RX 250 GENERAL PHARMACY W/ HCPCS (ALT 636 FOR OP/ED): Mod: JZ | Performed by: NURSE PRACTITIONER

## 2025-07-07 RX ORDER — MAGNESIUM SULFATE HEPTAHYDRATE 40 MG/ML
4 INJECTION, SOLUTION INTRAVENOUS ONCE
Status: COMPLETED | OUTPATIENT
Start: 2025-07-07 | End: 2025-07-07

## 2025-07-07 RX ADMIN — MAGNESIUM SULFATE HEPTAHYDRATE 4 G: 40 INJECTION, SOLUTION INTRAVENOUS at 11:47

## 2025-07-07 RX ADMIN — ROSUVASTATIN 40 MG: 20 TABLET, FILM COATED ORAL at 08:51

## 2025-07-07 RX ADMIN — APIXABAN 5 MG: 5 TABLET, FILM COATED ORAL at 21:01

## 2025-07-07 RX ADMIN — PANTOPRAZOLE SODIUM 40 MG: 40 TABLET, DELAYED RELEASE ORAL at 06:35

## 2025-07-07 RX ADMIN — HYDROMORPHONE HYDROCHLORIDE 0.2 MG: 0.2 INJECTION, SOLUTION INTRAMUSCULAR; INTRAVENOUS; SUBCUTANEOUS at 08:50

## 2025-07-07 RX ADMIN — VANCOMYCIN HYDROCHLORIDE 1250 MG: 1.25 INJECTION, POWDER, LYOPHILIZED, FOR SOLUTION INTRAVENOUS at 10:23

## 2025-07-07 RX ADMIN — AMPICILLIN SODIUM AND SULBACTAM SODIUM 3 G: 2; 1 INJECTION, POWDER, FOR SOLUTION INTRAMUSCULAR; INTRAVENOUS at 05:49

## 2025-07-07 RX ADMIN — HYDROMORPHONE HYDROCHLORIDE 0.2 MG: 0.2 INJECTION, SOLUTION INTRAMUSCULAR; INTRAVENOUS; SUBCUTANEOUS at 05:05

## 2025-07-07 RX ADMIN — POTASSIUM CHLORIDE EXTENDED-RELEASE 40 MEQ: 1500 TABLET ORAL at 08:50

## 2025-07-07 RX ADMIN — VANCOMYCIN HYDROCHLORIDE 1250 MG: 1.25 INJECTION, POWDER, LYOPHILIZED, FOR SOLUTION INTRAVENOUS at 21:01

## 2025-07-07 RX ADMIN — INSULIN LISPRO 1 UNITS: 100 INJECTION, SOLUTION INTRAVENOUS; SUBCUTANEOUS at 13:20

## 2025-07-07 RX ADMIN — HYDROMORPHONE HYDROCHLORIDE 0.2 MG: 0.2 INJECTION, SOLUTION INTRAMUSCULAR; INTRAVENOUS; SUBCUTANEOUS at 16:16

## 2025-07-07 RX ADMIN — FOLIC ACID 1 MG: 1 TABLET ORAL at 08:51

## 2025-07-07 ASSESSMENT — PAIN - FUNCTIONAL ASSESSMENT
PAIN_FUNCTIONAL_ASSESSMENT: 0-10

## 2025-07-07 ASSESSMENT — PAIN SCALES - GENERAL
PAINLEVEL_OUTOF10: 0 - NO PAIN
PAINLEVEL_OUTOF10: 5 - MODERATE PAIN
PAINLEVEL_OUTOF10: 3
PAINLEVEL_OUTOF10: 7

## 2025-07-07 ASSESSMENT — PAIN DESCRIPTION - LOCATION
LOCATION: CHEST
LOCATION: CHEST

## 2025-07-07 ASSESSMENT — PAIN DESCRIPTION - DESCRIPTORS
DESCRIPTORS: ACHING;THROBBING
DESCRIPTORS: ACHING;THROBBING

## 2025-07-07 ASSESSMENT — PAIN DESCRIPTION - ORIENTATION: ORIENTATION: RIGHT

## 2025-07-07 NOTE — PROGRESS NOTES
"Jayant Holland is a 74 y.o. male on day 5 of admission presenting with Pleural effusion.    Subjective   ALDAIR reported  C/o back pain         Objective     Physical Exam  Constitutional:       General: He is awake. He is not in acute distress.     Appearance: He is normal weight. He is not ill-appearing.   Cardiovascular:      Rate and Rhythm: Normal rate and regular rhythm.      Pulses: Normal pulses.   Pulmonary:      Effort: Pulmonary effort is normal. No respiratory distress.      Breath sounds: No wheezing.      Comments: Small amount of serous drainage from pleurex catheter into atrium.  Patient oxygenating well on RA and in no acute distress.   Abdominal:      General: There is no distension.      Palpations: Abdomen is soft.      Tenderness: There is no abdominal tenderness.   Musculoskeletal:         General: Normal range of motion.   Skin:     General: Skin is warm and dry.      Capillary Refill: Capillary refill takes less than 2 seconds.   Neurological:      General: No focal deficit present.      Mental Status: He is alert and oriented to person, place, and time.   Psychiatric:         Mood and Affect: Mood normal.         Last Recorded Vitals  Blood pressure 111/80, pulse 93, temperature 37.1 °C (98.7 °F), temperature source Temporal, resp. rate 18, height 1.854 m (6' 0.99\"), weight 78.9 kg (173 lb 15.1 oz), SpO2 95%.  Intake/Output last 3 Shifts:  I/O last 3 completed shifts:  In: 840 (10.6 mL/kg) [P.O.:640; IV Piggyback:200]  Out: 966 (12.2 mL/kg) [Urine:200 (0.1 mL/kg/hr); Chest Tube:766]  Weight: 78.9 kg     Relevant Results  Scheduled medications  Scheduled Medications[1]  Continuous medications  Continuous Medications[2]  PRN medications  PRN Medications[3]    Results for orders placed or performed during the hospital encounter of 07/02/25 (from the past 24 hours)   POCT GLUCOSE   Result Value Ref Range    POCT Glucose 212 (H) 74 - 99 mg/dL   POCT GLUCOSE   Result Value Ref Range    POCT Glucose 136 " (H) 74 - 99 mg/dL   Comprehensive Metabolic Panel   Result Value Ref Range    Glucose 132 (H) 74 - 99 mg/dL    Sodium 135 (L) 136 - 145 mmol/L    Potassium 4.0 3.5 - 5.3 mmol/L    Chloride 104 98 - 107 mmol/L    Bicarbonate 26 21 - 32 mmol/L    Anion Gap 9 (L) 10 - 20 mmol/L    Urea Nitrogen 7 6 - 23 mg/dL    Creatinine 0.63 0.50 - 1.30 mg/dL    eGFR >90 >60 mL/min/1.73m*2    Calcium 8.0 (L) 8.6 - 10.3 mg/dL    Albumin 2.2 (L) 3.4 - 5.0 g/dL    Alkaline Phosphatase 173 (H) 33 - 136 U/L    Total Protein 5.5 (L) 6.4 - 8.2 g/dL     (H) 9 - 39 U/L    Bilirubin, Total 0.6 0.0 - 1.2 mg/dL    ALT 92 (H) 10 - 52 U/L   CBC and Auto Differential   Result Value Ref Range    WBC 8.8 4.4 - 11.3 x10*3/uL    nRBC 0.0 0.0 - 0.0 /100 WBCs    RBC 2.95 (L) 4.50 - 5.90 x10*6/uL    Hemoglobin 8.2 (L) 13.5 - 17.5 g/dL    Hematocrit 25.6 (L) 41.0 - 52.0 %    MCV 87 80 - 100 fL    MCH 27.8 26.0 - 34.0 pg    MCHC 32.0 32.0 - 36.0 g/dL    RDW 16.9 (H) 11.5 - 14.5 %    Platelets 286 150 - 450 x10*3/uL    Neutrophils % 77.0 40.0 - 80.0 %    Immature Granulocytes %, Automated 0.9 0.0 - 0.9 %    Lymphocytes % 10.7 13.0 - 44.0 %    Monocytes % 10.5 2.0 - 10.0 %    Eosinophils % 0.8 0.0 - 6.0 %    Basophils % 0.1 0.0 - 2.0 %    Neutrophils Absolute 6.78 (H) 1.60 - 5.50 x10*3/uL    Immature Granulocytes Absolute, Automated 0.08 0.00 - 0.50 x10*3/uL    Lymphocytes Absolute 0.94 0.80 - 3.00 x10*3/uL    Monocytes Absolute 0.92 (H) 0.05 - 0.80 x10*3/uL    Eosinophils Absolute 0.07 0.00 - 0.40 x10*3/uL    Basophils Absolute 0.01 0.00 - 0.10 x10*3/uL   Vancomycin   Result Value Ref Range    Vancomycin 12.4 5.0 - 20.0 ug/mL   Magnesium   Result Value Ref Range    Magnesium 1.28 (L) 1.60 - 2.40 mg/dL   POCT GLUCOSE   Result Value Ref Range    POCT Glucose 130 (H) 74 - 99 mg/dL     *Note: Due to a large number of results and/or encounters for the requested time period, some results have not been displayed. A complete set of results can be found in  Results Review.     XR chest 1 view   Final Result   Multiple stable findings as above.        MACRO:   None        Signed by: Zana Le 7/7/2025 8:15 AM   Dictation workstation:   [id number]      US right upper quadrant   Final Result   Coarsened and hyperechoic hepatic parenchyma, compatible with   steatosis or hepatocellular disease. Debris/sludge within the   gallbladder.        MACRO:   None.        Signed by: J Luis Bangura 7/6/2025 10:06 AM   Dictation workstation:   [id number]      XR chest 1 view   Final Result   Stable atelectasis along the right minor fissure and at the right   lung base. Stable right pleural effusion.        Stable right-sided central venous MediPort.        Cardiomegaly.        MACRO:   None        Signed by: Zana Le 7/7/2025 8:14 AM   Dictation workstation:   [id number]      XR chest 1 view   Final Result   1. PleurX catheter in the right lower hemithorax.   2. Decreased size of a moderate-sized loculated right pleural   effusion.        MACRO:   None        Signed by: Glory Everett 7/5/2025 1:00 PM   Dictation workstation:   [id number]      CT angio chest for pulmonary embolism   Final Result   1.  No pulmonary embolism   2. Increase in right pleural effusion since the prior study with   loculated appearance, particularly subpulmonic collection. Pleural   catheter remains in place and is unchanged in position.   3. Right upper lobe mass is unchanged in size from the prior study.   Previously noted pleural nodules are obscured by the pleural effusion   currently             MACRO:   None        Signed by: Kimmy Alves 7/2/2025 3:05 PM   Dictation workstation:   [id number]      XR chest 1 view   Final Result   1. History of lung carcinoma.   2. Small right pleural effusion extending into the major and minor   fissures.   3. Chronic moderate elevation right hemidiaphragm.        MACRO:   None        Signed by: Glory Everett 7/2/2025 11:04 AM   Dictation workstation:    [id number]          Assessment & Plan  Pleural effusion    Jayant Holland is a 74 y.o. male with a previous medical history of non-small cell lung cancer with mets with Pleurx catheter inserted on the right side due to recurrent malignant pleural effusion, PE ( on Apixaban)  hypertension, type 2 diabetes mellitus, asthma, GERD, low magnesium  presented to [facility] ED from his Oncology office due to c/o of SOB and right sided Chest Pain. He underwent a CT which showed a loculated right pleural effusion which had increased in size since prior imaging.  Thoracic surgery was consulted for further recommendations.     Plan  Right Loculated pleural effusion with prior pleurx placement  TPA/Dornase 7/6 with approx 60ml bloody with rapid transition to serous like drainage.  - Continue CT to water seal and document drainage, monitor for air leak  - Chest xray daily   - Medical management per primary service and remaining consulting services.   - Pleural fluid cx growing staph epidermidis and corynebacterium amycolatum  -->> ABX per ID; Will remove Pleurx catheter tomorrow morning.      Imaging and case discussed with Dr. Orlin GATES spent 30 minutes in the professional and overall care of this patient.      Teo Horner, APRN-CNP         [1] ampicillin-sulbactam, 3 g, intravenous, q6h  apixaban, 5 mg, oral, BID  folic acid, 1,000 mcg, oral, Daily  insulin lispro, 0-5 Units, subcutaneous, TID AC  lisinopril, 20 mg, oral, Daily  pantoprazole, 40 mg, oral, Daily before breakfast  potassium chloride CR, 40 mEq, oral, Daily  rosuvastatin, 40 mg, oral, Daily  vancomycin, 1,250 mg, intravenous, q12h  zinc oxide, 1 Application, Topical, BID  [2]    [3] PRN medications: acetaminophen **OR** acetaminophen **OR** acetaminophen, dextrose, dextrose, glucagon, glucagon, HYDROmorphone, vancomycin

## 2025-07-07 NOTE — PROGRESS NOTES
"  INFECTIOUS DISEASE DAILY PROGRESS NOTE    SUBJECTIVE:    No new issues. Feels OK.     OBJECTIVE:  VITALS (Last 24 Hours)  /80 (BP Location: Right arm, Patient Position: Lying)   Pulse 93   Temp 37.1 °C (98.7 °F) (Temporal)   Resp 18   Ht 1.854 m (6' 0.99\")   Wt 78.9 kg (173 lb 15.1 oz)   SpO2 95%   BMI 22.95 kg/m²     PHYSICAL EXAM:  Gen - NAD  Lungs - diminished RLL, pleurex present hooked up to atrium with serosang output  Skin - no rashes    ABX: IV Vanc/Unasyn    LABS:  Lab Results   Component Value Date    WBC 8.8 07/07/2025    HGB 8.2 (L) 07/07/2025    HCT 25.6 (L) 07/07/2025    MCV 87 07/07/2025     07/07/2025     Lab Results   Component Value Date    GLUCOSE 132 (H) 07/07/2025    CALCIUM 8.0 (L) 07/07/2025     (L) 07/07/2025    K 4.0 07/07/2025    CO2 26 07/07/2025     07/07/2025    BUN 7 07/07/2025    CREATININE 0.63 07/07/2025     Results from last 72 hours   Lab Units 07/07/25  0545   ALK PHOS U/L 173*   BILIRUBIN TOTAL mg/dL 0.6   PROTEIN TOTAL g/dL 5.5*   ALT U/L 92*   AST U/L 107*   ALBUMIN g/dL 2.2*     Estimated Creatinine Clearance: 114.8 mL/min (by C-G formula based on SCr of 0.63 mg/dL).    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 I would suggest removed the pleureux once fluid is drained to prevent recurrent infection as these may form biofilm on the catheter.    Stopped Unasyn.    Monitoring for adverse effects of abx such as rash/itching/diarrhea. Monitoring Vancomycin levels and renal function.    Will follow. Thanks! D/w Dr. Michael Connelly MD  [facility]  Office #[phone]      "

## 2025-07-07 NOTE — CARE PLAN
"The patient's goals for the shift include \"get my back to stop hurting please\"    The clinical goals for the shift include patient will remain HDS throughout shift    Over the shift, the patient did make progress toward the following goals.       Problem: Pain - Adult  Goal: Verbalizes/displays adequate comfort level or baseline comfort level  Outcome: Progressing     Problem: Safety - Adult  Goal: Free from fall injury  Outcome: Progressing     Problem: Fall/Injury  Goal: Pace activities to prevent fatigue by end of the shift  Outcome: Progressing     "

## 2025-07-07 NOTE — PROGRESS NOTES
07/07/25 1015   Discharge Planning   Expected Discharge Disposition Home   Does the patient need discharge transport arranged? Yes   [facility] coordination needed? Yes   Has discharge transport been arranged? No         Patient is in with pleural effusion and has pleurx cath. He had lytic therapy couple time over the weekend. When patient is medically cleared will go home with his wife, no needs identified.

## 2025-07-07 NOTE — PROGRESS NOTES
Vancomycin Dosing by Pharmacy- FOLLOW UP    Jayant Holland is a 74 y.o. year old male who Pharmacy has been consulted for vancomycin dosing for pneumonia. Based on the patient's indication and renal status this patient is being dosed based on a goal AUC of 400-600.     Renal function is currently improving.    Current vancomycin dose: 1,000 mg given every 12 hours    Estimated vancomycin AUC on current dose: 382 mg/L.hr     Visit Vitals  /77 (BP Location: Right arm, Patient Position: Lying)   Pulse 93   Temp 36.7 °C (98 °F) (Temporal)   Resp 18        Lab Results   Component Value Date    CREATININE 0.63 2025    CREATININE 0.75 2025    CREATININE 0.68 2025    CREATININE 0.73 2025        Patient weight is as follows:   Vitals:    25 1547   Weight: 78.9 kg (173 lb 15.1 oz)       Cultures:  No results found for the encounter in last 14 days.       I/O last 3 completed shifts:  In: 1260 (16 mL/kg) [P.O.:760; IV Piggyback:500]  Out: 816 (10.3 mL/kg) [Chest Tube:816]  Weight: 78.9 kg   I/O during current shift:  I/O this shift:  In: -   Out: 200 [Urine:200]    Temp (24hrs), Av.8 °C (98.2 °F), Min:36.5 °C (97.7 °F), Max:37.2 °C (98.9 °F)      Assessment/Plan    Below goal AUC. Orders placed for new vancomcyin regimen of 1,250 mg every 12 hours to begin at 10:00.     This dosing regimen is predicted by [employer] to result in the following pharmacokinetic parameters:    DRQ19-65: 456 mg/L.hr  AUC24,ss: 476 mg/L.hr  Probability of AUC24 > 400: 86 %  Ctrough,ss: 14 mg/L  Probability of Ctrough,ss > 20: 10 %    The next level will be obtained on  at 05:00. May be obtained sooner if clinically indicated.   Will continue to monitor renal function daily while on vancomycin and order serum creatinine at least every 48 hours if not already ordered.  Follow for continued vancomycin needs, clinical response, and signs/symptoms of toxicity.       Gaby Nieves, PharmD

## 2025-07-08 DIAGNOSIS — C80.1 ADENOCARCINOMA (MULTI): Primary | ICD-10-CM

## 2025-07-08 DIAGNOSIS — E83.42 HYPOMAGNESEMIA: ICD-10-CM

## 2025-07-08 LAB
ALBUMIN SERPL BCP-MCNC: 2.4 G/DL (ref 3.4–5)
ALP SERPL-CCNC: 175 U/L (ref 33–136)
ALT SERPL W P-5'-P-CCNC: 88 U/L (ref 10–52)
ANION GAP SERPL CALC-SCNC: 15 MMOL/L (ref 10–20)
AST SERPL W P-5'-P-CCNC: 86 U/L (ref 9–39)
BASOPHILS # BLD AUTO: 0.03 X10*3/UL (ref 0–0.1)
BASOPHILS NFR BLD AUTO: 0.3 %
BILIRUB SERPL-MCNC: 0.6 MG/DL (ref 0–1.2)
BUN SERPL-MCNC: 6 MG/DL (ref 6–23)
CALCIUM SERPL-MCNC: 8.2 MG/DL (ref 8.6–10.3)
CHLORIDE SERPL-SCNC: 100 MMOL/L (ref 98–107)
CO2 SERPL-SCNC: 23 MMOL/L (ref 21–32)
CREAT SERPL-MCNC: 0.66 MG/DL (ref 0.5–1.3)
EGFRCR SERPLBLD CKD-EPI 2021: >90 ML/MIN/1.73M*2
EOSINOPHIL # BLD AUTO: 0.13 X10*3/UL (ref 0–0.4)
EOSINOPHIL NFR BLD AUTO: 1.5 %
ERYTHROCYTE [DISTWIDTH] IN BLOOD BY AUTOMATED COUNT: 16.9 % (ref 11.5–14.5)
GLUCOSE BLD MANUAL STRIP-MCNC: 117 MG/DL (ref 74–99)
GLUCOSE BLD MANUAL STRIP-MCNC: 121 MG/DL (ref 74–99)
GLUCOSE BLD MANUAL STRIP-MCNC: 248 MG/DL (ref 74–99)
GLUCOSE SERPL-MCNC: 107 MG/DL (ref 74–99)
HCT VFR BLD AUTO: 25.5 % (ref 41–52)
HGB BLD-MCNC: 8.1 G/DL (ref 13.5–17.5)
IMM GRANULOCYTES # BLD AUTO: 0.08 X10*3/UL (ref 0–0.5)
IMM GRANULOCYTES NFR BLD AUTO: 0.9 % (ref 0–0.9)
LYMPHOCYTES # BLD AUTO: 1.22 X10*3/UL (ref 0.8–3)
LYMPHOCYTES NFR BLD AUTO: 14 %
MCH RBC QN AUTO: 27.8 PG (ref 26–34)
MCHC RBC AUTO-ENTMCNC: 31.8 G/DL (ref 32–36)
MCV RBC AUTO: 88 FL (ref 80–100)
MONOCYTES # BLD AUTO: 0.92 X10*3/UL (ref 0.05–0.8)
MONOCYTES NFR BLD AUTO: 10.5 %
NEUTROPHILS # BLD AUTO: 6.35 X10*3/UL (ref 1.6–5.5)
NEUTROPHILS NFR BLD AUTO: 72.8 %
NRBC BLD-RTO: 0 /100 WBCS (ref 0–0)
PLATELET # BLD AUTO: 309 X10*3/UL (ref 150–450)
POTASSIUM SERPL-SCNC: 3.7 MMOL/L (ref 3.5–5.3)
PROT SERPL-MCNC: 6.6 G/DL (ref 6.4–8.2)
RBC # BLD AUTO: 2.91 X10*6/UL (ref 4.5–5.9)
SODIUM SERPL-SCNC: 134 MMOL/L (ref 136–145)
VANCOMYCIN SERPL-MCNC: 18.8 UG/ML (ref 5–20)
WBC # BLD AUTO: 8.7 X10*3/UL (ref 4.4–11.3)

## 2025-07-08 PROCEDURE — 1200000002 HC GENERAL ROOM WITH TELEMETRY DAILY

## 2025-07-08 PROCEDURE — 32552 REMOVE LUNG CATHETER: CPT | Performed by: STUDENT IN AN ORGANIZED HEALTH CARE EDUCATION/TRAINING PROGRAM

## 2025-07-08 PROCEDURE — 80053 COMPREHEN METABOLIC PANEL: CPT | Performed by: STUDENT IN AN ORGANIZED HEALTH CARE EDUCATION/TRAINING PROGRAM

## 2025-07-08 PROCEDURE — 80202 ASSAY OF VANCOMYCIN: CPT

## 2025-07-08 PROCEDURE — 99233 SBSQ HOSP IP/OBS HIGH 50: CPT | Performed by: HOSPITALIST

## 2025-07-08 PROCEDURE — 82947 ASSAY GLUCOSE BLOOD QUANT: CPT

## 2025-07-08 PROCEDURE — 0WP9X0Z REMOVAL OF DRAINAGE DEVICE FROM RIGHT PLEURAL CAVITY, EXTERNAL APPROACH: ICD-10-PCS | Performed by: STUDENT IN AN ORGANIZED HEALTH CARE EDUCATION/TRAINING PROGRAM

## 2025-07-08 PROCEDURE — 2500000001 HC RX 250 WO HCPCS SELF ADMINISTERED DRUGS (ALT 637 FOR MEDICARE OP): Performed by: NURSE PRACTITIONER

## 2025-07-08 PROCEDURE — 2500000001 HC RX 250 WO HCPCS SELF ADMINISTERED DRUGS (ALT 637 FOR MEDICARE OP): Performed by: HOSPITALIST

## 2025-07-08 PROCEDURE — 2500000002 HC RX 250 W HCPCS SELF ADMINISTERED DRUGS (ALT 637 FOR MEDICARE OP, ALT 636 FOR OP/ED): Performed by: NURSE PRACTITIONER

## 2025-07-08 PROCEDURE — 2500000004 HC RX 250 GENERAL PHARMACY W/ HCPCS (ALT 636 FOR OP/ED): Mod: JZ | Performed by: NURSE PRACTITIONER

## 2025-07-08 PROCEDURE — 2500000004 HC RX 250 GENERAL PHARMACY W/ HCPCS (ALT 636 FOR OP/ED)

## 2025-07-08 PROCEDURE — 85025 COMPLETE CBC W/AUTO DIFF WBC: CPT | Performed by: STUDENT IN AN ORGANIZED HEALTH CARE EDUCATION/TRAINING PROGRAM

## 2025-07-08 RX ORDER — MAGNESIUM SULFATE HEPTAHYDRATE 40 MG/ML
4 INJECTION, SOLUTION INTRAVENOUS ONCE
Status: CANCELLED | OUTPATIENT
Start: 2025-07-08 | End: 2025-07-08

## 2025-07-08 RX ORDER — MAGNESIUM SULFATE HEPTAHYDRATE 40 MG/ML
2 INJECTION, SOLUTION INTRAVENOUS ONCE
Status: CANCELLED | OUTPATIENT
Start: 2025-07-08 | End: 2025-07-08

## 2025-07-08 RX ORDER — MAGNESIUM SULFATE HEPTAHYDRATE 40 MG/ML
2-4 INJECTION, SOLUTION INTRAVENOUS ONCE
Status: CANCELLED | OUTPATIENT
Start: 2025-07-08 | End: 2025-07-08

## 2025-07-08 RX ADMIN — POTASSIUM CHLORIDE EXTENDED-RELEASE 40 MEQ: 1500 TABLET ORAL at 08:50

## 2025-07-08 RX ADMIN — VANCOMYCIN HYDROCHLORIDE 1250 MG: 1.25 INJECTION, POWDER, LYOPHILIZED, FOR SOLUTION INTRAVENOUS at 10:06

## 2025-07-08 RX ADMIN — ZINC OXIDE 1 APPLICATION: 200 OINTMENT TOPICAL at 08:53

## 2025-07-08 RX ADMIN — FOLIC ACID 1 MG: 1 TABLET ORAL at 08:50

## 2025-07-08 RX ADMIN — VANCOMYCIN HYDROCHLORIDE 1250 MG: 1.25 INJECTION, POWDER, LYOPHILIZED, FOR SOLUTION INTRAVENOUS at 21:25

## 2025-07-08 RX ADMIN — PANTOPRAZOLE SODIUM 40 MG: 40 TABLET, DELAYED RELEASE ORAL at 06:21

## 2025-07-08 RX ADMIN — HYDROMORPHONE HYDROCHLORIDE 0.2 MG: 0.2 INJECTION, SOLUTION INTRAMUSCULAR; INTRAVENOUS; SUBCUTANEOUS at 21:32

## 2025-07-08 RX ADMIN — APIXABAN 5 MG: 5 TABLET, FILM COATED ORAL at 21:25

## 2025-07-08 RX ADMIN — HYDROMORPHONE HYDROCHLORIDE 0.2 MG: 0.2 INJECTION, SOLUTION INTRAMUSCULAR; INTRAVENOUS; SUBCUTANEOUS at 06:35

## 2025-07-08 RX ADMIN — APIXABAN 5 MG: 5 TABLET, FILM COATED ORAL at 08:50

## 2025-07-08 RX ADMIN — ROSUVASTATIN 40 MG: 20 TABLET, FILM COATED ORAL at 08:50

## 2025-07-08 RX ADMIN — LISINOPRIL 20 MG: 20 TABLET ORAL at 08:50

## 2025-07-08 ASSESSMENT — PAIN SCALES - GENERAL
PAINLEVEL_OUTOF10: 6
PAINLEVEL_OUTOF10: 2
PAINLEVEL_OUTOF10: 3
PAINLEVEL_OUTOF10: 7

## 2025-07-08 ASSESSMENT — COGNITIVE AND FUNCTIONAL STATUS - GENERAL
MOBILITY SCORE: 24
DAILY ACTIVITIY SCORE: 24

## 2025-07-08 ASSESSMENT — PAIN - FUNCTIONAL ASSESSMENT
PAIN_FUNCTIONAL_ASSESSMENT: 0-10

## 2025-07-08 ASSESSMENT — PAIN DESCRIPTION - LOCATION
LOCATION: RIB CAGE
LOCATION: CHEST

## 2025-07-08 ASSESSMENT — PAIN DESCRIPTION - ORIENTATION: ORIENTATION: RIGHT

## 2025-07-08 NOTE — PROGRESS NOTES
Between 7AM-7PM please message me via Epic Secure Chat.  After 7PM please page Nocturnist on call.    [facility] Hospitalist Progress Note      Jayant Holland    :  1950(74 y.o.)    MRN:  [id number]  Date: 25     Assessment and Plan:     Jayant Holland is a 74 y.o. male with a past medical history of non-small cell lung cancer with mets with Pleurx catheter, HTN, DM, asthma, PE on eliquis was admitted for SOB and right sided chest pain.      Non-small cell lung cancer with mets   Pleurx catheter inserted on the right side due to recurrent malignant pleural effusion  Empyema due to Staph epidermidis and Corynebacterium amycolatum  - thoracic surg following: Initiate management recommendations  - Planned tpa per thoracic surg  - ID consulted; Continue IV vancomycin pending sensitives    - Pleural studies consistent with exudative effusion based on Light's criteria  - Pleural fluid stain: GPC.   - BCX pending: NTD      Concern for GI bleed  Hx GERD  - GI consult: No EGD/colonoscopy warranted   - Hg 6.9 -> transfused 1U PRBC. Remains stable around 8  - continue ppi     HypoMg  - replace and monitor     HTN  - lisinopril     PE  - on eliquis     DM  - hold metformin  - ISS        DVT Prophylaxis: full anticoagulation with eliquis    Disposition: continue to monitor inpatient, await consultant recommendations, await test results, and await clinical improvement    Subjective:      Interval History:   Vitals and chart notes from overnight reviewed.   No acute issues overnight.   Patient seen and evaluated at bedside.   No fevers or chills. No nausea, vomiting. No diarrhea, constipation.     Review of Systems:   Other than patient's chronic conditions and those complaints in the history above, the rest of the 10 systems review were done and were negative.     Current medications:  Scheduled Meds:Scheduled Medications[1]  Continuous Infusions:Continuous Medications[2]  PRN Meds:PRN  Medications[3]      Objective:     Heart Rate:  [82-93]   Temp:  [36.5 °C (97.7 °F)-37.1 °C (98.7 °F)]   Resp:  [16-18]   BP: (103-122)/(69-80)   SpO2:  [95 %-98 %]          Physical Exam    Labs:   Lab Results   Component Value Date     (L) 07/07/2025    K 4.0 07/07/2025     07/07/2025    CO2 26 07/07/2025    BUN 7 07/07/2025    CREATININE 0.63 07/07/2025    GLUCOSE 132 (H) 07/07/2025    CALCIUM 8.0 (L) 07/07/2025    PROT 5.5 (L) 07/07/2025    BILITOT 0.6 07/07/2025    ALKPHOS 173 (H) 07/07/2025     (H) 07/07/2025    ALT 92 (H) 07/07/2025       Lab Results   Component Value Date    WBC 8.8 07/07/2025    HGB 8.2 (L) 07/07/2025    HCT 25.6 (L) 07/07/2025    MCV 87 07/07/2025     07/07/2025          [1] apixaban, 5 mg, oral, BID  folic acid, 1,000 mcg, oral, Daily  insulin lispro, 0-5 Units, subcutaneous, TID AC  lisinopril, 20 mg, oral, Daily  pantoprazole, 40 mg, oral, Daily before breakfast  potassium chloride CR, 40 mEq, oral, Daily  rosuvastatin, 40 mg, oral, Daily  vancomycin, 1,250 mg, intravenous, q12h  zinc oxide, 1 Application, Topical, BID    [2]    [3] PRN medications: acetaminophen **OR** acetaminophen **OR** acetaminophen, dextrose, dextrose, glucagon, glucagon, HYDROmorphone, vancomycin

## 2025-07-08 NOTE — PROCEDURES
PleurX removal:  The patient's Pleurx bandage was removed and site was cleansed.  Local anesthetic (1% lidocaine) was injected and the suture was cut.  The cuff of the tube was carefully dissected out utilizing a hemostat.  The tube was removed during exhalation.  Occlusive bandage placed.  Recommendation to keep dressing in place x48 hours.  Patient tolerated procedure well.      - Ok to discharge per primary/ID. Will need close interval follow up with CXR and thora vs temporary pigtail for effusion if symptomatic until ID comfortable with replacement PleurX    Rosio Ordaz,   Thoracic & Esophageal Surgery

## 2025-07-08 NOTE — CONSULTS
Nutrition Follow-up Note  Nutrition Assessment      Jayant Holland is a 74 y.o. year old male patient with Pleural effusion [J90]  On day #7 of hospital admission  .     Per chart review:  -PleurX may need to be removed once fluid drained  -No new culture growth    Per pt:  -Doing alright today  -Eating okay     Scheduled medications  Scheduled Medications[1]  Continuous medications  Continuous Medications[2]  PRN medications  PRN Medications[3]    Nutrition Significant Labs:  BMP Trend:   Results from last 7 days   Lab Units 07/08/25  0621 07/07/25  0545 07/06/25 0449 07/05/25  0514   GLUCOSE mg/dL 107* 132* 120* 137*   CALCIUM mg/dL 8.2* 8.0* 8.2* 7.3*   SODIUM mmol/L 134* 135* 137 136   POTASSIUM mmol/L 3.7 4.0 3.8 3.9   CO2 mmol/L 23 26 24 21   CHLORIDE mmol/L 100 104 104 105   BUN mg/dL 6 7 8 7   CREATININE mg/dL 0.66 0.63 0.75 0.68    , BG POCT trend:   Results from last 7 days   Lab Units 07/08/25  1130 07/08/25  0743 07/07/25 2010 07/07/25  1705 07/07/25  1432   POCT GLUCOSE mg/dL 248* 121* 159* 124* 132*    , Liver Function Trend:   Results from last 7 days   Lab Units 07/08/25  0621 07/07/25  0545 07/06/25 0449 07/05/25  0514   ALK PHOS U/L 175* 173* 164* 132   AST U/L 86* 107* 123* 77*   ALT U/L 88* 92* 85* 56*   BILIRUBIN TOTAL mg/dL 0.6 0.6 0.6 0.5    , Renal Lab Trend:   Results from last 7 days   Lab Units 07/08/25  0621 07/07/25  0545 07/06/25 0449 07/05/25  0514   POTASSIUM mmol/L 3.7 4.0 3.8 3.9   SODIUM mmol/L 134* 135* 137 136   MAGNESIUM mg/dL  --  1.28*  --   --    EGFR mL/min/1.73m*2 >90 >90 >90 >90   BUN mg/dL 6 7 8 7   CREATININE mg/dL 0.66 0.63 0.75 0.68    , TPN/PPN Labs:   Results from last 7 days   Lab Units 07/08/25  0621 07/07/25  0545 07/06/25  0449 07/05/25  0514   GLUCOSE mg/dL 107* 132* 120* 137*   POTASSIUM mmol/L 3.7 4.0 3.8 3.9   MAGNESIUM mg/dL  --  1.28*  --   --    SODIUM mmol/L 134* 135* 137 136   CHLORIDE mmol/L 100 104 104 105   ALT U/L 88* 92* 85* 56*   AST U/L 86* 107*  "123* 77*   ALK PHOS U/L 175* 173* 164* 132   BILIRUBIN TOTAL mg/dL 0.6 0.6 0.6 0.5    , Lipid Panel:   Lab Results   Component Value Date    CHOL 171 01/28/2025    HDL 56.4 01/28/2025    CHHDL 3.0 01/28/2025    LDLF 96 01/24/2023    VLDL 31 01/28/2025    TRIG 156 (H) 01/28/2025    , Vit D:   Lab Results   Component Value Date    VITD25 32 01/28/2025    , Vit B12:   Lab Results   Component Value Date    RUUBZAEO71 805 07/03/2025    , Iron Panel:   Lab Results   Component Value Date    IRON <10 (L) 07/02/2025    TIBC  07/02/2025      Comment:      One or more of the analytes used in this calculation is outside of the analytical measurement range.      FERRITIN 1,935 (H) 07/02/2025    , Folate:   Lab Results   Component Value Date    FOLATE 7.2 07/03/2025        Dietary Orders (From admission, onward)       Start     Ordered    07/02/25 1847  May Participate in Room Service  ( ROOM SERVICE MAY PARTICIPATE)  Once        Question:  .  Answer:  Yes    07/02/25 1846    07/02/25 1818  Adult diet Cardiac; 70 gm fat; 2 - 3 grams Sodium  Diet effective now        Question Answer Comment   Diet type Cardiac    Fat restriction: 70 gm fat    Sodium restriction: 2 - 3 grams Sodium        07/02/25 1817                     History:  Energy Intake: Fair 50-75 %, Good > 75 %  Food and Nutrient History: Per flowsheets    Anthropometrics:  Height: 185.4 cm (6' 0.99\")  Weight: 78.9 kg (173 lb 15.1 oz)  BMI (Calculated): 22.95    Weight Change: -0.03    Wt Readings from Last 85 Encounters:   07/03/25 78.9 kg (173 lb 15.1 oz)   07/02/25 79.3 kg (174 lb 12.8 oz)   06/27/25 80.8 kg (178 lb 3.2 oz)   06/25/25 80.6 kg (177 lb 11.2 oz)   06/24/25 79.4 kg (175 lb)   06/23/25 79.4 kg (175 lb 1.6 oz)   06/20/25 79.7 kg (175 lb 11.2 oz)   06/18/25 80.3 kg (177 lb)   06/16/25 80.4 kg (177 lb 3.2 oz)   06/13/25 82.7 kg (182 lb 4.8 oz)   06/11/25 81.4 kg (179 lb 6.4 oz)   06/09/25 81.7 kg (180 lb 1.6 oz)   06/06/25 81.2 kg (179 lb 1.6 oz)   06/02/25 " 81.7 kg (180 lb 1.6 oz)   05/30/25 82.9 kg (182 lb 12.8 oz)   05/27/25 82.9 kg (182 lb 12.2 oz)   05/23/25 85.6 kg (188 lb 11.2 oz)   05/21/25 84.9 kg (187 lb 2.7 oz)   05/19/25 83.4 kg (183 lb 14.4 oz)   05/16/25 83.6 kg (184 lb 4.8 oz)   05/14/25 83.2 kg (183 lb 8 oz)   05/12/25 83 kg (182 lb 14.4 oz)   05/09/25 82.4 kg (181 lb 9.6 oz)   05/07/25 82.2 kg (181 lb 4.8 oz)   05/05/25 82.5 kg (181 lb 12.8 oz)   05/02/25 82.6 kg (182 lb)   04/30/25 82.5 kg (181 lb 12.8 oz)   04/28/25 82 kg (180 lb 12.8 oz)   04/25/25 82.6 kg (182 lb 3.2 oz)   04/22/25 81.3 kg (179 lb 4.8 oz)   04/21/25 82.9 kg (182 lb 12.2 oz)   04/18/25 82.3 kg (181 lb 8 oz)   04/16/25 82.7 kg (182 lb 6.4 oz)   04/16/25 82.7 kg (182 lb 6.4 oz)   04/14/25 83.1 kg (183 lb 3.2 oz)   04/11/25 84.2 kg (185 lb 11.2 oz)   04/09/25 85.5 kg (188 lb 7.9 oz)   04/07/25 86.3 kg (190 lb 4.8 oz)   04/04/25 88.5 kg (195 lb 1.6 oz)   04/02/25 86.9 kg (191 lb 9.3 oz)   04/01/25 84.8 kg (187 lb)   03/31/25 86.2 kg (190 lb 1.6 oz)   03/27/25 84.8 kg (187 lb)   03/24/25 83 kg (183 lb)   03/23/25 81.6 kg (179 lb 14.3 oz)   03/20/25 83 kg (182 lb 14.4 oz)   03/17/25 87.5 kg (193 lb)   03/17/25 84.6 kg (186 lb 9.6 oz)   03/14/25 87.7 kg (193 lb 6.4 oz)   03/12/25 85.2 kg (187 lb 13.3 oz)   03/09/25 85.5 kg (188 lb 7.9 oz)   03/06/25 84.8 kg (187 lb)   03/05/25 84.8 kg (187 lb)   03/03/25 83.6 kg (184 lb 6.4 oz)   02/27/25 82.7 kg (182 lb 6.4 oz)   02/24/25 85.3 kg (188 lb)   02/21/25 85.4 kg (188 lb 4.8 oz)   02/19/25 84.3 kg (185 lb 13.6 oz)   02/18/25 84.6 kg (186 lb 8 oz)   02/11/25 82.1 kg (181 lb)   02/04/25 82.3 kg (181 lb 7 oz)   01/30/25 86.2 kg (190 lb 0.6 oz)   01/29/25 84.9 kg (187 lb 2.7 oz)   01/28/25 84.1 kg (185 lb 4.8 oz)   01/21/25 86.2 kg (190 lb)   01/16/25 87.1 kg (192 lb)   01/10/25 87.1 kg (192 lb)   01/07/25 87.5 kg (193 lb)   12/18/24 87.1 kg (192 lb)   12/17/24 88 kg (194 lb)   12/17/24 88.4 kg (194 lb 14.4 oz)   01/15/25 86.2 kg (190 lb)   12/10/24  "90.7 kg (200 lb)   12/10/24 90.7 kg (200 lb)   12/09/24 90.7 kg (200 lb)   12/05/24 90.7 kg (200 lb)   11/06/24 91.1 kg (200 lb 12.8 oz)   10/23/24 91.9 kg (202 lb 9.6 oz)   10/21/24 94.3 kg (208 lb)   10/08/24 94.3 kg (208 lb)   08/26/24 94.8 kg (209 lb)   02/28/24 94.8 kg (209 lb)   11/29/23 94.8 kg (209 lb)   08/23/23 94.8 kg (209 lb)   03/14/23 90 kg (198 lb 6.6 oz)     Significant Weight Loss: Yes  Interpretation of Weight Loss: >7.5% in 3 months       IBW/kg (Dietitian Calculated): 83.6 kg  Percent of IBW: 93 %       Energy Needs:  Height: 185.4 cm (6' 0.99\")  Temp: 36.7 °C (98.1 °F)    Total Energy Estimated Needs in 24 hours (kCal): 2375 kCal  Energy Estimated Needs per kg Body Weight in 24 hours (kCal/kg): 2600 kCal/kg  Method for Estimating Needs: 30-33kcal/kg    Total Protein Estimated Needs in 24 Hours (g): 80 g  Protein Estimated Needs per kg Body Weight in 24 Hours (g/kg): 95 g/kg  Method for Estimating 24 Hour Protein Needs: 1.0-1.2g/kg    Method for Estimating 24 Hour Fluid Needs: 1mL/kcal or MD recommendations       Nutrition Focused Physical Findings:  Orbital Fat Pads: Mild-Moderate (slight dark circles and slight hollowing)  Buccal Fat Pads: Mild-Moderate (flat cheeks, minimal bounce)    Temporalis: Mild-Moderate (slight depression)  Pectoralis (Clavicular Region): Mild-Moderate (some protrusion of clavicle)  Interosseous: Mild-Moderate (slightly depressed area between thumb and forefinger)    Edema: none       Skin: Negative       Nutrition Diagnosis   Malnutrition Diagnosis  Patient has Malnutrition Diagnosis: Yes  Diagnosis Status: Active  Malnutrition Diagnosis: Moderate malnutrition related to chronic disease or condition  Related to: cancer  As Evidenced by: Greater than 7.5% weight loss in 3 months, mild fat loss and moderate muscle loss.  Additional Assessment Information: Poor oral intake prior to admission. Po intake improving during admission    Nutrition Interventions/Recommendations "   Individualized Nutrition Prescription Provided for : Intake improving (per flowsheets). Continue diet as ordered. Should po intake decrease, may consider ONS on f/u. Should appetite remain suboptimal, consider appetite stimulant    Food and/or Nutrient Delivery Interventions  Meals and Snacks: General healthful diet, Fat-modified diet, Mineral-modified diet  Goal: Tolerate diet; >75% consumed     Nutrition Monitoring and Evaluation   Food and Nutrient Related History  Estimated Energy Intake: Energy intake greater or equal to 75% of estimated energy needs, Energy intake 50 -75% of estimated energy needs    Fluid Intake: Estimated fluid intake    Intake / Amount of food: Meets > 75% estimated energy needs, Consumes at least 75% or more of meals/snacks/supplements, Consumes at least 50% or more of meals/snacks/supplements    Anthropometrics: Body Composition/Growth/Weight History  Body Weight: Body weight - Maintain stable weight    Biochemical Data, Medical Tests and Procedures  Electrolyte and Renal Panel: Other (Comment), Sodium, Magnesium  Criteria: As clinically indicated    Gastrointestinal Profile: Other (Comment), Aspartate aminotransferase (AST)  Criteria: As clinically indicated    Glucose/Endocrine Profile: Glucose within normal limits ( mg/dL)  Criteria: As clinically indicated    Nutritional Anemia Profile: Other (Comment)  Criteria: As clinically indicated    Vitamin Profile: Other (Comment)  Criteria: As clinically indicated    Nutrition Focused Physical Findings  Adipose Finding: Loss of subcutaneous fat    Bones Finding: Other (Comment)       Muscle Finding: Muscle atrophy    Skin Finding: Promote intact skin - Promote skin integrity    Time Spent (min): 30 minutes  Last Date of Nutrition Visit: 07/08/25  Nutrition Follow-Up Needed?: Dietitian to reassess per policy  Follow up Comment: f/u, MUNDO Hoffman            [1] apixaban, 5 mg, oral, BID  folic acid, 1,000 mcg, oral, Daily  insulin  lispro, 0-5 Units, subcutaneous, TID AC  lisinopril, 20 mg, oral, Daily  pantoprazole, 40 mg, oral, Daily before breakfast  potassium chloride CR, 40 mEq, oral, Daily  rosuvastatin, 40 mg, oral, Daily  vancomycin, 1,250 mg, intravenous, q12h  zinc oxide, 1 Application, Topical, BID  [2]    [3] PRN medications: acetaminophen **OR** acetaminophen **OR** acetaminophen, dextrose, dextrose, glucagon, glucagon, HYDROmorphone, vancomycin

## 2025-07-08 NOTE — PROGRESS NOTES
"                                                                                                               '' Infectious Disease Progress Note''        Interval Events:  He feels better  On room air  Afebrile  WBC 8K  Blood culture no growth to date      Current antibiotic:  Vancomycin D4    Physical Exam:  /71 (BP Location: Left arm, Patient Position: Lying)   Pulse 88   Temp 36.7 °C (98.1 °F) (Oral)   Resp 18   Ht 1.854 m (6' 0.99\")   Wt 78.9 kg (173 lb 15.1 oz)   SpO2 95%   BMI 22.95 kg/m²   General: NAD, nontoxic appearing  Skin: no new rash  Chest: Mediport  Resp: RLL diminished breathing sounds, + Pleurex  CV:  normal S1/S2, no murmur   Abd: soft, non-tender  Ext: no edema      Lab Results:  Reviewed    Micro:  7/4 pleural fluid culture: Staph epi and Corynebacterium  7/5 blood culture: No growth to date    Imaging: reviewed         Assessment:  - Malignant right pleural effusion c/b infection/empyema d/t staph epi and Corynebacterium  - NSCLC      Plan/Recommendations:  - Continue vancomycin IV day 4  - Consider removing his Pleurx once fluid is drained  - Monitor for adverse effect of antibiotics such as nephrotoxicity, diarrhea or rash      Please call ID with any concerns or questions.  D/w Pt.      Taurus Buckley MD  [facility]  #[phone]      "

## 2025-07-08 NOTE — PROGRESS NOTES
Vancomycin Dosing by Pharmacy- FOLLOW UP    Jayant Holland is a 74 y.o. year old male who Pharmacy has been consulted for vancomycin dosing for pneumonia. Based on the patient's indication and renal status this patient is being dosed based on a goal AUC of 400-600.     Renal function is currently stable.    Current vancomycin dose: 1250 mg given every 24 hours    Estimated vancomycin AUC on current dose: 553 mg/L.hr     Visit Vitals  /71 (BP Location: Right arm, Patient Position: Lying)   Pulse 88   Temp 36.6 °C (97.8 °F) (Temporal)   Resp 16        Lab Results   Component Value Date    CREATININE 0.66 2025    CREATININE 0.63 2025    CREATININE 0.75 2025    CREATININE 0.68 2025        Patient weight is as follows:   Vitals:    25 1547   Weight: 78.9 kg (173 lb 15.1 oz)       Cultures:  Susceptibility data for the encounter in last 14 days.  Collected Specimen Info Organism   25 Fluid from Pleural Staphylococcus epidermidis     Corynebacterium amycolatum        I/O last 3 completed shifts:  In: 250 (3.2 mL/kg) [IV Piggyback:250]  Out: 620 (7.9 mL/kg) [Urine:400 (0.1 mL/kg/hr); Chest Tube:220]  Weight: 78.9 kg   I/O during current shift:  No intake/output data recorded.    Temp (24hrs), Av.9 °C (98.4 °F), Min:36.6 °C (97.8 °F), Max:37.4 °C (99.3 °F)      Assessment/Plan    Within goal AUC range. Continue current vancomycin regimen.    This dosing regimen is predicted by [employer] to result in the following pharmacokinetic parameters:  Exposure target: AUC24 (range) 400-600 mg/L.hr   [id number]: 534 mg/L.hr  AUC24,ss: 553 mg/L.hr  Probability of AUC24 > 400: 99 %  Ctrough,ss: 17.9 mg/L  Probability of Ctrough,ss > 20: 30 %  The next level will be obtained on  at 0500. May be obtained sooner if clinically indicated.   Will continue to monitor renal function daily while on vancomycin and order serum creatinine at least every 48 hours if not already ordered.  Follow for  continued vancomycin needs, clinical response, and signs/symptoms of toxicity.       Richelle Cornelius, PharmD

## 2025-07-08 NOTE — CARE PLAN
"The patient's goals for the shift include \"get my back to stop hurting please\"    The clinical goals for the shift include pt will remain free from injury this shift      Problem: Pain - Adult  Goal: Verbalizes/displays adequate comfort level or baseline comfort level  Outcome: Progressing     Problem: Safety - Adult  Goal: Free from fall injury  Outcome: Progressing     Problem: Fall/Injury  Goal: Not fall by end of shift  Outcome: Progressing  Goal: Be free from injury by end of the shift  Outcome: Progressing  Goal: Use assistive devices by end of the shift  Outcome: Progressing  Goal: Pace activities to prevent fatigue by end of the shift  Outcome: Progressing     "

## 2025-07-08 NOTE — CARE PLAN
"The patient's goals for the shift include \"get my back to stop hurting please\"    The clinical goals for the shift include pt will remain free from injury this shift      Problem: Pain - Adult  Goal: Verbalizes/displays adequate comfort level or baseline comfort level  Outcome: Met     Problem: Safety - Adult  Goal: Free from fall injury  Outcome: Progressing     Problem: Discharge Planning  Goal: Discharge to home or other facility with appropriate resources  Outcome: Progressing     Problem: Chronic Conditions and Co-morbidities  Goal: Patient's chronic conditions and co-morbidity symptoms are monitored and maintained or improved  Outcome: Progressing     Problem: Nutrition  Goal: Nutrient intake appropriate for maintaining nutritional needs  Outcome: Progressing     "

## 2025-07-08 NOTE — PROGRESS NOTES
Between 7AM-7PM please message me via Epic Secure Chat.  After 7PM please page Nocturnist on call.    [facility] Hospitalist Progress Note      Jayant Holland    :  1950(74 y.o.)    MRN:  [id number]  Date: 25     Assessment and Plan:     Jayant Holland is a 74 y.o. male with a past medical history of non-small cell lung cancer with mets with Pleurx catheter, HTN, DM, asthma, PE on eliquis was admitted for SOB and right sided chest pain.      Non-small cell lung cancer with mets   Pleurx catheter inserted on the right side due to recurrent malignant pleural effusion  Empyema due to Staph epidermidis and Corynebacterium amycolatum  - Pleural studies consistent with exudative effusion based on Light's criteria. Fluid cultures with staph epidermis and corynebacterium amycolatum. Blood cultures NGTD.  - Thoracic surg consulted. TPA/Dornase  with approx 60ml bloody with rapid transition to serous like drainage. PleurX removed on  for source control.  - ID consulted; Continue IV vancomycin pending sensitives    - Will need close interval follow up with CXR and thora vs temporary pigtail for effusion if symptomatic until ID comfortable with replacement PleurX      Concern for GI bleed  Hx GERD  - GI consult: No EGD/colonoscopy warranted   - Hg 6.9 -> transfused 1U PRBC. Remains stable around 8  - continue ppi     HypoMg  - replace and monitor     HTN  - lisinopril     PE  - on eliquis     DM  - hold metformin  - ISS        DVT Prophylaxis: full anticoagulation with eliquis    Disposition: continue to monitor inpatient, await consultant recommendations, await test results, and await clinical improvement    Subjective:      Interval History:   Vitals and chart notes from overnight reviewed.   No acute issues overnight.   Patient seen and evaluated at bedside.   Ambulating on his own. Noted to have some tachycardia with ambulation but denies chest pain, shortness of breath, lightheadedness or  dizziness.     Review of Systems:   Other than patient's chronic conditions and those complaints in the history above, the rest of the 10 systems review were done and were negative.     Current medications:  Scheduled Meds:Scheduled Medications[1]  Continuous Infusions:Continuous Medications[2]  PRN Meds:PRN Medications[3]      Objective:     Heart Rate:  [82-88]   Temp:  [36.5 °C (97.7 °F)-37.4 °C (99.3 °F)]   Resp:  [16-18]   BP: (103-120)/(65-73)   SpO2:  [95 %-98 %]          Physical Exam  Vitals and nursing note reviewed.   HENT:      Mouth/Throat:      Mouth: Mucous membranes are moist.      Pharynx: Oropharynx is clear.   Cardiovascular:      Rate and Rhythm: Regular rhythm. Tachycardia present.   Pulmonary:      Effort: Pulmonary effort is normal.   Abdominal:      Palpations: Abdomen is soft.   Neurological:      Mental Status: He is alert and oriented to person, place, and time.         Labs:   Lab Results   Component Value Date     (L) 07/08/2025    K 3.7 07/08/2025     07/08/2025    CO2 23 07/08/2025    BUN 6 07/08/2025    CREATININE 0.66 07/08/2025    GLUCOSE 107 (H) 07/08/2025    CALCIUM 8.2 (L) 07/08/2025    PROT 6.6 07/08/2025    BILITOT 0.6 07/08/2025    ALKPHOS 175 (H) 07/08/2025    AST 86 (H) 07/08/2025    ALT 88 (H) 07/08/2025       Lab Results   Component Value Date    WBC 8.7 07/08/2025    HGB 8.1 (L) 07/08/2025    HCT 25.5 (L) 07/08/2025    MCV 88 07/08/2025     07/08/2025            [1] apixaban, 5 mg, oral, BID  folic acid, 1,000 mcg, oral, Daily  insulin lispro, 0-5 Units, subcutaneous, TID AC  lisinopril, 20 mg, oral, Daily  pantoprazole, 40 mg, oral, Daily before breakfast  potassium chloride CR, 40 mEq, oral, Daily  rosuvastatin, 40 mg, oral, Daily  vancomycin, 1,250 mg, intravenous, q12h  zinc oxide, 1 Application, Topical, BID     [2]    [3] PRN medications: acetaminophen **OR** acetaminophen **OR** acetaminophen, dextrose, dextrose, glucagon, glucagon,  HYDROmorphone, vancomycin

## 2025-07-09 ENCOUNTER — APPOINTMENT (OUTPATIENT)
Dept: HEMATOLOGY/ONCOLOGY | Facility: CLINIC | Age: 75
End: 2025-07-09
Payer: MEDICARE

## 2025-07-09 ENCOUNTER — PHARMACY VISIT (OUTPATIENT)
Dept: PHARMACY | Facility: CLINIC | Age: 75
End: 2025-07-09
Payer: COMMERCIAL

## 2025-07-09 VITALS
SYSTOLIC BLOOD PRESSURE: 107 MMHG | DIASTOLIC BLOOD PRESSURE: 72 MMHG | TEMPERATURE: 98.8 F | HEART RATE: 82 BPM | BODY MASS INDEX: 23.05 KG/M2 | RESPIRATION RATE: 16 BRPM | HEIGHT: 73 IN | OXYGEN SATURATION: 96 % | WEIGHT: 173.94 LBS

## 2025-07-09 DIAGNOSIS — J86.9 EMPYEMA (MULTI): ICD-10-CM

## 2025-07-09 LAB
ALBUMIN SERPL BCP-MCNC: 2.3 G/DL (ref 3.4–5)
ALP SERPL-CCNC: 163 U/L (ref 33–136)
ALT SERPL W P-5'-P-CCNC: 82 U/L (ref 10–52)
ANION GAP SERPL CALC-SCNC: 7 MMOL/L (ref 10–20)
AST SERPL W P-5'-P-CCNC: 74 U/L (ref 9–39)
BACTERIA BLD CULT: NORMAL
BACTERIA BLD CULT: NORMAL
BACTERIA FLD CULT: ABNORMAL
BASOPHILS # BLD AUTO: 0.02 X10*3/UL (ref 0–0.1)
BASOPHILS NFR BLD AUTO: 0.2 %
BILIRUB SERPL-MCNC: 0.4 MG/DL (ref 0–1.2)
BUN SERPL-MCNC: 8 MG/DL (ref 6–23)
CALCIUM SERPL-MCNC: 8.2 MG/DL (ref 8.6–10.3)
CHLORIDE SERPL-SCNC: 102 MMOL/L (ref 98–107)
CO2 SERPL-SCNC: 28 MMOL/L (ref 21–32)
CREAT SERPL-MCNC: 0.63 MG/DL (ref 0.5–1.3)
EGFRCR SERPLBLD CKD-EPI 2021: >90 ML/MIN/1.73M*2
EOSINOPHIL # BLD AUTO: 0.12 X10*3/UL (ref 0–0.4)
EOSINOPHIL NFR BLD AUTO: 1.4 %
ERYTHROCYTE [DISTWIDTH] IN BLOOD BY AUTOMATED COUNT: 16.9 % (ref 11.5–14.5)
GLUCOSE BLD MANUAL STRIP-MCNC: 119 MG/DL (ref 74–99)
GLUCOSE BLD MANUAL STRIP-MCNC: 175 MG/DL (ref 74–99)
GLUCOSE BLD MANUAL STRIP-MCNC: 181 MG/DL (ref 74–99)
GLUCOSE SERPL-MCNC: 126 MG/DL (ref 74–99)
GRAM STN SPEC: ABNORMAL
GRAM STN SPEC: ABNORMAL
HCT VFR BLD AUTO: 24.6 % (ref 41–52)
HGB BLD-MCNC: 7.5 G/DL (ref 13.5–17.5)
IMM GRANULOCYTES # BLD AUTO: 0.1 X10*3/UL (ref 0–0.5)
IMM GRANULOCYTES NFR BLD AUTO: 1.1 % (ref 0–0.9)
LABORATORY COMMENT REPORT: ABNORMAL
LYMPHOCYTES # BLD AUTO: 0.95 X10*3/UL (ref 0.8–3)
LYMPHOCYTES NFR BLD AUTO: 10.8 %
MCH RBC QN AUTO: 27.3 PG (ref 26–34)
MCHC RBC AUTO-ENTMCNC: 30.5 G/DL (ref 32–36)
MCV RBC AUTO: 90 FL (ref 80–100)
MONOCYTES # BLD AUTO: 0.88 X10*3/UL (ref 0.05–0.8)
MONOCYTES NFR BLD AUTO: 10 %
NEUTROPHILS # BLD AUTO: 6.72 X10*3/UL (ref 1.6–5.5)
NEUTROPHILS NFR BLD AUTO: 76.5 %
NRBC BLD-RTO: 0 /100 WBCS (ref 0–0)
PLATELET # BLD AUTO: 301 X10*3/UL (ref 150–450)
POTASSIUM SERPL-SCNC: 3.8 MMOL/L (ref 3.5–5.3)
PROT SERPL-MCNC: 6 G/DL (ref 6.4–8.2)
RBC # BLD AUTO: 2.75 X10*6/UL (ref 4.5–5.9)
SODIUM SERPL-SCNC: 133 MMOL/L (ref 136–145)
VANCOMYCIN SERPL-MCNC: 25.8 UG/ML (ref 5–20)
WBC # BLD AUTO: 8.8 X10*3/UL (ref 4.4–11.3)

## 2025-07-09 PROCEDURE — 2500000001 HC RX 250 WO HCPCS SELF ADMINISTERED DRUGS (ALT 637 FOR MEDICARE OP): Performed by: NURSE PRACTITIONER

## 2025-07-09 PROCEDURE — 84075 ASSAY ALKALINE PHOSPHATASE: CPT | Performed by: STUDENT IN AN ORGANIZED HEALTH CARE EDUCATION/TRAINING PROGRAM

## 2025-07-09 PROCEDURE — 2500000002 HC RX 250 W HCPCS SELF ADMINISTERED DRUGS (ALT 637 FOR MEDICARE OP, ALT 636 FOR OP/ED): Performed by: NURSE PRACTITIONER

## 2025-07-09 PROCEDURE — 82947 ASSAY GLUCOSE BLOOD QUANT: CPT

## 2025-07-09 PROCEDURE — 85025 COMPLETE CBC W/AUTO DIFF WBC: CPT | Performed by: STUDENT IN AN ORGANIZED HEALTH CARE EDUCATION/TRAINING PROGRAM

## 2025-07-09 PROCEDURE — 99239 HOSP IP/OBS DSCHRG MGMT >30: CPT | Performed by: HOSPITALIST

## 2025-07-09 PROCEDURE — 2500000004 HC RX 250 GENERAL PHARMACY W/ HCPCS (ALT 636 FOR OP/ED): Performed by: HOSPITALIST

## 2025-07-09 PROCEDURE — 80202 ASSAY OF VANCOMYCIN: CPT | Performed by: PHARMACIST

## 2025-07-09 PROCEDURE — 2500000001 HC RX 250 WO HCPCS SELF ADMINISTERED DRUGS (ALT 637 FOR MEDICARE OP): Performed by: HOSPITALIST

## 2025-07-09 PROCEDURE — RXMED WILLOW AMBULATORY MEDICATION CHARGE

## 2025-07-09 RX ORDER — FOLIC ACID 1 MG/1
1000 TABLET ORAL DAILY
Qty: 90 TABLET | Refills: 0 | Status: SHIPPED | OUTPATIENT
Start: 2025-07-09 | End: 2025-10-07

## 2025-07-09 RX ORDER — HEPARIN 100 UNIT/ML
5 SYRINGE INTRAVENOUS ONCE
Status: COMPLETED | OUTPATIENT
Start: 2025-07-09 | End: 2025-07-09

## 2025-07-09 RX ORDER — PANTOPRAZOLE SODIUM 40 MG/1
40 TABLET, DELAYED RELEASE ORAL
Qty: 30 TABLET | Refills: 0 | Status: SHIPPED | OUTPATIENT
Start: 2025-07-10 | End: 2025-08-09

## 2025-07-09 RX ORDER — LINEZOLID 600 MG/1
600 TABLET, FILM COATED ORAL 2 TIMES DAILY
Qty: 18 TABLET | Refills: 0 | Status: SHIPPED | OUTPATIENT
Start: 2025-07-09 | End: 2025-07-18

## 2025-07-09 RX ORDER — VANCOMYCIN HYDROCHLORIDE 1 G/200ML
1000 INJECTION, SOLUTION INTRAVENOUS EVERY 12 HOURS
Status: DISCONTINUED | OUTPATIENT
Start: 2025-07-09 | End: 2025-07-09 | Stop reason: HOSPADM

## 2025-07-09 RX ORDER — ZINC OXIDE 20 G/100G
1 OINTMENT TOPICAL 2 TIMES DAILY
Qty: 6 G | Refills: 0 | Status: SHIPPED | OUTPATIENT
Start: 2025-07-09 | End: 2025-08-08

## 2025-07-09 RX ADMIN — POTASSIUM CHLORIDE EXTENDED-RELEASE 40 MEQ: 1500 TABLET ORAL at 09:59

## 2025-07-09 RX ADMIN — INSULIN LISPRO 1 UNITS: 100 INJECTION, SOLUTION INTRAVENOUS; SUBCUTANEOUS at 12:06

## 2025-07-09 RX ADMIN — PANTOPRAZOLE SODIUM 40 MG: 40 TABLET, DELAYED RELEASE ORAL at 06:22

## 2025-07-09 RX ADMIN — ROSUVASTATIN 40 MG: 20 TABLET, FILM COATED ORAL at 09:59

## 2025-07-09 RX ADMIN — HEPARIN 500 UNITS: 100 SYRINGE at 15:42

## 2025-07-09 RX ADMIN — LISINOPRIL 20 MG: 20 TABLET ORAL at 09:59

## 2025-07-09 RX ADMIN — APIXABAN 5 MG: 5 TABLET, FILM COATED ORAL at 09:59

## 2025-07-09 RX ADMIN — FOLIC ACID 1 MG: 1 TABLET ORAL at 09:59

## 2025-07-09 ASSESSMENT — PAIN SCALES - GENERAL: PAINLEVEL_OUTOF10: 0 - NO PAIN

## 2025-07-09 ASSESSMENT — COGNITIVE AND FUNCTIONAL STATUS - GENERAL
MOBILITY SCORE: 24
DAILY ACTIVITIY SCORE: 24

## 2025-07-09 ASSESSMENT — PAIN - FUNCTIONAL ASSESSMENT: PAIN_FUNCTIONAL_ASSESSMENT: 0-10

## 2025-07-09 NOTE — CARE PLAN
"The patient's goals for the shift include \"get my back to stop hurting please\"    The clinical goals for the shift include pt will remain free from injury this shift    Over the shift, the patient did make progress toward the following goals. Barriers to progression include       Problem: Safety - Adult  Goal: Free from fall injury  Outcome: Progressing  Flowsheets (Taken 7/9/2025 1537)  Free from fall injury: Instruct family/caregiver on patient safety     Problem: Discharge Planning  Goal: Discharge to home or other facility with appropriate resources  Outcome: Progressing  Flowsheets (Taken 7/9/2025 1537)  Discharge to home or other facility with appropriate resources:   Identify barriers to discharge with patient and caregiver   Arrange for needed discharge resources and transportation as appropriate   Identify discharge learning needs (meds, wound care, etc)   Arrange for interpreters to assist at discharge as needed   Refer to discharge planning if patient needs post-hospital services based on physician order or complex needs related to functional status, cognitive ability or social support system     Problem: Chronic Conditions and Co-morbidities  Goal: Patient's chronic conditions and co-morbidity symptoms are monitored and maintained or improved  Outcome: Progressing  Flowsheets (Taken 7/9/2025 1537)  Care Plan - Patient's Chronic Conditions and Co-Morbidity Symptoms are Monitored and Maintained or Improved:   Monitor and assess patient's chronic conditions and comorbid symptoms for stability, deterioration, or improvement   Collaborate with multidisciplinary team to address chronic and comorbid conditions and prevent exacerbation or deterioration   Update acute care plan with appropriate goals if chronic or comorbid symptoms are exacerbated and prevent overall improvement and discharge     Problem: Nutrition  Goal: Nutrient intake appropriate for maintaining nutritional needs  Outcome: Progressing   "   Problem: Skin  Goal: Decreased wound size/increased tissue granulation at next dressing change  Outcome: Progressing  Flowsheets (Taken 7/9/2025 1537)  Decreased wound size/increased tissue granulation at next dressing change: Protective dressings over bony prominences  Goal: Participates in plan/prevention/treatment measures  Outcome: Progressing  Flowsheets (Taken 7/9/2025 1537)  Participates in plan/prevention/treatment measures: Increase activity/out of bed for meals  Goal: Prevent/manage excess moisture  Outcome: Progressing  Flowsheets (Taken 7/9/2025 1537)  Prevent/manage excess moisture: Moisturize dry skin  Goal: Prevent/minimize sheer/friction injuries  Outcome: Progressing  Flowsheets (Taken 7/9/2025 1537)  Prevent/minimize sheer/friction injuries: Increase activity/out of bed for meals  Goal: Promote/optimize nutrition  Outcome: Progressing  Flowsheets (Taken 7/9/2025 1537)  Promote/optimize nutrition: Monitor/record intake including meals  Goal: Promote skin healing  Outcome: Progressing  Flowsheets (Taken 7/9/2025 1537)  Promote skin healing: Protective dressings over bony prominences     Problem: Diabetes  Goal: Decrease in ketones present in urine by end of shift  Outcome: Progressing  Flowsheets (Taken 7/9/2025 1537)  Decrease in ketones present in urine by end of shift: Med administration/monitoring of effect  Goal: Maintain electrolyte levels within acceptable range throughout shift  Outcome: Progressing  Flowsheets (Taken 7/9/2025 1537)  Maintain electrolyte levels within acceptable range throughout shift: Med administration/monitoring of effect  Goal: Maintain glucose levels >70mg/dl to <250mg/dl throughout shift  Outcome: Progressing  Flowsheets (Taken 7/9/2025 1537)  Maintain glucose levels >70mg/dl to <250mg/dl throughout shift: Med administration/monitoring of effect  Goal: No changes in neurological exam by end of shift  Outcome: Progressing  Goal: Learn about and adhere to nutrition  recommendations by end of shift  Outcome: Progressing  Flowsheets (Taken 7/9/2025 3705)  Learn about and adhere to nutrition recommendations by end of shift: Ensure/encourage compliance with appropriate diet  Goal: Vital signs within normal range for age by end of shift  Outcome: Progressing  Flowsheets (Taken 7/9/2025 1173)  Vital signs within normal range for age by end of shift: Med administration/monitoring of effect     Problem: Pain  Goal: Takes deep breaths with improved pain control throughout the shift  Outcome: Progressing  Goal: Turns in bed with improved pain control throughout the shift  Outcome: Progressing  Goal: Walks with improved pain control throughout the shift  Outcome: Progressing  Goal: Performs ADL's with improved pain control throughout shift  Outcome: Progressing  Goal: Participates in PT with improved pain control throughout the shift  Outcome: Progressing  Goal: Free from opioid side effects throughout the shift  Outcome: Progressing  Goal: Free from acute confusion related to pain meds throughout the shift  Outcome: Progressing     Problem: Fall/Injury  Goal: Not fall by end of shift  Outcome: Progressing  Goal: Be free from injury by end of the shift  Outcome: Progressing  Goal: Use assistive devices by end of the shift  Outcome: Progressing  Goal: Pace activities to prevent fatigue by end of the shift  Outcome: Progressing

## 2025-07-09 NOTE — PROGRESS NOTES
Vancomycin Dosing by Pharmacy- FOLLOW UP    Jayant Holland is a 75 y.o. year old male who Pharmacy has been consulted for vancomycin dosing for pneumonia. Based on the patient's indication and renal status this patient is being dosed based on a goal AUC of 400-600.     Renal function is currently stable.    Current vancomycin dose: 1250 mg given every 12 hours    Estimated vancomycin AUC on current dose: 632 mg/L.hr     Visit Vitals  /73 (BP Location: Right arm, Patient Position: Lying)   Pulse 82   Temp 37.2 °C (98.9 °F) (Temporal)   Resp 20        Lab Results   Component Value Date    CREATININE 0.63 2025    CREATININE 0.66 2025    CREATININE 0.63 2025    CREATININE 0.75 2025        Patient weight is as follows:   Vitals:    25 1547   Weight: 78.9 kg (173 lb 15.1 oz)       Cultures:  Susceptibility data for the encounter in last 14 days.  Collected Specimen Info Organism Clindamycin Erythromycin Oxacillin Tetracycline Trimethoprim/Sulfamethoxazole Vancomycin   25 Fluid from Pleural Staphylococcus epidermidis (2)  R  R  S  R  S  S     Staphylococcus epidermidis (1)  R  R  R  S  S  S     Corynebacterium amycolatum              I/O last 3 completed shifts:  In: 1090 (13.8 mL/kg) [P.O.:840; IV Piggyback:250]  Out: 320 (4.1 mL/kg) [Urine:200 (0.1 mL/kg/hr); Chest Tube:120]  Weight: 78.9 kg   I/O during current shift:  I/O this shift:  In: 240 [P.O.:240]  Out: -     Temp (24hrs), Av.9 °C (98.4 °F), Min:36.5 °C (97.7 °F), Max:37.2 °C (98.9 °F)      Assessment/Plan    Above goal AUC. Orders placed for new vancomcyin regimen of 1000 every 12 hours to begin at 2100.    This dosing regimen is predicted by [employer] to result in the following pharmacokinetic parameters:  Exposure target: AUC24 (range) 400-600 mg/L.hr   [id number]: 527 mg/L.hr  AUC24,ss: 509 mg/L.hr  Probability of AUC24 > 400: 96 %  Ctrough,ss: 17.6 mg/L  Probability of Ctrough,ss > 20: 26 %  The next level will be  obtained on 7/11 at 0500. May be obtained sooner if clinically indicated.   Will continue to monitor renal function daily while on vancomycin and order serum creatinine at least every 48 hours if not already ordered.  Follow for continued vancomycin needs, clinical response, and signs/symptoms of toxicity.       Richelle Cornelius, PharmD

## 2025-07-09 NOTE — PROGRESS NOTES
07/09/25 0750   Discharge Planning   Expected Discharge Disposition Home  (once med ready plan is discharge home with wife)     Once med ready plan is to discharge home with his wife, per notes pleurx drained removed 7/8, ID following on IV vanco, I will continue to monitor for discharge planing and home going needs.

## 2025-07-09 NOTE — PROGRESS NOTES
"                                                                                                               '' Infectious Disease Progress Note''        Interval Events:  No new symptoms   s/p Pleurx removed  Afebrile  WBC 8K  Pleural fluid culture grew staph epi and Corynebacterium      Current antibiotic:  Vancomycin D5    Physical Exam:  BP 99/68 (BP Location: Right arm, Patient Position: Lying)   Pulse 82   Temp 37.1 °C (98.8 °F) (Temporal)   Resp 16   Ht 1.854 m (6' 0.99\")   Wt 78.9 kg (173 lb 15.1 oz)   SpO2 97%   BMI 22.95 kg/m²   General: NAD, nontoxic appearing  Skin: no new rash  Chest: Mediport  Resp: RLL diminished breathing sounds  CV:  normal S1/S2, no murmur   Abd: soft, non-tender  Ext: no edema      Lab Results:  Reviewed    Micro:  7/4 pleural fluid culture: Staph epi x2 and Corynebacterium  7/5 blood culture: No growth to date    Imaging: reviewed         Assessment:  - Malignant right pleural effusion c/b infection/empyema d/t staph epi and Corynebacterium  - NSCLC      Plan/Recommendations:  - Continue vancomycin IV day 5 (switch to Zyvox Po thru 7/18/25 when he is ready for discharge)  - Monitor for adverse effect of antibiotics such as nephrotoxicity, diarrhea or rash      Please call ID with any concerns or questions.  D/w primary team.      Taurus Buckley MD  [facility]  #[phone]      "

## 2025-07-09 NOTE — NURSING NOTE
Discharge instructions provided using teach back method. Pt's health related  risk factors discussed with pt. pt educated to look for any worsening sign and symptoms. Pt educated to seek medical attention if experience any medical emergency. Pt aware to follow up with outpatient clinics as scheduled. Home going meds reviewed with pt. Pt verbalized understanding of disposition and discharge instructions. All questions answered to patient's satisfaction and within nursing scope of practice. Vitals stable, patient has no IV access. Patient has mediport which will be deaccessed by the charge nurse.

## 2025-07-09 NOTE — NURSING NOTE
Nurse entered Pt room and informed Pt that I had 1 unit of insulin to give pt, for blood sugar of 175 per order. Nurse then scanned medication, and walked around bed to give insulin in pt left arm. As I walked back around bed and was throwing away syringe in sharp's container, pt asked what that was given and I again stated that it was 1 unit of insulin and explained that his blood sugar was elevated. Pt stated that he has never taken insulin before. Writer stated that sometimes blood sugars can be elevated in the hospital with illness and certain medications and we correct for that. Pt. stated he did not want medication and I advised that I would inform the physician to ensure order was discontinued.

## 2025-07-09 NOTE — PATIENT INSTRUCTIONS
I have ordered neck and shoulder x-rays for you to complete today. I strongly advised that you wean yourself off of alcohol consumption.  Continue taking all current medications as prescribed and keep your predetermined office visit on 3/5/2025 for annual Medicare wellness exam.   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 No problems after last visit.      OBJECTIVE                                                                                                                                         Treatment     Therapeutic Exercise  Standing arm bike fwd/rev x 2 min/2 min  Prone snow angels over swissball 3 lb 2 x 10    Manual Therapy   Soft tissue mobilization to right shoulder - deltoid, upper trap, infraspinatus, anterior shoulder    Neuromuscular Re-Education  Plank on swissball with arm circles 2 x 10  High plank on ama disc with opposite arm ball rolls 2 x 10 bilateral  1 kb mb shot put rebound up wall 2 x 20  Single arm snatch 10 lb 2 x 10  Push ups on kettlebells 2 x 10  Side plank with row 3 plates (double) x 15  Plyometric push up off 4 inch step 2 x 10  Birddogs on foam rolls 5 sec hold 2 x 10  Swissball tabletop with unilateral bench press 10 lb x 15 bilateral  Prone swissball walkout with knee tuck x 10  Spiking without jumps with yellow ball x 15  Spiking with jumps with yellow ball 2 x 10  90-90 shoulder perturbations green band 3 x 30 sec  20 lb slam ball 2 hand throws 2 x 10  Forward lunges with 5 lb kb overhead 2 x 5  Bear crawls fwd/retro/lateral x 2 laps  Crab walk circles x 2 laps each way  90-90 1 kg mb drop-catch 3 x 10  Shoulder horizontal abduction pulses during overhead raise with green band to fatigue x 2    Only 1:1 skilled therapy time billed for intervention, additional time spent completing intervention with physical therapy extender recorded under additional time.     Skilled input: verbal instruction/cues, tactile  instruction/cues and posture correction    Writer verbally educated and received verbal consent for hand placement, positioning of patient, and techniques to be performed today from patient for clothing adjustments for techniques and hand placement and palpation for techniques as described above and how they are pertinent to the patient's plan of care.  Home Exercise Program  Access Code: [email or web address]  URL: [email or web address]  Date: 06/23/2025  Prepared by: Aleks Dobson    Exercises  - Standing Shoulder Flexion Full Range  - 1 x daily - 5-7 x weekly - 2-3 sets - 8-12 reps  - Shoulder Abduction - Thumbs Up  - 1 x daily - 5-7 x weekly - 2-3 sets - 8-12 reps  - Prone Shoulder Row  - 1 x daily - 3-5 x weekly - 2-3 sets - 8-12 reps  - Prone Horizontal Abduction with Palms Down  - 1 x daily - 3-5 x weekly - 2-3 sets - 8-12 reps  - Shoulder External Rotation with Anchored Resistance  - 1 x daily - 3-5 x weekly - 2-3 sets - 8-12 reps  - Shoulder Internal Rotation with Resistance  - 1 x daily - 3-5 x weekly - 2-3 sets - 8-12 reps  - Push Up on Table  - 1 x daily - 3-5 x weekly - 2-3 sets - 8-12 reps  - Standing Shoulder Horizontal Abduction with Resistance  - 1 x daily - 3-5 x weekly - 2-3 sets - 8-12 reps  - Standing Shoulder Scaption with Resistance  - 1 x daily - 3-5 x weekly - 2-3 sets - 8-12 reps  - Standing Wall Ball Circles with Mini Swiss Ball  - 1 x daily - 3-5 x weekly - 2-3 sets - 8-12 reps  - Standing Shoulder External Rotation in Abduction with Anchored Resistance  - 1 x daily - 3-5 x weekly - 2-3 sets - 8-12 reps  - Side Plank on Elbow with Rotation  - 1 x daily - 3 x weekly - 2-3 sets - 8-12 reps - 3 hold  - Plank with Shoulder Row  - 1 x daily - 3 x weekly - 2-3 sets - 8-12 reps      ASSESSMENT                                                                                                            Patient is demonstrating mild improvement since their last visit completing additional  lifts at the gym. Skilled care provided today included graded exercises per patient response and advanced stabilization of the right shoulder. Increased tempo and speed with some of the new exercises which she tolerated very well. Patient demonstrated mild fatigue after therapy today. No report of pain.  Education:   - Results of above outlined education: Verbalizes understanding and Demonstrates understanding    PLAN                                                                                                                           Suggestions for next session as indicated: Progress per plan of care           Therapy procedure time and total treatment time can be found documented on the Time Entry flowsheet

## 2025-07-10 DIAGNOSIS — E13.69 OTHER SPECIFIED DIABETES MELLITUS WITH OTHER SPECIFIED COMPLICATION, WITHOUT LONG-TERM CURRENT USE OF INSULIN: ICD-10-CM

## 2025-07-11 ENCOUNTER — INFUSION (OUTPATIENT)
Dept: HEMATOLOGY/ONCOLOGY | Facility: CLINIC | Age: 75
End: 2025-07-11
Payer: MEDICARE

## 2025-07-11 ENCOUNTER — APPOINTMENT (OUTPATIENT)
Dept: HEMATOLOGY/ONCOLOGY | Facility: CLINIC | Age: 75
End: 2025-07-11
Payer: MEDICARE

## 2025-07-11 VITALS
OXYGEN SATURATION: 98 % | WEIGHT: 171.7 LBS | DIASTOLIC BLOOD PRESSURE: 83 MMHG | SYSTOLIC BLOOD PRESSURE: 119 MMHG | HEART RATE: 103 BPM | HEIGHT: 72 IN | BODY MASS INDEX: 23.26 KG/M2 | RESPIRATION RATE: 16 BRPM | TEMPERATURE: 97.2 F

## 2025-07-11 DIAGNOSIS — E83.42 HYPOMAGNESEMIA: ICD-10-CM

## 2025-07-11 DIAGNOSIS — C80.1 ADENOCARCINOMA (MULTI): ICD-10-CM

## 2025-07-11 LAB — MAGNESIUM SERPL-MCNC: 1.21 MG/DL (ref 1.6–2.4)

## 2025-07-11 PROCEDURE — 96365 THER/PROPH/DIAG IV INF INIT: CPT | Mod: INF

## 2025-07-11 PROCEDURE — 83735 ASSAY OF MAGNESIUM: CPT

## 2025-07-11 PROCEDURE — 2500000004 HC RX 250 GENERAL PHARMACY W/ HCPCS (ALT 636 FOR OP/ED): Performed by: NURSE PRACTITIONER

## 2025-07-11 RX ORDER — MAGNESIUM SULFATE HEPTAHYDRATE 40 MG/ML
2-4 INJECTION, SOLUTION INTRAVENOUS ONCE
OUTPATIENT
Start: 2025-07-14 | End: 2025-07-14

## 2025-07-11 RX ORDER — MAGNESIUM SULFATE HEPTAHYDRATE 40 MG/ML
2 INJECTION, SOLUTION INTRAVENOUS ONCE
Status: COMPLETED | OUTPATIENT
Start: 2025-07-11 | End: 2025-07-11

## 2025-07-11 RX ORDER — MAGNESIUM SULFATE HEPTAHYDRATE 40 MG/ML
4 INJECTION, SOLUTION INTRAVENOUS ONCE
OUTPATIENT
Start: 2025-07-14 | End: 2025-07-14

## 2025-07-11 RX ORDER — MAGNESIUM SULFATE HEPTAHYDRATE 40 MG/ML
2 INJECTION, SOLUTION INTRAVENOUS ONCE
OUTPATIENT
Start: 2025-07-14 | End: 2025-07-14

## 2025-07-11 RX ADMIN — MAGNESIUM SULFATE IN WATER 2 G: 40 INJECTION, SOLUTION INTRAVENOUS at 09:47

## 2025-07-11 ASSESSMENT — PAIN SCALES - GENERAL: PAINLEVEL_OUTOF10: 0-NO PAIN

## 2025-07-11 NOTE — PROGRESS NOTES
Patient here for Magnesium replacement, tolerated well. Patient to return 7/14 for labs and Magnesium if needed. Patient denies any questions at this time. Discharged in stable condition.

## 2025-07-13 NOTE — DISCHARGE SUMMARY
Discharge Diagnosis  Non-small cell lung cancer with mets   Pleurx catheter inserted on the right side due to recurrent malignant pleural effusion  Empyema due to Staph epidermidis and Corynebacterium amycolatum  Bleeding due to perianal sores  Hypomagnesemia  HTN  PE  T2DM    Issues Requiring Follow-Up  - op follow up with pcp, thoracic surgery  - repeat CXR in 1-2 weeks  - repeat cbc, cmp in 1 week    Discharge Meds     Medication List      START taking these medications     linezolid 600 mg tablet; Commonly known as: Zyvox; Take 1 tablet (600   mg) by mouth 2 times a day for 9 days.   pantoprazole 40 mg EC tablet; Commonly known as: ProtoNix; Take 1 tablet   (40 mg) by mouth once daily in the morning. Take before meals. Do not   crush, chew, or split.; Replaces: omeprazole 20 mg DR capsule   zinc oxide 20 % ointment; Apply 1 Application topically 2 times a day.   Apply to perirectal area     CONTINUE taking these medications     apixaban 5 mg tablet; Commonly known as: Eliquis; Take 1 tablet (5 mg)   by mouth 2 times a day.   * Blood glucose monitoring meter   * blood-glucose meter misc; 1 Device once daily.   clindamycin 1 % lotion; Commonly known as: Cleocin T; Apply topically to   affected area twice daily. Do not fill before January 29, 2025.   folic acid 1 mg tablet; Commonly known as: Folvite; Take 1 tablet (1 mg)   by mouth once daily.   hydrocortisone 1 % cream; Apply topically to face, hands, feet, neck,   back, and chest once daily at bedtime for rash prevention. Do not fill   before January 29, 2025.   lancets 30 gauge misc; 1 Device 3 times a day.   lisinopril 20 mg tablet   metFORMIN  mg 24 hr tablet; Commonly known as: Glucophage-XR; TAKE   2 TABLETS (1,000 MG) BY MOUTH 2 TIMES A DAY. DO NOT CRUSH, CHEW, OR SPLIT.   ondansetron 8 mg tablet; Commonly known as: Zofran; Take 1 tablet (8 mg)   by mouth every 8 hours if needed for nausea or vomiting. Do not fill   before January 29, 2025.    potassium chloride CR 20 mEq ER tablet; Commonly known as: Klor-Con M20;   Take 2 tablets (40 mEq) by mouth once daily. Do not crush, chew, or split.   prochlorperazine 10 mg tablet; Commonly known as: Compazine; Take 1   tablet (10 mg) by mouth every 6 hours if needed for nausea or vomiting. Do   not fill before January 29, 2025.   rosuvastatin 40 mg tablet; Commonly known as: Crestor; TAKE 1 TABLET BY   MOUTH EVERY DAY   sennosides 8.6 mg tablet; Commonly known as: Senokot; Take 2 tablets   (17.2 mg) by mouth as needed at bedtime for constipation. Do not fill   before January 29, 2025.  * This list has 2 medication(s) that are the same as other medications   prescribed for you. Read the directions carefully, and ask your doctor or   other care provider to review them with you.     STOP taking these medications     meloxicam 15 mg tablet; Commonly known as: Mobic   omeprazole 20 mg DR capsule; Commonly known as: PriLOSEC; Replaced by:   pantoprazole 40 mg EC tablet       Test Results Pending At Discharge  Pending Labs       No current pending labs.            Hospital Course  Jayant Holland is a 74 y.o. male with a past medical history of non-small cell lung cancer with mets with Pleurx catheter, HTN, DM, asthma, PE on eliquis was admitted for SOB and right sided chest pain.      Non-small cell lung cancer with mets   Pleurx catheter inserted on the right side due to recurrent malignant pleural effusion  Empyema due to Staph epidermidis and Corynebacterium amycolatum  - Pleural studies consistent with exudative effusion based on Light's criteria. Fluid cultures with staph epidermis and corynebacterium amycolatum. Blood cultures NGTD.  - Thoracic surg consulted. TPA/Dornase 7/6 with approx 60ml bloody with rapid transition to serous like drainage. PleurX removed on 7/8 for source control.  - ID consulted. IV vancomycin in hospital with plans for Zyvox Po thru 7/18/25 on discharge.  - Will need close interval follow up  with CXR and thora vs temporary pigtail for effusion if symptomatic until ID comfortable with replacement PleurX     Perianal bleeding  - GI consult: No EGD/colonoscopy warranted. GI felt bleeding is due to his sores in the perianal region   - He had a colonoscopy around 2 years ago without any polyps, colitis, or precancerous changes, which would make something serious such as colon cancer or IBD very unlikely. Ordered zinc oxide barrier cream to be used BID, and also discussed with him using wet wipes and dabbing his rectum instead of using dry toilet paper. If bleeding continue he can follow up outpatient.     Hx GERD  - Hg 6.9 -> transfused 1U PRBC. Remains stable around 8    GERD  - continue ppi     HypoMg  - replaced while admitted, repeat labs as OP    Abnormal LFTs  - mild elevation now improving. Repeat LFTs as OP. If remains elevated, PCP can refer to GI/hepatology     HTN  - lisinopril     PE  - on eliquis     DM  - resume metformin on dc    Pertinent Physical Exam At Time of Discharge  Physical Exam  Vitals and nursing note reviewed.   HENT:      Mouth/Throat:      Mouth: Mucous membranes are moist.      Pharynx: Oropharynx is clear.   Cardiovascular:      Rate and Rhythm: Normal rate and regular rhythm.   Pulmonary:      Effort: Pulmonary effort is normal.      Breath sounds: No wheezing.   Abdominal:      General: There is no distension.      Palpations: Abdomen is soft.      Tenderness: There is no abdominal tenderness.   Neurological:      Mental Status: He is alert and oriented to person, place, and time.         Outpatient Follow-Up  Future Appointments   Date Time Provider Department Center   7/14/2025 10:00 AM FORTINO Mosher-CNP [id number] [place]   7/14/2025 11:00 AM INF 09 [place] [id number] [facility]   7/16/2025 12:30 PM INF 08 [place] [id number] [facility]   7/17/2025  9:30 AM POR CT 1 PORCT [facility]   7/17/2025 11:15 AM POR MRI PORMRI [facility]   7/18/2025 12:30 PM INF 07 [place]  [id number] [facility]   7/22/2025  9:00 AM Alisha Solitario MD [id number] [facility]   7/22/2025 10:00 AM INF31 CMC 7 DAY [id number] [facility]   7/22/2025  3:00 PM Rosio Ordaz DO [id number] [facility]   7/23/2025 10:00 AM INF 03 MINOFF [id number] [facility]   7/25/2025 12:30 PM INF 10 [place] [id number] [facility]   7/28/2025 10:00 AM INF 05 [place] [id number] [facility]   7/30/2025  9:30 AM INF 02 MINOFF [id number] [facility]   8/1/2025  8:30 AM INF 07 [place] [id number] [facility]   8/4/2025  9:00 AM INF 10 [place] [id number] [facility]   8/6/2025  8:00 AM INF 13 MINOFF [id number] [facility]   8/8/2025  9:30 AM INF 02 MINOFF [id number] [facility]   9/8/2025 10:20 AM Jorge Luis Oconnor APRN-CNP, DNP [id number] [facility]   3/6/2026 11:00 AM Jorge Luis Oconnor APRN-CNP, DNP [id number] [facility]     DISCHARGE TIME: > 30 minutes    Deep DO Michael

## 2025-07-14 ENCOUNTER — APPOINTMENT (OUTPATIENT)
Facility: CLINIC | Age: 75
End: 2025-07-14
Payer: MEDICARE

## 2025-07-14 ENCOUNTER — HOSPITAL ENCOUNTER (OUTPATIENT)
Dept: RADIOLOGY | Facility: HOSPITAL | Age: 75
Discharge: HOME | End: 2025-07-14
Payer: MEDICARE

## 2025-07-14 ENCOUNTER — INFUSION (OUTPATIENT)
Dept: HEMATOLOGY/ONCOLOGY | Facility: CLINIC | Age: 75
End: 2025-07-14
Payer: MEDICARE

## 2025-07-14 VITALS
HEART RATE: 111 BPM | HEIGHT: 72 IN | DIASTOLIC BLOOD PRESSURE: 97 MMHG | BODY MASS INDEX: 22.62 KG/M2 | OXYGEN SATURATION: 95 % | SYSTOLIC BLOOD PRESSURE: 142 MMHG | WEIGHT: 167 LBS

## 2025-07-14 VITALS
WEIGHT: 169 LBS | TEMPERATURE: 97.5 F | DIASTOLIC BLOOD PRESSURE: 77 MMHG | OXYGEN SATURATION: 97 % | HEIGHT: 72 IN | RESPIRATION RATE: 16 BRPM | BODY MASS INDEX: 22.89 KG/M2 | SYSTOLIC BLOOD PRESSURE: 129 MMHG | HEART RATE: 110 BPM

## 2025-07-14 DIAGNOSIS — C80.1 ADENOCARCINOMA (MULTI): ICD-10-CM

## 2025-07-14 DIAGNOSIS — J91.0 MALIGNANT PLEURAL EFFUSION: ICD-10-CM

## 2025-07-14 DIAGNOSIS — K62.5 BRIGHT RED RECTAL BLEEDING: ICD-10-CM

## 2025-07-14 DIAGNOSIS — E83.42 HYPOMAGNESEMIA: ICD-10-CM

## 2025-07-14 LAB — MAGNESIUM SERPL-MCNC: 1.15 MG/DL (ref 1.6–2.4)

## 2025-07-14 PROCEDURE — 99214 OFFICE O/P EST MOD 30 MIN: CPT | Performed by: NURSE PRACTITIONER

## 2025-07-14 PROCEDURE — 1159F MED LIST DOCD IN RCRD: CPT | Performed by: NURSE PRACTITIONER

## 2025-07-14 PROCEDURE — 3052F HG A1C>EQUAL 8.0%<EQUAL 9.0%: CPT | Performed by: NURSE PRACTITIONER

## 2025-07-14 PROCEDURE — 4010F ACE/ARB THERAPY RXD/TAKEN: CPT | Performed by: NURSE PRACTITIONER

## 2025-07-14 PROCEDURE — 2500000004 HC RX 250 GENERAL PHARMACY W/ HCPCS (ALT 636 FOR OP/ED): Performed by: STUDENT IN AN ORGANIZED HEALTH CARE EDUCATION/TRAINING PROGRAM

## 2025-07-14 PROCEDURE — 2500000004 HC RX 250 GENERAL PHARMACY W/ HCPCS (ALT 636 FOR OP/ED): Performed by: NURSE PRACTITIONER

## 2025-07-14 PROCEDURE — 83735 ASSAY OF MAGNESIUM: CPT | Performed by: NURSE PRACTITIONER

## 2025-07-14 PROCEDURE — 1036F TOBACCO NON-USER: CPT | Performed by: NURSE PRACTITIONER

## 2025-07-14 PROCEDURE — 3077F SYST BP >= 140 MM HG: CPT | Performed by: NURSE PRACTITIONER

## 2025-07-14 PROCEDURE — 3080F DIAST BP >= 90 MM HG: CPT | Performed by: NURSE PRACTITIONER

## 2025-07-14 PROCEDURE — 3048F LDL-C <100 MG/DL: CPT | Performed by: NURSE PRACTITIONER

## 2025-07-14 PROCEDURE — 96365 THER/PROPH/DIAG IV INF INIT: CPT | Mod: INF

## 2025-07-14 PROCEDURE — 3061F NEG MICROALBUMINURIA REV: CPT | Performed by: NURSE PRACTITIONER

## 2025-07-14 PROCEDURE — 71046 X-RAY EXAM CHEST 2 VIEWS: CPT | Performed by: RADIOLOGY

## 2025-07-14 PROCEDURE — 71046 X-RAY EXAM CHEST 2 VIEWS: CPT

## 2025-07-14 PROCEDURE — 1111F DSCHRG MED/CURRENT MED MERGE: CPT | Performed by: NURSE PRACTITIONER

## 2025-07-14 RX ORDER — MAGNESIUM SULFATE HEPTAHYDRATE 40 MG/ML
2-4 INJECTION, SOLUTION INTRAVENOUS ONCE
Status: CANCELLED | OUTPATIENT
Start: 2025-07-16 | End: 2025-07-16

## 2025-07-14 RX ORDER — MAGNESIUM SULFATE HEPTAHYDRATE 40 MG/ML
4 INJECTION, SOLUTION INTRAVENOUS ONCE
Status: CANCELLED | OUTPATIENT
Start: 2025-07-16 | End: 2025-07-16

## 2025-07-14 RX ORDER — HEPARIN 100 UNIT/ML
500 SYRINGE INTRAVENOUS AS NEEDED
Status: DISCONTINUED | OUTPATIENT
Start: 2025-07-14 | End: 2025-07-14 | Stop reason: HOSPADM

## 2025-07-14 RX ORDER — MAGNESIUM SULFATE HEPTAHYDRATE 40 MG/ML
2 INJECTION, SOLUTION INTRAVENOUS ONCE
Status: CANCELLED | OUTPATIENT
Start: 2025-07-16 | End: 2025-07-16

## 2025-07-14 RX ORDER — HEPARIN SODIUM,PORCINE/PF 10 UNIT/ML
50 SYRINGE (ML) INTRAVENOUS AS NEEDED
Status: CANCELLED | OUTPATIENT
Start: 2025-07-14

## 2025-07-14 RX ORDER — MAGNESIUM SULFATE HEPTAHYDRATE 40 MG/ML
2 INJECTION, SOLUTION INTRAVENOUS ONCE
Status: COMPLETED | OUTPATIENT
Start: 2025-07-14 | End: 2025-07-14

## 2025-07-14 RX ORDER — CALCIUM CARBONATE/VITAMIN D3 500-10/5ML
LIQUID (ML) ORAL EVERY 12 HOURS
COMMUNITY
Start: 2025-06-10

## 2025-07-14 RX ORDER — HEPARIN 100 UNIT/ML
500 SYRINGE INTRAVENOUS AS NEEDED
Status: CANCELLED | OUTPATIENT
Start: 2025-07-14

## 2025-07-14 RX ORDER — DEXAMETHASONE 4 MG/1
TABLET ORAL
COMMUNITY
Start: 2025-04-15

## 2025-07-14 RX ADMIN — Medication 500 UNITS: at 13:01

## 2025-07-14 RX ADMIN — MAGNESIUM SULFATE IN WATER 2 G: 40 INJECTION, SOLUTION INTRAVENOUS at 11:58

## 2025-07-14 ASSESSMENT — PAIN SCALES - GENERAL: PAINLEVEL_OUTOF10: 0-NO PAIN

## 2025-07-14 ASSESSMENT — ENCOUNTER SYMPTOMS
FEVER: 0
WOUND: 0
SORE THROAT: 0
ADENOPATHY: 0
SHORTNESS OF BREATH: 0
CHILLS: 0
TROUBLE SWALLOWING: 0
ARTHRALGIAS: 0
ROS GI COMMENTS: SEE HPI
DIFFICULTY URINATING: 0
JOINT SWELLING: 0
WEAKNESS: 0
CONFUSION: 0
DIZZINESS: 0
PALPITATIONS: 0
BRUISES/BLEEDS EASILY: 0
COUGH: 0

## 2025-07-14 NOTE — PATIENT INSTRUCTIONS
Thank you for coming to your appointment today   - I will wait until your CT has resulted to ensure that the lung area looks better before we commit to a colonoscopy   - I will need to check with your team regarding holding the eliquis to ensure we are safe to do so for a procedure     Please call [phone] with any questions or concerns       If you utilize [employer] messages, please understand that these are intended to be used for simple and straightforward medical questions. If you have a more complex question or numerous complaints, an office appointment may be needed.

## 2025-07-14 NOTE — PROGRESS NOTES
Pt is here for magnesium replacement. Mag level 1.15, replaced with 2g per orders. Pt reports having some SOB upon arrival to clinic today. He denies any chest pain or other pain associated with this. Endorses weakness and poor appetite, denies n/v/d. VSS at pt's normal. Notified MD Solitario and DEIDRA Rehman. MD Solitario recommends pt have repeat CXR done today to see if he needs sooner follow up with Thoracic Surgery. Pt verbalized understanding of plan, will go to Radiology dept after today's infusion. He tolerated infusion without incident, discharged in stable condition with no additional needs. He is aware of his follow up schedule.

## 2025-07-14 NOTE — PROGRESS NOTES
"Subjective   Patient ID: Jayant Holland is a 75 y.o. male with PMH of HTN, HLD, PE, non-small cell lung cancer adenocarcinoma, malignant pleural effusion, DM2, GERD, PTSD, OA, who was referred by Tito Matos APRN-* for Black or Bloody Stool.     Patient's PCP is FORTINO Garland-CNP, DNP    HPI  Patient referred for rectal bleeding. He was admitted to the hospital earlier this month for a loculated pleural effusion. He had a pleurx catheter that was placed in November 2024, and was removed this admission due to infection. He is currently on PO antibiotics and will finish those soon. He has a repeat chest CT scheduled 7/17/25.   and seen by GI for rectal bleeding at that time (Justyn Ramirez MD). It was felt that bleeding was related to some perianal sores and it was recommended that he use wet wipes to dab the area rather than dry toilet paper.     He follows with heme/onc for lung cancer and was referred to GI outpatient for ongoing rectal bleeding. He states he has been using a cream on the sores and feels those are getting better, but still has persistent rectal bleeding. Blood is bright red and occurs only with BMs. He does not have any diarrhea, constipation, rectal pain, or abdominal pain. He feels the bleeding may be improving somewhat, as he reports it was a \"constant stream\" before, and is now just with defecation.     Has lost weight and is trying to drink more boost/ensure. No N/V, dysphagia, abdominal pain, melena.     Summary of endoscopies:  - Colonoscopy 3/14/23: fair prep. 1 rectal polyp (HP)    Social Hx:  Tobacco: none   Etoh: 5 per week   Recreational drug use: none   NSAIDs: none       Family Hx:  No GI malignancy, IBD, or pancreatitis     Review of Systems:  Review of Systems   Constitutional:  Negative for chills and fever.   HENT:  Negative for sore throat and trouble swallowing.    Respiratory:  Negative for cough and shortness of breath.    Cardiovascular:  Negative for chest pain " and palpitations.   Gastrointestinal:         SEE HPI   Endocrine: Negative for cold intolerance and heat intolerance.   Genitourinary:  Negative for difficulty urinating.   Musculoskeletal:  Negative for arthralgias and joint swelling.   Skin:  Negative for rash and wound.   Neurological:  Negative for dizziness and weakness.   Hematological:  Negative for adenopathy. Does not bruise/bleed easily.   Psychiatric/Behavioral:  Negative for confusion.         Medications:  Prior to Admission medications    Medication Sig Start Date End Date Taking? Authorizing Provider   apixaban (Eliquis) 5 mg tablet Take 1 tablet (5 mg) by mouth 2 times a day. 6/4/25 12/1/25  JENNIFER Garland DNP   Blood glucose monitoring meter kit kit 1 each if needed.    Historical Provider, MD   blood-glucose meter misc 1 Device once daily. 3/6/24   JENNIFER Garland DNP   clindamycin (Cleocin T) 1 % lotion Apply topically to affected area twice daily. Do not fill before January 29, 2025.  Patient taking differently: Apply 1 Application topically 2 times a day as needed. Apply topically to affected area twice daily. 1/29/25   Alisha Solitario MD   folic acid (Folvite) 1 mg tablet Take 1 tablet (1 mg) by mouth once daily. 7/9/25 10/7/25  Deep Rodriguez, DO   hydrocortisone 1 % cream Apply topically to face, hands, feet, neck, back, and chest once daily at bedtime for rash prevention. Do not fill before January 29, 2025.  Patient taking differently: Apply 1 Application topically as needed at bedtime for rash or irritation. Apply topically to face, hands, feet, neck, back, and chest once daily at bedtime for rash prevention. 1/29/25   Alisha Solitario MD   lancets 30 gauge misc 1 Device 3 times a day. 3/8/24   JENNIFER Garland DNP   linezolid (Zyvox) 600 mg tablet Take 1 tablet (600 mg) by mouth 2 times a day for 9 days. 7/9/25 7/18/25  Deep Rodriguez, DO   lisinopril 20 mg tablet Take 1 tablet (20 mg) by mouth once daily.     Historical Provider, MD   metFORMIN  mg 24 hr tablet TAKE 2 TABLETS (1,000 MG) BY MOUTH 2 TIMES A DAY. DO NOT CRUSH, CHEW, OR SPLIT. 3/26/25   JENNIFER Garland DNP   ondansetron (Zofran) 8 mg tablet Take 1 tablet (8 mg) by mouth every 8 hours if needed for nausea or vomiting. Do not fill before January 29, 2025. 1/29/25   Alisha Solitario MD   pantoprazole (ProtoNix) 40 mg EC tablet Take 1 tablet (40 mg) by mouth once daily in the morning. Take before meals. Do not crush, chew, or split. 7/10/25 8/9/25  Deep Rodriguez, DO   potassium chloride CR 20 mEq ER tablet Take 2 tablets (40 mEq) by mouth once daily. Do not crush, chew, or split. 5/19/25 9/16/25  Alisha Solitario MD   prochlorperazine (Compazine) 10 mg tablet Take 1 tablet (10 mg) by mouth every 6 hours if needed for nausea or vomiting. Do not fill before January 29, 2025. 1/29/25   Alisha Solitario MD   rosuvastatin (Crestor) 40 mg tablet TAKE 1 TABLET BY MOUTH EVERY DAY 6/25/25   JENNIFER Garland DNP   sennosides (Senokot) 8.6 mg tablet Take 2 tablets (17.2 mg) by mouth as needed at bedtime for constipation. Do not fill before January 29, 2025. 1/29/25   Alisha Solitario MD   zinc oxide 20 % ointment Apply 1 Application topically 2 times a day. Apply to perirectal area 7/9/25 8/8/25  Deep Rodriguez, DO   folic acid (Folvite) 1 mg tablet Take 1 tablet (1,000 mcg) by mouth once daily. Do not fill before January 29, 2025. 1/29/25 7/9/25  Alisha Solitario MD   meloxicam (Mobic) 15 mg tablet Take 1 tablet (15 mg) by mouth once daily.  7/9/25  Historical Provider, MD   omeprazole (PriLOSEC) 20 mg DR capsule Take 1 capsule (20 mg) by mouth once daily. Do not crush or chew.  7/9/25  Historical Provider, MD       Allergies:  Patient has no known allergies.    Past Medical History:  He has a past medical history of Arthritis (20+ years), Asthma (20+ years), Cervical disc disorder, Colon polyp (2 yrs), Diabetes mellitus (Multi) (20+ years), Exposure to  potentially hazardous substance (12/06/2024), GERD (gastroesophageal reflux disease) (20+ years), Hypertension (20+ years), and Lung disease.    Past Surgical History:  He has a past surgical history that includes Knee surgery (04/28/2015); Colonoscopy (04/28/2015); and Knee Arthroplasty (20 + years).    Social History:  He reports that he has never smoked. He has never used smokeless tobacco. He reports that he does not currently use alcohol after a past usage of about 5.0 standard drinks of alcohol per week. He reports that he does not use drugs.    Objective   Physical exam:  Physical Exam  Constitutional:       General: He is not in acute distress.     Appearance: Normal appearance.      Comments: Very thin    HENT:      Mouth/Throat:      Mouth: Mucous membranes are moist.   Eyes:      Conjunctiva/sclera: Conjunctivae normal.      Pupils: Pupils are equal, round, and reactive to light.   Cardiovascular:      Rate and Rhythm: Normal rate and regular rhythm.      Heart sounds: No murmur heard.  Pulmonary:      Effort: Pulmonary effort is normal.      Breath sounds: Normal breath sounds.   Abdominal:      General: Bowel sounds are normal. There is no distension.      Palpations: Abdomen is soft.      Tenderness: There is no abdominal tenderness. There is no guarding.   Genitourinary:     Comments: Refused rectal exam   Skin:     General: Skin is warm and dry.      Coloration: Skin is not jaundiced.   Neurological:      Mental Status: He is alert and oriented to person, place, and time.   Psychiatric:         Mood and Affect: Mood normal.         Behavior: Behavior normal.          Assessment/Plan     Rectal bleeding   Patient with persistent rectal bleeding for 1 year. Improving somewhat now but still occurring with defecation. States that the perianal sores have been healing. Discussed colonoscopy and increased risks of procedure with his co morbidities. Patient wants to pursue colonoscopy. Will wait until repeat  CT chest has resulted to ensure improvement in pleural effusion. Will need to check with PCP regarding holding eliquis as he had a PE in March.     Procedures need done in OR     Meds to hold: haja Keller, FORTINO-CNP

## 2025-07-16 ENCOUNTER — INFUSION (OUTPATIENT)
Dept: HEMATOLOGY/ONCOLOGY | Facility: CLINIC | Age: 75
End: 2025-07-16
Payer: MEDICARE

## 2025-07-16 ENCOUNTER — APPOINTMENT (OUTPATIENT)
Dept: HEMATOLOGY/ONCOLOGY | Facility: CLINIC | Age: 75
End: 2025-07-16
Payer: MEDICARE

## 2025-07-16 VITALS
RESPIRATION RATE: 16 BRPM | HEART RATE: 109 BPM | BODY MASS INDEX: 22.73 KG/M2 | OXYGEN SATURATION: 97 % | TEMPERATURE: 97.7 F | DIASTOLIC BLOOD PRESSURE: 82 MMHG | HEIGHT: 72 IN | SYSTOLIC BLOOD PRESSURE: 131 MMHG | WEIGHT: 167.8 LBS

## 2025-07-16 DIAGNOSIS — C80.1 ADENOCARCINOMA (MULTI): ICD-10-CM

## 2025-07-16 DIAGNOSIS — E83.42 HYPOMAGNESEMIA: ICD-10-CM

## 2025-07-16 LAB — MAGNESIUM SERPL-MCNC: 1.12 MG/DL (ref 1.6–2.4)

## 2025-07-16 PROCEDURE — 2500000004 HC RX 250 GENERAL PHARMACY W/ HCPCS (ALT 636 FOR OP/ED): Performed by: STUDENT IN AN ORGANIZED HEALTH CARE EDUCATION/TRAINING PROGRAM

## 2025-07-16 PROCEDURE — 96365 THER/PROPH/DIAG IV INF INIT: CPT | Mod: INF

## 2025-07-16 PROCEDURE — 2500000004 HC RX 250 GENERAL PHARMACY W/ HCPCS (ALT 636 FOR OP/ED): Performed by: NURSE PRACTITIONER

## 2025-07-16 PROCEDURE — 83735 ASSAY OF MAGNESIUM: CPT | Performed by: NURSE PRACTITIONER

## 2025-07-16 RX ORDER — MAGNESIUM SULFATE HEPTAHYDRATE 40 MG/ML
2 INJECTION, SOLUTION INTRAVENOUS ONCE
Status: CANCELLED | OUTPATIENT
Start: 2025-07-18 | End: 2025-07-18

## 2025-07-16 RX ORDER — HEPARIN 100 UNIT/ML
500 SYRINGE INTRAVENOUS AS NEEDED
Status: DISCONTINUED | OUTPATIENT
Start: 2025-07-16 | End: 2025-07-16 | Stop reason: HOSPADM

## 2025-07-16 RX ORDER — MAGNESIUM SULFATE HEPTAHYDRATE 40 MG/ML
4 INJECTION, SOLUTION INTRAVENOUS ONCE
Status: CANCELLED | OUTPATIENT
Start: 2025-07-18 | End: 2025-07-18

## 2025-07-16 RX ORDER — HEPARIN SODIUM,PORCINE/PF 10 UNIT/ML
50 SYRINGE (ML) INTRAVENOUS AS NEEDED
Status: CANCELLED | OUTPATIENT
Start: 2025-07-16

## 2025-07-16 RX ORDER — HEPARIN 100 UNIT/ML
500 SYRINGE INTRAVENOUS AS NEEDED
Status: CANCELLED | OUTPATIENT
Start: 2025-07-16

## 2025-07-16 RX ORDER — MAGNESIUM SULFATE HEPTAHYDRATE 40 MG/ML
2-4 INJECTION, SOLUTION INTRAVENOUS ONCE
Status: CANCELLED | OUTPATIENT
Start: 2025-07-18 | End: 2025-07-18

## 2025-07-16 RX ORDER — MAGNESIUM SULFATE HEPTAHYDRATE 40 MG/ML
2 INJECTION, SOLUTION INTRAVENOUS ONCE
Status: COMPLETED | OUTPATIENT
Start: 2025-07-16 | End: 2025-07-16

## 2025-07-16 RX ADMIN — Medication 500 UNITS: at 14:47

## 2025-07-16 RX ADMIN — MAGNESIUM SULFATE IN WATER 2 G: 40 INJECTION, SOLUTION INTRAVENOUS at 13:26

## 2025-07-16 ASSESSMENT — PAIN SCALES - GENERAL: PAINLEVEL_OUTOF10: 0-NO PAIN

## 2025-07-16 NOTE — PROGRESS NOTES
Pt arrived awake, alert, oriented, no apparent distress with unlabored breaths. Pt reports with ongoing weakness/fatigue/SOB with activity/ low appetite, otherwise without s/sx of acute illness. Pt here for magnesium infusion, in which he received 2 G today. Pt with next appointments for CT/MRI 7/17/25, and then next magnesium infusion set for 7/18/25. Pt given AVS without further questions or concerns, discharged in stable condition.

## 2025-07-17 ENCOUNTER — HOSPITAL ENCOUNTER (OUTPATIENT)
Dept: RADIOLOGY | Facility: HOSPITAL | Age: 75
Discharge: HOME | End: 2025-07-17
Payer: MEDICARE

## 2025-07-17 DIAGNOSIS — C80.1 ADENOCARCINOMA (MULTI): ICD-10-CM

## 2025-07-17 PROCEDURE — A9575 INJ GADOTERATE MEGLUMI 0.1ML: HCPCS | Performed by: STUDENT IN AN ORGANIZED HEALTH CARE EDUCATION/TRAINING PROGRAM

## 2025-07-17 PROCEDURE — 70553 MRI BRAIN STEM W/O & W/DYE: CPT | Performed by: RADIOLOGY

## 2025-07-17 PROCEDURE — 74177 CT ABD & PELVIS W/CONTRAST: CPT

## 2025-07-17 PROCEDURE — 70553 MRI BRAIN STEM W/O & W/DYE: CPT

## 2025-07-17 PROCEDURE — 2500000004 HC RX 250 GENERAL PHARMACY W/ HCPCS (ALT 636 FOR OP/ED): Performed by: NURSE PRACTITIONER

## 2025-07-17 PROCEDURE — 2550000001 HC RX 255 CONTRASTS: Performed by: STUDENT IN AN ORGANIZED HEALTH CARE EDUCATION/TRAINING PROGRAM

## 2025-07-17 RX ORDER — HEPARIN 100 UNIT/ML
100 SYRINGE INTRAVENOUS AS NEEDED
Status: DISCONTINUED | OUTPATIENT
Start: 2025-07-17 | End: 2025-07-18 | Stop reason: HOSPADM

## 2025-07-17 RX ORDER — GADOTERATE MEGLUMINE 376.9 MG/ML
0.2 INJECTION INTRAVENOUS
Status: COMPLETED | OUTPATIENT
Start: 2025-07-17 | End: 2025-07-17

## 2025-07-17 RX ADMIN — GADOTERATE MEGLUMINE 15 ML: 376.9 INJECTION INTRAVENOUS at 11:05

## 2025-07-17 RX ADMIN — IOHEXOL 75 ML: 350 INJECTION, SOLUTION INTRAVENOUS at 10:15

## 2025-07-17 RX ADMIN — Medication 100 UNITS: at 11:37

## 2025-07-18 ENCOUNTER — INFUSION (OUTPATIENT)
Dept: HEMATOLOGY/ONCOLOGY | Facility: CLINIC | Age: 75
End: 2025-07-18
Payer: MEDICARE

## 2025-07-18 VITALS
OXYGEN SATURATION: 98 % | DIASTOLIC BLOOD PRESSURE: 83 MMHG | SYSTOLIC BLOOD PRESSURE: 123 MMHG | HEART RATE: 107 BPM | WEIGHT: 169.6 LBS | HEIGHT: 72 IN | BODY MASS INDEX: 22.97 KG/M2 | TEMPERATURE: 98.2 F | RESPIRATION RATE: 16 BRPM

## 2025-07-18 DIAGNOSIS — E83.42 HYPOMAGNESEMIA: ICD-10-CM

## 2025-07-18 DIAGNOSIS — C80.1 ADENOCARCINOMA (MULTI): ICD-10-CM

## 2025-07-18 LAB
ALBUMIN SERPL BCP-MCNC: 2.7 G/DL (ref 3.4–5)
ALP SERPL-CCNC: 111 U/L (ref 33–136)
ALT SERPL W P-5'-P-CCNC: 24 U/L (ref 10–52)
ANION GAP SERPL CALC-SCNC: 15 MMOL/L (ref 10–20)
AST SERPL W P-5'-P-CCNC: 18 U/L (ref 9–39)
BASOPHILS # BLD AUTO: 0.02 X10*3/UL (ref 0–0.1)
BASOPHILS NFR BLD AUTO: 0.3 %
BILIRUB SERPL-MCNC: 0.4 MG/DL (ref 0–1.2)
BUN SERPL-MCNC: 9 MG/DL (ref 6–23)
CALCIUM SERPL-MCNC: 8.3 MG/DL (ref 8.6–10.3)
CHLORIDE SERPL-SCNC: 101 MMOL/L (ref 98–107)
CO2 SERPL-SCNC: 23 MMOL/L (ref 21–32)
CREAT SERPL-MCNC: 0.72 MG/DL (ref 0.5–1.3)
EGFRCR SERPLBLD CKD-EPI 2021: >90 ML/MIN/1.73M*2
EOSINOPHIL # BLD AUTO: 0.08 X10*3/UL (ref 0–0.4)
EOSINOPHIL NFR BLD AUTO: 1.1 %
ERYTHROCYTE [DISTWIDTH] IN BLOOD BY AUTOMATED COUNT: 16.7 % (ref 11.5–14.5)
GLUCOSE SERPL-MCNC: 133 MG/DL (ref 74–99)
HCT VFR BLD AUTO: 28.3 % (ref 41–52)
HGB BLD-MCNC: 8.6 G/DL (ref 13.5–17.5)
IMM GRANULOCYTES # BLD AUTO: 0.02 X10*3/UL (ref 0–0.5)
IMM GRANULOCYTES NFR BLD AUTO: 0.3 % (ref 0–0.9)
LYMPHOCYTES # BLD AUTO: 1.24 X10*3/UL (ref 0.8–3)
LYMPHOCYTES NFR BLD AUTO: 16.8 %
MAGNESIUM SERPL-MCNC: 1.2 MG/DL (ref 1.6–2.4)
MCH RBC QN AUTO: 27.2 PG (ref 26–34)
MCHC RBC AUTO-ENTMCNC: 30.4 G/DL (ref 32–36)
MCV RBC AUTO: 90 FL (ref 80–100)
MONOCYTES # BLD AUTO: 0.37 X10*3/UL (ref 0.05–0.8)
MONOCYTES NFR BLD AUTO: 5 %
NEUTROPHILS # BLD AUTO: 5.63 X10*3/UL (ref 1.6–5.5)
NEUTROPHILS NFR BLD AUTO: 76.5 %
PLATELET # BLD AUTO: 293 X10*3/UL (ref 150–450)
POTASSIUM SERPL-SCNC: 3.6 MMOL/L (ref 3.5–5.3)
PROT SERPL-MCNC: 6.8 G/DL (ref 6.4–8.2)
RBC # BLD AUTO: 3.16 X10*6/UL (ref 4.5–5.9)
SODIUM SERPL-SCNC: 135 MMOL/L (ref 136–145)
WBC # BLD AUTO: 7.4 X10*3/UL (ref 4.4–11.3)

## 2025-07-18 PROCEDURE — 85025 COMPLETE CBC W/AUTO DIFF WBC: CPT

## 2025-07-18 PROCEDURE — 96365 THER/PROPH/DIAG IV INF INIT: CPT | Mod: INF

## 2025-07-18 PROCEDURE — 80053 COMPREHEN METABOLIC PANEL: CPT

## 2025-07-18 PROCEDURE — 2500000004 HC RX 250 GENERAL PHARMACY W/ HCPCS (ALT 636 FOR OP/ED): Performed by: NURSE PRACTITIONER

## 2025-07-18 PROCEDURE — 2500000004 HC RX 250 GENERAL PHARMACY W/ HCPCS (ALT 636 FOR OP/ED): Performed by: STUDENT IN AN ORGANIZED HEALTH CARE EDUCATION/TRAINING PROGRAM

## 2025-07-18 PROCEDURE — 83735 ASSAY OF MAGNESIUM: CPT | Performed by: NURSE PRACTITIONER

## 2025-07-18 RX ORDER — MAGNESIUM SULFATE HEPTAHYDRATE 40 MG/ML
2-4 INJECTION, SOLUTION INTRAVENOUS ONCE
OUTPATIENT
Start: 2025-07-21 | End: 2025-07-21

## 2025-07-18 RX ORDER — HEPARIN 100 UNIT/ML
500 SYRINGE INTRAVENOUS AS NEEDED
OUTPATIENT
Start: 2025-07-18

## 2025-07-18 RX ORDER — MAGNESIUM SULFATE HEPTAHYDRATE 40 MG/ML
4 INJECTION, SOLUTION INTRAVENOUS ONCE
OUTPATIENT
Start: 2025-07-21 | End: 2025-07-21

## 2025-07-18 RX ORDER — MAGNESIUM SULFATE HEPTAHYDRATE 40 MG/ML
2 INJECTION, SOLUTION INTRAVENOUS ONCE
OUTPATIENT
Start: 2025-07-21 | End: 2025-07-21

## 2025-07-18 RX ORDER — HEPARIN 100 UNIT/ML
500 SYRINGE INTRAVENOUS AS NEEDED
Status: DISCONTINUED | OUTPATIENT
Start: 2025-07-18 | End: 2025-07-18 | Stop reason: HOSPADM

## 2025-07-18 RX ORDER — MAGNESIUM SULFATE HEPTAHYDRATE 40 MG/ML
2 INJECTION, SOLUTION INTRAVENOUS ONCE
Status: COMPLETED | OUTPATIENT
Start: 2025-07-18 | End: 2025-07-18

## 2025-07-18 RX ORDER — HEPARIN SODIUM,PORCINE/PF 10 UNIT/ML
50 SYRINGE (ML) INTRAVENOUS AS NEEDED
OUTPATIENT
Start: 2025-07-18

## 2025-07-18 RX ADMIN — Medication 500 UNITS: at 13:18

## 2025-07-18 RX ADMIN — MAGNESIUM SULFATE IN WATER 2 G: 40 INJECTION, SOLUTION INTRAVENOUS at 13:18

## 2025-07-18 ASSESSMENT — PAIN SCALES - GENERAL: PAINLEVEL_OUTOF10: 0-NO PAIN

## 2025-07-18 NOTE — PROGRESS NOTES
Patient identified by name &   Denies acute distress- none noted at this time.   2 grams magnesium replaced. Patient discharged home, ambulatory, in stable condition.

## 2025-07-22 ENCOUNTER — OFFICE VISIT (OUTPATIENT)
Dept: SURGERY | Facility: CLINIC | Age: 75
End: 2025-07-22
Payer: MEDICARE

## 2025-07-22 ENCOUNTER — APPOINTMENT (OUTPATIENT)
Dept: HEMATOLOGY/ONCOLOGY | Facility: HOSPITAL | Age: 75
End: 2025-07-22
Payer: MEDICARE

## 2025-07-22 ENCOUNTER — OFFICE VISIT (OUTPATIENT)
Dept: HEMATOLOGY/ONCOLOGY | Facility: HOSPITAL | Age: 75
End: 2025-07-22
Payer: MEDICARE

## 2025-07-22 ENCOUNTER — INFUSION (OUTPATIENT)
Dept: HEMATOLOGY/ONCOLOGY | Facility: HOSPITAL | Age: 75
End: 2025-07-22
Payer: MEDICARE

## 2025-07-22 VITALS
SYSTOLIC BLOOD PRESSURE: 123 MMHG | WEIGHT: 170 LBS | TEMPERATURE: 97.9 F | OXYGEN SATURATION: 95 % | BODY MASS INDEX: 22.53 KG/M2 | HEIGHT: 73 IN | DIASTOLIC BLOOD PRESSURE: 78 MMHG | HEART RATE: 97 BPM

## 2025-07-22 VITALS
WEIGHT: 165.79 LBS | HEART RATE: 132 BPM | SYSTOLIC BLOOD PRESSURE: 124 MMHG | RESPIRATION RATE: 18 BRPM | DIASTOLIC BLOOD PRESSURE: 82 MMHG | BODY MASS INDEX: 22.48 KG/M2 | TEMPERATURE: 98.6 F | OXYGEN SATURATION: 98 %

## 2025-07-22 DIAGNOSIS — C80.1 ADENOCARCINOMA (MULTI): Primary | ICD-10-CM

## 2025-07-22 DIAGNOSIS — C80.1 ADENOCARCINOMA (MULTI): ICD-10-CM

## 2025-07-22 DIAGNOSIS — J90 PLEURAL EFFUSION: ICD-10-CM

## 2025-07-22 PROCEDURE — 1159F MED LIST DOCD IN RCRD: CPT | Performed by: STUDENT IN AN ORGANIZED HEALTH CARE EDUCATION/TRAINING PROGRAM

## 2025-07-22 PROCEDURE — 2500000004 HC RX 250 GENERAL PHARMACY W/ HCPCS (ALT 636 FOR OP/ED): Performed by: STUDENT IN AN ORGANIZED HEALTH CARE EDUCATION/TRAINING PROGRAM

## 2025-07-22 PROCEDURE — 1111F DSCHRG MED/CURRENT MED MERGE: CPT | Performed by: STUDENT IN AN ORGANIZED HEALTH CARE EDUCATION/TRAINING PROGRAM

## 2025-07-22 PROCEDURE — 3078F DIAST BP <80 MM HG: CPT | Performed by: STUDENT IN AN ORGANIZED HEALTH CARE EDUCATION/TRAINING PROGRAM

## 2025-07-22 PROCEDURE — 1126F AMNT PAIN NOTED NONE PRSNT: CPT | Performed by: STUDENT IN AN ORGANIZED HEALTH CARE EDUCATION/TRAINING PROGRAM

## 2025-07-22 PROCEDURE — 3074F SYST BP LT 130 MM HG: CPT | Performed by: STUDENT IN AN ORGANIZED HEALTH CARE EDUCATION/TRAINING PROGRAM

## 2025-07-22 PROCEDURE — 4010F ACE/ARB THERAPY RXD/TAKEN: CPT | Performed by: STUDENT IN AN ORGANIZED HEALTH CARE EDUCATION/TRAINING PROGRAM

## 2025-07-22 PROCEDURE — 2500000001 HC RX 250 WO HCPCS SELF ADMINISTERED DRUGS (ALT 637 FOR MEDICARE OP): Performed by: STUDENT IN AN ORGANIZED HEALTH CARE EDUCATION/TRAINING PROGRAM

## 2025-07-22 PROCEDURE — 99215 OFFICE O/P EST HI 40 MIN: CPT | Performed by: STUDENT IN AN ORGANIZED HEALTH CARE EDUCATION/TRAINING PROGRAM

## 2025-07-22 PROCEDURE — 96413 CHEMO IV INFUSION 1 HR: CPT

## 2025-07-22 PROCEDURE — 96375 TX/PRO/DX INJ NEW DRUG ADDON: CPT | Mod: INF

## 2025-07-22 PROCEDURE — 3079F DIAST BP 80-89 MM HG: CPT | Performed by: STUDENT IN AN ORGANIZED HEALTH CARE EDUCATION/TRAINING PROGRAM

## 2025-07-22 PROCEDURE — 96374 THER/PROPH/DIAG INJ IV PUSH: CPT | Mod: INF

## 2025-07-22 PROCEDURE — 96415 CHEMO IV INFUSION ADDL HR: CPT

## 2025-07-22 PROCEDURE — 99213 OFFICE O/P EST LOW 20 MIN: CPT | Performed by: STUDENT IN AN ORGANIZED HEALTH CARE EDUCATION/TRAINING PROGRAM

## 2025-07-22 PROCEDURE — 2500000004 HC RX 250 GENERAL PHARMACY W/ HCPCS (ALT 636 FOR OP/ED): Mod: JZ,TB | Performed by: STUDENT IN AN ORGANIZED HEALTH CARE EDUCATION/TRAINING PROGRAM

## 2025-07-22 PROCEDURE — 99215 OFFICE O/P EST HI 40 MIN: CPT | Mod: 25 | Performed by: STUDENT IN AN ORGANIZED HEALTH CARE EDUCATION/TRAINING PROGRAM

## 2025-07-22 PROCEDURE — G2211 COMPLEX E/M VISIT ADD ON: HCPCS | Performed by: STUDENT IN AN ORGANIZED HEALTH CARE EDUCATION/TRAINING PROGRAM

## 2025-07-22 PROCEDURE — 1036F TOBACCO NON-USER: CPT | Performed by: STUDENT IN AN ORGANIZED HEALTH CARE EDUCATION/TRAINING PROGRAM

## 2025-07-22 RX ORDER — DIPHENHYDRAMINE HYDROCHLORIDE 50 MG/ML
50 INJECTION, SOLUTION INTRAMUSCULAR; INTRAVENOUS AS NEEDED
Status: CANCELLED | OUTPATIENT
Start: 2025-07-22

## 2025-07-22 RX ORDER — PROCHLORPERAZINE MALEATE 10 MG
10 TABLET ORAL EVERY 6 HOURS PRN
Status: CANCELLED | OUTPATIENT
Start: 2025-07-22

## 2025-07-22 RX ORDER — HEPARIN 100 UNIT/ML
500 SYRINGE INTRAVENOUS AS NEEDED
Status: DISCONTINUED | OUTPATIENT
Start: 2025-07-22 | End: 2025-07-22 | Stop reason: HOSPADM

## 2025-07-22 RX ORDER — EPINEPHRINE 0.3 MG/.3ML
0.3 INJECTION SUBCUTANEOUS EVERY 5 MIN PRN
Status: CANCELLED | OUTPATIENT
Start: 2025-07-22

## 2025-07-22 RX ORDER — ALBUTEROL SULFATE 0.83 MG/ML
3 SOLUTION RESPIRATORY (INHALATION) AS NEEDED
Status: CANCELLED | OUTPATIENT
Start: 2025-07-22

## 2025-07-22 RX ORDER — PROCHLORPERAZINE EDISYLATE 5 MG/ML
10 INJECTION INTRAMUSCULAR; INTRAVENOUS EVERY 6 HOURS PRN
Status: CANCELLED | OUTPATIENT
Start: 2025-07-22

## 2025-07-22 RX ORDER — ACETAMINOPHEN 325 MG/1
650 TABLET ORAL ONCE
Status: COMPLETED | OUTPATIENT
Start: 2025-07-22 | End: 2025-07-22

## 2025-07-22 RX ORDER — ACETAMINOPHEN 325 MG/1
650 TABLET ORAL ONCE
Status: CANCELLED | OUTPATIENT
Start: 2025-07-22

## 2025-07-22 RX ORDER — FAMOTIDINE 10 MG/ML
20 INJECTION, SOLUTION INTRAVENOUS ONCE AS NEEDED
Status: CANCELLED | OUTPATIENT
Start: 2025-07-22

## 2025-07-22 RX ORDER — DIPHENHYDRAMINE HCL 50 MG
50 CAPSULE ORAL ONCE
Status: CANCELLED | OUTPATIENT
Start: 2025-07-22

## 2025-07-22 RX ORDER — HEPARIN SODIUM,PORCINE/PF 10 UNIT/ML
50 SYRINGE (ML) INTRAVENOUS AS NEEDED
Status: CANCELLED | OUTPATIENT
Start: 2025-07-22

## 2025-07-22 RX ORDER — DIPHENHYDRAMINE HCL 50 MG
50 CAPSULE ORAL ONCE
Status: COMPLETED | OUTPATIENT
Start: 2025-07-22 | End: 2025-07-22

## 2025-07-22 RX ORDER — HEPARIN 100 UNIT/ML
500 SYRINGE INTRAVENOUS AS NEEDED
Status: CANCELLED | OUTPATIENT
Start: 2025-07-22

## 2025-07-22 RX ADMIN — HEPARIN 500 UNITS: 100 SYRINGE at 14:10

## 2025-07-22 RX ADMIN — ACETAMINOPHEN 650 MG: 325 TABLET ORAL at 11:14

## 2025-07-22 RX ADMIN — AMIVANTAMAB 2100 MG: 350 INJECTION INTRAVENOUS at 12:00

## 2025-07-22 RX ADMIN — DIPHENHYDRAMINE HYDROCHLORIDE 50 MG: 50 CAPSULE ORAL at 11:14

## 2025-07-22 RX ADMIN — DEXAMETHASONE SODIUM PHOSPHATE 10 MG: 4 INJECTION, SOLUTION INTRA-ARTICULAR; INTRALESIONAL; INTRAMUSCULAR; INTRAVENOUS; SOFT TISSUE at 11:14

## 2025-07-22 ASSESSMENT — PAIN SCALES - GENERAL
PAINLEVEL_OUTOF10: 0-NO PAIN
PAINLEVEL_OUTOF10: 0-NO PAIN

## 2025-07-22 NOTE — PROGRESS NOTES
Patient arrived to infusion for amivantamab+pemetrexed. Pemetrexed held per Dr Solitario. Patient tolerated infusion well without incident. Schedule reviewed with patient. Patient to receive scheduled magnesium infusion tomorrow. Patient off unit independently.

## 2025-07-22 NOTE — PROGRESS NOTES
[facility] - Medical Oncology Follow-Up Visit  Patient ID: Jayant Holland is a 74 y.o. male with NSCLC adenocarcinoma     Current therapy: cyanocobalamin (Vitamin B-12) injection 1,000 mcg, 1,000 mcg, intramuscular, Once, 1 of 6 cycles  Administration: 1,000 mcg (1/29/2025)    fosaprepitant (Emend) 150 mg in sodium chloride 0.9% 250 mL IV, 150 mg, intravenous, Once, 1 of 4 cycles  Administration: 150 mg (1/29/2025)    CARBOplatin (Paraplatin) 600 mg in sodium chloride 0.9% 170 mL IV, 600 mg, intravenous, Once, 1 of 4 cycles  Administration: 600 mg (1/29/2025)    amivantamab-vmjw (Rybrevant) 350 mg in sodium chloride 0.9% 250 mL IV, 350 mg, intravenous, Once, 1 of 18 cycles  Administration: 350 mg (1/29/2025), 1,400 mg (1/30/2025), 1,750 mg (2/4/2025), 1,750 mg (2/11/2025)  methylPREDNISolone sod succinate (SOLU-Medrol) 40 mg/mL injection 40 mg, 40 mg, intravenous, As needed, 1 of 18 cycles    palonosetron (Aloxi) injection 250 mcg, 250 mcg, intravenous, Once, 1 of 4 cycles  Administration: 250 mcg (1/29/2025)    PEMEtrexed disodium (Alimta) 1,100 mg in sodium chloride 0.9% 154 mL IV, 500 mg/m2 = 1,100 mg, intravenous, Once, 1 of 18 cycles  Dose modification: 400 mg/m2 (original dose 500 mg/m2, Cycle 2)  Administration: 1,100 mg (1/29/2025)       Chief Concern: follow-up and readiness to treat     Oncologic History:     DIAGNOSIS  NSCLC adenocarcinoma     STAGING  [id number]     CURRENT SITES OF DISEASE  RUL, R pleural effusion/pleural carcinomatosis, R hilar, mediastinal, retrocaval nodes      MOLECULAR GENOMICS  PD-L1 5%  EGFR [id number] ([id number])      PRIOR THERAPY      CURRENT THERAPY  Carboplatin/Pemetrexed/Amivantamab C1D1 1/29/25 -   Pemetrexed dose reduced 400mg/m2 C2 -   Maintenance pemetrexed/amivantamab delayed 1 week for neutropenia to start 4/25/25     CURRENT ONCOLOGICAL PROBLEMS  Unintentional wt loss - stabilized  Hypomagnesia         HISTORY  OF PRESENT ILLNESS  Mr. Holland is a 73 yo with PMH significant for DMII, HTN, and HLD who had a CXR done on 12/5/24 for persistent coughing after pneumonia about a month prior, which was concerning for moderate pleural effusion of the R lung. He went to the ER, where a CT chest w/contrast was done with spiculated RUL mass measuring 2.2 x 2.1 cm and large R pleural effusion. He was referred to diagnostic clinic and seen on 12/9/24 by Kirstin where he noted persistent cough for 6 weeks, constant dyspnea, wheezing, and unintentional 30 lb weight loss over the last year. He was referred to pulmonology and had thoracentesis on 12/10/24 with 980 ml removed, cytology with adenocarcinoma, PD-L1 5%, and NGS with EGFR exon 20 mutation. He had repeat thoracentesis on 12/17/24 with 2.4L removed. PET/CT 12/20/24 with FDV avidity of the RL nodule SUV max 9.2, multiple RLL avid nodules SUV max 4.1, FDG-avid pleural thickening on the right, multiple R hilar, mediastinal, subcarinal nodes, and moderate R pleural effusion. Mildly avid GGO within JANELLE SUV max 2.0. Brain MRI is rescheduled for 1/15/25.   Consented for carboplatin/pemetrexed/amivantamab started 1/29/25.  Treatment delayed d/t hospitalization. Treatment has been c/b hypomagnesemia. Pemetrexed dose-reduced to 400 mg/m2 with C2. Maintenance pemetrexed/amivantamab delayed 1 week for neutropenia.    1/21/25 - 1/23/25 - hospitalization 2/2 recurrent pleural effusion.        PAST MEDICAL HISTORY  DMII   HTN  HLD   Osteoarthritis (neck)  asthma     SOCIAL HISTORY  Lives at home with his wife. Retired from [employer], worked as a technician for 48 years, notes hazardous chemical exposures.  Tob: never  EtOH: occasionally  Illicits: none     FAMILY HISTORY  Mother - breast cancer    HPI     7/22/2025  Here today with his wife. Finished antibiotics yesterday. Was hospitalized with empyema, chest tube removed. Breathing is still bad since discharge, feels short of breath now at rest. Not  coughing too much, more winded. Fingernails and big toe area have some oozing. Gradually got more and more tired, and is pretty much lying down all day. He's only using the chair lift up and down. He's barely eating - no appetite. Drinks ensure once per day. Saw nephrology at [facility] (but not part of ) for the hypomag. Stopped taking the acid reflex pill over a month ago.       Review of Systems:  A review of systems has been completed and are negative for complaints except what is stated in the HPI and/or past medical history      Meds (Current):  Medications Ordered Prior to Encounter[1]      Objective   Vital Signs  /78, , Temp 37.1    Wt Readings from Last 5 Encounters:   07/22/25 75.2 kg (165 lb 12.6 oz)   07/18/25 76.9 kg (169 lb 9.6 oz)   07/16/25 76.1 kg (167 lb 12.8 oz)   07/14/25 76.7 kg (169 lb)   07/14/25 75.8 kg (167 lb)          Results:  Lab Results   Component Value Date    WBC 7.4 07/18/2025    HGB 8.6 (L) 07/18/2025    HCT 28.3 (L) 07/18/2025    MCV 90 07/18/2025     07/18/2025      Lab Results   Component Value Date    NEUTROABS 5.63 (H) 07/18/2025      Lab Results   Component Value Date    GLUCOSE 133 (H) 07/18/2025    CALCIUM 8.3 (L) 07/18/2025     (L) 07/18/2025    K 3.6 07/18/2025    CO2 23 07/18/2025     07/18/2025    BUN 9 07/18/2025    CREATININE 0.72 07/18/2025    MG 1.20 (L) 07/18/2025     Lab Results   Component Value Date    ALT 24 07/18/2025    AST 18 07/18/2025    ALKPHOS 111 07/18/2025    BILITOT 0.4 07/18/2025    BILIDIR 0.1 11/18/2019      Lab Results   Component Value Date    TSH 2.12 01/28/2025         Imaging:  I have personally reviewed the below imaging and concur with the reported findings unless otherwise stated:    CT chest abdomen pelvis w IV contrast  Result Date: 7/19/2025  Impression:   1. Mild interval increase in size of the dominant right upper lobe spiculated nodule compared to 04/18/2025, likely reflecting progressive disease;  stable in size since 07/02/2025. New spiculated 8 mm subpleural right upper lobe pulmonary nodule, bears watching on follow-up imaging. 2. Mild interval decrease in size of mediastinal lymph nodes as described above. 3. Unchanged pleural thickening, consistent with zena metastasis. 4. No evidence of metastatic disease in the abdomen or pelvis. 5. Interval removal of the right chest tube with decreased size of the loculated right pleural effusion. New loculated hydropneumothorax may represent residual gas from catheter removal or possible superimposed infection, particularly in the context of known empyema. Recommend clinical correlation and follow-up imaging as indicated. 6. Improved PET persistent right lower lobe consolidation, concerning for pneumonia. New posterior right upper lobe ground-glass opacity may represent pulmonary edema with superimposed infection not excluded. 7. Additional stable chronic and incidental findings as described above including new mild gastric antrum/distal stomach submucosal edema, correlate with concern for gastritis.   MACRO: None.   Signed by: Galo Delgado 7/19/2025 9:08 AM Dictation workstation:   [id number]    MR brain w and wo IV contrast  Result Date: 7/17/2025  Impression: 1. The prior examination demonstrated a metastatic lesion involving the dorsal left superior frontal gyrus that on today's examination has significantly decreased in size and measures a proximally 1.5 mm in diameter.   2. The previously noted left precuneus lesion can not be identified on today's exam.   3. There is a punctate focus of enhancement within the carisa. There was suggestion of this on the prior exam but is easier to identify on today's study. It did not increase in size. No other areas of abnormal enhancement are noted.   MACRO: None   Signed by: Yaron Echols 7/17/2025 2:59 PM Dictation workstation:   [id number]      Assessment/Plan      Jayant Holland is a 74 y.o. male here for follow up of  NSCLC adenocarcinoma    # NSCLC adenocarcinoma  - [id number] (pleural effusion)  - PD-L1 5%, EGFR [id number] ([id number])   - discussed that given EGFR exon 20 mutation, recommend combination chemotherapy+amivantamab, consented  - started C1D1 1/29/25, pemetrexed dose-reduced with C2 to 400 mg/m2  - C1D1 dexamethasone Rx started - for 3 doses.  Discussed potential infusion reaction.  Pt verbalized understanding.    - Per pt request, C2 infusion and visit transferred to [facility].  Visits with FORTINO Grace.  Scan review visits with Dr. Solitario at [facility].   - Per pt request, order placed for mediport placement. Mediport Placed 3/17/2025  - personally reviewed restaging scans with mixed response, and resolving pneumonia. Has worsening fatigue, and discussed holding pemetrexed and trialing amivantamab maintenance alone. Will continue on maintenance for now and repeat scans in 3 months. Will consider adding pemetrexed back for future cycles, tbd     # Malignant pleural effusion  - s/p thoracentesis and contacted by IP while in visit for repeat thora.   - Right pleurx catheter placed 12/17/24 with HH referral in place.  Drained 2x/wk. Removed most recent hospitalization due to empyema and sees Dr. Ordaz this afternoon  - continue to monitor    # Hypomagnesia, ongoing with current Amivantamab treatment  - Mg replacement plan created, next IV mag replacement dates include: 3/14, 3/17, 3/20, 3/24, 3/27, 3/31, 4/4, 4/7, 4/9, 4/11, 4/14 at [facility].  Replacement dates ongoing. Treatment parameters:    Serum magnesium 1 - 1.6 mg/dL - administer 2 grams over 1 hour  Serum magnesium less than 1 mg/dL - administer 4 grams over 2 hours   - Following with Dr. Bala Gray,nephrology Omeprazole has been discontinued in hopes to improve dietary magnesium absorption. Oral magnesium started.     # Macular rash to face, bridge of nose and checks  - monitor    # Paronychia  - recommended vinegar 1:10 soaks, but  currently uninterested    # Unintentional wt loss  - Recommended pt add Ensure/Boost supplements and snacks throughout the day. Will try increased nutritional intake vs pharm intervention for now.    - Dietician referral placed.   - 2/11:  wt loss stabilized, pt started daily Boost supplements    # Leukocytosis 12.6 3/12/25  - no clinical s/sx's infection  - Continue to monitor     # Advanced care planning  - discussed incurable but treatable nature of metastatic disease, and goal of therapy is to prolong life while maintaining or improving quality of life.  - 1/30/25 - Supportive onc referral placed.  Pt declined need for visit at this time.  Supportive onc will follow-up in 2-3 weeks.           [1]   Current Outpatient Medications on File Prior to Visit   Medication Sig Dispense Refill    apixaban (Eliquis) 5 mg tablet Take 1 tablet (5 mg) by mouth 2 times a day. 180 tablet 1    Blood glucose monitoring meter kit kit 1 each if needed.      blood-glucose meter misc 1 Device once daily. 1 each 0    clindamycin (Cleocin T) 1 % lotion Apply topically to affected area twice daily. Do not fill before January 29, 2025. (Patient taking differently: Apply 1 Application topically 2 times a day as needed. Apply topically to affected area twice daily.) 60 mL 3    dexAMETHasone (Decadron) 4 mg tablet PLEASE SEE ATTACHED FOR DETAILED DIRECTIONS      folic acid (Folvite) 1 mg tablet Take 1 tablet (1 mg) by mouth once daily. 90 tablet 0    hydrocortisone 1 % cream Apply topically to face, hands, feet, neck, back, and chest once daily at bedtime for rash prevention. Do not fill before January 29, 2025. (Patient taking differently: Apply 1 Application topically as needed at bedtime for rash or irritation. Apply topically to face, hands, feet, neck, back, and chest once daily at bedtime for rash prevention.) 453.6 g 3    lancets 30 gauge misc 1 Device 3 times a day. 200 each 3    lisinopril 20 mg tablet Take 1 tablet (20 mg) by  mouth once daily.      magnesium oxide 400 mg magnesium capsule every 12 hours.      metFORMIN  mg 24 hr tablet TAKE 2 TABLETS (1,000 MG) BY MOUTH 2 TIMES A DAY. DO NOT CRUSH, CHEW, OR SPLIT. 360 tablet 3    ondansetron (Zofran) 8 mg tablet Take 1 tablet (8 mg) by mouth every 8 hours if needed for nausea or vomiting. Do not fill before 2025. 30 tablet 5    pantoprazole (ProtoNix) 40 mg EC tablet Take 1 tablet (40 mg) by mouth once daily in the morning. Take before meals. Do not crush, chew, or split. 30 tablet 0    potassium chloride CR 20 mEq ER tablet Take 2 tablets (40 mEq) by mouth once daily. Do not crush, chew, or split. 30 tablet 3    prochlorperazine (Compazine) 10 mg tablet Take 1 tablet (10 mg) by mouth every 6 hours if needed for nausea or vomiting. Do not fill before 2025. 30 tablet 5    rosuvastatin (Crestor) 40 mg tablet TAKE 1 TABLET BY MOUTH EVERY DAY 90 tablet 3    sennosides (Senokot) 8.6 mg tablet Take 2 tablets (17.2 mg) by mouth as needed at bedtime for constipation. Do not fill before 2025. 30 tablet 11    zinc oxide 20 % ointment Apply 1 Application topically 2 times a day. Apply to perirectal area 6 g 0    [] linezolid (Zyvox) 600 mg tablet Take 1 tablet (600 mg) by mouth 2 times a day for 9 days. 18 tablet 0     No current facility-administered medications on file prior to visit.

## 2025-07-23 ENCOUNTER — INFUSION (OUTPATIENT)
Dept: HEMATOLOGY/ONCOLOGY | Facility: CLINIC | Age: 75
End: 2025-07-23
Payer: MEDICARE

## 2025-07-23 VITALS
WEIGHT: 167.6 LBS | SYSTOLIC BLOOD PRESSURE: 124 MMHG | TEMPERATURE: 97.7 F | HEART RATE: 105 BPM | RESPIRATION RATE: 18 BRPM | DIASTOLIC BLOOD PRESSURE: 79 MMHG | OXYGEN SATURATION: 97 % | BODY MASS INDEX: 22.11 KG/M2

## 2025-07-23 DIAGNOSIS — C80.1 ADENOCARCINOMA (MULTI): ICD-10-CM

## 2025-07-23 DIAGNOSIS — E83.42 HYPOMAGNESEMIA: ICD-10-CM

## 2025-07-23 LAB — MAGNESIUM SERPL-MCNC: 1.3 MG/DL (ref 1.6–2.4)

## 2025-07-23 PROCEDURE — 96365 THER/PROPH/DIAG IV INF INIT: CPT | Mod: INF

## 2025-07-23 PROCEDURE — 2500000004 HC RX 250 GENERAL PHARMACY W/ HCPCS (ALT 636 FOR OP/ED): Performed by: NURSE PRACTITIONER

## 2025-07-23 PROCEDURE — 83735 ASSAY OF MAGNESIUM: CPT | Performed by: NURSE PRACTITIONER

## 2025-07-23 PROCEDURE — 2500000004 HC RX 250 GENERAL PHARMACY W/ HCPCS (ALT 636 FOR OP/ED): Performed by: STUDENT IN AN ORGANIZED HEALTH CARE EDUCATION/TRAINING PROGRAM

## 2025-07-23 RX ORDER — MAGNESIUM SULFATE HEPTAHYDRATE 40 MG/ML
2 INJECTION, SOLUTION INTRAVENOUS ONCE
Status: CANCELLED | OUTPATIENT
Start: 2025-07-25 | End: 2025-07-25

## 2025-07-23 RX ORDER — HEPARIN 100 UNIT/ML
500 SYRINGE INTRAVENOUS AS NEEDED
OUTPATIENT
Start: 2025-07-23

## 2025-07-23 RX ORDER — MAGNESIUM SULFATE HEPTAHYDRATE 40 MG/ML
2 INJECTION, SOLUTION INTRAVENOUS ONCE
Status: COMPLETED | OUTPATIENT
Start: 2025-07-23 | End: 2025-07-23

## 2025-07-23 RX ORDER — MAGNESIUM SULFATE HEPTAHYDRATE 40 MG/ML
4 INJECTION, SOLUTION INTRAVENOUS ONCE
Status: CANCELLED | OUTPATIENT
Start: 2025-07-25 | End: 2025-07-25

## 2025-07-23 RX ORDER — HEPARIN SODIUM,PORCINE/PF 10 UNIT/ML
50 SYRINGE (ML) INTRAVENOUS AS NEEDED
OUTPATIENT
Start: 2025-07-23

## 2025-07-23 RX ORDER — MAGNESIUM SULFATE HEPTAHYDRATE 40 MG/ML
2-4 INJECTION, SOLUTION INTRAVENOUS ONCE
Status: CANCELLED | OUTPATIENT
Start: 2025-07-25 | End: 2025-07-25

## 2025-07-23 RX ORDER — HEPARIN 100 UNIT/ML
500 SYRINGE INTRAVENOUS AS NEEDED
Status: DISCONTINUED | OUTPATIENT
Start: 2025-07-23 | End: 2025-07-23 | Stop reason: HOSPADM

## 2025-07-23 RX ADMIN — HEPARIN 500 UNITS: 100 SYRINGE at 11:44

## 2025-07-23 RX ADMIN — MAGNESIUM SULFATE 2 G: 2 INJECTION INTRAVENOUS at 10:36

## 2025-07-23 ASSESSMENT — PAIN SCALES - GENERAL: PAINLEVEL_OUTOF10: 0-NO PAIN

## 2025-07-24 LAB
ATRIAL RATE: 103 BPM
ATRIAL RATE: 94 BPM
P AXIS: -26 DEGREES
P AXIS: -28 DEGREES
P OFFSET: 186 MS
P OFFSET: 192 MS
P ONSET: 124 MS
P ONSET: 131 MS
PR INTERVAL: 174 MS
PR INTERVAL: 186 MS
Q ONSET: 217 MS
Q ONSET: 218 MS
QRS COUNT: 16 BEATS
QRS COUNT: 17 BEATS
QRS DURATION: 76 MS
QRS DURATION: 82 MS
QT INTERVAL: 324 MS
QT INTERVAL: 358 MS
QTC CALCULATION(BAZETT): 424 MS
QTC CALCULATION(BAZETT): 447 MS
QTC FREDERICIA: 388 MS
QTC FREDERICIA: 415 MS
R AXIS: 168 DEGREES
R AXIS: 42 DEGREES
T AXIS: -13 DEGREES
T AXIS: -22 DEGREES
T OFFSET: 380 MS
T OFFSET: 396 MS
VENTRICULAR RATE: 103 BPM
VENTRICULAR RATE: 94 BPM

## 2025-07-25 ENCOUNTER — INFUSION (OUTPATIENT)
Dept: HEMATOLOGY/ONCOLOGY | Facility: CLINIC | Age: 75
End: 2025-07-25
Payer: MEDICARE

## 2025-07-25 VITALS
SYSTOLIC BLOOD PRESSURE: 126 MMHG | OXYGEN SATURATION: 97 % | WEIGHT: 167.5 LBS | TEMPERATURE: 97.9 F | BODY MASS INDEX: 22.1 KG/M2 | DIASTOLIC BLOOD PRESSURE: 78 MMHG | HEART RATE: 108 BPM | RESPIRATION RATE: 16 BRPM

## 2025-07-25 DIAGNOSIS — C80.1 ADENOCARCINOMA (MULTI): ICD-10-CM

## 2025-07-25 DIAGNOSIS — E83.42 HYPOMAGNESEMIA: ICD-10-CM

## 2025-07-25 LAB — MAGNESIUM SERPL-MCNC: 0.99 MG/DL (ref 1.6–2.4)

## 2025-07-25 PROCEDURE — 83735 ASSAY OF MAGNESIUM: CPT | Performed by: NURSE PRACTITIONER

## 2025-07-25 PROCEDURE — 96365 THER/PROPH/DIAG IV INF INIT: CPT | Mod: INF

## 2025-07-25 PROCEDURE — 96366 THER/PROPH/DIAG IV INF ADDON: CPT | Mod: INF

## 2025-07-25 PROCEDURE — 2500000004 HC RX 250 GENERAL PHARMACY W/ HCPCS (ALT 636 FOR OP/ED): Performed by: NURSE PRACTITIONER

## 2025-07-25 RX ORDER — MAGNESIUM SULFATE HEPTAHYDRATE 40 MG/ML
4 INJECTION, SOLUTION INTRAVENOUS ONCE
OUTPATIENT
Start: 2025-07-28 | End: 2025-07-28

## 2025-07-25 RX ORDER — MAGNESIUM SULFATE HEPTAHYDRATE 40 MG/ML
2-4 INJECTION, SOLUTION INTRAVENOUS ONCE
OUTPATIENT
Start: 2025-07-28 | End: 2025-07-28

## 2025-07-25 RX ORDER — MAGNESIUM SULFATE HEPTAHYDRATE 40 MG/ML
2 INJECTION, SOLUTION INTRAVENOUS ONCE
OUTPATIENT
Start: 2025-07-28 | End: 2025-07-28

## 2025-07-25 RX ORDER — MAGNESIUM SULFATE HEPTAHYDRATE 40 MG/ML
4 INJECTION, SOLUTION INTRAVENOUS ONCE
Status: COMPLETED | OUTPATIENT
Start: 2025-07-25 | End: 2025-07-25

## 2025-07-25 RX ADMIN — MAGNESIUM SULFATE 4 G: 4 INJECTION INTRAVENOUS at 13:32

## 2025-07-25 ASSESSMENT — PAIN SCALES - GENERAL: PAINLEVEL_OUTOF10: 0-NO PAIN

## 2025-07-25 NOTE — PROGRESS NOTES
Patient identified by name &    Denies acute distress- none noted at this time. Patient given 4 grams magnesium for serum magnesium of 0.99; Discharged home in stable condition. Returns  for next dose of magnesium/labs.

## 2025-07-27 NOTE — PROGRESS NOTES
Chief complaint  Post-Hospital visit    History Of Present Illness  Jayant Holland is a 75 y.o. male, history of stage IV right lung NSCLC, presenting for their first post-operative visit after undergoing lytic therapy through a clogged right Pleurx catheter during hospitalization at [facility] in the beginning of July.  His pleural cultures grew 2 different types of bacteria and there was concern for catheter related infection, so his Pleurx tube was removed prior to discharge.    He follows with Dr. Solitario, oncology.  Overall is feeling okay since hospital discharge, but does endorse fatigue.  He denies any new shortness of breath or chest pain.  No issues with his prior Pleurx catheter site.  No wound concerns.  Denies fevers or chills.    By way of review per oncology: CXR done on 12/5/24 for persistent coughing after pneumonia about a month prior, which was concerning for moderate pleural effusion of the R lung. He went to the ER, where a CT chest w/contrast was done with spiculated RUL mass measuring 2.2 x 2.1 cm and large R pleural effusion. He was referred to diagnostic clinic and seen on 12/9/24 by Kirstin where he noted persistent cough for 6 weeks, constant dyspnea, wheezing, and unintentional 30 lb weight loss over the last year. He was referred to pulmonology and had thoracentesis on 12/10/24 with 980 ml removed, cytology with adenocarcinoma, PD-L1 5%, and NGS with EGFR exon 20 mutation. He had repeat thoracentesis on 12/17/24 with 2.4L removed. PET/CT 12/20/24 with FDV avidity of the RL nodule SUV max 9.2, multiple RLL avid nodules SUV max 4.1, FDG-avid pleural thickening on the right, multiple R hilar, mediastinal, subcarinal nodes, and moderate R pleural effusion. Mildly avid GGO within JANELLE SUV max 2.0. Brain MRI is rescheduled for 1/15/25.   Consented for carboplatin/pemetrexed/amivantamab started 1/29/25.  Treatment delayed d/t hospitalization. Treatment has been c/b hypomagnesemia. Pemetrexed dose-reduced to  "400 mg/m2 with C2. Maintenance pemetrexed/amivantamab delayed 1 week for neutropenia.     Past Medical History  He has a past medical history of Arthritis (20+ years), Asthma (20+ years), Cervical disc disorder, Colon polyp (2 yrs), Diabetes mellitus (Multi) (20+ years), Exposure to potentially hazardous substance (12/06/2024), GERD (gastroesophageal reflux disease) (20+ years), Hypertension (20+ years), Lung disease, and Type 2 diabetes mellitus (30 yrs).    Surgical History  He has a past surgical history that includes Knee surgery (04/28/2015); Colonoscopy (04/28/2015); and Knee Arthroplasty (20 + years).     Social History  He reports that he has never smoked. He has never used smokeless tobacco. He reports that he does not currently use alcohol after a past usage of about 5.0 standard drinks of alcohol per week. He reports that he does not use drugs.    Medications  Current Medications[1]    Review of Systems:  Review of Systems   Constitutional: No fevers, chills, unexpected weight change  HENT: No sore throat, congestion, or nasal drainage  Eyes: No visual changes or eye itching  Respiratory: see HPI. No cough, worsening dyspnea, wheezing  Cardiac: No chest pain, palpitations, or lower extremity edema  Gastrointestinal: No nausea, vomiting, diarrhea. No abdominal pain  Genitourinary: No dysuria or hematuria  Musculoskeletal: No back pain. No significant myalgias or arthralgias  Neurologic: No headaches, dizziness, or seizures.  Hematologic: No east bleeding or bruising.  Psychiatric: No anxiety or depression.    Physical Exam:  Physical Exam  /78   Pulse 97   Temp 36.6 °C (97.9 °F) (Temporal)   Ht 1.854 m (6' 1\")   Wt 77.1 kg (170 lb)   SpO2 95%   BMI 22.43 kg/m²   Constitutional:       General: Patient is not in acute distress.     Appearance: Normal appearance; not ill-appearing.   HENT:      Head: Normocephalic.      Nose: No congestion or rhinorrhea.   Cardiovascular:      Rate and Rhythm: " Normal rate and regular rhythm.      Pulses: Normal pulses.   Pulmonary:      Effort: Pulmonary effort is normal. No respiratory distress.  No conversational dyspnea     Breath sounds: No stridor. No wheezing.   Abdominal:      General: There is no distension.      Palpations: Abdomen is soft.      Tenderness: There is no abdominal tenderness.   Musculoskeletal:         General: No swelling, tenderness or deformity. Normal range of motion.      Cervical back: Normal range of motion. No rigidity.   Lymphadenopathy:      Cervical: No cervical adenopathy.   Skin:     General: Skin is warm and dry.   Neurological:      General: No focal deficit present.      Mental Status: Patient is alert and oriented to person, place, and time.   Psychiatric:         Mood and Affect: Mood normal.     Relevant Results:    Pathology:  No new    Imaging:  Imaging  No results found.    Cardiology, Vascular, and Other Imaging  No other imaging results found for the past 7 days       CXR personally reviewed     Assessment/Plan   Problem List Items Addressed This Visit           ICD-10-CM    Pleural effusion J90    Relevant Orders    XR chest 2 views       Mr. Holland is a pleasant 75-year-old male with a history of right lung stage IV NSCLC.  He previously had a right Pleurx catheter placed for recurrent effusions given pleural spread of his disease.  He was recently admitted to [facility] with shortness of breath and found to have loculated effusion with occlusion of his Pleurx catheter.  He underwent lytic therapy through this.  And then given bacterial growth on his pleural cultures his Pleurx catheter was removed prior to discharge.  He has been on antibiotics per ID.  His CXR today does not show a significant reaccumulation of fluid, though I did discuss with the patient and his wife that this is a possibility in the future.  They can call me with any questions or concerns.  He is following up with Dr. Solitario, oncology       [facility],  DO  Thoracic & Esophageal Surgery          [1]   Current Outpatient Medications:     apixaban (Eliquis) 5 mg tablet, Take 1 tablet (5 mg) by mouth 2 times a day., Disp: 180 tablet, Rfl: 1    Blood glucose monitoring meter kit kit, 1 each if needed., Disp: , Rfl:     blood-glucose meter misc, 1 Device once daily., Disp: 1 each, Rfl: 0    clindamycin (Cleocin T) 1 % lotion, Apply topically to affected area twice daily. Do not fill before January 29, 2025., Disp: 60 mL, Rfl: 3    dexAMETHasone (Decadron) 4 mg tablet, PLEASE SEE ATTACHED FOR DETAILED DIRECTIONS, Disp: , Rfl:     folic acid (Folvite) 1 mg tablet, Take 1 tablet (1 mg) by mouth once daily., Disp: 90 tablet, Rfl: 0    hydrocortisone 1 % cream, Apply topically to face, hands, feet, neck, back, and chest once daily at bedtime for rash prevention. Do not fill before January 29, 2025., Disp: 453.6 g, Rfl: 3    lancets 30 gauge misc, 1 Device 3 times a day., Disp: 200 each, Rfl: 3    lisinopril 20 mg tablet, Take 1 tablet (20 mg) by mouth once daily., Disp: , Rfl:     magnesium oxide 400 mg magnesium capsule, every 12 hours., Disp: , Rfl:     metFORMIN  mg 24 hr tablet, TAKE 2 TABLETS (1,000 MG) BY MOUTH 2 TIMES A DAY. DO NOT CRUSH, CHEW, OR SPLIT., Disp: 360 tablet, Rfl: 3    ondansetron (Zofran) 8 mg tablet, Take 1 tablet (8 mg) by mouth every 8 hours if needed for nausea or vomiting. Do not fill before January 29, 2025., Disp: 30 tablet, Rfl: 5    pantoprazole (ProtoNix) 40 mg EC tablet, Take 1 tablet (40 mg) by mouth once daily in the morning. Take before meals. Do not crush, chew, or split., Disp: 30 tablet, Rfl: 0    potassium chloride CR 20 mEq ER tablet, Take 2 tablets (40 mEq) by mouth once daily. Do not crush, chew, or split., Disp: 30 tablet, Rfl: 3    prochlorperazine (Compazine) 10 mg tablet, Take 1 tablet (10 mg) by mouth every 6 hours if needed for nausea or vomiting. Do not fill before January 29, 2025., Disp: 30 tablet, Rfl: 5     rosuvastatin (Crestor) 40 mg tablet, TAKE 1 TABLET BY MOUTH EVERY DAY, Disp: 90 tablet, Rfl: 3    sennosides (Senokot) 8.6 mg tablet, Take 2 tablets (17.2 mg) by mouth as needed at bedtime for constipation. Do not fill before January 29, 2025., Disp: 30 tablet, Rfl: 11    zinc oxide 20 % ointment, Apply 1 Application topically 2 times a day. Apply to perirectal area, Disp: 6 g, Rfl: 0

## 2025-07-28 ENCOUNTER — INFUSION (OUTPATIENT)
Dept: HEMATOLOGY/ONCOLOGY | Facility: CLINIC | Age: 75
End: 2025-07-28
Payer: MEDICARE

## 2025-07-28 VITALS
RESPIRATION RATE: 16 BRPM | OXYGEN SATURATION: 98 % | TEMPERATURE: 97.3 F | BODY MASS INDEX: 22.93 KG/M2 | DIASTOLIC BLOOD PRESSURE: 82 MMHG | HEIGHT: 72 IN | WEIGHT: 169.3 LBS | HEART RATE: 102 BPM | SYSTOLIC BLOOD PRESSURE: 128 MMHG

## 2025-07-28 DIAGNOSIS — C80.1 ADENOCARCINOMA (MULTI): ICD-10-CM

## 2025-07-28 DIAGNOSIS — E83.42 HYPOMAGNESEMIA: ICD-10-CM

## 2025-07-28 LAB — MAGNESIUM SERPL-MCNC: 1.11 MG/DL (ref 1.6–2.4)

## 2025-07-28 PROCEDURE — 83735 ASSAY OF MAGNESIUM: CPT

## 2025-07-28 PROCEDURE — 2500000004 HC RX 250 GENERAL PHARMACY W/ HCPCS (ALT 636 FOR OP/ED): Performed by: NURSE PRACTITIONER

## 2025-07-28 PROCEDURE — 2500000004 HC RX 250 GENERAL PHARMACY W/ HCPCS (ALT 636 FOR OP/ED): Performed by: STUDENT IN AN ORGANIZED HEALTH CARE EDUCATION/TRAINING PROGRAM

## 2025-07-28 PROCEDURE — 96365 THER/PROPH/DIAG IV INF INIT: CPT | Mod: INF

## 2025-07-28 RX ORDER — MAGNESIUM SULFATE HEPTAHYDRATE 40 MG/ML
2 INJECTION, SOLUTION INTRAVENOUS ONCE
Status: COMPLETED | OUTPATIENT
Start: 2025-07-28 | End: 2025-07-28

## 2025-07-28 RX ORDER — HEPARIN 100 UNIT/ML
500 SYRINGE INTRAVENOUS AS NEEDED
Status: CANCELLED | OUTPATIENT
Start: 2025-07-28

## 2025-07-28 RX ORDER — MAGNESIUM SULFATE HEPTAHYDRATE 40 MG/ML
2 INJECTION, SOLUTION INTRAVENOUS ONCE
Status: CANCELLED | OUTPATIENT
Start: 2025-07-30 | End: 2025-07-30

## 2025-07-28 RX ORDER — HEPARIN SODIUM,PORCINE/PF 10 UNIT/ML
50 SYRINGE (ML) INTRAVENOUS AS NEEDED
Status: CANCELLED | OUTPATIENT
Start: 2025-07-28

## 2025-07-28 RX ORDER — MAGNESIUM SULFATE HEPTAHYDRATE 40 MG/ML
4 INJECTION, SOLUTION INTRAVENOUS ONCE
Status: CANCELLED | OUTPATIENT
Start: 2025-07-30 | End: 2025-07-30

## 2025-07-28 RX ORDER — HEPARIN 100 UNIT/ML
500 SYRINGE INTRAVENOUS AS NEEDED
Status: DISCONTINUED | OUTPATIENT
Start: 2025-07-28 | End: 2025-07-28 | Stop reason: HOSPADM

## 2025-07-28 RX ORDER — MAGNESIUM SULFATE HEPTAHYDRATE 40 MG/ML
2-4 INJECTION, SOLUTION INTRAVENOUS ONCE
Status: CANCELLED | OUTPATIENT
Start: 2025-07-30 | End: 2025-07-30

## 2025-07-28 RX ADMIN — MAGNESIUM SULFATE IN WATER 2 G: 40 INJECTION, SOLUTION INTRAVENOUS at 11:00

## 2025-07-28 RX ADMIN — Medication 500 UNITS: at 11:00

## 2025-07-28 ASSESSMENT — PAIN SCALES - GENERAL: PAINLEVEL_OUTOF10: 0-NO PAIN

## 2025-07-28 NOTE — PROGRESS NOTES
Patient here for Magnesium replacement, tolerated well. Patient to [facility] on 07/30 for labs and more Magnesium if needed. Patient denies any questions at this time. Discharged in stable condition.

## 2025-07-29 DIAGNOSIS — E83.42 HYPOMAGNESEMIA: ICD-10-CM

## 2025-07-29 DIAGNOSIS — C80.1 ADENOCARCINOMA (MULTI): Primary | ICD-10-CM

## 2025-07-29 RX ORDER — MAGNESIUM SULFATE HEPTAHYDRATE 40 MG/ML
2-4 INJECTION, SOLUTION INTRAVENOUS ONCE
Status: CANCELLED | OUTPATIENT
Start: 2025-07-29 | End: 2025-07-29

## 2025-07-29 RX ORDER — MAGNESIUM SULFATE HEPTAHYDRATE 40 MG/ML
4 INJECTION, SOLUTION INTRAVENOUS ONCE
Status: CANCELLED | OUTPATIENT
Start: 2025-07-29 | End: 2025-07-29

## 2025-07-29 RX ORDER — MAGNESIUM SULFATE HEPTAHYDRATE 40 MG/ML
2 INJECTION, SOLUTION INTRAVENOUS ONCE
Status: CANCELLED | OUTPATIENT
Start: 2025-07-29 | End: 2025-07-29

## 2025-07-30 ENCOUNTER — INFUSION (OUTPATIENT)
Dept: HEMATOLOGY/ONCOLOGY | Facility: CLINIC | Age: 75
End: 2025-07-30
Payer: MEDICARE

## 2025-07-30 ENCOUNTER — APPOINTMENT (OUTPATIENT)
Dept: HEMATOLOGY/ONCOLOGY | Facility: CLINIC | Age: 75
End: 2025-07-30
Payer: MEDICARE

## 2025-07-30 ENCOUNTER — TELEPHONE (OUTPATIENT)
Facility: CLINIC | Age: 75
End: 2025-07-30

## 2025-07-30 VITALS
RESPIRATION RATE: 18 BRPM | TEMPERATURE: 97.2 F | WEIGHT: 170.2 LBS | OXYGEN SATURATION: 95 % | DIASTOLIC BLOOD PRESSURE: 83 MMHG | HEART RATE: 100 BPM | SYSTOLIC BLOOD PRESSURE: 122 MMHG | BODY MASS INDEX: 23.08 KG/M2

## 2025-07-30 DIAGNOSIS — C80.1 ADENOCARCINOMA (MULTI): ICD-10-CM

## 2025-07-30 DIAGNOSIS — K62.5 BRIGHT RED RECTAL BLEEDING: Primary | ICD-10-CM

## 2025-07-30 DIAGNOSIS — E83.42 HYPOMAGNESEMIA: ICD-10-CM

## 2025-07-30 LAB — MAGNESIUM SERPL-MCNC: 1.36 MG/DL (ref 1.6–2.4)

## 2025-07-30 PROCEDURE — 2500000004 HC RX 250 GENERAL PHARMACY W/ HCPCS (ALT 636 FOR OP/ED): Performed by: NURSE PRACTITIONER

## 2025-07-30 PROCEDURE — 2500000004 HC RX 250 GENERAL PHARMACY W/ HCPCS (ALT 636 FOR OP/ED): Performed by: STUDENT IN AN ORGANIZED HEALTH CARE EDUCATION/TRAINING PROGRAM

## 2025-07-30 PROCEDURE — 83735 ASSAY OF MAGNESIUM: CPT | Performed by: NURSE PRACTITIONER

## 2025-07-30 PROCEDURE — 96365 THER/PROPH/DIAG IV INF INIT: CPT | Mod: INF

## 2025-07-30 RX ORDER — HEPARIN 100 UNIT/ML
500 SYRINGE INTRAVENOUS AS NEEDED
Status: CANCELLED | OUTPATIENT
Start: 2025-07-30

## 2025-07-30 RX ORDER — HEPARIN SODIUM,PORCINE/PF 10 UNIT/ML
50 SYRINGE (ML) INTRAVENOUS AS NEEDED
Status: CANCELLED | OUTPATIENT
Start: 2025-07-30

## 2025-07-30 RX ORDER — MAGNESIUM SULFATE HEPTAHYDRATE 40 MG/ML
2 INJECTION, SOLUTION INTRAVENOUS ONCE
Status: CANCELLED | OUTPATIENT
Start: 2025-08-01 | End: 2025-08-01

## 2025-07-30 RX ORDER — MAGNESIUM SULFATE HEPTAHYDRATE 40 MG/ML
2 INJECTION, SOLUTION INTRAVENOUS ONCE
Status: COMPLETED | OUTPATIENT
Start: 2025-07-30 | End: 2025-07-30

## 2025-07-30 RX ORDER — MAGNESIUM SULFATE HEPTAHYDRATE 40 MG/ML
4 INJECTION, SOLUTION INTRAVENOUS ONCE
OUTPATIENT
Start: 2025-08-01 | End: 2025-08-01

## 2025-07-30 RX ORDER — HEPARIN 100 UNIT/ML
500 SYRINGE INTRAVENOUS AS NEEDED
Status: DISCONTINUED | OUTPATIENT
Start: 2025-07-30 | End: 2025-07-30 | Stop reason: HOSPADM

## 2025-07-30 RX ORDER — MAGNESIUM SULFATE HEPTAHYDRATE 40 MG/ML
2-4 INJECTION, SOLUTION INTRAVENOUS ONCE
OUTPATIENT
Start: 2025-08-01 | End: 2025-08-01

## 2025-07-30 RX ORDER — HEPARIN SODIUM,PORCINE/PF 10 UNIT/ML
50 SYRINGE (ML) INTRAVENOUS AS NEEDED
Status: DISCONTINUED | OUTPATIENT
Start: 2025-07-30 | End: 2025-07-30 | Stop reason: HOSPADM

## 2025-07-30 RX ADMIN — MAGNESIUM SULFATE 2 G: 2 INJECTION INTRAVENOUS at 10:00

## 2025-07-30 RX ADMIN — HEPARIN 500 UNITS: 100 SYRINGE at 11:07

## 2025-07-30 ASSESSMENT — PAIN SCALES - GENERAL: PAINLEVEL_OUTOF10: 0-NO PAIN

## 2025-07-30 NOTE — TELEPHONE ENCOUNTER
----- Message from Denise MATIAS sent at 2025  2:37 PM EDT -----  Regarding: RE: colonoscopy and eliquis hold    Dr. Jorge Luis Oconnor,      Your patient, Jayant Holland (: 1950), is being scheduled for an endoscopic procedure (EGD and/or colonoscopy). They are currently taking an anticoagulant or antiplatelet medication that is being managed by your office. Prior to scheduling the procedure we need to know if it is okay for the patient to temporarily hold this medication for their procedure.    Low dose Aspirin (81 mg per day) is considered safe for endoscopic procedures and should be continued through the day of their procedure.      Current Anticoagulation: Eliquis    This medication would need to be held starting the following number of days before the procedure(s).    Eliquis (Apixaban): 2 days before procedure (based on patient's most recent kidney function)        Please Respond to this Message at your Earliest Convenience (for ease you may copy and paste one of the following responses into your reply)      _______  It is CURRENTLY OKAY (safe) for this patient to temporarily hold the above medication for the duration listed.      _______  It is okay (safe) for this patient to temporarily hold the above medication, BUT ONLY for ______ days prior to the planned procedure      _______  If it is NOT currently safe for the patient to temporarily hold this medication, please indicate how long they would need to wait until it might be safe to hold it.          Patients are typically able to restart these medications the day after the procedure(s). If there are specific instructions for restarting this medication (i.e. dose titration) that you would like your patient to follow, please contact them directly.      If you have any questions or need additional information please reply to this message or call our office at [phone].      Thank you.        Denise Pena  Medical Assistant      /[facility]    [facility]  [address] [place] [address]  [address] [place] [id number]    Phone: [phone]  Fax: [phone]  ----- Message -----  From: JENNIFER Mosher  Sent: 7/30/2025   2:01 PM EDT  To: Denise Pena MA  Subject: colonoscopy and eliquis hold                     Patient saw thoracic surgery; no re-accumulation of pleural effusion. Please reach out to PCP regarding holding eliquis x2 days prior to procedure. If ok to hold eliquis, ok to schedule in OR with miralax prep.

## 2025-07-31 DIAGNOSIS — E83.42 HYPOMAGNESEMIA SYNDROME: Primary | ICD-10-CM

## 2025-08-01 ENCOUNTER — APPOINTMENT (OUTPATIENT)
Dept: HEMATOLOGY/ONCOLOGY | Facility: CLINIC | Age: 75
End: 2025-08-01
Payer: MEDICARE

## 2025-08-01 ENCOUNTER — INFUSION (OUTPATIENT)
Dept: HEMATOLOGY/ONCOLOGY | Facility: CLINIC | Age: 75
End: 2025-08-01
Payer: MEDICARE

## 2025-08-01 VITALS
HEART RATE: 91 BPM | WEIGHT: 171.6 LBS | OXYGEN SATURATION: 100 % | HEIGHT: 72 IN | DIASTOLIC BLOOD PRESSURE: 80 MMHG | BODY MASS INDEX: 23.24 KG/M2 | TEMPERATURE: 97.7 F | SYSTOLIC BLOOD PRESSURE: 124 MMHG | RESPIRATION RATE: 16 BRPM

## 2025-08-01 DIAGNOSIS — E83.42 HYPOMAGNESEMIA: ICD-10-CM

## 2025-08-01 DIAGNOSIS — C80.1 ADENOCARCINOMA (MULTI): ICD-10-CM

## 2025-08-01 LAB — MAGNESIUM SERPL-MCNC: 1.16 MG/DL (ref 1.6–2.4)

## 2025-08-01 PROCEDURE — 2500000004 HC RX 250 GENERAL PHARMACY W/ HCPCS (ALT 636 FOR OP/ED): Performed by: NURSE PRACTITIONER

## 2025-08-01 PROCEDURE — 96365 THER/PROPH/DIAG IV INF INIT: CPT | Mod: INF

## 2025-08-01 PROCEDURE — 83735 ASSAY OF MAGNESIUM: CPT | Performed by: NURSE PRACTITIONER

## 2025-08-01 PROCEDURE — 2500000004 HC RX 250 GENERAL PHARMACY W/ HCPCS (ALT 636 FOR OP/ED): Performed by: STUDENT IN AN ORGANIZED HEALTH CARE EDUCATION/TRAINING PROGRAM

## 2025-08-01 RX ORDER — HEPARIN 100 UNIT/ML
500 SYRINGE INTRAVENOUS AS NEEDED
OUTPATIENT
Start: 2025-08-01

## 2025-08-01 RX ORDER — MAGNESIUM SULFATE HEPTAHYDRATE 40 MG/ML
2-4 INJECTION, SOLUTION INTRAVENOUS ONCE
OUTPATIENT
Start: 2025-08-04 | End: 2025-08-04

## 2025-08-01 RX ORDER — HEPARIN 100 UNIT/ML
500 SYRINGE INTRAVENOUS AS NEEDED
Status: DISCONTINUED | OUTPATIENT
Start: 2025-08-01 | End: 2025-08-01 | Stop reason: HOSPADM

## 2025-08-01 RX ORDER — MAGNESIUM SULFATE HEPTAHYDRATE 40 MG/ML
2 INJECTION, SOLUTION INTRAVENOUS ONCE
Status: COMPLETED | OUTPATIENT
Start: 2025-08-01 | End: 2025-08-01

## 2025-08-01 RX ORDER — MAGNESIUM SULFATE HEPTAHYDRATE 40 MG/ML
2 INJECTION, SOLUTION INTRAVENOUS ONCE
OUTPATIENT
Start: 2025-08-04 | End: 2025-08-04

## 2025-08-01 RX ORDER — POLYETHYLENE GLYCOL 3350, SODIUM SULFATE ANHYDROUS, SODIUM BICARBONATE, SODIUM CHLORIDE, POTASSIUM CHLORIDE 236; 22.74; 6.74; 5.86; 2.97 G/4L; G/4L; G/4L; G/4L; G/4L
4000 POWDER, FOR SOLUTION ORAL ONCE
Qty: 4000 ML | Refills: 0 | Status: SHIPPED | OUTPATIENT
Start: 2025-08-01 | End: 2025-08-01

## 2025-08-01 RX ORDER — HEPARIN SODIUM,PORCINE/PF 10 UNIT/ML
50 SYRINGE (ML) INTRAVENOUS AS NEEDED
OUTPATIENT
Start: 2025-08-01

## 2025-08-01 RX ORDER — MAGNESIUM SULFATE HEPTAHYDRATE 40 MG/ML
4 INJECTION, SOLUTION INTRAVENOUS ONCE
OUTPATIENT
Start: 2025-08-04 | End: 2025-08-04

## 2025-08-01 RX ADMIN — Medication 500 UNITS: at 09:22

## 2025-08-01 RX ADMIN — MAGNESIUM SULFATE IN WATER 2 G: 40 INJECTION, SOLUTION INTRAVENOUS at 09:21

## 2025-08-01 ASSESSMENT — PAIN SCALES - GENERAL: PAINLEVEL_OUTOF10: 0-NO PAIN

## 2025-08-01 NOTE — TELEPHONE ENCOUNTER
----- Message from Denise MATIAS sent at 2025 10:22 AM EDT -----  Regarding: RE: colonoscopy and eliquis hold  Please advise location and place order for procedure  ----- Message -----  From: JENNIFER Garland DNP  Sent: 2025   3:17 PM EDT  To: Denise Pena MA  Subject: RE: colonoscopy and eliquis hold                 Yes, it is okay to hold Eliquis as indicated.  ----- Message -----  From: Denise Pena MA  Sent: 2025   2:38 PM EDT  To: JENNIFER Garland DNP; Denise Rodriguez  Subject: RE: colonoscopy and eliquis hold                   Dr. Jorge Luis Oconnor,      Your patient, Jayant Holland (: 1950), is being scheduled for an endoscopic procedure (EGD and/or colonoscopy). They are currently taking an anticoagulant or antiplatelet medication that is being managed by your office. Prior to scheduling the procedure we need to know if it is okay for the patient to temporarily hold this medication for their procedure.    Low dose Aspirin (81 mg per day) is considered safe for endoscopic procedures and should be continued through the day of their procedure.      Current Anticoagulation: Eliquis    This medication would need to be held starting the following number of days before the procedure(s).    Eliquis (Apixaban): 2 days before procedure (based on patient's most recent kidney function)        Please Respond to this Message at your Earliest Convenience (for ease you may copy and paste one of the following responses into your reply)      _______  It is CURRENTLY OKAY (safe) for this patient to temporarily hold the above medication for the duration listed.      _______  It is okay (safe) for this patient to temporarily hold the above medication, BUT ONLY for ______ days prior to the planned procedure      _______  If it is NOT currently safe for the patient to temporarily hold this medication, please indicate how long they would need to wait until it might be safe to hold  it.          Patients are typically able to restart these medications the day after the procedure(s). If there are specific instructions for restarting this medication (i.e. dose titration) that you would like your patient to follow, please contact them directly.      If you have any questions or need additional information please reply to this message or call our office at [phone].      Thank you.        Denise Pena  Medical Assistant      /[facility]    [address] [address]    Phone: [phone]  Fax: [phone]  ----- Message -----  From: FORTINO Mosher-CNP  Sent: 7/30/2025   2:01 PM EDT  To: Denise Pena MA  Subject: colonoscopy and eliquis hold                     Patient saw thoracic surgery; no re-accumulation of pleural effusion. Please reach out to PCP regarding holding eliquis x2 days prior to procedure. If ok to hold eliquis, ok to schedule in OR with miralax prep.

## 2025-08-01 NOTE — PROGRESS NOTES
Patient identified by name & . Denies acute distress, none noted at this time. 2 grams magnesium given for serum result of 1.16; Patient returns ; Discharged home, ambulatory, in stable condition.

## 2025-08-04 ENCOUNTER — APPOINTMENT (OUTPATIENT)
Dept: HEMATOLOGY/ONCOLOGY | Facility: CLINIC | Age: 75
End: 2025-08-04
Payer: MEDICARE

## 2025-08-04 ENCOUNTER — INFUSION (OUTPATIENT)
Dept: HEMATOLOGY/ONCOLOGY | Facility: CLINIC | Age: 75
End: 2025-08-04
Payer: MEDICARE

## 2025-08-04 VITALS
OXYGEN SATURATION: 98 % | RESPIRATION RATE: 16 BRPM | HEART RATE: 91 BPM | WEIGHT: 174.9 LBS | HEIGHT: 72 IN | SYSTOLIC BLOOD PRESSURE: 126 MMHG | DIASTOLIC BLOOD PRESSURE: 84 MMHG | TEMPERATURE: 97.9 F | BODY MASS INDEX: 23.69 KG/M2

## 2025-08-04 DIAGNOSIS — E83.42 HYPOMAGNESEMIA: ICD-10-CM

## 2025-08-04 DIAGNOSIS — C80.1 ADENOCARCINOMA (MULTI): ICD-10-CM

## 2025-08-04 LAB — MAGNESIUM SERPL-MCNC: 1.04 MG/DL (ref 1.6–2.4)

## 2025-08-04 PROCEDURE — 83735 ASSAY OF MAGNESIUM: CPT

## 2025-08-04 PROCEDURE — 2500000004 HC RX 250 GENERAL PHARMACY W/ HCPCS (ALT 636 FOR OP/ED): Performed by: NURSE PRACTITIONER

## 2025-08-04 PROCEDURE — 96365 THER/PROPH/DIAG IV INF INIT: CPT | Mod: INF

## 2025-08-04 RX ORDER — MAGNESIUM SULFATE HEPTAHYDRATE 40 MG/ML
4 INJECTION, SOLUTION INTRAVENOUS ONCE
Status: CANCELLED | OUTPATIENT
Start: 2025-08-06 | End: 2025-08-06

## 2025-08-04 RX ORDER — MAGNESIUM SULFATE HEPTAHYDRATE 40 MG/ML
2 INJECTION, SOLUTION INTRAVENOUS ONCE
Status: CANCELLED | OUTPATIENT
Start: 2025-08-06 | End: 2025-08-06

## 2025-08-04 RX ORDER — HEPARIN 100 UNIT/ML
500 SYRINGE INTRAVENOUS AS NEEDED
Status: CANCELLED | OUTPATIENT
Start: 2025-08-04

## 2025-08-04 RX ORDER — MAGNESIUM SULFATE HEPTAHYDRATE 40 MG/ML
2 INJECTION, SOLUTION INTRAVENOUS ONCE
Status: COMPLETED | OUTPATIENT
Start: 2025-08-04 | End: 2025-08-04

## 2025-08-04 RX ORDER — HEPARIN SODIUM,PORCINE/PF 10 UNIT/ML
50 SYRINGE (ML) INTRAVENOUS AS NEEDED
Status: CANCELLED | OUTPATIENT
Start: 2025-08-04

## 2025-08-04 RX ORDER — MAGNESIUM SULFATE HEPTAHYDRATE 40 MG/ML
2-4 INJECTION, SOLUTION INTRAVENOUS ONCE
Status: CANCELLED | OUTPATIENT
Start: 2025-08-06 | End: 2025-08-06

## 2025-08-04 RX ADMIN — MAGNESIUM SULFATE IN WATER 2 G: 40 INJECTION, SOLUTION INTRAVENOUS at 10:09

## 2025-08-04 ASSESSMENT — PAIN SCALES - GENERAL: PAINLEVEL_OUTOF10: 0-NO PAIN

## 2025-08-04 NOTE — PROGRESS NOTES
Patient presents for treatment, has no complaints alert and oriented x 4. CVAD accessed, positive for blood return and flushes,  labs drawn per orders. Patient meets parameters for treatment. Patient tolerated treatment well, no reaction noted. After treatment, Mediport flushed with 10 ml NS followed by 5 ml of 100mg/ml Heparin Flush, Moody Needle removed per practice policy and procedure, site care given with bandaid applied. Pt tolerated procedure well. Patient feels well and has no complaints, vital signs stable. Patient discharged in stable condition with no further needs.

## 2025-08-06 ENCOUNTER — INFUSION (OUTPATIENT)
Dept: HEMATOLOGY/ONCOLOGY | Facility: CLINIC | Age: 75
End: 2025-08-06
Payer: MEDICARE

## 2025-08-06 ENCOUNTER — NUTRITION (OUTPATIENT)
Dept: HEMATOLOGY/ONCOLOGY | Facility: CLINIC | Age: 75
End: 2025-08-06
Payer: MEDICARE

## 2025-08-06 VITALS
RESPIRATION RATE: 18 BRPM | TEMPERATURE: 97 F | BODY MASS INDEX: 23.76 KG/M2 | WEIGHT: 175.2 LBS | DIASTOLIC BLOOD PRESSURE: 80 MMHG | SYSTOLIC BLOOD PRESSURE: 122 MMHG | HEART RATE: 96 BPM | OXYGEN SATURATION: 94 %

## 2025-08-06 DIAGNOSIS — E83.42 HYPOMAGNESEMIA: ICD-10-CM

## 2025-08-06 DIAGNOSIS — C80.1 ADENOCARCINOMA (MULTI): ICD-10-CM

## 2025-08-06 LAB — MAGNESIUM SERPL-MCNC: 2.15 MG/DL (ref 1.6–2.4)

## 2025-08-06 PROCEDURE — 2500000004 HC RX 250 GENERAL PHARMACY W/ HCPCS (ALT 636 FOR OP/ED): Performed by: NURSE PRACTITIONER

## 2025-08-06 PROCEDURE — 2500000004 HC RX 250 GENERAL PHARMACY W/ HCPCS (ALT 636 FOR OP/ED): Performed by: STUDENT IN AN ORGANIZED HEALTH CARE EDUCATION/TRAINING PROGRAM

## 2025-08-06 PROCEDURE — 83735 ASSAY OF MAGNESIUM: CPT | Performed by: NURSE PRACTITIONER

## 2025-08-06 PROCEDURE — 96365 THER/PROPH/DIAG IV INF INIT: CPT | Mod: INF

## 2025-08-06 RX ORDER — HEPARIN SODIUM,PORCINE/PF 10 UNIT/ML
50 SYRINGE (ML) INTRAVENOUS AS NEEDED
Status: DISCONTINUED | OUTPATIENT
Start: 2025-08-06 | End: 2025-08-06 | Stop reason: HOSPADM

## 2025-08-06 RX ORDER — MAGNESIUM SULFATE HEPTAHYDRATE 40 MG/ML
2 INJECTION, SOLUTION INTRAVENOUS ONCE
OUTPATIENT
Start: 2025-08-08 | End: 2025-08-08

## 2025-08-06 RX ORDER — MAGNESIUM SULFATE HEPTAHYDRATE 40 MG/ML
4 INJECTION, SOLUTION INTRAVENOUS ONCE
OUTPATIENT
Start: 2025-08-08 | End: 2025-08-08

## 2025-08-06 RX ORDER — HEPARIN 100 UNIT/ML
500 SYRINGE INTRAVENOUS AS NEEDED
Status: DISCONTINUED | OUTPATIENT
Start: 2025-08-06 | End: 2025-08-06 | Stop reason: HOSPADM

## 2025-08-06 RX ORDER — HEPARIN 100 UNIT/ML
500 SYRINGE INTRAVENOUS AS NEEDED
OUTPATIENT
Start: 2025-08-06

## 2025-08-06 RX ORDER — MAGNESIUM SULFATE HEPTAHYDRATE 40 MG/ML
2 INJECTION, SOLUTION INTRAVENOUS ONCE
Status: COMPLETED | OUTPATIENT
Start: 2025-08-06 | End: 2025-08-06

## 2025-08-06 RX ORDER — MAGNESIUM SULFATE HEPTAHYDRATE 40 MG/ML
2-4 INJECTION, SOLUTION INTRAVENOUS ONCE
OUTPATIENT
Start: 2025-08-08 | End: 2025-08-08

## 2025-08-06 RX ORDER — HEPARIN SODIUM,PORCINE/PF 10 UNIT/ML
50 SYRINGE (ML) INTRAVENOUS AS NEEDED
OUTPATIENT
Start: 2025-08-06

## 2025-08-06 RX ADMIN — HEPARIN 500 UNITS: 100 SYRINGE at 09:42

## 2025-08-06 RX ADMIN — MAGNESIUM SULFATE 2 G: 2 INJECTION INTRAVENOUS at 08:17

## 2025-08-06 ASSESSMENT — PAIN SCALES - GENERAL: PAINLEVEL_OUTOF10: 0-NO PAIN

## 2025-08-06 NOTE — PROGRESS NOTES
NUTRITION Follow up NOTE    Nutrition Assessment     Reason for Visit:  Jayant Holland is a 75 y.o. male who presents for NSCLC Adenocarcinoma, Staging [id number], RUL, R pleaural effusion/pleural carcinomatosis, R hilar, mediastinal retrocaval nodes.  PleurX placed 1/10/2025     PMH: T2DM, HTN, HLD    ACTIVE TREATMENT: Amivantamab + Pemetrexed/Carboplatin, 21 Day Cycles    Admitted 3/20-3/23 with acute pulmonary embolism, Acute left lower extremity deep vein thrombosis and dehydration  Admitted:  7/2-7/9 for SOB; found to have loculated effusion with occlusion of PleurX catheter, underwent lytic therapy through this; + bacterial growth on pleural cultures ,catheter removed    SOCIAL HISTORY  Lives at home with his wife. Retired from [employer], worked as a technician for 48 years, notes hazardous chemical exposures.    Met with patient today in infusion for nutrition follow up. He is here today for Mag infusion     Patient Active Problem List   Diagnosis    Arthritis of knee, degenerative    Benign essential hypertension    Diabetes mellitus (Multi)    Edema    Fatigue    GERD (gastroesophageal reflux disease)    Hematochezia    Hyperlipemia    Hypokalemia    Joint pain, knee    Left knee DJD    Left knee pain    Leg length discrepancy    Cervicalgia    Low back pain with left-sided sciatica    Low vitamin B12 level    Stiffness of left knee    Sciatica    Night sweats    Strain of right shoulder    Medicare annual wellness visit, subsequent    Contact with and (suspected) exposure to covid-19    Contact with and (suspected) exposure to other hazardous substances    Exposure to Agent Orange    Other chest pain    Hearing loss of right ear    Vitamin D deficiency    Knee stiffness    Arthralgia of knee    Osteoarthritis of knee    Osteoarthritis of left knee    Inequality of length of lower extremity    Erectile dysfunction    Inflammation of sacroiliac joint    Strain of shoulder    Exertional dyspnea    Neck pain, bilateral     Bilateral shoulder pain    Neural foraminal stenosis of cervical spine    Arthritis of both shoulder regions    Impingement of shoulder    Chronic post-traumatic stress disorder    Paresthesia of skin    Reaction to severe stress, unspecified    Sensorineural hearing loss, bilateral    Unstable angina (Multi)    Pleural effusion on right    Malignant pleural effusion    Adenocarcinoma (Multi)    Pleural effusion    Encounter for antineoplastic chemotherapy    Hypomagnesemia    Encounter for monoclonal antibody treatment for malignancy    Advance directive in chart    Syncope, unspecified syncope type    Pulmonary emboli    Hospital discharge follow-up    History of pulmonary embolus (PE)    Epistaxis       Nutrition Significant Labs:  Lab Results   Component Value Date/Time    GLUCOSE 133 (H) 07/18/2025 1229     (L) 07/18/2025 1229    K 3.6 07/18/2025 1229     07/18/2025 1229    CO2 23 07/18/2025 1229    ANIONGAP 15 07/18/2025 1229    BUN 9 07/18/2025 1229    CREATININE 0.72 07/18/2025 1229    EGFR >90 07/18/2025 1229    CALCIUM 8.3 (L) 07/18/2025 1229    ALBUMIN 2.7 (L) 07/18/2025 1229    ALKPHOS 111 07/18/2025 1229    PROT 6.8 07/18/2025 1229    AST 18 07/18/2025 1229    BILITOT 0.4 07/18/2025 1229    ALT 24 07/18/2025 1229    MG 1.04 (L) 08/04/2025 0919   Calcium corrected at 9.34 for low albumin  Hypomagnesia, ongoing with current Amivantamab treatment- pt receiving IV mag replacement    Taking K+  and Mag pills    Lab Results   Component Value Date    WBC 7.4 07/18/2025    HGB 8.6 (L) 07/18/2025    HCT 28.3 (L) 07/18/2025    MCV 90 07/18/2025     07/18/2025                  Lab Results   Component Value Date/Time    VITD25 32 01/28/2025 0833     This SmartLink has not been configured with any valid records.      Lab Results   Component Value Date    HGBA1C 8.2 (H) 03/12/2025        Anthropometrics:                       11% weight loss x 3 months, weight is now gradually trending back  upward from lowest weight      Wt Readings from Last 25 Encounters:   08/06/25 79.5 kg (175 lb 3.2 oz)   08/04/25 79.3 kg (174 lb 14.4 oz)   08/01/25 77.8 kg (171 lb 9.6 oz)   07/30/25 77.2 kg (170 lb 3.2 oz)   07/28/25 76.8 kg (169 lb 4.8 oz)   07/25/25 76 kg (167 lb 8 oz)   07/23/25 76 kg (167 lb 9.6 oz)   07/22/25 77.1 kg (170 lb)   07/22/25 75.2 kg (165 lb 12.6 oz)   07/18/25 76.9 kg (169 lb 9.6 oz)   07/16/25 76.1 kg (167 lb 12.8 oz)   07/14/25 76.7 kg (169 lb)   07/14/25 75.8 kg (167 lb)   07/11/25 77.9 kg (171 lb 11.2 oz)   07/03/25 78.9 kg (173 lb 15.1 oz)   07/02/25 79.3 kg (174 lb 12.8 oz)   06/27/25 80.8 kg (178 lb 3.2 oz)   06/25/25 80.6 kg (177 lb 11.2 oz)   06/24/25 79.4 kg (175 lb)   06/23/25 79.4 kg (175 lb 1.6 oz)   06/20/25 79.7 kg (175 lb 11.2 oz)   06/18/25 80.3 kg (177 lb)   06/16/25 80.4 kg (177 lb 3.2 oz)   06/13/25 82.7 kg (182 lb 4.8 oz)   06/11/25 81.4 kg (179 lb 6.4 oz)   5/19/25:  83.4 kg  (183 lbs)  5/2/25: 82.6 kg  (182 lbs)   4/16/25: 82.7 kg (182 lbs)  4/11/25 : 84.2 kg (188 lbs)           Nutrition History:  Food & Nutrition History: Patient reporting decreased appetite and oral intake. He had a significant 21 lb weight loss in the last 3 months.  He denies any specific GI symptoms (n/v/d) just with lack of feeling hungry. He states he stopped drinking the Boost VHC , as he wasn't a fan of the taste. He has now been purchasing Ensure Complete and trying to drink 1-2 bottles per day.  He is eating less solid food so he states he'd like to get up to 3 or 4 bottles.  He is not taking any vitamins/minerals other than what is prescribed to him such as Mag/K .  Weight is now trending up some but difficult to assess any fluid around lung as pleurx cath removed during hospitalization.  Patient in a shared room today thus we did not get detailed information for a 24 hour recall.  Medications:  Current Outpatient Medications   Medication Instructions    apixaban (ELIQUIS) 5 mg, oral, 2 times  daily    Blood glucose monitoring meter kit kit 1 each, As needed    blood-glucose meter misc 1 Device, miscellaneous, Daily    clindamycin (Cleocin T) 1 % lotion Apply topically to affected area twice daily.    dexAMETHasone (Decadron) 4 mg tablet PLEASE SEE ATTACHED FOR DETAILED DIRECTIONS    folic acid (FOLVITE) 1 mg, oral, Daily    hydrocortisone 1 % cream Apply topically to face, hands, feet, neck, back, and chest once daily at bedtime for rash prevention.    lancets 30 gauge misc 1 Device, miscellaneous, 3 times daily    lisinopril 20 mg, Daily    magnesium oxide 400 mg magnesium capsule Every 12 hours    metFORMIN XR (GLUCOPHAGE-XR) 1,000 mg, oral, 2 times daily, Do not crush, chew, or split    ondansetron (ZOFRAN) 8 mg, oral, Every 8 hours PRN    pantoprazole (PROTONIX) 40 mg, oral, Daily before breakfast, Do not crush, chew, or split.    potassium chloride CR 20 mEq ER tablet 40 mEq, oral, Daily, Do not crush, chew, or split.    prochlorperazine (COMPAZINE) 10 mg, oral, Every 6 hours PRN    rosuvastatin (CRESTOR) 40 mg, oral, Daily    sennosides (SENOKOT) 17.2 mg, oral, Nightly PRN    zinc oxide 20 % ointment 1 Application, Topical, 2 times daily, Apply to perirectal area       Nutrition Focused Physical Exam Findings:    Subcutaneous Fat Loss:   Orbital Fat Pads: Mild-Moderate (slight dark circles and slight hollowing)  Buccal Fat Pads: Mild-Moderate (flat cheeks, minimal bounce)  Triceps: Mild-Moderate (less than ample fat tissue)    Muscle Wasting:  Temporalis: Mild-Moderate (slight depression)  Pectoralis (Clavicular Region): Defer  Interosseous: Defer    Physical Findings:        Denies N/V D  Moving bowels regularly    Estimated Needs:       Total Energy Estimated Needs in 24 hours (kCal):  (7198-4894 (30-32 cals/kg))  Total Protein Estimated Needs in 24 Hours (g):  ( g/kg (1.2-1.4 g/kg))  Total Fluid Estimated Needs in 24 Hours (mL):  (2400 mls (30 mls/kg))         Nutrition Diagnosis    Malnutrition Diagnosis  Patient has Malnutrition Diagnosis: Yes  Diagnosis Status: Active  Malnutrition Diagnosis: Moderate malnutrition related to chronic disease or condition  Related to: increased nutrient demands of cancer and inadequate intake  As Evidenced by: intake <75% of nutrition needs, significant wt loss as documented, and physical findings per NFPE. (Patient with gradual trend upward from lowest weight)  Nutrition Diagnosis  Patient has Nutrition Diagnosis: Yes  Diagnosis Status (1): Active  Nutrition Diagnosis 1: Altered nutrition related to laboratory values  Related to (1): suboptimal BS control  As Evidenced by (1): HgbA1c of 8.2 (Highest value in the past 3 years)  Additional Assessment Information (1): Pt would benefit from BS monitoring to assist MD in proper interventions to improve BS control     Nutrition Interventions/Recommendations   Nutrition Prescription: Oral nutrition High Calorie High protein; 4-6 small frequent meals/snacks: -3 Ensure Complete shakes daily, until further improvement in solid intake; 64 oz. caffeine free fluids    Nutrition Interventions:   Food and Nutrient Delivery: Meals & Snacks: Modification of schedule of oral intake, Modify composition of oral intake  Goals: Small frequent meals and snacks- paired with a protein (to include ONS)  Medical Food Supplement: Commercial beverage medical food supplement therapy  Goals: 3 Ensure Complete per day (350 cals/30 g protein in each) (Pt is purchasing from  [facility].  Did provide with Ensure Plus and Complete sample per request- and coupons)     Coordination of Care: Collaboration and referral of nutrition care: Collaboration and Referral of Nutrition Care     Nutrition Education:   Nutrition Education Content: Content related nutrition education   Encouraged 3 ONS shake daily, until solid intake further improves      Ensure Complete 3 x/day provides: 1050 kCal, 90 g PRO, 24 g fat, 126 g CHO, 12 g fiber, and 0 mL  "free water    Provided samples/coupons today to assist with cost savings  -Encouraged 4-6 small frequent meals/snacks /day (ONS can count towards this)  -Discussed that cool moist foods are often more appealing with poor appetite and provided some suggestions (cottage cheese, yogurt, egg or chicken salad, fruit with cheese, cereal with milk, smoothies etc, ).    -Patient does well with bread products thus for calorie optimization may consider bagels with cream cheese or peanut butter, sandwiches, wraps, etc  -Provided patient with \"High calorie meal and snack Ideas\" handout with review (AND)   -Suggested to consider one a day MVI while solid intake sub-optimal  Readiness to Change : Good  Level of Understanding : Good  Anticipated Compliant : Good         Nutrition Monitoring and Evaluation   Food and Nutrient Intake  Monitoring and Evaluation Plan: Fluid intake, Amount of food, Meal/snack pattern, Carbohydrate intake  Energy Intake: Estimated energy intake  Criteria: Meet >75% estim rosa needs  Fluid Intake: Estimated fluid intake  Criteria: Meet daily needs for hydration  Amount of Food: Estimated amout of food  Criteria: Meet energy and protein needs  Meal/Snack Pattern: Estimated meal and snack pattern  Criteria: 5-6 daily to include ONS    Anthropometric measurements  Monitoring and Evaluation Plan: Weight  Body Weight: Body weight  Criteria: Maintain  Biochemical Data, Medical Tests and Procedures  Monitoring and Evaluation Plan: Electrolyte/renal panel, Glucose/endocrine profile  Criteria: WNLNutrition Focused Physical Findings  Monitoring and Evaluation Plan: Adipose, Digestive System, Muscle  Adipose Finding: Loss of subcutaneous fat  Criteria: None further  Digestive System Finding: Other (Comment) (GI symptoms as needed)  Criteria: Manage appropriately wioth diet and meds per MD/CNP  Muscle Finding: Muscle atrophy  Criteria: None further                "

## 2025-08-08 ENCOUNTER — APPOINTMENT (OUTPATIENT)
Dept: HEMATOLOGY/ONCOLOGY | Facility: CLINIC | Age: 75
End: 2025-08-08
Payer: MEDICARE

## 2025-08-08 ENCOUNTER — TELEPHONE (OUTPATIENT)
Facility: CLINIC | Age: 75
End: 2025-08-08
Payer: MEDICARE

## 2025-08-08 NOTE — TELEPHONE ENCOUNTER
Fax from [facility] regarding Safety and Health Consideration for Rosuvastatin scanned to chart

## 2025-08-11 ENCOUNTER — INFUSION (OUTPATIENT)
Dept: HEMATOLOGY/ONCOLOGY | Facility: CLINIC | Age: 75
End: 2025-08-11
Payer: MEDICARE

## 2025-08-11 VITALS
OXYGEN SATURATION: 94 % | DIASTOLIC BLOOD PRESSURE: 79 MMHG | HEART RATE: 82 BPM | RESPIRATION RATE: 18 BRPM | TEMPERATURE: 98.2 F | WEIGHT: 178.1 LBS | SYSTOLIC BLOOD PRESSURE: 121 MMHG | BODY MASS INDEX: 24.15 KG/M2

## 2025-08-11 DIAGNOSIS — E83.42 HYPOMAGNESEMIA: ICD-10-CM

## 2025-08-11 DIAGNOSIS — C80.1 ADENOCARCINOMA (MULTI): ICD-10-CM

## 2025-08-11 LAB
ALBUMIN SERPL BCP-MCNC: 2.4 G/DL (ref 3.4–5)
ALP SERPL-CCNC: 96 U/L (ref 33–136)
ALT SERPL W P-5'-P-CCNC: 12 U/L (ref 10–52)
ANION GAP SERPL CALC-SCNC: 10 MMOL/L (ref 10–20)
AST SERPL W P-5'-P-CCNC: 17 U/L (ref 9–39)
BILIRUB SERPL-MCNC: 0.4 MG/DL (ref 0–1.2)
BUN SERPL-MCNC: 8 MG/DL (ref 6–23)
CALCIUM SERPL-MCNC: 7.9 MG/DL (ref 8.6–10.6)
CHLORIDE SERPL-SCNC: 106 MMOL/L (ref 98–107)
CO2 SERPL-SCNC: 25 MMOL/L (ref 21–32)
CREAT SERPL-MCNC: 0.62 MG/DL (ref 0.5–1.3)
EGFRCR SERPLBLD CKD-EPI 2021: >90 ML/MIN/1.73M*2
ERYTHROCYTE [DISTWIDTH] IN BLOOD BY AUTOMATED COUNT: 19.2 % (ref 11.5–14.5)
GLUCOSE SERPL-MCNC: 129 MG/DL (ref 74–99)
HCT VFR BLD AUTO: 29.7 % (ref 41–52)
HGB BLD-MCNC: 9.1 G/DL (ref 13.5–17.5)
MAGNESIUM SERPL-MCNC: 1.25 MG/DL (ref 1.6–2.4)
MCH RBC QN AUTO: 27.6 PG (ref 26–34)
MCHC RBC AUTO-ENTMCNC: 30.6 G/DL (ref 32–36)
MCV RBC AUTO: 90 FL (ref 80–100)
NRBC BLD-RTO: ABNORMAL /100{WBCS}
PLATELET # BLD AUTO: 204 X10*3/UL (ref 150–450)
POTASSIUM SERPL-SCNC: 3.4 MMOL/L (ref 3.5–5.3)
PROT SERPL-MCNC: 5.3 G/DL (ref 6.4–8.2)
RBC # BLD AUTO: 3.3 X10*6/UL (ref 4.5–5.9)
SODIUM SERPL-SCNC: 138 MMOL/L (ref 136–145)
WBC # BLD AUTO: 4.7 X10*3/UL (ref 4.4–11.3)

## 2025-08-11 PROCEDURE — 80053 COMPREHEN METABOLIC PANEL: CPT | Performed by: HOSPITALIST

## 2025-08-11 PROCEDURE — 2500000004 HC RX 250 GENERAL PHARMACY W/ HCPCS (ALT 636 FOR OP/ED): Performed by: NURSE PRACTITIONER

## 2025-08-11 PROCEDURE — 83735 ASSAY OF MAGNESIUM: CPT | Performed by: NURSE PRACTITIONER

## 2025-08-11 PROCEDURE — 2500000004 HC RX 250 GENERAL PHARMACY W/ HCPCS (ALT 636 FOR OP/ED): Performed by: STUDENT IN AN ORGANIZED HEALTH CARE EDUCATION/TRAINING PROGRAM

## 2025-08-11 PROCEDURE — 85027 COMPLETE CBC AUTOMATED: CPT | Performed by: HOSPITALIST

## 2025-08-11 PROCEDURE — 96365 THER/PROPH/DIAG IV INF INIT: CPT | Mod: INF

## 2025-08-11 RX ORDER — HEPARIN SODIUM,PORCINE/PF 10 UNIT/ML
50 SYRINGE (ML) INTRAVENOUS AS NEEDED
Status: CANCELLED | OUTPATIENT
Start: 2025-08-11

## 2025-08-11 RX ORDER — HEPARIN 100 UNIT/ML
500 SYRINGE INTRAVENOUS AS NEEDED
Status: DISCONTINUED | OUTPATIENT
Start: 2025-08-11 | End: 2025-08-11 | Stop reason: HOSPADM

## 2025-08-11 RX ORDER — MAGNESIUM SULFATE HEPTAHYDRATE 40 MG/ML
2 INJECTION, SOLUTION INTRAVENOUS ONCE
Status: CANCELLED | OUTPATIENT
Start: 2025-08-15 | End: 2025-08-15

## 2025-08-11 RX ORDER — MAGNESIUM SULFATE HEPTAHYDRATE 40 MG/ML
2-4 INJECTION, SOLUTION INTRAVENOUS ONCE
Status: CANCELLED | OUTPATIENT
Start: 2025-08-15 | End: 2025-08-15

## 2025-08-11 RX ORDER — MAGNESIUM SULFATE HEPTAHYDRATE 40 MG/ML
4 INJECTION, SOLUTION INTRAVENOUS ONCE
Status: CANCELLED | OUTPATIENT
Start: 2025-08-15 | End: 2025-08-15

## 2025-08-11 RX ORDER — HEPARIN SODIUM,PORCINE/PF 10 UNIT/ML
50 SYRINGE (ML) INTRAVENOUS AS NEEDED
Status: DISCONTINUED | OUTPATIENT
Start: 2025-08-11 | End: 2025-08-11 | Stop reason: HOSPADM

## 2025-08-11 RX ORDER — MAGNESIUM SULFATE HEPTAHYDRATE 40 MG/ML
2 INJECTION, SOLUTION INTRAVENOUS ONCE
Status: COMPLETED | OUTPATIENT
Start: 2025-08-11 | End: 2025-08-11

## 2025-08-11 RX ORDER — HEPARIN 100 UNIT/ML
500 SYRINGE INTRAVENOUS AS NEEDED
Status: CANCELLED | OUTPATIENT
Start: 2025-08-11

## 2025-08-11 RX ADMIN — MAGNESIUM SULFATE 2 G: 2 INJECTION INTRAVENOUS at 08:22

## 2025-08-11 RX ADMIN — HEPARIN 500 UNITS: 100 SYRINGE at 09:23

## 2025-08-11 ASSESSMENT — PAIN SCALES - GENERAL: PAINLEVEL_OUTOF10: 0-NO PAIN

## 2025-08-13 ENCOUNTER — INFUSION (OUTPATIENT)
Dept: HEMATOLOGY/ONCOLOGY | Facility: CLINIC | Age: 75
End: 2025-08-13
Payer: MEDICARE

## 2025-08-13 ENCOUNTER — OFFICE VISIT (OUTPATIENT)
Dept: HEMATOLOGY/ONCOLOGY | Facility: CLINIC | Age: 75
End: 2025-08-13
Payer: MEDICARE

## 2025-08-13 VITALS
DIASTOLIC BLOOD PRESSURE: 69 MMHG | OXYGEN SATURATION: 95 % | HEART RATE: 97 BPM | WEIGHT: 178.6 LBS | TEMPERATURE: 98.8 F | BODY MASS INDEX: 24.22 KG/M2 | SYSTOLIC BLOOD PRESSURE: 110 MMHG

## 2025-08-13 DIAGNOSIS — C80.1 ADENOCARCINOMA (MULTI): Primary | ICD-10-CM

## 2025-08-13 DIAGNOSIS — C80.1 ADENOCARCINOMA (MULTI): ICD-10-CM

## 2025-08-13 DIAGNOSIS — E83.42 HYPOMAGNESEMIA: ICD-10-CM

## 2025-08-13 DIAGNOSIS — R53.0 NEOPLASTIC MALIGNANT RELATED FATIGUE: ICD-10-CM

## 2025-08-13 LAB
BASOPHILS # BLD AUTO: 0.02 X10*3/UL (ref 0–0.1)
BASOPHILS NFR BLD AUTO: 0.4 %
EOSINOPHIL # BLD AUTO: 0.09 X10*3/UL (ref 0–0.4)
EOSINOPHIL NFR BLD AUTO: 1.7 %
ERYTHROCYTE [DISTWIDTH] IN BLOOD BY AUTOMATED COUNT: 19.6 % (ref 11.5–14.5)
HCT VFR BLD AUTO: 32.1 % (ref 41–52)
HGB BLD-MCNC: 9.5 G/DL (ref 13.5–17.5)
IMM GRANULOCYTES # BLD AUTO: 0.01 X10*3/UL (ref 0–0.5)
IMM GRANULOCYTES NFR BLD AUTO: 0.2 % (ref 0–0.9)
LYMPHOCYTES # BLD AUTO: 0.85 X10*3/UL (ref 0.8–3)
LYMPHOCYTES NFR BLD AUTO: 15.9 %
MAGNESIUM SERPL-MCNC: 1.84 MG/DL (ref 1.6–2.4)
MCH RBC QN AUTO: 27.1 PG (ref 26–34)
MCHC RBC AUTO-ENTMCNC: 29.6 G/DL (ref 32–36)
MCV RBC AUTO: 92 FL (ref 80–100)
MONOCYTES # BLD AUTO: 0.39 X10*3/UL (ref 0.05–0.8)
MONOCYTES NFR BLD AUTO: 7.3 %
NEUTROPHILS # BLD AUTO: 4 X10*3/UL (ref 1.6–5.5)
NEUTROPHILS NFR BLD AUTO: 74.5 %
NRBC BLD-RTO: ABNORMAL /100{WBCS}
PLATELET # BLD AUTO: 208 X10*3/UL (ref 150–450)
RBC # BLD AUTO: 3.5 X10*6/UL (ref 4.5–5.9)
WBC # BLD AUTO: 5.4 X10*3/UL (ref 4.4–11.3)

## 2025-08-13 PROCEDURE — 2500000001 HC RX 250 WO HCPCS SELF ADMINISTERED DRUGS (ALT 637 FOR MEDICARE OP): Performed by: NURSE PRACTITIONER

## 2025-08-13 PROCEDURE — 96413 CHEMO IV INFUSION 1 HR: CPT

## 2025-08-13 PROCEDURE — 1125F AMNT PAIN NOTED PAIN PRSNT: CPT | Performed by: NURSE PRACTITIONER

## 2025-08-13 PROCEDURE — 85025 COMPLETE CBC W/AUTO DIFF WBC: CPT

## 2025-08-13 PROCEDURE — 99213 OFFICE O/P EST LOW 20 MIN: CPT | Performed by: NURSE PRACTITIONER

## 2025-08-13 PROCEDURE — 2500000004 HC RX 250 GENERAL PHARMACY W/ HCPCS (ALT 636 FOR OP/ED): Performed by: NURSE PRACTITIONER

## 2025-08-13 PROCEDURE — 96367 TX/PROPH/DG ADDL SEQ IV INF: CPT

## 2025-08-13 PROCEDURE — 2500000004 HC RX 250 GENERAL PHARMACY W/ HCPCS (ALT 636 FOR OP/ED): Mod: JZ,TB | Performed by: NURSE PRACTITIONER

## 2025-08-13 PROCEDURE — 96375 TX/PRO/DX INJ NEW DRUG ADDON: CPT | Mod: INF

## 2025-08-13 PROCEDURE — 83735 ASSAY OF MAGNESIUM: CPT | Performed by: NURSE PRACTITIONER

## 2025-08-13 PROCEDURE — 4010F ACE/ARB THERAPY RXD/TAKEN: CPT | Performed by: NURSE PRACTITIONER

## 2025-08-13 PROCEDURE — 96409 CHEMO IV PUSH SNGL DRUG: CPT

## 2025-08-13 PROCEDURE — 99213 OFFICE O/P EST LOW 20 MIN: CPT | Mod: 25 | Performed by: NURSE PRACTITIONER

## 2025-08-13 PROCEDURE — 3074F SYST BP LT 130 MM HG: CPT | Performed by: NURSE PRACTITIONER

## 2025-08-13 PROCEDURE — 3078F DIAST BP <80 MM HG: CPT | Performed by: NURSE PRACTITIONER

## 2025-08-13 PROCEDURE — 2500000004 HC RX 250 GENERAL PHARMACY W/ HCPCS (ALT 636 FOR OP/ED): Performed by: STUDENT IN AN ORGANIZED HEALTH CARE EDUCATION/TRAINING PROGRAM

## 2025-08-13 PROCEDURE — 1159F MED LIST DOCD IN RCRD: CPT | Performed by: NURSE PRACTITIONER

## 2025-08-13 PROCEDURE — 96415 CHEMO IV INFUSION ADDL HR: CPT

## 2025-08-13 RX ORDER — DIPHENHYDRAMINE HCL 50 MG
50 CAPSULE ORAL ONCE
Status: COMPLETED | OUTPATIENT
Start: 2025-08-13 | End: 2025-08-13

## 2025-08-13 RX ORDER — HEPARIN SODIUM,PORCINE/PF 10 UNIT/ML
50 SYRINGE (ML) INTRAVENOUS AS NEEDED
Status: CANCELLED | OUTPATIENT
Start: 2025-08-13

## 2025-08-13 RX ORDER — ALBUTEROL SULFATE 0.83 MG/ML
3 SOLUTION RESPIRATORY (INHALATION) AS NEEDED
Status: DISCONTINUED | OUTPATIENT
Start: 2025-08-13 | End: 2025-08-13 | Stop reason: HOSPADM

## 2025-08-13 RX ORDER — FAMOTIDINE 10 MG/ML
20 INJECTION, SOLUTION INTRAVENOUS ONCE AS NEEDED
Status: CANCELLED | OUTPATIENT
Start: 2025-08-13

## 2025-08-13 RX ORDER — PROCHLORPERAZINE MALEATE 10 MG
10 TABLET ORAL EVERY 6 HOURS PRN
Status: CANCELLED | OUTPATIENT
Start: 2025-08-13

## 2025-08-13 RX ORDER — MAGNESIUM SULFATE HEPTAHYDRATE 40 MG/ML
2 INJECTION, SOLUTION INTRAVENOUS ONCE
Status: COMPLETED | OUTPATIENT
Start: 2025-08-13 | End: 2025-08-13

## 2025-08-13 RX ORDER — HEPARIN 100 UNIT/ML
500 SYRINGE INTRAVENOUS AS NEEDED
Status: DISCONTINUED | OUTPATIENT
Start: 2025-08-13 | End: 2025-08-13 | Stop reason: HOSPADM

## 2025-08-13 RX ORDER — PROCHLORPERAZINE EDISYLATE 5 MG/ML
10 INJECTION INTRAMUSCULAR; INTRAVENOUS EVERY 6 HOURS PRN
Status: DISCONTINUED | OUTPATIENT
Start: 2025-08-13 | End: 2025-08-13 | Stop reason: HOSPADM

## 2025-08-13 RX ORDER — PROCHLORPERAZINE EDISYLATE 5 MG/ML
10 INJECTION INTRAMUSCULAR; INTRAVENOUS EVERY 6 HOURS PRN
Status: CANCELLED | OUTPATIENT
Start: 2025-08-13

## 2025-08-13 RX ORDER — EPINEPHRINE 0.3 MG/.3ML
0.3 INJECTION SUBCUTANEOUS EVERY 5 MIN PRN
Status: CANCELLED | OUTPATIENT
Start: 2025-08-13

## 2025-08-13 RX ORDER — MAGNESIUM SULFATE HEPTAHYDRATE 40 MG/ML
2 INJECTION, SOLUTION INTRAVENOUS ONCE
Status: CANCELLED | OUTPATIENT
Start: 2025-08-15 | End: 2025-08-15

## 2025-08-13 RX ORDER — MAGNESIUM SULFATE HEPTAHYDRATE 40 MG/ML
2-4 INJECTION, SOLUTION INTRAVENOUS ONCE
Status: CANCELLED | OUTPATIENT
Start: 2025-08-15 | End: 2025-08-15

## 2025-08-13 RX ORDER — PROCHLORPERAZINE MALEATE 10 MG
10 TABLET ORAL EVERY 6 HOURS PRN
Status: DISCONTINUED | OUTPATIENT
Start: 2025-08-13 | End: 2025-08-13 | Stop reason: HOSPADM

## 2025-08-13 RX ORDER — DIPHENHYDRAMINE HYDROCHLORIDE 50 MG/ML
50 INJECTION, SOLUTION INTRAMUSCULAR; INTRAVENOUS AS NEEDED
Status: DISCONTINUED | OUTPATIENT
Start: 2025-08-13 | End: 2025-08-13 | Stop reason: HOSPADM

## 2025-08-13 RX ORDER — DIPHENHYDRAMINE HCL 50 MG
50 CAPSULE ORAL ONCE
Status: CANCELLED | OUTPATIENT
Start: 2025-08-13

## 2025-08-13 RX ORDER — FAMOTIDINE 10 MG/ML
20 INJECTION, SOLUTION INTRAVENOUS ONCE AS NEEDED
Status: DISCONTINUED | OUTPATIENT
Start: 2025-08-13 | End: 2025-08-13 | Stop reason: HOSPADM

## 2025-08-13 RX ORDER — ALBUTEROL SULFATE 0.83 MG/ML
3 SOLUTION RESPIRATORY (INHALATION) AS NEEDED
Status: CANCELLED | OUTPATIENT
Start: 2025-08-13

## 2025-08-13 RX ORDER — DIPHENHYDRAMINE HYDROCHLORIDE 50 MG/ML
50 INJECTION, SOLUTION INTRAMUSCULAR; INTRAVENOUS AS NEEDED
Status: CANCELLED | OUTPATIENT
Start: 2025-08-13

## 2025-08-13 RX ORDER — HEPARIN SODIUM,PORCINE/PF 10 UNIT/ML
50 SYRINGE (ML) INTRAVENOUS AS NEEDED
Status: DISCONTINUED | OUTPATIENT
Start: 2025-08-13 | End: 2025-08-13 | Stop reason: HOSPADM

## 2025-08-13 RX ORDER — MAGNESIUM SULFATE HEPTAHYDRATE 40 MG/ML
4 INJECTION, SOLUTION INTRAVENOUS ONCE
Status: CANCELLED | OUTPATIENT
Start: 2025-08-15 | End: 2025-08-15

## 2025-08-13 RX ORDER — HEPARIN 100 UNIT/ML
500 SYRINGE INTRAVENOUS AS NEEDED
Status: CANCELLED | OUTPATIENT
Start: 2025-08-13

## 2025-08-13 RX ORDER — ACETAMINOPHEN 325 MG/1
650 TABLET ORAL ONCE
Status: COMPLETED | OUTPATIENT
Start: 2025-08-13 | End: 2025-08-13

## 2025-08-13 RX ORDER — EPINEPHRINE 0.3 MG/.3ML
0.3 INJECTION SUBCUTANEOUS EVERY 5 MIN PRN
Status: DISCONTINUED | OUTPATIENT
Start: 2025-08-13 | End: 2025-08-13 | Stop reason: HOSPADM

## 2025-08-13 RX ORDER — ACETAMINOPHEN 325 MG/1
650 TABLET ORAL ONCE
Status: CANCELLED | OUTPATIENT
Start: 2025-08-13

## 2025-08-13 RX ADMIN — ACETAMINOPHEN 650 MG: 325 TABLET ORAL at 09:09

## 2025-08-13 RX ADMIN — AMIVANTAMAB 2100 MG: 350 INJECTION INTRAVENOUS at 10:30

## 2025-08-13 RX ADMIN — DEXAMETHASONE SODIUM PHOSPHATE 10 MG: 4 INJECTION, SOLUTION INTRA-ARTICULAR; INTRALESIONAL; INTRAMUSCULAR; INTRAVENOUS; SOFT TISSUE at 10:01

## 2025-08-13 RX ADMIN — DIPHENHYDRAMINE HYDROCHLORIDE 50 MG: 50 CAPSULE ORAL at 09:09

## 2025-08-13 RX ADMIN — HEPARIN 500 UNITS: 100 SYRINGE at 12:52

## 2025-08-13 RX ADMIN — PEMETREXED DISODIUM 850 MG: 500 INJECTION, POWDER, LYOPHILIZED, FOR SOLUTION INTRAVENOUS at 10:10

## 2025-08-13 RX ADMIN — MAGNESIUM SULFATE 2 G: 2 INJECTION INTRAVENOUS at 08:58

## 2025-08-13 ASSESSMENT — PAIN SCALES - GENERAL: PAINLEVEL_OUTOF10: 6

## 2025-08-15 ENCOUNTER — INFUSION (OUTPATIENT)
Dept: HEMATOLOGY/ONCOLOGY | Facility: CLINIC | Age: 75
End: 2025-08-15
Payer: MEDICARE

## 2025-08-15 VITALS
DIASTOLIC BLOOD PRESSURE: 85 MMHG | RESPIRATION RATE: 18 BRPM | WEIGHT: 181.88 LBS | BODY MASS INDEX: 24.66 KG/M2 | HEART RATE: 80 BPM | SYSTOLIC BLOOD PRESSURE: 145 MMHG | TEMPERATURE: 96.6 F

## 2025-08-15 DIAGNOSIS — C80.1 ADENOCARCINOMA (MULTI): ICD-10-CM

## 2025-08-15 DIAGNOSIS — E83.42 HYPOMAGNESEMIA: ICD-10-CM

## 2025-08-15 LAB — MAGNESIUM SERPL-MCNC: 1.4 MG/DL (ref 1.6–2.4)

## 2025-08-15 PROCEDURE — 2500000004 HC RX 250 GENERAL PHARMACY W/ HCPCS (ALT 636 FOR OP/ED): Performed by: STUDENT IN AN ORGANIZED HEALTH CARE EDUCATION/TRAINING PROGRAM

## 2025-08-15 PROCEDURE — 83735 ASSAY OF MAGNESIUM: CPT | Performed by: NURSE PRACTITIONER

## 2025-08-15 PROCEDURE — 96365 THER/PROPH/DIAG IV INF INIT: CPT | Mod: INF

## 2025-08-15 PROCEDURE — 2500000004 HC RX 250 GENERAL PHARMACY W/ HCPCS (ALT 636 FOR OP/ED): Performed by: NURSE PRACTITIONER

## 2025-08-15 RX ORDER — HEPARIN SODIUM,PORCINE/PF 10 UNIT/ML
50 SYRINGE (ML) INTRAVENOUS AS NEEDED
OUTPATIENT
Start: 2025-08-15

## 2025-08-15 RX ORDER — HEPARIN SODIUM,PORCINE/PF 10 UNIT/ML
50 SYRINGE (ML) INTRAVENOUS AS NEEDED
Status: DISCONTINUED | OUTPATIENT
Start: 2025-08-15 | End: 2025-08-15 | Stop reason: HOSPADM

## 2025-08-15 RX ORDER — MAGNESIUM SULFATE HEPTAHYDRATE 40 MG/ML
2 INJECTION, SOLUTION INTRAVENOUS ONCE
OUTPATIENT
Start: 2025-08-18 | End: 2025-08-18

## 2025-08-15 RX ORDER — MAGNESIUM SULFATE HEPTAHYDRATE 40 MG/ML
4 INJECTION, SOLUTION INTRAVENOUS ONCE
OUTPATIENT
Start: 2025-08-18 | End: 2025-08-18

## 2025-08-15 RX ORDER — HEPARIN 100 UNIT/ML
500 SYRINGE INTRAVENOUS AS NEEDED
Status: DISCONTINUED | OUTPATIENT
Start: 2025-08-15 | End: 2025-08-15 | Stop reason: HOSPADM

## 2025-08-15 RX ORDER — MAGNESIUM SULFATE HEPTAHYDRATE 40 MG/ML
2 INJECTION, SOLUTION INTRAVENOUS ONCE
Status: COMPLETED | OUTPATIENT
Start: 2025-08-15 | End: 2025-08-15

## 2025-08-15 RX ORDER — HEPARIN 100 UNIT/ML
500 SYRINGE INTRAVENOUS AS NEEDED
OUTPATIENT
Start: 2025-08-15

## 2025-08-15 RX ORDER — MAGNESIUM SULFATE HEPTAHYDRATE 40 MG/ML
2-4 INJECTION, SOLUTION INTRAVENOUS ONCE
OUTPATIENT
Start: 2025-08-18 | End: 2025-08-18

## 2025-08-15 RX ADMIN — MAGNESIUM SULFATE 2 G: 2 INJECTION INTRAVENOUS at 09:58

## 2025-08-15 RX ADMIN — HEPARIN 500 UNITS: 100 SYRINGE at 11:02

## 2025-08-15 ASSESSMENT — PAIN SCALES - GENERAL: PAINLEVEL_OUTOF10: 6

## 2025-08-18 ENCOUNTER — APPOINTMENT (OUTPATIENT)
Dept: HEMATOLOGY/ONCOLOGY | Facility: CLINIC | Age: 75
End: 2025-08-18
Payer: MEDICARE

## 2025-08-18 ENCOUNTER — INFUSION (OUTPATIENT)
Dept: HEMATOLOGY/ONCOLOGY | Facility: CLINIC | Age: 75
End: 2025-08-18
Payer: MEDICARE

## 2025-08-18 VITALS
SYSTOLIC BLOOD PRESSURE: 129 MMHG | DIASTOLIC BLOOD PRESSURE: 71 MMHG | OXYGEN SATURATION: 97 % | WEIGHT: 176.59 LBS | HEART RATE: 103 BPM | BODY MASS INDEX: 23.94 KG/M2 | TEMPERATURE: 98.8 F | RESPIRATION RATE: 18 BRPM

## 2025-08-18 DIAGNOSIS — E83.42 HYPOMAGNESEMIA: ICD-10-CM

## 2025-08-18 DIAGNOSIS — C80.1 ADENOCARCINOMA (MULTI): ICD-10-CM

## 2025-08-18 LAB — MAGNESIUM SERPL-MCNC: 1.21 MG/DL (ref 1.6–2.4)

## 2025-08-18 PROCEDURE — 2500000004 HC RX 250 GENERAL PHARMACY W/ HCPCS (ALT 636 FOR OP/ED): Performed by: STUDENT IN AN ORGANIZED HEALTH CARE EDUCATION/TRAINING PROGRAM

## 2025-08-18 PROCEDURE — 2500000004 HC RX 250 GENERAL PHARMACY W/ HCPCS (ALT 636 FOR OP/ED): Performed by: NURSE PRACTITIONER

## 2025-08-18 PROCEDURE — 96365 THER/PROPH/DIAG IV INF INIT: CPT | Mod: INF

## 2025-08-18 PROCEDURE — 83735 ASSAY OF MAGNESIUM: CPT | Performed by: NURSE PRACTITIONER

## 2025-08-18 RX ORDER — HEPARIN 100 UNIT/ML
500 SYRINGE INTRAVENOUS AS NEEDED
Status: DISCONTINUED | OUTPATIENT
Start: 2025-08-18 | End: 2025-08-18 | Stop reason: HOSPADM

## 2025-08-18 RX ORDER — MAGNESIUM SULFATE HEPTAHYDRATE 40 MG/ML
2-4 INJECTION, SOLUTION INTRAVENOUS ONCE
Status: CANCELLED | OUTPATIENT
Start: 2025-08-20 | End: 2025-08-20

## 2025-08-18 RX ORDER — HEPARIN 100 UNIT/ML
500 SYRINGE INTRAVENOUS AS NEEDED
Status: CANCELLED | OUTPATIENT
Start: 2025-08-18

## 2025-08-18 RX ORDER — HEPARIN SODIUM,PORCINE/PF 10 UNIT/ML
50 SYRINGE (ML) INTRAVENOUS AS NEEDED
Status: DISCONTINUED | OUTPATIENT
Start: 2025-08-18 | End: 2025-08-18 | Stop reason: HOSPADM

## 2025-08-18 RX ORDER — MAGNESIUM SULFATE HEPTAHYDRATE 40 MG/ML
4 INJECTION, SOLUTION INTRAVENOUS ONCE
Status: CANCELLED | OUTPATIENT
Start: 2025-08-20 | End: 2025-08-20

## 2025-08-18 RX ORDER — MAGNESIUM SULFATE HEPTAHYDRATE 40 MG/ML
2 INJECTION, SOLUTION INTRAVENOUS ONCE
Status: COMPLETED | OUTPATIENT
Start: 2025-08-18 | End: 2025-08-18

## 2025-08-18 RX ORDER — HEPARIN SODIUM,PORCINE/PF 10 UNIT/ML
50 SYRINGE (ML) INTRAVENOUS AS NEEDED
Status: CANCELLED | OUTPATIENT
Start: 2025-08-18

## 2025-08-18 RX ORDER — MAGNESIUM SULFATE HEPTAHYDRATE 40 MG/ML
2 INJECTION, SOLUTION INTRAVENOUS ONCE
Status: CANCELLED | OUTPATIENT
Start: 2025-08-20 | End: 2025-08-20

## 2025-08-18 RX ADMIN — MAGNESIUM SULFATE 2 G: 2 INJECTION INTRAVENOUS at 07:43

## 2025-08-18 RX ADMIN — HEPARIN 500 UNITS: 100 SYRINGE at 08:57

## 2025-08-18 ASSESSMENT — PAIN SCALES - GENERAL: PAINLEVEL_OUTOF10: 5

## 2025-08-20 ENCOUNTER — INFUSION (OUTPATIENT)
Dept: HEMATOLOGY/ONCOLOGY | Facility: CLINIC | Age: 75
End: 2025-08-20
Payer: MEDICARE

## 2025-08-20 ENCOUNTER — NUTRITION (OUTPATIENT)
Dept: HEMATOLOGY/ONCOLOGY | Facility: CLINIC | Age: 75
End: 2025-08-20
Payer: MEDICARE

## 2025-08-20 VITALS
SYSTOLIC BLOOD PRESSURE: 124 MMHG | DIASTOLIC BLOOD PRESSURE: 79 MMHG | WEIGHT: 176.6 LBS | HEART RATE: 107 BPM | BODY MASS INDEX: 23.95 KG/M2 | OXYGEN SATURATION: 94 % | TEMPERATURE: 100 F | RESPIRATION RATE: 18 BRPM

## 2025-08-20 DIAGNOSIS — C80.1 ADENOCARCINOMA (MULTI): ICD-10-CM

## 2025-08-20 DIAGNOSIS — E83.42 HYPOMAGNESEMIA: ICD-10-CM

## 2025-08-20 LAB — MAGNESIUM SERPL-MCNC: 1.31 MG/DL (ref 1.6–2.4)

## 2025-08-20 PROCEDURE — 96365 THER/PROPH/DIAG IV INF INIT: CPT | Mod: INF

## 2025-08-20 PROCEDURE — 83735 ASSAY OF MAGNESIUM: CPT | Performed by: NURSE PRACTITIONER

## 2025-08-20 PROCEDURE — 2500000004 HC RX 250 GENERAL PHARMACY W/ HCPCS (ALT 636 FOR OP/ED): Performed by: STUDENT IN AN ORGANIZED HEALTH CARE EDUCATION/TRAINING PROGRAM

## 2025-08-20 PROCEDURE — 2500000004 HC RX 250 GENERAL PHARMACY W/ HCPCS (ALT 636 FOR OP/ED): Performed by: NURSE PRACTITIONER

## 2025-08-20 RX ORDER — MAGNESIUM SULFATE HEPTAHYDRATE 40 MG/ML
2 INJECTION, SOLUTION INTRAVENOUS ONCE
Status: CANCELLED | OUTPATIENT
Start: 2025-08-22 | End: 2025-08-22

## 2025-08-20 RX ORDER — HEPARIN SODIUM,PORCINE/PF 10 UNIT/ML
50 SYRINGE (ML) INTRAVENOUS AS NEEDED
Status: DISCONTINUED | OUTPATIENT
Start: 2025-08-20 | End: 2025-08-20 | Stop reason: HOSPADM

## 2025-08-20 RX ORDER — HEPARIN 100 UNIT/ML
500 SYRINGE INTRAVENOUS AS NEEDED
Status: DISCONTINUED | OUTPATIENT
Start: 2025-08-20 | End: 2025-08-20 | Stop reason: HOSPADM

## 2025-08-20 RX ORDER — HEPARIN 100 UNIT/ML
500 SYRINGE INTRAVENOUS AS NEEDED
Status: CANCELLED | OUTPATIENT
Start: 2025-08-20

## 2025-08-20 RX ORDER — HEPARIN SODIUM,PORCINE/PF 10 UNIT/ML
50 SYRINGE (ML) INTRAVENOUS AS NEEDED
Status: CANCELLED | OUTPATIENT
Start: 2025-08-20

## 2025-08-20 RX ORDER — MAGNESIUM SULFATE HEPTAHYDRATE 40 MG/ML
2 INJECTION, SOLUTION INTRAVENOUS ONCE
Status: COMPLETED | OUTPATIENT
Start: 2025-08-20 | End: 2025-08-20

## 2025-08-20 RX ORDER — MAGNESIUM SULFATE HEPTAHYDRATE 40 MG/ML
2-4 INJECTION, SOLUTION INTRAVENOUS ONCE
Status: CANCELLED | OUTPATIENT
Start: 2025-08-22 | End: 2025-08-22

## 2025-08-20 RX ORDER — MAGNESIUM SULFATE HEPTAHYDRATE 40 MG/ML
4 INJECTION, SOLUTION INTRAVENOUS ONCE
Status: CANCELLED | OUTPATIENT
Start: 2025-08-22 | End: 2025-08-22

## 2025-08-20 RX ADMIN — HEPARIN 500 UNITS: 100 SYRINGE at 09:10

## 2025-08-20 RX ADMIN — MAGNESIUM SULFATE 2 G: 2 INJECTION INTRAVENOUS at 08:08

## 2025-08-20 ASSESSMENT — PAIN SCALES - GENERAL: PAINLEVEL_OUTOF10: 0-NO PAIN

## 2025-08-22 ENCOUNTER — INFUSION (OUTPATIENT)
Dept: HEMATOLOGY/ONCOLOGY | Facility: CLINIC | Age: 75
End: 2025-08-22
Payer: MEDICARE

## 2025-08-22 VITALS
DIASTOLIC BLOOD PRESSURE: 86 MMHG | WEIGHT: 174.38 LBS | TEMPERATURE: 98.4 F | OXYGEN SATURATION: 96 % | SYSTOLIC BLOOD PRESSURE: 131 MMHG | BODY MASS INDEX: 23.65 KG/M2 | RESPIRATION RATE: 18 BRPM | HEART RATE: 102 BPM

## 2025-08-22 DIAGNOSIS — E83.42 HYPOMAGNESEMIA: ICD-10-CM

## 2025-08-22 DIAGNOSIS — R91.8 MASS OF UPPER LOBE OF RIGHT LUNG: ICD-10-CM

## 2025-08-22 DIAGNOSIS — C80.1 ADENOCARCINOMA (MULTI): ICD-10-CM

## 2025-08-22 LAB — MAGNESIUM SERPL-MCNC: 1.43 MG/DL (ref 1.6–2.4)

## 2025-08-22 PROCEDURE — 2500000004 HC RX 250 GENERAL PHARMACY W/ HCPCS (ALT 636 FOR OP/ED): Performed by: STUDENT IN AN ORGANIZED HEALTH CARE EDUCATION/TRAINING PROGRAM

## 2025-08-22 PROCEDURE — 2500000004 HC RX 250 GENERAL PHARMACY W/ HCPCS (ALT 636 FOR OP/ED): Performed by: NURSE PRACTITIONER

## 2025-08-22 PROCEDURE — 83735 ASSAY OF MAGNESIUM: CPT | Performed by: NURSE PRACTITIONER

## 2025-08-22 PROCEDURE — 96365 THER/PROPH/DIAG IV INF INIT: CPT | Mod: INF

## 2025-08-22 RX ORDER — HEPARIN 100 UNIT/ML
500 SYRINGE INTRAVENOUS AS NEEDED
Status: DISCONTINUED | OUTPATIENT
Start: 2025-08-22 | End: 2025-08-22 | Stop reason: HOSPADM

## 2025-08-22 RX ORDER — MAGNESIUM SULFATE HEPTAHYDRATE 40 MG/ML
4 INJECTION, SOLUTION INTRAVENOUS ONCE
OUTPATIENT
Start: 2025-08-25 | End: 2025-08-25

## 2025-08-22 RX ORDER — MAGNESIUM SULFATE HEPTAHYDRATE 40 MG/ML
2 INJECTION, SOLUTION INTRAVENOUS ONCE
Status: COMPLETED | OUTPATIENT
Start: 2025-08-22 | End: 2025-08-22

## 2025-08-22 RX ORDER — HEPARIN 100 UNIT/ML
500 SYRINGE INTRAVENOUS AS NEEDED
OUTPATIENT
Start: 2025-08-22

## 2025-08-22 RX ORDER — HEPARIN SODIUM,PORCINE/PF 10 UNIT/ML
50 SYRINGE (ML) INTRAVENOUS AS NEEDED
Status: DISCONTINUED | OUTPATIENT
Start: 2025-08-22 | End: 2025-08-22 | Stop reason: HOSPADM

## 2025-08-22 RX ORDER — HEPARIN SODIUM,PORCINE/PF 10 UNIT/ML
50 SYRINGE (ML) INTRAVENOUS AS NEEDED
OUTPATIENT
Start: 2025-08-22

## 2025-08-22 RX ORDER — MAGNESIUM SULFATE HEPTAHYDRATE 40 MG/ML
2 INJECTION, SOLUTION INTRAVENOUS ONCE
OUTPATIENT
Start: 2025-08-25 | End: 2025-08-25

## 2025-08-22 RX ORDER — MAGNESIUM SULFATE HEPTAHYDRATE 40 MG/ML
2-4 INJECTION, SOLUTION INTRAVENOUS ONCE
OUTPATIENT
Start: 2025-08-25 | End: 2025-08-25

## 2025-08-22 RX ADMIN — MAGNESIUM SULFATE 2 G: 2 INJECTION INTRAVENOUS at 11:44

## 2025-08-22 RX ADMIN — HEPARIN 500 UNITS: 100 SYRINGE at 12:46

## 2025-08-22 ASSESSMENT — PAIN SCALES - GENERAL: PAINLEVEL_OUTOF10: 0-NO PAIN

## 2025-08-25 ENCOUNTER — INFUSION (OUTPATIENT)
Dept: HEMATOLOGY/ONCOLOGY | Facility: CLINIC | Age: 75
End: 2025-08-25
Payer: MEDICARE

## 2025-08-25 ENCOUNTER — APPOINTMENT (OUTPATIENT)
Dept: HEMATOLOGY/ONCOLOGY | Facility: CLINIC | Age: 75
End: 2025-08-25
Payer: MEDICARE

## 2025-08-25 VITALS
TEMPERATURE: 99 F | HEART RATE: 95 BPM | RESPIRATION RATE: 18 BRPM | BODY MASS INDEX: 23.69 KG/M2 | OXYGEN SATURATION: 95 % | SYSTOLIC BLOOD PRESSURE: 125 MMHG | DIASTOLIC BLOOD PRESSURE: 80 MMHG | WEIGHT: 174.71 LBS

## 2025-08-25 DIAGNOSIS — E83.42 HYPOMAGNESEMIA: ICD-10-CM

## 2025-08-25 DIAGNOSIS — C80.1 ADENOCARCINOMA (MULTI): ICD-10-CM

## 2025-08-25 LAB — MAGNESIUM SERPL-MCNC: 1.38 MG/DL (ref 1.6–2.4)

## 2025-08-25 PROCEDURE — 2500000004 HC RX 250 GENERAL PHARMACY W/ HCPCS (ALT 636 FOR OP/ED): Performed by: STUDENT IN AN ORGANIZED HEALTH CARE EDUCATION/TRAINING PROGRAM

## 2025-08-25 PROCEDURE — 2500000004 HC RX 250 GENERAL PHARMACY W/ HCPCS (ALT 636 FOR OP/ED): Performed by: NURSE PRACTITIONER

## 2025-08-25 PROCEDURE — 83735 ASSAY OF MAGNESIUM: CPT | Performed by: NURSE PRACTITIONER

## 2025-08-25 PROCEDURE — 96365 THER/PROPH/DIAG IV INF INIT: CPT | Mod: INF

## 2025-08-25 RX ORDER — HEPARIN 100 UNIT/ML
500 SYRINGE INTRAVENOUS AS NEEDED
Status: CANCELLED | OUTPATIENT
Start: 2025-08-25

## 2025-08-25 RX ORDER — MAGNESIUM SULFATE HEPTAHYDRATE 40 MG/ML
2 INJECTION, SOLUTION INTRAVENOUS ONCE
Status: COMPLETED | OUTPATIENT
Start: 2025-08-25 | End: 2025-08-25

## 2025-08-25 RX ORDER — MAGNESIUM SULFATE HEPTAHYDRATE 40 MG/ML
4 INJECTION, SOLUTION INTRAVENOUS ONCE
Status: CANCELLED | OUTPATIENT
Start: 2025-08-27 | End: 2025-08-27

## 2025-08-25 RX ORDER — HEPARIN SODIUM,PORCINE/PF 10 UNIT/ML
50 SYRINGE (ML) INTRAVENOUS AS NEEDED
Status: DISCONTINUED | OUTPATIENT
Start: 2025-08-25 | End: 2025-08-25 | Stop reason: HOSPADM

## 2025-08-25 RX ORDER — HEPARIN 100 UNIT/ML
500 SYRINGE INTRAVENOUS AS NEEDED
Status: DISCONTINUED | OUTPATIENT
Start: 2025-08-25 | End: 2025-08-25 | Stop reason: HOSPADM

## 2025-08-25 RX ORDER — HEPARIN SODIUM,PORCINE/PF 10 UNIT/ML
50 SYRINGE (ML) INTRAVENOUS AS NEEDED
Status: CANCELLED | OUTPATIENT
Start: 2025-08-25

## 2025-08-25 RX ORDER — MAGNESIUM SULFATE HEPTAHYDRATE 40 MG/ML
2 INJECTION, SOLUTION INTRAVENOUS ONCE
Status: CANCELLED | OUTPATIENT
Start: 2025-08-27 | End: 2025-08-27

## 2025-08-25 RX ORDER — MAGNESIUM SULFATE HEPTAHYDRATE 40 MG/ML
2-4 INJECTION, SOLUTION INTRAVENOUS ONCE
Status: CANCELLED | OUTPATIENT
Start: 2025-08-27 | End: 2025-08-27

## 2025-08-25 RX ADMIN — HEPARIN 500 UNITS: 100 SYRINGE at 09:48

## 2025-08-25 RX ADMIN — MAGNESIUM SULFATE 2 G: 2 INJECTION INTRAVENOUS at 08:42

## 2025-08-25 ASSESSMENT — PAIN SCALES - GENERAL: PAINLEVEL_OUTOF10: 5

## 2025-08-27 ENCOUNTER — INFUSION (OUTPATIENT)
Dept: HEMATOLOGY/ONCOLOGY | Facility: CLINIC | Age: 75
End: 2025-08-27
Payer: MEDICARE

## 2025-08-27 ENCOUNTER — APPOINTMENT (OUTPATIENT)
Dept: HEMATOLOGY/ONCOLOGY | Facility: CLINIC | Age: 75
End: 2025-08-27
Payer: MEDICARE

## 2025-08-27 VITALS
SYSTOLIC BLOOD PRESSURE: 141 MMHG | HEART RATE: 93 BPM | RESPIRATION RATE: 16 BRPM | WEIGHT: 178.57 LBS | TEMPERATURE: 97.5 F | BODY MASS INDEX: 24.21 KG/M2 | DIASTOLIC BLOOD PRESSURE: 70 MMHG

## 2025-08-27 DIAGNOSIS — C80.1 ADENOCARCINOMA (MULTI): ICD-10-CM

## 2025-08-27 DIAGNOSIS — E83.42 HYPOMAGNESEMIA: ICD-10-CM

## 2025-08-27 LAB
BASOPHILS # BLD AUTO: 0.01 X10*3/UL (ref 0–0.1)
BASOPHILS NFR BLD AUTO: 0.2 %
EOSINOPHIL # BLD AUTO: 0.11 X10*3/UL (ref 0–0.4)
EOSINOPHIL NFR BLD AUTO: 2.2 %
ERYTHROCYTE [DISTWIDTH] IN BLOOD BY AUTOMATED COUNT: 19 % (ref 11.5–14.5)
HCT VFR BLD AUTO: 30.3 % (ref 41–52)
HGB BLD-MCNC: 9.2 G/DL (ref 13.5–17.5)
IMM GRANULOCYTES # BLD AUTO: 0.01 X10*3/UL (ref 0–0.5)
IMM GRANULOCYTES NFR BLD AUTO: 0.2 % (ref 0–0.9)
LYMPHOCYTES # BLD AUTO: 1.21 X10*3/UL (ref 0.8–3)
LYMPHOCYTES NFR BLD AUTO: 23.7 %
MCH RBC QN AUTO: 27.1 PG (ref 26–34)
MCHC RBC AUTO-ENTMCNC: 30.4 G/DL (ref 32–36)
MCV RBC AUTO: 89 FL (ref 80–100)
MONOCYTES # BLD AUTO: 0.5 X10*3/UL (ref 0.05–0.8)
MONOCYTES NFR BLD AUTO: 9.8 %
NEUTROPHILS # BLD AUTO: 3.26 X10*3/UL (ref 1.6–5.5)
NEUTROPHILS NFR BLD AUTO: 63.9 %
NRBC BLD-RTO: ABNORMAL /100{WBCS}
PLATELET # BLD AUTO: 225 X10*3/UL (ref 150–450)
RBC # BLD AUTO: 3.4 X10*6/UL (ref 4.5–5.9)
WBC # BLD AUTO: 5.1 X10*3/UL (ref 4.4–11.3)

## 2025-08-27 PROCEDURE — 2500000004 HC RX 250 GENERAL PHARMACY W/ HCPCS (ALT 636 FOR OP/ED): Performed by: STUDENT IN AN ORGANIZED HEALTH CARE EDUCATION/TRAINING PROGRAM

## 2025-08-27 PROCEDURE — 82043 UR ALBUMIN QUANTITATIVE: CPT | Performed by: NURSE PRACTITIONER

## 2025-08-27 PROCEDURE — 2500000004 HC RX 250 GENERAL PHARMACY W/ HCPCS (ALT 636 FOR OP/ED): Performed by: NURSE PRACTITIONER

## 2025-08-27 PROCEDURE — 80061 LIPID PANEL: CPT | Performed by: NURSE PRACTITIONER

## 2025-08-27 PROCEDURE — 83735 ASSAY OF MAGNESIUM: CPT | Performed by: NURSE PRACTITIONER

## 2025-08-27 PROCEDURE — 85025 COMPLETE CBC W/AUTO DIFF WBC: CPT

## 2025-08-27 PROCEDURE — 96365 THER/PROPH/DIAG IV INF INIT: CPT | Mod: INF

## 2025-08-27 PROCEDURE — 80053 COMPREHEN METABOLIC PANEL: CPT | Performed by: NURSE PRACTITIONER

## 2025-08-27 RX ORDER — MAGNESIUM SULFATE HEPTAHYDRATE 40 MG/ML
4 INJECTION, SOLUTION INTRAVENOUS ONCE
Status: CANCELLED | OUTPATIENT
Start: 2025-08-29 | End: 2025-08-29

## 2025-08-27 RX ORDER — HEPARIN SODIUM,PORCINE/PF 10 UNIT/ML
50 SYRINGE (ML) INTRAVENOUS AS NEEDED
Status: CANCELLED | OUTPATIENT
Start: 2025-08-27

## 2025-08-27 RX ORDER — MAGNESIUM SULFATE HEPTAHYDRATE 40 MG/ML
2-4 INJECTION, SOLUTION INTRAVENOUS ONCE
Status: CANCELLED | OUTPATIENT
Start: 2025-08-29 | End: 2025-08-29

## 2025-08-27 RX ORDER — HEPARIN 100 UNIT/ML
500 SYRINGE INTRAVENOUS AS NEEDED
Status: CANCELLED | OUTPATIENT
Start: 2025-08-27

## 2025-08-27 RX ORDER — MAGNESIUM SULFATE HEPTAHYDRATE 40 MG/ML
2 INJECTION, SOLUTION INTRAVENOUS ONCE
Status: COMPLETED | OUTPATIENT
Start: 2025-08-27 | End: 2025-08-27

## 2025-08-27 RX ORDER — MAGNESIUM SULFATE HEPTAHYDRATE 40 MG/ML
2 INJECTION, SOLUTION INTRAVENOUS ONCE
Status: CANCELLED | OUTPATIENT
Start: 2025-08-29 | End: 2025-08-29

## 2025-08-27 RX ORDER — HEPARIN 100 UNIT/ML
500 SYRINGE INTRAVENOUS AS NEEDED
Status: DISCONTINUED | OUTPATIENT
Start: 2025-08-27 | End: 2025-08-27 | Stop reason: HOSPADM

## 2025-08-27 RX ORDER — HEPARIN SODIUM,PORCINE/PF 10 UNIT/ML
50 SYRINGE (ML) INTRAVENOUS AS NEEDED
Status: DISCONTINUED | OUTPATIENT
Start: 2025-08-27 | End: 2025-08-27 | Stop reason: HOSPADM

## 2025-08-27 RX ADMIN — MAGNESIUM SULFATE 2 G: 2 INJECTION INTRAVENOUS at 09:11

## 2025-08-27 RX ADMIN — HEPARIN 500 UNITS: 100 SYRINGE at 10:19

## 2025-08-27 ASSESSMENT — PAIN SCALES - GENERAL: PAINLEVEL_OUTOF10: 0-NO PAIN

## 2025-08-28 LAB
ALBUMIN SERPL BCP-MCNC: 2.6 G/DL (ref 3.4–5)
ALP SERPL-CCNC: 106 U/L (ref 33–136)
ALT SERPL W P-5'-P-CCNC: 10 U/L (ref 10–52)
ANION GAP SERPL CALC-SCNC: 11 MMOL/L (ref 10–20)
AST SERPL W P-5'-P-CCNC: 17 U/L (ref 9–39)
BILIRUB SERPL-MCNC: 0.2 MG/DL (ref 0–1.2)
BUN SERPL-MCNC: 5 MG/DL (ref 6–23)
CALCIUM SERPL-MCNC: 8 MG/DL (ref 8.6–10.6)
CHLORIDE SERPL-SCNC: 106 MMOL/L (ref 98–107)
CHOLEST SERPL-MCNC: 113 MG/DL (ref 0–199)
CHOLESTEROL/HDL RATIO: 3
CO2 SERPL-SCNC: 26 MMOL/L (ref 21–32)
CREAT SERPL-MCNC: 0.61 MG/DL (ref 0.5–1.3)
CREAT UR-MCNC: 259.1 MG/DL (ref 20–370)
EGFRCR SERPLBLD CKD-EPI 2021: >90 ML/MIN/1.73M*2
GLUCOSE SERPL-MCNC: 107 MG/DL (ref 74–99)
HDLC SERPL-MCNC: 37.3 MG/DL
LDLC SERPL CALC-MCNC: 67 MG/DL
MAGNESIUM SERPL-MCNC: 1.27 MG/DL (ref 1.6–2.4)
MICROALBUMIN UR-MCNC: 21.7 MG/L
MICROALBUMIN/CREAT UR: 8.4 UG/MG CREAT
NON HDL CHOLESTEROL: 76 MG/DL (ref 0–149)
POTASSIUM SERPL-SCNC: 3.6 MMOL/L (ref 3.5–5.3)
PROT SERPL-MCNC: 5.7 G/DL (ref 6.4–8.2)
SODIUM SERPL-SCNC: 139 MMOL/L (ref 136–145)
TRIGL SERPL-MCNC: 45 MG/DL (ref 0–149)
VLDL: 9 MG/DL (ref 0–40)

## 2025-08-29 ENCOUNTER — INFUSION (OUTPATIENT)
Dept: HEMATOLOGY/ONCOLOGY | Facility: CLINIC | Age: 75
End: 2025-08-29
Payer: MEDICARE

## 2025-08-29 VITALS
BODY MASS INDEX: 24.14 KG/M2 | WEIGHT: 178.2 LBS | SYSTOLIC BLOOD PRESSURE: 123 MMHG | TEMPERATURE: 97.3 F | OXYGEN SATURATION: 98 % | RESPIRATION RATE: 16 BRPM | HEART RATE: 100 BPM | DIASTOLIC BLOOD PRESSURE: 82 MMHG | HEIGHT: 72 IN

## 2025-08-29 DIAGNOSIS — C80.1 ADENOCARCINOMA (MULTI): ICD-10-CM

## 2025-08-29 DIAGNOSIS — E83.42 HYPOMAGNESEMIA: ICD-10-CM

## 2025-08-29 DIAGNOSIS — C80.1 ADENOCARCINOMA (MULTI): Primary | ICD-10-CM

## 2025-08-29 PROCEDURE — 96365 THER/PROPH/DIAG IV INF INIT: CPT | Mod: INF

## 2025-08-29 PROCEDURE — 2500000004 HC RX 250 GENERAL PHARMACY W/ HCPCS (ALT 636 FOR OP/ED): Performed by: NURSE PRACTITIONER

## 2025-08-29 PROCEDURE — 2500000004 HC RX 250 GENERAL PHARMACY W/ HCPCS (ALT 636 FOR OP/ED): Performed by: STUDENT IN AN ORGANIZED HEALTH CARE EDUCATION/TRAINING PROGRAM

## 2025-08-29 RX ORDER — HEPARIN 100 UNIT/ML
500 SYRINGE INTRAVENOUS AS NEEDED
Status: DISCONTINUED | OUTPATIENT
Start: 2025-08-29 | End: 2025-08-29 | Stop reason: HOSPADM

## 2025-08-29 RX ORDER — MAGNESIUM SULFATE HEPTAHYDRATE 40 MG/ML
2 INJECTION, SOLUTION INTRAVENOUS ONCE
OUTPATIENT
Start: 2025-08-29 | End: 2025-08-29

## 2025-08-29 RX ORDER — MAGNESIUM SULFATE HEPTAHYDRATE 40 MG/ML
2-4 INJECTION, SOLUTION INTRAVENOUS ONCE
Status: CANCELLED | OUTPATIENT
Start: 2025-09-02 | End: 2025-09-02

## 2025-08-29 RX ORDER — MAGNESIUM SULFATE HEPTAHYDRATE 40 MG/ML
4 INJECTION, SOLUTION INTRAVENOUS ONCE
OUTPATIENT
Start: 2025-08-29 | End: 2025-08-29

## 2025-08-29 RX ORDER — MAGNESIUM SULFATE HEPTAHYDRATE 40 MG/ML
2-4 INJECTION, SOLUTION INTRAVENOUS ONCE
OUTPATIENT
Start: 2025-08-29 | End: 2025-08-29

## 2025-08-29 RX ORDER — MAGNESIUM SULFATE HEPTAHYDRATE 40 MG/ML
4 INJECTION, SOLUTION INTRAVENOUS ONCE
Status: CANCELLED | OUTPATIENT
Start: 2025-09-02 | End: 2025-09-02

## 2025-08-29 RX ORDER — MAGNESIUM SULFATE HEPTAHYDRATE 40 MG/ML
2 INJECTION, SOLUTION INTRAVENOUS ONCE
Status: CANCELLED | OUTPATIENT
Start: 2025-09-02 | End: 2025-09-02

## 2025-08-29 RX ORDER — MAGNESIUM SULFATE HEPTAHYDRATE 40 MG/ML
2 INJECTION, SOLUTION INTRAVENOUS ONCE
Status: COMPLETED | OUTPATIENT
Start: 2025-08-29 | End: 2025-08-29

## 2025-08-29 RX ORDER — HEPARIN 100 UNIT/ML
500 SYRINGE INTRAVENOUS AS NEEDED
OUTPATIENT
Start: 2025-08-29

## 2025-08-29 RX ORDER — HEPARIN SODIUM,PORCINE/PF 10 UNIT/ML
50 SYRINGE (ML) INTRAVENOUS AS NEEDED
OUTPATIENT
Start: 2025-08-29

## 2025-08-29 RX ADMIN — MAGNESIUM SULFATE IN WATER 2 G: 40 INJECTION, SOLUTION INTRAVENOUS at 08:46

## 2025-08-29 RX ADMIN — Medication 500 UNITS: at 09:44

## 2025-08-29 ASSESSMENT — PAIN SCALES - GENERAL: PAINLEVEL_OUTOF10: 5

## 2025-09-02 ENCOUNTER — APPOINTMENT (OUTPATIENT)
Dept: HEMATOLOGY/ONCOLOGY | Facility: CLINIC | Age: 75
End: 2025-09-02
Payer: MEDICARE

## 2025-09-02 ENCOUNTER — INFUSION (OUTPATIENT)
Dept: HEMATOLOGY/ONCOLOGY | Facility: CLINIC | Age: 75
End: 2025-09-02
Payer: MEDICARE

## 2025-09-02 VITALS
HEART RATE: 85 BPM | RESPIRATION RATE: 18 BRPM | TEMPERATURE: 97.9 F | BODY MASS INDEX: 24.24 KG/M2 | DIASTOLIC BLOOD PRESSURE: 74 MMHG | SYSTOLIC BLOOD PRESSURE: 132 MMHG | OXYGEN SATURATION: 98 % | WEIGHT: 178.8 LBS

## 2025-09-02 DIAGNOSIS — E83.42 HYPOMAGNESEMIA: ICD-10-CM

## 2025-09-02 DIAGNOSIS — C80.1 ADENOCARCINOMA (MULTI): ICD-10-CM

## 2025-09-02 LAB
ALBUMIN SERPL BCP-MCNC: 2.6 G/DL (ref 3.4–5)
ALP SERPL-CCNC: 96 U/L (ref 33–136)
ALT SERPL W P-5'-P-CCNC: 12 U/L (ref 10–52)
ANION GAP SERPL CALC-SCNC: 10 MMOL/L (ref 10–20)
AST SERPL W P-5'-P-CCNC: 18 U/L (ref 9–39)
BASOPHILS # BLD AUTO: 0.02 X10*3/UL (ref 0–0.1)
BASOPHILS NFR BLD AUTO: 0.4 %
BILIRUB SERPL-MCNC: 0.3 MG/DL (ref 0–1.2)
BUN SERPL-MCNC: 7 MG/DL (ref 6–23)
CALCIUM SERPL-MCNC: 8.1 MG/DL (ref 8.6–10.6)
CHLORIDE SERPL-SCNC: 108 MMOL/L (ref 98–107)
CO2 SERPL-SCNC: 26 MMOL/L (ref 21–32)
CREAT SERPL-MCNC: 0.66 MG/DL (ref 0.5–1.3)
EGFRCR SERPLBLD CKD-EPI 2021: >90 ML/MIN/1.73M*2
EOSINOPHIL # BLD AUTO: 0.11 X10*3/UL (ref 0–0.4)
EOSINOPHIL NFR BLD AUTO: 2.2 %
ERYTHROCYTE [DISTWIDTH] IN BLOOD BY AUTOMATED COUNT: 19 % (ref 11.5–14.5)
GLUCOSE SERPL-MCNC: 110 MG/DL (ref 74–99)
HCT VFR BLD AUTO: 31 % (ref 41–52)
HGB BLD-MCNC: 9.3 G/DL (ref 13.5–17.5)
IMM GRANULOCYTES # BLD AUTO: 0.01 X10*3/UL (ref 0–0.5)
IMM GRANULOCYTES NFR BLD AUTO: 0.2 % (ref 0–0.9)
LYMPHOCYTES # BLD AUTO: 1.27 X10*3/UL (ref 0.8–3)
LYMPHOCYTES NFR BLD AUTO: 25.6 %
MAGNESIUM SERPL-MCNC: 1.33 MG/DL (ref 1.6–2.4)
MCH RBC QN AUTO: 27.1 PG (ref 26–34)
MCHC RBC AUTO-ENTMCNC: 30 G/DL (ref 32–36)
MCV RBC AUTO: 90 FL (ref 80–100)
MONOCYTES # BLD AUTO: 0.49 X10*3/UL (ref 0.05–0.8)
MONOCYTES NFR BLD AUTO: 9.9 %
NEUTROPHILS # BLD AUTO: 3.06 X10*3/UL (ref 1.6–5.5)
NEUTROPHILS NFR BLD AUTO: 61.7 %
NRBC BLD-RTO: ABNORMAL /100{WBCS}
PLATELET # BLD AUTO: 318 X10*3/UL (ref 150–450)
POTASSIUM SERPL-SCNC: 3.6 MMOL/L (ref 3.5–5.3)
PROT SERPL-MCNC: 6.1 G/DL (ref 6.4–8.2)
RBC # BLD AUTO: 3.43 X10*6/UL (ref 4.5–5.9)
SODIUM SERPL-SCNC: 140 MMOL/L (ref 136–145)
WBC # BLD AUTO: 5 X10*3/UL (ref 4.4–11.3)

## 2025-09-02 PROCEDURE — 2500000004 HC RX 250 GENERAL PHARMACY W/ HCPCS (ALT 636 FOR OP/ED): Performed by: NURSE PRACTITIONER

## 2025-09-02 PROCEDURE — 85025 COMPLETE CBC W/AUTO DIFF WBC: CPT

## 2025-09-02 PROCEDURE — 2500000004 HC RX 250 GENERAL PHARMACY W/ HCPCS (ALT 636 FOR OP/ED): Performed by: STUDENT IN AN ORGANIZED HEALTH CARE EDUCATION/TRAINING PROGRAM

## 2025-09-02 PROCEDURE — 96365 THER/PROPH/DIAG IV INF INIT: CPT | Mod: INF

## 2025-09-02 PROCEDURE — 83735 ASSAY OF MAGNESIUM: CPT | Performed by: NURSE PRACTITIONER

## 2025-09-02 PROCEDURE — 84075 ASSAY ALKALINE PHOSPHATASE: CPT

## 2025-09-02 RX ORDER — MAGNESIUM SULFATE HEPTAHYDRATE 40 MG/ML
2 INJECTION, SOLUTION INTRAVENOUS ONCE
Status: CANCELLED | OUTPATIENT
Start: 2025-09-05 | End: 2025-09-05

## 2025-09-02 RX ORDER — MAGNESIUM SULFATE HEPTAHYDRATE 40 MG/ML
2 INJECTION, SOLUTION INTRAVENOUS ONCE
Status: COMPLETED | OUTPATIENT
Start: 2025-09-02 | End: 2025-09-02

## 2025-09-02 RX ORDER — HEPARIN 100 UNIT/ML
500 SYRINGE INTRAVENOUS AS NEEDED
Status: DISCONTINUED | OUTPATIENT
Start: 2025-09-02 | End: 2025-09-02 | Stop reason: HOSPADM

## 2025-09-02 RX ORDER — MAGNESIUM SULFATE HEPTAHYDRATE 40 MG/ML
4 INJECTION, SOLUTION INTRAVENOUS ONCE
Status: CANCELLED | OUTPATIENT
Start: 2025-09-05 | End: 2025-09-05

## 2025-09-02 RX ORDER — MAGNESIUM SULFATE HEPTAHYDRATE 40 MG/ML
2-4 INJECTION, SOLUTION INTRAVENOUS ONCE
Status: CANCELLED | OUTPATIENT
Start: 2025-09-05 | End: 2025-09-05

## 2025-09-02 RX ORDER — HEPARIN SODIUM,PORCINE/PF 10 UNIT/ML
50 SYRINGE (ML) INTRAVENOUS AS NEEDED
Status: CANCELLED | OUTPATIENT
Start: 2025-09-02

## 2025-09-02 RX ORDER — HEPARIN 100 UNIT/ML
500 SYRINGE INTRAVENOUS AS NEEDED
Status: CANCELLED | OUTPATIENT
Start: 2025-09-02

## 2025-09-02 RX ADMIN — MAGNESIUM SULFATE 2 G: 2 INJECTION INTRAVENOUS at 08:55

## 2025-09-02 RX ADMIN — HEPARIN 500 UNITS: 100 SYRINGE at 10:01

## 2025-09-02 ASSESSMENT — PAIN SCALES - GENERAL: PAINLEVEL_OUTOF10: 0-NO PAIN

## 2025-09-03 ENCOUNTER — OFFICE VISIT (OUTPATIENT)
Dept: HEMATOLOGY/ONCOLOGY | Facility: CLINIC | Age: 75
End: 2025-09-03
Payer: MEDICARE

## 2025-09-03 ENCOUNTER — TELEPHONE (OUTPATIENT)
Dept: HEMATOLOGY/ONCOLOGY | Facility: HOSPITAL | Age: 75
End: 2025-09-03
Payer: MEDICARE

## 2025-09-03 ENCOUNTER — INFUSION (OUTPATIENT)
Dept: HEMATOLOGY/ONCOLOGY | Facility: CLINIC | Age: 75
End: 2025-09-03
Payer: MEDICARE

## 2025-09-03 VITALS
BODY MASS INDEX: 24.26 KG/M2 | DIASTOLIC BLOOD PRESSURE: 53 MMHG | OXYGEN SATURATION: 96 % | HEART RATE: 94 BPM | SYSTOLIC BLOOD PRESSURE: 120 MMHG | TEMPERATURE: 98.1 F | WEIGHT: 178.9 LBS

## 2025-09-03 DIAGNOSIS — E83.42 HYPOMAGNESEMIA: ICD-10-CM

## 2025-09-03 DIAGNOSIS — C80.1 ADENOCARCINOMA (MULTI): Primary | ICD-10-CM

## 2025-09-03 DIAGNOSIS — C80.1 ADENOCARCINOMA (MULTI): ICD-10-CM

## 2025-09-03 LAB — MAGNESIUM SERPL-MCNC: 1.58 MG/DL (ref 1.6–2.4)

## 2025-09-03 PROCEDURE — 99213 OFFICE O/P EST LOW 20 MIN: CPT | Mod: 25 | Performed by: NURSE PRACTITIONER

## 2025-09-03 PROCEDURE — 2500000004 HC RX 250 GENERAL PHARMACY W/ HCPCS (ALT 636 FOR OP/ED): Performed by: NURSE PRACTITIONER

## 2025-09-03 PROCEDURE — 96372 THER/PROPH/DIAG INJ SC/IM: CPT

## 2025-09-03 PROCEDURE — 1159F MED LIST DOCD IN RCRD: CPT | Performed by: NURSE PRACTITIONER

## 2025-09-03 PROCEDURE — 3052F HG A1C>EQUAL 8.0%<EQUAL 9.0%: CPT | Performed by: NURSE PRACTITIONER

## 2025-09-03 PROCEDURE — 4010F ACE/ARB THERAPY RXD/TAKEN: CPT | Performed by: NURSE PRACTITIONER

## 2025-09-03 PROCEDURE — 3061F NEG MICROALBUMINURIA REV: CPT | Performed by: NURSE PRACTITIONER

## 2025-09-03 PROCEDURE — 1126F AMNT PAIN NOTED NONE PRSNT: CPT | Performed by: NURSE PRACTITIONER

## 2025-09-03 PROCEDURE — 3078F DIAST BP <80 MM HG: CPT | Performed by: NURSE PRACTITIONER

## 2025-09-03 PROCEDURE — 96415 CHEMO IV INFUSION ADDL HR: CPT

## 2025-09-03 PROCEDURE — 96375 TX/PRO/DX INJ NEW DRUG ADDON: CPT | Mod: INF

## 2025-09-03 PROCEDURE — 83735 ASSAY OF MAGNESIUM: CPT

## 2025-09-03 PROCEDURE — 96367 TX/PROPH/DG ADDL SEQ IV INF: CPT

## 2025-09-03 PROCEDURE — 3074F SYST BP LT 130 MM HG: CPT | Performed by: NURSE PRACTITIONER

## 2025-09-03 PROCEDURE — 96413 CHEMO IV INFUSION 1 HR: CPT

## 2025-09-03 PROCEDURE — 2500000004 HC RX 250 GENERAL PHARMACY W/ HCPCS (ALT 636 FOR OP/ED): Mod: TB | Performed by: NURSE PRACTITIONER

## 2025-09-03 PROCEDURE — 96411 CHEMO IV PUSH ADDL DRUG: CPT

## 2025-09-03 PROCEDURE — 3048F LDL-C <100 MG/DL: CPT | Performed by: NURSE PRACTITIONER

## 2025-09-03 PROCEDURE — 2500000001 HC RX 250 WO HCPCS SELF ADMINISTERED DRUGS (ALT 637 FOR MEDICARE OP): Performed by: NURSE PRACTITIONER

## 2025-09-03 PROCEDURE — 2500000004 HC RX 250 GENERAL PHARMACY W/ HCPCS (ALT 636 FOR OP/ED): Performed by: STUDENT IN AN ORGANIZED HEALTH CARE EDUCATION/TRAINING PROGRAM

## 2025-09-03 PROCEDURE — 99213 OFFICE O/P EST LOW 20 MIN: CPT | Performed by: NURSE PRACTITIONER

## 2025-09-03 RX ORDER — DOXYCYCLINE 100 MG/1
100 CAPSULE ORAL 2 TIMES DAILY
Qty: 60 CAPSULE | Refills: 0 | Status: SHIPPED | OUTPATIENT
Start: 2025-09-03 | End: 2025-10-03

## 2025-09-03 RX ORDER — PROCHLORPERAZINE MALEATE 10 MG
10 TABLET ORAL EVERY 6 HOURS PRN
Status: CANCELLED | OUTPATIENT
Start: 2025-09-03

## 2025-09-03 RX ORDER — MAGNESIUM SULFATE HEPTAHYDRATE 40 MG/ML
2 INJECTION, SOLUTION INTRAVENOUS ONCE
Status: CANCELLED | OUTPATIENT
Start: 2025-09-05 | End: 2025-09-05

## 2025-09-03 RX ORDER — PROCHLORPERAZINE EDISYLATE 5 MG/ML
10 INJECTION INTRAMUSCULAR; INTRAVENOUS EVERY 6 HOURS PRN
Status: CANCELLED | OUTPATIENT
Start: 2025-09-03

## 2025-09-03 RX ORDER — ACETAMINOPHEN 325 MG/1
650 TABLET ORAL ONCE
Status: COMPLETED | OUTPATIENT
Start: 2025-09-03 | End: 2025-09-03

## 2025-09-03 RX ORDER — MAGNESIUM SULFATE HEPTAHYDRATE 40 MG/ML
2 INJECTION, SOLUTION INTRAVENOUS ONCE
Status: COMPLETED | OUTPATIENT
Start: 2025-09-03 | End: 2025-09-03

## 2025-09-03 RX ORDER — FAMOTIDINE 10 MG/ML
20 INJECTION, SOLUTION INTRAVENOUS ONCE AS NEEDED
Status: CANCELLED | OUTPATIENT
Start: 2025-09-03

## 2025-09-03 RX ORDER — HEPARIN SODIUM,PORCINE/PF 10 UNIT/ML
50 SYRINGE (ML) INTRAVENOUS AS NEEDED
Status: DISCONTINUED | OUTPATIENT
Start: 2025-09-03 | End: 2025-09-03 | Stop reason: HOSPADM

## 2025-09-03 RX ORDER — DIPHENHYDRAMINE HYDROCHLORIDE 50 MG/ML
50 INJECTION, SOLUTION INTRAMUSCULAR; INTRAVENOUS AS NEEDED
Status: DISCONTINUED | OUTPATIENT
Start: 2025-09-03 | End: 2025-09-03 | Stop reason: HOSPADM

## 2025-09-03 RX ORDER — ACETAMINOPHEN 325 MG/1
650 TABLET ORAL ONCE
Status: CANCELLED | OUTPATIENT
Start: 2025-09-03

## 2025-09-03 RX ORDER — EPINEPHRINE 0.3 MG/.3ML
0.3 INJECTION SUBCUTANEOUS EVERY 5 MIN PRN
Status: CANCELLED | OUTPATIENT
Start: 2025-09-03

## 2025-09-03 RX ORDER — HEPARIN 100 UNIT/ML
500 SYRINGE INTRAVENOUS AS NEEDED
Status: DISCONTINUED | OUTPATIENT
Start: 2025-09-03 | End: 2025-09-03 | Stop reason: HOSPADM

## 2025-09-03 RX ORDER — CYANOCOBALAMIN 1000 UG/ML
1000 INJECTION, SOLUTION INTRAMUSCULAR; SUBCUTANEOUS ONCE
Status: COMPLETED | OUTPATIENT
Start: 2025-09-03 | End: 2025-09-03

## 2025-09-03 RX ORDER — HEPARIN SODIUM,PORCINE/PF 10 UNIT/ML
50 SYRINGE (ML) INTRAVENOUS AS NEEDED
Status: CANCELLED | OUTPATIENT
Start: 2025-09-03

## 2025-09-03 RX ORDER — PROCHLORPERAZINE EDISYLATE 5 MG/ML
10 INJECTION INTRAMUSCULAR; INTRAVENOUS EVERY 6 HOURS PRN
Status: DISCONTINUED | OUTPATIENT
Start: 2025-09-03 | End: 2025-09-03 | Stop reason: HOSPADM

## 2025-09-03 RX ORDER — ALBUTEROL SULFATE 0.83 MG/ML
3 SOLUTION RESPIRATORY (INHALATION) AS NEEDED
Status: DISCONTINUED | OUTPATIENT
Start: 2025-09-03 | End: 2025-09-03 | Stop reason: HOSPADM

## 2025-09-03 RX ORDER — HEPARIN 100 UNIT/ML
500 SYRINGE INTRAVENOUS AS NEEDED
Status: CANCELLED | OUTPATIENT
Start: 2025-09-03

## 2025-09-03 RX ORDER — DIPHENHYDRAMINE HCL 50 MG
50 CAPSULE ORAL ONCE
Status: CANCELLED | OUTPATIENT
Start: 2025-09-03

## 2025-09-03 RX ORDER — CYANOCOBALAMIN 1000 UG/ML
1000 INJECTION, SOLUTION INTRAMUSCULAR; SUBCUTANEOUS ONCE
Status: CANCELLED | OUTPATIENT
Start: 2025-09-03

## 2025-09-03 RX ORDER — DIPHENHYDRAMINE HYDROCHLORIDE 50 MG/ML
50 INJECTION, SOLUTION INTRAMUSCULAR; INTRAVENOUS AS NEEDED
Status: CANCELLED | OUTPATIENT
Start: 2025-09-03

## 2025-09-03 RX ORDER — ALBUTEROL SULFATE 0.83 MG/ML
3 SOLUTION RESPIRATORY (INHALATION) AS NEEDED
Status: CANCELLED | OUTPATIENT
Start: 2025-09-03

## 2025-09-03 RX ORDER — MAGNESIUM SULFATE HEPTAHYDRATE 40 MG/ML
2-4 INJECTION, SOLUTION INTRAVENOUS ONCE
Status: CANCELLED | OUTPATIENT
Start: 2025-09-05 | End: 2025-09-05

## 2025-09-03 RX ORDER — EPINEPHRINE 0.3 MG/.3ML
0.3 INJECTION SUBCUTANEOUS EVERY 5 MIN PRN
Status: DISCONTINUED | OUTPATIENT
Start: 2025-09-03 | End: 2025-09-03 | Stop reason: HOSPADM

## 2025-09-03 RX ORDER — FAMOTIDINE 10 MG/ML
20 INJECTION, SOLUTION INTRAVENOUS ONCE AS NEEDED
Status: DISCONTINUED | OUTPATIENT
Start: 2025-09-03 | End: 2025-09-03 | Stop reason: HOSPADM

## 2025-09-03 RX ORDER — DIPHENHYDRAMINE HCL 50 MG
50 CAPSULE ORAL ONCE
Status: COMPLETED | OUTPATIENT
Start: 2025-09-03 | End: 2025-09-03

## 2025-09-03 RX ORDER — PROCHLORPERAZINE MALEATE 10 MG
10 TABLET ORAL EVERY 6 HOURS PRN
Status: DISCONTINUED | OUTPATIENT
Start: 2025-09-03 | End: 2025-09-03 | Stop reason: HOSPADM

## 2025-09-03 RX ORDER — MAGNESIUM SULFATE HEPTAHYDRATE 40 MG/ML
4 INJECTION, SOLUTION INTRAVENOUS ONCE
Status: CANCELLED | OUTPATIENT
Start: 2025-09-05 | End: 2025-09-05

## 2025-09-03 RX ADMIN — PEMETREXED DISODIUM 850 MG: 100 INJECTION, POWDER, LYOPHILIZED, FOR SOLUTION INTRAVENOUS at 12:07

## 2025-09-03 RX ADMIN — AMIVANTAMAB 2100 MG: 350 INJECTION INTRAVENOUS at 12:27

## 2025-09-03 RX ADMIN — HEPARIN 500 UNITS: 100 SYRINGE at 15:00

## 2025-09-03 RX ADMIN — ACETAMINOPHEN 650 MG: 325 TABLET ORAL at 10:57

## 2025-09-03 RX ADMIN — CYANOCOBALAMIN 1000 MCG: 1000 INJECTION, SOLUTION INTRAMUSCULAR at 10:58

## 2025-09-03 RX ADMIN — DIPHENHYDRAMINE HYDROCHLORIDE 50 MG: 50 CAPSULE ORAL at 10:57

## 2025-09-03 RX ADMIN — DEXAMETHASONE SODIUM PHOSPHATE 10 MG: 4 INJECTION INTRA-ARTICULAR; INTRALESIONAL; INTRAMUSCULAR; INTRAVENOUS; SOFT TISSUE at 10:57

## 2025-09-03 RX ADMIN — MAGNESIUM SULFATE 2 G: 2 INJECTION INTRAVENOUS at 10:58

## 2025-09-03 ASSESSMENT — PAIN SCALES - GENERAL: PAINLEVEL_OUTOF10: 0-NO PAIN

## 2025-09-05 ENCOUNTER — APPOINTMENT (OUTPATIENT)
Dept: HEMATOLOGY/ONCOLOGY | Facility: CLINIC | Age: 75
End: 2025-09-05
Payer: MEDICARE

## 2025-09-05 ENCOUNTER — APPOINTMENT (OUTPATIENT)
Dept: PRIMARY CARE | Facility: CLINIC | Age: 75
End: 2025-09-05
Payer: MEDICARE

## 2025-09-05 ENCOUNTER — INFUSION (OUTPATIENT)
Dept: HEMATOLOGY/ONCOLOGY | Facility: CLINIC | Age: 75
End: 2025-09-05
Payer: MEDICARE

## 2025-09-05 VITALS
SYSTOLIC BLOOD PRESSURE: 143 MMHG | TEMPERATURE: 97.2 F | DIASTOLIC BLOOD PRESSURE: 77 MMHG | BODY MASS INDEX: 24.99 KG/M2 | WEIGHT: 184.3 LBS | HEART RATE: 87 BPM | OXYGEN SATURATION: 94 % | RESPIRATION RATE: 18 BRPM

## 2025-09-05 DIAGNOSIS — E83.42 HYPOMAGNESEMIA: ICD-10-CM

## 2025-09-05 DIAGNOSIS — C80.1 ADENOCARCINOMA (MULTI): ICD-10-CM

## 2025-09-05 LAB — MAGNESIUM SERPL-MCNC: 1.48 MG/DL (ref 1.6–2.4)

## 2025-09-05 PROCEDURE — 96365 THER/PROPH/DIAG IV INF INIT: CPT | Mod: INF

## 2025-09-05 PROCEDURE — 2500000004 HC RX 250 GENERAL PHARMACY W/ HCPCS (ALT 636 FOR OP/ED): Performed by: NURSE PRACTITIONER

## 2025-09-05 PROCEDURE — 2500000004 HC RX 250 GENERAL PHARMACY W/ HCPCS (ALT 636 FOR OP/ED): Performed by: STUDENT IN AN ORGANIZED HEALTH CARE EDUCATION/TRAINING PROGRAM

## 2025-09-05 PROCEDURE — 83735 ASSAY OF MAGNESIUM: CPT | Performed by: NURSE PRACTITIONER

## 2025-09-05 RX ORDER — HEPARIN 100 UNIT/ML
500 SYRINGE INTRAVENOUS AS NEEDED
OUTPATIENT
Start: 2025-09-05

## 2025-09-05 RX ORDER — HEPARIN SODIUM,PORCINE/PF 10 UNIT/ML
50 SYRINGE (ML) INTRAVENOUS AS NEEDED
OUTPATIENT
Start: 2025-09-05

## 2025-09-05 RX ORDER — MAGNESIUM SULFATE HEPTAHYDRATE 40 MG/ML
2-4 INJECTION, SOLUTION INTRAVENOUS ONCE
OUTPATIENT
Start: 2025-09-08 | End: 2025-09-08

## 2025-09-05 RX ORDER — MAGNESIUM SULFATE HEPTAHYDRATE 40 MG/ML
2 INJECTION, SOLUTION INTRAVENOUS ONCE
Status: COMPLETED | OUTPATIENT
Start: 2025-09-05 | End: 2025-09-05

## 2025-09-05 RX ORDER — MAGNESIUM SULFATE HEPTAHYDRATE 40 MG/ML
2 INJECTION, SOLUTION INTRAVENOUS ONCE
OUTPATIENT
Start: 2025-09-08 | End: 2025-09-08

## 2025-09-05 RX ORDER — HEPARIN SODIUM,PORCINE/PF 10 UNIT/ML
50 SYRINGE (ML) INTRAVENOUS AS NEEDED
Status: DISCONTINUED | OUTPATIENT
Start: 2025-09-05 | End: 2025-09-05 | Stop reason: HOSPADM

## 2025-09-05 RX ORDER — HEPARIN 100 UNIT/ML
500 SYRINGE INTRAVENOUS AS NEEDED
Status: DISCONTINUED | OUTPATIENT
Start: 2025-09-05 | End: 2025-09-05 | Stop reason: HOSPADM

## 2025-09-05 RX ORDER — MAGNESIUM SULFATE HEPTAHYDRATE 40 MG/ML
4 INJECTION, SOLUTION INTRAVENOUS ONCE
OUTPATIENT
Start: 2025-09-08 | End: 2025-09-08

## 2025-09-05 RX ADMIN — MAGNESIUM SULFATE 2 G: 2 INJECTION INTRAVENOUS at 09:11

## 2025-09-05 RX ADMIN — HEPARIN 500 UNITS: 100 SYRINGE at 10:10

## 2025-09-05 ASSESSMENT — PAIN SCALES - GENERAL: PAINLEVEL_OUTOF10: 0-NO PAIN

## 2025-09-08 ENCOUNTER — APPOINTMENT (OUTPATIENT)
Dept: HEMATOLOGY/ONCOLOGY | Facility: CLINIC | Age: 75
End: 2025-09-08
Payer: MEDICARE

## 2025-09-08 ENCOUNTER — APPOINTMENT (OUTPATIENT)
Dept: PRIMARY CARE | Facility: CLINIC | Age: 75
End: 2025-09-08
Payer: MEDICARE

## 2025-09-10 ENCOUNTER — APPOINTMENT (OUTPATIENT)
Dept: HEMATOLOGY/ONCOLOGY | Facility: CLINIC | Age: 75
End: 2025-09-10
Payer: MEDICARE

## 2025-09-17 ENCOUNTER — APPOINTMENT (OUTPATIENT)
Dept: HEMATOLOGY/ONCOLOGY | Facility: CLINIC | Age: 75
End: 2025-09-17
Payer: MEDICARE

## 2026-03-05 ENCOUNTER — APPOINTMENT (OUTPATIENT)
Dept: PRIMARY CARE | Facility: CLINIC | Age: 76
End: 2026-03-05
Payer: MEDICARE

## 2026-03-06 ENCOUNTER — APPOINTMENT (OUTPATIENT)
Dept: PRIMARY CARE | Facility: CLINIC | Age: 76
End: 2026-03-06
Payer: MEDICARE